# Patient Record
Sex: FEMALE | Race: WHITE
[De-identification: names, ages, dates, MRNs, and addresses within clinical notes are randomized per-mention and may not be internally consistent; named-entity substitution may affect disease eponyms.]

---

## 2019-03-07 ENCOUNTER — HOSPITAL ENCOUNTER (OUTPATIENT)
Dept: HOSPITAL 8 - STAR | Age: 74
Discharge: HOME | End: 2019-03-07
Attending: NEUROLOGICAL SURGERY
Payer: MEDICARE

## 2019-03-07 DIAGNOSIS — M43.16: Primary | ICD-10-CM

## 2019-03-07 DIAGNOSIS — M47.814: ICD-10-CM

## 2019-03-07 DIAGNOSIS — I44.1: ICD-10-CM

## 2019-03-07 DIAGNOSIS — R79.1: ICD-10-CM

## 2019-03-07 DIAGNOSIS — R82.998: ICD-10-CM

## 2019-03-07 LAB
ANION GAP SERPL CALC-SCNC: 4 MMOL/L (ref 5–15)
APTT BLD: 34 SECONDS (ref 25–31)
BASOPHILS # BLD AUTO: 0.02 X10^3/UL (ref 0–0.1)
BASOPHILS NFR BLD AUTO: 0 % (ref 0–1)
CALCIUM SERPL-MCNC: 8.6 MG/DL (ref 8.5–10.1)
CHLORIDE SERPL-SCNC: 109 MMOL/L (ref 98–107)
CREAT SERPL-MCNC: 0.86 MG/DL (ref 0.55–1.02)
CULTURE INDICATED?: YES
EOSINOPHIL # BLD AUTO: 0 X10^3/UL (ref 0–0.4)
EOSINOPHIL NFR BLD AUTO: 0 % (ref 1–7)
ERYTHROCYTE [DISTWIDTH] IN BLOOD BY AUTOMATED COUNT: 15.6 % (ref 9.6–15.2)
INR PPP: 1 (ref 0.93–1.1)
LYMPHOCYTES # BLD AUTO: 0.99 X10^3/UL (ref 1–3.4)
LYMPHOCYTES NFR BLD AUTO: 17 % (ref 22–44)
MCH RBC QN AUTO: 30.9 PG (ref 27–34.8)
MCHC RBC AUTO-ENTMCNC: 33.2 G/DL (ref 32.4–35.8)
MCV RBC AUTO: 92.9 FL (ref 80–100)
MD: NO
MICROSCOPIC: (no result)
MONOCYTES # BLD AUTO: 0.37 X10^3/UL (ref 0.2–0.8)
MONOCYTES NFR BLD AUTO: 6 % (ref 2–9)
NEUTROPHILS # BLD AUTO: 4.57 X10^3/UL (ref 1.8–6.8)
NEUTROPHILS NFR BLD AUTO: 77 % (ref 42–75)
PLATELET # BLD AUTO: 277 X10^3/UL (ref 130–400)
PMV BLD AUTO: 8.5 FL (ref 7.4–10.4)
PROTHROMBIN TIME: 10.5 SECONDS (ref 9.6–11.5)
RBC # BLD AUTO: 4.66 X10^6/UL (ref 3.82–5.3)

## 2019-03-07 PROCEDURE — 85025 COMPLETE CBC W/AUTO DIFF WBC: CPT

## 2019-03-07 PROCEDURE — 93005 ELECTROCARDIOGRAM TRACING: CPT

## 2019-03-07 PROCEDURE — 85730 THROMBOPLASTIN TIME PARTIAL: CPT

## 2019-03-07 PROCEDURE — 36415 COLL VENOUS BLD VENIPUNCTURE: CPT

## 2019-03-07 PROCEDURE — 87147 CULTURE TYPE IMMUNOLOGIC: CPT

## 2019-03-07 PROCEDURE — 81001 URINALYSIS AUTO W/SCOPE: CPT

## 2019-03-07 PROCEDURE — 80048 BASIC METABOLIC PNL TOTAL CA: CPT

## 2019-03-07 PROCEDURE — 87086 URINE CULTURE/COLONY COUNT: CPT

## 2019-03-07 PROCEDURE — 85610 PROTHROMBIN TIME: CPT

## 2019-03-07 PROCEDURE — 71046 X-RAY EXAM CHEST 2 VIEWS: CPT

## 2019-03-18 ENCOUNTER — HOSPITAL ENCOUNTER (OUTPATIENT)
Dept: HOSPITAL 8 - STAR | Age: 74
Discharge: HOME | End: 2019-03-18
Attending: NEUROLOGICAL SURGERY
Payer: MEDICARE

## 2019-03-18 DIAGNOSIS — Z01.818: Primary | ICD-10-CM

## 2019-03-18 DIAGNOSIS — M43.16: ICD-10-CM

## 2019-03-18 PROCEDURE — 93005 ELECTROCARDIOGRAM TRACING: CPT

## 2019-06-17 ENCOUNTER — HOSPITAL ENCOUNTER (OUTPATIENT)
Dept: RADIOLOGY | Facility: MEDICAL CENTER | Age: 74
End: 2019-06-17
Attending: NEUROLOGICAL SURGERY
Payer: MEDICARE

## 2019-06-17 DIAGNOSIS — M43.16 SPONDYLOLISTHESIS OF LUMBAR REGION: ICD-10-CM

## 2019-06-17 PROCEDURE — 72110 X-RAY EXAM L-2 SPINE 4/>VWS: CPT

## 2019-09-09 ENCOUNTER — HOSPITAL ENCOUNTER (OUTPATIENT)
Dept: RADIOLOGY | Facility: MEDICAL CENTER | Age: 74
End: 2019-09-09
Attending: PHYSICIAN ASSISTANT
Payer: MEDICARE

## 2019-09-09 DIAGNOSIS — G89.29 CHRONIC LOW BACK PAIN WITHOUT SCIATICA, UNSPECIFIED BACK PAIN LATERALITY: ICD-10-CM

## 2019-09-09 DIAGNOSIS — M54.50 CHRONIC LOW BACK PAIN WITHOUT SCIATICA, UNSPECIFIED BACK PAIN LATERALITY: ICD-10-CM

## 2019-09-09 PROCEDURE — 72110 X-RAY EXAM L-2 SPINE 4/>VWS: CPT

## 2020-12-15 ENCOUNTER — TELEMEDICINE (OUTPATIENT)
Dept: MEDICAL GROUP | Age: 75
End: 2020-12-15
Payer: MEDICARE

## 2020-12-15 VITALS
HEIGHT: 64 IN | TEMPERATURE: 98.6 F | DIASTOLIC BLOOD PRESSURE: 81 MMHG | WEIGHT: 155 LBS | HEART RATE: 77 BPM | SYSTOLIC BLOOD PRESSURE: 130 MMHG | BODY MASS INDEX: 26.46 KG/M2

## 2020-12-15 DIAGNOSIS — E04.1 THYROID NODULE: ICD-10-CM

## 2020-12-15 DIAGNOSIS — Z00.00 HEALTH CARE MAINTENANCE: ICD-10-CM

## 2020-12-15 DIAGNOSIS — E78.5 DYSLIPIDEMIA: ICD-10-CM

## 2020-12-15 DIAGNOSIS — E03.9 ACQUIRED HYPOTHYROIDISM: ICD-10-CM

## 2020-12-15 DIAGNOSIS — E06.3 HASHIMOTO'S THYROIDITIS: ICD-10-CM

## 2020-12-15 DIAGNOSIS — E55.9 HYPOVITAMINOSIS D: ICD-10-CM

## 2020-12-15 PROCEDURE — 99204 OFFICE O/P NEW MOD 45 MIN: CPT | Performed by: INTERNAL MEDICINE

## 2020-12-15 ASSESSMENT — ANXIETY QUESTIONNAIRES
4. TROUBLE RELAXING: NOT AT ALL
5. BEING SO RESTLESS THAT IT IS HARD TO SIT STILL: NOT AT ALL
GAD7 TOTAL SCORE: 0
3. WORRYING TOO MUCH ABOUT DIFFERENT THINGS: NOT AT ALL
1. FEELING NERVOUS, ANXIOUS, OR ON EDGE: NOT AT ALL
7. FEELING AFRAID AS IF SOMETHING AWFUL MIGHT HAPPEN: NOT AT ALL
2. NOT BEING ABLE TO STOP OR CONTROL WORRYING: NOT AT ALL
6. BECOMING EASILY ANNOYED OR IRRITABLE: NOT AT ALL

## 2020-12-15 ASSESSMENT — PATIENT HEALTH QUESTIONNAIRE - PHQ9: CLINICAL INTERPRETATION OF PHQ2 SCORE: 0

## 2020-12-16 NOTE — PROGRESS NOTES
Telemedicine Visit: Established Patient     This Remote Face to Face encounter was conducted via Zoom. Given the importance of social distancing and other strategies recommended to reduce the risk of COVID-19 transmission, I am providing medical care to this patient via audio/video visit in place of an in person visit at the request of the patient. Verbal consent to telehealth, risks, benefits, and consequences were discussed. Patient retains the right to withdraw at any time. All existing confidentiality protections apply. The patient has access to all transmitted medical information. No dissemination of any patient images or information to other entities without further written consent.    CHIEF COMPLAINT     Chief Complaint   Patient presents with   • Establish Care       HPI  Paige Gudino is a 75 y.o. female who presents today for the following       Hypothyroidism, Hashimoto, Thyroid nodule  [place] thyroid, 60 mg, taking daily early in am, before any po intake   - 1.5 tabs x 3 days, 1 tab x 4 days a week  No temperature intolerance. No change in weight,  hair/skin quality, BMs.   No tremors, weakness.  No peripheral swelling.  No mood changes.  FH: neg    Thyroid ultrasound, 12/9/2020  Mean solid-appearing nodule measuring 8X5X6 mm      Dyslipidemia  The patient had slightly abnormal lipid panel, no medications  Diet: Advised low-calorie  Exercise: Advised daily  BMI: 26  FH: neg     Hypovitaminosis D  The patient had low vitamin D level.  Vitamin D supplement :OTC    Reviewed PMH, PSH, FH, SH, ALL, HCM/IMM  Reviewed MEDS    CURRENT MEDICATIONS  No current outpatient medications on file.     No current facility-administered medications for this visit.      ALLERGIES  Allergies: Synthroid [levothroid]  PAST MEDICAL HISTORY  Past Medical History:   Diagnosis Date   • Hypothyroidism    • Thyroid nodule      SURGICAL HISTORY  She  has no past surgical history on file.  SOCIAL HISTORY  Social History  "    Tobacco Use   • Smoking status: Not on file   Substance Use Topics   • Alcohol use: Not on file   • Drug use: Not on file     Social History     Social History Narrative   • Not on file     FAMILY HISTORY  History reviewed. No pertinent family history.  No family status information on file.     ROS   Constitutional: Negative for fever, chills, fatigue.  HENT: Negative for congestion, sore throat.  Eyes: Negative for vision problems.   Respiratory: Negative for cough, shortness of breath.  Cardiovascular: Negative for chest pain, palpitations.   Gastrointestinal: Negative for heartburn, nausea, abdominal pain.   Genitourinary: Negative for dysuria.  Musculoskeletal: Negative for significant myalgia, back and joint pain.   Skin: Negative for rash.   Neuro: Negative for dizziness, weakness and headaches.   Endo/Heme/Allergies: Does not bruise/bleed easily.   Psychiatric/Behavioral: Negative for depression.    Objective   Vitals obtained by patient:  Blood Pressure 130/81 (BP Location: Left arm, Patient Position: Sitting)   Pulse 77   Temperature 37 °C (98.6 °F) (Temporal)   Height 1.626 m (5' 4\")   Weight 70.3 kg (155 lb)   Body Mass Index 26.61 kg/m²   Physical Exam:  Constitutional: Alert, no distress, well-groomed.  Skin: No rash in visible areas.  Eye: Round. Conjunctiva clear, lids normal.  ENMT: Lips pink without lesions, good dentition. Phonation normal.  Neck: No visible masses or thyromegaly. Moves freely without pain.  CV: no peripheral cyanosis, tachycardia.  Respiratory: Unlabored respiratory effort, no cough or audible wheezing.  Psych: Alert and oriented x3, normal affect and mood.     Labs     Labs are reviewed and discussed with a patient  Lab Results   Component Value Date/Time    WBC 6.0 09/05/2006 10:55 AM    RBC 4.54 09/05/2006 10:55 AM    HEMOGLOBIN 12.6 09/05/2006 10:55 AM    HEMATOCRIT 37.7 09/05/2006 10:55 AM    MCV 83.0 09/05/2006 10:55 AM    MCH 27.7 09/05/2006 10:55 AM    MCHC 33.4 " 09/05/2006 10:55 AM    MPV 6.9 09/05/2006 10:55 AM        Imaging      Reviewed and discussed per \[facility]\""    Assessment and Plan     Paige Gudino is a 75 y.o. female    1. Acquired hypothyroidism  Asymptomatic, pending labs, continue current treatment  - TSH; Future  - FREE THYROXINE; Future  - T3 FREE; Future  - THYROID PEROXIDASE  (TPO) AB; Future    2. Thyroid nodule  Reviewed and discussed imaging  - TSH; Future  - FREE THYROXINE; Future    3. Hashimoto's thyroiditis  Follow-up labs  - THYROID PEROXIDASE  (TPO) AB; Future    4. Dyslipidemia  Advised per HPI, pending labs  - Comp Metabolic Panel; Future  - Lipid Profile; Future    5. Hypovitaminosis D  Continue current supplement, follow-up labs  - VITAMIN D,25 HYDROXY; Future    6. Health care maintenance  Pending records from [facility]    Follow-up: As needed, according to results

## 2020-12-21 ENCOUNTER — TELEPHONE (OUTPATIENT)
Dept: MEDICAL GROUP | Age: 75
End: 2020-12-21

## 2020-12-21 NOTE — TELEPHONE ENCOUNTER
FINAL PRIOR AUTHORIZATION STATUS:    1.  Name of Medication & Dose: [place] Thyroid 60mg tab     2. Prior Auth Status: Approved through 12/31/2021     3. Action Taken: Pharmacy Notified: yes Patient Notified: yes

## 2021-01-04 ENCOUNTER — HOSPITAL ENCOUNTER (OUTPATIENT)
Dept: LAB | Facility: MEDICAL CENTER | Age: 76
End: 2021-01-04
Attending: INTERNAL MEDICINE
Payer: MEDICARE

## 2021-01-04 DIAGNOSIS — E04.1 THYROID NODULE: ICD-10-CM

## 2021-01-04 DIAGNOSIS — E78.5 DYSLIPIDEMIA: ICD-10-CM

## 2021-01-04 DIAGNOSIS — E03.9 ACQUIRED HYPOTHYROIDISM: ICD-10-CM

## 2021-01-04 DIAGNOSIS — E55.9 HYPOVITAMINOSIS D: ICD-10-CM

## 2021-01-04 DIAGNOSIS — E06.3 HASHIMOTO'S THYROIDITIS: ICD-10-CM

## 2021-01-04 LAB
25(OH)D3 SERPL-MCNC: 21 NG/ML (ref 30–100)
ALBUMIN SERPL BCP-MCNC: 4.1 G/DL (ref 3.2–4.9)
ALBUMIN/GLOB SERPL: 1.2 G/DL
ALP SERPL-CCNC: 85 U/L (ref 30–99)
ALT SERPL-CCNC: 21 U/L (ref 2–50)
ANION GAP SERPL CALC-SCNC: 9 MMOL/L (ref 7–16)
AST SERPL-CCNC: 24 U/L (ref 12–45)
BILIRUB SERPL-MCNC: 1.5 MG/DL (ref 0.1–1.5)
BUN SERPL-MCNC: 11 MG/DL (ref 8–22)
CALCIUM SERPL-MCNC: 9.1 MG/DL (ref 8.5–10.5)
CHLORIDE SERPL-SCNC: 103 MMOL/L (ref 96–112)
CHOLEST SERPL-MCNC: 229 MG/DL (ref 100–199)
CO2 SERPL-SCNC: 26 MMOL/L (ref 20–33)
CREAT SERPL-MCNC: 0.79 MG/DL (ref 0.5–1.4)
FASTING STATUS PATIENT QL REPORTED: NORMAL
GLOBULIN SER CALC-MCNC: 3.4 G/DL (ref 1.9–3.5)
GLUCOSE SERPL-MCNC: 94 MG/DL (ref 65–99)
HDLC SERPL-MCNC: 45 MG/DL
LDLC SERPL CALC-MCNC: 154 MG/DL
POTASSIUM SERPL-SCNC: 4.3 MMOL/L (ref 3.6–5.5)
PROT SERPL-MCNC: 7.5 G/DL (ref 6–8.2)
SODIUM SERPL-SCNC: 138 MMOL/L (ref 135–145)
T3FREE SERPL-MCNC: 4.67 PG/ML (ref 2–4.4)
T4 FREE SERPL-MCNC: 0.89 NG/DL (ref 0.93–1.7)
THYROPEROXIDASE AB SERPL-ACNC: 490 IU/ML (ref 0–9)
TRIGL SERPL-MCNC: 148 MG/DL (ref 0–149)
TSH SERPL DL<=0.005 MIU/L-ACNC: 7.16 UIU/ML (ref 0.38–5.33)

## 2021-01-04 PROCEDURE — 84439 ASSAY OF FREE THYROXINE: CPT

## 2021-01-04 PROCEDURE — 86376 MICROSOMAL ANTIBODY EACH: CPT

## 2021-01-04 PROCEDURE — 84443 ASSAY THYROID STIM HORMONE: CPT

## 2021-01-04 PROCEDURE — 82306 VITAMIN D 25 HYDROXY: CPT

## 2021-01-04 PROCEDURE — 36415 COLL VENOUS BLD VENIPUNCTURE: CPT

## 2021-01-04 PROCEDURE — 80061 LIPID PANEL: CPT

## 2021-01-04 PROCEDURE — 80053 COMPREHEN METABOLIC PANEL: CPT

## 2021-01-04 PROCEDURE — 84481 FREE ASSAY (FT-3): CPT

## 2021-01-15 DIAGNOSIS — Z23 NEED FOR VACCINATION: ICD-10-CM

## 2021-01-19 ENCOUNTER — TELEPHONE (OUTPATIENT)
Dept: MEDICAL GROUP | Age: 76
End: 2021-01-19

## 2021-01-19 NOTE — TELEPHONE ENCOUNTER
ESTABLISHED PATIENT PRE-VISIT PLANNING     01/19/21 Called pt. No answer. LVM: Offered AWV. Advised Virtual Visit scheduled Wednesday, 1/20@1:30PM. Shoutlet active. Advised download Zoom cristobal. Labs Done.      Patient was contacted to complete PVP.     Note: Patient will not be contacted if there is no indication to call.     1.  Reviewed notes from the last few office visits within the medical group: Yes    2.  If any orders were placed at last visit or intended to be done for this visit (i.e. 6 mos follow-up), do we have Results/Consult Notes?         •  Labs - Labs ordered, completed on 01/04/21 and results are in chart.  Note: If patient appointment is for lab review and patient did not complete labs, check with provider if OK to reschedule patient until labs completed.       •  Imaging - Imaging was not ordered at last office visit.       •  Referrals - No referrals were ordered at last office visit.    3. Is this appointment scheduled as a Hospital Follow-Up? No    4.  Immunizations were updated in Epic using Reconcile Outside Information activity? Yes    5.  Patient is due for the following Health Maintenance Topics:   Health Maintenance Due   Topic Date Due   • Annual Wellness Visit  1945   • HEPATITIS C SCREENING  1945   • COLONOSCOPY  10/06/1996   • MAMMOGRAM  09/18/2007   • BONE DENSITY  10/06/2011   • IMM PNEUMOCOCCAL VACCINE: 65+ Years (2 of 2 - PPSV23) 10/06/2011   • IMM ZOSTER VACCINES (2 of 3) 03/05/2015       6.  AHA (Pulse8) form printed for Provider? N/A

## 2021-01-19 NOTE — TELEPHONE ENCOUNTER
Phone Number Called: [phone] (home)       Call outcome: Left detailed message for patient. Informed to call back with any additional questions.    Message: Received message from 's Medical Assistant Mima that patient came into office today & spoke with  requesting to speak with myself in person and I was unavailable. It is unknown the reasoning for the patient's visit, however, call placed to patient afterwards @ [phone] (home)   To follow up. No answer. Received voicemail. Left voice message advising Virtual Visit scheduled for tomorrow, Wednesday, 1/20/21@1:30PM-. Advised download [employer] cristobal. [place] active.

## 2021-01-20 ENCOUNTER — TELEMEDICINE (OUTPATIENT)
Dept: MEDICAL GROUP | Age: 76
End: 2021-01-20
Payer: MEDICARE

## 2021-01-20 VITALS
SYSTOLIC BLOOD PRESSURE: 133 MMHG | DIASTOLIC BLOOD PRESSURE: 71 MMHG | BODY MASS INDEX: 26.46 KG/M2 | WEIGHT: 155 LBS | HEIGHT: 64 IN

## 2021-01-20 DIAGNOSIS — Z00.00 MEDICARE ANNUAL WELLNESS VISIT, SUBSEQUENT: ICD-10-CM

## 2021-01-20 DIAGNOSIS — Z78.0 POSTMENOPAUSAL STATUS (AGE-RELATED) (NATURAL): ICD-10-CM

## 2021-01-20 DIAGNOSIS — E06.3 HASHIMOTO'S THYROIDITIS: ICD-10-CM

## 2021-01-20 DIAGNOSIS — E03.9 ACQUIRED HYPOTHYROIDISM: ICD-10-CM

## 2021-01-20 DIAGNOSIS — Z12.12 SCREENING FOR COLORECTAL CANCER: ICD-10-CM

## 2021-01-20 DIAGNOSIS — E78.5 DYSLIPIDEMIA: ICD-10-CM

## 2021-01-20 DIAGNOSIS — Z11.59 NEED FOR HEPATITIS C SCREENING TEST: ICD-10-CM

## 2021-01-20 DIAGNOSIS — N95.1 MENOPAUSAL STATE: ICD-10-CM

## 2021-01-20 DIAGNOSIS — Z00.00 HEALTH CARE MAINTENANCE: ICD-10-CM

## 2021-01-20 DIAGNOSIS — Z12.31 ENCOUNTER FOR SCREENING MAMMOGRAM FOR BREAST CANCER: ICD-10-CM

## 2021-01-20 DIAGNOSIS — E04.1 THYROID NODULE: ICD-10-CM

## 2021-01-20 DIAGNOSIS — Z12.11 SCREENING FOR COLORECTAL CANCER: ICD-10-CM

## 2021-01-20 DIAGNOSIS — E55.9 HYPOVITAMINOSIS D: ICD-10-CM

## 2021-01-20 PROCEDURE — G0439 PPPS, SUBSEQ VISIT: HCPCS | Mod: 95,CR | Performed by: INTERNAL MEDICINE

## 2021-01-20 RX ORDER — THYROID 60 MG/1
TABLET ORAL
Qty: 114 TAB | Refills: 4 | Status: SHIPPED | OUTPATIENT
Start: 2021-01-20 | End: 2021-01-20

## 2021-01-20 RX ORDER — THYROID 60 MG/1
TABLET ORAL
Qty: 135 TAB | Refills: 4 | Status: SHIPPED | OUTPATIENT
Start: 2021-01-20 | End: 2022-02-03

## 2021-01-20 ASSESSMENT — ENCOUNTER SYMPTOMS: GENERAL WELL-BEING: EXCELLENT

## 2021-01-20 ASSESSMENT — PATIENT HEALTH QUESTIONNAIRE - PHQ9: CLINICAL INTERPRETATION OF PHQ2 SCORE: 0

## 2021-01-20 ASSESSMENT — ACTIVITIES OF DAILY LIVING (ADL): BATHING_REQUIRES_ASSISTANCE: 0

## 2021-01-20 NOTE — PROGRESS NOTES
Chief Complaint   Patient presents with   • Annual Exam   • Lab Results   • Medication Refill     armour thyroid 60 mg     HPI:  Rosangela is a 75 y.o. here for Medicare Annual Wellness Visit    Patient Active Problem List    Diagnosis Date Noted   • Acquired hypothyroidism 12/15/2020   • Thyroid nodule 12/15/2020   • Hashimoto's thyroiditis 12/15/2020   • Dyslipidemia 12/15/2020   • Health care maintenance 12/15/2020   • Hypovitaminosis D 12/15/2020     No current outpatient medications on file.     No current facility-administered medications for this visit.       Patient is taking medications as noted in medication list.  Current supplements as per medication list.     Allergies: Synthroid [levothroid]    Current social contact/activities: Nothing right now with virus and weather, when weather is nice and no virus walks with her dog, usually walk around [facility] for about 30 minutes     Is patient current with immunizations? No, due for PNEUMOVAX (PPSV23) and SHINGRIX (Shingles). Patient is interested in receiving NONE today.    She  reports that she has never smoked. She has never used smokeless tobacco. She reports previous alcohol use. She reports that she does not use drugs.  Counseling given: Not Answered    DPA/Advanced directive: Patient has Advanced Directive on file.     ROS:    Gait: Uses no assistive device   Ostomy: No   Other tubes: No   Amputations: No   Chronic oxygen use No   Last eye exam 9/2020   Wears hearing aids: No   : Denies any urinary leakage during the last 6 months    Screening:    Depression Screening  Little interest or pleasure in doing things?  0 - not at all  Feeling down, depressed, or hopeless? 0 - not at all  Patient Health Questionnaire Score: 0    Screening for Cognitive Impairment  Three Minute Recall (river, nation, finger)   3/3 [place], nation, finger  [place] clock face with all 12 numbers and set the hands to show 10 past 11.       If cognitive concerns identified, deferred for  follow up unless specifically addressed in assessment and plan.    Fall Risk Assessment  Has the patient had two or more falls in the last year or any fall with injury in the last year?    No  If fall risk identified, deferred for follow up unless specifically addressed in assessment and plan.    Safety Assessment  Throw rugs on floor.  No  Handrails on all stairs.  Yes  Good lighting in all hallways.  Yes  Difficulty hearing.  No  Patient counseled about all safety risks that were identified.    Functional Assessment ADLs  Are there any barriers preventing you from cooking for yourself or meeting nutritional needs?  No.    Are there any barriers preventing you from driving safely or obtaining transportation?  No.    Are there any barriers preventing you from using a telephone or calling for help?  No.    Are there any barriers preventing you from shopping?  No.    Are there any barriers preventing you from taking care of your own finances?  No.    Are there any barriers preventing you from managing your medications?  No.    Are there any barriers preventing you from showering, bathing or dressing yourself?  No.    Are you currently engaging in any exercise or physical activity?  Yes.  Nothing right now with virus and weather, when weather is nice and no virus walks with her dog, usually walk around [facility] for about 30 minutes  What is your perception of your health?  Excellent.    Health Maintenance Summary                HEPATITIS C SCREENING Overdue 1945     COLONOSCOPY Overdue 10/6/1996     MAMMOGRAM Overdue 9/18/2007      Done 9/18/2006 [id number]     Patient has more history with this topic...    BONE DENSITY Overdue 10/6/2011      Done 8/9/2005 DS-BONE DENSITY STUDY (DEXA)    IMM ZOSTER VACCINES Overdue 3/5/2015      Done 1/8/2015 Imm Admin: Zoster Vaccine Live (ZVL) (Zostavax) - HISTORICAL DATA    IMM PNEUMOCOCCAL VACCINE: 65+ Years Overdue 6/25/2020      Done 6/25/2019 Imm Admin:  "Pneumococcal Conjugate Vaccine (Prevnar/PCV-13)    IMM DTaP/Tdap/Td Vaccine Next Due 6/25/2029      Done 6/25/2019 Imm Admin: Tdap Vaccine        Patient Care Team:  Jackie Velarde M.D. as PCP - General (Family Medicine)    Social History     Tobacco Use   • Smoking status: Never Smoker   • Smokeless tobacco: Never Used   Substance Use Topics   • Alcohol use: Not Currently   • Drug use: Never     Family History   Problem Relation Age of Onset   • Hyperlipidemia Neg Hx      She  has a past medical history of Hypothyroidism and Thyroid nodule.   History reviewed. No pertinent surgical history.    Exam:   /71 (BP Location: Left arm, Patient Position: Sitting)   Ht 1.626 m (5' 4\")   Wt 70.3 kg (155 lb)  Body mass index is 26.61 kg/m².  Hearing good.    Dentition good  Alert, oriented in no acute distress.  Eye contact is good, speech goal directed, affect calm    Labs  Reviewed and discussed  Lab Results   Component Value Date/Time    CHOLSTRLTOT 229 (H) 01/04/2021 10:02 AM     (H) 01/04/2021 10:02 AM    HDL 45 01/04/2021 10:02 AM    TRIGLYCERIDE 148 01/04/2021 10:02 AM       Lab Results   Component Value Date/Time    SODIUM 138 01/04/2021 10:02 AM    POTASSIUM 4.3 01/04/2021 10:02 AM    CHLORIDE 103 01/04/2021 10:02 AM    CO2 26 01/04/2021 10:02 AM    GLUCOSE 94 01/04/2021 10:02 AM    BUN 11 01/04/2021 10:02 AM    CREATININE 0.79 01/04/2021 10:02 AM     Lab Results   Component Value Date/Time    ALKPHOSPHAT 85 01/04/2021 10:02 AM    ASTSGOT 24 01/04/2021 10:02 AM    ALTSGPT 21 01/04/2021 10:02 AM    TBILIRUBIN 1.5 01/04/2021 10:02 AM      No results found for: HBA1C  No results found for: TSH  Lab Results   Component Value Date/Time    FREET4 0.89 (L) 01/04/2021 10:02 AM     Assessment and Plan    1. Medicare annual wellness visit, subsequent  Reviewed PMH, PSH, FH, SH, ALL, MEDS, HCM/IMM.   - Subsequent Annual Wellness Visit - Includes PPPS ()    2. Health care maintenance  Per HPI  - " Subsequent Annual Wellness Visit - Includes PPPS ()    3. Need for hepatitis C screening test  - HEP C VIRUS ANTIBODY; Future  - Subsequent Annual Wellness Visit - Includes PPPS ()    4. Screening for colorectal cancer  - REFERRAL TO GI FOR COLONOSCOPY  - COLOGUARD (FIT DNA)  - Subsequent Annual Wellness Visit - Includes PPPS ()    5. Encounter for screening mammogram for breast cancer  - MA-SCREENING MAMMO BILAT W/CAD; Future  - Subsequent Annual Wellness Visit - Includes PPPS ()    6. Postmenopausal status (age-related) (natural)  - DS-BONE DENSITY STUDY (DEXA)  - Subsequent Annual Wellness Visit - Includes PPPS ()  7. Menopausal state  - DS-BONE DENSITY STUDY (DEXA)  - Subsequent Annual Wellness Visit - Includes PPPS ()    8. Dyslipidemia  Borderline, discussed the treatment options, patient prefers low-calorie diet, exercise, weight control  - Comp Metabolic Panel; Future  - Lipid Profile; Future  - Subsequent Annual Wellness Visit - Includes PPPS ()    9. Acquired hypothyroidism  On [place] Thyroid, 60 mg taking 1.5 tablets for 3 days in a week and 1 tablet for 4 days in a week  -Increase medicine to 1.5 tablets daily    - TSH; Standing  - FREE THYROXINE; Standing  - THYROID PEROXIDASE  (TPO) AB; Future  - thyroid (ARMOUR THYROID) 60 MG Tab; Take 1.5 tabs early in AM, PO  Dispense: 135 Tab; Refill: 4  - Subsequent Annual Wellness Visit - Includes PPPS ()    10. Hashimoto's thyroiditis  Reviewed labs  - THYROID PEROXIDASE  (TPO) AB; Future  - Subsequent Annual Wellness Visit - Includes PPPS ()    11. Thyroid nodule  Imaging will be followed up  - Subsequent Annual Wellness Visit - Includes PPPS ()    12. Hypovitaminosis D  No supplement, will start 2000 units of vitamin D daily, OTC  - VITAMIN D,25 HYDROXY; Future  - Subsequent Annual Wellness Visit - Includes PPPS ()    Services suggested: No services needed at this time  Health Care Screening  recommendations as per orders if indicated.  Referrals offered: PT/OT/Nutrition counseling/Behavioral Health/Smoking cessation as per orders if indicated.    Discussion today about general wellness and lifestyle habits:    · Prevent falls and reduce trip hazards; Cautioned about securing or removing rugs.  · Have a working fire alarm and carbon monoxide detector;   · Engage in regular physical activity and social activities       Follow-up: Return in about 6 months (around 7/20/2021) for LABS.

## 2021-04-14 ENCOUNTER — OFFICE VISIT (OUTPATIENT)
Dept: HEALTH INFORMATION MANAGEMENT | Facility: MEDICAL CENTER | Age: 76
End: 2021-04-14

## 2021-04-14 DIAGNOSIS — E88.810 METABOLIC SYNDROME: ICD-10-CM

## 2021-04-14 DIAGNOSIS — E78.2 MIXED HYPERLIPIDEMIA: ICD-10-CM

## 2021-04-14 DIAGNOSIS — E03.9 HYPOTHYROIDISM, UNSPECIFIED TYPE: ICD-10-CM

## 2021-04-14 DIAGNOSIS — E06.3 AUTOIMMUNE THYROIDITIS: ICD-10-CM

## 2021-04-14 PROCEDURE — 97802 MEDICAL NUTRITION INDIV IN: CPT | Performed by: DIETITIAN, REGISTERED

## 2021-04-14 NOTE — PROGRESS NOTES
4/14/2021    Jackie Velarde M.D.  75 y.o.   Time in/out: 1188    Anthropometrics/Objective  There were no vitals filed for this visit.    There is no height or weight on file to calculate BMI.    Stated Goal Weight: NA    Estimated Caloric needs NA  Kcal   See comprehensive patient history form for further information     Subjective:  - provided updated labs as out of network provider  - may be switching to [facility] PCP  - current PCP rec no soy, gluten, dairy or sugar  - has been making some swaps  - does not feel different      Nutrition Diagnosis (PES Statement)  Problem (Nutrition diagnosis)  Clinical: Altered nutrition-related lab values    Etiology(Addresses the cause,contributing factors)  Food and nutrition related knowledge deficit    Signs/Symptoms (Address observations and stated info: subjective and objective data)  Reports or observations of CHO/PRO/FAT intake that is different from recommended types or ingested on an irregular basis    Client history:  Condition(s) associated with a diagnosis or treatment (specify) acquired hypothyroidism, hashimotos, dyslipidemia, Vit D def    Biochemical data, medical test and procedures  No results found for: HBA1C@  No results found for: POCGLUCOSE  Lab Results   Component Value Date/Time    CHOLSTRLTOT 229 (H) 01/04/2021 10:02 AM     (H) 01/04/2021 10:02 AM    HDL 45 01/04/2021 10:02 AM    TRIGLYCERIDE 148 01/04/2021 10:02 AM         Nutrition Intervention    Meal and Snack  Recommend a general/healthful diet    Comprehensive Nutrition education Instruction or training leading to in-depth nutrition related knowledge about:  Handouts provided regarding topics discussed: AHA fats handout    Monitoring & Evaluation Plan    Behavioral-Environmental:  Behavior: identify if food feels good or not for you    Food / Nutrient Intake:  Macronutrients intake: inc more heart healthy fats    Physical Signs / Symptoms:  Lipid profiles: WNL    Assessment Notes:  Rosangela  would like to work on a healthful diet as she had some recent labs done and PCP made some recommendations. She has been making some swaps but unsure if they are helpful as she has not been feeling any different. She was a bit worried about her A1c initially as well. We discussed she is in a good goal range as it was 5.9%. We discussed her cholesterol levels. We reviewed the impact of thyroid abnormalities on cholesterol levels. At this visit we reviewed the AHA fats handout. At next visit suggest reviewing the plate planner/Med style plate.     Goals:  1. Eat what feels good to you! If foods don't make you feel good, then you can avoid it  2. Keep up with the movement! Add more as you feel comfortable.    Follow up: 4-6 weeks

## 2021-05-12 ENCOUNTER — OFFICE VISIT (OUTPATIENT)
Dept: HEALTH INFORMATION MANAGEMENT | Facility: MEDICAL CENTER | Age: 76
End: 2021-05-12

## 2021-05-12 DIAGNOSIS — E88.810 METABOLIC SYNDROME: ICD-10-CM

## 2021-05-12 DIAGNOSIS — E06.3 AUTOIMMUNE THYROIDITIS: ICD-10-CM

## 2021-05-12 DIAGNOSIS — E03.9 HYPOTHYROIDISM, UNSPECIFIED TYPE: ICD-10-CM

## 2021-05-12 DIAGNOSIS — E78.2 MIXED HYPERLIPIDEMIA: ICD-10-CM

## 2021-05-12 PROCEDURE — 97803 MED NUTRITION INDIV SUBSEQ: CPT | Performed by: DIETITIAN, REGISTERED

## 2021-05-12 NOTE — PROGRESS NOTES
Nutrition Reassess    5/12/2021    Jackie Velarde M.D.   75 y.o.     Time In/Out: 2:35-3:09      Subjective:  - more regular movement  - down 9 lbs  - cooking differently  - more mindful  - heart healthy fats usually  - decreasing added sugars  - gardening more now    Anthropometrics/Objective    There were no vitals filed for this visit.  There is no height or weight on file to calculate BMI.          ReAssesment/Notes:    Rosangela has been working on her overall health goals towards better health inclusive of weight loss. She is pleased with her weight loss. States she feels good with her choices, finds them sustainable. Allows herself to be inclusive of foods, applauded her for this. Feels comfortable with sweet treats and having them in portions that feel good for her rather than large portions of these. Provided Rosangela with the plate planner handout to identify portions, and Med style eating handouts to help identify healthier food choices as a norm. Encouraged Rosangela to reach out with any questions.    Follow-up: 3-6 months

## 2021-06-11 ENCOUNTER — OFFICE VISIT (OUTPATIENT)
Dept: MEDICAL GROUP | Age: 76
End: 2021-06-11
Payer: MEDICARE

## 2021-06-11 VITALS
HEART RATE: 74 BPM | DIASTOLIC BLOOD PRESSURE: 70 MMHG | BODY MASS INDEX: 26.84 KG/M2 | HEIGHT: 64 IN | SYSTOLIC BLOOD PRESSURE: 132 MMHG | OXYGEN SATURATION: 96 % | WEIGHT: 157.2 LBS | TEMPERATURE: 97.1 F

## 2021-06-11 DIAGNOSIS — E78.5 DYSLIPIDEMIA: ICD-10-CM

## 2021-06-11 DIAGNOSIS — Z76.89 ESTABLISHING CARE WITH NEW DOCTOR, ENCOUNTER FOR: ICD-10-CM

## 2021-06-11 DIAGNOSIS — E53.8 VITAMIN B 12 DEFICIENCY: ICD-10-CM

## 2021-06-11 DIAGNOSIS — E55.9 VITAMIN D INSUFFICIENCY: ICD-10-CM

## 2021-06-11 DIAGNOSIS — E03.9 ACQUIRED HYPOTHYROIDISM: ICD-10-CM

## 2021-06-11 PROBLEM — Z00.00 HEALTH CARE MAINTENANCE: Status: RESOLVED | Noted: 2020-12-15 | Resolved: 2021-06-11

## 2021-06-11 PROCEDURE — 99213 OFFICE O/P EST LOW 20 MIN: CPT | Performed by: PHYSICIAN ASSISTANT

## 2021-06-11 RX ORDER — TRIAMCINOLONE ACETONIDE 5 MG/G
CREAM TOPICAL
COMMUNITY
Start: 2021-05-04 | End: 2021-06-11

## 2021-06-11 RX ORDER — METHYLPREDNISOLONE 4 MG/1
TABLET ORAL
COMMUNITY
Start: 2021-05-04 | End: 2021-06-11

## 2021-06-11 RX ORDER — TRIAMCINOLONE ACETONIDE 5 MG/G
OINTMENT TOPICAL
COMMUNITY
Start: 2021-05-04 | End: 2021-06-11

## 2021-06-11 RX ORDER — CEFDINIR 300 MG/1
CAPSULE ORAL
COMMUNITY
Start: 2021-05-04 | End: 2021-06-11

## 2021-06-11 RX ORDER — ALPRAZOLAM 1 MG/1
TABLET ORAL
COMMUNITY
Start: 2021-05-04 | End: 2021-06-11

## 2021-06-11 NOTE — PROGRESS NOTES
"cc: Establish care    Subjective:     HPI  Paige Gudino is a 75 y.o. female presenting to [facility].  Spent about 3 months since she has been seen by primary care provider.  She is up-to-date on mammogram and colonoscopy.    Dyslipidemia  LDL-154, done almost 6 months ago.  She has changed her diet significantly.  She was eating a lot of sweets daily, she has decreased this significantly.  She has successfully lost about 11 pounds.  She is currently not on any medications.  Denies dizziness, chest pain, palpitations, shortness of breath.    Acquired hypothyroidism  She is currently taking 90 mg of [place] Thyroid 5 days a week, and 60 mg 2 days a week.  Thyroid levels done 3 months ago were within normal limits, however she states that she did adjust her medication after this.  Denies fatigue, palpitations, jitteriness, heat/cold intolerance.    Vitamin B 12 deficiency  Noted on labs done about 3 months ago by previous provider.  Vitamin B12 level-93.  She is currently taking oral supplementation over-the-counter.  Does note increase in energy.      Review of systems:  See above.       Current Outpatient Medications:   •  thyroid (ARMOUR THYROID) 60 MG Tab, Take 1.5 tabs early in AM, PO, Disp: 135 Tab, Rfl: 4    Allergies, past medical history, past surgical history, family history, social history reviewed and updated    Objective:     Vitals: /70 (BP Location: Left arm, Patient Position: Sitting, BP Cuff Size: Adult)   Pulse 74   Temp 36.2 °C (97.1 °F) (Temporal)   Ht 1.626 m (5' 4\")   Wt 71.3 kg (157 lb 3.2 oz)   LMP  (LMP Unknown)   SpO2 96%   Breastfeeding No   BMI 26.98 kg/m²   General: Alert, pleasant, NAD  HEENT: Normocephalic. Neck supple.  No thyromegaly or masses palpated. No cervical or supraclavicular lymphadenopathy. No carotid bruits   Heart: Regular rate and rhythm.  S1 and S2 normal.  No murmurs appreciated.  Respiratory: Normal respiratory effort.  Clear to " auscultation bilaterally.  Skin: Warm, dry, no rashes.  Extremities: No leg edema.  Radial pulses 2+ symmetric  Psych:  Affect/mood is normal, judgement is good, memory is intact, grooming is appropriate.    Assessment/Plan:     Paige was seen today for establish care and lab results.    Diagnoses and all orders for this visit:    Acquired hypothyroidism  -She did have dose adjustment.  Will check thyroid levels and adjust medication if needed pending results  -     TSH WITH REFLEX TO FT4; Future    Dyslipidemia  -Previously elevated.  She has lost about 11 pounds.  Will monitor and repeat lipid panel.  -     Lipid Profile; Future    Vitamin D insufficiency  -Controlled.  Continue with over-the-counter vitamin D    Vitamin B 12 deficiency  -She has been on oral supplementation for about 3 months.  We will repeat vitamin B12 levels  -     VITAMIN B12; Future    Establishing care with new doctor, encounter for  -We will request records and review when received      Return in about 7 months (around 1/11/2022) for AWV.

## 2021-06-11 NOTE — ASSESSMENT & PLAN NOTE
Noted on labs done about 3 months ago by previous provider.  Vitamin B12 level-93.  She is currently taking oral supplementation over-the-counter.  Does note increase in energy.

## 2021-06-11 NOTE — ASSESSMENT & PLAN NOTE
LDL-154, done almost 6 months ago.  She has changed her diet significantly.  She was eating a lot of sweets daily, she has decreased this significantly.  She has successfully lost about 11 pounds.  She is currently not on any medications.  Denies dizziness, chest pain, palpitations, shortness of breath.

## 2021-06-11 NOTE — ASSESSMENT & PLAN NOTE
She is currently taking 90 mg of [place] Thyroid 5 days a week, and 60 mg 2 days a week.  Thyroid levels done 3 months ago were within normal limits, however she states that she did adjust her medication after this.  Denies fatigue, palpitations, jitteriness, heat/cold intolerance.

## 2021-07-26 ENCOUNTER — HOSPITAL ENCOUNTER (OUTPATIENT)
Dept: LAB | Facility: MEDICAL CENTER | Age: 76
End: 2021-07-26
Attending: PHYSICIAN ASSISTANT
Payer: MEDICARE

## 2021-07-26 DIAGNOSIS — E78.5 DYSLIPIDEMIA: ICD-10-CM

## 2021-07-26 DIAGNOSIS — E53.8 VITAMIN B 12 DEFICIENCY: ICD-10-CM

## 2021-07-26 DIAGNOSIS — E03.9 ACQUIRED HYPOTHYROIDISM: ICD-10-CM

## 2021-07-26 LAB
CHOLEST SERPL-MCNC: 182 MG/DL (ref 100–199)
FASTING STATUS PATIENT QL REPORTED: NORMAL
HDLC SERPL-MCNC: 41 MG/DL
LDLC SERPL CALC-MCNC: 109 MG/DL
TRIGL SERPL-MCNC: 160 MG/DL (ref 0–149)
TSH SERPL DL<=0.005 MIU/L-ACNC: 0.41 UIU/ML (ref 0.38–5.33)
VIT B12 SERPL-MCNC: 1826 PG/ML (ref 211–911)

## 2021-07-26 PROCEDURE — 82607 VITAMIN B-12: CPT

## 2021-07-26 PROCEDURE — 84443 ASSAY THYROID STIM HORMONE: CPT

## 2021-07-26 PROCEDURE — 80061 LIPID PANEL: CPT

## 2021-07-26 PROCEDURE — 36415 COLL VENOUS BLD VENIPUNCTURE: CPT

## 2021-10-11 ENCOUNTER — HOSPITAL ENCOUNTER (OUTPATIENT)
Dept: RADIOLOGY | Facility: MEDICAL CENTER | Age: 76
End: 2021-10-11
Payer: MEDICARE

## 2021-10-25 ENCOUNTER — TELEPHONE (OUTPATIENT)
Dept: MEDICAL GROUP | Age: 76
End: 2021-10-25

## 2021-10-25 NOTE — TELEPHONE ENCOUNTER
DOCUMENTATION OF PAR STATUS:    1. Name of Medication & Dose: [place] Thyroid 60mg     2. Name of Prescription Coverage Company & phone #: [employer]    3. Date Prior Auth Submitted: 10/25/2021    4. What information was given to obtain insurance decision? ICD10    5. Prior Auth Status? Sent to Plan    6. Patient Notified: yes    Key: [id number] - PA Case ID: [id number]

## 2021-11-05 ENCOUNTER — HOSPITAL ENCOUNTER (OUTPATIENT)
Dept: RADIOLOGY | Facility: MEDICAL CENTER | Age: 76
End: 2021-11-05
Attending: PHYSICIAN ASSISTANT
Payer: MEDICARE

## 2021-11-05 DIAGNOSIS — Z12.31 VISIT FOR SCREENING MAMMOGRAM: ICD-10-CM

## 2021-11-05 PROCEDURE — 77063 BREAST TOMOSYNTHESIS BI: CPT | Mod: RT

## 2022-02-01 DIAGNOSIS — E03.9 ACQUIRED HYPOTHYROIDISM: ICD-10-CM

## 2022-02-03 RX ORDER — THYROID 60 MG
TABLET ORAL
Qty: 135 TABLET | Refills: 0 | Status: SHIPPED | OUTPATIENT
Start: 2022-02-03 | End: 2022-03-28

## 2022-03-25 SDOH — HEALTH STABILITY: MENTAL HEALTH
STRESS IS WHEN SOMEONE FEELS TENSE, NERVOUS, ANXIOUS, OR CAN'T SLEEP AT NIGHT BECAUSE THEIR MIND IS TROUBLED. HOW STRESSED ARE YOU?: NOT AT ALL

## 2022-03-25 SDOH — ECONOMIC STABILITY: HOUSING INSECURITY: IN THE LAST 12 MONTHS, HOW MANY PLACES HAVE YOU LIVED?: 1

## 2022-03-25 SDOH — ECONOMIC STABILITY: HOUSING INSECURITY
IN THE LAST 12 MONTHS, WAS THERE A TIME WHEN YOU DID NOT HAVE A STEADY PLACE TO SLEEP OR SLEPT IN A SHELTER (INCLUDING NOW)?: NO

## 2022-03-25 SDOH — ECONOMIC STABILITY: TRANSPORTATION INSECURITY
IN THE PAST 12 MONTHS, HAS THE LACK OF TRANSPORTATION KEPT YOU FROM MEDICAL APPOINTMENTS OR FROM GETTING MEDICATIONS?: NO

## 2022-03-25 SDOH — HEALTH STABILITY: PHYSICAL HEALTH: ON AVERAGE, HOW MANY DAYS PER WEEK DO YOU ENGAGE IN MODERATE TO STRENUOUS EXERCISE (LIKE A BRISK WALK)?: 2 DAYS

## 2022-03-25 SDOH — HEALTH STABILITY: PHYSICAL HEALTH: ON AVERAGE, HOW MANY MINUTES DO YOU ENGAGE IN EXERCISE AT THIS LEVEL?: 20 MIN

## 2022-03-25 SDOH — ECONOMIC STABILITY: INCOME INSECURITY: IN THE LAST 12 MONTHS, WAS THERE A TIME WHEN YOU WERE NOT ABLE TO PAY THE MORTGAGE OR RENT ON TIME?: NO

## 2022-03-25 SDOH — ECONOMIC STABILITY: FOOD INSECURITY: WITHIN THE PAST 12 MONTHS, THE FOOD YOU BOUGHT JUST DIDN'T LAST AND YOU DIDN'T HAVE MONEY TO GET MORE.: NEVER TRUE

## 2022-03-25 SDOH — ECONOMIC STABILITY: FOOD INSECURITY: WITHIN THE PAST 12 MONTHS, YOU WORRIED THAT YOUR FOOD WOULD RUN OUT BEFORE YOU GOT MONEY TO BUY MORE.: NEVER TRUE

## 2022-03-25 SDOH — ECONOMIC STABILITY: TRANSPORTATION INSECURITY
IN THE PAST 12 MONTHS, HAS LACK OF TRANSPORTATION KEPT YOU FROM MEETINGS, WORK, OR FROM GETTING THINGS NEEDED FOR DAILY LIVING?: NO

## 2022-03-25 SDOH — ECONOMIC STABILITY: TRANSPORTATION INSECURITY
IN THE PAST 12 MONTHS, HAS LACK OF RELIABLE TRANSPORTATION KEPT YOU FROM MEDICAL APPOINTMENTS, MEETINGS, WORK OR FROM GETTING THINGS NEEDED FOR DAILY LIVING?: NO

## 2022-03-25 SDOH — ECONOMIC STABILITY: INCOME INSECURITY: HOW HARD IS IT FOR YOU TO PAY FOR THE VERY BASICS LIKE FOOD, HOUSING, MEDICAL CARE, AND HEATING?: NOT HARD AT ALL

## 2022-03-25 ASSESSMENT — SOCIAL DETERMINANTS OF HEALTH (SDOH)
HOW HARD IS IT FOR YOU TO PAY FOR THE VERY BASICS LIKE FOOD, HOUSING, MEDICAL CARE, AND HEATING?: NOT HARD AT ALL
DO YOU BELONG TO ANY CLUBS OR ORGANIZATIONS SUCH AS CHURCH GROUPS UNIONS, FRATERNAL OR ATHLETIC GROUPS, OR SCHOOL GROUPS?: YES
DO YOU BELONG TO ANY CLUBS OR ORGANIZATIONS SUCH AS CHURCH GROUPS UNIONS, FRATERNAL OR ATHLETIC GROUPS, OR SCHOOL GROUPS?: YES
IN A TYPICAL WEEK, HOW MANY TIMES DO YOU TALK ON THE PHONE WITH FAMILY, FRIENDS, OR NEIGHBORS?: TWICE A WEEK
IN A TYPICAL WEEK, HOW MANY TIMES DO YOU TALK ON THE PHONE WITH FAMILY, FRIENDS, OR NEIGHBORS?: TWICE A WEEK
HOW OFTEN DO YOU ATTENT MEETINGS OF THE CLUB OR ORGANIZATION YOU BELONG TO?: MORE THAN 4 TIMES PER YEAR
HOW OFTEN DO YOU HAVE A DRINK CONTAINING ALCOHOL: NEVER
HOW OFTEN DO YOU ATTEND CHURCH OR RELIGIOUS SERVICES?: NEVER
HOW MANY DRINKS CONTAINING ALCOHOL DO YOU HAVE ON A TYPICAL DAY WHEN YOU ARE DRINKING: PATIENT DECLINED
HOW OFTEN DO YOU ATTENT MEETINGS OF THE CLUB OR ORGANIZATION YOU BELONG TO?: MORE THAN 4 TIMES PER YEAR
HOW OFTEN DO YOU GET TOGETHER WITH FRIENDS OR RELATIVES?: ONCE A WEEK
HOW OFTEN DO YOU HAVE SIX OR MORE DRINKS ON ONE OCCASION: NEVER
WITHIN THE PAST 12 MONTHS, YOU WORRIED THAT YOUR FOOD WOULD RUN OUT BEFORE YOU GOT THE MONEY TO BUY MORE: NEVER TRUE
HOW OFTEN DO YOU GET TOGETHER WITH FRIENDS OR RELATIVES?: ONCE A WEEK
HOW OFTEN DO YOU ATTEND CHURCH OR RELIGIOUS SERVICES?: NEVER

## 2022-03-25 ASSESSMENT — LIFESTYLE VARIABLES
HOW OFTEN DO YOU HAVE SIX OR MORE DRINKS ON ONE OCCASION: NEVER
HOW MANY STANDARD DRINKS CONTAINING ALCOHOL DO YOU HAVE ON A TYPICAL DAY: PATIENT DECLINED
HOW OFTEN DO YOU HAVE A DRINK CONTAINING ALCOHOL: NEVER

## 2022-03-28 ENCOUNTER — OFFICE VISIT (OUTPATIENT)
Dept: MEDICAL GROUP | Age: 77
End: 2022-03-28
Payer: MEDICARE

## 2022-03-28 VITALS
HEART RATE: 75 BPM | WEIGHT: 158.6 LBS | TEMPERATURE: 97.9 F | OXYGEN SATURATION: 95 % | SYSTOLIC BLOOD PRESSURE: 122 MMHG | HEIGHT: 64 IN | DIASTOLIC BLOOD PRESSURE: 68 MMHG | BODY MASS INDEX: 27.08 KG/M2

## 2022-03-28 DIAGNOSIS — E03.9 ACQUIRED HYPOTHYROIDISM: ICD-10-CM

## 2022-03-28 DIAGNOSIS — E55.9 HYPOVITAMINOSIS D: ICD-10-CM

## 2022-03-28 DIAGNOSIS — E53.8 VITAMIN B 12 DEFICIENCY: ICD-10-CM

## 2022-03-28 DIAGNOSIS — E78.5 DYSLIPIDEMIA: ICD-10-CM

## 2022-03-28 DIAGNOSIS — Z78.0 POSTMENOPAUSAL STATUS (AGE-RELATED) (NATURAL): ICD-10-CM

## 2022-03-28 DIAGNOSIS — Z00.00 MEDICARE ANNUAL WELLNESS VISIT, SUBSEQUENT: Primary | ICD-10-CM

## 2022-03-28 DIAGNOSIS — M85.80 OSTEOPENIA, UNSPECIFIED LOCATION: ICD-10-CM

## 2022-03-28 PROBLEM — H90.3 SENSORINEURAL HEARING LOSS, BILATERAL: Status: ACTIVE | Noted: 2022-03-28

## 2022-03-28 PROCEDURE — G0439 PPPS, SUBSEQ VISIT: HCPCS | Performed by: PHYSICIAN ASSISTANT

## 2022-03-28 RX ORDER — THYROID 60 MG
TABLET ORAL
Qty: 135 TABLET | Refills: 3 | Status: SHIPPED | OUTPATIENT
Start: 2022-03-28 | End: 2022-03-28 | Stop reason: SDUPTHER

## 2022-03-28 RX ORDER — THYROID 60 MG
TABLET ORAL
Qty: 135 TABLET | Refills: 3 | Status: SHIPPED | OUTPATIENT
Start: 2022-04-28 | End: 2023-07-18

## 2022-03-28 ASSESSMENT — ACTIVITIES OF DAILY LIVING (ADL): BATHING_REQUIRES_ASSISTANCE: 0

## 2022-03-28 ASSESSMENT — PATIENT HEALTH QUESTIONNAIRE - PHQ9: CLINICAL INTERPRETATION OF PHQ2 SCORE: 0

## 2022-03-28 ASSESSMENT — ENCOUNTER SYMPTOMS: GENERAL WELL-BEING: GOOD

## 2022-03-28 NOTE — PROGRESS NOTES
Chief Complaint   Patient presents with   • Medicare Annual Wellness         HPI:  Rosangela is a 76 y.o. here for Medicare Annual Wellness Visit    Results for ROSANGELA VERDIN (MRN [id number]) as of 3/28/2022 11:04   Ref. Range 7/26/2021 09:17   Cholesterol,Tot Latest Ref Range: 100 - 199 mg/dL 182   Triglycerides Latest Ref Range: 0 - 149 mg/dL 160 (H)   HDL Latest Ref Range: >=40 mg/dL 41   LDL Latest Ref Range: <100 mg/dL 109 (H)   Vitamin B12 -True Cobalamin Latest Ref Range: 211 - 911 pg/mL 1826 (H)   TSH Latest Ref Range: 0.380 - 5.330 uIU/mL 0.410       Patient Active Problem List    Diagnosis Date Noted   • Sensorineural hearing loss, bilateral 03/28/2022   • Osteopenia 03/28/2022   • Vitamin B 12 deficiency 06/11/2021   • Acquired hypothyroidism 12/15/2020   • Thyroid nodule 12/15/2020   • Hashimoto's thyroiditis 12/15/2020   • Dyslipidemia 12/15/2020   • Hypovitaminosis D 12/15/2020       Current Outpatient Medications   Medication Sig Dispense Refill   • [START ON 4/28/2022] ARMOUR THYROID 60 MG Tab TAKE 1 & 1/2 (ONE & ONE-HALF) TABLETS BY MOUTH MON-Friday, 60 MG SAT AND SUNDAY 135 Tablet 3     No current facility-administered medications for this visit.        Patient is taking medications as noted in medication list.  Current supplements as per medication list.     Allergies: Synthroid [fd&c red #40 [place]-levothyroxine] and Synthroid [levothyroxine]    Current social contact/activities:  Weekly activities, garden club    Is patient current with immunizations? No, due for FLU, PNEUMOVAX (PPSV23), SHINGRIX (Shingles) and COVID-19. Patient is interested in receiving NONE today.    She  reports that she has never smoked. She has never used smokeless tobacco. She reports previous alcohol use. She reports that she does not use drugs.  Counseling given: Not Answered        DPA/Advanced directive: Patient has Durable Power of , but it is not on file. Instructed to bring in a copy to scan into their  chart.    ROS:    Gait: Uses no assistive device   Ostomy: No   Other tubes: No   Amputations: No   Chronic oxygen use No   Last eye exam  Last year   Wears hearing aids: No   : Denies any urinary leakage during the last 6 months    Screening:        Depression Screening  Little interest or pleasure in doing things?  0 - not at all  Feeling down, depressed, or hopeless? 0 - not at all  Patient Health Questionnaire Score: 0    If depressive symptoms identified deferred to follow up visit unless specifically addressed in assessment and plan.    Interpretation of PHQ-9 Total Score   Score Severity   1-4 No Depression   5-9 Mild Depression   10-14 Moderate Depression   15-19 Moderately Severe Depression   20-27 Severe Depression    Screening for Cognitive Impairment  Three Minute Recall (daughter, heaven, mountain)  3/3    Abdoul clock face with all 12 numbers and set the hands to show 10 past 11.  Yes    If cognitive concerns identified, deferred for follow up unless specifically addressed in assessment and plan.    Fall Risk Assessment  Has the patient had two or more falls in the last year or any fall with injury in the last year?  No  If fall risk identified, deferred for follow up unless specifically addressed in assessment and plan.    Safety Assessment  Throw rugs on floor.  No  Handrails on all stairs.  Yes  Good lighting in all hallways.  Yes  Difficulty hearing.  No  Patient counseled about all safety risks that were identified.    Functional Assessment ADLs  Are there any barriers preventing you from cooking for yourself or meeting nutritional needs?  No.    Are there any barriers preventing you from driving safely or obtaining transportation?  No.    Are there any barriers preventing you from using a telephone or calling for help?  No.    Are there any barriers preventing you from shopping?  No.    Are there any barriers preventing you from taking care of your own finances?  No.    Are there any barriers  preventing you from managing your medications?  No.    Are there any barriers preventing you from showering, bathing or dressing yourself?  No.    Are you currently engaging in any exercise or physical activity?  Yes.  Goes to gym  What is your perception of your health?  Good.    Health Maintenance Summary          Overdue - COVID-19 Vaccine (1) Overdue - never done    No completion history exists for this topic.          Ordered - BONE DENSITY (Every 5 Years) Ordered on 3/28/2022    08/09/2005  DS-BONE DENSITY STUDY (DEXA)          Overdue - IMM ZOSTER VACCINES (2 of 3) Overdue since 3/5/2015    01/08/2015  Imm Admin: Zoster Vaccine Live (ZVL) (Zostavax) - HISTORICAL DATA          Overdue - IMM INFLUENZA (1) Overdue since 9/1/2021 09/23/2020  Imm Admin: Influenza Vaccine Quad Inj (Pf)    09/07/2019  Imm Admin: Influenza, Unspecified - HISTORICAL DATA    10/01/2018  Imm Admin: Influenza Vaccine Quad Inj (Pf)    09/30/2017  Imm Admin: Influenza Vaccine Quad Inj (Pf)    10/17/2016  Imm Admin: Influenza (IM) Preservative Free - HISTORICAL DATA    Only the first 5 history entries have been loaded, but more history exists.          Overdue - IMM PNEUMOCOCCAL VACCINE: 65+ Years (2 of 2 - PPSV23) Overdue since 1/23/2022 01/23/2021  Imm Admin: Pneumococcal Conjugate Vaccine (Prevnar/PCV-13)    06/25/2019  Imm Admin: Pneumococcal Conjugate Vaccine (Prevnar/PCV-13)          MAMMOGRAM (Yearly) Next due on 11/5/2022 11/05/2021  MA-SCREENING MAMMO RIGHT W/TOMOSYNTHESIS W/CAD    11/03/2020  Done    09/18/2006  [id number]    08/09/2005  [id number]    06/23/2004  [id number]          Annual Wellness Visit (Every 366 Days) Next due on 3/29/2023    03/28/2022  Visit Dx: Medicare annual wellness visit, subsequent    03/28/2022  Level of Service: ANNUAL WELLNESS VISIT-INCLUDES PPPS SUBSEQUE*    01/20/2021  Done    01/20/2021  Visit Dx: Medicare annual wellness visit, subsequent     "01/20/2021  Subsequent Annual Wellness Visit - Includes PPPS ()          COLORECTAL CANCER SCREENING (COLONOSCOPY - Every 3 Years) Next due on 3/28/2024    03/28/2021  REFERRAL TO GI FOR COLONOSCOPY    02/25/2015  COLONOSCOPY (Reason not specified)          IMM DTaP/Tdap/Td Vaccine (2 - Td or Tdap) Next due on 6/25/2029 06/25/2019  Imm Admin: Tdap Vaccine          HEPATITIS C SCREENING  Completed    03/18/2021  HEP C VIRUS ANTIBODY          IMM HEP B VACCINE (Series Information) Aged Out    No completion history exists for this topic.          IMM MENINGOCOCCAL VACCINE (MCV4) (Series Information) Aged Out    No completion history exists for this topic.                Patient Care Team:  Roula Ga P.A.-C. as PCP - General (Family Medicine)    Social History     Tobacco Use   • Smoking status: Never Smoker   • Smokeless tobacco: Never Used   Vaping Use   • Vaping Use: Never used   Substance Use Topics   • Alcohol use: Not Currently   • Drug use: Never     Family History   Problem Relation Age of Onset   • Hyperlipidemia Neg Hx      She  has a past medical history of Hypothyroidism and Thyroid nodule.   History reviewed. No pertinent surgical history.        Exam:     /68 (BP Location: Left arm, Patient Position: Sitting, BP Cuff Size: Adult)   Pulse 75   Temp 36.6 °C (97.9 °F) (Temporal)   Ht 1.626 m (5' 4\")   Wt 71.9 kg (158 lb 9.6 oz)   SpO2 95%  Body mass index is 27.22 kg/m².    Hearing fair.    Dentition good  Alert, oriented in no acute distress.  Eye contact is good, speech goal directed, affect calm      Assessment and Plan. The following treatment and monitoring plan is recommended:      1. Medicare annual wellness visit, subsequent  Continue with regular physical activity and good control diet.  Recommended pneumonia, shingles, and Covid vaccine.  She declines.    2. Acquired hypothyroidism  -Has been well controlled at current dose.  Will be due for thyroid level check.  - TSH WITH " REFLEX TO FT4; Future  - ARMOUR THYROID 60 MG Tab; TAKE 1 & 1/2 (ONE & ONE-HALF) TABLETS BY MOUTH MON-Friday, 60 MG SAT AND SUNDAY  Dispense: 135 Tablet; Refill: 3    3. Hypovitaminosis D  - continue with over-the-counter vitamin D.  Will check below lab.  Further treatment if needed pending results  - VITAMIN D,25 HYDROXY; Future    4. Vitamin B 12 deficiency  -Currently taking over-the-counter vitamin B-12.  Check below lab.  Further treatment if needed pending results  - CBC WITH DIFFERENTIAL; Future  - VITAMIN B12; Future    5. Dyslipidemia  -Significantly improved through lifestyle modifications.  Will monitor check below lab.  - Comp Metabolic Panel; Future  - Lipid Profile; Future    6. Postmenopausal status (age-related) (natural)  -Due for DEXA.   - DS-BONE DENSITY STUDY (DEXA); Future    7.  Osteopenia, unspecified location  Due for DEXA.  Recommended calcium and vitamin D daily    Services suggested: No services needed at this time  Health Care Screening recommendations as per orders if indicated.  Referrals offered: PT/OT/Nutrition counseling/Behavioral Health/Smoking cessation as per orders if indicated.    Discussion today about general wellness and lifestyle habits:    · Prevent falls and reduce trip hazards; Cautioned about securing or removing rugs.  · Have a working fire alarm and carbon monoxide detector;   · Engage in regular physical activity and social activities       Follow-up: Return in about 6 months (around 9/28/2022) for Lab Review.

## 2022-05-18 ENCOUNTER — HOSPITAL ENCOUNTER (OUTPATIENT)
Dept: RADIOLOGY | Facility: MEDICAL CENTER | Age: 77
End: 2022-05-18
Attending: PHYSICIAN ASSISTANT
Payer: MEDICARE

## 2022-05-18 DIAGNOSIS — Z78.0 POSTMENOPAUSAL STATUS (AGE-RELATED) (NATURAL): ICD-10-CM

## 2022-05-18 PROCEDURE — 77080 DXA BONE DENSITY AXIAL: CPT

## 2022-05-24 ENCOUNTER — TELEPHONE (OUTPATIENT)
Dept: HEALTH INFORMATION MANAGEMENT | Facility: OTHER | Age: 77
End: 2022-05-24
Payer: MEDICARE

## 2022-05-24 NOTE — TELEPHONE ENCOUNTER
Roula, I copied you on my conversation only to make you aware that the patient had been taking less calcium than you recommended and more vitamin D than recommended.    I spoke to the patient and conveyed Roula's message regarding the results of her DEXA scan.  The patient was told that she  has osteopenia. She was told that Roula recommended that she take  1200 mg of calcium daily in addition to 2000 units of vitamin D if not already taking over-the-counter.  The patient said that she was taking 1000 mg of calcium but would increase it to 1200 mg.  She said that she has had  osteopenia for years and was taking 5,000 units of vitamin D.

## 2022-06-20 ENCOUNTER — OFFICE VISIT (OUTPATIENT)
Dept: MEDICAL GROUP | Age: 77
End: 2022-06-20
Payer: MEDICARE

## 2022-06-20 VITALS
SYSTOLIC BLOOD PRESSURE: 126 MMHG | RESPIRATION RATE: 16 BRPM | HEIGHT: 64 IN | TEMPERATURE: 100 F | HEART RATE: 92 BPM | BODY MASS INDEX: 27.31 KG/M2 | WEIGHT: 160 LBS | DIASTOLIC BLOOD PRESSURE: 84 MMHG | OXYGEN SATURATION: 98 %

## 2022-06-20 DIAGNOSIS — Z85.3 HISTORY OF LEFT BREAST CANCER: ICD-10-CM

## 2022-06-20 DIAGNOSIS — Z98.82 BREAST IMPLANT STATUS: ICD-10-CM

## 2022-06-20 DIAGNOSIS — N63.0 BREAST LUMP: ICD-10-CM

## 2022-06-20 DIAGNOSIS — N64.4 MASTODYNIA: ICD-10-CM

## 2022-06-20 DIAGNOSIS — Z90.12 HISTORY OF LEFT MASTECTOMY: ICD-10-CM

## 2022-06-20 PROCEDURE — 99214 OFFICE O/P EST MOD 30 MIN: CPT | Performed by: INTERNAL MEDICINE

## 2022-06-20 NOTE — PROGRESS NOTES
CHIEF COMPLAINT  Left breast lump    HPI: Patient is a 76 y.o. female patient who presents today for the following     LT breast lump / breast implant  H/o mastectomy / LT breast cancer   77 y/o, F, h/o remote LT breast cancer/mastectomy, implant placement    She has just noticed lump in the LT  breast:  6 o'clock  No change of the breast skin, asymmetry, nipple discharge.    No axillary, cervical LAD.   No fatigue, fevers, night sweats, weight loss.   She is  doing self breast exam.   Last mammography: RT breast 11/5/21    FH of breast cancer: unknown    She  has a past medical history of Hypothyroidism and Thyroid nodule.    Patient Active Problem List    Diagnosis Date Noted   • Sensorineural hearing loss, bilateral 03/28/2022   • Osteopenia 03/28/2022   • Vitamin B 12 deficiency 06/11/2021   • Acquired hypothyroidism 12/15/2020   • Thyroid nodule 12/15/2020   • Hashimoto's thyroiditis 12/15/2020   • Dyslipidemia 12/15/2020   • Hypovitaminosis D 12/15/2020       Allergies:Synthroid [fd&c red #40 [place]-levothyroxine] and Synthroid [levothyroxine]    Current Outpatient Medications   Medication Sig Dispense Refill   • ARMOUR THYROID 60 MG Tab TAKE 1 & 1/2 (ONE & ONE-HALF) TABLETS BY MOUTH MON-Friday, 60 MG SAT AND SUNDAY 135 Tablet 3     No current facility-administered medications for this visit.       Social History     Tobacco Use   • Smoking status: Never Smoker   • Smokeless tobacco: Never Used   Vaping Use   • Vaping Use: Never used   Substance Use Topics   • Alcohol use: Not Currently   • Drug use: Never       Family History   Problem Relation Age of Onset   • Hyperlipidemia Neg Hx      History reviewed. No pertinent surgical history.      ROS   No fever, chills, nausea, vomiting, diarrhea.  No chest pain or shortness of breath.    See HPI      PHYSICAL EXAM   Blood Pressure 126/84 (BP Location: Left arm, Patient Position: Sitting, BP Cuff Size: Adult)   Pulse 92   Temperature 37.8 °C (100 °F) (Temporal)   " Respiration 16   Height 1.626 m (5' 4\")   Weight 72.6 kg (160 lb)   Oxygen Saturation 98%  Body mass index is 27.46 kg/m².  General:  NAD, well appearing  HEENT:   NC/AT, PERRLA, EOMI, OP clear, no lymphadenopathy, no thyromegaly.    Cardiovascular: RRR.   No m/r/g. No carotid bruits       Lungs:   CTAB, no w/r/r, no respiratory distress  Abdomen: Soft, NT/ND + BS, no masses, no organoomegaly  Extremities:  2+ DP and radial pulses bilaterally.  No c/c/e  Skin:  Warm and dry.  No lesions noted.  Neurologic: Alert & oriented x 3. No focal deficits noted  Psychiatric:  Affect, mood, and judgment normal.  Breast exam:  Breast implant LT breast, scar, removed nipple.  Possible lump 1-2 cm at 6 o'clock, non-tender, non-movable, does not appear as lump, more as implant part.    Assessment and Plan     1. Breast lump LEFT  My impression / exam is that it looks more as implant problem, rather than breast lump, pending imaging.  - MA DIAGNOSTIC MAMMO LEFT W/CAD; Future  - US-BREAST LIMITED-LEFT; Future  2. Mastodynia   - MA DIAGNOSTIC MAMMO LEFT W/CAD; Future  - US-BREAST LIMITED-LEFT; Future  3. Breast implant status  - MA DIAGNOSTIC MAMMO LEFT W/CAD; Future  - US-BREAST LIMITED-LEFT; Future  4. History of left mastectomy  - MA DIAGNOSTIC MAMMO LEFT W/CAD; Future  - US-BREAST LIMITED-LEFT; Future  5. History of left breast cancer  - MA DIAGNOSTIC MAMMO LEFT W/CAD; Future  - US-BREAST LIMITED-LEFT; Future    Followup: Return if symptoms worsen or fail to improve.    All questions are answered.    Please note that this dictation was created using voice recognition software. I have made every reasonable attempt to correct obvious errors, but I expect that there are errors of grammar and possibly content that I did not discover before finalizing the note.          "

## 2022-06-23 ENCOUNTER — TELEMEDICINE (OUTPATIENT)
Dept: MEDICAL GROUP | Age: 77
End: 2022-06-23
Payer: MEDICARE

## 2022-06-23 ENCOUNTER — HOSPITAL ENCOUNTER (OUTPATIENT)
Dept: RADIOLOGY | Facility: MEDICAL CENTER | Age: 77
End: 2022-06-23
Attending: INTERNAL MEDICINE
Payer: MEDICARE

## 2022-06-23 DIAGNOSIS — R05.9 COUGH: ICD-10-CM

## 2022-06-23 DIAGNOSIS — Z98.82 BREAST IMPLANT STATUS: ICD-10-CM

## 2022-06-23 DIAGNOSIS — Z85.3 HISTORY OF LEFT BREAST CANCER: ICD-10-CM

## 2022-06-23 DIAGNOSIS — Z90.12 HISTORY OF LEFT MASTECTOMY: ICD-10-CM

## 2022-06-23 DIAGNOSIS — N64.4 MASTODYNIA: ICD-10-CM

## 2022-06-23 DIAGNOSIS — N63.0 BREAST LUMP: ICD-10-CM

## 2022-06-23 PROCEDURE — 99213 OFFICE O/P EST LOW 20 MIN: CPT | Mod: 95 | Performed by: INTERNAL MEDICINE

## 2022-06-23 PROCEDURE — 76642 ULTRASOUND BREAST LIMITED: CPT | Mod: LT

## 2022-06-23 RX ORDER — PREDNISONE 20 MG/1
20 TABLET ORAL
Qty: 7 TABLET | Refills: 0 | Status: SHIPPED | OUTPATIENT
Start: 2022-06-23 | End: 2022-06-30

## 2022-06-23 RX ORDER — ALBUTEROL SULFATE 90 UG/1
2 AEROSOL, METERED RESPIRATORY (INHALATION) EVERY 6 HOURS PRN
Qty: 8.5 G | Refills: 3 | Status: ON HOLD | OUTPATIENT
Start: 2022-06-23 | End: 2022-08-01

## 2022-06-23 RX ORDER — AZITHROMYCIN 250 MG/1
TABLET, FILM COATED ORAL
Qty: 6 TABLET | Refills: 0 | Status: ON HOLD | OUTPATIENT
Start: 2022-06-23 | End: 2022-08-01

## 2022-06-23 RX ORDER — CODEINE PHOSPHATE/GUAIFENESIN 10-100MG/5
5 LIQUID (ML) ORAL 3 TIMES DAILY PRN
Qty: 236 ML | Refills: 1 | Status: SHIPPED | OUTPATIENT
Start: 2022-06-23 | End: 2022-07-03

## 2022-06-23 NOTE — PROGRESS NOTES
Telemedicine Visit: Established Patient     This Remote Face to Face encounter was conducted via Zoom. Given the importance of social distancing and other strategies recommended to reduce the risk of COVID-19 transmission, I am providing medical care to this patient via audio/video visit in place of an in person visit at the request of the patient. Verbal consent to telehealth, risks, benefits, and consequences were discussed. Patient retains the right to withdraw at any time. All existing confidentiality protections apply. The patient has access to all transmitted medical information. No dissemination of any patient images or information to other entities without further written consent.    CHIEF COMPLAINT     RESPIRATORY SX    HPI  Paige Gudino is a 76 y.o. female who presents today for the following     Covid sx  Sx x 4 days  Fever low grade, chills initially  · Fatigue  · Muscle or body aches  · Headache  · Congestion or runny nose  · Cough, dry    Patient has not have other covid sx:  · New loss of taste or smell  · Sore throat  · Shortness of breath or difficulty breathing  · Nausea or vomiting  · Diarrhea, abdominal cramps.    Reviewed PMH, PSH, FH, SH, ALL, HCM/IMM, no changes  Reviewed MEDS, no changes    Patient Active Problem List    Diagnosis Date Noted   • Sensorineural hearing loss, bilateral 03/28/2022   • Osteopenia 03/28/2022   • Vitamin B 12 deficiency 06/11/2021   • Acquired hypothyroidism 12/15/2020   • Thyroid nodule 12/15/2020   • Hashimoto's thyroiditis 12/15/2020   • Dyslipidemia 12/15/2020   • Hypovitaminosis D 12/15/2020     CURRENT MEDICATIONS  Current Outpatient Medications   Medication Sig Dispense Refill   • azithromycin (ZITHROMAX) 250 MG Tab Take 1 tabl twice daily the first day, then 1 tabl daily, PO. 6 Tablet 0   • predniSONE (DELTASONE) 20 MG Tab Take 1 Tablet by mouth 1/2 hour after breakfast for 7 days. 7 Tablet 0   • albuterol 108 (90 Base) MCG/ACT Aero Soln  "inhalation aerosol Inhale 2 Puffs every 6 hours as needed for Shortness of Breath. 8.5 g 3   • guaifenesin-codeine (TUSSI-ORGANIDIN NR) 100-10 MG/5ML syrup Take 5 mL by mouth 3 times a day as needed for Cough for up to 10 days. 236 mL 1   • ARMOUR THYROID 60 MG Tab TAKE 1 & 1/2 (ONE & ONE-HALF) TABLETS BY MOUTH MON-Friday, 60 MG SAT AND SUNDAY 135 Tablet 3     No current facility-administered medications for this visit.     ALLERGIES  Allergies: Synthroid [fd&c red #40 [place]-levothyroxine] and Synthroid [levothyroxine]  PAST MEDICAL HISTORY  Past Medical History:   Diagnosis Date   • Hypothyroidism    • Thyroid nodule      SURGICAL HISTORY  She  has no past surgical history on file.  SOCIAL HISTORY  Social History     Tobacco Use   • Smoking status: Never Smoker   • Smokeless tobacco: Never Used   Vaping Use   • Vaping Use: Never used   Substance Use Topics   • Alcohol use: Not Currently   • Drug use: Never     Social History     Social History Narrative   • Not on file     FAMILY HISTORY  Family History   Problem Relation Age of Onset   • Hyperlipidemia Neg Hx      Family Status   Relation Name Status   • Neg Hx  (Not Specified)       ROS   Constitutional: Per HPI.  HENT: Per HPI.  Respiratory: Per HPI.  Cardiovascular: Negative for chest pain, palpitations.   Gastrointestinal: Per HPI.  Musculoskeletal: Per HPI.  Skin: Negative for rash.   Neuro: Per HPI.  Endo/Heme/Allergies: Does not bruise/bleed easily.     Objective   Vitals obtained by patient:   Respiration 16   Height 1.626 m (5' 4\")   Weight 72.6 kg (160 lb)   Last Menstrual Period  (LMP Unknown)   Body Mass Index 27.46 kg/m²   Physical Exam:  Constitutional: Alert, no distress, well-groomed.  Skin: No rash in visible areas.  Eye: Round. Conjunctiva clear, lids normal.  ENMT: Lips pink without lesions, good dentition. Phonation normal.  Neck: No visible masses or thyromegaly. Moves freely without pain.  CV: no peripheral cyanosis, " tachycardia.  Respiratory: Unlabored respiratory effort, no cough or audible wheezing.  Psych: Alert and oriented x3, normal affect and mood.     Labs     Labs are reviewed and discussed with a patient  Lab Results   Component Value Date/Time    CHOLSTRLTOT 182 07/26/2021 09:17 AM     (H) 07/26/2021 09:17 AM    HDL 41 07/26/2021 09:17 AM    TRIGLYCERIDE 160 (H) 07/26/2021 09:17 AM       Lab Results   Component Value Date/Time    SODIUM 138 01/04/2021 10:02 AM    POTASSIUM 4.3 01/04/2021 10:02 AM    CHLORIDE 103 01/04/2021 10:02 AM    CO2 26 01/04/2021 10:02 AM    GLUCOSE 94 01/04/2021 10:02 AM    BUN 11 01/04/2021 10:02 AM    CREATININE 0.79 01/04/2021 10:02 AM     Lab Results   Component Value Date/Time    ALKPHOSPHAT 85 01/04/2021 10:02 AM    ASTSGOT 24 01/04/2021 10:02 AM    ALTSGPT 21 01/04/2021 10:02 AM    TBILIRUBIN 1.5 01/04/2021 10:02 AM      No results found for: HBA1C  No results found for: TSH  Lab Results   Component Value Date/Time    FREET4 0.89 (L) 01/04/2021 10:02 AM       Lab Results   Component Value Date/Time    WBC 6.0 09/05/2006 10:55 AM    RBC 4.54 09/05/2006 10:55 AM    HEMOGLOBIN 12.6 09/05/2006 10:55 AM    HEMATOCRIT 37.7 09/05/2006 10:55 AM    MCV 83.0 09/05/2006 10:55 AM    MCH 27.7 09/05/2006 10:55 AM    MCHC 33.4 09/05/2006 10:55 AM    MPV 6.9 09/05/2006 10:55 AM        Imaging      None  Reviewed and discussed per HPI    Assessment and Plan     Paige Gudino is a 76 y.o. female    1. Cough  Advised good fluid intake, rest  - azithromycin (ZITHROMAX) 250 MG Tab; Take 1 tabl twice daily the first day, then 1 tabl daily, PO.  Dispense: 6 Tablet; Refill: 0  - predniSONE (DELTASONE) 20 MG Tab; Take 1 Tablet by mouth 1/2 hour after breakfast for 7 days.  Dispense: 7 Tablet; Refill: 0  - albuterol 108 (90 Base) MCG/ACT Aero Soln inhalation aerosol; Inhale 2 Puffs every 6 hours as needed for Shortness of Breath.  Dispense: 8.5 g; Refill: 3  - guaifenesin-codeine  (TUSSI-AGAPITO NR) 100-10 MG/5ML syrup; Take 5 mL by mouth 3 times a day as needed for Cough for up to 10 days.  Dispense: 236 mL; Refill: 1        Follow-up:prn

## 2022-06-24 VITALS — HEIGHT: 64 IN | BODY MASS INDEX: 27.31 KG/M2 | RESPIRATION RATE: 16 BRPM | WEIGHT: 160 LBS

## 2022-06-27 ENCOUNTER — HOSPITAL ENCOUNTER (OUTPATIENT)
Dept: RADIOLOGY | Facility: MEDICAL CENTER | Age: 77
End: 2022-06-27
Attending: INTERNAL MEDICINE
Payer: MEDICARE

## 2022-06-27 DIAGNOSIS — R92.8 ABNORMAL ULTRASOUND OF BREAST: ICD-10-CM

## 2022-06-27 LAB — PATHOLOGY CONSULT NOTE: NORMAL

## 2022-06-27 PROCEDURE — 88342 IMHCHEM/IMCYTCHM 1ST ANTB: CPT

## 2022-06-27 PROCEDURE — 88305 TISSUE EXAM BY PATHOLOGIST: CPT

## 2022-06-27 PROCEDURE — 19083 BX BREAST 1ST LESION US IMAG: CPT | Mod: LT

## 2022-06-27 PROCEDURE — 88360 TUMOR IMMUNOHISTOCHEM/MANUAL: CPT

## 2022-06-28 ENCOUNTER — PATIENT MESSAGE (OUTPATIENT)
Dept: MEDICAL GROUP | Age: 77
End: 2022-06-28
Payer: MEDICARE

## 2022-06-28 DIAGNOSIS — D05.12 DUCTAL CARCINOMA IN SITU (DCIS) OF LEFT BREAST: ICD-10-CM

## 2022-06-28 NOTE — PATIENT COMMUNICATION
1. Caller Name: Paige Gudino                        Call Back Number: [phone] (home)       How would the patient prefer to be contacted with a response: [employer] message    2. SPECIFIC Action To Be Taken: Referral pending, please sign.    3. Diagnosis/Clinical Reason for Request: Ductal Carcinoma in SITU, Left breast    4. Specialty & Provider Name/Lab/Imaging Location: Dr. Elena Arroyo    5. Is appointment scheduled for requested order/referral: no    Patient was informed they will receive a return phone call from the office ONLY if there are any questions before processing their request. Advised to call back if they haven't received a call from the referral department in 5 days.

## 2022-07-19 ENCOUNTER — PRE-ADMISSION TESTING (OUTPATIENT)
Dept: ADMISSIONS | Facility: MEDICAL CENTER | Age: 77
End: 2022-07-19
Attending: SURGERY
Payer: MEDICARE

## 2022-07-29 ENCOUNTER — PATIENT MESSAGE (OUTPATIENT)
Dept: HEALTH INFORMATION MANAGEMENT | Facility: OTHER | Age: 77
End: 2022-07-29

## 2022-07-29 ENCOUNTER — PATIENT OUTREACH (OUTPATIENT)
Dept: OTHER | Facility: MEDICAL CENTER | Age: 77
End: 2022-07-29
Payer: MEDICARE

## 2022-07-29 DIAGNOSIS — Z65.8 PSYCHOSOCIAL DISTRESS: ICD-10-CM

## 2022-07-29 NOTE — PROGRESS NOTES
"Oncology Nurse Navigation Assessment  Phoned pt for follow up.  Pt is shceduled for a partial mastectomy on 8/1/22.  She will see Dr. Arroyo for post op follow up on 8/9/22.  Pt reports she found a lump in her breast while undressing.  She has a personal history of breast cancer in the same breast 35 years ago.          BARRIERS ASSESSMENT:    TRANSPORTATION: denies barriers;  will transport  EMPLOYMENT:  retired   FINANCIAL:  No barriers at this time  INSURANCE:  Medicare  SUPPORT SYSTEM:  and  Daughter who lives with them. Both will be available post op.  PSYCHOLOGICAL:   dst 1/10  \"count down time to surgery\"   COMMUNICATION:  No barrier noted   FAMILY CARE: denies barriers  SELF CARE: denies barriers         INTERVENTION:  Intro letter sent via My Chart.    "

## 2022-08-01 ENCOUNTER — APPOINTMENT (OUTPATIENT)
Dept: RADIOLOGY | Facility: MEDICAL CENTER | Age: 77
End: 2022-08-01
Attending: SURGERY
Payer: MEDICARE

## 2022-08-01 ENCOUNTER — ANESTHESIA EVENT (OUTPATIENT)
Dept: SURGERY | Facility: MEDICAL CENTER | Age: 77
End: 2022-08-01
Payer: MEDICARE

## 2022-08-01 ENCOUNTER — ANESTHESIA (OUTPATIENT)
Dept: SURGERY | Facility: MEDICAL CENTER | Age: 77
End: 2022-08-01
Payer: MEDICARE

## 2022-08-01 ENCOUNTER — HOSPITAL ENCOUNTER (OUTPATIENT)
Facility: MEDICAL CENTER | Age: 77
End: 2022-08-01
Attending: SURGERY | Admitting: SURGERY
Payer: MEDICARE

## 2022-08-01 VITALS
HEART RATE: 63 BPM | BODY MASS INDEX: 27.02 KG/M2 | HEIGHT: 64 IN | SYSTOLIC BLOOD PRESSURE: 155 MMHG | OXYGEN SATURATION: 92 % | RESPIRATION RATE: 16 BRPM | DIASTOLIC BLOOD PRESSURE: 70 MMHG | WEIGHT: 158.29 LBS | TEMPERATURE: 97.1 F

## 2022-08-01 DIAGNOSIS — R11.2 POST-OPERATIVE NAUSEA AND VOMITING: ICD-10-CM

## 2022-08-01 DIAGNOSIS — D05.12 INTRADUCTAL CARCINOMA IN SITU OF LEFT BREAST: ICD-10-CM

## 2022-08-01 DIAGNOSIS — G89.18 ACUTE POST-OPERATIVE PAIN: ICD-10-CM

## 2022-08-01 DIAGNOSIS — Z98.890 POST-OPERATIVE NAUSEA AND VOMITING: ICD-10-CM

## 2022-08-01 PROBLEM — D05.10 DUCTAL CARCINOMA IN SITU (DCIS) OF BREAST: Status: ACTIVE | Noted: 2022-08-01

## 2022-08-01 PROCEDURE — 160009 HCHG ANES TIME/MIN: Performed by: SURGERY

## 2022-08-01 PROCEDURE — 160039 HCHG SURGERY MINUTES - EA ADDL 1 MIN LEVEL 3: Performed by: SURGERY

## 2022-08-01 PROCEDURE — 88307 TISSUE EXAM BY PATHOLOGIST: CPT

## 2022-08-01 PROCEDURE — 160025 RECOVERY II MINUTES (STATS): Performed by: SURGERY

## 2022-08-01 PROCEDURE — 88360 TUMOR IMMUNOHISTOCHEM/MANUAL: CPT | Mod: 91,XU

## 2022-08-01 PROCEDURE — 160028 HCHG SURGERY MINUTES - 1ST 30 MINS LEVEL 3: Performed by: SURGERY

## 2022-08-01 PROCEDURE — 160046 HCHG PACU - 1ST 60 MINS PHASE II: Performed by: SURGERY

## 2022-08-01 PROCEDURE — 76098 X-RAY EXAM SURGICAL SPECIMEN: CPT | Mod: LT

## 2022-08-01 PROCEDURE — 88342 IMHCHEM/IMCYTCHM 1ST ANTB: CPT

## 2022-08-01 PROCEDURE — 88341 IMHCHEM/IMCYTCHM EA ADD ANTB: CPT | Mod: 91

## 2022-08-01 PROCEDURE — 700101 HCHG RX REV CODE 250: Performed by: ANESTHESIOLOGY

## 2022-08-01 PROCEDURE — 160048 HCHG OR STATISTICAL LEVEL 1-5: Performed by: SURGERY

## 2022-08-01 PROCEDURE — 700105 HCHG RX REV CODE 258: Performed by: SURGERY

## 2022-08-01 PROCEDURE — 160002 HCHG RECOVERY MINUTES (STAT): Performed by: SURGERY

## 2022-08-01 PROCEDURE — 88361 TUMOR IMMUNOHISTOCHEM/COMPUT: CPT

## 2022-08-01 PROCEDURE — 160035 HCHG PACU - 1ST 60 MINS PHASE I: Performed by: SURGERY

## 2022-08-01 PROCEDURE — 99100 ANES PT EXTEME AGE<1 YR&>70: CPT | Performed by: ANESTHESIOLOGY

## 2022-08-01 PROCEDURE — A9270 NON-COVERED ITEM OR SERVICE: HCPCS | Performed by: ANESTHESIOLOGY

## 2022-08-01 PROCEDURE — 700111 HCHG RX REV CODE 636 W/ 250 OVERRIDE (IP): Performed by: ANESTHESIOLOGY

## 2022-08-01 PROCEDURE — 00400 ANES INTEGUMENTARY SYS NOS: CPT | Performed by: ANESTHESIOLOGY

## 2022-08-01 PROCEDURE — 700102 HCHG RX REV CODE 250 W/ 637 OVERRIDE(OP): Performed by: ANESTHESIOLOGY

## 2022-08-01 PROCEDURE — 700101 HCHG RX REV CODE 250: Performed by: SURGERY

## 2022-08-01 RX ORDER — OXYCODONE HCL 5 MG/5 ML
5 SOLUTION, ORAL ORAL
Status: DISCONTINUED | OUTPATIENT
Start: 2022-08-01 | End: 2022-08-01 | Stop reason: HOSPADM

## 2022-08-01 RX ORDER — LIDOCAINE HYDROCHLORIDE 20 MG/ML
INJECTION, SOLUTION EPIDURAL; INFILTRATION; INTRACAUDAL; PERINEURAL PRN
Status: DISCONTINUED | OUTPATIENT
Start: 2022-08-01 | End: 2022-08-01 | Stop reason: SURG

## 2022-08-01 RX ORDER — HYDRALAZINE HYDROCHLORIDE 20 MG/ML
5 INJECTION INTRAMUSCULAR; INTRAVENOUS
Status: DISCONTINUED | OUTPATIENT
Start: 2022-08-01 | End: 2022-08-01 | Stop reason: HOSPADM

## 2022-08-01 RX ORDER — HYDROMORPHONE HYDROCHLORIDE 1 MG/ML
0.2 INJECTION, SOLUTION INTRAMUSCULAR; INTRAVENOUS; SUBCUTANEOUS
Status: DISCONTINUED | OUTPATIENT
Start: 2022-08-01 | End: 2022-08-01 | Stop reason: HOSPADM

## 2022-08-01 RX ORDER — ONDANSETRON 2 MG/ML
INJECTION INTRAMUSCULAR; INTRAVENOUS PRN
Status: DISCONTINUED | OUTPATIENT
Start: 2022-08-01 | End: 2022-08-01 | Stop reason: SURG

## 2022-08-01 RX ORDER — ONDANSETRON 2 MG/ML
4 INJECTION INTRAMUSCULAR; INTRAVENOUS
Status: DISCONTINUED | OUTPATIENT
Start: 2022-08-01 | End: 2022-08-01 | Stop reason: HOSPADM

## 2022-08-01 RX ORDER — ALBUTEROL SULFATE 2.5 MG/3ML
2.5 SOLUTION RESPIRATORY (INHALATION)
Status: DISCONTINUED | OUTPATIENT
Start: 2022-08-01 | End: 2022-08-01 | Stop reason: HOSPADM

## 2022-08-01 RX ORDER — ACETAMINOPHEN 500 MG
1000 TABLET ORAL ONCE
Status: COMPLETED | OUTPATIENT
Start: 2022-08-01 | End: 2022-08-01

## 2022-08-01 RX ORDER — CEFAZOLIN SODIUM 1 G/3ML
INJECTION, POWDER, FOR SOLUTION INTRAMUSCULAR; INTRAVENOUS PRN
Status: DISCONTINUED | OUTPATIENT
Start: 2022-08-01 | End: 2022-08-01 | Stop reason: SURG

## 2022-08-01 RX ORDER — CELECOXIB 200 MG/1
200 CAPSULE ORAL ONCE
Status: COMPLETED | OUTPATIENT
Start: 2022-08-01 | End: 2022-08-01

## 2022-08-01 RX ORDER — BUPIVACAINE HYDROCHLORIDE AND EPINEPHRINE 2.5; 5 MG/ML; UG/ML
INJECTION, SOLUTION EPIDURAL; INFILTRATION; INTRACAUDAL; PERINEURAL
Status: DISCONTINUED | OUTPATIENT
Start: 2022-08-01 | End: 2022-08-01 | Stop reason: HOSPADM

## 2022-08-01 RX ORDER — HYDROMORPHONE HYDROCHLORIDE 1 MG/ML
0.5 INJECTION, SOLUTION INTRAMUSCULAR; INTRAVENOUS; SUBCUTANEOUS
Status: DISCONTINUED | OUTPATIENT
Start: 2022-08-01 | End: 2022-08-01 | Stop reason: HOSPADM

## 2022-08-01 RX ORDER — HYDROCODONE BITARTRATE AND ACETAMINOPHEN 5; 325 MG/1; MG/1
1 TABLET ORAL EVERY 4 HOURS PRN
Qty: 12 TABLET | Refills: 0 | Status: SHIPPED | OUTPATIENT
Start: 2022-08-01 | End: 2022-08-03

## 2022-08-01 RX ORDER — HYDROMORPHONE HYDROCHLORIDE 1 MG/ML
0.4 INJECTION, SOLUTION INTRAMUSCULAR; INTRAVENOUS; SUBCUTANEOUS
Status: DISCONTINUED | OUTPATIENT
Start: 2022-08-01 | End: 2022-08-01 | Stop reason: HOSPADM

## 2022-08-01 RX ORDER — HALOPERIDOL 5 MG/ML
1 INJECTION INTRAMUSCULAR
Status: DISCONTINUED | OUTPATIENT
Start: 2022-08-01 | End: 2022-08-01 | Stop reason: HOSPADM

## 2022-08-01 RX ORDER — SODIUM CHLORIDE, SODIUM LACTATE, POTASSIUM CHLORIDE, CALCIUM CHLORIDE 600; 310; 30; 20 MG/100ML; MG/100ML; MG/100ML; MG/100ML
INJECTION, SOLUTION INTRAVENOUS CONTINUOUS
Status: DISCONTINUED | OUTPATIENT
Start: 2022-08-01 | End: 2022-08-01

## 2022-08-01 RX ORDER — OXYCODONE HCL 5 MG/5 ML
10 SOLUTION, ORAL ORAL
Status: DISCONTINUED | OUTPATIENT
Start: 2022-08-01 | End: 2022-08-01 | Stop reason: HOSPADM

## 2022-08-01 RX ORDER — SODIUM CHLORIDE, SODIUM LACTATE, POTASSIUM CHLORIDE, CALCIUM CHLORIDE 600; 310; 30; 20 MG/100ML; MG/100ML; MG/100ML; MG/100ML
INJECTION, SOLUTION INTRAVENOUS CONTINUOUS
Status: DISCONTINUED | OUTPATIENT
Start: 2022-08-01 | End: 2022-08-01 | Stop reason: HOSPADM

## 2022-08-01 RX ORDER — DEXAMETHASONE SODIUM PHOSPHATE 4 MG/ML
INJECTION, SOLUTION INTRA-ARTICULAR; INTRALESIONAL; INTRAMUSCULAR; INTRAVENOUS; SOFT TISSUE PRN
Status: DISCONTINUED | OUTPATIENT
Start: 2022-08-01 | End: 2022-08-01 | Stop reason: SURG

## 2022-08-01 RX ORDER — ONDANSETRON 4 MG/1
4 TABLET, ORALLY DISINTEGRATING ORAL EVERY 6 HOURS PRN
Qty: 10 TABLET | Refills: 0 | Status: SHIPPED | OUTPATIENT
Start: 2022-08-01 | End: 2022-09-12

## 2022-08-01 RX ADMIN — LIDOCAINE HYDROCHLORIDE 80 MG: 20 INJECTION, SOLUTION EPIDURAL; INFILTRATION; INTRACAUDAL at 15:54

## 2022-08-01 RX ADMIN — DEXAMETHASONE SODIUM PHOSPHATE 8 MG: 4 INJECTION, SOLUTION INTRA-ARTICULAR; INTRALESIONAL; INTRAMUSCULAR; INTRAVENOUS; SOFT TISSUE at 15:57

## 2022-08-01 RX ADMIN — ACETAMINOPHEN 1000 MG: 500 TABLET ORAL at 14:48

## 2022-08-01 RX ADMIN — FENTANYL CITRATE 100 MCG: 50 INJECTION, SOLUTION INTRAMUSCULAR; INTRAVENOUS at 15:54

## 2022-08-01 RX ADMIN — EPHEDRINE SULFATE 10 MG: 50 INJECTION, SOLUTION INTRAVENOUS at 16:20

## 2022-08-01 RX ADMIN — CEFAZOLIN 2 G: 330 INJECTION, POWDER, FOR SOLUTION INTRAMUSCULAR; INTRAVENOUS at 15:51

## 2022-08-01 RX ADMIN — CELECOXIB 200 MG: 200 CAPSULE ORAL at 14:48

## 2022-08-01 RX ADMIN — ONDANSETRON 4 MG: 2 INJECTION INTRAMUSCULAR; INTRAVENOUS at 16:22

## 2022-08-01 RX ADMIN — SODIUM CHLORIDE, POTASSIUM CHLORIDE, SODIUM LACTATE AND CALCIUM CHLORIDE: 600; 310; 30; 20 INJECTION, SOLUTION INTRAVENOUS at 15:51

## 2022-08-01 RX ADMIN — PROPOFOL 150 MG: 10 INJECTION, EMULSION INTRAVENOUS at 15:54

## 2022-08-01 ASSESSMENT — PAIN DESCRIPTION - PAIN TYPE
TYPE: SURGICAL PAIN

## 2022-08-01 ASSESSMENT — PAIN SCALES - GENERAL: PAIN_LEVEL: 0

## 2022-08-01 NOTE — ANESTHESIA PROCEDURE NOTES
Airway    Date/Time: 8/1/2022 3:55 PM  Performed by: Devonte Spears M.D.  Authorized by: Devonte Spears M.D.     Location:  OR  Urgency:  Elective  Indications for Airway Management:  Anesthesia      Spontaneous Ventilation: absent    Sedation Level:  Deep  Preoxygenated: Yes    Final Airway Type:  Supraglottic airway  Final Supraglottic Airway:  Standard LMA    SGA Size:  3  Number of Attempts at Approach:  1   Atraumatic insertion, good seal

## 2022-08-01 NOTE — DISCHARGE INSTRUCTIONS
HOME CARE INSTRUCTIONS    ACTIVITY: Rest and take it easy for the first 24 hours.  A responsible adult is recommended to remain with you during that time.  It is normal to feel sleepy.  We encourage you to not do anything that requires balance, judgment or coordination.    FOR 24 HOURS DO NOT:  Drive, operate machinery or run household appliances.  Drink beer or alcoholic beverages.  Make important decisions or sign legal documents.    SPECIAL INSTRUCTIONS:   This sheet gives you information about how to care for yourself after your procedure. Your health care provider may also give you more specific instructions. If you have problems or questions, contact your health care provider.  What can I expect after the procedure?  After the procedure, it is common to have:  Breast swelling.  Breast tenderness.  Stiffness in your arm or shoulder.  A change in the shape and feel of your breast.  Scar tissue that feels hard to the touch in the area where the lump was removed.  Follow these instructions at home:  Medicines  Take over-the-counter and prescription medicines only as told by your health care provider.  Take your antibiotic medicine as told by your health care provider. Do not stop taking the antibiotic even if you start to feel better.  If you are taking prescription pain medicine, take actions to prevent or treat constipation. Your health care provider may recommend that you:  Drink enough fluid to keep your urine pale yellow.  Eat foods that are high in fiber, such as fresh fruits and vegetables, whole grains, and beans.  Limit foods that are high in fat and processed sugars, such as fried and sweet foods.  Take an over-the-counter or prescription medicine for constipation.  Bathing  Do not take baths, swim, or use a hot tub until your health care provider approves. Ask your health care provider if you may take showers. You may only be allowed to take sponge baths.  Incision care         Follow instructions from  your health care provider about how to take care of your incision. Make sure you:  Wash your hands with soap and water before you change your bandage (dressing). If soap and water are not available, use hand .  Change your dressing as told by your health care provider.  Leave stitches (sutures), skin glue, or adhesive strips in place. These skin closures may need to stay in place for 2 weeks or longer. If adhesive strip edges start to loosen and curl up, you may trim the loose edges. Do not remove adhesive strips completely unless your health care provider tells you to do that.  Check your incision area every day for signs of infection. Check for:  Redness, swelling, or pain.  Fluid or blood.  Warmth.  Pus or a bad smell.  Keep your dressing clean and dry.  If you were sent home with a surgical drain in place, follow instructions from your health care provider about emptying it.  Activity  Return to your normal activities as told by your health care provider. Ask your health care provider what activities are safe for you.  Be careful to avoid any activities that could cause an injury to your arm on the side of your surgery.  Do not lift anything that is heavier than 10 lb (4.5 kg), or the limit that you are told, until your health care provider says that it is safe. Avoid lifting with the arm that is on the side of your surgery.  Do not carry heavy objects on your shoulder on the side of your surgery.  After your drain is removed, you should perform exercises to keep your arm from getting stiff and swollen. Talk with your health care provider about which exercises are safe for you.  General instructions  Do not drive or use heavy machinery while taking prescription pain medicine.  Wear a supportive bra as told by your health care provider.  Raise (elevate) your arm above the level of your heart while you are sitting or lying down.  Do not wear tight jewelry on your arm, wrist, or fingers on the side of your  surgery.  Keep all follow-up visits as told by your health care provider. This is important.  You may need to be screened for extra fluid around the lymph nodes (lymphedema). Follow instructions from your health care provider about how often you should be checked.  If you had any lymph nodes removed during your procedure, be sure to tell all of your health care providers. This is important information to share before you are involved in certain procedures, such as having blood tests or having your blood pressure taken.  Contact a health care provider if:  You develop a rash.  You have a fever.  Your pain medicine is not working.  Your swelling, weakness, or numbness in your arm has not improved after a few weeks.  You have new swelling in your breast or arm.  You have redness, swelling, or pain in your incision area.  You have fluid or blood coming from your incision.  Your incision feels warm to the touch.  You have pus or a bad smell coming from your incision.  Get help right away if:  You have very bad pain in your breast or arm.  You have chest pain.  You have difficulty breathing.  Summary  After the procedure, it is common to have breast tenderness, swelling, and stiffness in your arm and shoulder.  Follow instructions from your health care provider about how to take care of your incision.  Do not lift anything that is heavier than 10 lb (4.5 kg), or the limit that you are told, until your health care provider says that it is safe. Avoid lifting with the arm that is on the side of your surgery.  If you had any lymph nodes removed during your procedure, be sure to tell all of your health care providers. This is important information to share before you are involved in certain procedures, such as having blood tests or having your blood pressure taken.  This information is not intended to replace advice given to you by your health care provider. Make sure you discuss any questions you have with your health care  provider.  Document Released: 01/03/2008 Document Revised: 03/27/2020 Document Reviewed: 08/30/2017  [employer] Patient Education © 2020 [employer]      DIET: To avoid nausea, slowly advance diet as tolerated, avoiding spicy or greasy foods for the first day.  Add more substantial food to your diet according to your physician's instructions.  Babies can be fed formula or breast milk as soon as they are hungry.  INCREASE FLUIDS AND FIBER TO AVOID CONSTIPATION.    SURGICAL DRESSING/BATHING: you may shower in 24 hours, your incicions is closed with Dermabond    MEDICATIONS: Resume taking daily medication.  Take prescribed pain medication with food.  If no medication is prescribed, you may take non-aspirin pain medication if needed.  PAIN MEDICATION CAN BE VERY CONSTIPATING.  Take a stool softener or laxative such as senokot, pericolace, or milk of magnesia if needed.    Prescription given for Norco and Zofran sent to your [facility].  Last pain medication given at Tylenol 1000mg and Celebrex 200mg at 2:48pm..    A follow-up appointment should be arranged with your doctor in 1-2 weeks; call to schedule.    You should CALL YOUR PHYSICIAN if you develop:  Fever greater than 101 degrees F.  Pain not relieved by medication, or persistent nausea or vomiting.  Excessive bleeding (blood soaking through dressing) or unexpected drainage from the wound.  Extreme redness or swelling around the incision site, drainage of pus or foul smelling drainage.  Inability to urinate or empty your bladder within 8 hours.  Problems with breathing or chest pain.    You should call 911 if you develop problems with breathing or chest pain.  If you are unable to contact your doctor or surgical center, you should go to the nearest emergency room or urgent care center.  Physician's telephone #: [phone] [place]    MILD FLU-LIKE SYMPTOMS ARE NORMAL.  YOU MAY EXPERIENCE GENERALIZED MUSCLE ACHES, THROAT IRRITATION, HEADACHE AND/OR  SOME NAUSEA.    If any questions arise, call your doctor.  If your doctor is not available, please feel free to call the Surgical Center at [phone].  The Center is open Monday through Friday from 7AM to 7PM.      A registered nurse may call you a few days after your surgery to see how you are doing after your procedure.    You may also receive a survey in the mail within the next two weeks and we ask that you take a few moments to complete the survey and return it to us.  Our goal is to provide you with very good care and we value your comments.     Depression / Suicide Risk    As you are discharged from this [facility] facility, it is important to learn how to keep safe from harming yourself.    Recognize the warning signs:  Abrupt changes in personality, positive or negative- including increase in energy   Giving away possessions  Change in eating patterns- significant weight changes-  positive or negative  Change in sleeping patterns- unable to sleep or sleeping all the time   Unwillingness or inability to communicate  Depression  Unusual sadness, discouragement and loneliness  Talk of wanting to die  Neglect of personal appearance   Rebelliousness- reckless behavior  Withdrawal from people/activities they love  Confusion- inability to concentrate     If you or a loved one observes any of these behaviors or has concerns about self-harm, here's what you can do:  Talk about it- your feelings and reasons for harming yourself  Remove any means that you might use to hurt yourself (examples: pills, rope, extension cords, firearm)  Get professional help from the community (Mental Health, Substance Abuse, psychological counseling)  Do not be alone:Call your Safe Contact- someone whom you trust who will be there for you.  Call your local CRISIS HOTLINE 108-1861 or 189-414-7743  Call your local Children's Mobile Crisis Response Team [place] (449) 431-8598 or www.gamigo  Call the toll free National  Suicide Prevention Hotlines   National Suicide Prevention Lifeline 766-247-OTNO (9083)  [facility] 800-SUICIDE (307-4012)    I acknowledge receipt and understanding of these Home Care instructions.

## 2022-08-01 NOTE — ANESTHESIA PREPROCEDURE EVALUATION
Case: [id number] Date/Time: 08/01/22 1545    Procedure: MELLO LOCALIZED LEFT PARTIAL MASTECTOMY    Pre-op diagnosis: INTRADUCTAL CARCINOMA IN SITU OF BREAST -LEFT    Location: [facility] 08 / SURGERY [facility]    Surgeons: Elena Arroyo M.D.        Hypothyroid, PONV.  Denies: MI/CHF/smoking/CVA/DM/CKD.    Physical Exam    Airway   Mallampati: II  TM distance: >3 FB  Neck ROM: full       Cardiovascular - normal exam  Rhythm: regular  Rate: normal  (-) murmur     Dental - normal exam           Pulmonary - normal exam  Breath sounds clear to auscultation     Abdominal    Neurological - normal exam                 Anesthesia Plan    ASA 2       Plan - general       Airway plan will be LMA          Induction: intravenous    Postoperative Plan: Postoperative administration of opioids is intended.    Pertinent diagnostic labs and testing reviewed    Informed Consent:    Anesthetic plan and risks discussed with patient.    Use of blood products discussed with: patient whom consented to blood products.

## 2022-08-01 NOTE — ANESTHESIA TIME REPORT
Anesthesia Start and Stop Event Times     Date Time Event    8/1/2022 1443 Ready for Procedure     1551 Anesthesia Start     1636 Anesthesia Stop        Responsible Staff  08/01/22    Name Role Begin End    Devonte Spears M.D. Anesth 1551 1636        Overtime Reason:  no overtime (within assigned shift)    Comments:

## 2022-08-01 NOTE — PROGRESS NOTES
Patient in PACU in stable condition. VSS. Surgical dressing clean dry intact. Will continue to monitor and medicate appropriately.

## 2022-08-01 NOTE — OP REPORT
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    Anesthesiologist/Type of Anesthesia:  Anesthesiologist: Devonte Spears M.D./General    Surgical Staff:  Assistant: TOAN Kiser  Circulator: Janessa Posada R.N.  Scrub Person: Waldemar Whalen     The nature of the surgical procedure warranted additional skilled operative assistance from an Advanced Registered Nurse Practitioner (ARNP). The assistant was present during the entire operation. The surgical assistant performed the following: provided assistance with optimal surgical exposure of the operative field, provided high complexity, subspecialty decision making input and performed the skin closure and dressing application.      Specimens removed if any:  ID Type Source Tests Collected by Time Destination   A : Left Breast DCIS; short stitch superior, long stitch lateral Tissue Breast PATHOLOGY SPECIMEN Elena Arroyo M.D. 8/1/2022  4:12 PM    B : additional inferior margin; stitch marks new margin Tissue Breast PATHOLOGY SPECIMEN Elena Arroyo M.D. 8/1/2022  4:16 PM    C : new medial margin; stitch marks new margin Tissue Breast PATHOLOGY SPECIMEN Elena Arroyo M.D. 8/1/2022  4:17 PM    D : lateral margin; stitch marks new margin Tissue Breast PATHOLOGY SPECIMEN Elena Arroyo M.D. 8/1/2022  4:20 PM        Estimated Blood Loss: 10mL    Findings: Mello clip and mass in surgical specimen    Complications: None apparent    Indications: This is a 76-year-old woman who presents with a self identified palpable mass.  Further work-up revealed recurrent ductal carcinoma in situ for which she had had a mastectomy 35 years ago.  We discussed excision with risks including, but not  limited to, bleeding, infection, damage surrounding structures, implant puncture, need for further procedures and further treatment.  The patient understood and agreed to proceed.    Description of procedure:  The patient was brought to the operating room and placed in a comfortable supine position on the operating room table with all appropriate points padded.  General anesthesia was induced without complication.  Timeout was held and completed confirming correct patient, correct site, correct procedure and SCDs on and functioning.  She received antibiotics prior to incision.  Her left chest was prepped with ChloraPrep and draped in the usual sterile fashion.  We confirmed that the savvy clip was functional.  I infiltrated quarter percent Marcaine with epi transversely over the palpable mass, where the Chely signal was strongest.  I made an incision with a 10 blade.  I resected the tissue circumferentially with cautery and then sharply as much as possible when I got close to the implant.  The posterior margin is at the implant, there is no further tissue to remove.  The anterior margin was completely removed.  Once I amputated the specimen completely, I marked it carefully with a short stitch superior long stitch laterally.  This was placed on specimen mammogram and I noted that the mass in clip were both in the specimen.  This was somewhat close to the inferior margin so I took an additional inferior margin, medial margin and lateral margin.  As noted the posterior margin is the implant and the anterior margin was completely removed.  The superior margin appeared to be far away from the clip and mass.  I irrigated the cavity and confirmed excellent hemostasis.  I closed the skin in layers using interrupted 3-0 Vicryl in the dermis and a running subicular 4-0 Monocryl in the epidermis.  This was dressed with Dermabond.  The patient was awoken from anesthesia in good condition having tolerated procedure well.  All  counts were correct at the end of the case x2.        8/1/2022 4:45 PM Elena Arroyo M.D.

## 2022-08-02 LAB — PATHOLOGY CONSULT NOTE: NORMAL

## 2022-08-02 NOTE — PROGRESS NOTES
Patient to Phase II with RN in stable condition. VSS. Surgical dressing clean dry intact. Aox4 and on RA. No belongings. Family updated. No further needs.

## 2022-08-02 NOTE — ANESTHESIA POSTPROCEDURE EVALUATION
Patient: Paige Gudino    Procedure Summary     Date: 08/01/22 Room / Location: [facility] 08 / SURGERY [facility]    Anesthesia Start: 1551 Anesthesia Stop: 1636    Procedure: MELLO LOCALIZED LEFT PARTIAL MASTECTOMY (Left Breast) Diagnosis: (INTRADUCTAL CARCINOMA IN SITU OF BREAST -LEFT)    Surgeons: Elena Arroyo M.D. Responsible Provider: Devonte Spears M.D.    Anesthesia Type: general ASA Status: 2          Final Anesthesia Type: general  Last vitals  BP   Blood Pressure : (!) 155/70    Temp   36.2 °C (97.1 °F)    Pulse   63   Resp   16    SpO2   92 %      Anesthesia Post Evaluation    Patient location during evaluation: PACU  Patient participation: complete - patient participated  Level of consciousness: awake and alert  Pain score: 0    Airway patency: patent  Anesthetic complications: no  Cardiovascular status: hemodynamically stable  Respiratory status: acceptable  Hydration status: euvolemic    PONV: none          No complications documented.     Nurse Pain Score: 0 (NPRS)

## 2022-08-02 NOTE — OR NURSING
Pt arrived to phase II. Pt states pain is tolerable and is motivated to go home. D/C instructions given to pt and family, verbalized understanding.

## 2022-09-12 PROBLEM — C50.812 CANCER OF OVERLAPPING SITES OF LEFT BREAST (HCC): Status: ACTIVE | Noted: 2022-08-01

## 2022-09-13 ENCOUNTER — HOSPITAL ENCOUNTER (OUTPATIENT)
Dept: RADIATION ONCOLOGY | Facility: MEDICAL CENTER | Age: 77
End: 2022-09-30
Attending: RADIOLOGY
Payer: MEDICARE

## 2022-09-13 VITALS
HEIGHT: 64 IN | WEIGHT: 158 LBS | DIASTOLIC BLOOD PRESSURE: 99 MMHG | OXYGEN SATURATION: 96 % | SYSTOLIC BLOOD PRESSURE: 177 MMHG | BODY MASS INDEX: 26.98 KG/M2 | HEART RATE: 73 BPM

## 2022-09-13 DIAGNOSIS — C50.812 CANCER OF OVERLAPPING SITES OF LEFT BREAST (HCC): ICD-10-CM

## 2022-09-13 PROBLEM — C50.919 MALIGNANT TUMOR OF BREAST (HCC): Status: ACTIVE | Noted: 2022-07-12

## 2022-09-13 PROCEDURE — 99214 OFFICE O/P EST MOD 30 MIN: CPT | Performed by: RADIOLOGY

## 2022-09-13 PROCEDURE — 99205 OFFICE O/P NEW HI 60 MIN: CPT | Performed by: RADIOLOGY

## 2022-09-13 ASSESSMENT — PAIN SCALES - GENERAL: PAINLEVEL: NO PAIN

## 2022-09-13 NOTE — CONSULTS
RADIATION ONCOLOGY CONSULT    DATE OF SERVICE: 9/13/2022    IDENTIFICATION: A 76 y.o. female with [id number] Grade 2 invasive ductal carcinoma of the left breast 6 o'clock position arising in breast tissue after modified radical mastectomy 35 years ago for multifocal DCIS.  Tumor ER/MD greater than 90 HER2 1+ Ki-67 5 to 10% status postlumpectomy node lymph node dissection.  1.6 cm tumor less than 0.5 cm from posterior margin no LVI.  She is here at the kind request of Dr. Elena Terrell.      HISTORY OF PRESENT ILLNESS: Patient's breast cancer history dates back to 35 years ago when she underwent mastectomy for multifocal DCIS.  That is the only treatment she received.  More recently in May she noted a lump at about the 12 o'clock position in the reconstructed breast in mammary tissue.  She underwent an ultrasound 6/23/2022 revealing at the 6 o'clock position a 10 x 6 x 7 cm mass the axilla was negative.  This was biopsied 6/27/2022 revealing an invasive cribriform carcinoma grade 1 ER/MD greater than 90 HER2 IHC 1+.She then underwent an excisional biopsy 8/1/2022 again HER2 1+ grade 2 infiltrating ductal carcinoma measuring 1.6 cm with a Ki-67 of 5 to 10%.  There was no lymph static vessel invasion.  The posterior margin was less than 0.25 mm.  Postoperatively she is doing well she is here to discuss radiation therapy in her overall treatment plan.  It was explained to her that she did not have an axillary node dissection because she had 135 years ago.    PAST MEDICAL HISTORY:   Past Medical History:   Diagnosis Date    Anesthesia     nausea post op    Cancer (HCC)     1987 breast left    Cancer of overlapping sites of left female breast (HCC)     Hypothyroidism     Thyroid nodule        PAST SURGICAL HISTORY:  Past Surgical History:   Procedure Laterality Date    PB MASTECTOMY, PARTIAL Left 8/1/2022    Procedure: MELLO LOCALIZED LEFT PARTIAL MASTECTOMY;  Surgeon: Elena Arroyo M.D.;  Location: SURGERY  [facility];  Service: General    OTHER ORTHOPEDIC SURGERY  2019    back surgery    OTHER  2001    replace left breast implant    OTHER  1988    Left breast implant/lift    OTHER  1987    left mastectomy       GYNECOLOGICAL STATUS:  G3, P2    CURRENT MEDICATIONS:  Current Outpatient Medications   Medication Sig Dispense Refill    vitamin D3 (CHOLECALCIFEROL) 5000 Unit (125 mcg) Tab Take 1,000 Units by mouth every day.      Calcium Carbonate (CALCIUM 600 PO) Take  by mouth every day.      Cyanocobalamin (VITAMIN B12 PO) Take 150 mg by mouth every 7 days.      Non Formulary Request \"Total vitamin\" 280mg. 2 tabs po daily      Multiple Vitamins-Minerals (SM MULTIPLE VITAMINS WOMENS PO) Take 2 Tablets by mouth every day at 6 PM.      ARMOUR THYROID 60 MG Tab TAKE 1 & 1/2 (ONE & ONE-HALF) TABLETS BY MOUTH MON-Friday, 60 MG SAT AND SUNDAY 135 Tablet 3     No current facility-administered medications for this encounter.       ALLERGIES:    Synthroid [fd&c red #40 [place]-levothyroxine] and Synthroid [levothyroxine]    FAMILY HISTORY:    Family History   Problem Relation Age of Onset    Ovarian Cancer Mother     Hyperlipidemia Neg Hx    [unfilled]        SOCIAL HISTORY:     reports that she has never smoked. She has never used smokeless tobacco. She reports that she does not currently use alcohol. She reports that she does not use drugs.  Patient is a 76 year old female who resides in [place] with her spouse. She is retired from banking. Patient lives with her second  and her daughter who is a paraplegic due to transverse myelitis.    REVIEW OF SYSTEMS: Pertinent positives consist of  nothing  The rest of the review of systems is negative and has been reviewed.     PAIN:   Patient reports no acute/chronic pain     PHYSICAL EXAM:    0= Fully active, able to carry on all pre-disease performance without restriction.  Vitals:    09/13/22 0806   BP: (!) 177/99   BP Location: Right arm   Patient Position: Sitting   BP Cuff " "Size: Adult   Pulse: 73   SpO2: 96%   Weight: 71.7 kg (158 lb)   Height: 1.626 m (5' 4\")   Pain Score: No pain        GENERAL: Well-appearing alert and oriented x3 no apparent distress  Breasts: Left reconstructed breast has evidence of an incision  just below the reconstructed nipple that is smooth flat without nodularity or mass.  Implant is in place no other masses are appreciated in that breast.  The right breast has evidence of reduction mammoplasty no masses are appreciated there.  She does have just some induration in the left axilla which she states she has had since the original mastectomy 35 years ago.  HEENT:  Pupils are equal, round, and reactive to light.  Extraocular muscles   are intact. Sclerae nonicteric.  Conjunctivae pink.  Oral cavity, tongue   protrudes midline.   NECK:   No peripheral adenopathy of the neck, supraclavicular fossa or axillae   bilaterally.  LUNGS:  Clear to ascultation   HEART:  Regular rate and rhythm.  No murmur appreciated  ABDOMEN:  Soft. No evidence of hepatosplenomegaly.    EXTREMITIES:  Without Edema.  NEUROLOGIC:  Cranial nerves II through XII were intact. Normal stance and gait motor and sensory grossly within normal limits            IMPRESSION:    A 76 y.o. 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  Tumor ER/W[place] greater than 90 HER2 1+ Ki-67 5 to 10% status postlumpectomy node lymph node dissection.  1.6 cm tumor less than 0.5 cm from posterior margin no LVI. .      RECOMMENDATIONS:   I had a long discussion with the patient regarding diagnosis prognosis and treatment.  I am recommending radiation therapy to decrease her chance of local regional recurrence.  I am recommending 15 fractions plus a 5 fraction boost.  I've described the details of radiation along with the side effects both acute and chronic, including but not exclusive " to fatigue, skin reaction, local soreness, swelling, delayed healing, scarring fibrosis discoloration. Also understands since she has an implant there is a chance for retraction requiring a replacement.Ample time was allowed for questions, and patient understands.    She is tentatively scheduled for simulation to get started soon thereafter.  She will be seeing [facility] in consultation regarding systemic management.    Thank you for the opportunity to participate in her care.  If any questions or comments, please do not hesitate in calling.    Please note that this dictation was created using voice recognition software. I have made every reasonable attempt to correct obvious errors, but I expect that there are errors of grammar and possibly content that I did not discover before finalizing the note.

## 2022-09-13 NOTE — PROGRESS NOTES
"Patient was seen today in clinic with Dr. Burroughs for consultation.  Vitals signs and weight were obtained and pain assessment was completed.  Allergies and medications were reviewed with the patient.      Vitals/Pain:  Vitals:    09/13/22 0806   BP: (!) 177/99   BP Location: Right arm   Patient Position: Sitting   BP Cuff Size: Adult   Pulse: 73   SpO2: 96%   Weight: 71.7 kg (158 lb)   Height: 1.626 m (5' 4\")   Pain Score: No pain        Allergies:   Synthroid [fd&c red #40 [place]-levothyroxine] and Synthroid [levothyroxine]    Current Medications:  Current Outpatient Medications   Medication Sig Dispense Refill    vitamin D3 (CHOLECALCIFEROL) 5000 Unit (125 mcg) Tab Take 1,000 Units by mouth every day.      Calcium Carbonate (CALCIUM 600 PO) Take  by mouth every day.      Cyanocobalamin (VITAMIN B12 PO) Take 150 mg by mouth every 7 days.      Non Formulary Request \"Total vitamin\" 280mg. 2 tabs po daily      Multiple Vitamins-Minerals (SM MULTIPLE VITAMINS WOMENS PO) Take 2 Tablets by mouth every day at 6 PM.      ARMOUR THYROID 60 MG Tab TAKE 1 & 1/2 (ONE & ONE-HALF) TABLETS BY MOUTH MON-Friday, 60 MG SAT AND SUNDAY 135 Tablet 3     No current facility-administered medications for this encounter.         PCP:  Harmeet Srinivasan R.N.   "

## 2022-09-13 NOTE — CT SIMULATION
[facility]   PRIMARY PHYSICIAN Roula Ga [id number]   REFERRING PHYSICIAN Elena Arroyo M* 1945     Cancer of overlapping sites of left breast (HCC)  Staging form: Breast, AJCC 8th Edition  - Pathologic: Stage Unknown (pT1c, pNX, cM0, G2, ER+, WA+, HER2-) - Signed by Teri Burroughs M.D. on 9/12/2022  Stage prefix: Initial diagnosis  Histologic grading system: 3 grade system         Treatment Planning CT Simulation        Order Questions       Question Answer Comment    Is this for a new course of treatment? Yes     Is this an Addendum? No     Implanted Device/Pacemaker No     Simulation Status Initial     Treatment Technique 3D CRT     Treatment Pattern/Frequency Daily     Concurrent Chemotherapy No     CT Technique 3D possible     DIBH     Slice Thickness 3mm     Scan Extent Chest     Treatment Device(s) Vac Kenn      Wing Board     Treatment Marker Scar      Breast Borders     Patient Attire Gown     Patient Position Supine     Patient Orientation Head First     Arm Position Up     Treatment Machine No preference     Treatment Image Guidance Ports for boost     CBCT     Frequency (ports) Weekly     Frequency (CBCT) Daily for boost    Image Guidance Match PTV - Soft Tissue     Other Orders Weekly Physics Check

## 2022-09-16 ENCOUNTER — HOSPITAL ENCOUNTER (OUTPATIENT)
Dept: LAB | Facility: MEDICAL CENTER | Age: 77
End: 2022-09-16
Attending: PHYSICIAN ASSISTANT
Payer: MEDICARE

## 2022-09-16 DIAGNOSIS — E55.9 HYPOVITAMINOSIS D: ICD-10-CM

## 2022-09-16 DIAGNOSIS — E78.5 DYSLIPIDEMIA: ICD-10-CM

## 2022-09-16 DIAGNOSIS — E03.9 ACQUIRED HYPOTHYROIDISM: ICD-10-CM

## 2022-09-16 DIAGNOSIS — E53.8 VITAMIN B 12 DEFICIENCY: ICD-10-CM

## 2022-09-16 LAB
25(OH)D3 SERPL-MCNC: 48 NG/ML (ref 30–100)
ALBUMIN SERPL BCP-MCNC: 4.4 G/DL (ref 3.2–4.9)
ALBUMIN/GLOB SERPL: 1.4 G/DL
ALP SERPL-CCNC: 79 U/L (ref 30–99)
ALT SERPL-CCNC: 18 U/L (ref 2–50)
ANION GAP SERPL CALC-SCNC: 10 MMOL/L (ref 7–16)
AST SERPL-CCNC: 17 U/L (ref 12–45)
BASOPHILS # BLD AUTO: 0.2 % (ref 0–1.8)
BASOPHILS # BLD: 0.01 K/UL (ref 0–0.12)
BILIRUB SERPL-MCNC: 1.7 MG/DL (ref 0.1–1.5)
BUN SERPL-MCNC: 17 MG/DL (ref 8–22)
CALCIUM SERPL-MCNC: 9.2 MG/DL (ref 8.5–10.5)
CHLORIDE SERPL-SCNC: 103 MMOL/L (ref 96–112)
CHOLEST SERPL-MCNC: 182 MG/DL (ref 100–199)
CO2 SERPL-SCNC: 25 MMOL/L (ref 20–33)
CREAT SERPL-MCNC: 0.85 MG/DL (ref 0.5–1.4)
EOSINOPHIL # BLD AUTO: 0 K/UL (ref 0–0.51)
EOSINOPHIL NFR BLD: 0 % (ref 0–6.9)
ERYTHROCYTE [DISTWIDTH] IN BLOOD BY AUTOMATED COUNT: 45.8 FL (ref 35.9–50)
FASTING STATUS PATIENT QL REPORTED: NORMAL
GFR SERPLBLD CREATININE-BSD FMLA CKD-EPI: 71 ML/MIN/1.73 M 2
GLOBULIN SER CALC-MCNC: 3.2 G/DL (ref 1.9–3.5)
GLUCOSE SERPL-MCNC: 99 MG/DL (ref 65–99)
HCT VFR BLD AUTO: 41.2 % (ref 37–47)
HDLC SERPL-MCNC: 41 MG/DL
HGB BLD-MCNC: 12.8 G/DL (ref 12–16)
IMM GRANULOCYTES # BLD AUTO: 0.02 K/UL (ref 0–0.11)
IMM GRANULOCYTES NFR BLD AUTO: 0.4 % (ref 0–0.9)
LDLC SERPL CALC-MCNC: 114 MG/DL
LYMPHOCYTES # BLD AUTO: 1.04 K/UL (ref 1–4.8)
LYMPHOCYTES NFR BLD: 20.8 % (ref 22–41)
MCH RBC QN AUTO: 25.2 PG (ref 27–33)
MCHC RBC AUTO-ENTMCNC: 31.1 G/DL (ref 33.6–35)
MCV RBC AUTO: 81.1 FL (ref 81.4–97.8)
MONOCYTES # BLD AUTO: 0.35 K/UL (ref 0–0.85)
MONOCYTES NFR BLD AUTO: 7 % (ref 0–13.4)
NEUTROPHILS # BLD AUTO: 3.58 K/UL (ref 2–7.15)
NEUTROPHILS NFR BLD: 71.6 % (ref 44–72)
NRBC # BLD AUTO: 0 K/UL
NRBC BLD-RTO: 0 /100 WBC
PLATELET # BLD AUTO: 260 K/UL (ref 164–446)
PMV BLD AUTO: 11.2 FL (ref 9–12.9)
POTASSIUM SERPL-SCNC: 4.3 MMOL/L (ref 3.6–5.5)
PROT SERPL-MCNC: 7.6 G/DL (ref 6–8.2)
RBC # BLD AUTO: 5.08 M/UL (ref 4.2–5.4)
SODIUM SERPL-SCNC: 138 MMOL/L (ref 135–145)
TRIGL SERPL-MCNC: 134 MG/DL (ref 0–149)
TSH SERPL DL<=0.005 MIU/L-ACNC: 1.32 UIU/ML (ref 0.38–5.33)
VIT B12 SERPL-MCNC: 1763 PG/ML (ref 211–911)
WBC # BLD AUTO: 5 K/UL (ref 4.8–10.8)

## 2022-09-16 PROCEDURE — 36415 COLL VENOUS BLD VENIPUNCTURE: CPT

## 2022-09-16 PROCEDURE — 85025 COMPLETE CBC W/AUTO DIFF WBC: CPT

## 2022-09-16 PROCEDURE — 80061 LIPID PANEL: CPT

## 2022-09-16 PROCEDURE — 82306 VITAMIN D 25 HYDROXY: CPT

## 2022-09-16 PROCEDURE — 80053 COMPREHEN METABOLIC PANEL: CPT

## 2022-09-16 PROCEDURE — 84443 ASSAY THYROID STIM HORMONE: CPT

## 2022-09-16 PROCEDURE — 82607 VITAMIN B-12: CPT

## 2022-09-22 ENCOUNTER — TELEPHONE (OUTPATIENT)
Dept: MEDICAL GROUP | Age: 77
End: 2022-09-22
Payer: MEDICARE

## 2022-09-22 NOTE — TELEPHONE ENCOUNTER
----- Message from Roula Ga P.A.-C. sent at 9/19/2022  8:35 AM PDT -----  Please let patient know overall her labs look pretty good.  There are some slight abnormalities, these are not concerning.  We will review them together at her upcoming office visit.

## 2022-09-22 NOTE — TELEPHONE ENCOUNTER
Phone Number Called: [phone] (home)     Call outcome: Did not leave a detailed message. Requested patient to call back.    Message: 1st attempt

## 2022-09-23 NOTE — TELEPHONE ENCOUNTER
Phone Number Called: [phone] (home)     Call outcome: Spoke to patient regarding message below.    Message: Patient informed of Roula's message

## 2022-09-27 ENCOUNTER — HOSPITAL ENCOUNTER (OUTPATIENT)
Dept: RADIATION ONCOLOGY | Facility: MEDICAL CENTER | Age: 77
End: 2022-09-27

## 2022-09-27 PROCEDURE — 77290 THER RAD SIMULAJ FIELD CPLX: CPT | Performed by: RADIOLOGY

## 2022-09-27 PROCEDURE — 77263 THER RADIOLOGY TX PLNG CPLX: CPT | Performed by: RADIOLOGY

## 2022-09-27 PROCEDURE — 77334 RADIATION TREATMENT AID(S): CPT | Mod: 26 | Performed by: RADIOLOGY

## 2022-09-27 PROCEDURE — 77290 THER RAD SIMULAJ FIELD CPLX: CPT | Mod: 26 | Performed by: RADIOLOGY

## 2022-09-27 PROCEDURE — 77334 RADIATION TREATMENT AID(S): CPT | Performed by: RADIOLOGY

## 2022-09-27 NOTE — RADIATION COMPLETION NOTES
DATE OF SERVICE: 9/27/2022    DIAGNOSIS:  Cancer of overlapping sites of left breast (HCC)  Staging form: Breast, AJCC 8th Edition  - Pathologic: Stage Unknown (pT1c, pNX, cM0, G2, ER+, MI+, HER2-) - Signed by Teri Burroughs M.D. on 9/12/2022  Stage prefix: Initial diagnosis  Histologic grading system: 3 grade system       DATE OF SERVICE: 9/27/2022    TYPE OF SIMULATION: Breast       [] Right    [x] Left      GOAL OF TREATMENT:   [x] Curative  [] Palliative  [] Oligometastatic    COMPLEX:  [x] Complex Blocking   []Arcs  [] Custom Blocks  [] >3 Sites    PROCEDURE: Patient placed in supine position on wing board with VAC YASMINE bag with appropriate slant to compensate for slope of chest wall.  Breast/chest wall borders marked CT scan obtained to contain entire volume of interest.  Breath-hold required      I have personally reviewed the relevant data, performed the target localization, and determined all relevant factors for this patient’s simulation.

## 2022-09-27 NOTE — RADIATION COMPLETION NOTES
Clinical Treatment Planning Note    DATE OF SERVICE: 9/27/2022    DIAGNOSIS:  Cancer of overlapping sites of left breast (HCC)  Staging form: Breast, AJCC 8th Edition  - Pathologic: Stage Unknown (pT1c, pNX, cM0, G2, ER+, TN+, HER2-) - Signed by Teri Burroughs M.D. on 9/12/2022  Stage prefix: Initial diagnosis  Histologic grading system: 3 grade system         IMAGING REVIEWED:  [] CT     [] MRI     [] PET/CT     [] BONE SCAN     [x] MAMMO     [] OTHER      TREATMENT INTENT:   [x] CURATIVE     [] MAINTENANCE     []  PALLIATIVE      []  SUPPORTIVE     []  PROPHYLACTIC     [] BENIGN     []  CONSOLIDATIVE      [] DEFINITIVE   []  OLOGIMETASTATIC      LINE OF TREATMENT:  [] ADJUVANT   [x] DEFINITIVE   [] NEOADJUVANT   [] RE-TREATMENT      TECHNIQUE PLANNED:  [] IMRT   [x] 3D   [] SBRT   [] SRS/SRT   [] HDR   [] ELECTRON       IMRT JUSTIFICATION:  []   An immediately adjacent area has been previously irradiated and abutting portals must be established with high precision.    []  Dose escalation is planned to deliver radiation doses in excess of those commonly utilized for similar tumors with conventional treatment.    []  The target volume is concave or convex, and the critical normal tissues are within or around that convexity or concavity.    []  The target volume is in close proximity to critical structures that must be protected.    []  The volume of interest must be covered with narrow margins to adequately protect  immediately adjacent structures.      FIELDS & BLOCKING:  [x] COMPLEX BLOCKS     []  = 3 TX AREAS     []  ARCS     []  CUSTOM SHEILD        []  SIMPLE BLOCK      CHEMOTHERAPY:  []  CONCURRENT     []  INDUCTION     [] SEQUENTIAL     []  <30 DAYS FROM XRT      NOTES:    OAR CONSTRAINTS: (GUIDELINES ONLY NOT ABSOLUTE)    Target Prescribed Coverage   PTV 95% of PTV covered by 95% (cGy) of RX Dose       JORGE Goal   Max point dose PTV Eval <=110%   Contralateral Breast V5% < 2Gy   Ipsilateral Lung V15% <  20Gy   Ipsilateral Lung V35% < 10Gy   Ipsilateral Lung V50% < 5Gy   Contralateral Lung V10% < 5Gy   Heart (L Sided) V5% < 20Gy   *RTOG 1005, START-A, START-B

## 2022-09-29 ENCOUNTER — OFFICE VISIT (OUTPATIENT)
Dept: MEDICAL GROUP | Age: 77
End: 2022-09-29
Payer: MEDICARE

## 2022-09-29 VITALS
HEART RATE: 76 BPM | BODY MASS INDEX: 27.31 KG/M2 | WEIGHT: 160 LBS | HEIGHT: 64 IN | TEMPERATURE: 98.6 F | DIASTOLIC BLOOD PRESSURE: 62 MMHG | RESPIRATION RATE: 16 BRPM | OXYGEN SATURATION: 97 % | SYSTOLIC BLOOD PRESSURE: 122 MMHG

## 2022-09-29 DIAGNOSIS — C50.319 MALIGNANT NEOPLASM OF LOWER-INNER QUADRANT OF FEMALE BREAST, UNSPECIFIED ESTROGEN RECEPTOR STATUS, UNSPECIFIED LATERALITY (HCC): ICD-10-CM

## 2022-09-29 DIAGNOSIS — E03.9 ACQUIRED HYPOTHYROIDISM: ICD-10-CM

## 2022-09-29 DIAGNOSIS — E04.1 THYROID NODULE: ICD-10-CM

## 2022-09-29 PROCEDURE — 99214 OFFICE O/P EST MOD 30 MIN: CPT | Performed by: PHYSICIAN ASSISTANT

## 2022-09-29 ASSESSMENT — FIBROSIS 4 INDEX: FIB4 SCORE: 1.16

## 2022-09-29 NOTE — PROGRESS NOTES
cc: Breast cancer and diarrhea    Subjective:     RAVINDRA Gibson was seen by another provider in our clinic, as she felt a mass on her left breast.  Subsequently imaging was completed concerning for recurrent breast cancer.  Underwent biopsy-which confirmed breast cancer.  She established with breast surgeon.  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  Since her surgery she has been doing well.  She is established with radiation oncology.  They are planning on 20 rounds of radiation therapy.  She also has an appointment with medical oncology next week.  Overall she is feeling well in that regards.    She would also like to discuss her [place] Thyroid.  She states that in 2018 when the [place] Thyroid was increased to 90 mg Monday through Friday she started experience diarrhea almost daily, would even have accidents as symptoms would come on fairly quickly.  She stopped taking her [place] Thyroid altogether a week and a half ago.  Since then she has not had any diarrhea.  She unfortunately was not able to take levothyroxine or Synthroid as this makes her break out in hives.  Thyroid level done recently was within normal limits, but was also still taking [place] Thyroid.  In further discussion she states she also had an ultrasound done in 2020, with thyroid nodule.  In review it did recommend follow-up ultrasound.    FINDINGS:   Right thyroid lobe measures 3.0 cm x 1.0 cm x 1.1 cm.   The right thyroid lobe is diffusely inhomogeneous in echogenicity.     Thyroid isthmus is inhomogeneous in echogenicity and measures 2 mm in   thickness.     Left thyroid lobe measures 2.7 cm x 1.0 cm x 1.1 cm.   The left thyroid lobe is diffusely inhomogeneous in echogenicity.   Left thyroid lobe nodule:   Mid solid appearing nodule measuring 8 mm CC by 5 mm AP by 6 no meters lateral   dimension.  TR 3 follow-up  is recommended.     IMPRESSION:     SOMEWHAT ATROPHIC THYROID GLAND CONSISTENT WITH HYPOTHYROIDISM.   SOLITARY SMALL LEFT THYROID LOBE NODULE.  FOLLOW-UP IS RECOMMENDED.   TI RADS 3.     Latest Reference Range & Units 9/16/22 10:21   WBC 4.8 - 10.8 K/uL 5.0   RBC 4.20 - 5.40 M/uL 5.08   Hemoglobin 12.0 - 16.0 g/dL 12.8   Hematocrit 37.0 - 47.0 % 41.2   MCV 81.4 - 97.8 fL 81.1 (L)   MCH 27.0 - 33.0 pg 25.2 (L)   MCHC 33.6 - 35.0 g/dL 31.1 (L)   RDW 35.9 - 50.0 fL 45.8   Platelet Count 164 - 446 K/uL 260   MPV 9.0 - 12.9 fL 11.2   Neutrophils-Polys 44.00 - 72.00 % 71.60   Neutrophils (Absolute) 2.00 - 7.15 K/uL 3.58   Lymphocytes 22.00 - 41.00 % 20.80 (L)   Lymphs (Absolute) 1.00 - 4.80 K/uL 1.04   Monocytes 0.00 - 13.40 % 7.00   Monos (Absolute) 0.00 - 0.85 K/uL 0.35   Eosinophils 0.00 - 6.90 % 0.00   Eos (Absolute) 0.00 - 0.51 K/uL 0.00   Basophils 0.00 - 1.80 % 0.20   Baso (Absolute) 0.00 - 0.12 K/uL 0.01   Immature Granulocytes 0.00 - 0.90 % 0.40   Immature Granulocytes (abs) 0.00 - 0.11 K/uL 0.02   Nucleated RBC /100 WBC 0.00   NRBC (Absolute) K/uL 0.00   Sodium 135 - 145 mmol/L 138   Potassium 3.6 - 5.5 mmol/L 4.3   Chloride 96 - 112 mmol/L 103   Co2 20 - 33 mmol/L 25   Anion Gap 7.0 - 16.0  10.0   Glucose 65 - 99 mg/dL 99   Bun 8 - 22 mg/dL 17   Creatinine 0.50 - 1.40 mg/dL 0.85   GFR (CKD-EPI) >60 mL/min/1.73 m 2 71   Calcium 8.5 - 10.5 mg/dL 9.2   AST(SGOT) 12 - 45 U/L 17   ALT(SGPT) 2 - 50 U/L 18   Alkaline Phosphatase 30 - 99 U/L 79   Total Bilirubin 0.1 - 1.5 mg/dL 1.7 (H)   Albumin 3.2 - 4.9 g/dL 4.4   Total Protein 6.0 - 8.2 g/dL 7.6   Globulin 1.9 - 3.5 g/dL 3.2   A-G Ratio g/dL 1.4   Fasting Status  Fasting   Cholesterol,Tot 100 - 199 mg/dL 182   Triglycerides 0 - 149 mg/dL 134   HDL >=40 mg/dL 41   LDL <100 mg/dL 114 (H)   25-Hydroxy   Vitamin D 25 30 - 100 ng/mL 48   Vitamin B12 -True Cobalamin 211 - 911 pg/mL 1763 (H)   TSH 0.380 - 5.330 uIU/mL 1.320   (L): Data is abnormally low  (H): Data is  "abnormally high    Review of systems:  See above.       Current Outpatient Medications:     vitamin D3 (CHOLECALCIFEROL) 5000 Unit (125 mcg) Tab, Take 1,000 Units by mouth every day., Disp: , Rfl:     Calcium Carbonate (CALCIUM 600 PO), Take  by mouth every day., Disp: , Rfl:     Cyanocobalamin (VITAMIN B12 PO), Take 150 mg by mouth every 7 days., Disp: , Rfl:     Non Formulary Request, \"Total vitamin\" 280mg. 2 tabs po daily, Disp: , Rfl:     Multiple Vitamins-Minerals (SM MULTIPLE VITAMINS WOMENS PO), Take 2 Tablets by mouth every day at 6 PM., Disp: , Rfl:     ARMOUR THYROID 60 MG Tab, TAKE 1 & 1/2 (ONE & ONE-HALF) TABLETS BY MOUTH MON-Friday, 60 MG SAT AND SUNDAY (Patient not taking: Reported on 9/29/2022), Disp: 135 Tablet, Rfl: 3    Allergies, past medical history, past surgical history, family history, social history reviewed and updated    Objective:     Vitals: /62 (BP Location: Left arm, Patient Position: Sitting, BP Cuff Size: Adult)   Pulse 76   Temp 37 °C (98.6 °F) (Temporal)   Resp 16   Ht 1.626 m (5' 4\")   Wt 72.6 kg (160 lb)   LMP  (LMP Unknown)   SpO2 97%   BMI 27.46 kg/m²   General: Alert, pleasant, NAD  HEENT: Normocephalic. Neck supple.  No thyromegaly or masses palpated. No cervical or supraclavicular lymphadenopathy. No carotid bruits   Heart: Regular rate and rhythm.  S1 and S2 normal.  No murmurs appreciated.  Respiratory: Normal respiratory effort.  Clear to auscultation bilaterally.  Skin: Warm, dry, no rashes.  Extremities: No leg edema.  Radial pulses 2+ symmetric  Psych:  Affect/mood is normal, judgement is good, memory is intact, grooming is appropriate.    Assessment/Plan:     Paige was seen today for follow-up and medication problem.    Diagnoses and all orders for this visit:    Malignant neoplasm of lower-inner quadrant of female breast, unspecified estrogen receptor status, unspecified laterality (HCC)  Recovering well from partial mastectomy.  Currently followed by " radiation oncology, will be starting radiation next week.  She is also scheduled with medical oncology.  Follow-up with specialist as recommended.    Acquired hypothyroidism  -Diarrhea completely resolved after stopping [place] Thyroid.  Seems to have occurred when her dose was increased to 90 mg.  Her TSH level was on the lower end of normal.  We will have her restart [place] Thyroid at 60 mg.  If diarrhea represents, advised to send [employer] message and stop taking the [place] Thyroid.  We will repeat TSH levels in 6 weeks.  -     TSH WITH REFLEX TO FT4; Future    Thyroid nodule  -Imaging completed at another facility, ordered by another provider did recommend follow-up ultrasound.  Ultrasound ordered.  Advised to have this scheduled at her convenience.  -     US-THYROID; Future        Return in about 3 months (around 12/29/2022) for Lab Review.

## 2022-09-30 PROCEDURE — 77334 RADIATION TREATMENT AID(S): CPT | Performed by: RADIOLOGY

## 2022-09-30 PROCEDURE — 77334 RADIATION TREATMENT AID(S): CPT | Mod: 26 | Performed by: RADIOLOGY

## 2022-09-30 PROCEDURE — 77295 3-D RADIOTHERAPY PLAN: CPT | Performed by: RADIOLOGY

## 2022-09-30 PROCEDURE — 77300 RADIATION THERAPY DOSE PLAN: CPT | Mod: 26 | Performed by: RADIOLOGY

## 2022-09-30 PROCEDURE — 77300 RADIATION THERAPY DOSE PLAN: CPT | Performed by: RADIOLOGY

## 2022-09-30 PROCEDURE — 77295 3-D RADIOTHERAPY PLAN: CPT | Mod: 26 | Performed by: RADIOLOGY

## 2022-10-03 ENCOUNTER — HOSPITAL ENCOUNTER (OUTPATIENT)
Dept: RADIATION ONCOLOGY | Facility: MEDICAL CENTER | Age: 77
End: 2022-10-31
Attending: RADIOLOGY
Payer: MEDICARE

## 2022-10-04 ENCOUNTER — HOSPITAL ENCOUNTER (OUTPATIENT)
Dept: RADIATION ONCOLOGY | Facility: MEDICAL CENTER | Age: 77
End: 2022-10-04

## 2022-10-04 LAB
CHEMOTHERAPY INFUSION START DATE: NORMAL
CHEMOTHERAPY RECORDS: 2.67
CHEMOTHERAPY RECORDS: 4005
CHEMOTHERAPY RECORDS: NORMAL
CHEMOTHERAPY RX CANCER: NORMAL
DATE 1ST CHEMO CANCER: NORMAL
RAD ONC ARIA COURSE LAST TREATMENT DATE: NORMAL
RAD ONC ARIA COURSE TREATMENT ELAPSED DAYS: NORMAL
RAD ONC ARIA REFERENCE POINT DOSAGE GIVEN TO DATE: 2.67
RAD ONC ARIA REFERENCE POINT DOSAGE GIVEN TO DATE: 2.67
RAD ONC ARIA REFERENCE POINT ID: NORMAL
RAD ONC ARIA REFERENCE POINT ID: NORMAL
RAD ONC ARIA REFERENCE POINT SESSION DOSAGE GIVEN: 2.67
RAD ONC ARIA REFERENCE POINT SESSION DOSAGE GIVEN: 2.67

## 2022-10-04 PROCEDURE — 77280 THER RAD SIMULAJ FIELD SMPL: CPT | Performed by: RADIOLOGY

## 2022-10-04 PROCEDURE — 77412 RADIATION TX DELIVERY LVL 3: CPT | Performed by: RADIOLOGY

## 2022-10-04 PROCEDURE — 77280 THER RAD SIMULAJ FIELD SMPL: CPT | Mod: 26 | Performed by: RADIOLOGY

## 2022-10-04 NOTE — CT SIMULATION
DATE OF SERVICE: 10/4/2022    Radiation Therapy Episodes       Active Episodes       Radiation Therapy: 3D CRT                   Radiation Treatments         Plan Last Treated On Elapsed Days Fractions Treated Prescribed Fraction Dose (cGy) Prescribed Total Dose (cGy)    DIBH L Breast 10/4/2022 0 @ [id number] 1 of 15 267 4,005                  Reference Point Last Treated On Elapsed Days Most Recent Session Dose (cGy) Total Dose (cGy)    DIBH L Breast 10/4/2022 0 @ [id number] 267 267    DIBH L Breast CP 10/4/2022 0 @ [id number] 267 267                            First Visit Simple Simulation: Called by [employer] machine to verify treatment parameters including:  treatment site, treatment dose, and treatment setup prior to first treatment. Image derived shifts reviewed in all appropriate plains.  Shifts approved.  Patient treated.    I have personally reviewed the relevant data, performed the target localization, and determined all relevant factors for this patient’s simulation.

## 2022-10-05 ENCOUNTER — HOSPITAL ENCOUNTER (OUTPATIENT)
Dept: RADIATION ONCOLOGY | Facility: MEDICAL CENTER | Age: 77
End: 2022-10-05
Payer: MEDICARE

## 2022-10-05 LAB
CHEMOTHERAPY INFUSION START DATE: NORMAL
CHEMOTHERAPY RECORDS: 2.67
CHEMOTHERAPY RECORDS: 4005
CHEMOTHERAPY RECORDS: NORMAL
CHEMOTHERAPY RX CANCER: NORMAL
DATE 1ST CHEMO CANCER: NORMAL
RAD ONC ARIA COURSE LAST TREATMENT DATE: NORMAL
RAD ONC ARIA COURSE TREATMENT ELAPSED DAYS: NORMAL
RAD ONC ARIA REFERENCE POINT DOSAGE GIVEN TO DATE: 5.34
RAD ONC ARIA REFERENCE POINT DOSAGE GIVEN TO DATE: 5.34
RAD ONC ARIA REFERENCE POINT ID: NORMAL
RAD ONC ARIA REFERENCE POINT ID: NORMAL
RAD ONC ARIA REFERENCE POINT SESSION DOSAGE GIVEN: 2.67
RAD ONC ARIA REFERENCE POINT SESSION DOSAGE GIVEN: 2.67

## 2022-10-05 PROCEDURE — 77412 RADIATION TX DELIVERY LVL 3: CPT | Performed by: RADIOLOGY

## 2022-10-05 NOTE — ON TREATMENT VISIT
ON TREATMENT NOTE  RADIATION ONCOLOGY DEPARTMENT    Patient name:  Paige Gudino    Primary Physician:  Roula Ga P.A.-C. MRN: [id number]  CSN: [id number]   Referring physician:  No ref. provider found : 1945, 76 y.o.     ENCOUNTER DATE:  10/05/22    DIAGNOSIS:    Cancer of overlapping sites of left breast (HCC)  Staging form: Breast, AJCC 8th Edition  - Pathologic: Stage Unknown (pT1c, pNX, cM0, G2, ER+, MS+, HER2-) - Signed by Teri Burroughs M.D. on 2022  Stage prefix: Initial diagnosis  Histologic grading system: 3 grade system      TREATMENT SUMMARY:  [facility] Treatment Information          Some values may be hidden. Unless noted otherwise, only the newest values recorded on each date are displayed.           [facility] Treatment Summary 10/5/22   Course First Treatment Date 10/04/2022   Course Last Treatment Date 10/05/2022   DIBH L Breast Plan from Course C1 L_breast   Fraction 2 of 15   Elapsed Course Days 1 @    Prescribed Fraction Dose 267 cGy   Prescribed Total Dose 4,005 cGy   DIBH L Breast Reference Point from Course C1 L_breast   Elapsed Course Days 1 @    Session Dose 267 cGy   Total Dose 534 cGy   DIBH L Breast CP Reference Point from Course C1 L_breast   Elapsed Course Days  @    Session Dose 267 cGy   Total Dose 534 cGy                SUBJECTIVE:   Patient is doing well.  She does not any questions regarding her radiation.    VITAL SIGNS:    Encounter Vitals  Blood Pressure : (!) (P) 153/89  Pulse: (P) 73  Pulse Oximetry: (P) 97 %  Pain Assessment 2022   Pain Score 0   Some recent data might be hidden          PHYSICAL EXAM:  No erythema    TOXICITY  Toxicity Assessment 10/5/2022   Toxicity Assessment Breast   Fatigue (lethargy, malaise, asthenia) None   Fever (in the absence of neutropenia) None   Radiation Dermatitis None   Lymphatics Normal   RT - Pain due to RT None   Dyspnea Normal         IMPRESSION:  Cancer Staging  Cancer of  overlapping sites of left breast (HCC)  Staging form: Breast, AJCC 8th Edition  - Pathologic: Stage Unknown (pT1c, pNX, cM0, G2, ER+, SC+, HER2-) - Signed by Teri Burroughs M.D. on 9/12/2022      PLAN:  No change in treatment plan    Disposition:  Treatment plan reviewed. Questions answered. Continue therapy outlined.     Glenn Lloyd M.D.    No orders of the defined types were placed in this encounter.

## 2022-10-06 ENCOUNTER — HOSPITAL ENCOUNTER (OUTPATIENT)
Dept: RADIATION ONCOLOGY | Facility: MEDICAL CENTER | Age: 77
End: 2022-10-06
Payer: MEDICARE

## 2022-10-06 LAB
CHEMOTHERAPY INFUSION START DATE: NORMAL
CHEMOTHERAPY RECORDS: 2.67
CHEMOTHERAPY RECORDS: 4005
CHEMOTHERAPY RECORDS: NORMAL
CHEMOTHERAPY RX CANCER: NORMAL
DATE 1ST CHEMO CANCER: NORMAL
RAD ONC ARIA COURSE LAST TREATMENT DATE: NORMAL
RAD ONC ARIA COURSE TREATMENT ELAPSED DAYS: NORMAL
RAD ONC ARIA REFERENCE POINT DOSAGE GIVEN TO DATE: 8.01
RAD ONC ARIA REFERENCE POINT DOSAGE GIVEN TO DATE: 8.01
RAD ONC ARIA REFERENCE POINT ID: NORMAL
RAD ONC ARIA REFERENCE POINT ID: NORMAL
RAD ONC ARIA REFERENCE POINT SESSION DOSAGE GIVEN: 2.67
RAD ONC ARIA REFERENCE POINT SESSION DOSAGE GIVEN: 2.67

## 2022-10-06 PROCEDURE — 77412 RADIATION TX DELIVERY LVL 3: CPT | Performed by: RADIOLOGY

## 2022-10-06 PROCEDURE — 77336 RADIATION PHYSICS CONSULT: CPT | Performed by: RADIOLOGY

## 2022-10-07 ENCOUNTER — HOSPITAL ENCOUNTER (OUTPATIENT)
Dept: RADIATION ONCOLOGY | Facility: MEDICAL CENTER | Age: 77
End: 2022-10-07
Payer: MEDICARE

## 2022-10-07 LAB
CHEMOTHERAPY INFUSION START DATE: NORMAL
CHEMOTHERAPY RECORDS: 2.67
CHEMOTHERAPY RECORDS: 4005
CHEMOTHERAPY RECORDS: NORMAL
CHEMOTHERAPY RX CANCER: NORMAL
DATE 1ST CHEMO CANCER: NORMAL
RAD ONC ARIA COURSE LAST TREATMENT DATE: NORMAL
RAD ONC ARIA COURSE TREATMENT ELAPSED DAYS: NORMAL
RAD ONC ARIA REFERENCE POINT DOSAGE GIVEN TO DATE: 10.68
RAD ONC ARIA REFERENCE POINT DOSAGE GIVEN TO DATE: 10.68
RAD ONC ARIA REFERENCE POINT ID: NORMAL
RAD ONC ARIA REFERENCE POINT ID: NORMAL
RAD ONC ARIA REFERENCE POINT SESSION DOSAGE GIVEN: 2.67
RAD ONC ARIA REFERENCE POINT SESSION DOSAGE GIVEN: 2.67

## 2022-10-07 PROCEDURE — 77412 RADIATION TX DELIVERY LVL 3: CPT | Performed by: RADIOLOGY

## 2022-10-10 ENCOUNTER — HOSPITAL ENCOUNTER (OUTPATIENT)
Dept: RADIATION ONCOLOGY | Facility: MEDICAL CENTER | Age: 77
End: 2022-10-10
Payer: MEDICARE

## 2022-10-10 LAB
CHEMOTHERAPY INFUSION START DATE: NORMAL
CHEMOTHERAPY RECORDS: 2.67
CHEMOTHERAPY RECORDS: 4005
CHEMOTHERAPY RECORDS: NORMAL
CHEMOTHERAPY RX CANCER: NORMAL
DATE 1ST CHEMO CANCER: NORMAL
RAD ONC ARIA COURSE LAST TREATMENT DATE: NORMAL
RAD ONC ARIA COURSE TREATMENT ELAPSED DAYS: NORMAL
RAD ONC ARIA REFERENCE POINT DOSAGE GIVEN TO DATE: 13.35
RAD ONC ARIA REFERENCE POINT DOSAGE GIVEN TO DATE: 13.35
RAD ONC ARIA REFERENCE POINT ID: NORMAL
RAD ONC ARIA REFERENCE POINT ID: NORMAL
RAD ONC ARIA REFERENCE POINT SESSION DOSAGE GIVEN: 2.67
RAD ONC ARIA REFERENCE POINT SESSION DOSAGE GIVEN: 2.67

## 2022-10-10 PROCEDURE — 77412 RADIATION TX DELIVERY LVL 3: CPT | Performed by: RADIOLOGY

## 2022-10-10 PROCEDURE — 77427 RADIATION TX MANAGEMENT X5: CPT | Performed by: RADIOLOGY

## 2022-10-11 ENCOUNTER — HOSPITAL ENCOUNTER (OUTPATIENT)
Dept: RADIATION ONCOLOGY | Facility: MEDICAL CENTER | Age: 77
End: 2022-10-11
Payer: MEDICARE

## 2022-10-11 LAB
CHEMOTHERAPY INFUSION START DATE: NORMAL
CHEMOTHERAPY RECORDS: 2.67
CHEMOTHERAPY RECORDS: 4005
CHEMOTHERAPY RECORDS: NORMAL
CHEMOTHERAPY RX CANCER: NORMAL
DATE 1ST CHEMO CANCER: NORMAL
RAD ONC ARIA COURSE LAST TREATMENT DATE: NORMAL
RAD ONC ARIA COURSE TREATMENT ELAPSED DAYS: NORMAL
RAD ONC ARIA REFERENCE POINT DOSAGE GIVEN TO DATE: 16.02
RAD ONC ARIA REFERENCE POINT DOSAGE GIVEN TO DATE: 16.02
RAD ONC ARIA REFERENCE POINT ID: NORMAL
RAD ONC ARIA REFERENCE POINT ID: NORMAL
RAD ONC ARIA REFERENCE POINT SESSION DOSAGE GIVEN: 2.67
RAD ONC ARIA REFERENCE POINT SESSION DOSAGE GIVEN: 2.67

## 2022-10-11 PROCEDURE — 77417 THER RADIOLOGY PORT IMAGE(S): CPT | Performed by: RADIOLOGY

## 2022-10-11 PROCEDURE — 77412 RADIATION TX DELIVERY LVL 3: CPT | Performed by: RADIOLOGY

## 2022-10-12 ENCOUNTER — HOSPITAL ENCOUNTER (OUTPATIENT)
Dept: RADIATION ONCOLOGY | Facility: MEDICAL CENTER | Age: 77
End: 2022-10-12
Payer: MEDICARE

## 2022-10-12 VITALS — DIASTOLIC BLOOD PRESSURE: 91 MMHG | OXYGEN SATURATION: 98 % | HEART RATE: 81 BPM | SYSTOLIC BLOOD PRESSURE: 140 MMHG

## 2022-10-12 LAB
CHEMOTHERAPY INFUSION START DATE: NORMAL
CHEMOTHERAPY RECORDS: 2.67
CHEMOTHERAPY RECORDS: 4005
CHEMOTHERAPY RECORDS: NORMAL
CHEMOTHERAPY RX CANCER: NORMAL
DATE 1ST CHEMO CANCER: NORMAL
RAD ONC ARIA COURSE LAST TREATMENT DATE: NORMAL
RAD ONC ARIA COURSE TREATMENT ELAPSED DAYS: NORMAL
RAD ONC ARIA REFERENCE POINT DOSAGE GIVEN TO DATE: 18.69
RAD ONC ARIA REFERENCE POINT DOSAGE GIVEN TO DATE: 18.69
RAD ONC ARIA REFERENCE POINT ID: NORMAL
RAD ONC ARIA REFERENCE POINT ID: NORMAL
RAD ONC ARIA REFERENCE POINT SESSION DOSAGE GIVEN: 2.67
RAD ONC ARIA REFERENCE POINT SESSION DOSAGE GIVEN: 2.67

## 2022-10-12 PROCEDURE — 77412 RADIATION TX DELIVERY LVL 3: CPT | Performed by: RADIOLOGY

## 2022-10-12 NOTE — ON TREATMENT VISIT
ON TREATMENT NOTE  RADIATION ONCOLOGY DEPARTMENT    Patient name:  Paige Gudino    Primary Physician:  Roula Ga P.A.-C. MRN: [id number]  CSN: [id number]   Referring physician:  No ref. provider found : 1945, 77 y.o.     ENCOUNTER DATE:  10/12/22    DIAGNOSIS:    Cancer of overlapping sites of left breast (HCC)  Staging form: Breast, AJCC 8th Edition  - Pathologic: Stage Unknown (pT1c, pNX, cM0, G2, ER+, ND+, HER2-) - Signed by Teri Burroughs M.D. on 2022  Stage prefix: Initial diagnosis  Histologic grading system: 3 grade system      TREATMENT SUMMARY:  Radiation Treatments       Active   Plans   DIBH L Breast   Most recent treatment: Dose planned: 267 cGy (fraction 7 of 15 on 10/12/2022)   Total: Dose planned: 4,005 cGy   Elapsed Days: 8 @       Reference Points   DIBH L Breast   Most recent treatment: Dose given: 267 cGy (on 10/12/2022)   Total: Dose given: 1,869 cGy   Elapsed Days: 8 @ [id number]      DIBH L Breast CP   Most recent treatment: Dose given: 267 cGy (on 10/12/2022)   Total: Dose given: 1,869 cGy   Elapsed Days: 8 @ [id number]                      SUBJECTIVE:   Tolerating therapy without event      VITAL SIGNS:  BP (!) 140/91 (BP Location: Left arm, Patient Position: Sitting)   Pulse 81   SpO2 98%    Encounter Vitals  Blood Pressure : (!) 140/91  Pulse: 81  Pulse Oximetry: 98 %  Pain Assessment 2022   Pain Score 0   Some recent data might be hidden          PHYSICAL EXAM:    No erythema    Toxicity Assessment 10/12/2022 10/5/2022   Toxicity Assessment Breast Breast   Fatigue (lethargy, malaise, asthenia) None None   Fever (in the absence of neutropenia) None None   Radiation Dermatitis None None   Lymphatics Normal Normal   RT - Pain due to RT None None   Dyspnea Normal Normal         IMPRESSION:  Cancer Staging  Cancer of overlapping sites of left breast (HCC)  Staging form: Breast, AJCC 8th Edition  - Pathologic: Stage Unknown (pT1c, pNX,  cM0, G2, ER+, CO+, HER2-) - Signed by Teri Burroughs M.D. on 9/12/2022      PLAN:  No change in treatment plan    Disposition:  Treatment plan reviewed. Questions answered. Continue therapy outlined.     Teri Burroughs M.D.    No orders of the defined types were placed in this encounter.

## 2022-10-13 ENCOUNTER — HOSPITAL ENCOUNTER (OUTPATIENT)
Dept: RADIATION ONCOLOGY | Facility: MEDICAL CENTER | Age: 77
End: 2022-10-13
Payer: MEDICARE

## 2022-10-13 LAB
CHEMOTHERAPY INFUSION START DATE: NORMAL
CHEMOTHERAPY RECORDS: 2.67
CHEMOTHERAPY RECORDS: 4005
CHEMOTHERAPY RECORDS: NORMAL
CHEMOTHERAPY RX CANCER: NORMAL
DATE 1ST CHEMO CANCER: NORMAL
RAD ONC ARIA COURSE LAST TREATMENT DATE: NORMAL
RAD ONC ARIA COURSE TREATMENT ELAPSED DAYS: NORMAL
RAD ONC ARIA REFERENCE POINT DOSAGE GIVEN TO DATE: 21.36
RAD ONC ARIA REFERENCE POINT DOSAGE GIVEN TO DATE: 21.36
RAD ONC ARIA REFERENCE POINT ID: NORMAL
RAD ONC ARIA REFERENCE POINT ID: NORMAL
RAD ONC ARIA REFERENCE POINT SESSION DOSAGE GIVEN: 2.67
RAD ONC ARIA REFERENCE POINT SESSION DOSAGE GIVEN: 2.67

## 2022-10-13 PROCEDURE — 77412 RADIATION TX DELIVERY LVL 3: CPT | Performed by: RADIOLOGY

## 2022-10-13 PROCEDURE — 77336 RADIATION PHYSICS CONSULT: CPT | Performed by: RADIOLOGY

## 2022-10-14 ENCOUNTER — HOSPITAL ENCOUNTER (OUTPATIENT)
Dept: RADIATION ONCOLOGY | Facility: MEDICAL CENTER | Age: 77
End: 2022-10-14
Payer: MEDICARE

## 2022-10-14 LAB
CHEMOTHERAPY INFUSION START DATE: NORMAL
CHEMOTHERAPY RECORDS: 2.67
CHEMOTHERAPY RECORDS: 4005
CHEMOTHERAPY RECORDS: NORMAL
CHEMOTHERAPY RX CANCER: NORMAL
DATE 1ST CHEMO CANCER: NORMAL
RAD ONC ARIA COURSE LAST TREATMENT DATE: NORMAL
RAD ONC ARIA COURSE TREATMENT ELAPSED DAYS: NORMAL
RAD ONC ARIA REFERENCE POINT DOSAGE GIVEN TO DATE: 24.03
RAD ONC ARIA REFERENCE POINT DOSAGE GIVEN TO DATE: 24.03
RAD ONC ARIA REFERENCE POINT ID: NORMAL
RAD ONC ARIA REFERENCE POINT ID: NORMAL
RAD ONC ARIA REFERENCE POINT SESSION DOSAGE GIVEN: 2.67
RAD ONC ARIA REFERENCE POINT SESSION DOSAGE GIVEN: 2.67

## 2022-10-14 PROCEDURE — 77412 RADIATION TX DELIVERY LVL 3: CPT | Performed by: RADIOLOGY

## 2022-10-17 ENCOUNTER — HOSPITAL ENCOUNTER (OUTPATIENT)
Dept: RADIATION ONCOLOGY | Facility: MEDICAL CENTER | Age: 77
End: 2022-10-17
Payer: MEDICARE

## 2022-10-17 LAB
CHEMOTHERAPY INFUSION START DATE: NORMAL
CHEMOTHERAPY RECORDS: 2.67
CHEMOTHERAPY RECORDS: 4005
CHEMOTHERAPY RECORDS: NORMAL
CHEMOTHERAPY RX CANCER: NORMAL
DATE 1ST CHEMO CANCER: NORMAL
RAD ONC ARIA COURSE LAST TREATMENT DATE: NORMAL
RAD ONC ARIA COURSE TREATMENT ELAPSED DAYS: NORMAL
RAD ONC ARIA REFERENCE POINT DOSAGE GIVEN TO DATE: 26.7
RAD ONC ARIA REFERENCE POINT DOSAGE GIVEN TO DATE: 26.7
RAD ONC ARIA REFERENCE POINT ID: NORMAL
RAD ONC ARIA REFERENCE POINT ID: NORMAL
RAD ONC ARIA REFERENCE POINT SESSION DOSAGE GIVEN: 2.67
RAD ONC ARIA REFERENCE POINT SESSION DOSAGE GIVEN: 2.67

## 2022-10-17 PROCEDURE — 77427 RADIATION TX MANAGEMENT X5: CPT | Performed by: RADIOLOGY

## 2022-10-17 PROCEDURE — 77412 RADIATION TX DELIVERY LVL 3: CPT | Performed by: RADIOLOGY

## 2022-10-18 ENCOUNTER — HOSPITAL ENCOUNTER (OUTPATIENT)
Dept: RADIATION ONCOLOGY | Facility: MEDICAL CENTER | Age: 77
End: 2022-10-18
Payer: MEDICARE

## 2022-10-18 LAB
CHEMOTHERAPY INFUSION START DATE: NORMAL
CHEMOTHERAPY RECORDS: 2.67
CHEMOTHERAPY RECORDS: 4005
CHEMOTHERAPY RECORDS: NORMAL
CHEMOTHERAPY RX CANCER: NORMAL
DATE 1ST CHEMO CANCER: NORMAL
RAD ONC ARIA COURSE LAST TREATMENT DATE: NORMAL
RAD ONC ARIA COURSE TREATMENT ELAPSED DAYS: NORMAL
RAD ONC ARIA REFERENCE POINT DOSAGE GIVEN TO DATE: 29.37
RAD ONC ARIA REFERENCE POINT DOSAGE GIVEN TO DATE: 29.37
RAD ONC ARIA REFERENCE POINT ID: NORMAL
RAD ONC ARIA REFERENCE POINT ID: NORMAL
RAD ONC ARIA REFERENCE POINT SESSION DOSAGE GIVEN: 2.67
RAD ONC ARIA REFERENCE POINT SESSION DOSAGE GIVEN: 2.67

## 2022-10-18 PROCEDURE — 77417 THER RADIOLOGY PORT IMAGE(S): CPT | Performed by: RADIOLOGY

## 2022-10-18 PROCEDURE — 77412 RADIATION TX DELIVERY LVL 3: CPT | Performed by: RADIOLOGY

## 2022-10-19 ENCOUNTER — HOSPITAL ENCOUNTER (OUTPATIENT)
Dept: RADIATION ONCOLOGY | Facility: MEDICAL CENTER | Age: 77
End: 2022-10-19
Payer: MEDICARE

## 2022-10-19 ENCOUNTER — APPOINTMENT (OUTPATIENT)
Dept: RADIATION ONCOLOGY | Facility: MEDICAL CENTER | Age: 77
End: 2022-10-19
Attending: RADIOLOGY
Payer: MEDICARE

## 2022-10-19 LAB
CHEMOTHERAPY INFUSION START DATE: NORMAL
CHEMOTHERAPY RECORDS: 2.67
CHEMOTHERAPY RECORDS: 4005
CHEMOTHERAPY RECORDS: NORMAL
CHEMOTHERAPY RX CANCER: NORMAL
DATE 1ST CHEMO CANCER: NORMAL
RAD ONC ARIA COURSE LAST TREATMENT DATE: NORMAL
RAD ONC ARIA COURSE TREATMENT ELAPSED DAYS: NORMAL
RAD ONC ARIA REFERENCE POINT DOSAGE GIVEN TO DATE: 32.04
RAD ONC ARIA REFERENCE POINT DOSAGE GIVEN TO DATE: 32.04
RAD ONC ARIA REFERENCE POINT ID: NORMAL
RAD ONC ARIA REFERENCE POINT ID: NORMAL
RAD ONC ARIA REFERENCE POINT SESSION DOSAGE GIVEN: 2.67
RAD ONC ARIA REFERENCE POINT SESSION DOSAGE GIVEN: 2.67

## 2022-10-19 PROCEDURE — 77412 RADIATION TX DELIVERY LVL 3: CPT | Performed by: RADIOLOGY

## 2022-10-19 NOTE — ON TREATMENT VISIT
ON TREATMENT NOTE  RADIATION ONCOLOGY DEPARTMENT    Patient name:  Paige Gudino    Primary Physician:  Roula Ga P.A.-C. MRN: [id number]  CSN: [id number]   Referring physician:  No ref. provider found : 1945, 77 y.o.     ENCOUNTER DATE:  10/19/22    DIAGNOSIS:    Cancer of overlapping sites of left breast (HCC)  Staging form: Breast, AJCC 8th Edition  - Pathologic: Stage Unknown (pT1c, pNX, cM0, G2, ER+, ID+, HER2-) - Signed by Teri Burroughs M.D. on 2022  Stage prefix: Initial diagnosis  Histologic grading system: 3 grade system      TREATMENT SUMMARY:  Radiation Treatments       Active   Plans   DIBH L Breast   Most recent treatment: Dose planned: 267 cGy (fraction 11 of 15 on 10/18/2022)   Total: Dose planned: 4,005 cGy   Elapsed Days: 14 @ [id number]      Reference Points   DIBH L Breast   Most recent treatment: Dose given: 267 cGy (on 10/18/2022)   Total: Dose given: 2,937 cGy   Elapsed Days: 14 @ [id number]      DIBH L Breast CP   Most recent treatment: Dose given: 267 cGy (on 10/18/2022)   Total: Dose given: 2,937 cGy   Elapsed Days: 14 @ [id number]                      SUBJECTIVE:   Unhappy with oncologist      VITAL SIGNS:  There were no vitals taken for this visit.      Pain Assessment 2022   Pain Score 0   Some recent data might be hidden          PHYSICAL EXAM:    No erythema    Toxicity Assessment 10/19/2022 10/12/2022 10/5/2022   Toxicity Assessment Breast Breast Breast   Fatigue (lethargy, malaise, asthenia) None None None   Fever (in the absence of neutropenia) None None None   Radiation Dermatitis None None None   Lymphatics Normal Normal Normal   RT - Pain due to RT None None None   Dyspnea Normal Normal Normal         IMPRESSION:  Cancer Staging  Cancer of overlapping sites of left breast (HCC)  Staging form: Breast, AJCC 8th Edition  - Pathologic: Stage Unknown (pT1c, pNX, cM0, G2, ER+, ID+, HER2-) - Signed by Teri Burroughs M.D. on  9/12/2022      PLAN:  No change in treatment plan    Disposition:  Treatment plan reviewed. Questions answered. Continue therapy outlined.     Teri Burroughs M.D.    No orders of the defined types were placed in this encounter.

## 2022-10-20 ENCOUNTER — HOSPITAL ENCOUNTER (OUTPATIENT)
Dept: RADIATION ONCOLOGY | Facility: MEDICAL CENTER | Age: 77
End: 2022-10-20
Payer: MEDICARE

## 2022-10-20 DIAGNOSIS — C50.812 CANCER OF OVERLAPPING SITES OF LEFT BREAST (HCC): ICD-10-CM

## 2022-10-20 LAB
CHEMOTHERAPY INFUSION START DATE: NORMAL
CHEMOTHERAPY RECORDS: 2.67
CHEMOTHERAPY RECORDS: 4005
CHEMOTHERAPY RECORDS: NORMAL
CHEMOTHERAPY RX CANCER: NORMAL
DATE 1ST CHEMO CANCER: NORMAL
RAD ONC ARIA COURSE LAST TREATMENT DATE: NORMAL
RAD ONC ARIA COURSE TREATMENT ELAPSED DAYS: NORMAL
RAD ONC ARIA REFERENCE POINT DOSAGE GIVEN TO DATE: 34.71
RAD ONC ARIA REFERENCE POINT DOSAGE GIVEN TO DATE: 34.71
RAD ONC ARIA REFERENCE POINT ID: NORMAL
RAD ONC ARIA REFERENCE POINT ID: NORMAL
RAD ONC ARIA REFERENCE POINT SESSION DOSAGE GIVEN: 2.67
RAD ONC ARIA REFERENCE POINT SESSION DOSAGE GIVEN: 2.67

## 2022-10-20 PROCEDURE — 77300 RADIATION THERAPY DOSE PLAN: CPT | Mod: 26 | Performed by: RADIOLOGY

## 2022-10-20 PROCEDURE — 77300 RADIATION THERAPY DOSE PLAN: CPT | Performed by: RADIOLOGY

## 2022-10-20 PROCEDURE — 77334 RADIATION TREATMENT AID(S): CPT | Mod: 26 | Performed by: RADIOLOGY

## 2022-10-20 PROCEDURE — 77336 RADIATION PHYSICS CONSULT: CPT | Mod: XU | Performed by: RADIOLOGY

## 2022-10-20 PROCEDURE — 77412 RADIATION TX DELIVERY LVL 3: CPT | Performed by: RADIOLOGY

## 2022-10-20 PROCEDURE — 77295 3-D RADIOTHERAPY PLAN: CPT | Performed by: RADIOLOGY

## 2022-10-20 PROCEDURE — 77334 RADIATION TREATMENT AID(S): CPT | Performed by: RADIOLOGY

## 2022-10-20 PROCEDURE — 77295 3-D RADIOTHERAPY PLAN: CPT | Mod: 26 | Performed by: RADIOLOGY

## 2022-10-21 ENCOUNTER — HOSPITAL ENCOUNTER (OUTPATIENT)
Dept: RADIATION ONCOLOGY | Facility: MEDICAL CENTER | Age: 77
End: 2022-10-21
Payer: MEDICARE

## 2022-10-21 LAB
CHEMOTHERAPY INFUSION START DATE: NORMAL
CHEMOTHERAPY RECORDS: 2.67
CHEMOTHERAPY RECORDS: 4005
CHEMOTHERAPY RECORDS: NORMAL
CHEMOTHERAPY RX CANCER: NORMAL
DATE 1ST CHEMO CANCER: NORMAL
RAD ONC ARIA COURSE LAST TREATMENT DATE: NORMAL
RAD ONC ARIA COURSE TREATMENT ELAPSED DAYS: NORMAL
RAD ONC ARIA REFERENCE POINT DOSAGE GIVEN TO DATE: 37.38
RAD ONC ARIA REFERENCE POINT DOSAGE GIVEN TO DATE: 37.38
RAD ONC ARIA REFERENCE POINT ID: NORMAL
RAD ONC ARIA REFERENCE POINT ID: NORMAL
RAD ONC ARIA REFERENCE POINT SESSION DOSAGE GIVEN: 2.67
RAD ONC ARIA REFERENCE POINT SESSION DOSAGE GIVEN: 2.67

## 2022-10-21 PROCEDURE — 77412 RADIATION TX DELIVERY LVL 3: CPT | Performed by: RADIOLOGY

## 2022-10-24 ENCOUNTER — HOSPITAL ENCOUNTER (OUTPATIENT)
Dept: RADIATION ONCOLOGY | Facility: MEDICAL CENTER | Age: 77
End: 2022-10-24
Payer: MEDICARE

## 2022-10-24 LAB
CHEMOTHERAPY INFUSION START DATE: NORMAL
CHEMOTHERAPY RECORDS: 2.67
CHEMOTHERAPY RECORDS: 4005
CHEMOTHERAPY RECORDS: NORMAL
CHEMOTHERAPY RX CANCER: NORMAL
DATE 1ST CHEMO CANCER: NORMAL
RAD ONC ARIA COURSE LAST TREATMENT DATE: NORMAL
RAD ONC ARIA COURSE TREATMENT ELAPSED DAYS: NORMAL
RAD ONC ARIA REFERENCE POINT DOSAGE GIVEN TO DATE: 40.05
RAD ONC ARIA REFERENCE POINT DOSAGE GIVEN TO DATE: 40.05
RAD ONC ARIA REFERENCE POINT ID: NORMAL
RAD ONC ARIA REFERENCE POINT ID: NORMAL
RAD ONC ARIA REFERENCE POINT SESSION DOSAGE GIVEN: 2.67
RAD ONC ARIA REFERENCE POINT SESSION DOSAGE GIVEN: 2.67

## 2022-10-24 PROCEDURE — 77412 RADIATION TX DELIVERY LVL 3: CPT | Performed by: RADIOLOGY

## 2022-10-24 PROCEDURE — 77427 RADIATION TX MANAGEMENT X5: CPT | Performed by: RADIOLOGY

## 2022-10-25 ENCOUNTER — HOSPITAL ENCOUNTER (OUTPATIENT)
Dept: RADIATION ONCOLOGY | Facility: MEDICAL CENTER | Age: 77
End: 2022-10-25

## 2022-10-25 LAB
CHEMOTHERAPY INFUSION START DATE: NORMAL
CHEMOTHERAPY RECORDS: 1000
CHEMOTHERAPY RECORDS: 2
CHEMOTHERAPY RECORDS: NORMAL
CHEMOTHERAPY RX CANCER: NORMAL
DATE 1ST CHEMO CANCER: NORMAL
RAD ONC ARIA COURSE LAST TREATMENT DATE: NORMAL
RAD ONC ARIA COURSE TREATMENT ELAPSED DAYS: NORMAL
RAD ONC ARIA REFERENCE POINT DOSAGE GIVEN TO DATE: 2
RAD ONC ARIA REFERENCE POINT DOSAGE GIVEN TO DATE: 2
RAD ONC ARIA REFERENCE POINT ID: NORMAL
RAD ONC ARIA REFERENCE POINT ID: NORMAL
RAD ONC ARIA REFERENCE POINT SESSION DOSAGE GIVEN: 2
RAD ONC ARIA REFERENCE POINT SESSION DOSAGE GIVEN: 2

## 2022-10-25 PROCEDURE — 77280 THER RAD SIMULAJ FIELD SMPL: CPT | Mod: 26 | Performed by: RADIOLOGY

## 2022-10-25 PROCEDURE — 77280 THER RAD SIMULAJ FIELD SMPL: CPT | Performed by: RADIOLOGY

## 2022-10-25 PROCEDURE — 77412 RADIATION TX DELIVERY LVL 3: CPT | Performed by: RADIOLOGY

## 2022-10-25 NOTE — CT SIMULATION
DATE OF SERVICE: 10/25/2022    Radiation Therapy Episodes       Active Episodes       Radiation Therapy: 3D CRT                   Radiation Treatments         Plan Last Treated On Elapsed Days Fractions Treated Prescribed Fraction Dose (cGy) Prescribed Total Dose (cGy)    DIBH L Breast 10/24/2022 20 @ [id number] 15 of 15 267 4,005    [facility] 10/25/20[facility] 21 @ [id number] 1 of 5 200 1,000                  Reference Point Last Treated On Elapsed Days Most Recent Session Dose (cGy) Total Dose (cGy)    DIBH L Breast 10/24/2022 20 @ [id number] 267 4,005    DIBH L Breast CP 10/24/2022 20 @ [id number] 267 4,005    [facility] 10/25/2022 21 @ [id number] 200 200    [facility] CP 10/25/2022 21 @ [id number] 200 200                            First Visit Simple Simulation: Called by [employer] machine to verify treatment parameters including:  treatment site, treatment dose, and treatment setup prior to first treatment. Image derived shifts reviewed in all appropriate plains.  Shifts approved.  Patient treated.    I have personally reviewed the relevant data, performed the target localization, and determined all relevant factors for this patient’s simulation.

## 2022-10-26 ENCOUNTER — HOSPITAL ENCOUNTER (OUTPATIENT)
Dept: RADIATION ONCOLOGY | Facility: MEDICAL CENTER | Age: 77
End: 2022-10-26
Payer: MEDICARE

## 2022-10-26 VITALS — HEART RATE: 72 BPM | OXYGEN SATURATION: 98 %

## 2022-10-26 LAB

## 2022-10-26 PROCEDURE — 77014 PR CT GUIDANCE PLACEMENT RAD THERAPY FIELDS: CPT | Mod: 26 | Performed by: RADIOLOGY

## 2022-10-26 PROCEDURE — 77387 GUIDANCE FOR RADJ TX DLVR: CPT | Performed by: RADIOLOGY

## 2022-10-26 PROCEDURE — 77412 RADIATION TX DELIVERY LVL 3: CPT | Performed by: RADIOLOGY

## 2022-10-26 NOTE — ON TREATMENT VISIT
ON TREATMENT NOTE  RADIATION ONCOLOGY DEPARTMENT    Patient name:  Paige Gudino    Primary Physician:  Roula Ga P.A.-C. MRN: [id number]  CSN: [id number]   Referring physician:  No ref. provider found : 1945, 77 y.o.     ENCOUNTER DATE:  10/26/22    DIAGNOSIS:    Cancer of overlapping sites of left breast (HCC)  Staging form: Breast, AJCC 8th Edition  - Pathologic: Stage Unknown (pT1c, pNX, cM0, G2, ER+, PA+, HER2-) - Signed by Teri Burroughs M.D. on 2022  Stage prefix: Initial diagnosis  Histologic grading system: 3 grade system      TREATMENT SUMMARY:  Radiation Treatments       Active   Plans   DIBH L Breast   Most recent treatment: Dose planned: 267 cGy (fraction 15 of 15 on 10/24/2022)   Total: Dose planned: 4,005 cGy   Elapsed Days: 20 @ [id number]      L Brst Bst FB   Most recent treatment: Dose planned: 200 cGy (fraction 2 of 5 on 10/26/2022)   Total: Dose planned: 1,000 cGy   Elapsed Days: 22 @       Reference Points   DIBH L Breast   Most recent treatment: Dose given: 267 cGy (on 10/24/2022)   Total: Dose given: 4,005 cGy   Elapsed Days: 20 @ [id number]      DIBH L Breast CP   Most recent treatment: Dose given: 267 cGy (on 10/24/2022)   Total: Dose given: 4,005 cGy   Elapsed Days: 20 @ [id number]      L Brst Bst FB   Most recent treatment: Dose given: 200 cGy (on 10/26/2022)   Total: Dose given: 400 cGy   Elapsed Days: 22 @ [id number]      L Brst Bst FB CP   Most recent treatment: Dose given: 200 cGy (on 10/26/2022)   Total: Dose given: 400 cGy   Elapsed Days: 22 @                       SUBJECTIVE:   Tolerating therapy without event      VITAL SIGNS:  Pulse 72   SpO2 98%    Encounter Vitals  Pulse: 72  Pulse Oximetry: 98 %  Pain Assessment 2022   Pain Score 0   Some recent data might be hidden          PHYSICAL EXAM:    Mild erythema in the treatment field    Toxicity Assessment 10/26/2022 10/19/2022 10/12/2022 10/5/2022   Toxicity  Assessment Breast Breast Breast Breast   Fatigue (lethargy, malaise, asthenia) None None None None   Fever (in the absence of neutropenia) None None None None   Radiation Dermatitis None None None None   Lymphatics Normal Normal Normal Normal   RT - Pain due to RT None None None None   Dyspnea Normal Normal Normal Normal         IMPRESSION:  Cancer Staging  Cancer of overlapping sites of left breast (HCC)  Staging form: Breast, AJCC 8th Edition  - Pathologic: Stage Unknown (pT1c, pNX, cM0, G2, ER+, MA+, HER2-) - Signed by Teri Burroughs M.D. on 9/12/2022      PLAN:  No change in treatment plan    Disposition:  Treatment plan reviewed. Questions answered. Continue therapy outlined.     Teri Burroughs M.D.    No orders of the defined types were placed in this encounter.

## 2022-10-26 NOTE — ADDENDUM NOTE
Encounter addended by: Teri Burroughs M.D. on: 10/26/2022 10:57 AM   Actions taken: Medication List reviewed

## 2022-10-27 ENCOUNTER — HOSPITAL ENCOUNTER (OUTPATIENT)
Dept: RADIATION ONCOLOGY | Facility: MEDICAL CENTER | Age: 77
End: 2022-10-27
Payer: MEDICARE

## 2022-10-27 LAB
CHEMOTHERAPY INFUSION START DATE: NORMAL
CHEMOTHERAPY RECORDS: 1000
CHEMOTHERAPY RECORDS: 2
CHEMOTHERAPY RECORDS: NORMAL
CHEMOTHERAPY RX CANCER: NORMAL
DATE 1ST CHEMO CANCER: NORMAL
RAD ONC ARIA COURSE LAST TREATMENT DATE: NORMAL
RAD ONC ARIA COURSE TREATMENT ELAPSED DAYS: NORMAL
RAD ONC ARIA REFERENCE POINT DOSAGE GIVEN TO DATE: 6
RAD ONC ARIA REFERENCE POINT DOSAGE GIVEN TO DATE: 6
RAD ONC ARIA REFERENCE POINT ID: NORMAL
RAD ONC ARIA REFERENCE POINT ID: NORMAL
RAD ONC ARIA REFERENCE POINT SESSION DOSAGE GIVEN: 2
RAD ONC ARIA REFERENCE POINT SESSION DOSAGE GIVEN: 2

## 2022-10-27 PROCEDURE — 77014 PR CT GUIDANCE PLACEMENT RAD THERAPY FIELDS: CPT | Mod: 26 | Performed by: RADIOLOGY

## 2022-10-27 PROCEDURE — 77412 RADIATION TX DELIVERY LVL 3: CPT | Performed by: RADIOLOGY

## 2022-10-27 PROCEDURE — 77387 GUIDANCE FOR RADJ TX DLVR: CPT | Performed by: RADIOLOGY

## 2022-10-27 PROCEDURE — 77336 RADIATION PHYSICS CONSULT: CPT | Performed by: RADIOLOGY

## 2022-10-31 ENCOUNTER — HOSPITAL ENCOUNTER (OUTPATIENT)
Dept: RADIATION ONCOLOGY | Facility: MEDICAL CENTER | Age: 77
End: 2022-10-31
Payer: MEDICARE

## 2022-10-31 LAB
CHEMOTHERAPY INFUSION START DATE: NORMAL
CHEMOTHERAPY RECORDS: 1000
CHEMOTHERAPY RECORDS: 2
CHEMOTHERAPY RECORDS: NORMAL
CHEMOTHERAPY RX CANCER: NORMAL
DATE 1ST CHEMO CANCER: NORMAL
RAD ONC ARIA COURSE LAST TREATMENT DATE: NORMAL
RAD ONC ARIA COURSE TREATMENT ELAPSED DAYS: NORMAL
RAD ONC ARIA REFERENCE POINT DOSAGE GIVEN TO DATE: 8
RAD ONC ARIA REFERENCE POINT DOSAGE GIVEN TO DATE: 8
RAD ONC ARIA REFERENCE POINT ID: NORMAL
RAD ONC ARIA REFERENCE POINT ID: NORMAL
RAD ONC ARIA REFERENCE POINT SESSION DOSAGE GIVEN: 2
RAD ONC ARIA REFERENCE POINT SESSION DOSAGE GIVEN: 2

## 2022-10-31 PROCEDURE — 77014 PR CT GUIDANCE PLACEMENT RAD THERAPY FIELDS: CPT | Mod: 26 | Performed by: RADIOLOGY

## 2022-10-31 PROCEDURE — 77412 RADIATION TX DELIVERY LVL 3: CPT | Performed by: RADIOLOGY

## 2022-10-31 PROCEDURE — 77387 GUIDANCE FOR RADJ TX DLVR: CPT | Performed by: RADIOLOGY

## 2022-11-01 ENCOUNTER — HOSPITAL ENCOUNTER (OUTPATIENT)
Dept: RADIATION ONCOLOGY | Facility: MEDICAL CENTER | Age: 77
End: 2022-11-01

## 2022-11-01 ENCOUNTER — HOSPITAL ENCOUNTER (OUTPATIENT)
Dept: RADIATION ONCOLOGY | Facility: MEDICAL CENTER | Age: 77
End: 2022-11-30
Attending: RADIOLOGY
Payer: MEDICARE

## 2022-11-01 LAB
CHEMOTHERAPY INFUSION START DATE: NORMAL
CHEMOTHERAPY INFUSION START DATE: NORMAL
CHEMOTHERAPY INFUSION STOP DATE: NORMAL
CHEMOTHERAPY RECORDS: 1000
CHEMOTHERAPY RECORDS: 1000
CHEMOTHERAPY RECORDS: 2
CHEMOTHERAPY RECORDS: 2
CHEMOTHERAPY RECORDS: 2.67
CHEMOTHERAPY RECORDS: 4005
CHEMOTHERAPY RECORDS: NORMAL
CHEMOTHERAPY RX CANCER: NORMAL
CHEMOTHERAPY RX CANCER: NORMAL
DATE 1ST CHEMO CANCER: NORMAL
DATE 1ST CHEMO CANCER: NORMAL
RAD ONC ARIA COURSE LAST TREATMENT DATE: NORMAL
RAD ONC ARIA COURSE LAST TREATMENT DATE: NORMAL
RAD ONC ARIA COURSE TREATMENT ELAPSED DAYS: NORMAL
RAD ONC ARIA COURSE TREATMENT ELAPSED DAYS: NORMAL
RAD ONC ARIA REFERENCE POINT DOSAGE GIVEN TO DATE: 10
RAD ONC ARIA REFERENCE POINT DOSAGE GIVEN TO DATE: 40.05
RAD ONC ARIA REFERENCE POINT DOSAGE GIVEN TO DATE: 40.05
RAD ONC ARIA REFERENCE POINT ID: NORMAL
RAD ONC ARIA REFERENCE POINT SESSION DOSAGE GIVEN: 2
RAD ONC ARIA REFERENCE POINT SESSION DOSAGE GIVEN: 2

## 2022-11-01 PROCEDURE — 77014 PR CT GUIDANCE PLACEMENT RAD THERAPY FIELDS: CPT | Mod: 26 | Performed by: RADIOLOGY

## 2022-11-01 PROCEDURE — 77387 GUIDANCE FOR RADJ TX DLVR: CPT | Performed by: RADIOLOGY

## 2022-11-01 PROCEDURE — 77412 RADIATION TX DELIVERY LVL 3: CPT | Performed by: RADIOLOGY

## 2022-11-01 PROCEDURE — 77427 RADIATION TX MANAGEMENT X5: CPT | Performed by: RADIOLOGY

## 2022-11-01 NOTE — RADIATION COMPLETION NOTES
END OF TREATMENT SUMMARY    Patient name:  Paige Gudino    Primary Physician:  Roula Ga P.A.-C. MRN: [id number]  CSN: [id number]   Referring physician:   Dr. Elena Terrell  : 1945, 77 y.o.       TREATMENT SUMMARY:        Course First Treatment Date 10/04/2022    Course Last Treatment Date 2022   Course Elapsed Days 28 @ [id number]   Course Intent Curative     Radiation Therapy Episodes       Active Episodes       Radiation Therapy: 3D CRT                   Radiation Treatments         Plan Last Treated On Elapsed Days Fractions Treated Prescribed Fraction Dose (cGy) Prescribed Total Dose (cGy)    DIBH L Breast 10/24/2022 20 @ [id number] 15 of 15 267 4,005    L Brst [facility] 2022 28 @ [id number] 5 of 5 200 1,000                  Reference Point Last Treated On Elapsed Days Most Recent Session Dose (cGy) Total Dose (cGy)    DIBH L Breast 10/24/2022 20 @ [id number] 267 4,005    DIBH L Breast CP 10/24/2022 20 @ [id number] 267 4,005    L Brst [facility] 2022 28 @ [id number] 200 1,000    L Brst [facility] 2022 28 @ [id number] 200 1,000                                     STAGE:   Cancer of overlapping sites of left breast (HCC)  Staging form: Breast, AJCC 8th Edition  - Pathologic: Stage Unknown (pT1c, pNX, cM0, G2, ER+, OK+, HER2-) - Signed by Teri Burroughs M.D. on 2022  Stage prefix: Initial diagnosis  Histologic grading system: 3 grade system       TREATMENT INDICATION:   Local control, cure     CONCURRENT SYSTEMIC TREATMENT:   None     RT COURSE DISCONTINUED EARLY:   No     PATIENT EXPERIENCE:   Toxicity Assessment 10/26/2022 10/19/2022 10/12/2022 10/5/2022   Toxicity Assessment Breast Breast Breast Breast   Fatigue (lethargy, malaise, asthenia) None None None None   Fever (in the absence of neutropenia) None None None None   Radiation Dermatitis None None None None   Lymphatics Normal Normal Normal Normal   RT - Pain due to RT None None None None    Dyspnea Normal Normal Normal Normal        FOLLOW-UP PLAN:   6 Weeks     COMMENT:          ANATOMIC TARGET SUMMARY    ANATOMIC TARGET MODALITY TECHNIQUE   Left breast   External beam, photons 3D Conformal            COMMENT:         DIAGRAMS:    DOSE VOLUME HISTOGRAMS:

## 2022-11-11 ENCOUNTER — APPOINTMENT (OUTPATIENT)
Dept: RADIOLOGY | Facility: MEDICAL CENTER | Age: 77
End: 2022-11-11
Attending: PHYSICIAN ASSISTANT
Payer: MEDICARE

## 2022-11-11 ENCOUNTER — PATIENT MESSAGE (OUTPATIENT)
Dept: HEALTH INFORMATION MANAGEMENT | Facility: OTHER | Age: 77
End: 2022-11-11

## 2022-11-11 ENCOUNTER — HOSPITAL ENCOUNTER (OUTPATIENT)
Dept: LAB | Facility: MEDICAL CENTER | Age: 77
End: 2022-11-11
Attending: PHYSICIAN ASSISTANT
Payer: MEDICARE

## 2022-11-11 DIAGNOSIS — Z12.31 VISIT FOR SCREENING MAMMOGRAM: ICD-10-CM

## 2022-11-11 DIAGNOSIS — E03.9 ACQUIRED HYPOTHYROIDISM: ICD-10-CM

## 2022-11-11 LAB
T4 FREE SERPL-MCNC: 0.55 NG/DL (ref 0.93–1.7)
TSH SERPL DL<=0.005 MIU/L-ACNC: 19 UIU/ML (ref 0.38–5.33)

## 2022-11-11 PROCEDURE — 84439 ASSAY OF FREE THYROXINE: CPT

## 2022-11-11 PROCEDURE — 84443 ASSAY THYROID STIM HORMONE: CPT

## 2022-11-11 PROCEDURE — 36415 COLL VENOUS BLD VENIPUNCTURE: CPT

## 2022-11-21 DIAGNOSIS — E03.9 ACQUIRED HYPOTHYROIDISM: ICD-10-CM

## 2022-11-29 ENCOUNTER — HOSPITAL ENCOUNTER (OUTPATIENT)
Dept: HEMATOLOGY ONCOLOGY | Facility: MEDICAL CENTER | Age: 77
End: 2022-11-29
Attending: INTERNAL MEDICINE
Payer: MEDICARE

## 2022-11-29 VITALS
OXYGEN SATURATION: 97 % | WEIGHT: 160.05 LBS | HEART RATE: 84 BPM | SYSTOLIC BLOOD PRESSURE: 140 MMHG | DIASTOLIC BLOOD PRESSURE: 82 MMHG | BODY MASS INDEX: 27.47 KG/M2 | RESPIRATION RATE: 16 BRPM | TEMPERATURE: 99 F

## 2022-11-29 DIAGNOSIS — C50.812 CANCER OF OVERLAPPING SITES OF LEFT BREAST (HCC): ICD-10-CM

## 2022-11-29 PROCEDURE — 99204 OFFICE O/P NEW MOD 45 MIN: CPT | Performed by: INTERNAL MEDICINE

## 2022-11-29 PROCEDURE — 99212 OFFICE O/P EST SF 10 MIN: CPT | Performed by: INTERNAL MEDICINE

## 2022-11-29 ASSESSMENT — ENCOUNTER SYMPTOMS
NEUROLOGICAL NEGATIVE: 1
MUSCULOSKELETAL NEGATIVE: 1
EYES NEGATIVE: 1
RESPIRATORY NEGATIVE: 1
PSYCHIATRIC NEGATIVE: 1
GASTROINTESTINAL NEGATIVE: 1
CARDIOVASCULAR NEGATIVE: 1
CONSTITUTIONAL NEGATIVE: 1

## 2022-11-29 ASSESSMENT — FIBROSIS 4 INDEX: FIB4 SCORE: 1.17

## 2022-11-29 ASSESSMENT — PAIN SCALES - GENERAL: PAINLEVEL: NO PAIN

## 2022-11-29 NOTE — LETTER
[facility]   [address] [place] [address],   [address]  [place] [address]  Phone: [phone]  Fax: [phone]              Paige Gudino  1945    Encounter Date: 11/29/2022    Casimiro Fowler M.D.          PROGRESS NOTE:  Consult:  Hematology/Oncology      Referring Physician: Che Burroughs MD  Primary Care:  Roula Ga P.A.-C.    Diagnosis: ER positive left breast cancer    Chief Complaint: ER positive left breast cancer    History of Presenting Illness:  Paige Gudino is a 77 y.o. female with a history of noninvasive carcinoma of the left breast associated with calcifications found on mammogram 34 years ago.  She was treated with a total mastectomy at that time.  She had implant reconstruction and a right mamma pexy subsequently.  No systemic therapy was given.  She did well until May 2022 when she self detected a lump under the implant at the inframammary fold on the left.  She had a an ultrasound that showed a irregular spiculated hypoechoic solid mass measuring 10 mm.  2722 she underwent a biopsy of mass      Past Medical History:   Diagnosis Date   • Anesthesia     nausea post op   • Cancer (HCC)     1987 breast left   • Cancer of overlapping sites of left female breast (HCC)    • Hypothyroidism    • Thyroid nodule        Past Surgical History:   Procedure Laterality Date   • PB MASTECTOMY, PARTIAL Left 8/1/2022    Procedure: MELLO LOCALIZED LEFT PARTIAL MASTECTOMY;  Surgeon: Elena Arroyo M.D.;  Location: SURGERY [facility];  Service: General   • OTHER ORTHOPEDIC SURGERY  2019    back surgery   • OTHER  2001    replace left breast implant   • OTHER  1988    Left breast implant/lift   • OTHER  1987    left mastectomy       Social History     Tobacco Use   • Smoking status: Never   • Smokeless tobacco: Never   Vaping Use   • Vaping Use: Never used   Substance Use Topics   • Alcohol use: Not Currently   • Drug use: Never        Family History   Problem Relation Age  "of Onset   • Ovarian Cancer Mother    • Hyperlipidemia Neg Hx        Allergies as of 11/29/2022 - Reviewed 11/29/2022   Allergen Reaction Noted   • Synthroid [fd&c red #40 al fang-levothyroxine] Hives and Unspecified 05/04/2021   • Synthroid [levothyroxine]  11/04/2021         Current Outpatient Medications:   •  vitamin D3 (CHOLECALCIFEROL) 5000 Unit (125 mcg) Tab, Take 1,000 Units by mouth every day., Disp: , Rfl:   •  Calcium Carbonate (CALCIUM 600 PO), Take  by mouth every day., Disp: , Rfl:   •  Cyanocobalamin (VITAMIN B12 PO), Take 150 mg by mouth every 7 days., Disp: , Rfl:   •  Non Formulary Request, \"Total vitamin\" 280mg. 2 tabs po daily, Disp: , Rfl:   •  Multiple Vitamins-Minerals (SM MULTIPLE VITAMINS WOMENS PO), Take 2 Tablets by mouth every day at 6 PM., Disp: , Rfl:   •  ARMOUR THYROID 60 MG Tab, TAKE 1 & 1/2 (ONE & ONE-HALF) TABLETS BY MOUTH MON-Friday, 60 MG SAT AND SUNDAY, Disp: 135 Tablet, Rfl: 3    Review of Systems:  ROS       Physical Exam:  Vitals:    11/29/22 0954   BP: (!) 140/82   BP Location: Right arm   Patient Position: Sitting   BP Cuff Size: Adult   Pulse: 84   Resp: 16   Temp: 37.2 °C (99 °F)   TempSrc: Temporal   SpO2: 97%   Weight: 72.6 kg (160 lb 0.9 oz)   Height: (P) 1.626 m (5' 4.02\")       {DESC; KARNOFSKY SCALE WITH ECOG EQUIVALENT:11707:::1}    {DISTRESS LEVEL:65856:::1}    Physical Exam     Depression Screening    Little interest or pleasure in doing things?      Feeling down, depressed , or hopeless?     Trouble falling or staying asleep, or sleeping too much?      Feeling tired or having little energy?      Poor appetite or overeating?      Feeling bad about yourself - or that you are a failure or have let yourself or your family down?     Trouble concentrating on things, such as reading the newspaper or watching television?     Moving or speaking so slowly that other people could have noticed.  Or the opposite - being so fidgety or restless that you have been moving around " a lot more than usual?      Thoughts that you would be better off dead, or of hurting yourself?      Patient Health Questionnaire Score:       If depressive symptoms identified deferred to follow up visit unless specifically addressed in assesment and plan.    Labs:  Hospital Outpatient Visit on 11/11/2022   Component Date Value Ref Range Status   • TSH 11/11/2022 19.000 (H)  0.380 - 5.330 uIU/mL Final    Comment: The 2011 American Thyroid Association (ALEXANDRO) guidelines  recommended that the interpretation of thyroid function in  pregnancy be based on trimester specific reference ranges.    1st Trimester  0.100-2.500 mIU/L  2nd Trimester  0.200-3.000 mIU/L  3rd Trimester  0.300-3.500 mIU/L    These established reference ranges have not been validated  at [facility].     • Free T-4 11/11/2022 0.55 (L)  0.93 - 1.70 ng/dL Final   Hospital Outpatient Visit on 11/01/2022   Component Date Value Ref Range Status   • Course ID 11/01/2022 C1 L_breast   Final   • Course Start Date 11/01/2022 [id number]   Final   • Course First Treatment Date 11/01/2022 10/04/2022   Final   • Course Last Treatment Date 11/01/2022 11/01/2022   Final   • Course Elapsed Days 11/01/2022 28 @ [id number]   Final   • Course Intent 11/01/2022 Curative   Final   • RP ID 11/01/2022 L Brst Bst FB   Final   • RP Dosage Given to Date (Gy) 11/01/2022 10   Final   • RP Session Dosage Given (Gy) 11/01/2022 2   Final   • RP ID 11/01/2022 L Brst Bst FB CP   Final   • RP Dosage Given to Date (Gy) 11/01/2022 10   Final   • RP Session Dosage Given (Gy) 11/01/2022 2   Final   • Plan ID 11/01/2022 L Brst Bst FB   Final   • Plan Name 11/01/2022 L Brst Bst FB   Final   • Plan Fractions Treated to Date 11/01/2022 5 of 5   Final   • Plan Prescribed Dose Per Fraction * 11/01/2022 2   Final   • Plan Total Prescribed Dose (cGy) 11/01/2022 1,000   Final   Orders Only on 11/01/2022   Component Date Value Ref Range Status   • Course ID 11/01/2022 C1  L_breast   Final   • Course Start Date 11/01/2022 [id number]   Final   • Course End Date 11/01/2022 [id number]   Final   • Course First Treatment Date 11/01/2022 10/04/2022   Final   • Course Last Treatment Date 11/01/2022 11/01/2022   Final   • Course Elapsed Days 11/01/2022 28 @ [id number]   Final   • Course Intent 11/01/2022 Curative   Final   • RP ID 11/01/2022 DIBH L Breast   Final   • RP Dosage Given to Date (Gy) 11/01/2022 40.05   Final   • RP ID 11/01/2022 DIBH L Breast CP   Final   • RP Dosage Given to Date (Gy) 11/01/2022 40.05   Final   • RP ID 11/01/2022 L Brst Bst FB   Final   • RP Dosage Given to Date (Gy) 11/01/2022 10   Final   • RP ID 11/01/2022 L Brst Bst FB CP   Final   • RP Dosage Given to Date (Gy) 11/01/2022 10   Final   • Plan ID 11/01/2022 DIBH L Breast   Final   • Plan Name 11/01/2022 DIBH L Breast   Final   • Plan Fractions Treated to Date 11/01/2022 15 of 15   Final   • Plan Prescribed Dose Per Fraction * 11/01/2022 2.67   Final   • Plan Total Prescribed Dose (cGy) 11/01/2022 4,005   Final   • Plan ID 11/01/2022 L Brst Bst FB   Final   • Plan Name 11/01/2022 L Brst Bst FB   Final   • Plan Fractions Treated to Date 11/01/2022 5 of 5   Final   • Plan Prescribed Dose Per Fraction * 11/01/2022 2   Final   • Plan Total Prescribed Dose (cGy) 11/01/2022 1,000   Final   Hospital Outpatient Visit on 10/31/2022   Component Date Value Ref Range Status   • Course ID 10/31/2022 C1 L_breast   Final   • Course Start Date 10/31/2022 [id number]   Final   • Course First Treatment Date 10/31/2022 10/04/2022   Final   • Course Last Treatment Date 10/31/2022 10/31/2022   Final   • Course Elapsed Days 10/31/2022 27 @ [id number]   Final   • Course Intent 10/31/2022 Curative   Final   • RP ID 10/31/2022 L Brst Bst FB   Final   • RP Dosage Given to Date (Gy) 10/31/2022 8   Final   • RP Session Dosage Given (Gy) 10/31/2022 2   Final   • RP ID 10/31/2022 L Brst Bst FB CP   Final   • RP Dosage Given  to Date (Gy) 10/31/2022 8   Final   • RP Session Dosage Given (Gy) 10/31/2022 2   Final   • Plan ID 10/31/2022 L Brst Bst FB   Final   • Plan Name 10/31/2022 L Brst Bst FB   Final   • Plan Fractions Treated to Date 10/31/2022 4 of 5   Final   • Plan Prescribed Dose Per Fraction * 10/31/2022 2   Final   • Plan Total Prescribed Dose (cGy) 10/31/2022 1,000   Final       Imaging:   All listed images below have been independently reviewed by me. I agree with the findings as summarized below:    No results found.     Pathology:  ***    Assessment & Plan:  1. Cancer of overlapping sites of left breast (HCC)       ***    Any questions and concerns raised by the patient were answered to the best of my ability. Thank you for allowing me to participate in the care for this patient. Please feel free to contact me for any questions or concerns.     Casimiro Fowler M.D.                No Recipients

## 2022-11-29 NOTE — LETTER
[address],   [address]  [place] [address]  Phone: [phone]  Fax: [phone]              Paige Gudino  1945    Encounter Date: 11/29/2022    Casimiro Fowler M.D.          PROGRESS NOTE:  Consult:  Hematology/Oncology      Referring Physician: Che Burroughs MD  Primary Care:  Roula Ga P.A.-C.    Diagnosis: ER positive left breast cancer    Chief Complaint: ER positive left breast cancer    History of Presenting Illness:  Paige Gudino is a 77 y.o. female with a history of noninvasive carcinoma of the left breast associated with calcifications found on mammogram 34 years ago.  She was treated with a total mastectomy at that time.  She had implant reconstruction and a right mamma pexy subsequently.  No systemic therapy was given.  She did well until May 2022 when she self detected a lump under the implant at the inframammary fold on the left.  She had a an ultrasound that showed a irregular spiculated hypoechoic solid mass measuring 10 mm.  On 6/27/2022 she underwent a biopsy of mass or ultrasound guidance.  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  The tumor was ER positive greater than 90% DC positive greater than 90% HER2 negative IHC 1+.  Residual normal breast tissue was not mentioned but there was a background stroma showing dense scar with abundant microcalcifications.  On 8/1/2022 she underwent resection of the lesion which demonstrated invasive grade 2 mammary carcinoma measuring 1.6 cm with a close posterior margin, all other margins clear.  Postoperatively she saw her radiation therapy and received radiation to the chest wall.  An Oncotype Dx recurrence score was ordered and returned a score of 0.  In the interim she saw another medical oncologist who commended endocrine therapy.  The slides was sent for second  "opinion to [place] and they confirmed the diagnosis of invasive mammary carcinoma although they felt it was not a cribriform type.  He repeated myoepithelial stains which were negative.  She elected to transfer care and comes in today for second opinion regarding systemic therapy.      Past Medical History:   Diagnosis Date   • Anesthesia     nausea post op   • Cancer (HCC)     1987 breast left   • Cancer of overlapping sites of left female breast (HCC)    • Hypothyroidism    • Thyroid nodule        Past Surgical History:   Procedure Laterality Date   • PB MASTECTOMY, PARTIAL Left 8/1/2022    Procedure: MELLO LOCALIZED LEFT PARTIAL MASTECTOMY;  Surgeon: Elena Arroyo M.D.;  Location: SURGERY [facility];  Service: General   • OTHER ORTHOPEDIC SURGERY  2019    back surgery   • OTHER  2001    replace left breast implant   • OTHER  1988    Left breast implant/lift   • OTHER  1987    left mastectomy       Social History     Tobacco Use   • Smoking status: Never   • Smokeless tobacco: Never   Vaping Use   • Vaping Use: Never used   Substance Use Topics   • Alcohol use: Not Currently   • Drug use: Never        Family History   Problem Relation Age of Onset   • Ovarian Cancer Mother    • Hyperlipidemia Neg Hx        Allergies as of 11/29/2022 - Reviewed 11/29/2022   Allergen Reaction Noted   • Synthroid [fd&c red #40 al fang-levothyroxine] Hives and Unspecified 05/04/2021   • Synthroid [levothyroxine]  11/04/2021         Current Outpatient Medications:   •  vitamin D3 (CHOLECALCIFEROL) 5000 Unit (125 mcg) Tab, Take 1,000 Units by mouth every day., Disp: , Rfl:   •  Calcium Carbonate (CALCIUM 600 PO), Take  by mouth every day., Disp: , Rfl:   •  Cyanocobalamin (VITAMIN B12 PO), Take 150 mg by mouth every 7 days., Disp: , Rfl:   •  Non Formulary Request, \"Total vitamin\" 280mg. 2 tabs po daily, Disp: , Rfl:   •  Multiple Vitamins-Minerals (SM MULTIPLE VITAMINS WOMENS PO), Take 2 Tablets by mouth every day at 6 PM., " "Disp: , Rfl:   •  ARMOUR THYROID 60 MG Tab, TAKE 1 & 1/2 (ONE & ONE-HALF) TABLETS BY MOUTH MON-Friday, 60 MG SAT AND SUNDAY, Disp: 135 Tablet, Rfl: 3    Review of Systems:  Review of Systems   Constitutional: Negative.    HENT: Negative.     Eyes: Negative.    Respiratory: Negative.     Cardiovascular: Negative.    Gastrointestinal: Negative.    Genitourinary: Negative.    Musculoskeletal: Negative.    Skin: Negative.    Neurological: Negative.    Endo/Heme/Allergies: Negative.    Psychiatric/Behavioral: Negative.          Physical Exam:  Vitals:    11/29/22 0954   BP: (!) 140/82   BP Location: Right arm   Patient Position: Sitting   BP Cuff Size: Adult   Pulse: 84   Resp: 16   Temp: 37.2 °C (99 °F)   TempSrc: Temporal   SpO2: 97%   Weight: 72.6 kg (160 lb 0.9 oz)   Height: (P) 1.626 m (5' 4.02\")       DESC; KARNOFSKY SCALE WITH ECOG EQUIVALENT: 100, Fully active, able to carry on all pre-disease performed without restriction (ECOG equivalent 0)    DISTRESS LEVEL: no acute distress    Physical Exam  Constitutional:       Appearance: Normal appearance.   HENT:      Head: Normocephalic.      Mouth/Throat:      Mouth: Mucous membranes are moist.      Pharynx: Oropharynx is clear.   Eyes:      Extraocular Movements: Extraocular movements intact.      Conjunctiva/sclera: Conjunctivae normal.      Pupils: Pupils are equal, round, and reactive to light.   Cardiovascular:      Rate and Rhythm: Normal rate and regular rhythm.      Pulses: Normal pulses.   Pulmonary:      Effort: Pulmonary effort is normal.      Breath sounds: Normal breath sounds.   Chest:      Comments: Left mastectomy and reconstruction without nodularity or erythema.  There is an incision in the inframammary fold consistent with excision of the recurrent/new primary without nodularity or erythema.  Right breast shows a mamma pexy without masses nipple discharge or tenderness.  No axillary adenopathy is noted bilaterally  Abdominal:      General: Abdomen " is flat. Bowel sounds are normal.      Palpations: Abdomen is soft. There is no mass.   Musculoskeletal:         General: Normal range of motion.   Skin:     General: Skin is warm.   Neurological:      General: No focal deficit present.      Mental Status: She is alert and oriented to person, place, and time.   Psychiatric:         Mood and Affect: Mood normal.         Behavior: Behavior normal.          Labs:  Hospital Outpatient Visit on 11/11/2022   Component Date Value Ref Range Status   • TSH 11/11/2022 19.000 (H)  0.380 - 5.330 uIU/mL Final    Comment: The 2011 American Thyroid Association (ALEXANDRO) guidelines  recommended that the interpretation of thyroid function in  pregnancy be based on trimester specific reference ranges.    1st Trimester  0.100-2.500 mIU/L  2nd Trimester  0.200-3.000 mIU/L  3rd Trimester  0.300-3.500 mIU/L    These established reference ranges have not been validated  at [facility].     • Free T-4 11/11/2022 0.55 (L)  0.93 - 1.70 ng/dL Final   Hospital Outpatient Visit on 11/01/2022   Component Date Value Ref Range Status   • Course ID 11/01/2022 C1 L_breast   Final   • Course Start Date 11/01/2022 [id number]   Final   • Course First Treatment Date 11/01/2022 10/04/2022   Final   • Course Last Treatment Date 11/01/2022 11/01/2022   Final   • Course Elapsed Days 11/01/2022 28 @ [id number]   Final   • Course Intent 11/01/2022 Curative   Final   • RP ID 11/01/2022 L Brst Bst FB   Final   • RP Dosage Given to Date (Gy) 11/01/2022 10   Final   • RP Session Dosage Given (Gy) 11/01/2022 2   Final   • RP ID 11/01/2022 L Brst Bst FB CP   Final   • RP Dosage Given to Date (Gy) 11/01/2022 10   Final   • RP Session Dosage Given (Gy) 11/01/2022 2   Final   • Plan ID 11/01/2022 L Brst Bst FB   Final   • Plan Name 11/01/2022 L Brst Bst FB   Final   • Plan Fractions Treated to Date 11/01/2022 5 of 5   Final   • Plan Prescribed Dose Per Fraction * 11/01/2022 2   Final   • Plan Total  Prescribed Dose (cGy) 11/01/2022 1,000   Final   Orders Only on 11/01/2022   Component Date Value Ref Range Status   • Course ID 11/01/2022 C1 L_breast   Final   • Course Start Date 11/01/2022 [id number]   Final   • Course End Date 11/01/2022 [id number]   Final   • Course First Treatment Date 11/01/2022 10/04/2022   Final   • Course Last Treatment Date 11/01/2022 11/01/2022   Final   • Course Elapsed Days 11/01/2022 28 @ [id number]   Final   • Course Intent 11/01/2022 Curative   Final   • RP ID 11/01/2022 DIBH L Breast   Final   • RP Dosage Given to Date (Gy) 11/01/2022 40.05   Final   • RP ID 11/01/2022 DIBH L Breast CP   Final   • RP Dosage Given to Date (Gy) 11/01/2022 40.05   Final   • RP ID 11/01/2022 L Brst Bst FB   Final   • RP Dosage Given to Date (Gy) 11/01/2022 10   Final   • RP ID 11/01/2022 L Brst Bst FB CP   Final   • RP Dosage Given to Date (Gy) 11/01/2022 10   Final   • Plan ID 11/01/2022 DIBH L Breast   Final   • Plan Name 11/01/2022 DIBH L Breast   Final   • Plan Fractions Treated to Date 11/01/2022 15 of 15   Final   • Plan Prescribed Dose Per Fraction * 11/01/2022 2.67   Final   • Plan Total Prescribed Dose (cGy) 11/01/2022 4,005   Final   • Plan ID 11/01/2022 L Brst Bst FB   Final   • Plan Name 11/01/2022 L Brst Bst FB   Final   • Plan Fractions Treated to Date 11/01/2022 5 of 5   Final   • Plan Prescribed Dose Per Fraction * 11/01/2022 2   Final   • Plan Total Prescribed Dose (cGy) 11/01/2022 1,000   Final   Hospital Outpatient Visit on 10/31/2022   Component Date Value Ref Range Status   • Course ID 10/31/2022 C1 L_breast   Final   • Course Start Date 10/31/2022 [id number]   Final   • Course First Treatment Date 10/31/2022 10/04/2022   Final   • Course Last Treatment Date 10/31/2022 10/31/2022   Final   • Course Elapsed Days 10/31/2022 27 @ [id number]   Final   • Course Intent 10/31/2022 Curative   Final   • RP ID 10/31/2022 L Brst Bst FB   Final   • RP Dosage Given to Date (Gy)  10/31/2022 8   Final   • RP Session Dosage Given (Gy) 10/31/2022 2   Final   • RP ID 10/31/2022 L Brst Bst FB CP   Final   • RP Dosage Given to Date (Gy) 10/31/2022 8   Final   • RP Session Dosage Given (Gy) 10/31/2022 2   Final   • Plan ID 10/31/2022 L Brst Bst FB   Final   • Plan Name 10/31/2022 L Brst Bst FB   Final   • Plan Fractions Treated to Date 10/31/2022 4 of 5   Final   • Plan Prescribed Dose Per Fraction * 10/31/2022 2   Final   • Plan Total Prescribed Dose (cGy) 10/31/2022 1,000   Final       Imaging:   All listed images below have been independently reviewed by me. I agree with the findings as summarized below:    No results found.     Pathology:      Assessment & Plan:      1.  History of breast cancer 34 years ago status postmastectomy.  2.  Inframammary recurrence on the left side, invasive mammary carcinoma likely occurring in a rest of residual breast tissue versus less likely recurrent disease.  Tumor was grade 2, stage I (pT1c, PN X) ER greater than 95%, [place] greater than 95%, HER2 negative Ki-67 low.  Oncotype DX recurrence score of 0.  Status post radiation therapy to the regional area.    Plan: She is a good candidate for adjuvant endocrine therapy with anastrozole.  We will see her back in 8 weeks for assessment of her tolerance of therapy.  Present her case in tumor board  Any questions and concerns raised by the patient were answered to the best of my ability. Thank you for allowing me to participate in the care for this patient. Please feel free to contact me for any questions or concerns.     Casimiro Fowler M.D.              No Recipients

## 2022-11-29 NOTE — PROGRESS NOTES
Consult:  Hematology/Oncology      Referring Physician: Che Burroughs MD  Primary Care:  Roula Ga P.A.-C.    Diagnosis: ER positive left breast cancer    Chief Complaint: ER positive left breast cancer    History of Presenting Illness:  Paige Gudino is a 77 y.o. female with a history of noninvasive carcinoma of the left breast associated with calcifications found on mammogram 34 years ago.  She was treated with a total mastectomy at that time.  She had implant reconstruction and a right mamma pexy subsequently.  No systemic therapy was given.  She did well until May 2022 when she self detected a lump under the implant at the inframammary fold on the left.  She had a an ultrasound that showed a irregular spiculated hypoechoic solid mass measuring 10 mm.  On 6/27/2022 she underwent a biopsy of mass or ultrasound guidance.  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  The tumor was ER positive greater than 90% SD positive greater than 90% HER2 negative IHC 1+.  Residual normal breast tissue was not mentioned but there was a background stroma showing dense scar with abundant microcalcifications.  On 8/1/2022 she underwent resection of the lesion which demonstrated invasive grade 2 mammary carcinoma measuring 1.6 cm with a close posterior margin, all other margins clear.  Postoperatively she saw her radiation therapy and received radiation to the chest wall.  An Oncotype Dx recurrence score was ordered and returned a score of 0.  In the interim she saw another medical oncologist who commended endocrine therapy.  The slides was sent for second opinion to [place] and they confirmed the diagnosis of invasive mammary carcinoma although they felt it was not a cribriform type.  He repeated myoepithelial stains which were negative.  She elected to transfer care and comes in today for second  "opinion regarding systemic therapy.      Past Medical History:   Diagnosis Date    Anesthesia     nausea post op    Cancer (HCC)     1987 breast left    Cancer of overlapping sites of left female breast (HCC)     Hypothyroidism     Thyroid nodule        Past Surgical History:   Procedure Laterality Date    PB MASTECTOMY, PARTIAL Left 8/1/2022    Procedure: MELLO LOCALIZED LEFT PARTIAL MASTECTOMY;  Surgeon: Elena Arroyo M.D.;  Location: SURGERY [facility];  Service: General    OTHER ORTHOPEDIC SURGERY  2019    back surgery    OTHER  2001    replace left breast implant    OTHER  1988    Left breast implant/lift    OTHER  1987    left mastectomy       Social History     Tobacco Use    Smoking status: Never    Smokeless tobacco: Never   Vaping Use    Vaping Use: Never used   Substance Use Topics    Alcohol use: Not Currently    Drug use: Never        Family History   Problem Relation Age of Onset    Ovarian Cancer Mother     Hyperlipidemia Neg Hx        Allergies as of 11/29/2022 - Reviewed 11/29/2022   Allergen Reaction Noted    Synthroid [fd&c red #40 al fang-levothyroxine] Hives and Unspecified 05/04/2021    Synthroid [levothyroxine]  11/04/2021         Current Outpatient Medications:     vitamin D3 (CHOLECALCIFEROL) 5000 Unit (125 mcg) Tab, Take 1,000 Units by mouth every day., Disp: , Rfl:     Calcium Carbonate (CALCIUM 600 PO), Take  by mouth every day., Disp: , Rfl:     Cyanocobalamin (VITAMIN B12 PO), Take 150 mg by mouth every 7 days., Disp: , Rfl:     Non Formulary Request, \"Total vitamin\" 280mg. 2 tabs po daily, Disp: , Rfl:     Multiple Vitamins-Minerals (SM MULTIPLE VITAMINS WOMENS PO), Take 2 Tablets by mouth every day at 6 PM., Disp: , Rfl:     ARMOUR THYROID 60 MG Tab, TAKE 1 & 1/2 (ONE & ONE-HALF) TABLETS BY MOUTH MON-Friday, 60 MG SAT AND SUNDAY, Disp: 135 Tablet, Rfl: 3    Review of Systems:  Review of Systems   Constitutional: Negative.    HENT: Negative.     Eyes: Negative.    Respiratory: " "Negative.     Cardiovascular: Negative.    Gastrointestinal: Negative.    Genitourinary: Negative.    Musculoskeletal: Negative.    Skin: Negative.    Neurological: Negative.    Endo/Heme/Allergies: Negative.    Psychiatric/Behavioral: Negative.          Physical Exam:  Vitals:    11/29/22 0954   BP: (!) 140/82   BP Location: Right arm   Patient Position: Sitting   BP Cuff Size: Adult   Pulse: 84   Resp: 16   Temp: 37.2 °C (99 °F)   TempSrc: Temporal   SpO2: 97%   Weight: 72.6 kg (160 lb 0.9 oz)   Height: (P) 1.626 m (5' 4.02\")       DESC; KARNOFSKY SCALE WITH ECOG EQUIVALENT: 100, Fully active, able to carry on all pre-disease performed without restriction (ECOG equivalent 0)    DISTRESS LEVEL: no acute distress    Physical Exam  Constitutional:       Appearance: Normal appearance.   HENT:      Head: Normocephalic.      Mouth/Throat:      Mouth: Mucous membranes are moist.      Pharynx: Oropharynx is clear.   Eyes:      Extraocular Movements: Extraocular movements intact.      Conjunctiva/sclera: Conjunctivae normal.      Pupils: Pupils are equal, round, and reactive to light.   Cardiovascular:      Rate and Rhythm: Normal rate and regular rhythm.      Pulses: Normal pulses.   Pulmonary:      Effort: Pulmonary effort is normal.      Breath sounds: Normal breath sounds.   Chest:      Comments: Left mastectomy and reconstruction without nodularity or erythema.  There is an incision in the inframammary fold consistent with excision of the recurrent/new primary without nodularity or erythema.  Right breast shows a mamma pexy without masses nipple discharge or tenderness.  No axillary adenopathy is noted bilaterally  Abdominal:      General: Abdomen is flat. Bowel sounds are normal.      Palpations: Abdomen is soft. There is no mass.   Musculoskeletal:         General: Normal range of motion.   Skin:     General: Skin is warm.   Neurological:      General: No focal deficit present.      Mental Status: She is alert and " oriented to person, place, and time.   Psychiatric:         Mood and Affect: Mood normal.         Behavior: Behavior normal.          Labs:  Hospital Outpatient Visit on 11/11/2022   Component Date Value Ref Range Status    TSH 11/11/2022 19.000 (H)  0.380 - 5.330 uIU/mL Final    Comment: The 2011 American Thyroid Association (ALEXANDRO) guidelines  recommended that the interpretation of thyroid function in  pregnancy be based on trimester specific reference ranges.    1st Trimester  0.100-2.500 mIU/L  2nd Trimester  0.200-3.000 mIU/L  3rd Trimester  0.300-3.500 mIU/L    These established reference ranges have not been validated  at [facility].      Free T-4 11/11/2022 0.55 (L)  0.93 - 1.70 ng/dL Final   Hospital Outpatient Visit on 11/01/2022   Component Date Value Ref Range Status    Course ID 11/01/2022 C1 L_breast   Final    Course Start Date 11/01/2022 [id number]   Final    Course First Treatment Date 11/01/2022 10/04/2022   Final    Course Last Treatment Date 11/01/2022 11/01/2022   Final    Course Elapsed Days 11/01/2022 28 @ [id number]   Final    Course Intent 11/01/2022 Curative   Final    RP ID 11/01/2022 L Brst Bst FB   Final    RP Dosage Given to Date (Gy) 11/01/2022 10   Final    RP Session Dosage Given (Gy) 11/01/2022 2   Final    RP ID 11/01/2022 L Brst Bst FB CP   Final    RP Dosage Given to Date (Gy) 11/01/2022 10   Final    RP Session Dosage Given (Gy) 11/01/2022 2   Final    Plan ID 11/01/2022 L Brst Bst FB   Final    Plan Name 11/01/2022 L Brst Bst FB   Final    Plan Fractions Treated to Date 11/01/2022 5 of 5   Final    Plan Prescribed Dose Per Fraction * 11/01/2022 2   Final    Plan Total Prescribed Dose (cGy) 11/01/2022 1,000   Final   Orders Only on 11/01/2022   Component Date Value Ref Range Status    Course ID 11/01/2022 C1 L_breast   Final    Course Start Date 11/01/2022 [id number]   Final    Course End Date 11/01/2022 [id number]   Final    Course First Treatment  Date 11/01/2022 10/04/2022   Final    Course Last Treatment Date 11/01/2022 11/01/2022   Final    Course Elapsed Days 11/01/2022 28 @ [id number]   Final    Course Intent 11/01/2022 Curative   Final    RP ID 11/01/2022 DIBH L Breast   Final    RP Dosage Given to Date (Gy) 11/01/2022 40.05   Final    RP ID 11/01/2022 DIBH L Breast CP   Final    RP Dosage Given to Date (Gy) 11/01/2022 40.05   Final    RP ID 11/01/2022 L Brst Bst FB   Final    RP Dosage Given to Date (Gy) 11/01/2022 10   Final    RP ID 11/01/2022 L Brst Bst FB CP   Final    RP Dosage Given to Date (Gy) 11/01/2022 10   Final    Plan ID 11/01/2022 DIBH L Breast   Final    Plan Name 11/01/2022 DIBH L Breast   Final    Plan Fractions Treated to Date 11/01/2022 15 of 15   Final    Plan Prescribed Dose Per Fraction * 11/01/2022 2.67   Final    Plan Total Prescribed Dose (cGy) 11/01/2022 4,005   Final    Plan ID 11/01/2022 L Brst Bst FB   Final    Plan Name 11/01/2022 L Brst Bst FB   Final    Plan Fractions Treated to Date 11/01/2022 5 of 5   Final    Plan Prescribed Dose Per Fraction * 11/01/2022 2   Final    Plan Total Prescribed Dose (cGy) 11/01/2022 1,000   Final   Hospital Outpatient Visit on 10/31/2022   Component Date Value Ref Range Status    Course ID 10/31/2022 C1 L_breast   Final    Course Start Date 10/31/2022 [id number]   Final    Course First Treatment Date 10/31/2022 10/04/2022   Final    Course Last Treatment Date 10/31/2022 10/31/2022   Final    Course Elapsed Days 10/31/2022 27 @ [id number]   Final    Course Intent 10/31/2022 Curative   Final    RP ID 10/31/2022 L Brst Bst FB   Final    RP Dosage Given to Date (Gy) 10/31/2022 8   Final    RP Session Dosage Given (Gy) 10/31/2022 2   Final    RP ID 10/31/2022 L Brst Bst FB CP   Final    RP Dosage Given to Date (Gy) 10/31/2022 8   Final    RP Session Dosage Given (Gy) 10/31/2022 2   Final    Plan ID 10/31/2022 L Brst Bst FB   Final    Plan Name 10/31/2022 FRANCES BANGURA   Final    Plan  Fractions Treated to Date 10/31/2022 4 of 5   Final    Plan Prescribed Dose Per Fraction * 10/31/2022 2   Final    Plan Total Prescribed Dose (cGy) 10/31/2022 1,000   Final       Imaging:   All listed images below have been independently reviewed by me. I agree with the findings as summarized below:    No results found.     Pathology:      Assessment & Plan:      1.  History of left breast cancer 34 years ago status postmastectomy without systemic treatment.  2.  Inframammary recurrence on the left side, invasive mammary carcinoma likely occurring in a rest of residual breast tissue versus less likely recurrent disease.  Tumor was grade 2, stage I (pT1c, PN X) ER greater than 95%, [place] greater than 95%, HER2 negative Ki-67 low.  Oncotype DX recurrence score of 0.  Status post radiation therapy to the regional area.    Plan: She is a good candidate for adjuvant endocrine therapy with anastrozole.  We will see her back in 8 weeks for assessment of her tolerance of therapy.  Present her case in tumor board  Any questions and concerns raised by the patient were answered to the best of my ability. Thank you for allowing me to participate in the care for this patient. Please feel free to contact me for any questions or concerns.     Casimiro Fowler M.D.

## 2022-11-30 DIAGNOSIS — C50.812 CANCER OF OVERLAPPING SITES OF LEFT BREAST (HCC): Primary | ICD-10-CM

## 2022-11-30 RX ORDER — ANASTROZOLE 1 MG/1
1 TABLET ORAL DAILY
Qty: 30 TABLET | Refills: 0 | Status: SHIPPED | OUTPATIENT
Start: 2022-11-30 | End: 2022-12-28

## 2022-12-01 ENCOUNTER — TELEPHONE (OUTPATIENT)
Dept: HEALTH INFORMATION MANAGEMENT | Facility: OTHER | Age: 77
End: 2022-12-01
Payer: MEDICARE

## 2022-12-02 ENCOUNTER — TELEPHONE (OUTPATIENT)
Dept: MEDICAL GROUP | Age: 77
End: 2022-12-02
Payer: MEDICARE

## 2022-12-02 NOTE — TELEPHONE ENCOUNTER
----- Message from Maryann Eddy sent at 12/1/2022 11:15 AM PST -----  Regarding: Thyroid medication  Called pt to r/s her dec. 16th appt. She stated she is supposed to go over her thyroid medication with abraham due to abraham thinking she may need more or a different medication. She was talking to another one of her providers who stated that she may want to stop taking her supplements with her thyroid medication in the morning, that it could cause her numbers to be off. She is wondering if she should try that first and then do her thyroid labs sometime in January? Or redo them asap and come in before January. Please advise pt.    Thank vickie daigle

## 2022-12-02 NOTE — TELEPHONE ENCOUNTER
I had an extended conversation with the patient to convey Roula's message: let patient know that her thyroid level is elevated, we are under treating her.  I know that she was having issues with diarrhea with higher dosages.  Can you please get her scheduled for a quick visit, this can be virtual, so that we can discuss medication adjustment.

## 2022-12-05 NOTE — TELEPHONE ENCOUNTER
Roula Ga P.A.-C.  You 3 days ago    No, just have her come in for her visit and we will discuss them.  If she has been taking her supplements in the morning like she normally has it would not throw her thyroid levels off that much.  Have her keep her appointment

## 2022-12-06 NOTE — TELEPHONE ENCOUNTER
Phone Number Called: [phone]      Call outcome: Spoke to patient regarding message below.    Message: Called and informed patient that she will review her thyroid with R[facility]a at her upcoming visit and not to change how she takes her thyroid medication.

## 2022-12-06 NOTE — TELEPHONE ENCOUNTER
Phone Number Called: [phone] (home)     Call outcome: Did not leave a detailed message. Requested patient to call back.    Message: 1st attempt

## 2022-12-08 ENCOUNTER — HOSPITAL ENCOUNTER (OUTPATIENT)
Dept: RADIOLOGY | Facility: MEDICAL CENTER | Age: 77
End: 2022-12-08
Attending: PHYSICIAN ASSISTANT
Payer: MEDICARE

## 2022-12-08 DIAGNOSIS — Z12.31 ENCOUNTER FOR SCREENING MAMMOGRAM FOR MALIGNANT NEOPLASM OF BREAST: ICD-10-CM

## 2022-12-08 PROCEDURE — 77063 BREAST TOMOSYNTHESIS BI: CPT | Mod: 52

## 2022-12-12 ENCOUNTER — HOSPITAL ENCOUNTER (OUTPATIENT)
Dept: RADIATION ONCOLOGY | Facility: MEDICAL CENTER | Age: 77
End: 2022-12-31
Attending: RADIOLOGY
Payer: MEDICARE

## 2022-12-12 VITALS — DIASTOLIC BLOOD PRESSURE: 90 MMHG | OXYGEN SATURATION: 97 % | HEART RATE: 73 BPM | SYSTOLIC BLOOD PRESSURE: 148 MMHG

## 2022-12-12 PROCEDURE — 99214 OFFICE O/P EST MOD 30 MIN: CPT | Performed by: RADIOLOGY

## 2022-12-12 NOTE — PROGRESS NOTES
"RADIATION ONCOLOGY FOLLOW-UP    DATE OF SERVICE: 12/12/2022    IDENTIFICATION:   A 77 y.o. 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  Tumor ER/CA greater than 90 HER2 1+ Ki-67 5 to 10% status postlumpectomy node lymph node dissection.  1.6 cm tumor less than 0.5 cm from posterior margin no LVI.    Oncotype 0.  Completed radiation therapy 11/1/2022.    HISTORY OF PRESENT ILLNESS:    since last seen patient has been doing well.  She saw Dr. Fowler the Oncotype was 0 he is going to put her on an anastrozole.  From a radiation standpoint she did well she really did not have much in the way of side effects.  She is feeling much more calm now that she did the Oncotype and it was negative.    CURRENT MEDICATIONS:  Current Outpatient Medications   Medication Sig Dispense Refill    anastrozole (ARIMIDEX) 1 MG Tab Take 1 Tablet by mouth every day. 30 Tablet 0    vitamin D3 (CHOLECALCIFEROL) 5000 Unit (125 mcg) Tab Take 1,000 Units by mouth every day.      Calcium Carbonate (CALCIUM 600 PO) Take  by mouth every day.      Cyanocobalamin (VITAMIN B12 PO) Take 150 mg by mouth every 7 days.      Non Formulary Request \"Total vitamin\" 280mg. 2 tabs po daily      Multiple Vitamins-Minerals (SM MULTIPLE VITAMINS WOMENS PO) Take 2 Tablets by mouth every day at 6 PM.      ARMOUR THYROID 60 MG Tab TAKE 1 & 1/2 (ONE & ONE-HALF) TABLETS BY MOUTH MON-Friday, 60 MG SAT AND SUNDAY 135 Tablet 3     No current facility-administered medications for this encounter.       ALLERGIES:  Synthroid [fd&c red #40 al lake-levothyroxine] and Synthroid [levothyroxine]    FAMILY HISTORY:    Family History   Problem Relation Age of Onset    Ovarian Cancer Mother     Hyperlipidemia Neg Hx    [unfilled]        SOCIAL HISTORY:     reports that she has never smoked. She has never used smokeless tobacco. She " reports that she does not currently use alcohol. She reports that she does not use drugs.    PAIN:   None    REVIEW OF SYSTEMS: Is significant for  nothing new  The rest of the review of systems has been reviewed.    PHYSICAL EXAM:     ECOG PERFORMANCE STATUS:  0= Fully active, able to carry on all pre-disease performance without restriction.   Vitals:    12/12/22 0810   BP: (!) 148/90   BP Location: Right arm   Patient Position: Sitting   Pulse: 73   SpO2: 97%            GENERAL:  well-appearing alert and oriented x3 in no apparent distress   left reconstructed breast is smooth flat without nodularity or mass.  Minimal radiation change.  Right breast has evidence of reduction mammoplasty and prior radiation.  No mass appreciated in either chest wall breast or axilla.  HEENT:  Pupils are equal, round, and reactive to light.  Extraocular muscles   are intact. Sclerae nonicteric.  Conjunctivae pink.  Oral cavity, tongue   protrudes midline.   NECK:  No preauricular neck supraclavicular axillary adenopathy.  LUNGS:  Clear to ascultation   HEART:  Regular rate and rhythm.  No murmur appreciated  ABDOMEN:  Soft. No evidence of hepatosplenomegaly.   EXTREMITIES:  Without Edema.  NEUROLOGIC:  Cranial nerves II through XII were intact. Normal stance and gait motor and sensory grossly within normal limits          IMPRESSION:    A 77 y.o. 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  Tumor ER/MD greater than 90 HER2 1+ Ki-67 5 to 10% status postlumpectomy node lymph node dissection.  1.6 cm tumor less than 0.5 cm from posterior margin no LVI.   Radiation therapy was completed on 11/1/22.   Oncotype 0 on anastrozole.    RECOMMENDATIONS:       Patient is going to continue her follow-up with Dr. Fowler based on national cancer guidelines.  I am happy to see her on an as-needed  basis.        Thank you for the opportunity to participate in her care.  If any questions or comments, please do not hesitate in calling.      Please note that this dictation was created using voice recognition software. I have made every reasonable attempt to correct obvious errors, but I expect that there are errors of grammar and possibly content that I did not discover before finalizing the note.

## 2022-12-12 NOTE — PROGRESS NOTES
"Patient was seen today in clinic with Dr. Burroughs for follow up.  Vitals signs and weight were obtained and pain assessment was completed.  Allergies and medications were reviewed with the patient.  Toxicities of treatment assessed.     Vitals/Pain:  Vitals:    12/12/22 0810   BP: (!) 148/90   BP Location: Right arm   Patient Position: Sitting   Pulse: 73   SpO2: 97%            Allergies:   Synthroid [fd&c red #40 al lake-levothyroxine] and Synthroid [levothyroxine]    Current Medications:  Current Outpatient Medications   Medication Sig Dispense Refill    anastrozole (ARIMIDEX) 1 MG Tab Take 1 Tablet by mouth every day. 30 Tablet 0    vitamin D3 (CHOLECALCIFEROL) 5000 Unit (125 mcg) Tab Take 1,000 Units by mouth every day.      Calcium Carbonate (CALCIUM 600 PO) Take  by mouth every day.      Cyanocobalamin (VITAMIN B12 PO) Take 150 mg by mouth every 7 days.      Non Formulary Request \"Total vitamin\" 280mg. 2 tabs po daily      Multiple Vitamins-Minerals (SM MULTIPLE VITAMINS WOMENS PO) Take 2 Tablets by mouth every day at 6 PM.      ARMOUR THYROID 60 MG Tab TAKE 1 & 1/2 (ONE & ONE-HALF) TABLETS BY MOUTH MON-Friday, 60 MG SAT AND SUNDAY 135 Tablet 3     No current facility-administered medications for this encounter.           Ck Herrera Ass't    "

## 2022-12-27 ENCOUNTER — TELEPHONE (OUTPATIENT)
Dept: HEMATOLOGY ONCOLOGY | Facility: MEDICAL CENTER | Age: 77
End: 2022-12-27
Payer: MEDICARE

## 2022-12-27 DIAGNOSIS — C50.812 CANCER OF OVERLAPPING SITES OF LEFT BREAST (HCC): ICD-10-CM

## 2022-12-27 NOTE — TELEPHONE ENCOUNTER
Patient states she spoke with someone last week, requesting the refill on her Anastrozole.  She contacted her pharmacy, [facility] on [address], and they don't have it.    Please put in the refill for this medication, as she has only 4 pills left.

## 2022-12-28 RX ORDER — ANASTROZOLE 1 MG/1
TABLET ORAL
Qty: 30 TABLET | Refills: 0 | Status: SHIPPED | OUTPATIENT
Start: 2022-12-28 | End: 2023-07-18

## 2023-01-16 ENCOUNTER — HOSPITAL ENCOUNTER (OUTPATIENT)
Dept: RADIOLOGY | Facility: MEDICAL CENTER | Age: 78
End: 2023-01-16

## 2023-01-16 ENCOUNTER — HOSPITAL ENCOUNTER (OUTPATIENT)
Dept: RADIOLOGY | Facility: MEDICAL CENTER | Age: 78
End: 2023-01-16
Attending: PHYSICIAN ASSISTANT
Payer: MEDICARE

## 2023-01-16 DIAGNOSIS — E04.1 THYROID NODULE: ICD-10-CM

## 2023-01-16 PROCEDURE — 76536 US EXAM OF HEAD AND NECK: CPT

## 2023-01-25 ENCOUNTER — HOSPITAL ENCOUNTER (OUTPATIENT)
Dept: HEMATOLOGY ONCOLOGY | Facility: MEDICAL CENTER | Age: 78
End: 2023-01-25
Attending: INTERNAL MEDICINE
Payer: MEDICARE

## 2023-01-25 VITALS
RESPIRATION RATE: 18 BRPM | SYSTOLIC BLOOD PRESSURE: 161 MMHG | WEIGHT: 159.72 LBS | OXYGEN SATURATION: 97 % | HEART RATE: 90 BPM | TEMPERATURE: 99.3 F | BODY MASS INDEX: 27.27 KG/M2 | DIASTOLIC BLOOD PRESSURE: 91 MMHG | HEIGHT: 64 IN

## 2023-01-25 DIAGNOSIS — C50.812 CANCER OF OVERLAPPING SITES OF LEFT BREAST (HCC): ICD-10-CM

## 2023-01-25 PROCEDURE — 99212 OFFICE O/P EST SF 10 MIN: CPT | Performed by: INTERNAL MEDICINE

## 2023-01-25 PROCEDURE — 99213 OFFICE O/P EST LOW 20 MIN: CPT | Performed by: INTERNAL MEDICINE

## 2023-01-25 RX ORDER — ANASTROZOLE 1 MG/1
1 TABLET ORAL DAILY
Qty: 90 TABLET | Refills: 1 | Status: SHIPPED | OUTPATIENT
Start: 2023-01-25 | End: 2023-07-18

## 2023-01-25 ASSESSMENT — ENCOUNTER SYMPTOMS
CONSTITUTIONAL NEGATIVE: 1
MUSCULOSKELETAL NEGATIVE: 1
RESPIRATORY NEGATIVE: 1
EYES NEGATIVE: 1
CARDIOVASCULAR NEGATIVE: 1
GASTROINTESTINAL NEGATIVE: 1
NEUROLOGICAL NEGATIVE: 1
PSYCHIATRIC NEGATIVE: 1

## 2023-01-25 ASSESSMENT — PAIN SCALES - GENERAL: PAINLEVEL: NO PAIN

## 2023-01-25 ASSESSMENT — FIBROSIS 4 INDEX: FIB4 SCORE: 1.17

## 2023-01-25 NOTE — ADDENDUM NOTE
Encounter addended by: Tahira Dunlap, Med Ass't on: 1/25/2023 10:22 AM   Actions taken: Charge Capture section accepted

## 2023-01-25 NOTE — PROGRESS NOTES
Medical oncology follow-up visit 1/25/2023:      Referring Physician: Che Burroughs MD  Primary Care:  [facility] Ga P.A.-[facility].    Diagnosis: ER positive left breast cancer    Chief Complaint: ER positive left breast cancer    History of Presenting Illness:  Paige Gudino is a 77 y.o. female with a history of noninvasive carcinoma of the left breast associated with calcifications found on mammogram 34 years ago.  She was treated with a total mastectomy at that time.  She had implant reconstruction and a right mamma pexy subsequently.  No systemic therapy was given.  She did well until May 2022 when she self detected a lump under the implant at the inframammary fold on the left.  She had a an ultrasound that showed a irregular spiculated hypoechoic solid mass measuring 10 mm.  On 6/27/2022 she underwent a biopsy of mass or ultrasound guidance.  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  The tumor was ER positive greater than 90% VA positive greater than 90% HER2 negative IHC 1+.  Residual normal breast tissue was not mentioned but there was a background stroma showing dense scar with abundant microcalcifications.  On 8/1/2022 she underwent resection of the lesion which demonstrated invasive grade 2 mammary carcinoma measuring 1.6 cm with a close posterior margin, all other margins clear.  Postoperatively she saw her radiation therapy and received radiation to the chest wall.  An Oncotype Dx recurrence score was ordered and returned a score of 0.  In the interim she saw another medical oncologist who commended endocrine therapy.  The slides was sent for second opinion to [place] and they confirmed the diagnosis of invasive mammary carcinoma although they felt it was not a cribriform type.  He repeated myoepithelial stains which were negative.  She elected to transfer care and comes in today  for second opinion regarding systemic therapy.    Interval history 1/25/2023: She started anastrozole 6 weeks ago and is tolerating it extremely well.  She has some mild warmth at night improved with using less covers and turning on a fan.  She has no musculoskeletal symptoms.  She is seeing her physician regarding her thyroid nodule.  Overall she feels terrific.  Postoperative evaluation was normal.      Past Medical History:   Diagnosis Date    Anesthesia     nausea post op    Cancer (HCC)     1987 breast left    Cancer of overlapping sites of left female breast (HCC)     Hypothyroidism     Thyroid nodule        Past Surgical History:   Procedure Laterality Date    PB MASTECTOMY, PARTIAL Left 8/1/2022    Procedure: MELLO LOCALIZED LEFT PARTIAL MASTECTOMY;  Surgeon: Elena Arroyo M.D.;  Location: SURGERY [facility];  Service: General    OTHER ORTHOPEDIC SURGERY  2019    back surgery    OTHER  2001    replace left breast implant    OTHER  1988    Left breast implant/lift    OTHER  1987    left mastectomy       Social History     Tobacco Use    Smoking status: Never    Smokeless tobacco: Never   Vaping Use    Vaping Use: Never used   Substance Use Topics    Alcohol use: Not Currently    Drug use: Never        Family History   Problem Relation Age of Onset    Ovarian Cancer Mother     Hyperlipidemia Neg Hx        Allergies as of 01/25/2023 - Reviewed 01/25/2023   Allergen Reaction Noted    Synthroid [fd&c red #40 al fang-levothyroxine] Hives and Unspecified 05/04/2021    Synthroid [levothyroxine]  11/04/2021         Current Outpatient Medications:     anastrozole (ARIMIDEX) 1 MG Tab, Take 1 tablet by mouth once daily, Disp: 30 Tablet, Rfl: 0    vitamin D3 (CHOLECALCIFEROL) 5000 Unit (125 mcg) Tab, Take 1,000 Units by mouth every day., Disp: , Rfl:     Calcium Carbonate (CALCIUM 600 PO), Take  by mouth every day., Disp: , Rfl:     Cyanocobalamin (VITAMIN B12 PO), Take 150 mg by mouth every 7 days., Disp: , Rfl:  "    Non Formulary Request, \"Total vitamin\" 280mg. 2 tabs po daily, Disp: , Rfl:     Multiple Vitamins-Minerals (SM MULTIPLE VITAMINS WOMENS PO), Take 2 Tablets by mouth every day at 6 PM., Disp: , Rfl:     ARMOUR THYROID 60 MG Tab, TAKE 1 & 1/2 (ONE & ONE-HALF) TABLETS BY MOUTH MON-Friday, 60 MG SAT AND SUNDAY, Disp: 135 Tablet, Rfl: 3    Review of Systems:  Review of Systems   Constitutional: Negative.    HENT: Negative.     Eyes: Negative.    Respiratory: Negative.     Cardiovascular: Negative.    Gastrointestinal: Negative.    Genitourinary: Negative.    Musculoskeletal: Negative.    Skin: Negative.    Neurological: Negative.    Endo/Heme/Allergies: Negative.    Psychiatric/Behavioral: Negative.          Physical Exam:  There were no vitals filed for this visit.      DESC; KARNOFSKY SCALE WITH ECOG EQUIVALENT: 100, Fully active, able to carry on all pre-disease performed without restriction (ECOG equivalent 0)    DISTRESS LEVEL: no acute distress    Physical Exam  Constitutional:       Appearance: Normal appearance.   HENT:      Head: Normocephalic.      Mouth/Throat:      Mouth: Mucous membranes are moist.      Pharynx: Oropharynx is clear.   Eyes:      Extraocular Movements: Extraocular movements intact.      Conjunctiva/sclera: Conjunctivae normal.      Pupils: Pupils are equal, round, and reactive to light.   Cardiovascular:      Rate and Rhythm: Normal rate and regular rhythm.      Pulses: Normal pulses.   Pulmonary:      Effort: Pulmonary effort is normal.      Breath sounds: Normal breath sounds.   Chest:      Comments: Left mastectomy and reconstruction without nodularity or erythema.  There is an incision in the inframammary fold consistent with excision of the recurrent/new primary without nodularity or erythema.  Right breast shows a mamma pexy without masses nipple discharge or tenderness.  No axillary adenopathy is noted bilaterally  Abdominal:      General: Abdomen is flat. Bowel sounds are normal. "      Palpations: Abdomen is soft. There is no mass.   Musculoskeletal:         General: Normal range of motion.   Skin:     General: Skin is warm.   Neurological:      General: No focal deficit present.      Mental Status: She is alert and oriented to person, place, and time.   Psychiatric:         Mood and Affect: Mood normal.         Behavior: Behavior normal.          Labs:  Hospital Outpatient Visit on 11/11/2022   Component Date Value Ref Range Status    TSH 11/11/2022 19.000 (H)  0.380 - 5.330 uIU/mL Final    Comment: The 2011 American Thyroid Association (ALEXANDRO) guidelines  recommended that the interpretation of thyroid function in  pregnancy be based on trimester specific reference ranges.    1st Trimester  0.100-2.500 mIU/L  2nd Trimester  0.200-3.000 mIU/L  3rd Trimester  0.300-3.500 mIU/L    These established reference ranges have not been validated  at [place].      Free T-4 11/11/2022 0.55 (L)  0.93 - 1.70 ng/dL Final   Hospital Outpatient Visit on 11/01/2022   Component Date Value Ref Range Status    Course ID 11/01/2022 C1 [email or web address]   Final    Course Start Date 11/01/2022 [id number]   Final    Course First Treatment Date 11/01/2022 10/04/2022   Final    Course Last Treatment Date 11/01/2022 11/01/2022   Final    Course Elapsed Days 11/01/2022 28 @ [id number]   Final    Course Intent 11/01/2022 Curative   Final    RP ID 11/01/2022 L Brst Bst FB   Final    RP Dosage Given to Date (Gy) 11/01/2022 10   Final    RP Session Dosage Given (Gy) 11/01/2022 2   Final    RP ID 11/01/2022 L Brst Bst FB CP   Final    RP Dosage Given to Date (Gy) 11/01/2022 10   Final    RP Session Dosage Given (Gy) 11/01/2022 2   Final    Plan ID 11/01/2022 L Brst Bst FB   Final    Plan Name 11/01/2022 L Brst Bst FB   Final    Plan Fractions Treated to Date 11/01/2022 5 of 5   Final    Plan Prescribed Dose Per Fraction * 11/01/2022 2   Final    Plan Total Prescribed Dose (cGy) 11/01/2022 1,000   Final   Orders  Only on 11/01/2022   Component Date Value Ref Range Status    Course ID 11/01/2022 C1 [email or web address]   Final    Course Start Date 11/01/2022 [id number]   Final    Course End Date 11/01/2022 [id number]   Final    Course First Treatment Date 11/01/2022 10/04/2022   Final    Course Last Treatment Date 11/01/2022 11/01/2022   Final    Course Elapsed Days 11/01/2022 28 @ [id number]   Final    Course Intent 11/01/2022 Curative   Final    RP ID 11/01/2022 DIBH L Breast   Final    RP Dosage Given to Date (Gy) 11/01/2022 40.05   Final    RP ID 11/01/2022 DIBH L Breast CP   Final    RP Dosage Given to Date (Gy) 11/01/2022 40.05   Final    RP ID 11/01/2022 L Brst Bst FB   Final    RP Dosage Given to Date (Gy) 11/01/2022 10   Final    RP ID 11/01/2022 L Brst Bst FB CP   Final    RP Dosage Given to Date (Gy) 11/01/2022 10   Final    Plan ID 11/01/2022 DIBH L Breast   Final    Plan Name 11/01/2022 DIBH L Breast   Final    Plan Fractions Treated to Date 11/01/2022 15 of 15   Final    Plan Prescribed Dose Per Fraction * 11/01/2022 2.67   Final    Plan Total Prescribed Dose (cGy) 11/01/2022 4,005   Final    Plan ID 11/01/2022 L Brst Bst FB   Final    Plan Name 11/01/2022 L Brst Bst FB   Final    Plan Fractions Treated to Date 11/01/2022 5 of 5   Final    Plan Prescribed Dose Per Fraction * 11/01/2022 2   Final    Plan Total Prescribed Dose (cGy) 11/01/2022 1,000   Final   Hospital Outpatient Visit on 10/31/2022   Component Date Value Ref Range Status    Course ID 10/31/2022 C1 L_breast   Final    Course Start Date 10/31/2022 [id number]   Final    Course First Treatment Date 10/31/2022 10/04/2022   Final    Course Last Treatment Date 10/31/2022 10/31/2022   Final    Course Elapsed Days 10/31/2022 27 @ [id number]   Final    Course Intent 10/31/2022 Curative   Final    RP ID 10/31/2022 L Brst Bst FB   Final    RP Dosage Given to Date (Gy) 10/31/2022 8   Final    RP Session Dosage Given (Gy) 10/31/2022 2   Final    RP ID  10/31/2022 L Brst Bst FB CP   Final    RP Dosage Given to Date (Gy) 10/31/2022 8   Final    RP Session Dosage Given (Gy) 10/31/2022 2   Final    Plan ID 10/31/2022 L Brst Bst FB   Final    Plan Name 10/31/2022 L Brst Bst FB   Final    Plan Fractions Treated to Date 10/31/2022 4 of 5   Final    Plan Prescribed Dose Per Fraction * 10/31/2022 2   Final    Plan Total Prescribed Dose (cGy) 10/31/2022 1,000   Final       Imaging:   All listed images below have been independently reviewed by me. I agree with the findings as summarized below:    No results found.     Pathology:      Assessment & Plan:      1.  History of left breast cancer 34 years ago status postmastectomy without systemic treatment.  2.  Inframammary recurrence on the left side, invasive mammary carcinoma likely occurring in a rest of residual breast tissue versus less likely recurrent disease.  Tumor was grade 2, stage I (pT1c, PN X) ER greater than 95%, AR greater than 95%, HER2 negative Ki-67 low.  Oncotype DX recurrence score of 0.  Status post radiation therapy to the regional area.  On anastrozole from December 2022 with excellent tolerance.    Plan: Continue anastrozole.  I will see her back in 6 months.  She will receive a mammogram after the next visit.  Any questions and concerns raised by the patient were answered to the best of my ability. Thank you for allowing me to participate in the care for this patient. Please feel free to contact me for any questions or concerns.     Casimiro Fowler M.D.

## 2023-04-24 ENCOUNTER — OFFICE VISIT (OUTPATIENT)
Dept: URGENT CARE | Facility: CLINIC | Age: 78
End: 2023-04-24
Payer: MEDICARE

## 2023-04-24 VITALS
SYSTOLIC BLOOD PRESSURE: 130 MMHG | HEIGHT: 64 IN | BODY MASS INDEX: 26.46 KG/M2 | DIASTOLIC BLOOD PRESSURE: 80 MMHG | TEMPERATURE: 99 F | WEIGHT: 155 LBS | RESPIRATION RATE: 16 BRPM | HEART RATE: 76 BPM | OXYGEN SATURATION: 98 %
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
## 2023-04-24 DIAGNOSIS — S00.01XA ABRASION OF SCALP, INITIAL ENCOUNTER: ICD-10-CM

## 2023-04-24 DIAGNOSIS — S61.411A SKIN TEAR OF RIGHT HAND WITHOUT COMPLICATION, INITIAL ENCOUNTER: ICD-10-CM

## 2023-04-24 DIAGNOSIS — S40.211A: ICD-10-CM

## 2023-04-24 PROCEDURE — 99213 OFFICE O/P EST LOW 20 MIN: CPT | Performed by: PHYSICIAN ASSISTANT

## 2023-04-24 RX ORDER — CEPHALEXIN 500 MG/1
500 CAPSULE ORAL 4 TIMES DAILY
Qty: 28 CAPSULE | Refills: 0 | Status: SHIPPED | OUTPATIENT
Start: 2023-04-24 | End: 2023-05-01

## 2023-04-24 ASSESSMENT — FIBROSIS 4 INDEX: FIB4 SCORE: 1.17

## 2023-04-24 ASSESSMENT — ENCOUNTER SYMPTOMS
HEADACHES: 0
MYALGIAS: 0
DIZZINESS: 0

## 2023-04-24 NOTE — PROGRESS NOTES
"Jigar Gudino is a 77 y.o. female who presents with Laceration (On right hand and head. Also some scratches on the back. )    PMH:  has a past medical history of Anesthesia, Cancer (HCC), Cancer of overlapping sites of left female breast (HCC), Hypothyroidism, and Thyroid nodule.  MEDS:   Current Outpatient Medications:     cephALEXin (KEFLEX) 500 MG Cap, Take 1 Capsule by mouth 4 times a day for 7 days., Disp: 28 Capsule, Rfl: 0    mupirocin (BACTROBAN) 2 % Ointment, Apply 1 Application. topically 2 times a day., Disp: 22 g, Rfl: 0    anastrozole (ARIMIDEX) 1 MG Tab, Take 1 Tablet by mouth every day., Disp: 90 Tablet, Rfl: 1    anastrozole (ARIMIDEX) 1 MG Tab, Take 1 tablet by mouth once daily, Disp: 30 Tablet, Rfl: 0    vitamin D3 (CHOLECALCIFEROL) 5000 Unit (125 mcg) Tab, Take 1,000 Units by mouth every day., Disp: , Rfl:     Calcium Carbonate (CALCIUM 600 PO), Take  by mouth every day., Disp: , Rfl:     Cyanocobalamin (VITAMIN B12 PO), Take 150 mg by mouth every 7 days., Disp: , Rfl:     Non Formulary Request, \"Total vitamin\" 280mg. 2 tabs po daily, Disp: , Rfl:     Multiple Vitamins-Minerals (SM MULTIPLE VITAMINS WOMENS PO), Take 2 Tablets by mouth every day at 6 PM., Disp: , Rfl:     ARMOUR THYROID 60 MG Tab, TAKE 1 & 1/2 (ONE & ONE-HALF) TABLETS BY MOUTH MON-Friday, 60 MG SAT AND SUNDAY, Disp: 135 Tablet, Rfl: 3  ALLERGIES:   Allergies   Allergen Reactions    Synthroid [Fd&C Red #40 Al Paul-Levothyroxine] Hives and Unspecified    Synthroid [Levothyroxine]      Other reaction(s): Unknown     SURGHX:   Past Surgical History:   Procedure Laterality Date    PB MASTECTOMY, PARTIAL Left 8/1/2022    Procedure: MELLO LOCALIZED LEFT PARTIAL MASTECTOMY;  Surgeon: Elena Arroyo M.D.;  Location: SURGERY [facility];  Service: General    OTHER ORTHOPEDIC SURGERY  2019    back surgery    OTHER  2001    replace left breast implant    OTHER  1988    Left breast implant/lift    OTHER  1987    left " "mastectomy     SOCHX:  reports that she has never smoked. She has never used smokeless tobacco. She reports that she does not currently use alcohol. She reports that she does not use drugs.  FH: Reviewed with patient, not pertinent to this visit.           Patient presents clinic today with complaint of laceration to the back of her right hand, scratches to her scalp as well as her right shoulder and upper back.  Patient was putting in some solar lights along her sidewalk, when the light she was holding gave way and she tipped forward into a trend shrub hood in her yard that was quite sharp since it has not had any leaves or flowers on it yet.  Patient states she cleaned her wounds up with soap and water but she felt her hand may need some stitches.  Patient's Tdap is not up-to-date.  She is not interested in a vaccine today.  No other complaints.    Laceration   The incident occurred 1 to 3 hours ago. The laceration is located on the Scalp, right hand and back. Injury mechanism: hydrangea bush in her yard. The pain is mild. The pain has been Constant since onset. She reports no foreign bodies present. Her tetanus status is out of date.     Review of Systems   Musculoskeletal:  Negative for joint pain and myalgias.   Neurological:  Negative for dizziness and headaches.   All other systems reviewed and are negative.           Objective     /80 (BP Location: Left arm, Patient Position: Sitting, BP Cuff Size: Adult)   Pulse 76   Temp 37.2 °C (99 °F) (Temporal)   Resp 16   Ht 1.626 m (5' 4\")   Wt 70.3 kg (155 lb)   LMP  (LMP Unknown)   SpO2 98%   BMI 26.61 kg/m²      Physical Exam  Vitals and nursing note reviewed.   Constitutional:       General: She is not in acute distress.     Appearance: Normal appearance. She is well-developed and normal weight. She is not ill-appearing or toxic-appearing.   HENT:      Head: Normocephalic and atraumatic.      Right Ear: Tympanic membrane normal.      Left Ear: " Tympanic membrane normal.      Nose: Nose normal.      Mouth/Throat:      Lips: Pink.      Mouth: Mucous membranes are moist.      Pharynx: Oropharynx is clear. Uvula midline.   Eyes:      Extraocular Movements: Extraocular movements intact.      Conjunctiva/sclera: Conjunctivae normal.      Pupils: Pupils are equal, round, and reactive to light.   Cardiovascular:      Rate and Rhythm: Normal rate and regular rhythm.      Pulses: Normal pulses.      Heart sounds: Normal heart sounds.   Pulmonary:      Effort: Pulmonary effort is normal.      Breath sounds: Normal breath sounds.   Abdominal:      General: Bowel sounds are normal.      Palpations: Abdomen is soft.   Musculoskeletal:         General: Normal range of motion.      Cervical back: Normal range of motion and neck supple.   Skin:     General: Skin is warm and dry.      Capillary Refill: Capillary refill takes less than 2 seconds.          Neurological:      General: No focal deficit present.      Mental Status: She is alert and oriented to person, place, and time.      Cranial Nerves: No cranial nerve deficit.      Motor: Motor function is intact.      Coordination: Coordination is intact.      Gait: Gait normal.   Psychiatric:         Mood and Affect: Mood normal.                           Assessment & Plan        1. Skin tear of right hand without complication, initial encounter    - cephALEXin (KEFLEX) 500 MG Cap; Take 1 Capsule by mouth 4 times a day for 7 days.  Dispense: 28 Capsule; Refill: 0  - mupirocin (BACTROBAN) 2 % Ointment; Apply 1 Application. topically 2 times a day.  Dispense: 22 g; Refill: 0    2. Abrasion of scalp, initial encounter    - cephALEXin (KEFLEX) 500 MG Cap; Take 1 Capsule by mouth 4 times a day for 7 days.  Dispense: 28 Capsule; Refill: 0  - mupirocin (BACTROBAN) 2 % Ointment; Apply 1 Application. topically 2 times a day.  Dispense: 22 g; Refill: 0    3. Abrasion of shoulder area, right, initial encounter    - cephALEXin  (KEFLEX) 500 MG Cap; Take 1 Capsule by mouth 4 times a day for 7 days.  Dispense: 28 Capsule; Refill: 0  - mupirocin (BACTROBAN) 2 % Ointment; Apply 1 Application. topically 2 times a day.  Dispense: 22 g; Refill: 0    4. Fall against object    - cephALEXin (KEFLEX) 500 MG Cap; Take 1 Capsule by mouth 4 times a day for 7 days.  Dispense: 28 Capsule; Refill: 0  - mupirocin (BACTROBAN) 2 % Ointment; Apply 1 Application. topically 2 times a day.  Dispense: 22 g; Refill: 0            Skin tear to the back of right hand was irrigated copiously, washed with Hibiclens, irrigated again.  And was approximated gently.  Steri-Strips applied with good alignment of skin.  Wound was bandaged.      Remainder of superficial skin scratches and scrapes were left on bandaged at patient's request.    I have sent a prescription for mupirocin ointment for patient to place on her superficial scrapes on her scalp and shoulder and back.  Patient was instructed not to use this on her Steri-Strips of her hand as this will displace the Steri-Strips.  Patient verbalized understanding of these instructions.    Patient was instructed to remove the Steri-Strips after 7 days if they have not fallen off on their own.  Patient verbalized understanding of this instruction as well.    I sent a prescription for oral antibiotics for patient to take only if skin wound begins to appear infected, begins having significant erythema extending away from wound edges, if it begins to drain purulent discharge or has significant swelling.  Patient verbalized understanding of these instructions also.    Patient politely declined Tdap at this time.    PT should follow up with PCP in 1-2 days for re-evaluation if symptoms have not improved.      Discussed red flags and reasons to return to UC or ED.      Pt and/or family verbalized understanding of diagnosis and follow up instructions and was offered informational handout on diagnosis.  PT discharged.

## 2023-05-25 ENCOUNTER — HOSPITAL ENCOUNTER (OUTPATIENT)
Dept: RADIOLOGY | Facility: MEDICAL CENTER | Age: 78
End: 2023-05-25
Attending: NURSE PRACTITIONER
Payer: MEDICARE

## 2023-05-25 ENCOUNTER — OFFICE VISIT (OUTPATIENT)
Dept: URGENT CARE | Facility: PHYSICIAN GROUP | Age: 78
End: 2023-05-25
Payer: MEDICARE

## 2023-05-25 VITALS
HEIGHT: 64 IN | DIASTOLIC BLOOD PRESSURE: 80 MMHG | BODY MASS INDEX: 26.46 KG/M2 | WEIGHT: 155 LBS | SYSTOLIC BLOOD PRESSURE: 116 MMHG | OXYGEN SATURATION: 97 % | RESPIRATION RATE: 16 BRPM | TEMPERATURE: 97.9 F | HEART RATE: 70 BPM

## 2023-05-25 DIAGNOSIS — R07.81 RIB PAIN ON RIGHT SIDE: ICD-10-CM

## 2023-05-25 PROCEDURE — 3074F SYST BP LT 130 MM HG: CPT | Performed by: NURSE PRACTITIONER

## 2023-05-25 PROCEDURE — 3079F DIAST BP 80-89 MM HG: CPT | Performed by: NURSE PRACTITIONER

## 2023-05-25 PROCEDURE — 99213 OFFICE O/P EST LOW 20 MIN: CPT | Performed by: NURSE PRACTITIONER

## 2023-05-25 PROCEDURE — 71101 X-RAY EXAM UNILAT RIBS/CHEST: CPT | Mod: RT

## 2023-05-25 ASSESSMENT — FIBROSIS 4 INDEX: FIB4 SCORE: 1.17

## 2023-05-26 NOTE — PROGRESS NOTES
Subjective:     Paige Gudino is a 77 y.o. female who presents for Rib Injury (Bent over on an arm rest earlier today, ribs have been painful ever since )      Rib Injury      Pt presents for evaluation of a new problem. Rosangela is a very pleasant 77-year-old female presents urgent care today with complaints of right-sided rib pain that started earlier today when she bent over to console in her car trying to grab something off the floor of her passenger seat.  She believes that she could sustained this reach and notes that the console of her car is soft.  However, she did develop right-sided rib pain following this maneuver.  She is concerned that she may have sustained a rib fracture.  She has used Tylenol which provided some relief of her discomfort.    ROS    PMH:   Past Medical History:   Diagnosis Date    Anesthesia     nausea post op    Cancer (HCC)     1987 breast left    Cancer of overlapping sites of left female breast (HCC)     Hypothyroidism     Thyroid nodule      ALLERGIES:   Allergies   Allergen Reactions    Synthroid [Fd&C Red #40 Al Paul-Levothyroxine] Hives and Unspecified    Synthroid [Levothyroxine]      Other reaction(s): Unknown     SURGHX:   Past Surgical History:   Procedure Laterality Date    PB MASTECTOMY, PARTIAL Left 8/1/2022    Procedure: MELLO LOCALIZED LEFT PARTIAL MASTECTOMY;  Surgeon: Elena Arroyo M.D.;  Location: SURGERY [facility];  Service: General    OTHER ORTHOPEDIC SURGERY  2019    back surgery    OTHER  2001    replace left breast implant    OTHER  1988    Left breast implant/lift    OTHER  1987    left mastectomy     SOCHX:   Social History     Socioeconomic History    Marital status:     Highest education level: Bachelor's degree (e.g., BA, AB, BS)   Tobacco Use    Smoking status: Never    Smokeless tobacco: Never   Vaping Use    Vaping Use: Never used   Substance and Sexual Activity    Alcohol use: Not Currently    Drug use: Never     Social  "Determinants of Health     Financial Resource Strain: Low Risk  (3/25/2022)    Overall Financial Resource Strain (CARDIA)     Difficulty of Paying Living Expenses: Not hard at all   Food Insecurity: No Food Insecurity (3/25/2022)    Hunger Vital Sign     Worried About Running Out of Food in the Last Year: Never true     Ran Out of Food in the Last Year: Never true   Transportation Needs: No Transportation Needs (3/25/2022)    PRAPARE - Transportation     Lack of Transportation (Medical): No     Lack of Transportation (Non-Medical): No   Physical Activity: Insufficiently Active (3/25/2022)    Exercise Vital Sign     Days of Exercise per Week: 2 days     Minutes of Exercise per Session: 20 min   Stress: No Stress Concern Present (3/25/2022)    [employer] [place] [employer] - Occupational Stress Questionnaire     Feeling of Stress : Not at all   Social Connections: Moderately Integrated (3/25/2022)    Social Connection and Isolation Panel [NHANES]     Frequency of Communication with Friends and Family: Twice a week     Frequency of Social Gatherings with Friends and Family: Once a week     Attends [employer] Services: Never     Active Member of Clubs or Organizations: Yes     Attends Club or Organization Meetings: More than 4 times per year     Marital Status:    Housing Stability: Low Risk  (3/25/2022)    Housing Stability Vital Sign     Unable to Pay for Housing in the Last Year: No     Number of Places Lived in the Last Year: 1     Unstable Housing in the Last Year: No     FH:   Family History   Problem Relation Age of Onset    Ovarian Cancer Mother     Hyperlipidemia Neg Hx          Objective:   /80   Pulse 70   Temp 36.6 °C (97.9 °F) (Temporal)   Resp 16   Ht 1.626 m (5' 4\")   Wt 70.3 kg (155 lb)   LMP  (LMP Unknown)   SpO2 97%   BMI 26.61 kg/m²     Physical Exam  Vitals and nursing note reviewed.   Constitutional:       General: She is not in acute distress.     Appearance: Normal " appearance. She is normal weight. She is not ill-appearing or toxic-appearing.   HENT:      Head: Normocephalic.      Right Ear: External ear normal.      Left Ear: External ear normal.      Nose: No congestion or rhinorrhea.      Mouth/Throat:      Pharynx: No oropharyngeal exudate or posterior oropharyngeal erythema.   Eyes:      General:         Right eye: No discharge.         Left eye: No discharge.      Pupils: Pupils are equal, round, and reactive to light.   Pulmonary:      Effort: Pulmonary effort is normal.   Chest:      Chest wall: Tenderness present.          Comments: Tenderness present to right lateral rib.  Negative for swelling, ecchymosis or lesions.   Abdominal:      General: Abdomen is flat.   Musculoskeletal:         General: Normal range of motion.      Cervical back: Normal range of motion and neck supple.   Skin:     General: Skin is dry.   Neurological:      General: No focal deficit present.      Mental Status: She is alert and oriented to person, place, and time. Mental status is at baseline.   Psychiatric:         Mood and Affect: Mood normal.         Behavior: Behavior normal.         Thought Content: Thought content normal.         Judgment: Judgment normal.       [id number] (WITH 1-VIEW CXR) RIGHT    Result Date: 5/25/2023 5/25/2023 6:22 PM HISTORY/REASON FOR EXAM:  Right rib pain. Trauma.. TECHNIQUE/EXAM DESCRIPTION AND NUMBER OF VIEWS:  3 images of the right ribs and chest. COMPARISON: NONE FINDINGS: No fractures or acute bony changes are noted.  There is no evidence of a hemo or pneumothorax.     Normal rib series.     Assessment/Plan:   Assessment    1. Rib pain on right side  [id number] (WITH 1-VIEW CXR) RIGHT        X-ray results discussed with patient.  She is likely suffering from rib contusion.  I did encourage use of ibuprofen, Tylenol, ice/heat and rest.  Patient to splint right side with pillow with movement, deep breathing, coughing, sneezing and laughing.   Deep breathing exercises encouraged.  Patient to follow-up for worsening or persistent symptoms.  AVS handout given and reviewed with patient. Pt educated on red flags and when to seek treatment back in ER or UC.

## 2023-07-17 ENCOUNTER — APPOINTMENT (OUTPATIENT)
Dept: MEDICAL GROUP | Facility: LAB | Age: 78
End: 2023-07-17
Payer: MEDICARE

## 2023-07-18 ENCOUNTER — HOSPITAL ENCOUNTER (OUTPATIENT)
Dept: HEMATOLOGY ONCOLOGY | Facility: MEDICAL CENTER | Age: 78
End: 2023-07-18
Attending: INTERNAL MEDICINE
Payer: MEDICARE

## 2023-07-18 VITALS
BODY MASS INDEX: 27.42 KG/M2 | TEMPERATURE: 97.4 F | SYSTOLIC BLOOD PRESSURE: 124 MMHG | HEART RATE: 79 BPM | HEIGHT: 64 IN | OXYGEN SATURATION: 95 % | WEIGHT: 160.6 LBS | DIASTOLIC BLOOD PRESSURE: 80 MMHG | RESPIRATION RATE: 16 BRPM

## 2023-07-18 DIAGNOSIS — C50.812 CANCER OF OVERLAPPING SITES OF LEFT BREAST (HCC): ICD-10-CM

## 2023-07-18 PROCEDURE — 99212 OFFICE O/P EST SF 10 MIN: CPT | Performed by: INTERNAL MEDICINE

## 2023-07-18 PROCEDURE — 99213 OFFICE O/P EST LOW 20 MIN: CPT | Performed by: INTERNAL MEDICINE

## 2023-07-18 RX ORDER — ANASTROZOLE 1 MG/1
1 TABLET ORAL DAILY
Qty: 90 TABLET | Refills: 3 | Status: SHIPPED | OUTPATIENT
Start: 2023-07-18

## 2023-07-18 ASSESSMENT — ENCOUNTER SYMPTOMS
PSYCHIATRIC NEGATIVE: 1
GASTROINTESTINAL NEGATIVE: 1
EYES NEGATIVE: 1
CONSTITUTIONAL NEGATIVE: 1
NEUROLOGICAL NEGATIVE: 1
RESPIRATORY NEGATIVE: 1
CARDIOVASCULAR NEGATIVE: 1
MUSCULOSKELETAL NEGATIVE: 1

## 2023-07-18 ASSESSMENT — FIBROSIS 4 INDEX: FIB4 SCORE: 1.17

## 2023-07-18 ASSESSMENT — PAIN SCALES - GENERAL: PAINLEVEL: NO PAIN

## 2023-07-18 NOTE — PROGRESS NOTES
Medical oncology follow-up visit 7/18/2023:      Referring Physician: Che Burroughs MD  Primary Care:  Roula Ga P.A.-C.    Diagnosis: ER positive left breast cancer    Chief Complaint: ER positive left breast cancer    History of Presenting Illness:  Paige Gudino is a 77 y.o. female with a history of noninvasive carcinoma of the left breast associated with calcifications found on mammogram 34 years ago.  She was treated with a total mastectomy at that time.  She had implant reconstruction and a right mamma pexy subsequently.  No systemic therapy was given.  She did well until May 2022 when she self detected a lump under the implant at the inframammary fold on the left.  She had a an ultrasound that showed a irregular spiculated hypoechoic solid mass measuring 10 mm.  On 6/27/2022 she underwent a biopsy of mass or ultrasound guidance.  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  The tumor was ER positive greater than 90% MD positive greater than 90% HER2 negative IHC 1+.  Residual normal breast tissue was not mentioned but there was a background stroma showing dense scar with abundant microcalcifications.  On 8/1/2022 she underwent resection of the lesion which demonstrated invasive grade 2 mammary carcinoma measuring 1.6 cm with a close posterior margin, all other margins clear.  Postoperatively she saw her radiation therapy and received radiation to the chest wall.  An Oncotype Dx recurrence score was ordered and returned a score of 0.  In the interim she saw another medical oncologist who commended endocrine therapy.  The slides was sent for second opinion to [facility] and they confirmed the diagnosis of invasive mammary carcinoma although they felt it was not a cribriform type.  He repeated myoepithelial stains which were negative.  She elected to transfer care and comes in today  for second opinion regarding systemic therapy.    Interval history 1/25/2023: She started anastrozole 6 weeks ago and is tolerating it extremely well.  She has some mild warmth at night improved with using less covers and turning on a fan.  She has no musculoskeletal symptoms.  She is seeing her physician regarding her thyroid nodule.  Overall she feels terrific.  Postoperative evaluation was normal.    Interval history 7/18/2023: She is tolerating anastrozole extremely well with no hot flashes night sweats or musculoskeletal symptoms.  No problems with her breast and no symptoms referable to metastatic breast cancer.  Next mammogram is due in November.      Past Medical History:   Diagnosis Date    Anesthesia     nausea post op    Cancer (HCC)     1987 breast left    Cancer of overlapping sites of left female breast (HCC)     Hypothyroidism     Thyroid nodule        Past Surgical History:   Procedure Laterality Date    PB MASTECTOMY, PARTIAL Left 8/1/2022    Procedure: MELLO LOCALIZED LEFT PARTIAL MASTECTOMY;  Surgeon: Elena Arroyo M.D.;  Location: SURGERY [facility];  Service: General    OTHER ORTHOPEDIC SURGERY  2019    back surgery    OTHER  2001    replace left breast implant    OTHER  1988    Left breast implant/lift    OTHER  1987    left mastectomy       Social History     Tobacco Use    Smoking status: Never    Smokeless tobacco: Never   Vaping Use    Vaping Use: Never used   Substance Use Topics    Alcohol use: Not Currently    Drug use: Never        Family History   Problem Relation Age of Onset    Ovarian Cancer Mother     Hyperlipidemia Neg Hx        Allergies as of 07/18/2023 - Reviewed 07/18/2023   Allergen Reaction Noted    Synthroid [fd&c red #40 al fang-levothyroxine] Hives and Unspecified 05/04/2021    Synthroid [levothyroxine]  11/04/2021         Current Outpatient Medications:     anastrozole (ARIMIDEX) 1 MG Tab, Take 1 Tablet by mouth every day., Disp: 90 Tablet, Rfl: 3    vitamin D3  "(CHOLECALCIFEROL) 5000 Unit (125 mcg) Tab, Take 1,000 Units by mouth every day., Disp: , Rfl:     Calcium Carbonate (CALCIUM 600 PO), Take  by mouth every day., Disp: , Rfl:     Cyanocobalamin (VITAMIN B12 PO), Take 150 mg by mouth every 7 days., Disp: , Rfl:     Multiple Vitamins-Minerals (SM MULTIPLE VITAMINS WOMENS PO), Take 2 Tablets by mouth every day at 6 PM., Disp: , Rfl:     mupirocin (BACTROBAN) 2 % Ointment, Apply 1 Application. topically 2 times a day., Disp: 22 g, Rfl: 0    Non Formulary Request, \"Total vitamin\" 280mg. 2 tabs po daily, Disp: , Rfl:     ARMOUR THYROID 60 MG Tab, TAKE 1 & 1/2 (ONE & ONE-HALF) TABLETS BY MOUTH MON-Friday, 60 MG SAT AND SUNDAY, Disp: 135 Tablet, Rfl: 3    Review of Systems:  Review of Systems   Constitutional: Negative.    HENT: Negative.     Eyes: Negative.    Respiratory: Negative.     Cardiovascular: Negative.    Gastrointestinal: Negative.    Genitourinary: Negative.    Musculoskeletal: Negative.    Skin: Negative.    Neurological: Negative.    Endo/Heme/Allergies: Negative.    Psychiatric/Behavioral: Negative.            Physical Exam:  Vitals:    07/18/23 1056   BP: 124/80   Pulse: 79   Resp: 16   Temp: 36.3 °C (97.4 °F)   TempSrc: Temporal   SpO2: 95%   Weight: 72.8 kg (160 lb 9.7 oz)   Height: 1.626 m (5' 4.02\")         DESC; KARNOFSKY SCALE WITH ECOG EQUIVALENT: 100, Fully active, able to carry on all pre-disease performed without restriction (ECOG equivalent 0)    DISTRESS LEVEL: no acute distress    Physical Exam  Constitutional:       Appearance: Normal appearance.   HENT:      Head: Normocephalic.      Mouth/Throat:      Mouth: Mucous membranes are moist.      Pharynx: Oropharynx is clear.   Eyes:      Extraocular Movements: Extraocular movements intact.      Conjunctiva/sclera: Conjunctivae normal.      Pupils: Pupils are equal, round, and reactive to light.   Cardiovascular:      Rate and Rhythm: Normal rate and regular rhythm.      Pulses: Normal pulses. "   Pulmonary:      Effort: Pulmonary effort is normal.      Breath sounds: Normal breath sounds.   Chest:      Comments: Left mastectomy and reconstruction without nodularity or erythema.  There is an incision in the inframammary fold consistent with excision of the recurrent/new primary without nodularity or erythema.  Right breast shows a mamma pexy without masses nipple discharge or tenderness.  No axillary adenopathy is noted bilaterally  Abdominal:      General: Abdomen is flat. Bowel sounds are normal.      Palpations: Abdomen is soft. There is no mass.   Musculoskeletal:         General: Normal range of motion.   Skin:     General: Skin is warm.   Neurological:      General: No focal deficit present.      Mental Status: She is alert and oriented to person, place, and time.   Psychiatric:         Mood and Affect: Mood normal.         Behavior: Behavior normal.            Labs:  Hospital Outpatient Visit on 11/11/2022   Component Date Value Ref Range Status    TSH 11/11/2022 19.000 (H)  0.380 - 5.330 uIU/mL Final    Comment: The 2011 American Thyroid Association (ALEXANDRO) guidelines  recommended that the interpretation of thyroid function in  pregnancy be based on trimester specific reference ranges.    1st Trimester  0.100-2.500 mIU/L  2nd Trimester  0.200-3.000 mIU/L  3rd Trimester  0.300-3.500 mIU/L    These established reference ranges have not been validated  at [facility].      Free T-4 11/11/2022 0.55 (L)  0.93 - 1.70 ng/dL Final   Hospital Outpatient Visit on 11/01/2022   Component Date Value Ref Range Status    Course ID 11/01/2022 C1 L_breast   Final    Course Start Date 11/01/2022 [id number]   Final    Course First Treatment Date 11/01/2022 10/04/2022   Final    Course Last Treatment Date 11/01/2022 11/01/2022   Final    Course Elapsed Days 11/01/2022 28 @ [id number]   Final    Course Intent 11/01/2022 Curative   Final    RP ID 11/01/2022 L Brst Bst FB   Final    RP Dosage Given to  Date (Gy) 11/01/2022 10   Final    RP Session Dosage Given (Gy) 11/01/2022 2   Final    RP ID 11/01/2022 L Brst Bst FB CP   Final    RP Dosage Given to Date (Gy) 11/01/2022 10   Final    RP Session Dosage Given (Gy) 11/01/2022 2   Final    Plan ID 11/01/2022 L Brst Bst FB   Final    Plan Name 11/01/2022 L Brst Bst FB   Final    Plan Fractions Treated to Date 11/01/2022 5 of 5   Final    Plan Prescribed Dose Per Fraction * 11/01/2022 2   Final    Plan Total Prescribed Dose (cGy) 11/01/2022 1,000   Final   Orders Only on 11/01/2022   Component Date Value Ref Range Status    Course ID 11/01/2022 C1 L_breast   Final    Course Start Date 11/01/2022 [id number]   Final    Course End Date 11/01/2022 [id number]   Final    Course First Treatment Date 11/01/2022 10/04/2022   Final    Course Last Treatment Date 11/01/2022 11/01/2022   Final    Course Elapsed Days 11/01/2022 28 @ [id number]   Final    Course Intent 11/01/2022 Curative   Final    RP ID 11/01/2022 DIBH L Breast   Final    RP Dosage Given to Date (Gy) 11/01/2022 40.05   Final    RP ID 11/01/2022 DIBH L Breast CP   Final    RP Dosage Given to Date (Gy) 11/01/2022 40.05   Final    RP ID 11/01/2022 L Brst Bst FB   Final    RP Dosage Given to Date (Gy) 11/01/2022 10   Final    RP ID 11/01/2022 L Brst Bst FB CP   Final    RP Dosage Given to Date (Gy) 11/01/2022 10   Final    Plan ID 11/01/2022 DIBH L Breast   Final    Plan Name 11/01/2022 DIBH L Breast   Final    Plan Fractions Treated to Date 11/01/2022 15 of 15   Final    Plan Prescribed Dose Per Fraction * 11/01/2022 2.67   Final    Plan Total Prescribed Dose (cGy) 11/01/2022 4,005   Final    Plan ID 11/01/2022 L Brst Bst FB   Final    Plan Name 11/01/2022 L Brst Bst FB   Final    Plan Fractions Treated to Date 11/01/2022 5 of 5   Final    Plan Prescribed Dose Per Fraction * 11/01/2022 2   Final    Plan Total Prescribed Dose (cGy) 11/01/2022 1,000   Final   Hospital Outpatient Visit on 10/31/2022    Component Date Value Ref Range Status    Course ID 10/31/2022 C1 L_breast   Final    Course Start Date 10/31/2022 [id number]   Final    Course First Treatment Date 10/31/2022 10/04/2022   Final    Course Last Treatment Date 10/31/2022 10/31/2022   Final    Course Elapsed Days 10/31/2022 27 @ [id number]   Final    Course Intent 10/31/2022 Curative   Final    RP ID 10/31/2022 L Brst Bst FB   Final    RP Dosage Given to Date (Gy) 10/31/2022 8   Final    RP Session Dosage Given (Gy) 10/31/2022 2   Final    RP ID 10/31/2022 L Brst Bst FB CP   Final    RP Dosage Given to Date (Gy) 10/31/2022 8   Final    RP Session Dosage Given (Gy) 10/31/2022 2   Final    Plan ID 10/31/2022 L Brst Bst FB   Final    Plan Name 10/31/2022 L Brst Bst FB   Final    Plan Fractions Treated to Date 10/31/2022 4 of 5   Final    Plan Prescribed Dose Per Fraction * 10/31/2022 2   Final    Plan Total Prescribed Dose (cGy) 10/31/2022 1,000   Final       Imaging:   All listed images below have been independently reviewed by me. I agree with the findings as summarized below:    No results found.     Pathology:      Assessment & Plan:      1.  History of left breast cancer 34 years ago status postmastectomy without systemic treatment.  2.  Inframammary recurrence on the left side, invasive mammary carcinoma likely occurring in a rest of residual breast tissue versus less likely recurrent disease.  Tumor was grade 2, stage I (pT1c, PN X) ER greater than 95%, SC greater than 95%, HER2 negative Ki-67 low.  Oncotype DX recurrence score of 0.  Status post radiation therapy to the regional area.  On anastrozole from December 2022 with excellent tolerance, no evidence of disease.    Plan: Continue anastrozole.  I will see her back in 6 months.  She will receive a mammogram in November.  Any questions and concerns raised by the patient were answered to the best of my ability. Thank you for allowing me to participate in the care for this patient. Please  feel free to contact me for any questions or concerns.     Casimiro Fowler M.D.

## 2023-08-03 ENCOUNTER — TELEPHONE (OUTPATIENT)
Dept: MEDICAL GROUP | Facility: LAB | Age: 78
End: 2023-08-03
Payer: MEDICARE

## 2023-08-03 RX ORDER — ANASTROZOLE 1 MG/1
1 TABLET ORAL DAILY
COMMUNITY
End: 2023-08-03

## 2023-08-03 RX ORDER — CEPHALEXIN 500 MG/1
CAPSULE ORAL
COMMUNITY
End: 2023-08-03

## 2023-08-03 RX ORDER — LEVOTHYROXINE SODIUM 0.1 MG/1
100 TABLET ORAL
COMMUNITY
Start: 2023-06-26

## 2023-08-03 RX ORDER — LEVOTHYROXINE SODIUM 0.1 MG/1
1 TABLET ORAL
COMMUNITY
End: 2023-08-03

## 2023-08-03 NOTE — TELEPHONE ENCOUNTER
NEW PATIENT VISIT PRE-VISIT PLANNING    1.  [employer] Patient is checked in Patient Demographics?Yes    2.  Immunizations were updated in Epic using Reconcile Outside Information activity? Yes         3.  Is this appointment scheduled as a Hospital Follow-Up? No    4.  Patient is due for the following Health Maintenance Topics:   Health Maintenance Due   Topic Date Due    COVID-19 Vaccine (1) Never done    IMM ZOSTER VACCINES (1 of 2) 03/05/2015    IMM PNEUMOCOCCAL VACCINE: 65+ Years (2 - PPSV23 if available, else PCV20) 03/20/2021    Annual Wellness Visit  03/29/2023     5.  Reviewed/Updated the following with patient:          Preferred Pharmacy? Yes           Preferred Lab? Yes           Preferred Communication? Yes           Allergies? Yes           Medications? Yes, abstract     6.  Updated Care Team?          DME Company (gait device, O2, CPAP, etc.) N/A          Other Specialists (eye doctor, derm, GYN, cardiology, endo, etc): yes    7.  AHA (Puls8) form printed for Provider? N/A

## 2023-08-03 NOTE — TELEPHONE ENCOUNTER
Left message for patient to call back regarding pre-visit planning. Please transfer call to [phone].

## 2023-08-11 SDOH — HEALTH STABILITY: PHYSICAL HEALTH: ON AVERAGE, HOW MANY MINUTES DO YOU ENGAGE IN EXERCISE AT THIS LEVEL?: 20 MIN

## 2023-08-11 SDOH — ECONOMIC STABILITY: FOOD INSECURITY: WITHIN THE PAST 12 MONTHS, THE FOOD YOU BOUGHT JUST DIDN'T LAST AND YOU DIDN'T HAVE MONEY TO GET MORE.: NEVER TRUE

## 2023-08-11 SDOH — ECONOMIC STABILITY: HOUSING INSECURITY: IN THE LAST 12 MONTHS, HOW MANY PLACES HAVE YOU LIVED?: 1

## 2023-08-11 SDOH — ECONOMIC STABILITY: FOOD INSECURITY: WITHIN THE PAST 12 MONTHS, YOU WORRIED THAT YOUR FOOD WOULD RUN OUT BEFORE YOU GOT MONEY TO BUY MORE.: NEVER TRUE

## 2023-08-11 SDOH — HEALTH STABILITY: PHYSICAL HEALTH: ON AVERAGE, HOW MANY DAYS PER WEEK DO YOU ENGAGE IN MODERATE TO STRENUOUS EXERCISE (LIKE A BRISK WALK)?: 3 DAYS

## 2023-08-11 SDOH — ECONOMIC STABILITY: INCOME INSECURITY: IN THE LAST 12 MONTHS, WAS THERE A TIME WHEN YOU WERE NOT ABLE TO PAY THE MORTGAGE OR RENT ON TIME?: NO

## 2023-08-11 SDOH — ECONOMIC STABILITY: INCOME INSECURITY: HOW HARD IS IT FOR YOU TO PAY FOR THE VERY BASICS LIKE FOOD, HOUSING, MEDICAL CARE, AND HEATING?: NOT HARD AT ALL

## 2023-08-11 ASSESSMENT — SOCIAL DETERMINANTS OF HEALTH (SDOH)
HOW OFTEN DO YOU ATTEND CHURCH OR RELIGIOUS SERVICES?: NEVER
HOW OFTEN DO YOU ATTENT MEETINGS OF THE CLUB OR ORGANIZATION YOU BELONG TO?: MORE THAN 4 TIMES PER YEAR
HOW OFTEN DO YOU HAVE SIX OR MORE DRINKS ON ONE OCCASION: NEVER
HOW OFTEN DO YOU ATTEND CHURCH OR RELIGIOUS SERVICES?: NEVER
IN A TYPICAL WEEK, HOW MANY TIMES DO YOU TALK ON THE PHONE WITH FAMILY, FRIENDS, OR NEIGHBORS?: MORE THAN THREE TIMES A WEEK
HOW OFTEN DO YOU ATTENT MEETINGS OF THE CLUB OR ORGANIZATION YOU BELONG TO?: MORE THAN 4 TIMES PER YEAR
DO YOU BELONG TO ANY CLUBS OR ORGANIZATIONS SUCH AS CHURCH GROUPS UNIONS, FRATERNAL OR ATHLETIC GROUPS, OR SCHOOL GROUPS?: YES
WITHIN THE PAST 12 MONTHS, YOU WORRIED THAT YOUR FOOD WOULD RUN OUT BEFORE YOU GOT THE MONEY TO BUY MORE: NEVER TRUE
HOW OFTEN DO YOU GET TOGETHER WITH FRIENDS OR RELATIVES?: MORE THAN THREE TIMES A WEEK
HOW MANY DRINKS CONTAINING ALCOHOL DO YOU HAVE ON A TYPICAL DAY WHEN YOU ARE DRINKING: PATIENT DOES NOT DRINK
HOW OFTEN DO YOU GET TOGETHER WITH FRIENDS OR RELATIVES?: MORE THAN THREE TIMES A WEEK
DO YOU BELONG TO ANY CLUBS OR ORGANIZATIONS SUCH AS CHURCH GROUPS UNIONS, FRATERNAL OR ATHLETIC GROUPS, OR SCHOOL GROUPS?: YES
HOW OFTEN DO YOU HAVE A DRINK CONTAINING ALCOHOL: MONTHLY OR LESS
IN A TYPICAL WEEK, HOW MANY TIMES DO YOU TALK ON THE PHONE WITH FAMILY, FRIENDS, OR NEIGHBORS?: MORE THAN THREE TIMES A WEEK
HOW HARD IS IT FOR YOU TO PAY FOR THE VERY BASICS LIKE FOOD, HOUSING, MEDICAL CARE, AND HEATING?: NOT HARD AT ALL

## 2023-08-11 ASSESSMENT — LIFESTYLE VARIABLES
AUDIT-C TOTAL SCORE: 1
HOW OFTEN DO YOU HAVE A DRINK CONTAINING ALCOHOL: MONTHLY OR LESS
SKIP TO QUESTIONS 9-10: 1
HOW MANY STANDARD DRINKS CONTAINING ALCOHOL DO YOU HAVE ON A TYPICAL DAY: PATIENT DOES NOT DRINK
HOW OFTEN DO YOU HAVE SIX OR MORE DRINKS ON ONE OCCASION: NEVER

## 2023-08-14 ENCOUNTER — OFFICE VISIT (OUTPATIENT)
Dept: MEDICAL GROUP | Facility: LAB | Age: 78
End: 2023-08-14
Payer: MEDICARE

## 2023-08-14 VITALS
TEMPERATURE: 97.9 F | DIASTOLIC BLOOD PRESSURE: 90 MMHG | BODY MASS INDEX: 27.31 KG/M2 | HEART RATE: 72 BPM | HEIGHT: 64 IN | RESPIRATION RATE: 12 BRPM | WEIGHT: 160 LBS | OXYGEN SATURATION: 96 % | SYSTOLIC BLOOD PRESSURE: 150 MMHG

## 2023-08-14 DIAGNOSIS — E04.1 THYROID NODULE: ICD-10-CM

## 2023-08-14 DIAGNOSIS — E55.9 HYPOVITAMINOSIS D: ICD-10-CM

## 2023-08-14 DIAGNOSIS — E78.5 DYSLIPIDEMIA: ICD-10-CM

## 2023-08-14 DIAGNOSIS — Z76.89 ENCOUNTER TO ESTABLISH CARE: ICD-10-CM

## 2023-08-14 DIAGNOSIS — E53.8 VITAMIN B 12 DEFICIENCY: ICD-10-CM

## 2023-08-14 DIAGNOSIS — Z12.31 ENCOUNTER FOR SCREENING MAMMOGRAM FOR MALIGNANT NEOPLASM OF BREAST: ICD-10-CM

## 2023-08-14 DIAGNOSIS — E03.9 ACQUIRED HYPOTHYROIDISM: ICD-10-CM

## 2023-08-14 PROBLEM — C50.919 MALIGNANT TUMOR OF BREAST (HCC): Status: ACTIVE | Noted: 2022-07-11

## 2023-08-14 PROBLEM — H93.13 TINNITUS OF BOTH EARS: Status: ACTIVE | Noted: 2023-08-14

## 2023-08-14 PROBLEM — J32.8 OTHER CHRONIC SINUSITIS: Status: ACTIVE | Noted: 2023-08-14

## 2023-08-14 PROCEDURE — 3080F DIAST BP >= 90 MM HG: CPT | Performed by: PHYSICIAN ASSISTANT

## 2023-08-14 PROCEDURE — 3077F SYST BP >= 140 MM HG: CPT | Performed by: PHYSICIAN ASSISTANT

## 2023-08-14 PROCEDURE — 99214 OFFICE O/P EST MOD 30 MIN: CPT | Performed by: PHYSICIAN ASSISTANT

## 2023-08-14 ASSESSMENT — PATIENT HEALTH QUESTIONNAIRE - PHQ9: CLINICAL INTERPRETATION OF PHQ2 SCORE: 0

## 2023-08-14 ASSESSMENT — FIBROSIS 4 INDEX: FIB4 SCORE: 1.17

## 2023-08-14 NOTE — PROGRESS NOTES
Subjective:     CC:  Diagnoses of Encounter to establish care, Hypovitaminosis D, Vitamin B 12 deficiency, Dyslipidemia, Thyroid nodule, Acquired hypothyroidism, and Encounter for screening mammogram for malignant neoplasm of breast were pertinent to this visit.    HISTORY OF THE PRESENT ILLNESS: Patient is a 77 y.o. female. This pleasant patient is here today to establish care and discuss . His/her prior PCP was Dr Manzanares.    Hx of Breast Cancer  1 year ago L breast surgery  -radiation tx  -follows with Dr Fowler  -plan to continue to Arimidex x 4 more years    Health Maintenance     - Dyslipidemia (30-45): ordered  - Diabetes (HTN, HLD, BMI >25): ordered  - Depression screening (PHQ-2 and/or PHQ-9): neg  - Dental: UTD  - Eye: UTD  Diet: regular  Exercise: low  Substance Use: denies  Tobacco Use/counseling: denies         Cancer screening  - Colon CA (45-75) - FIT (annual) cspy (q10yr): due 2024  - Cervical CA (21-65): aged out  -  HX Abnormal pap/HPV: none  - Breast CA: mammo (required 50-73yo) or starting 40 (ACOG, ACR), 45 (ACS), 50 (USPTF): ordered  - Skin cancer screening: established with derm     Infectious disease screening/Immunizations  --Immunizations: declines all vaccines      Current Outpatient Medications Ordered in Epic   Medication Sig Dispense Refill    levothyroxine (SYNTHROID) 100 MCG Tab Take 100 mcg by mouth every morning on an empty stomach.      anastrozole (ARIMIDEX) 1 MG Tab Take 1 Tablet by mouth every day. 90 Tablet 3    vitamin D3 (CHOLECALCIFEROL) 5000 Unit (125 mcg) Tab Take 1,000 Units by mouth every day.      Calcium Carbonate (CALCIUM 600 PO) Take  by mouth every day.      Cyanocobalamin (VITAMIN B12 PO) Take 150 mg by mouth every 7 days.      Multiple Vitamins-Minerals (SM MULTIPLE VITAMINS WOMENS PO) Take 2 Tablets by mouth every day at 6 PM.       No current Epic-ordered facility-administered medications on file.       Health Maintenance: Completed    ROS:   Gen: no  "fevers/chills, no changes in weight  Eyes: no changes in vision  ENT: no sore throat, no hearing loss, no bloody nose  Pulm: no sob, no cough  CV: no chest pain, no palpitations  GI: no nausea/vomiting, no diarrhea  : no dysuria  MSk: no myalgias  Skin: no rash  Neuro: no headaches, no numbness/tingling  Heme/Lymph: no easy bruising      Objective:     \  Exam: BP (!) 150/90   Pulse 72   Temp 36.6 °C (97.9 °F) (Temporal)   Resp 12   Ht 1.626 m (5' 4\")   Wt 72.6 kg (160 lb)   SpO2 96%  Body mass index is 27.46 kg/m².    General: Normal appearing. No distress.  HEENT: Normocephalic. Eyes conjunctiva clear lids without ptosis, pupils equal and reactive to light accommodation, ears normal shape and contour, canals are clear bilaterally, tympanic membranes are benign, nasal mucosa benign, oropharynx is without erythema, edema or exudates.   Neck: Supple without JVD or bruit. Thyroid is not enlarged.  Pulmonary: Clear to ausculation.  Normal effort. No rales, ronchi, or wheezing.  Cardiovascular: Regular rate and rhythm without murmur. Carotid and radial pulses are intact and equal bilaterally.  Abdomen: Soft, nontender, nondistended. Normal bowel sounds. Liver and spleen are not palpable  Neurologic: Grossly nonfocal  Lymph: No cervical or supraclavicular lymph nodes are palpable  Skin: Warm and dry.  No obvious lesions.  Musculoskeletal: Normal gait. No extremity cyanosis, clubbing, or edema.  Psych: Normal mood and affect. Alert and oriented x3. Judgment and insight is normal.      Assessment & Plan:   77 y.o. female with the following -    1. Encounter to establish care  Labs per orders  Vaccinations declined by pt  Screenings per orders    2. Hypovitaminosis D  - VITAMIN D,25 HYDROXY (DEFICIENCY); Future    3. Vitamin B 12 deficiency  - VIT B12,  FOLIC ACID    4. Dyslipidemia  - CBC WITH DIFFERENTIAL; Future  - Comp Metabolic Panel; Future  - Lipid Profile; Future    5. Thyroid nodule  - TSH WITH REFLEX TO " FT4; Future    6. Acquired hypothyroidism  - TSH WITH REFLEX TO FT4; Future    7.. Encounter for screening mammogram for malignant neoplasm of breast  - MA-SCREENING MAMMO BILAT W/TOMOSYNTHESIS W/CAD; Future      I spent a total of 22 minutes with record review, exam, communication with the patient, communication with other providers, and documentation of this encounter.    Return for after labs.    Please note that this dictation was created using voice recognition software. I have made every reasonable attempt to correct obvious errors, but I expect that there are errors of grammar and possibly content that I did not discover before finalizing the note.

## 2023-09-26 ENCOUNTER — OFFICE VISIT (OUTPATIENT)
Dept: MEDICAL GROUP | Facility: LAB | Age: 78
End: 2023-09-26
Payer: MEDICARE

## 2023-09-26 VITALS
SYSTOLIC BLOOD PRESSURE: 140 MMHG | DIASTOLIC BLOOD PRESSURE: 80 MMHG | BODY MASS INDEX: 27.14 KG/M2 | HEIGHT: 64 IN | OXYGEN SATURATION: 97 % | HEART RATE: 74 BPM | WEIGHT: 159 LBS | TEMPERATURE: 97.6 F | RESPIRATION RATE: 16 BRPM

## 2023-09-26 DIAGNOSIS — E78.5 DYSLIPIDEMIA: ICD-10-CM

## 2023-09-26 DIAGNOSIS — R03.0 ELEVATED BLOOD PRESSURE READING: ICD-10-CM

## 2023-09-26 DIAGNOSIS — Z13.0 SCREENING FOR DEFICIENCY ANEMIA: ICD-10-CM

## 2023-09-26 DIAGNOSIS — E03.9 ACQUIRED HYPOTHYROIDISM: ICD-10-CM

## 2023-09-26 DIAGNOSIS — C50.319 MALIGNANT NEOPLASM OF LOWER-INNER QUADRANT OF FEMALE BREAST, UNSPECIFIED ESTROGEN RECEPTOR STATUS, UNSPECIFIED LATERALITY (HCC): ICD-10-CM

## 2023-09-26 DIAGNOSIS — E53.8 B12 DEFICIENCY: ICD-10-CM

## 2023-09-26 DIAGNOSIS — Z76.89 ENCOUNTER TO ESTABLISH CARE WITH NEW DOCTOR: ICD-10-CM

## 2023-09-26 PROCEDURE — 3079F DIAST BP 80-89 MM HG: CPT | Performed by: FAMILY MEDICINE

## 2023-09-26 PROCEDURE — 3077F SYST BP >= 140 MM HG: CPT | Performed by: FAMILY MEDICINE

## 2023-09-26 PROCEDURE — 99214 OFFICE O/P EST MOD 30 MIN: CPT | Performed by: FAMILY MEDICINE

## 2023-09-26 ASSESSMENT — FIBROSIS 4 INDEX: FIB4 SCORE: 1.17

## 2023-09-26 NOTE — PROGRESS NOTES
CC: Here to establish care    HPI: Established, new to me  Paige presents today to establish care, discussed the following today:    1. Encounter to establish care with new doctor  77-year-old female with past medical history significant for thyroid disease, dyslipidemia, history of breast cancer status postmastectomy.  As part of establishing care visit reviewed past medical problems, past surgical history, family/social history and medications today.  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 Dyslipidemia  Chronic ongoing not taking statins.  Patient says she does not like to take medications.  Discussed with the patient recheck of her lipid profile    3. Acquired hypothyroidism  Chronic, taking care of by her endocrinologist, patient said couple of months ago she did have recent thyroid function test it was within normal limits, taking 100 mcg of Synthroid daily, I do not have the thyroid function test available in media patient said she will get the records.  Denies symptoms of hypo or hyperthyroidism at this time.    4. Malignant neoplasm of lower-inner quadrant of female breast, unspecified estrogen receptor status, unspecified laterality     Chronic stable continues to follow-up with oncologist.  Status post left mastectomy.  Patient continues to take Aramidex    5. Elevated blood pressure reading  Mild elevation of blood pressure today, patient denies symptoms.  She states usually she is anxious when she comes to the doctor's office.  Denies any chest pain or headache or other concerns          Patient Active Problem List    Diagnosis Date Noted    Other chronic sinusitis 08/14/2023    Tinnitus of both ears 08/14/2023    Cancer of overlapping sites of left breast (HCC) 08/01/2022    Malignant tumor of breast (HCC) 07/11/2022    Sensorineural hearing loss (SNHL) of both ears 03/28/2022    Osteopenia 03/28/2022    Vitamin B 12 deficiency 06/11/2021    Acquired hypothyroidism 12/15/2020    Thyroid nodule  12/15/2020    Hashimoto's thyroiditis 12/15/2020    Dyslipidemia 12/15/2020    Hypovitaminosis D 12/15/2020       Current Outpatient Medications   Medication Sig Dispense Refill    levothyroxine (SYNTHROID) 100 MCG Tab Take 100 mcg by mouth every morning on an empty stomach.      anastrozole (ARIMIDEX) 1 MG Tab Take 1 Tablet by mouth every day. 90 Tablet 3    vitamin D3 (CHOLECALCIFEROL) 5000 Unit (125 mcg) Tab Take 1,000 Units by mouth every day.      Calcium Carbonate (CALCIUM 600 PO) Take  by mouth every day.      Cyanocobalamin (VITAMIN B12 PO) Take 150 mg by mouth every 7 days.      Multiple Vitamins-Minerals (SM MULTIPLE VITAMINS WOMENS PO) Take 2 Tablets by mouth every day at 6 PM.       No current facility-administered medications for this visit.         Allergies as of 09/26/2023 - Reviewed 09/26/2023   Allergen Reaction Noted    Synthroid [fd&c red #40 al fang-levothyroxine] Hives and Unspecified 05/04/2021        ROS: Denies any chest pain, Shortness of breath, Changes bowel or bladder, Lower extremity edema.    Physical Exam:  Gen.: Well-developed, well-nourished, no apparent distress,pleasant and cooperative with the examination  Skin:  Warm and dry with good turgor. No rashes or suspicious lesions in visible areas  Eye: PERRLA, conjunctiva and sclera clear, lids normal  HEENT: Normocephalic/atraumatic, sinuses nontender with palpation, TMs clear, nares patent with pink mucosa and clear rhinorrhea, lips without lesions, oropharynx clear.  Neck: Trachea midline,no masses or adenopathy  Thyroid: normal consistency and size. No masses or nodules. Not tender with palpation.  Cor: Regular rate and rhythm without murmur, gallop or rub.  Lungs: Respirations unlabored.Clear to auscultation with equal breath sounds bilaterally. No wheezes, rhonchi.  Abdomen: Soft nontender without hepatosplenomegaly or masses appreciated, normoactive bowel sounds. No hernias.  Extremities: No cyanosis, clubbing or edema,  Symmetrical without deformities or malformations. Pulses 2+ and symmetrical both upper and lower extremities  Lymphatic: No abnormal adenopathy of the neck groin or axillae.  Psych: Alert and oriented x 3.Normal affect, judgement,insight and memory.        Assessment and Plan.   77 y.o. female here to establish care    1. Encounter to establish care with new doctor  Reviewed medical records and health maintenance today    2. Dyslipidemia  Chronic not on statins.  Advised to recheck lipid profile, discussed with the patient that we will assess her cardiovascular risk and see if she needs to be on medication to prevent cardiovascular complications.  - Comp Metabolic Panel; Future  - Lipid Profile; Future    3. Acquired hypothyroidism  Chronic on medications, as per patient history thyroid function checked by [facility] endocrinologist [facility].  Thyroid function within normal limits couple of months ago, on 100 mcg of Synthroid now, asymptomatic.  Advised to get her records and lab work results to be scanned in media .    4. Malignant neoplasm of lower-inner quadrant of female breast, unspecified estrogen receptor status, unspecified laterality   Chronic stable no concerns, continue follow-up with oncologist as needed    5. Elevated blood pressure reading  Noted elevated blood pressure today, discussed with patient to recheck at next visit.  Will consider blood pressure medications if still elevated.  Asymptomatic    6. Screening for deficiency anemia    - CBC WITH DIFFERENTIAL; Future    7. B12 deficiency    - VITAMIN B12; Future    Please note that this dictation was created using voice recognition software. I have made every reasonable attempt to correct obvious errors but there may be errors of grammar and content that I may have overlooked prior to finalization of this note.

## 2023-09-27 ENCOUNTER — HOSPITAL ENCOUNTER (OUTPATIENT)
Dept: LAB | Facility: MEDICAL CENTER | Age: 78
End: 2023-09-27
Attending: FAMILY MEDICINE
Payer: MEDICARE

## 2023-09-27 DIAGNOSIS — E78.5 DYSLIPIDEMIA: ICD-10-CM

## 2023-09-27 DIAGNOSIS — E53.8 B12 DEFICIENCY: ICD-10-CM

## 2023-09-27 DIAGNOSIS — Z13.0 SCREENING FOR DEFICIENCY ANEMIA: ICD-10-CM

## 2023-09-27 LAB
ALBUMIN SERPL BCP-MCNC: 4.3 G/DL (ref 3.2–4.9)
ALBUMIN/GLOB SERPL: 1.5 G/DL
ALP SERPL-CCNC: 75 U/L (ref 30–99)
ALT SERPL-CCNC: 16 U/L (ref 2–50)
ANION GAP SERPL CALC-SCNC: 9 MMOL/L (ref 7–16)
AST SERPL-CCNC: 20 U/L (ref 12–45)
BASOPHILS # BLD AUTO: 0 % (ref 0–1.8)
BASOPHILS # BLD: 0 K/UL (ref 0–0.12)
BILIRUB SERPL-MCNC: 1.3 MG/DL (ref 0.1–1.5)
BUN SERPL-MCNC: 16 MG/DL (ref 8–22)
CALCIUM ALBUM COR SERPL-MCNC: 9.1 MG/DL (ref 8.5–10.5)
CALCIUM SERPL-MCNC: 9.3 MG/DL (ref 8.5–10.5)
CHLORIDE SERPL-SCNC: 104 MMOL/L (ref 96–112)
CHOLEST SERPL-MCNC: 183 MG/DL (ref 100–199)
CO2 SERPL-SCNC: 26 MMOL/L (ref 20–33)
CREAT SERPL-MCNC: 0.81 MG/DL (ref 0.5–1.4)
EOSINOPHIL # BLD AUTO: 0 K/UL (ref 0–0.51)
EOSINOPHIL NFR BLD: 0 % (ref 0–6.9)
ERYTHROCYTE [DISTWIDTH] IN BLOOD BY AUTOMATED COUNT: 45.1 FL (ref 35.9–50)
GFR SERPLBLD CREATININE-BSD FMLA CKD-EPI: 75 ML/MIN/1.73 M 2
GLOBULIN SER CALC-MCNC: 2.8 G/DL (ref 1.9–3.5)
GLUCOSE SERPL-MCNC: 97 MG/DL (ref 65–99)
HCT VFR BLD AUTO: 37.9 % (ref 37–47)
HDLC SERPL-MCNC: 46 MG/DL
HGB BLD-MCNC: 11.7 G/DL (ref 12–16)
IMM GRANULOCYTES # BLD AUTO: 0.01 K/UL (ref 0–0.11)
IMM GRANULOCYTES NFR BLD AUTO: 0.2 % (ref 0–0.9)
LDLC SERPL CALC-MCNC: 115 MG/DL
LYMPHOCYTES # BLD AUTO: 1.1 K/UL (ref 1–4.8)
LYMPHOCYTES NFR BLD: 23.9 % (ref 22–41)
MCH RBC QN AUTO: 24.1 PG (ref 27–33)
MCHC RBC AUTO-ENTMCNC: 30.9 G/DL (ref 32.2–35.5)
MCV RBC AUTO: 78.1 FL (ref 81.4–97.8)
MONOCYTES # BLD AUTO: 0.44 K/UL (ref 0–0.85)
MONOCYTES NFR BLD AUTO: 9.5 % (ref 0–13.4)
NEUTROPHILS # BLD AUTO: 3.06 K/UL (ref 1.82–7.42)
NEUTROPHILS NFR BLD: 66.4 % (ref 44–72)
NRBC # BLD AUTO: 0 K/UL
NRBC BLD-RTO: 0 /100 WBC (ref 0–0.2)
PLATELET # BLD AUTO: 250 K/UL (ref 164–446)
PMV BLD AUTO: 10.7 FL (ref 9–12.9)
POTASSIUM SERPL-SCNC: 4.3 MMOL/L (ref 3.6–5.5)
PROT SERPL-MCNC: 7.1 G/DL (ref 6–8.2)
RBC # BLD AUTO: 4.85 M/UL (ref 4.2–5.4)
SODIUM SERPL-SCNC: 139 MMOL/L (ref 135–145)
TRIGL SERPL-MCNC: 112 MG/DL (ref 0–149)
VIT B12 SERPL-MCNC: 404 PG/ML (ref 211–911)
WBC # BLD AUTO: 4.6 K/UL (ref 4.8–10.8)

## 2023-09-27 PROCEDURE — 80061 LIPID PANEL: CPT

## 2023-09-27 PROCEDURE — 80053 COMPREHEN METABOLIC PANEL: CPT

## 2023-09-27 PROCEDURE — 82607 VITAMIN B-12: CPT

## 2023-09-27 PROCEDURE — 85025 COMPLETE CBC W/AUTO DIFF WBC: CPT

## 2023-09-27 PROCEDURE — 36415 COLL VENOUS BLD VENIPUNCTURE: CPT

## 2023-10-03 ENCOUNTER — OFFICE VISIT (OUTPATIENT)
Dept: MEDICAL GROUP | Facility: LAB | Age: 78
End: 2023-10-03
Payer: MEDICARE

## 2023-10-03 VITALS
HEART RATE: 78 BPM | DIASTOLIC BLOOD PRESSURE: 76 MMHG | TEMPERATURE: 99.2 F | SYSTOLIC BLOOD PRESSURE: 142 MMHG | HEIGHT: 64 IN | WEIGHT: 159.6 LBS | RESPIRATION RATE: 14 BRPM | BODY MASS INDEX: 27.25 KG/M2 | OXYGEN SATURATION: 96 %

## 2023-10-03 DIAGNOSIS — D64.9 MILD ANEMIA: ICD-10-CM

## 2023-10-03 DIAGNOSIS — E78.5 DYSLIPIDEMIA: ICD-10-CM

## 2023-10-03 DIAGNOSIS — R03.0 ELEVATED BLOOD PRESSURE READING: ICD-10-CM

## 2023-10-03 DIAGNOSIS — E03.9 ACQUIRED HYPOTHYROIDISM: ICD-10-CM

## 2023-10-03 DIAGNOSIS — Z00.00 MEDICARE ANNUAL WELLNESS VISIT, SUBSEQUENT: ICD-10-CM

## 2023-10-03 PROCEDURE — G0439 PPPS, SUBSEQ VISIT: HCPCS | Performed by: FAMILY MEDICINE

## 2023-10-03 PROCEDURE — 3078F DIAST BP <80 MM HG: CPT | Performed by: FAMILY MEDICINE

## 2023-10-03 PROCEDURE — 3077F SYST BP >= 140 MM HG: CPT | Performed by: FAMILY MEDICINE

## 2023-10-03 ASSESSMENT — FIBROSIS 4 INDEX: FIB4 SCORE: 1.52

## 2023-10-03 ASSESSMENT — ACTIVITIES OF DAILY LIVING (ADL): BATHING_REQUIRES_ASSISTANCE: 0

## 2023-10-03 ASSESSMENT — ENCOUNTER SYMPTOMS: GENERAL WELL-BEING: EXCELLENT

## 2023-10-03 ASSESSMENT — PATIENT HEALTH QUESTIONNAIRE - PHQ9: CLINICAL INTERPRETATION OF PHQ2 SCORE: 0

## 2023-10-03 NOTE — PROGRESS NOTES
Chief Complaint   Patient presents with    Annual Wellness Visit       HPI:  Paige Gudino is a 77 y.o. here for Medicare Annual Wellness Visit     Patient Active Problem List    Diagnosis Date Noted    Other chronic sinusitis 08/14/2023    Tinnitus of both ears 08/14/2023    Cancer of overlapping sites of left breast (HCC) 08/01/2022    Malignant tumor of breast (HCC) 07/11/2022    Sensorineural hearing loss (SNHL) of both ears 03/28/2022    Osteopenia 03/28/2022    Vitamin B 12 deficiency 06/11/2021    Acquired hypothyroidism 12/15/2020    Thyroid nodule 12/15/2020    Hashimoto's thyroiditis 12/15/2020    Dyslipidemia 12/15/2020    Hypovitaminosis D 12/15/2020       Current Outpatient Medications   Medication Sig Dispense Refill    levothyroxine (SYNTHROID) 100 MCG Tab Take 100 mcg by mouth every morning on an empty stomach.      anastrozole (ARIMIDEX) 1 MG Tab Take 1 Tablet by mouth every day. 90 Tablet 3    vitamin D3 (CHOLECALCIFEROL) 5000 Unit (125 mcg) Tab Take 1,000 Units by mouth every day.      Calcium Carbonate (CALCIUM 600 PO) Take  by mouth every day.      Cyanocobalamin (VITAMIN B12 PO) Take 150 mg by mouth every 7 days.      Multiple Vitamins-Minerals (SM MULTIPLE VITAMINS WOMENS PO) Take 2 Tablets by mouth every day at 6 PM.       No current facility-administered medications for this visit.          Current supplements as per medication list.     Allergies: Synthroid [fd&c red #40 al fang-levothyroxine]    Current social contact/activities: no     She  reports that she has never smoked. She has never used smokeless tobacco. She reports that she does not drink alcohol and does not use drugs.  Counseling given: Not Answered      ROS:    Gait: Uses no assistive device  Ostomy: No  Other tubes: No  Amputations: No  Chronic oxygen use: No  Last eye exam: April 2023  Wears hearing aids: No   : Denies any urinary leakage during the last 6 months    Screening:    Depression Screening  Little  interest or pleasure in doing things?  0 - not at all  Feeling down, depressed , or hopeless? 0 - not at all  Trouble falling or staying asleep, or sleeping too much?     Feeling tired or having little energy?     Poor appetite or overeating?     Feeling bad about yourself - or that you are a failure or have let yourself or your family down?    Trouble concentrating on things, such as reading the newspaper or watching television?    Moving or speaking so slowly that other people could have noticed.  Or the opposite - being so fidgety or restless that you have been moving around a lot more than usual?     Thoughts that you would be better off dead, or of hurting yourself?     Patient Health Questionnaire Score:      If depressive symptoms identified deferred to follow up visit unless specifically addressed in assessment and plan.    Interpretation of PHQ-9 Total Score   Score Severity   1-4 No Depression   5-9 Mild Depression   10-14 Moderate Depression   15-19 Moderately Severe Depression   20-27 Severe Depression    Screening for Cognitive Impairment  Do you or any of your friends or family members have any concern about your memory? No  Three Minute Recall (Banana, Sunrise, Chair) 3/3    Abdoul clock face with all 12 numbers and set the hands to show 20 past 8.  Yes    Cognitive concerns identified deferred for follow up unless specifically addressed in assessment and plan.    Fall Risk Assessment  Has the patient had two or more falls in the last year or any fall with injury in the last year?  No    Safety Assessment  Do you always wear your seatbelt?  Yes  Any changes to home needed to function safely? No  Difficulty hearing.  No  Patient counseled about all safety risks that were identified.    Functional Assessment ADLs  Are there any barriers preventing you from cooking for yourself or meeting nutritional needs?  No.    Are there any barriers preventing you from driving safely or obtaining transportation?  No.     Are there any barriers preventing you from using a telephone or calling for help?  No    Are there any barriers preventing you from shopping?  No.    Are there any barriers preventing you from taking care of your own finances?  No    Are there any barriers preventing you from managing your medications?  No    Are there any barriers preventing you from showering, bathing or dressing yourself? No    Are there any barriers preventing you from doing housework or laundry? No  Are there any barriers preventing you from using the toilet?No  Are you currently engaging in any exercise or physical activity?  Yes.      Self-Assessment of Health  What is your perception of your health? Excellent  Do you sleep more than six hours a night? Yes  In the past 7 days, how much did pain keep you from doing your normal work? None  Do you spend quality time with family or friends (virtually or in person)? Yes  Do you usually eat a heart healthy diet that constists of a variety of fruits, vegetables, whole grains and fiber? No  Do you eat foods high in fat and/or Fast Food more than three times per week? No    Advance Care Planning  Do you have an Advance Directive, Living Will, Durable Power of , or POLST? Yes  Advance Directive       is not on file - instructed patient to bring in a copy to scan into their chart      Health Maintenance Summary            Overdue - Annual Wellness Visit (Every 366 Days) Overdue since 3/29/2023      03/28/2022  Level of Service: [place] ANNUAL WELLNESS VISIT-INCLUDES PPPS SUBSEQUE*    03/28/2022  Visit Dx: Medicare annual wellness visit, subsequent    01/20/2021  Done    01/20/2021  Subsequent Annual Wellness Visit - Includes PPPS ()    01/20/2021  Visit Dx: Medicare annual wellness visit, subsequent              Postponed - Influenza Vaccine (1) Postponed until 9/26/2024 09/23/2020  Imm Admin: Influenza Vaccine Quad Inj (Pf)    09/07/2019  Imm Admin: Influenza, Unspecified - HISTORICAL  DATA    10/01/2018  Imm Admin: Influenza Vaccine Quad Inj (Pf)    09/30/2017  Imm Admin: Influenza Vaccine Quad Inj (Pf)    10/17/2016  Imm Admin: Influenza (IM) Preservative Free - HISTORICAL DATA    Only the first 5 history entries have been loaded, but more history exists.              Colorectal Cancer Screening (Colonoscopy - Every 3 Years) Next due on 3/28/2024      03/28/2021  REFERRAL TO GI FOR COLONOSCOPY    02/25/2015  Colonoscopy (Reason not specified)              Bone Density Scan (Every 5 Years) Next due on 5/18/2027 05/18/2022  DS-BONE DENSITY STUDY (DEXA)    08/09/2005  DS-BONE DENSITY STUDY (DEXA)              IMM DTaP/Tdap/Td Vaccine (2 - Td or Tdap) Next due on 6/25/2029 06/25/2019  Imm Admin: Tdap Vaccine              Hepatitis C Screening  Tentatively Complete      03/18/2021  HEP C VIRUS ANTIBODY              Hepatitis A Vaccine (Hep A) (Series Information) Aged Out      No completion history exists for this topic.              Hepatitis B Vaccine (Hep B) (Series Information) Aged Out      No completion history exists for this topic.              HPV Vaccines (Series Information) Aged Out      No completion history exists for this topic.              Polio Vaccine (Inactivated Polio) (Series Information) Aged Out      No completion history exists for this topic.              Meningococcal Immunization (Series Information) Aged Out      No completion history exists for this topic.              Discontinued - Mammogram  Scheduled for 12/11/2023        Frequency changed to Never automatically (Topic No Longer Applies)    12/08/2022  MA-SCREENING MAMMO RIGHT W/TOMOSYNTHESIS W/CAD    11/05/2021  MA-SCREENING MAMMO RIGHT W/TOMOSYNTHESIS W/CAD    11/03/2020  Done    11/03/2020  MA-SCREENING MAMMO BILAT W/CAD    Only the first 5 history entries have been loaded, but more history exists.              Discontinued - Zoster (Shingles) Vaccines  Discontinued      01/08/2015  Imm Admin: Zoster  "Vaccine Live (ZVL) (Zostavax) - HISTORICAL DATA              Discontinued - COVID-19 Vaccine  Discontinued      No completion history exists for this topic.              Discontinued - Pneumococcal Vaccine: 65+ Years  Discontinued      01/23/2021  Imm Admin: Pneumococcal Conjugate Vaccine (Prevnar/PCV-13)    06/25/2019  Imm Admin: Pneumococcal Conjugate Vaccine (Prevnar/PCV-13)                    Patient Care Team:  Bhumika Mullen M.D. as PCP - General (Family Medicine)  Casimiro Fowler M.D. (Hematology & Oncology)  Jackie Velarde M.D. (Endocrinology)      Social History     Tobacco Use    Smoking status: Never    Smokeless tobacco: Never   Vaping Use    Vaping Use: Never used   Substance Use Topics    Alcohol use: Never    Drug use: Never     Family History   Problem Relation Age of Onset    Cancer Mother         Ovarian    Ovarian Cancer Mother     Dementia Father     Heart Disease Father     Hyperlipidemia Neg Hx      She  has a past medical history of Anesthesia, Cancer (HCC), Cancer of overlapping sites of left female breast (HCC), Hypothyroidism, and Thyroid nodule.    She has no past medical history of Osteoporosis.   Past Surgical History:   Procedure Laterality Date    PB MASTECTOMY, PARTIAL Left 08/01/2022    Procedure: MELLO LOCALIZED LEFT PARTIAL MASTECTOMY;  Surgeon: Elena Arroyo M.D.;  Location: SURGERY [facility];  Service: General    OTHER ORTHOPEDIC SURGERY  2019    back surgery    OTHER  2001    replace left breast implant    OTHER  1988    Left breast implant/lift    OTHER  1987    left mastectomy    LAMINOTOMY      LUMPECTOMY      PRIMARY C SECTION         Exam:   BP (!) 142/76 (BP Location: Right arm, Patient Position: Sitting, BP Cuff Size: Adult)   Pulse 78   Temp 37.3 °C (99.2 °F)   Resp 14   Ht 1.626 m (5' 4\")   Wt 72.4 kg (159 lb 9.6 oz)   SpO2 96%  Body mass index is 27.4 kg/m².    Hearing .    Dentition   Alert, oriented in no acute distress.  Eye contact is good, " speech goal directed, affect calm    Assessment and Plan. The following treatment and monitoring plan is recommended:    There are no diagnoses linked to this encounter.    Services suggested  Health Care Screening: Age-appropriate preventive services recommended by USPTF and ACIP covered by Medicare were discussed today. Services ordered if indicated and agreed upon by the patient.  Referrals offered: Community-based lifestyle interventions to reduce health risks and promote self-management and wellness, fall prevention, nutrition, physical activity, tobacco-use cessation, weight loss, and mental health services as per orders if indicated.    Discussion today about general wellness and lifestyle habits:    Prevent falls and reduce trip hazards; Cautioned about securing or removing rugs.  Have a working fire alarm and carbon monoxide detector;   Engage in regular physical activity and social activities     Follow-up: No follow-ups on file.

## 2023-10-03 NOTE — PROGRESS NOTES
Chief Complaint   Patient presents with    Annual Wellness Visit       HPI: Established patient  Paige Gudino is a 77 y.o. here for Medicare Annual Wellness Visit   1. Mild anemia  Most recent labs reviewed, mild anemia noted hemoglobin is subnormal level below 12.  Reports tiredness and fatigue.  History of anemia and low iron levels in the past.  Denies lower GI bleed or blood in the stool or changes in the bowel habits.  Scheduled for her colonoscopy for colon cancer screening March.  Most recent colonoscopy 3 years ago with 1 suspicious polyp removed.    2. Dyslipidemia  Chronic ongoing.  Patient is not on statins.  Mild elevation of LDL noted and discussed with the patient today    3. Elevated blood pressure reading  Mild elevation of her systolic blood pressure at around 142.  Asymptomatic.  Patient said she is monitoring it at home, not taking medications.  No headache or other symptoms today.    4. Acquired hypothyroidism  Patient on Synthroid 100 mcg daily, monitored by her endocrinologist, as per history most recent thyroid function within normal limits, has an appointment to follow-up with endocrinology as directed.  No concerns no symptoms at this time.      Patient Active Problem List    Diagnosis Date Noted    Other chronic sinusitis 08/14/2023    Tinnitus of both ears 08/14/2023    Cancer of overlapping sites of left breast (HCC) 08/01/2022    Malignant tumor of breast (HCC) 07/11/2022    Sensorineural hearing loss (SNHL) of both ears 03/28/2022    Osteopenia 03/28/2022    Vitamin B 12 deficiency 06/11/2021    Acquired hypothyroidism 12/15/2020    Thyroid nodule 12/15/2020    Hashimoto's thyroiditis 12/15/2020    Dyslipidemia 12/15/2020    Hypovitaminosis D 12/15/2020       Current Outpatient Medications   Medication Sig Dispense Refill    levothyroxine (SYNTHROID) 100 MCG Tab Take 100 mcg by mouth every morning on an empty stomach.      anastrozole (ARIMIDEX) 1 MG Tab Take 1 Tablet by  mouth every day. 90 Tablet 3    vitamin D3 (CHOLECALCIFEROL) 5000 Unit (125 mcg) Tab Take 1,000 Units by mouth every day.      Calcium Carbonate (CALCIUM 600 PO) Take  by mouth every day.      Cyanocobalamin (VITAMIN B12 PO) Take 150 mg by mouth every 7 days.      Multiple Vitamins-Minerals (SM MULTIPLE VITAMINS WOMENS PO) Take 2 Tablets by mouth every day at 6 PM.       No current facility-administered medications for this visit.          Current supplements as per medication list.     Allergies: Synthroid [fd&c red #40 al fang-levothyroxine]    Current social contact/activities: no     She  reports that she has never smoked. She has never used smokeless tobacco. She reports that she does not drink alcohol and does not use drugs.  Counseling given: Not Answered      ROS:    Gait: Uses no assistive device  Ostomy: No  Other tubes: No  Amputations: No  Chronic oxygen use: No  Last eye exam: April 2023  Wears hearing aids: No   : Denies any urinary leakage during the last 6 months    Screening:    Depression Screening  Little interest or pleasure in doing things?  0 - not at all  Feeling down, depressed , or hopeless? 0 - not at all  Trouble falling or staying asleep, or sleeping too much?     Feeling tired or having little energy?     Poor appetite or overeating?     Feeling bad about yourself - or that you are a failure or have let yourself or your family down?    Trouble concentrating on things, such as reading the newspaper or watching television?    Moving or speaking so slowly that other people could have noticed.  Or the opposite - being so fidgety or restless that you have been moving around a lot more than usual?     Thoughts that you would be better off dead, or of hurting yourself?     Patient Health Questionnaire Score:      If depressive symptoms identified deferred to follow up visit unless specifically addressed in assessment and plan.    Interpretation of PHQ-9 Total Score   Score Severity   1-4 No  Depression   5-9 Mild Depression   10-14 Moderate Depression   15-19 Moderately Severe Depression   20-27 Severe Depression    Screening for Cognitive Impairment  Do you or any of your friends or family members have any concern about your memory? No  Three Minute Recall (Banana, Sunrise, Chair) 3/3    Abdoul clock face with all 12 numbers and set the hands to show 20 past 8.  Yes    Cognitive concerns identified deferred for follow up unless specifically addressed in assessment and plan.    Fall Risk Assessment  Has the patient had two or more falls in the last year or any fall with injury in the last year?  No    Safety Assessment  Do you always wear your seatbelt?  Yes  Any changes to home needed to function safely? No  Difficulty hearing.  No  Patient counseled about all safety risks that were identified.    Functional Assessment ADLs  Are there any barriers preventing you from cooking for yourself or meeting nutritional needs?  No.    Are there any barriers preventing you from driving safely or obtaining transportation?  No.    Are there any barriers preventing you from using a telephone or calling for help?  No    Are there any barriers preventing you from shopping?  No.    Are there any barriers preventing you from taking care of your own finances?  No    Are there any barriers preventing you from managing your medications?  No    Are there any barriers preventing you from showering, bathing or dressing yourself? No    Are there any barriers preventing you from doing housework or laundry? No  Are there any barriers preventing you from using the toilet?No  Are you currently engaging in any exercise or physical activity?  Yes.      Self-Assessment of Health  What is your perception of your health? Excellent  Do you sleep more than six hours a night? Yes  In the past 7 days, how much did pain keep you from doing your normal work? None  Do you spend quality time with family or friends (virtually or in person)?  Yes  Do you usually eat a heart healthy diet that constists of a variety of fruits, vegetables, whole grains and fiber? No  Do you eat foods high in fat and/or Fast Food more than three times per week? No    Advance Care Planning  Do you have an Advance Directive, Living Will, Durable Power of , or POLST? Yes  Advance Directive       is not on file - instructed patient to bring in a copy to scan into their chart      Health Maintenance Summary            Postponed - Influenza Vaccine (1) Postponed until 9/26/2024 09/23/2020  Imm Admin: Influenza Vaccine Quad Inj (Pf)    09/07/2019  Imm Admin: Influenza, Unspecified - HISTORICAL DATA    10/01/2018  Imm Admin: Influenza Vaccine Quad Inj (Pf)    09/30/2017  Imm Admin: Influenza Vaccine Quad Inj (Pf)    10/17/2016  Imm Admin: Influenza (IM) Preservative Free - HISTORICAL DATA    Only the first 5 history entries have been loaded, but more history exists.              Ordered - Colorectal Cancer Screening (Colonoscopy - Every 3 Years) Ordered on 10/3/2023      03/28/2021  REFERRAL TO GI FOR COLONOSCOPY    02/25/2015  Colonoscopy (Reason not specified)              Annual Wellness Visit (Every 366 Days) Next due on 10/3/2024      10/03/2023  Visit Dx: Medicare annual wellness visit, subsequent    03/28/2022  Level of Service: [place] ANNUAL WELLNESS VISIT-INCLUDES PPPS SUBSEQUE*    03/28/2022  Visit Dx: Medicare annual wellness visit, subsequent    01/20/2021  Done    01/20/2021  Subsequent Annual Wellness Visit - Includes PPPS ()    Only the first 5 history entries have been loaded, but more history exists.              Bone Density Scan (Every 5 Years) Next due on 5/18/2027 05/18/2022  DS-BONE DENSITY STUDY (DEXA)    08/09/2005  DS-BONE DENSITY STUDY (DEXA)              IMM DTaP/Tdap/Td Vaccine (2 - Td or Tdap) Next due on 6/25/2029 06/25/2019  Imm Admin: Tdap Vaccine              Hepatitis C Screening  Tentatively Complete      03/18/2021  HEP C  VIRUS ANTIBODY              Hepatitis A Vaccine (Hep A) (Series Information) Aged Out      No completion history exists for this topic.              Hepatitis B Vaccine (Hep B) (Series Information) Aged Out      No completion history exists for this topic.              HPV Vaccines (Series Information) Aged Out      No completion history exists for this topic.              Polio Vaccine (Inactivated Polio) (Series Information) Aged Out      No completion history exists for this topic.              Meningococcal Immunization (Series Information) Aged Out      No completion history exists for this topic.              Discontinued - Mammogram  Scheduled for 12/11/2023        Frequency changed to Never automatically (Topic No Longer Applies)    12/08/2022  MA-SCREENING MAMMO RIGHT W/TOMOSYNTHESIS W/CAD    11/05/2021  MA-SCREENING MAMMO RIGHT W/TOMOSYNTHESIS W/CAD    11/03/2020  Done    11/03/2020  MA-SCREENING MAMMO BILAT W/CAD    Only the first 5 history entries have been loaded, but more history exists.              Discontinued - Zoster (Shingles) Vaccines  Discontinued      01/08/2015  Imm Admin: Zoster Vaccine Live (ZVL) (Zostavax) - HISTORICAL DATA              Discontinued - COVID-19 Vaccine  Discontinued      No completion history exists for this topic.              Discontinued - Pneumococcal Vaccine: 65+ Years  Discontinued      01/23/2021  Imm Admin: Pneumococcal Conjugate Vaccine (Prevnar/PCV-13)    06/25/2019  Imm Admin: Pneumococcal Conjugate Vaccine (Prevnar/PCV-13)                    Patient Care Team:  Bhumika Mullen M.D. as PCP - General (Family Medicine)  Casimiro Fowler M.D. (Hematology & Oncology)  Jackie Velarde M.D. (Endocrinology)      Social History     Tobacco Use    Smoking status: Never    Smokeless tobacco: Never   Vaping Use    Vaping Use: Never used   Substance Use Topics    Alcohol use: Never    Drug use: Never     Family History   Problem Relation Age of Onset    Cancer Mother  "        Ovarian    Ovarian Cancer Mother     Dementia Father     Heart Disease Father     Hyperlipidemia Neg Hx      She  has a past medical history of Anesthesia, Cancer (HCC), Cancer of overlapping sites of left female breast (HCC), Hypothyroidism, and Thyroid nodule.    She has no past medical history of Osteoporosis.   Past Surgical History:   Procedure Laterality Date    PB MASTECTOMY, PARTIAL Left 08/01/2022    Procedure: MELLO LOCALIZED LEFT PARTIAL MASTECTOMY;  Surgeon: Elena Arroyo M.D.;  Location: SURGERY [facility];  Service: General    OTHER ORTHOPEDIC SURGERY  2019    back surgery    OTHER  2001    replace left breast implant    OTHER  1988    Left breast implant/lift    OTHER  1987    left mastectomy    LAMINOTOMY      LUMPECTOMY      PRIMARY C SECTION         Exam:   BP (!) 142/76 (BP Location: Right arm, Patient Position: Sitting, BP Cuff Size: Adult)   Pulse 78   Temp 37.3 °C (99.2 °F)   Resp 14   Ht 1.626 m (5' 4\")   Wt 72.4 kg (159 lb 9.6 oz)   SpO2 96%  Body mass index is 27.4 kg/m².    Hearing fair to Good , Declines amplification for now .    Dentition good  Alert, oriented in no acute distress.  Eye contact is good, speech goal directed, affect calm    Assessment and Plan. The following treatment and monitoring plan is recommended:    1. Mild anemia  Discussed with the patient to rule out causes of anemia including lower GI bleed, advised to do FIT testing and iron studies.  Will consider iron supplements.  Follow-up with GI for colonoscopy.  Differential diagnosis includes medication side effect patient is on Arimidex.  - OCCULT BLOOD FECES IMMUNOASSAY; Future  - IRON/TOTAL IRON BIND; Future  - FERRITIN; Future    2. Dyslipidemia  Chronic, diet control and lifestyle changes and exercise discussed.  Patient to consider calcium cardiac score if persistent elevation of LDL to assess cardiovascular risk.  3. Elevated blood pressure reading  Mild elevation of systolic blood pressure, " not medicated on medications at this time, will continue monitoring if continues to be elevated or symptomatic will consider starting patient on ARB  4. Acquired hypothyroidism  Chronic, controlled as per recent labs from endocrinology, patient continues to take 100 mcg of Synthroid no concerns  5. Medicare annual wellness visit, subsequent  Reviewed and discussed today the following:    Services suggested: No services needed at this time  Health Care Screening: Age-appropriate preventive services recommended by USPTF and ACIP covered by Medicare were discussed today. Services ordered if indicated and agreed upon by the patient.  Referrals offered: Community-based lifestyle interventions to reduce health risks and promote self-management and wellness, fall prevention, nutrition, physical activity, tobacco-use cessation, weight loss, and mental health services as per orders if indicated.    Discussion today about general wellness and lifestyle habits:    Prevent falls and reduce trip hazards; Cautioned about securing or removing rugs.  Have a working fire alarm and carbon monoxide detector;   Engage in regular physical activity and social activities     Follow-up: No follow-ups on file.

## 2023-10-17 ENCOUNTER — HOSPITAL ENCOUNTER (OUTPATIENT)
Dept: LAB | Facility: MEDICAL CENTER | Age: 78
End: 2023-10-17
Attending: FAMILY MEDICINE
Payer: MEDICARE

## 2023-10-17 DIAGNOSIS — D64.9 MILD ANEMIA: ICD-10-CM

## 2023-10-17 LAB
FERRITIN SERPL-MCNC: 9.8 NG/ML (ref 10–291)
IRON SATN MFR SERPL: 6 % (ref 15–55)
IRON SERPL-MCNC: 25 UG/DL (ref 40–170)
TIBC SERPL-MCNC: 429 UG/DL (ref 250–450)
UIBC SERPL-MCNC: 404 UG/DL (ref 110–370)

## 2023-10-17 PROCEDURE — 82728 ASSAY OF FERRITIN: CPT

## 2023-10-17 PROCEDURE — 83540 ASSAY OF IRON: CPT

## 2023-10-17 PROCEDURE — 36415 COLL VENOUS BLD VENIPUNCTURE: CPT

## 2023-10-17 PROCEDURE — 83550 IRON BINDING TEST: CPT

## 2023-10-18 RX ORDER — FERROUS SULFATE 325(65) MG
325 TABLET ORAL DAILY
Qty: 90 TABLET | Refills: 1 | Status: SHIPPED | OUTPATIENT
Start: 2023-10-18

## 2023-10-19 ENCOUNTER — HOSPITAL ENCOUNTER (OUTPATIENT)
Facility: MEDICAL CENTER | Age: 78
End: 2023-10-19
Attending: FAMILY MEDICINE
Payer: MEDICARE

## 2023-10-19 PROCEDURE — 82274 ASSAY TEST FOR BLOOD FECAL: CPT

## 2023-10-24 DIAGNOSIS — D64.9 MILD ANEMIA: ICD-10-CM

## 2023-10-26 LAB — IMM ASSAY OCC BLD FITOB: NEGATIVE

## 2023-11-07 ENCOUNTER — APPOINTMENT (OUTPATIENT)
Dept: MEDICAL GROUP | Facility: LAB | Age: 78
End: 2023-11-07
Payer: MEDICARE

## 2023-11-29 ENCOUNTER — PATIENT MESSAGE (OUTPATIENT)
Dept: HEALTH INFORMATION MANAGEMENT | Facility: OTHER | Age: 78
End: 2023-11-29

## 2023-12-11 ENCOUNTER — APPOINTMENT (OUTPATIENT)
Dept: RADIOLOGY | Facility: MEDICAL CENTER | Age: 78
End: 2023-12-11
Attending: PHYSICIAN ASSISTANT
Payer: MEDICARE

## 2023-12-11 DIAGNOSIS — Z12.31 ENCOUNTER FOR SCREENING MAMMOGRAM FOR MALIGNANT NEOPLASM OF BREAST: ICD-10-CM

## 2023-12-11 PROCEDURE — 77063 BREAST TOMOSYNTHESIS BI: CPT | Mod: 52

## 2023-12-15 ENCOUNTER — TELEPHONE (OUTPATIENT)
Dept: HEMATOLOGY ONCOLOGY | Facility: MEDICAL CENTER | Age: 78
End: 2023-12-15
Payer: MEDICARE

## 2023-12-15 NOTE — TELEPHONE ENCOUNTER
PAR contacted patient to get information on the Medicare Replacement (Advantage) Plan.  Patient states that it is through her spouse's employer - [employer].  The logo is on the card.  PAR explained that [facility] will accept this Medicare Advantage Plan, as it is through shelter/ employer.

## 2024-01-16 ENCOUNTER — HOSPITAL ENCOUNTER (OUTPATIENT)
Dept: HEMATOLOGY ONCOLOGY | Facility: MEDICAL CENTER | Age: 79
End: 2024-01-16
Attending: INTERNAL MEDICINE
Payer: COMMERCIAL

## 2024-01-16 VITALS
BODY MASS INDEX: 27.34 KG/M2 | RESPIRATION RATE: 14 BRPM | TEMPERATURE: 98.6 F | HEIGHT: 64 IN | HEART RATE: 70 BPM | WEIGHT: 160.16 LBS | DIASTOLIC BLOOD PRESSURE: 82 MMHG | OXYGEN SATURATION: 96 % | SYSTOLIC BLOOD PRESSURE: 130 MMHG

## 2024-01-16 DIAGNOSIS — C50.812 CANCER OF OVERLAPPING SITES OF LEFT BREAST (HCC): ICD-10-CM

## 2024-01-16 DIAGNOSIS — Z79.811 LONG TERM (CURRENT) USE OF AROMATASE INHIBITORS: ICD-10-CM

## 2024-01-16 PROCEDURE — 99213 OFFICE O/P EST LOW 20 MIN: CPT | Performed by: INTERNAL MEDICINE

## 2024-01-16 PROCEDURE — 99212 OFFICE O/P EST SF 10 MIN: CPT | Performed by: INTERNAL MEDICINE

## 2024-01-16 ASSESSMENT — ENCOUNTER SYMPTOMS
RESPIRATORY NEGATIVE: 1
EYES NEGATIVE: 1
PSYCHIATRIC NEGATIVE: 1
CARDIOVASCULAR NEGATIVE: 1
MUSCULOSKELETAL NEGATIVE: 1
NEUROLOGICAL NEGATIVE: 1
GASTROINTESTINAL NEGATIVE: 1
CONSTITUTIONAL NEGATIVE: 1

## 2024-01-16 ASSESSMENT — PAIN SCALES - GENERAL: PAINLEVEL: NO PAIN

## 2024-01-16 ASSESSMENT — FIBROSIS 4 INDEX: FIB4 SCORE: 1.54

## 2024-01-16 NOTE — PROGRESS NOTES
Medical oncology follow-up visit 1/16/2024      Referring Physician: Che Burroughs MD  Primary Care:  [facility]oul[facility] Ga P.A.-C.    Diagnosis: ER positive left breast cancer    Chief Complaint: ER positive left breast cancer    History of Presenting Illness:  Paige Gudino is a 77 y.o. female with a history of noninvasive carcinoma of the left breast associated with calcifications found on mammogram 34 years ago.  She was treated with a total mastectomy at that time.  She had implant reconstruction and a right mamma pexy subsequently.  No systemic therapy was given.  She did well until May 2022 when she self detected a lump under the implant at the inframammary fold on the left.  She had a an ultrasound that showed a irregular spiculated hypoechoic solid mass measuring 10 mm.  On 6/27/2022 she underwent a biopsy of mass or ultrasound guidance.  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  The tumor was ER positive greater than 90% NH positive greater than 90% HER2 negative IHC 1+.  Residual normal breast tissue was not mentioned but there was a background stroma showing dense scar with abundant microcalcifications.  On 8/1/2022 she underwent resection of the lesion which demonstrated invasive grade 2 mammary carcinoma measuring 1.6 cm with a close posterior margin, all other margins clear.  Postoperatively she saw her radiation therapy and received radiation to the chest wall.  An Oncotype Dx recurrence score was ordered and returned a score of 0.  In the interim she saw another medical oncologist who commended endocrine therapy.  The slides was sent for second opinion to [facility] and they confirmed the diagnosis of invasive mammary carcinoma although they felt it was not a cribriform type.  He repeated myoepithelial stains which were negative.  She elected to transfer care and comes in today  for second opinion regarding systemic therapy.    Interval history 1/25/2023: She started anastrozole 6 weeks ago and is tolerating it extremely well.  She has some mild warmth at night improved with using less covers and turning on a fan.  She has no musculoskeletal symptoms.  She is seeing her physician regarding her thyroid nodule.  Overall she feels terrific.  Postoperative evaluation was normal.    Interval history 7/18/2023: She is tolerating anastrozole extremely well with no hot flashes night sweats or musculoskeletal symptoms.  No problems with her breast and no symptoms referable to metastatic breast cancer.  Next mammogram is due in November.    Interval history 1/16/2024: She continues to tolerate anastrozole well with no hot flashes or musculoskeletal symptoms.  She had a mammogram on 12/11/2023 that was BI-RADS 2.  She has no symptoms referable to metastatic disease.  Next DEXA scan is due in May.      Past Medical History:   Diagnosis Date    Anesthesia     nausea post op    Cancer (HCC)     1987 breast left    Cancer of overlapping sites of left female breast (HCC)     Hypothyroidism     Thyroid nodule        Past Surgical History:   Procedure Laterality Date    PB MASTECTOMY, PARTIAL Left 08/01/2022    Procedure: MELLO LOCALIZED LEFT PARTIAL MASTECTOMY;  Surgeon: Elena Arroyo M.D.;  Location: SURGERY [facility];  Service: General    OTHER ORTHOPEDIC SURGERY  2019    back surgery    OTHER  2001    replace left breast implant    OTHER  1988    Left breast implant/lift    OTHER  1987    left mastectomy    LAMINOTOMY      LUMPECTOMY      PRIMARY C SECTION         Social History     Tobacco Use    Smoking status: Never    Smokeless tobacco: Never   Vaping Use    Vaping Use: Never used   Substance Use Topics    Alcohol use: Never    Drug use: Never        Family History   Problem Relation Age of Onset    Cancer Mother         Ovarian    Ovarian Cancer Mother     Dementia Father     Heart Disease  "Father     Hyperlipidemia Neg Hx        Allergies as of 01/16/2024 - Reviewed 01/16/2024   Allergen Reaction Noted    Synthroid [fd&c red #40 al fang-levothyroxine] Hives and Unspecified 05/04/2021         Current Outpatient Medications:     ferrous sulfate 325 (65 Fe) MG tablet, Take 1 Tablet by mouth every day., Disp: 90 Tablet, Rfl: 1    levothyroxine (SYNTHROID) 100 MCG Tab, Take 100 mcg by mouth every morning on an empty stomach., Disp: , Rfl:     anastrozole (ARIMIDEX) 1 MG Tab, Take 1 Tablet by mouth every day., Disp: 90 Tablet, Rfl: 3    vitamin D3 (CHOLECALCIFEROL) 5000 Unit (125 mcg) Tab, Take 1,000 Units by mouth every day., Disp: , Rfl:     Calcium Carbonate (CALCIUM 600 PO), Take  by mouth every day., Disp: , Rfl:     Cyanocobalamin (VITAMIN B12 PO), Take 150 mg by mouth every 7 days., Disp: , Rfl:     Multiple Vitamins-Minerals (SM MULTIPLE VITAMINS WOMENS PO), Take 2 Tablets by mouth every day at 6 PM., Disp: , Rfl:     Review of Systems:  Review of Systems   Constitutional: Negative.    HENT: Negative.     Eyes: Negative.    Respiratory: Negative.     Cardiovascular: Negative.    Gastrointestinal: Negative.    Genitourinary: Negative.    Musculoskeletal: Negative.    Skin: Negative.    Neurological: Negative.    Endo/Heme/Allergies: Negative.    Psychiatric/Behavioral: Negative.            Physical Exam:  Vitals:    01/16/24 0957   BP: 130/82   BP Location: Right arm   Patient Position: Sitting   Pulse: 70   Resp: 14   Temp: 37 °C (98.6 °F)   TempSrc: Temporal   SpO2: 96%   Weight: 72.7 kg (160 lb 2.6 oz)   Height: 1.626 m (5' 4\")         DESC; KARNOFSKY SCALE WITH ECOG EQUIVALENT: 100, Fully active, able to carry on all pre-disease performed without restriction (ECOG equivalent 0)    DISTRESS LEVEL: no acute distress    Physical Exam  Constitutional:       Appearance: Normal appearance.   HENT:      Head: Normocephalic.      Mouth/Throat:      Mouth: Mucous membranes are moist.      Pharynx: " Oropharynx is clear.   Eyes:      Extraocular Movements: Extraocular movements intact.      Conjunctiva/sclera: Conjunctivae normal.      Pupils: Pupils are equal, round, and reactive to light.   Cardiovascular:      Rate and Rhythm: Normal rate and regular rhythm.      Pulses: Normal pulses.   Pulmonary:      Effort: Pulmonary effort is normal.      Breath sounds: Normal breath sounds.   Chest:      Comments: Left mastectomy and reconstruction without nodularity or erythema.  There is an incision in the inframammary fold consistent with excision of the recurrent/new primary without nodularity or erythema.  Right breast shows a mamma pexy without masses nipple discharge or tenderness.  No axillary adenopathy is noted bilaterally  Abdominal:      General: Abdomen is flat. Bowel sounds are normal.      Palpations: Abdomen is soft. There is no mass.   Musculoskeletal:         General: Normal range of motion.   Skin:     General: Skin is warm.   Neurological:      General: No focal deficit present.      Mental Status: She is alert and oriented to person, place, and time.   Psychiatric:         Mood and Affect: Mood normal.         Behavior: Behavior normal.            Labs:  Hospital Outpatient Visit on 11/11/2022   Component Date Value Ref Range Status    TSH 11/11/2022 19.000 (H)  0.380 - 5.330 uIU/mL Final    Comment: The 2011 American Thyroid Association (ALEXANDRO) guidelines  recommended that the interpretation of thyroid function in  pregnancy be based on trimester specific reference ranges.    1st Trimester  0.100-2.500 mIU/L  2nd Trimester  0.200-3.000 mIU/L  3rd Trimester  0.300-3.500 mIU/L    These established reference ranges [facility] validated  [facility].      Free T-4 11/11/2022 0.55 (L)  0.93 - 1.70 ng/dL Final   Hospital Outpatient Visit on 11/01/2022   Component Date Value Ref Range Status    Course ID 11/01/2022 C1 L_breast   Final    Course Start Date 11/01/2022 [id number]    Final    Course First Treatment Date 11/01/2022 10/04/2022   Final    Course Last Treatment Date 11/01/2022 11/01/2022   Final    Course Elapsed Days 11/01/2022 28 @ [id number]   Final    Course Intent 11/01/2022 Curative   Final    RP ID 11/01/2022 L Brst Bst FB   Final    RP Dosage Given to Date (Gy) 11/01/2022 10   Final    RP Session Dosage Given (Gy) 11/01/2022 2   Final    RP ID 11/01/2022 L Brst Bst FB CP   Final    RP Dosage Given to Date (Gy) 11/01/2022 10   Final    RP Session Dosage Given (Gy) 11/01/2022 2   Final    Plan ID 11/01/2022 L Brst Bst FB   Final    Plan Name 11/01/2022 L Brst Bst FB   Final    Plan Fractions Treated to Date 11/01/2022 5 of 5   Final    Plan Prescribed Dose Per Fraction * 11/01/2022 2   Final    Plan Total Prescribed Dose (cGy) 11/01/2022 1,000   Final   Orders Only on 11/01/2022   Component Date Value Ref Range Status    Course ID 11/01/2022 C1 L_breast   Final    Course Start Date 11/01/2022 [id number]   Final    Course End Date 11/01/2022 [id number]   Final    Course First Treatment Date 11/01/2022 10/04/2022   Final    Course Last Treatment Date 11/01/2022 11/01/2022   Final    Course Elapsed Days 11/01/2022 28 @ [id number]   Final    Course Intent 11/01/2022 Curative   Final    RP ID 11/01/2022 DIBH L Breast   Final    RP Dosage Given to Date (Gy) 11/01/2022 40.05   Final    RP ID 11/01/2022 DIBH L Breast CP   Final    RP Dosage Given to Date (Gy) 11/01/2022 40.05   Final    RP ID 11/01/2022 L Brst Bst FB   Final    RP Dosage Given to Date (Gy) 11/01/2022 10   Final    RP ID 11/01/2022 L Brst Bst FB CP   Final    RP Dosage Given to Date (Gy) 11/01/2022 10   Final    Plan ID 11/01/2022 DIBH L Breast   Final    Plan Name 11/01/2022 DIBH L Breast   Final    Plan Fractions Treated to Date 11/01/2022 15 of 15   Final    Plan Prescribed Dose Per Fraction * 11/01/2022 2.67   Final    Plan Total Prescribed Dose (cGy) 11/01/2022 4,005   Final    Plan ID 11/01/2022 L  Brst Bst FB   Final    Plan Name 11/01/2022 L Brst Bst FB   Final    Plan Fractions Treated to Date 11/01/2022 5 of 5   Final    Plan Prescribed Dose Per Fraction * 11/01/2022 2   Final    Plan Total Prescribed Dose (cGy) 11/01/2022 1,000   Final   Hospital Outpatient Visit on 10/31/2022   Component Date Value Ref Range Status    Course ID 10/31/2022 C1 L_breast   Final    Course Start Date 10/31/2022 [id number]   Final    Course First Treatment Date 10/31/2022 10/04/2022   Final    Course Last Treatment Date 10/31/2022 10/31/2022   Final    Course Elapsed Days 10/31/2022 27 @ [id number]   Final    Course Intent 10/31/2022 Curative   Final    RP ID 10/31/2022 L Brst Bst FB   Final    RP Dosage Given to Date (Gy) 10/31/2022 8   Final    RP Session Dosage Given (Gy) 10/31/2022 2   Final    RP ID 10/31/2022 L Brst Bst FB CP   Final    RP Dosage Given to Date (Gy) 10/31/2022 8   Final    RP Session Dosage Given (Gy) 10/31/2022 2   Final    Plan ID 10/31/2022 L Brst Bst FB   Final    Plan Name 10/31/2022 L Brst Bst FB   Final    Plan Fractions Treated to Date 10/31/2022 4 of 5   Final    Plan Prescribed Dose Per Fraction * 10/31/2022 2   Final    Plan Total Prescribed Dose (cGy) 10/31/2022 1,000   Final       Imaging:   All listed images below have been independently reviewed by me. I agree with the findings as summarized below:    No results found.     Pathology:      Assessment & Plan:      1.  History of left breast cancer 34 years ago status postmastectomy without systemic treatment.  2.  Inframammary recurrence on the left side, invasive mammary carcinoma likely occurring in a rest of residual breast tissue versus less likely recurrent disease.  Tumor was grade 2, stage I (pT1c, PN X) ER greater than 95%, NM greater than 95%, HER2 negative Ki-67 low.  Oncotype DX recurrence score of 0.  Status post radiation therapy to the regional area.  On anastrozole from December 2022 with excellent tolerance, no evidence  of disease.    Plan: Continue anastrozole.  DEXA scan in late May 2024.  I will see her back in 6 months.  Any questions and concerns raised by the patient were answered to the best of my ability. Thank you for allowing me to participate in the care for this patient. Please feel free to contact me for any questions or concerns.     Casimiro Fowler M.D.

## 2024-01-17 NOTE — ADDENDUM NOTE
Encounter addended by: Diony Chase, Med Ass't on: 1/16/2024 4:06 PM   Actions taken: Charge Capture section accepted Spiral Flap Text: The defect edges were debeveled with a #15 scalpel blade.  Given the location of the defect, shape of the defect and the proximity to free margins a spiral flap was deemed most appropriate.  Using a sterile surgical marker, an appropriate rotation flap was drawn incorporating the defect and placing the expected incisions within the relaxed skin tension lines where possible. The area thus outlined was incised deep to adipose tissue with a #15 scalpel blade.  The skin margins were undermined to an appropriate distance in all directions utilizing iris scissors.

## 2024-03-11 ENCOUNTER — OFFICE VISIT (OUTPATIENT)
Dept: URGENT CARE | Facility: CLINIC | Age: 79
End: 2024-03-11
Payer: COMMERCIAL

## 2024-03-11 VITALS
DIASTOLIC BLOOD PRESSURE: 70 MMHG | WEIGHT: 159 LBS | HEIGHT: 64 IN | OXYGEN SATURATION: 95 % | SYSTOLIC BLOOD PRESSURE: 142 MMHG | HEART RATE: 85 BPM | BODY MASS INDEX: 27.14 KG/M2 | TEMPERATURE: 98.5 F | RESPIRATION RATE: 16 BRPM

## 2024-03-11 DIAGNOSIS — J06.9 VIRAL URI WITH COUGH: ICD-10-CM

## 2024-03-11 PROCEDURE — 3078F DIAST BP <80 MM HG: CPT | Performed by: NURSE PRACTITIONER

## 2024-03-11 PROCEDURE — 99213 OFFICE O/P EST LOW 20 MIN: CPT | Performed by: NURSE PRACTITIONER

## 2024-03-11 PROCEDURE — 3077F SYST BP >= 140 MM HG: CPT | Performed by: NURSE PRACTITIONER

## 2024-03-11 RX ORDER — BENZONATATE 200 MG/1
200 CAPSULE ORAL 3 TIMES DAILY PRN
Qty: 60 CAPSULE | Refills: 0 | Status: SHIPPED | OUTPATIENT
Start: 2024-03-11

## 2024-03-11 ASSESSMENT — ENCOUNTER SYMPTOMS
SPUTUM PRODUCTION: 1
EYE DISCHARGE: 0
SORE THROAT: 1
MYALGIAS: 0
COUGH: 1
DIARRHEA: 0
NAUSEA: 0
SHORTNESS OF BREATH: 0
CHILLS: 0
ORTHOPNEA: 0
WHEEZING: 0
FEVER: 0

## 2024-03-11 ASSESSMENT — FIBROSIS 4 INDEX: FIB4 SCORE: 1.54

## 2024-03-11 NOTE — PROGRESS NOTES
"Subjective     Rosangela Reena Gudino is a 78 y.o. female who presents with Pharyngitis (X 1 week), Nasal Congestion (X 1 week), and Cough (Dry Cough patient states x 1 week)            HPI  Problem.  Patient is a 78-year-old female presents with a 5-day history of sore throat, nasal congestion, and dry cough.  She denies fever, chills but does endorse fatigue.  She denies any body aches or headache.  She denies nausea or diarrhea.  She has been taking over-the-counter medications for her symptoms.  She is not vaccinated for COVID and declines testing today.    Review of Systems   Constitutional:  Positive for malaise/fatigue. Negative for chills and fever.   HENT:  Positive for congestion and sore throat.    Eyes:  Negative for discharge.   Respiratory:  Positive for cough and sputum production. Negative for shortness of breath and wheezing.    Cardiovascular:  Negative for chest pain and orthopnea.   Gastrointestinal:  Negative for diarrhea and nausea.   Musculoskeletal:  Negative for myalgias.   Endo/Heme/Allergies:  Negative for environmental allergies.   All other systems reviewed and are negative.             Objective     BP (!) 142/70 (BP Location: Left arm, Patient Position: Sitting, BP Cuff Size: Large adult)   Pulse 85   Temp 36.9 °C (98.5 °F)   Resp 16   Ht 1.626 m (5' 4\")   Wt 72.1 kg (159 lb)   LMP  (LMP Unknown)   SpO2 95%   BMI 27.29 kg/m²      Physical Exam  Vitals and nursing note reviewed.   Constitutional:       General: She is not in acute distress.     Appearance: Normal appearance. She is well-developed.   HENT:      Head: Normocephalic.      Right Ear: Tympanic membrane and external ear normal.      Left Ear: Tympanic membrane and external ear normal.      Nose: Mucosal edema, congestion and rhinorrhea present.      Mouth/Throat:      Pharynx: No posterior oropharyngeal erythema.   Eyes:      General:         Right eye: No discharge.         Left eye: No discharge.      " Conjunctiva/sclera: Conjunctivae normal.   Cardiovascular:      Rate and Rhythm: Normal rate and regular rhythm.      Heart sounds: Normal heart sounds.   Pulmonary:      Effort: Pulmonary effort is normal.      Breath sounds: Normal breath sounds.   Musculoskeletal:         General: Normal range of motion.      Cervical back: Normal range of motion and neck supple.   Lymphadenopathy:      Cervical: No cervical adenopathy.   Skin:     General: Skin is warm and dry.   Neurological:      Mental Status: She is alert and oriented to person, place, and time.   Psychiatric:         Behavior: Behavior normal.         Thought Content: Thought content normal.                             Assessment & Plan        1. Viral URI with cough  benzonatate (TESSALON) 200 MG capsule        Viral illness at this time with no indication for antibiotics. Reviewed with patient expected course of illness and also reviewed OTC medications that may be used for symptom relief. Follow up 7-10 days if not improving.

## 2024-05-29 ENCOUNTER — APPOINTMENT (OUTPATIENT)
Dept: RADIOLOGY | Facility: MEDICAL CENTER | Age: 79
End: 2024-05-29
Attending: INTERNAL MEDICINE
Payer: COMMERCIAL

## 2024-06-26 ENCOUNTER — HOSPITAL ENCOUNTER (OUTPATIENT)
Dept: RADIOLOGY | Facility: MEDICAL CENTER | Age: 79
End: 2024-06-26
Attending: INTERNAL MEDICINE
Payer: COMMERCIAL

## 2024-06-26 DIAGNOSIS — K31.9 DISEASE OF STOMACH: ICD-10-CM

## 2024-06-26 PROCEDURE — 700112 HCHG RX REV CODE 229: Performed by: INTERNAL MEDICINE

## 2024-06-26 PROCEDURE — 74246 X-RAY XM UPR GI TRC 2CNTRST: CPT

## 2024-06-26 PROCEDURE — A9270 NON-COVERED ITEM OR SERVICE: HCPCS | Performed by: INTERNAL MEDICINE

## 2024-06-26 RX ADMIN — ANTACID/ANTIFLATULENT 1 PACKET: 380; 550; 10; 10 GRANULE, EFFERVESCENT ORAL at 10:31

## 2024-06-27 ENCOUNTER — OFFICE VISIT (OUTPATIENT)
Dept: MEDICAL GROUP | Age: 79
End: 2024-06-27
Payer: COMMERCIAL

## 2024-06-27 ENCOUNTER — HOSPITAL ENCOUNTER (OUTPATIENT)
Dept: LAB | Facility: MEDICAL CENTER | Age: 79
End: 2024-06-27
Attending: PHYSICIAN ASSISTANT
Payer: COMMERCIAL

## 2024-06-27 VITALS
RESPIRATION RATE: 16 BRPM | BODY MASS INDEX: 26.63 KG/M2 | HEART RATE: 76 BPM | HEIGHT: 64 IN | OXYGEN SATURATION: 97 % | TEMPERATURE: 97 F | WEIGHT: 156 LBS | SYSTOLIC BLOOD PRESSURE: 130 MMHG | DIASTOLIC BLOOD PRESSURE: 70 MMHG

## 2024-06-27 DIAGNOSIS — K31.89 MASS OF STOMACH: ICD-10-CM

## 2024-06-27 DIAGNOSIS — C50.319 MALIGNANT NEOPLASM OF LOWER-INNER QUADRANT OF FEMALE BREAST, UNSPECIFIED ESTROGEN RECEPTOR STATUS, UNSPECIFIED LATERALITY (HCC): ICD-10-CM

## 2024-06-27 LAB
ANION GAP SERPL CALC-SCNC: 10 MMOL/L (ref 7–16)
BUN SERPL-MCNC: 15 MG/DL (ref 8–22)
CALCIUM SERPL-MCNC: 9.3 MG/DL (ref 8.4–10.2)
CHLORIDE SERPL-SCNC: 103 MMOL/L (ref 96–112)
CO2 SERPL-SCNC: 26 MMOL/L (ref 20–33)
CREAT SERPL-MCNC: 0.82 MG/DL (ref 0.5–1.4)
GFR SERPLBLD CREATININE-BSD FMLA CKD-EPI: 73 ML/MIN/1.73 M 2
GLUCOSE SERPL-MCNC: 104 MG/DL (ref 65–99)
POTASSIUM SERPL-SCNC: 4 MMOL/L (ref 3.6–5.5)
SODIUM SERPL-SCNC: 139 MMOL/L (ref 135–145)

## 2024-06-27 PROCEDURE — 80048 BASIC METABOLIC PNL TOTAL CA: CPT

## 2024-06-27 PROCEDURE — 36415 COLL VENOUS BLD VENIPUNCTURE: CPT

## 2024-06-27 RX ORDER — LEVOTHYROXINE SODIUM 0.1 MG/1
TABLET ORAL
COMMUNITY
End: 2024-06-27

## 2024-06-27 RX ORDER — ANASTROZOLE 1 MG/1
1 TABLET ORAL
COMMUNITY
End: 2024-06-27

## 2024-06-27 RX ORDER — OFLOXACIN 3 MG/ML
SOLUTION/ DROPS OPHTHALMIC
COMMUNITY
Start: 2024-05-23 | End: 2024-06-27

## 2024-06-27 RX ORDER — HYDROCODONE BITARTRATE AND ACETAMINOPHEN 5; 325 MG/1; MG/1
TABLET ORAL
COMMUNITY
Start: 2024-05-23 | End: 2024-06-27

## 2024-06-27 RX ORDER — THYROID 60 MG/1
TABLET ORAL
COMMUNITY
End: 2024-06-27

## 2024-06-27 RX ORDER — PREDNISOLONE ACETATE 10 MG/ML
SUSPENSION/ DROPS OPHTHALMIC
COMMUNITY
Start: 2024-05-23 | End: 2024-06-27

## 2024-06-27 RX ORDER — PNV NO.95/FERROUS FUM/FOLIC AC 28MG-0.8MG
TABLET ORAL
COMMUNITY

## 2024-06-27 ASSESSMENT — PATIENT HEALTH QUESTIONNAIRE - PHQ9: CLINICAL INTERPRETATION OF PHQ2 SCORE: 0

## 2024-06-27 ASSESSMENT — FIBROSIS 4 INDEX: FIB4 SCORE: 1.54

## 2024-06-27 NOTE — PROGRESS NOTES
cc: Abnormal imaging    Subjective:     HPI    History of Present Illness  The patient is a 78-year-old female who presents for evaluation of stomach mass.  It has been about 2 years since she has been seen by myself, managed by another PCP within our group.  She would like to eventually reestablish care.    The patient underwent a fluoroscopy procedure yesterday, which was ordered by [facility].  This was completed due to an iron deficiency.  She is continue to take oral iron supplements.  A colonoscopy was performed in 12/2023, which yielded normal results. She has a medical history of breast cancer, which is currently in remission, managed with Armidex. She is scheduled for a follow-up with her oncologist in 07/16/2024, whom she sees biannually.  She is very concerned about the imaging results on the fluoroscopy, with results as below.  She has not been contacted by the gastroenterologist yet.   The patient denies experiencing nausea, vomiting, melena, hematochezia, constipation, diarrhea bloating, abdominal pain, weight loss, hot flashes, or night sweats.       The fluoroscopy results:  1. The stomach has normal contours but a featureless appearance with no discernible folds. These are nonspecific findings on the UGI series.. There is concern for a constricting or infiltrating mass, a CT is recommended.     Review of systems:  See above.       Current Outpatient Medications:     Ferrous Sulfate (IRON) 325 (65 Fe) MG Tab, 1 tablet Orally every other day, Disp: , Rfl:     liothyronine (CYTOMEL) 5 MCG Tab, 1 tablet on an empty stomach Orally Once a day for 90 days, Disp: , Rfl:     ferrous sulfate 325 (65 Fe) MG tablet, Take 1 Tablet by mouth every day., Disp: 90 Tablet, Rfl: 1    levothyroxine (SYNTHROID) 100 MCG Tab, Take 100 mcg by mouth every morning on an empty stomach., Disp: , Rfl:     anastrozole (ARIMIDEX) 1 MG Tab, Take 1 Tablet by mouth every day., Disp: 90 Tablet, Rfl: 3    vitamin D3  "(CHOLECALCIFEROL) 5000 Unit (125 mcg) Tab, Take 1,000 Units by mouth every day., Disp: , Rfl:     Calcium Carbonate (CALCIUM 600 PO), Take  by mouth every day., Disp: , Rfl:     Cyanocobalamin (VITAMIN B12 PO), Take 150 mg by mouth every 7 days., Disp: , Rfl:     Multiple Vitamins-Minerals (SM MULTIPLE VITAMINS WOMENS PO), Take 2 Tablets by mouth every day at 6 PM., Disp: , Rfl:     Allergies, past medical history, past surgical history, family history, social history reviewed and updated    Objective:     Vitals: /70 (BP Location: Left arm, Patient Position: Sitting, BP Cuff Size: Adult)   Pulse 76   Temp 36.1 °C (97 °F) (Temporal)   Resp 16   Ht 1.626 m (5' 4\")   Wt 70.8 kg (156 lb)   LMP  (LMP Unknown)   SpO2 97%   BMI 26.78 kg/m²   General: Alert, pleasant, NAD  HEENT: Normocephalic. Neck supple.   No carotid bruits   Heart: Regular rate and rhythm.  S1 and S2 normal.  No murmurs appreciated.  Respiratory: Normal respiratory effort.  Clear to auscultation bilaterally.  Skin: Warm, dry, no rashes.  Extremities: No leg edema.  Radial pulses 2+ symmetric  Psych:  Affect/mood is normal, judgement is good, memory is intact, grooming is appropriate.    Assessment/Plan:     Rosangela was seen today for requesting labs.    Diagnoses and all orders for this visit:    Mass of stomach  -Concerning finding on fluoroscopy constricting or infiltrating mass with CT recommended.  She does have history of breast cancer.  She has not been contacted by gastroenterology yet to review the results.  Will go ahead and evaluate further with CT chest, abdomen, pelvis.  These have been ordered stat.  She is currently asymptomatic.  She is already established with oncologist, if there are concerning findings on CT scan will advise follow-up sooner with her oncologist.  -     CT-CHEST,ABDOMEN,PELVIS WITH; Future  -     Basic Metabolic Panel; Future    Malignant neoplasm of lower-inner quadrant of female breast, unspecified estrogen " receptor status, unspecified laterality (HCC)  -     CT-CHEST,ABDOMEN,PELVIS WITH; Future  -     Basic Metabolic Panel; Future        Return in about 2 weeks (around 7/11/2024) for Imaging review/reestablish care.

## 2024-06-28 ENCOUNTER — APPOINTMENT (OUTPATIENT)
Dept: RADIOLOGY | Facility: MEDICAL CENTER | Age: 79
End: 2024-06-28
Attending: PHYSICIAN ASSISTANT
Payer: COMMERCIAL

## 2024-06-28 DIAGNOSIS — C50.319 MALIGNANT NEOPLASM OF LOWER-INNER QUADRANT OF FEMALE BREAST, UNSPECIFIED ESTROGEN RECEPTOR STATUS, UNSPECIFIED LATERALITY (HCC): ICD-10-CM

## 2024-06-28 DIAGNOSIS — K86.89 PANCREATIC MASS: ICD-10-CM

## 2024-06-28 DIAGNOSIS — C50.812 CANCER OF OVERLAPPING SITES OF LEFT BREAST (HCC): ICD-10-CM

## 2024-06-28 DIAGNOSIS — K31.89 MASS OF STOMACH: ICD-10-CM

## 2024-06-28 PROCEDURE — 700117 HCHG RX CONTRAST REV CODE 255: Performed by: PHYSICIAN ASSISTANT

## 2024-06-28 PROCEDURE — 71260 CT THORAX DX C+: CPT

## 2024-06-28 RX ORDER — ANASTROZOLE 1 MG/1
1 TABLET ORAL DAILY
Qty: 90 TABLET | Refills: 0 | Status: SHIPPED | OUTPATIENT
Start: 2024-06-28

## 2024-06-28 RX ADMIN — IOHEXOL 100 ML: 350 INJECTION, SOLUTION INTRAVENOUS at 15:11

## 2024-07-02 ENCOUNTER — TELEPHONE (OUTPATIENT)
Dept: HEMATOLOGY ONCOLOGY | Facility: MEDICAL CENTER | Age: 79
End: 2024-07-02
Payer: COMMERCIAL

## 2024-07-02 DIAGNOSIS — K86.89 PANCREATIC MASS: ICD-10-CM

## 2024-07-03 ENCOUNTER — TELEPHONE (OUTPATIENT)
Dept: HEMATOLOGY ONCOLOGY | Facility: MEDICAL CENTER | Age: 79
End: 2024-07-03
Payer: COMMERCIAL

## 2024-07-08 ENCOUNTER — HOSPITAL ENCOUNTER (OUTPATIENT)
Dept: RADIOLOGY | Facility: MEDICAL CENTER | Age: 79
End: 2024-07-08
Attending: INTERNAL MEDICINE
Payer: COMMERCIAL

## 2024-07-08 DIAGNOSIS — C50.812 CANCER OF OVERLAPPING SITES OF LEFT BREAST (HCC): ICD-10-CM

## 2024-07-08 DIAGNOSIS — Z79.811 LONG TERM (CURRENT) USE OF AROMATASE INHIBITORS: ICD-10-CM

## 2024-07-08 PROCEDURE — 77080 DXA BONE DENSITY AXIAL: CPT

## 2024-07-10 PROBLEM — M65.331 TRIGGER FINGER, RIGHT MIDDLE FINGER: Status: ACTIVE | Noted: 2024-07-10

## 2024-07-11 SDOH — ECONOMIC STABILITY: INCOME INSECURITY: IN THE LAST 12 MONTHS, WAS THERE A TIME WHEN YOU WERE NOT ABLE TO PAY THE MORTGAGE OR RENT ON TIME?: NO

## 2024-07-11 SDOH — HEALTH STABILITY: PHYSICAL HEALTH: ON AVERAGE, HOW MANY DAYS PER WEEK DO YOU ENGAGE IN MODERATE TO STRENUOUS EXERCISE (LIKE A BRISK WALK)?: 2 DAYS

## 2024-07-11 SDOH — ECONOMIC STABILITY: FOOD INSECURITY: WITHIN THE PAST 12 MONTHS, THE FOOD YOU BOUGHT JUST DIDN'T LAST AND YOU DIDN'T HAVE MONEY TO GET MORE.: NEVER TRUE

## 2024-07-11 SDOH — ECONOMIC STABILITY: FOOD INSECURITY: WITHIN THE PAST 12 MONTHS, YOU WORRIED THAT YOUR FOOD WOULD RUN OUT BEFORE YOU GOT MONEY TO BUY MORE.: NEVER TRUE

## 2024-07-11 SDOH — HEALTH STABILITY: PHYSICAL HEALTH: ON AVERAGE, HOW MANY MINUTES DO YOU ENGAGE IN EXERCISE AT THIS LEVEL?: 10 MIN

## 2024-07-11 SDOH — ECONOMIC STABILITY: INCOME INSECURITY: HOW HARD IS IT FOR YOU TO PAY FOR THE VERY BASICS LIKE FOOD, HOUSING, MEDICAL CARE, AND HEATING?: NOT HARD AT ALL

## 2024-07-11 SDOH — ECONOMIC STABILITY: HOUSING INSECURITY: IN THE LAST 12 MONTHS, HOW MANY PLACES HAVE YOU LIVED?: 1

## 2024-07-11 ASSESSMENT — SOCIAL DETERMINANTS OF HEALTH (SDOH)
HOW MANY DRINKS CONTAINING ALCOHOL DO YOU HAVE ON A TYPICAL DAY WHEN YOU ARE DRINKING: PATIENT DOES NOT DRINK
IN THE PAST 12 MONTHS, HAS THE ELECTRIC, GAS, OIL, OR WATER COMPANY THREATENED TO SHUT OFF SERVICE IN YOUR HOME?: NO
IN A TYPICAL WEEK, HOW MANY TIMES DO YOU TALK ON THE PHONE WITH FAMILY, FRIENDS, OR NEIGHBORS?: MORE THAN THREE TIMES A WEEK
DO YOU BELONG TO ANY CLUBS OR ORGANIZATIONS SUCH AS CHURCH GROUPS UNIONS, FRATERNAL OR ATHLETIC GROUPS, OR SCHOOL GROUPS?: YES
HOW HARD IS IT FOR YOU TO PAY FOR THE VERY BASICS LIKE FOOD, HOUSING, MEDICAL CARE, AND HEATING?: NOT HARD AT ALL
HOW OFTEN DO YOU GET TOGETHER WITH FRIENDS OR RELATIVES?: TWICE A WEEK
IN A TYPICAL WEEK, HOW MANY TIMES DO YOU TALK ON THE PHONE WITH FAMILY, FRIENDS, OR NEIGHBORS?: MORE THAN THREE TIMES A WEEK
HOW OFTEN DO YOU ATTEND CHURCH OR RELIGIOUS SERVICES?: PATIENT DECLINED
HOW OFTEN DO YOU ATTEND CHURCH OR RELIGIOUS SERVICES?: PATIENT DECLINED
HOW OFTEN DO YOU HAVE SIX OR MORE DRINKS ON ONE OCCASION: NEVER
HOW OFTEN DO YOU ATTENT MEETINGS OF THE CLUB OR ORGANIZATION YOU BELONG TO?: MORE THAN 4 TIMES PER YEAR
DO YOU BELONG TO ANY CLUBS OR ORGANIZATIONS SUCH AS CHURCH GROUPS UNIONS, FRATERNAL OR ATHLETIC GROUPS, OR SCHOOL GROUPS?: YES
HOW OFTEN DO YOU ATTENT MEETINGS OF THE CLUB OR ORGANIZATION YOU BELONG TO?: MORE THAN 4 TIMES PER YEAR
HOW OFTEN DO YOU GET TOGETHER WITH FRIENDS OR RELATIVES?: TWICE A WEEK
HOW OFTEN DO YOU HAVE A DRINK CONTAINING ALCOHOL: MONTHLY OR LESS
WITHIN THE PAST 12 MONTHS, YOU WORRIED THAT YOUR FOOD WOULD RUN OUT BEFORE YOU GOT THE MONEY TO BUY MORE: NEVER TRUE

## 2024-07-11 ASSESSMENT — LIFESTYLE VARIABLES
AUDIT-C TOTAL SCORE: 1
HOW MANY STANDARD DRINKS CONTAINING ALCOHOL DO YOU HAVE ON A TYPICAL DAY: PATIENT DOES NOT DRINK
HOW OFTEN DO YOU HAVE SIX OR MORE DRINKS ON ONE OCCASION: NEVER
HOW OFTEN DO YOU HAVE A DRINK CONTAINING ALCOHOL: MONTHLY OR LESS
SKIP TO QUESTIONS 9-10: 1

## 2024-07-12 ENCOUNTER — OFFICE VISIT (OUTPATIENT)
Dept: MEDICAL GROUP | Age: 79
End: 2024-07-12
Payer: COMMERCIAL

## 2024-07-12 VITALS
BODY MASS INDEX: 27.46 KG/M2 | RESPIRATION RATE: 17 BRPM | DIASTOLIC BLOOD PRESSURE: 80 MMHG | HEART RATE: 72 BPM | TEMPERATURE: 98.1 F | HEIGHT: 63 IN | SYSTOLIC BLOOD PRESSURE: 140 MMHG | OXYGEN SATURATION: 95 % | WEIGHT: 155 LBS

## 2024-07-12 DIAGNOSIS — M65.331 TRIGGER FINGER, RIGHT MIDDLE FINGER: ICD-10-CM

## 2024-07-12 DIAGNOSIS — M81.0 AGE-RELATED OSTEOPOROSIS WITHOUT CURRENT PATHOLOGICAL FRACTURE: ICD-10-CM

## 2024-07-12 DIAGNOSIS — C50.812 CANCER OF OVERLAPPING SITES OF LEFT BREAST (HCC): ICD-10-CM

## 2024-07-12 DIAGNOSIS — E06.3 HASHIMOTO'S THYROIDITIS: ICD-10-CM

## 2024-07-12 DIAGNOSIS — E61.1 IRON DEFICIENCY: ICD-10-CM

## 2024-07-12 DIAGNOSIS — E53.8 VITAMIN B 12 DEFICIENCY: ICD-10-CM

## 2024-07-12 DIAGNOSIS — Z76.89 ESTABLISHING CARE WITH NEW DOCTOR, ENCOUNTER FOR: ICD-10-CM

## 2024-07-12 DIAGNOSIS — K86.89 PANCREATIC MASS: ICD-10-CM

## 2024-07-12 DIAGNOSIS — E55.9 HYPOVITAMINOSIS D: ICD-10-CM

## 2024-07-12 PROBLEM — C50.919 MALIGNANT TUMOR OF BREAST (HCC): Status: RESOLVED | Noted: 2022-07-11 | Resolved: 2024-07-12

## 2024-07-12 PROCEDURE — 99215 OFFICE O/P EST HI 40 MIN: CPT | Performed by: PHYSICIAN ASSISTANT

## 2024-07-12 PROCEDURE — 3079F DIAST BP 80-89 MM HG: CPT | Performed by: PHYSICIAN ASSISTANT

## 2024-07-12 PROCEDURE — 3077F SYST BP >= 140 MM HG: CPT | Performed by: PHYSICIAN ASSISTANT

## 2024-07-12 ASSESSMENT — FIBROSIS 4 INDEX: FIB4 SCORE: 1.54

## 2024-07-16 ENCOUNTER — HOSPITAL ENCOUNTER (OUTPATIENT)
Dept: HEMATOLOGY ONCOLOGY | Facility: MEDICAL CENTER | Age: 79
End: 2024-07-16
Attending: INTERNAL MEDICINE
Payer: COMMERCIAL

## 2024-07-16 VITALS
OXYGEN SATURATION: 97 % | SYSTOLIC BLOOD PRESSURE: 122 MMHG | TEMPERATURE: 98.3 F | HEART RATE: 93 BPM | BODY MASS INDEX: 28.05 KG/M2 | DIASTOLIC BLOOD PRESSURE: 70 MMHG | WEIGHT: 155.87 LBS

## 2024-07-16 DIAGNOSIS — M81.0 AGE-RELATED OSTEOPOROSIS WITHOUT CURRENT PATHOLOGICAL FRACTURE: ICD-10-CM

## 2024-07-16 DIAGNOSIS — C50.812 CANCER OF OVERLAPPING SITES OF LEFT BREAST (HCC): ICD-10-CM

## 2024-07-16 PROCEDURE — 99212 OFFICE O/P EST SF 10 MIN: CPT | Performed by: INTERNAL MEDICINE

## 2024-07-16 PROCEDURE — 99214 OFFICE O/P EST MOD 30 MIN: CPT | Performed by: INTERNAL MEDICINE

## 2024-07-16 RX ORDER — IBANDRONATE SODIUM 150 MG/1
150 TABLET, FILM COATED ORAL
Qty: 3 TABLET | Refills: 4 | Status: SHIPPED | OUTPATIENT
Start: 2024-07-16

## 2024-07-16 RX ORDER — TAMOXIFEN CITRATE 20 MG/1
20 TABLET ORAL DAILY
Qty: 60 TABLET | Refills: 3 | Status: SHIPPED | OUTPATIENT
Start: 2024-07-16

## 2024-07-16 ASSESSMENT — ENCOUNTER SYMPTOMS
CONSTITUTIONAL NEGATIVE: 1
EYES NEGATIVE: 1
GASTROINTESTINAL NEGATIVE: 1
PSYCHIATRIC NEGATIVE: 1
NEUROLOGICAL NEGATIVE: 1
CARDIOVASCULAR NEGATIVE: 1
RESPIRATORY NEGATIVE: 1
MUSCULOSKELETAL NEGATIVE: 1

## 2024-07-16 ASSESSMENT — FIBROSIS 4 INDEX: FIB4 SCORE: 1.54

## 2024-07-16 ASSESSMENT — PAIN SCALES - GENERAL: PAINLEVEL: NO PAIN

## 2024-07-22 LAB
BASOPHILS # BLD AUTO: 0 X10E3/UL (ref 0–0.2)
BASOPHILS NFR BLD AUTO: 0 %
EOSINOPHIL # BLD AUTO: 0 X10E3/UL (ref 0–0.4)
EOSINOPHIL NFR BLD AUTO: 0 %
ERYTHROCYTE [DISTWIDTH] IN BLOOD BY AUTOMATED COUNT: 12.9 % (ref 11.7–15.4)
FERRITIN SERPL-MCNC: 121 NG/ML (ref 15–150)
HCT VFR BLD AUTO: 46.5 % (ref 34–46.6)
HGB BLD-MCNC: 15.1 G/DL (ref 11.1–15.9)
IMM GRANULOCYTES # BLD AUTO: 0 X10E3/UL (ref 0–0.1)
IMM GRANULOCYTES NFR BLD AUTO: 0 %
IMMATURE CELLS  115398: NORMAL
IRON SATN MFR SERPL: 38 % (ref 15–55)
IRON SERPL-MCNC: 120 UG/DL (ref 27–139)
LYMPHOCYTES # BLD AUTO: 1 X10E3/UL (ref 0.7–3.1)
LYMPHOCYTES NFR BLD AUTO: 20 %
MCH RBC QN AUTO: 29.8 PG (ref 26.6–33)
MCHC RBC AUTO-ENTMCNC: 32.5 G/DL (ref 31.5–35.7)
MCV RBC AUTO: 92 FL (ref 79–97)
MONOCYTES # BLD AUTO: 0.3 X10E3/UL (ref 0.1–0.9)
MONOCYTES NFR BLD AUTO: 7 %
MORPHOLOGY BLD-IMP: NORMAL
NEUTROPHILS # BLD AUTO: 3.8 X10E3/UL (ref 1.4–7)
NEUTROPHILS NFR BLD AUTO: 73 %
NRBC BLD AUTO-RTO: NORMAL %
PLATELET # BLD AUTO: 234 X10E3/UL (ref 150–450)
RBC # BLD AUTO: 5.07 X10E6/UL (ref 3.77–5.28)
TIBC SERPL-MCNC: 320 UG/DL (ref 250–450)
UIBC SERPL-MCNC: 200 UG/DL (ref 118–369)
WBC # BLD AUTO: 5.2 X10E3/UL (ref 3.4–10.8)

## 2024-07-30 ENCOUNTER — OFFICE VISIT (OUTPATIENT)
Dept: SURGICAL ONCOLOGY | Facility: MEDICAL CENTER | Age: 79
End: 2024-07-30
Payer: COMMERCIAL

## 2024-07-30 VITALS
DIASTOLIC BLOOD PRESSURE: 76 MMHG | SYSTOLIC BLOOD PRESSURE: 138 MMHG | HEIGHT: 62 IN | WEIGHT: 157 LBS | BODY MASS INDEX: 28.89 KG/M2 | TEMPERATURE: 98.3 F | HEART RATE: 68 BPM | OXYGEN SATURATION: 93 %

## 2024-07-30 DIAGNOSIS — Z92.21 S/P CHEMOTHERAPY, TIME SINCE GREATER THAN 12 WEEKS: ICD-10-CM

## 2024-07-30 DIAGNOSIS — K86.89 PANCREATIC MASS: ICD-10-CM

## 2024-07-30 DIAGNOSIS — Z90.10 HISTORY OF MASTECTOMY, UNSPECIFIED LATERALITY: ICD-10-CM

## 2024-07-30 DIAGNOSIS — C50.319 MALIGNANT NEOPLASM OF LOWER-INNER QUADRANT OF FEMALE BREAST, UNSPECIFIED ESTROGEN RECEPTOR STATUS, UNSPECIFIED LATERALITY (HCC): ICD-10-CM

## 2024-07-30 ASSESSMENT — FIBROSIS 4 INDEX: FIB4 SCORE: 1.65

## 2024-07-31 ENCOUNTER — APPOINTMENT (OUTPATIENT)
Dept: ADMISSIONS | Facility: MEDICAL CENTER | Age: 79
DRG: 407 | End: 2024-07-31
Attending: SURGERY
Payer: COMMERCIAL

## 2024-08-02 ENCOUNTER — APPOINTMENT (OUTPATIENT)
Dept: ADMISSIONS | Facility: MEDICAL CENTER | Age: 79
DRG: 407 | End: 2024-08-02
Attending: SURGERY
Payer: COMMERCIAL

## 2024-08-02 DIAGNOSIS — Z01.812 PRE-OPERATIVE LABORATORY EXAMINATION: ICD-10-CM

## 2024-08-02 LAB — EKG IMPRESSION: NORMAL

## 2024-08-02 PROCEDURE — 93010 ELECTROCARDIOGRAM REPORT: CPT | Performed by: INTERNAL MEDICINE

## 2024-08-02 PROCEDURE — 93005 ELECTROCARDIOGRAM TRACING: CPT

## 2024-08-02 NOTE — OR NURSING
RN pre admit tele appointment complete.  Medication and fasting instructions given per anesthesia protocol.  Instructed to hold all vitamin and herbal supplements for 7 days and hold all NSAIDs for 5 days prior to surgery, hold tamoxifen and ibandronate day of surgery unless otherwise instructed by physician.  Patient stated understanding of all instructions and had no further questions.  Patient would like to be Bloodless.  Informed Mel at Dr. Espinoza's office of the above.

## 2024-08-04 ENCOUNTER — ANESTHESIA EVENT (OUTPATIENT)
Dept: SURGERY | Facility: MEDICAL CENTER | Age: 79
DRG: 407 | End: 2024-08-04
Payer: COMMERCIAL

## 2024-08-05 ENCOUNTER — ANESTHESIA (OUTPATIENT)
Dept: SURGERY | Facility: MEDICAL CENTER | Age: 79
DRG: 407 | End: 2024-08-05
Payer: COMMERCIAL

## 2024-08-05 ENCOUNTER — HOSPITAL ENCOUNTER (INPATIENT)
Facility: MEDICAL CENTER | Age: 79
LOS: 4 days | DRG: 407 | End: 2024-08-09
Attending: SURGERY | Admitting: SURGERY
Payer: COMMERCIAL

## 2024-08-05 DIAGNOSIS — Z98.890 S/P EXPLORATORY LAPAROTOMY: ICD-10-CM

## 2024-08-05 DIAGNOSIS — K86.89 PANCREATIC MASS: ICD-10-CM

## 2024-08-05 DIAGNOSIS — K86.89 MASS OF PANCREAS: ICD-10-CM

## 2024-08-05 DIAGNOSIS — R06.09 DYSPNEA ON EXERTION: ICD-10-CM

## 2024-08-05 PROBLEM — C50.919 MALIGNANT TUMOR OF BREAST (HCC): Status: RESOLVED | Noted: 2022-07-11 | Resolved: 2024-08-05

## 2024-08-05 PROBLEM — Z85.3 HISTORY OF BREAST CANCER IN ADULTHOOD: Status: ACTIVE | Noted: 2024-08-05

## 2024-08-05 LAB
ERYTHROCYTE [DISTWIDTH] IN BLOOD BY AUTOMATED COUNT: 42 FL (ref 35.9–50)
HCT VFR BLD AUTO: 39.9 % (ref 37–47)
HGB BLD-MCNC: 13.5 G/DL (ref 12–16)
MCH RBC QN AUTO: 29.9 PG (ref 27–33)
MCHC RBC AUTO-ENTMCNC: 33.8 G/DL (ref 32.2–35.5)
MCV RBC AUTO: 88.3 FL (ref 81.4–97.8)
PATHOLOGY CONSULT NOTE: NORMAL
PLATELET # BLD AUTO: 195 K/UL (ref 164–446)
PMV BLD AUTO: 10.4 FL (ref 9–12.9)
RBC # BLD AUTO: 4.52 M/UL (ref 4.2–5.4)
WBC # BLD AUTO: 14.8 K/UL (ref 4.8–10.8)

## 2024-08-05 PROCEDURE — 700101 HCHG RX REV CODE 250: Performed by: SURGERY

## 2024-08-05 PROCEDURE — 49905 OMENTAL FLAP INTRA-ABDOM: CPT | Performed by: SURGERY

## 2024-08-05 PROCEDURE — 700111 HCHG RX REV CODE 636 W/ 250 OVERRIDE (IP): Performed by: ANESTHESIOLOGY

## 2024-08-05 PROCEDURE — 38747 REMOVE ABDOMINAL LYMPH NODES: CPT | Mod: 80,59 | Performed by: SURGERY

## 2024-08-05 PROCEDURE — 36415 COLL VENOUS BLD VENIPUNCTURE: CPT

## 2024-08-05 PROCEDURE — 0FBG0ZZ EXCISION OF PANCREAS, OPEN APPROACH: ICD-10-PCS | Performed by: SURGERY

## 2024-08-05 PROCEDURE — 88305 TISSUE EXAM BY PATHOLOGIST: CPT

## 2024-08-05 PROCEDURE — 160042 HCHG SURGERY MINUTES - EA ADDL 1 MIN LEVEL 5: Performed by: SURGERY

## 2024-08-05 PROCEDURE — 700101 HCHG RX REV CODE 250

## 2024-08-05 PROCEDURE — 76998 US GUIDE INTRAOP: CPT | Mod: 26,80 | Performed by: SURGERY

## 2024-08-05 PROCEDURE — A9270 NON-COVERED ITEM OR SERVICE: HCPCS | Performed by: SURGERY

## 2024-08-05 PROCEDURE — 160031 HCHG SURGERY MINUTES - 1ST 30 MINS LEVEL 5: Performed by: SURGERY

## 2024-08-05 PROCEDURE — 700101 HCHG RX REV CODE 250: Performed by: ANESTHESIOLOGY

## 2024-08-05 PROCEDURE — 48140 PARTIAL REMOVAL OF PANCREAS: CPT | Mod: 80 | Performed by: SURGERY

## 2024-08-05 PROCEDURE — 160002 HCHG RECOVERY MINUTES (STAT): Performed by: SURGERY

## 2024-08-05 PROCEDURE — 07TP0ZZ RESECTION OF SPLEEN, OPEN APPROACH: ICD-10-PCS | Performed by: SURGERY

## 2024-08-05 PROCEDURE — 160036 HCHG PACU - EA ADDL 30 MINS PHASE I: Performed by: SURGERY

## 2024-08-05 PROCEDURE — 49905 OMENTAL FLAP INTRA-ABDOM: CPT | Mod: 80 | Performed by: SURGERY

## 2024-08-05 PROCEDURE — 700111 HCHG RX REV CODE 636 W/ 250 OVERRIDE (IP): Performed by: SURGERY

## 2024-08-05 PROCEDURE — 160048 HCHG OR STATISTICAL LEVEL 1-5: Performed by: SURGERY

## 2024-08-05 PROCEDURE — 770001 HCHG ROOM/CARE - MED/SURG/GYN PRIV*

## 2024-08-05 PROCEDURE — 94669 MECHANICAL CHEST WALL OSCILL: CPT

## 2024-08-05 PROCEDURE — 700105 HCHG RX REV CODE 258: Performed by: SURGERY

## 2024-08-05 PROCEDURE — 700105 HCHG RX REV CODE 258: Performed by: ANESTHESIOLOGY

## 2024-08-05 PROCEDURE — 48140 PARTIAL REMOVAL OF PANCREAS: CPT | Performed by: SURGERY

## 2024-08-05 PROCEDURE — 160035 HCHG PACU - 1ST 60 MINS PHASE I: Performed by: SURGERY

## 2024-08-05 PROCEDURE — 88309 TISSUE EXAM BY PATHOLOGIST: CPT

## 2024-08-05 PROCEDURE — 85027 COMPLETE CBC AUTOMATED: CPT

## 2024-08-05 PROCEDURE — 160009 HCHG ANES TIME/MIN: Performed by: SURGERY

## 2024-08-05 PROCEDURE — 07BD0ZX EXCISION OF AORTIC LYMPHATIC, OPEN APPROACH, DIAGNOSTIC: ICD-10-PCS | Performed by: SURGERY

## 2024-08-05 PROCEDURE — 76998 US GUIDE INTRAOP: CPT | Mod: 26 | Performed by: SURGERY

## 2024-08-05 PROCEDURE — 38747 REMOVE ABDOMINAL LYMPH NODES: CPT | Mod: 59 | Performed by: SURGERY

## 2024-08-05 PROCEDURE — 700102 HCHG RX REV CODE 250 W/ 637 OVERRIDE(OP): Performed by: SURGERY

## 2024-08-05 RX ORDER — MOXIFLOXACIN 5 MG/ML
1 SOLUTION/ DROPS OPHTHALMIC 2 TIMES DAILY
Status: DISCONTINUED | OUTPATIENT
Start: 2024-08-05 | End: 2024-08-06

## 2024-08-05 RX ORDER — CEFAZOLIN SODIUM 1 G/3ML
INJECTION, POWDER, FOR SOLUTION INTRAMUSCULAR; INTRAVENOUS PRN
Status: DISCONTINUED | OUTPATIENT
Start: 2024-08-05 | End: 2024-08-05 | Stop reason: SURG

## 2024-08-05 RX ORDER — CELECOXIB 200 MG/1
200 CAPSULE ORAL
Status: DISCONTINUED | OUTPATIENT
Start: 2024-08-10 | End: 2024-08-09 | Stop reason: HOSPADM

## 2024-08-05 RX ORDER — LIDOCAINE 4 G/G
1 PATCH TOPICAL EVERY 24 HOURS
Status: DISCONTINUED | OUTPATIENT
Start: 2024-08-05 | End: 2024-08-09 | Stop reason: HOSPADM

## 2024-08-05 RX ORDER — HYDROMORPHONE HYDROCHLORIDE 1 MG/ML
0.2 INJECTION, SOLUTION INTRAMUSCULAR; INTRAVENOUS; SUBCUTANEOUS
Status: DISCONTINUED | OUTPATIENT
Start: 2024-08-05 | End: 2024-08-05 | Stop reason: HOSPADM

## 2024-08-05 RX ORDER — DEXAMETHASONE SODIUM PHOSPHATE 4 MG/ML
4 INJECTION, SOLUTION INTRA-ARTICULAR; INTRALESIONAL; INTRAMUSCULAR; INTRAVENOUS; SOFT TISSUE
Status: DISCONTINUED | OUTPATIENT
Start: 2024-08-05 | End: 2024-08-09 | Stop reason: HOSPADM

## 2024-08-05 RX ORDER — HALOPERIDOL 5 MG/ML
1 INJECTION INTRAMUSCULAR EVERY 6 HOURS PRN
Status: DISCONTINUED | OUTPATIENT
Start: 2024-08-05 | End: 2024-08-09 | Stop reason: HOSPADM

## 2024-08-05 RX ORDER — SODIUM CHLORIDE, SODIUM LACTATE, POTASSIUM CHLORIDE, CALCIUM CHLORIDE 600; 310; 30; 20 MG/100ML; MG/100ML; MG/100ML; MG/100ML
INJECTION, SOLUTION INTRAVENOUS CONTINUOUS
Status: DISCONTINUED | OUTPATIENT
Start: 2024-08-05 | End: 2024-08-05 | Stop reason: HOSPADM

## 2024-08-05 RX ORDER — ONDANSETRON 2 MG/ML
4 INJECTION INTRAMUSCULAR; INTRAVENOUS EVERY 4 HOURS PRN
Status: DISCONTINUED | OUTPATIENT
Start: 2024-08-05 | End: 2024-08-09 | Stop reason: HOSPADM

## 2024-08-05 RX ORDER — LABETALOL HYDROCHLORIDE 5 MG/ML
5 INJECTION, SOLUTION INTRAVENOUS
Status: DISCONTINUED | OUTPATIENT
Start: 2024-08-05 | End: 2024-08-05 | Stop reason: HOSPADM

## 2024-08-05 RX ORDER — FAMOTIDINE 20 MG/1
20 TABLET, FILM COATED ORAL 2 TIMES DAILY
Status: DISCONTINUED | OUTPATIENT
Start: 2024-08-05 | End: 2024-08-09 | Stop reason: HOSPADM

## 2024-08-05 RX ORDER — OXYCODONE HCL 5 MG/5 ML
5 SOLUTION, ORAL ORAL
Status: DISCONTINUED | OUTPATIENT
Start: 2024-08-05 | End: 2024-08-05 | Stop reason: HOSPADM

## 2024-08-05 RX ORDER — MIDAZOLAM HYDROCHLORIDE 1 MG/ML
1 INJECTION INTRAMUSCULAR; INTRAVENOUS
Status: DISCONTINUED | OUTPATIENT
Start: 2024-08-05 | End: 2024-08-05 | Stop reason: HOSPADM

## 2024-08-05 RX ORDER — LIDOCAINE HYDROCHLORIDE AND EPINEPHRINE 15; 5 MG/ML; UG/ML
INJECTION, SOLUTION EPIDURAL PRN
Status: DISCONTINUED | OUTPATIENT
Start: 2024-08-05 | End: 2024-08-05 | Stop reason: SURG

## 2024-08-05 RX ORDER — ACETAMINOPHEN 650 MG/1
975 SUPPOSITORY RECTAL EVERY 6 HOURS
Status: DISCONTINUED | OUTPATIENT
Start: 2024-08-05 | End: 2024-08-09

## 2024-08-05 RX ORDER — ENOXAPARIN SODIUM 100 MG/ML
40 INJECTION SUBCUTANEOUS DAILY
Status: DISCONTINUED | OUTPATIENT
Start: 2024-08-06 | End: 2024-08-09 | Stop reason: HOSPADM

## 2024-08-05 RX ORDER — CARBOXYMETHYLCELLULOSE SODIUM 5 MG/ML
1 SOLUTION/ DROPS OPHTHALMIC PRN
Status: DISCONTINUED | OUTPATIENT
Start: 2024-08-05 | End: 2024-08-09 | Stop reason: HOSPADM

## 2024-08-05 RX ORDER — HYDROMORPHONE HYDROCHLORIDE 2 MG/ML
INJECTION, SOLUTION INTRAMUSCULAR; INTRAVENOUS; SUBCUTANEOUS PRN
Status: DISCONTINUED | OUTPATIENT
Start: 2024-08-05 | End: 2024-08-05 | Stop reason: SURG

## 2024-08-05 RX ORDER — ROCURONIUM BROMIDE 10 MG/ML
INJECTION, SOLUTION INTRAVENOUS PRN
Status: DISCONTINUED | OUTPATIENT
Start: 2024-08-05 | End: 2024-08-05 | Stop reason: SURG

## 2024-08-05 RX ORDER — CELECOXIB 200 MG/1
200 CAPSULE ORAL DAILY
Status: DISCONTINUED | OUTPATIENT
Start: 2024-08-05 | End: 2024-08-09 | Stop reason: HOSPADM

## 2024-08-05 RX ORDER — OXYCODONE HCL 5 MG/5 ML
10 SOLUTION, ORAL ORAL
Status: DISCONTINUED | OUTPATIENT
Start: 2024-08-05 | End: 2024-08-05 | Stop reason: HOSPADM

## 2024-08-05 RX ORDER — DIPHENHYDRAMINE HYDROCHLORIDE 50 MG/ML
25 INJECTION INTRAMUSCULAR; INTRAVENOUS EVERY 6 HOURS PRN
Status: DISCONTINUED | OUTPATIENT
Start: 2024-08-05 | End: 2024-08-09 | Stop reason: HOSPADM

## 2024-08-05 RX ORDER — ACETAMINOPHEN 650 MG/1
975 SUPPOSITORY RECTAL EVERY 6 HOURS PRN
Status: DISCONTINUED | OUTPATIENT
Start: 2024-08-10 | End: 2024-08-09

## 2024-08-05 RX ORDER — SCOLOPAMINE TRANSDERMAL SYSTEM 1 MG/1
1 PATCH, EXTENDED RELEASE TRANSDERMAL
Status: DISCONTINUED | OUTPATIENT
Start: 2024-08-05 | End: 2024-08-09 | Stop reason: HOSPADM

## 2024-08-05 RX ORDER — SODIUM CHLORIDE, SODIUM LACTATE, POTASSIUM CHLORIDE, CALCIUM CHLORIDE 600; 310; 30; 20 MG/100ML; MG/100ML; MG/100ML; MG/100ML
INJECTION, SOLUTION INTRAVENOUS CONTINUOUS
Status: DISCONTINUED | OUTPATIENT
Start: 2024-08-05 | End: 2024-08-09 | Stop reason: HOSPADM

## 2024-08-05 RX ORDER — DIPHENHYDRAMINE HYDROCHLORIDE 50 MG/ML
12.5 INJECTION INTRAMUSCULAR; INTRAVENOUS
Status: DISCONTINUED | OUTPATIENT
Start: 2024-08-05 | End: 2024-08-05 | Stop reason: HOSPADM

## 2024-08-05 RX ORDER — HYDROMORPHONE HYDROCHLORIDE 1 MG/ML
0.4 INJECTION, SOLUTION INTRAMUSCULAR; INTRAVENOUS; SUBCUTANEOUS
Status: DISCONTINUED | OUTPATIENT
Start: 2024-08-05 | End: 2024-08-05 | Stop reason: HOSPADM

## 2024-08-05 RX ORDER — DEXAMETHASONE SODIUM PHOSPHATE 4 MG/ML
INJECTION, SOLUTION INTRA-ARTICULAR; INTRALESIONAL; INTRAMUSCULAR; INTRAVENOUS; SOFT TISSUE PRN
Status: DISCONTINUED | OUTPATIENT
Start: 2024-08-05 | End: 2024-08-05 | Stop reason: SURG

## 2024-08-05 RX ORDER — ONDANSETRON 2 MG/ML
INJECTION INTRAMUSCULAR; INTRAVENOUS PRN
Status: DISCONTINUED | OUTPATIENT
Start: 2024-08-05 | End: 2024-08-05 | Stop reason: SURG

## 2024-08-05 RX ORDER — HYDROMORPHONE HYDROCHLORIDE 1 MG/ML
0.1 INJECTION, SOLUTION INTRAMUSCULAR; INTRAVENOUS; SUBCUTANEOUS
Status: DISCONTINUED | OUTPATIENT
Start: 2024-08-05 | End: 2024-08-05 | Stop reason: HOSPADM

## 2024-08-05 RX ORDER — SODIUM CHLORIDE, SODIUM LACTATE, POTASSIUM CHLORIDE, CALCIUM CHLORIDE 600; 310; 30; 20 MG/100ML; MG/100ML; MG/100ML; MG/100ML
INJECTION, SOLUTION INTRAVENOUS CONTINUOUS
Status: ACTIVE | OUTPATIENT
Start: 2024-08-05 | End: 2024-08-05

## 2024-08-05 RX ORDER — ONDANSETRON 2 MG/ML
4 INJECTION INTRAMUSCULAR; INTRAVENOUS
Status: DISCONTINUED | OUTPATIENT
Start: 2024-08-05 | End: 2024-08-05 | Stop reason: HOSPADM

## 2024-08-05 RX ADMIN — SUGAMMADEX 200 MG: 100 INJECTION, SOLUTION INTRAVENOUS at 13:34

## 2024-08-05 RX ADMIN — LIDOCAINE 1 PATCH: 4 PATCH TOPICAL at 18:39

## 2024-08-05 RX ADMIN — CELECOXIB 200 MG: 200 CAPSULE ORAL at 18:30

## 2024-08-05 RX ADMIN — FENTANYL CITRATE 50 MCG: 50 INJECTION, SOLUTION INTRAMUSCULAR; INTRAVENOUS at 14:36

## 2024-08-05 RX ADMIN — SODIUM CHLORIDE, POTASSIUM CHLORIDE, SODIUM LACTATE AND CALCIUM CHLORIDE: 600; 310; 30; 20 INJECTION, SOLUTION INTRAVENOUS at 10:08

## 2024-08-05 RX ADMIN — HYDROMORPHONE HYDROCHLORIDE: 1 INJECTION, SOLUTION INTRAMUSCULAR; INTRAVENOUS; SUBCUTANEOUS at 14:16

## 2024-08-05 RX ADMIN — MOXIFLOXACIN 1 DROP: 5 SOLUTION/ DROPS OPHTHALMIC at 19:00

## 2024-08-05 RX ADMIN — ROCURONIUM BROMIDE 10 MG: 50 INJECTION, SOLUTION INTRAVENOUS at 12:58

## 2024-08-05 RX ADMIN — ONDANSETRON 4 MG: 2 INJECTION INTRAMUSCULAR; INTRAVENOUS at 20:31

## 2024-08-05 RX ADMIN — HYDROMORPHONE HYDROCHLORIDE 1 MG: 2 INJECTION INTRAMUSCULAR; INTRAVENOUS; SUBCUTANEOUS at 12:26

## 2024-08-05 RX ADMIN — FAMOTIDINE 20 MG: 20 TABLET, FILM COATED ORAL at 18:30

## 2024-08-05 RX ADMIN — SODIUM CHLORIDE, POTASSIUM CHLORIDE, SODIUM LACTATE AND CALCIUM CHLORIDE: 600; 310; 30; 20 INJECTION, SOLUTION INTRAVENOUS at 17:00

## 2024-08-05 RX ADMIN — LIDOCAINE HYDROCHLORIDE,EPINEPHRINE BITARTRATE 3 ML: 15; .005 INJECTION, SOLUTION EPIDURAL; INFILTRATION; INTRACAUDAL; PERINEURAL at 12:26

## 2024-08-05 RX ADMIN — PROPOFOL 150 MG: 10 INJECTION, EMULSION INTRAVENOUS at 12:31

## 2024-08-05 RX ADMIN — ONDANSETRON 8 MG: 2 INJECTION INTRAMUSCULAR; INTRAVENOUS at 13:34

## 2024-08-05 RX ADMIN — FENTANYL CITRATE 50 MCG: 50 INJECTION, SOLUTION INTRAMUSCULAR; INTRAVENOUS at 14:53

## 2024-08-05 RX ADMIN — DEXAMETHASONE SODIUM PHOSPHATE 8 MG: 4 INJECTION INTRA-ARTICULAR; INTRALESIONAL; INTRAMUSCULAR; INTRAVENOUS; SOFT TISSUE at 12:36

## 2024-08-05 RX ADMIN — CEFAZOLIN 2 G: 1 INJECTION, POWDER, FOR SOLUTION INTRAMUSCULAR; INTRAVENOUS at 12:35

## 2024-08-05 RX ADMIN — CEFAZOLIN 2 G: 2 INJECTION, POWDER, FOR SOLUTION INTRAMUSCULAR; INTRAVENOUS at 19:57

## 2024-08-05 RX ADMIN — LIDOCAINE HYDROCHLORIDE,EPINEPHRINE BITARTRATE 5 ML: 15; .005 INJECTION, SOLUTION EPIDURAL; INFILTRATION; INTRACAUDAL; PERINEURAL at 13:23

## 2024-08-05 RX ADMIN — MIDAZOLAM HYDROCHLORIDE 1 MG: 1 INJECTION, SOLUTION INTRAMUSCULAR; INTRAVENOUS at 15:06

## 2024-08-05 RX ADMIN — ROCURONIUM BROMIDE 50 MG: 50 INJECTION, SOLUTION INTRAVENOUS at 12:32

## 2024-08-05 ASSESSMENT — FIBROSIS 4 INDEX
FIB4 SCORE: 1.65
FIB4 SCORE: 1.974358974358974359

## 2024-08-05 ASSESSMENT — COGNITIVE AND FUNCTIONAL STATUS - GENERAL
SUGGESTED CMS G CODE MODIFIER DAILY ACTIVITY: CK
MOBILITY SCORE: 18
MOVING FROM LYING ON BACK TO SITTING ON SIDE OF FLAT BED: A LITTLE
DRESSING REGULAR UPPER BODY CLOTHING: A LITTLE
SUGGESTED CMS G CODE MODIFIER MOBILITY: CK
TOILETING: A LITTLE
PERSONAL GROOMING: A LITTLE
STANDING UP FROM CHAIR USING ARMS: A LITTLE
DRESSING REGULAR UPPER BODY CLOTHING: A LITTLE
WALKING IN HOSPITAL ROOM: A LITTLE
MOVING TO AND FROM BED TO CHAIR: A LITTLE
MOVING TO AND FROM BED TO CHAIR: A LITTLE
DAILY ACTIVITIY SCORE: 19
CLIMB 3 TO 5 STEPS WITH RAILING: A LITTLE
HELP NEEDED FOR BATHING: A LITTLE
SUGGESTED CMS G CODE MODIFIER MOBILITY: CK
DRESSING REGULAR LOWER BODY CLOTHING: A LITTLE
DRESSING REGULAR LOWER BODY CLOTHING: A LITTLE
TURNING FROM BACK TO SIDE WHILE IN FLAT BAD: A LITTLE
CLIMB 3 TO 5 STEPS WITH RAILING: A LITTLE
PERSONAL GROOMING: A LITTLE
EATING MEALS: A LITTLE
MOVING FROM LYING ON BACK TO SITTING ON SIDE OF FLAT BED: A LITTLE
SUGGESTED CMS G CODE MODIFIER DAILY ACTIVITY: CK
DAILY ACTIVITIY SCORE: 18
TURNING FROM BACK TO SIDE WHILE IN FLAT BAD: A LITTLE
HELP NEEDED FOR BATHING: A LITTLE
STANDING UP FROM CHAIR USING ARMS: A LITTLE
TOILETING: A LITTLE
MOBILITY SCORE: 18
WALKING IN HOSPITAL ROOM: A LITTLE

## 2024-08-05 ASSESSMENT — LIFESTYLE VARIABLES
EVER HAD A DRINK FIRST THING IN THE MORNING TO STEADY YOUR NERVES TO GET RID OF A HANGOVER: NO
CONSUMPTION TOTAL: NEGATIVE
EVER HAD A DRINK FIRST THING IN THE MORNING TO STEADY YOUR NERVES TO GET RID OF A HANGOVER: NO
EVER FELT BAD OR GUILTY ABOUT YOUR DRINKING: NO
TOTAL SCORE: 0
HAVE YOU EVER FELT YOU SHOULD CUT DOWN ON YOUR DRINKING: NO
DOES PATIENT WANT TO STOP DRINKING: NO
TOTAL SCORE: 0
CONSUMPTION TOTAL: INCOMPLETE
HAVE PEOPLE ANNOYED YOU BY CRITICIZING YOUR DRINKING: NO
EVER FELT BAD OR GUILTY ABOUT YOUR DRINKING: NO
TOTAL SCORE: 0
HAVE YOU EVER FELT YOU SHOULD CUT DOWN ON YOUR DRINKING: NO
HOW MANY TIMES IN THE PAST YEAR HAVE YOU HAD 5 OR MORE DRINKS IN A DAY: 0
TOTAL SCORE: 0
ALCOHOL_USE: YES
HAVE PEOPLE ANNOYED YOU BY CRITICIZING YOUR DRINKING: NO
ALCOHOL_USE: NO
ON A TYPICAL DAY WHEN YOU DRINK ALCOHOL HOW MANY DRINKS DO YOU HAVE: 1
AVERAGE NUMBER OF DAYS PER WEEK YOU HAVE A DRINK CONTAINING ALCOHOL: 0

## 2024-08-05 ASSESSMENT — PATIENT HEALTH QUESTIONNAIRE - PHQ9
1. LITTLE INTEREST OR PLEASURE IN DOING THINGS: NOT AT ALL
SUM OF ALL RESPONSES TO PHQ9 QUESTIONS 1 AND 2: 0
2. FEELING DOWN, DEPRESSED, IRRITABLE, OR HOPELESS: NOT AT ALL

## 2024-08-05 ASSESSMENT — PAIN DESCRIPTION - PAIN TYPE
TYPE: ACUTE PAIN;SURGICAL PAIN

## 2024-08-05 ASSESSMENT — SOCIAL DETERMINANTS OF HEALTH (SDOH)

## 2024-08-05 ASSESSMENT — PAIN SCALES - GENERAL: PAIN_LEVEL: 0

## 2024-08-05 ASSESSMENT — COPD QUESTIONNAIRES
DURING THE PAST 4 WEEKS HOW MUCH DID YOU FEEL SHORT OF BREATH: NONE/LITTLE OF THE TIME
DO YOU EVER COUGH UP ANY MUCUS OR PHLEGM?: NO/ONLY WITH OCCASIONAL COLDS OR INFECTIONS
COPD SCREENING SCORE: 2
HAVE YOU SMOKED AT LEAST 100 CIGARETTES IN YOUR ENTIRE LIFE: NO/DON'T KNOW

## 2024-08-05 NOTE — ANESTHESIA PREPROCEDURE EVALUATION
Case: [id number] Date/Time: 08/05/24 1115    Procedures:       OPEN DISTAL PANCREATECTOMY WITH SPLENECTOMY, NODE DISSECTION, OMENTAL FLAP AND ANY OTHER INDICATED PROCEDURES      SPLENECTOMY      CREATION, FLAP, OMENTUM    Pre-op diagnosis: PANCREATIC MASS    Location: [facility] OR 10 / SURGERY [facility]    Surgeons: Sachin Espinoza M.D.            Relevant Problems   ENDO   (positive) Acquired hypothyroidism       Physical Exam    Airway   Mallampati: II  TM distance: >3 FB  Neck ROM: full       Cardiovascular - normal exam  Rhythm: regular  Rate: normal  (-) murmur     Dental - normal exam           Pulmonary - normal exam  Breath sounds clear to auscultation     Abdominal    Neurological - normal exam                   Anesthesia Plan    ASA 2       Plan - epidural   Neuraxial block will be post-op pain control            Induction: intravenous    Postoperative Plan: Postoperative administration of opioids is intended.    Pertinent diagnostic labs and testing reviewed    Informed Consent:    Anesthetic plan and risks discussed with patient.    Use of blood products discussed with: patient whom consented to blood products.

## 2024-08-05 NOTE — ANESTHESIA PROCEDURE NOTES
Airway    Date/Time: 8/5/2024 12:33 PM    Performed by: Dhruv Aranda M.D.  Authorized by: Dhruv Aranda M.D.    Location:  OR  Urgency:  Elective  Indications for Airway Management:  Anesthesia      Spontaneous Ventilation: absent    Sedation Level:  Deep  Preoxygenated: Yes    Patient Position:  Sniffing  Mask Difficulty Assessment:  1 - vent by mask  Final Airway Type:  Endotracheal airway  Final Endotracheal Airway:  ETT  Cuffed: Yes    Technique Used for Successful ETT Placement:  Direct laryngoscopy    Insertion Site:  Oral  Blade Type:  Glide  Laryngoscope Blade/Videolaryngoscope Blade Size:  3  ETT Size (mm):  7.0  Measured from:  Teeth  ETT to Teeth (cm):  21  Placement Verified by: auscultation and capnometry    Cormack-Lehane Classification:  Grade I - full view of glottis  Number of Attempts at Approach:  1   SIVI, ATET, no teeth contact, soft bite block

## 2024-08-05 NOTE — ANESTHESIA TIME REPORT
Anesthesia Start and Stop Event Times       Date Time Event    8/5/2024 1210 Anesthesia Start     1345 Anesthesia Stop          Responsible Staff  08/05/24      Name Role Begin End    Dhruv Aranda M.D. Anesth 1210 1345          Overtime Reason:  no overtime (within assigned shift)    Comments:

## 2024-08-05 NOTE — OP REPORT
DATE OF SERVICE:  08/05/2024     INDICATIONS:  The patient is a 78-year-old female, patient of Roula Ga PA-C and Casimiro Fowler MD, who was referred to me after being diagnosed   with a large mass in the tail of the pancreas.  Based on imaging, it appeared   as though it had the characteristics of microcystic serous cystadenoma.  Thus,   we did not proceed with US and there was no sign of malignant disease and/or   metastatic disease, so she was taken for a distal pancreatectomy with   splenectomy.     SURGEON:  Sachin Espinoza MD     ASSISTANT SURGEON:  Devin Esquivel MD: The indication for a surgical assistant in this surgery were indicated due to the complexity of the procedure.  Their role included aiding in the incision, retraction, holding of devices including cameras for laparoscopic procedures and closure of wounds.     ANESTHESIOLOGIST:  Dr. Aranda.     ANESTHESIA:  General and epidural for postoperative pain management.     ESTIMATED BLOOD LOSS:  Approximately 100 mL.     COMPLICATIONS:  No complications     FINDINGS:  Large polycystic microcystic mass involving the tail of pancreas   and portion of the body.  No sign of metastatic disease.  Normal pancreas   appearance on ultrasound, clear margins.     PREOPERATIVE DIAGNOSIS:  Large pancreatic microcystic mass, tail of pancreas.     POSTOPERATIVE DIAGNOSIS:  Large pancreatic microcystic mass, tail of pancreas.     PROCEDURES:  1.  Exploratory laparotomy.  2.  Intraoperative ultrasound survey of the pancreas using 5/12 MHz T-probe.  3.  Distal pancreatectomy and splenectomy.  4.  Stomach and celiac node dissection.  5.  Omental flap.     CASE CLASS:  Clean.     SPECIMENS:  Distal pancreas, spleen with nodes.     DESCRIPTION OF PROCEDURE:  The patient was brought to OR, placed under general   anesthetic, prepped with chlorhexidine prep, after having an epidural placed   by Dr. Aranda for postoperative pain management with both arms out,  Bose   catheter, shaved, prepped with chlorhexidine prep.  A timeout was adequate   after she had been covered with sterile drapes.  At that point, incision made   from xiphoid down to just above the umbilicus with 10 blade and Bovie   electrocautery.  Upon entering the abdomen, the Omni retractor was inserted.    At that point, we entered the lesser sac with the LigaSure.  We continued   taking down the short gastrics and the areas of the omentum to expose the   lesser sac.  Immediately, it was obvious where the tumor was.  Intraoperative   ultrasound identified the margins easily, which equated perfectly with what we   palpated.  At that point, once the short gastrics were taken down we then   made an inferior margin incision outside of the pancreatic edge and then we   were able to dissect freely underneath the pancreas where our transection   margin was going to be popped out on the cephalad portion.  The splenic artery   was actually contained within the pancreas at this point in the dissections   and margins.  At that point, we placed an Endo-NILA 60 white load and fired it   several times through the pancreas and the artery and the retroperitoneal   tissue.  At that point, we continued our dissection laterally,   retroperitoneally and inferiorly using the LigaSure.  Care to avoid injury to   the duodenum, which we identified as well as the colon, which we identified.    We then took down the retroperitoneal attachments of the spleen with the   LigaSure until the specimen was freed up and pulled out through the abdomen.    The transection margin was marked and sent off to pathology for permanence.    On reinspection there was no bleeding, no bile leak.  There was a small area   of cracking of the pancreas.  We then proceeded with using a 3-0 horizontal   mattress Prolene suture to reapproximate the capsule and compress the area   where the crack was.  This was done without any difficulty or bleeding.  At    that point after all needle, sponge and instrument counts were correct we then   mobilized the omentum off transverse mesocolon, took that portion of it and   secured it to the retroperitoneum overlying our transection margin.  This was   secured with a 3-0 Prolene as well.  All needle, sponge and instrument counts   were correct.  Reinspection showed no bleeding or bile leak or any pancreatic   leak.  At that point, the operation was completed.  A final sweep of the   abdomen was negative.  We closed the abdomen with looped PDS starting both   directions tying in the center and skin closed using staples.  A large Prevena   was placed, extubated and transferred to recovery.        ______________________________  MD VIRGIL Wellington/JOY    DD:  08/05/2024 13:49  DT:  08/05/2024 14:19    Job#:  [id number]    CC:MD Roula Joseph PA-C

## 2024-08-05 NOTE — ANESTHESIA POSTPROCEDURE EVALUATION
Patient: Paige Gudino    Procedure Summary       Date: 08/05/24 Room / Location: [place] [address] / SURGERY [facility]    Anesthesia Start: 1210 Anesthesia Stop: 1345    Procedures:       OPEN DISTAL PANCREATECTOMY WITH SPLENECTOMY, NODE DISSECTION (Abdomen)      SPLENECTOMY (Abdomen)      CREATION, FLAP, OMENTUM (Abdomen)      ULTRASOUND GUIDANCE (Abdomen) Diagnosis: (PANCREATIC MASS)    Surgeons: Sachin Espinoza M.D. Responsible Provider: Dhruv rAanda M.D.    Anesthesia Type: epidural ASA Status: 2            Final Anesthesia Type: epidural  Last vitals  BP   Blood Pressure : (!) 142/78    Temp   36.6 °C (97.8 °F)    Pulse   74   Resp   16    SpO2   100 %      Anesthesia Post Evaluation    Patient location during evaluation: PACU  Patient participation: complete - patient participated  Level of consciousness: awake and alert  Pain score: 0    Airway patency: patent  Anesthetic complications: no  Cardiovascular status: hemodynamically stable  Respiratory status: acceptable  Hydration status: euvolemic    PONV: none          No notable events documented.     Nurse Pain Score: 0 (NPRS)

## 2024-08-05 NOTE — OR NURSING
1441: pt's daughter Teri and  Ryan updated on pt status in Recovery and POC.   1540: family updated on pt status, pt spoke with family via phone.   1602: family updated on pt status in Recovery and transfer to T431/2.

## 2024-08-05 NOTE — OR NURSING
Pt on 10 L oxy-mask per ERAS order, six hours complete at 2015.    No BP on Left Arm.     No c/o nausea, tolerating PO fluids/juice.  Abdominal surgical site CDI, Prevena in place.  Epidural (T6) in place, infusing per order.  Pt abdominal pain 3/10, tolerable.  Pt c/o left anterior trapezius discomfort/stiffness.  Warm packs applied, repositioned frequently, massaged.  Order for Lidocaine patch placed per APRN.    Abdomen semi-firm, soft, Surgeon aware. VSS, afebrile, SOLIS, A/O x4.     Oxygen tank 100% full.     Bedside handoff to Dionna LAWLER.  Pt sate EOB, ambulated to chair, tolerated well.

## 2024-08-05 NOTE — PROGRESS NOTES
Medication history reviewed with PT at bedside    [facility] is complete per PT reporting    Allergies reviewed.     Patient denies any outpatient antibiotics in the last 30 days.     Patient is not taking anticoagulants.    Preferred pharmacy for this visit - [facility] on [address] ([phone])

## 2024-08-05 NOTE — ANESTHESIA PROCEDURE NOTES
Arterial Line    Performed by: Dhruv Aranda M.D.  Authorized by: Dhruv Aranda M.D.    Start Time:  8/5/2024 12:29 PM  Localization: surface landmarks    Patient Location:  OR  Indication: continuous blood pressure monitoring        Catheter Size:  20 G  Seldinger Technique?: Yes    Laterality:  Right  Site:  Radial artery  Line Secured:  Antimicrobial disc, tape and transparent dressing  Events: patient tolerated procedure well with no complications

## 2024-08-05 NOTE — ANESTHESIA PROCEDURE NOTES
Epidural Block    Date/Time: 8/5/2024 12:19 PM    Performed by: Dhruv Aranda M.D.  Authorized by: Dhruv Aranda M.D.    Patient Location:  OR  Start Time:  8/5/2024 12:19 PM  Reason for Block: at surgeon's request and post-op pain management    patient identified, IV checked, site marked, risks and benefits discussed, surgical consent, monitors and equipment checked, pre-op evaluation and timeout performed    Patient Position:  Sitting  Prep: ChloraPrep, patient draped and sterile technique    Monitoring:  Blood pressure, continuous pulse oximetry and heart rate  Location:  T6-T7  Injection Technique:  YEHUDA saline  Needle Type:  Tuohy  Needle Gauge:  17 G  Needle Length:  3.5 in  Loss of resistance::  6  Catheter Size:  19 G  Catheter at Skin Depth:  11   No paresthesias, single pass, aspiration negative, test dose negative. Motor and sensory intact after placement. No complications

## 2024-08-06 ENCOUNTER — ANESTHESIA EVENT (OUTPATIENT)
Dept: ANESTHESIOLOGY | Facility: MEDICAL CENTER | Age: 79
DRG: 407 | End: 2024-08-06
Payer: COMMERCIAL

## 2024-08-06 LAB
ALBUMIN SERPL BCP-MCNC: 3.5 G/DL (ref 3.2–4.9)
ALBUMIN/GLOB SERPL: 1.3 G/DL
ALP SERPL-CCNC: 58 U/L (ref 30–99)
ALT SERPL-CCNC: 30 U/L (ref 2–50)
ANION GAP SERPL CALC-SCNC: 14 MMOL/L (ref 7–16)
AST SERPL-CCNC: 34 U/L (ref 12–45)
BILIRUB SERPL-MCNC: 2.3 MG/DL (ref 0.1–1.5)
BUN SERPL-MCNC: 14 MG/DL (ref 8–22)
CALCIUM ALBUM COR SERPL-MCNC: 8.4 MG/DL (ref 8.5–10.5)
CALCIUM SERPL-MCNC: 8 MG/DL (ref 8.5–10.5)
CHLORIDE SERPL-SCNC: 102 MMOL/L (ref 96–112)
CO2 SERPL-SCNC: 20 MMOL/L (ref 20–33)
CREAT SERPL-MCNC: 0.63 MG/DL (ref 0.5–1.4)
ERYTHROCYTE [DISTWIDTH] IN BLOOD BY AUTOMATED COUNT: 42.7 FL (ref 35.9–50)
GFR SERPLBLD CREATININE-BSD FMLA CKD-EPI: 91 ML/MIN/1.73 M 2
GLOBULIN SER CALC-MCNC: 2.6 G/DL (ref 1.9–3.5)
GLUCOSE SERPL-MCNC: 137 MG/DL (ref 65–99)
HCT VFR BLD AUTO: 40.5 % (ref 37–47)
HGB BLD-MCNC: 13.1 G/DL (ref 12–16)
MCH RBC QN AUTO: 29.2 PG (ref 27–33)
MCHC RBC AUTO-ENTMCNC: 32.3 G/DL (ref 32.2–35.5)
MCV RBC AUTO: 90.4 FL (ref 81.4–97.8)
PLATELET # BLD AUTO: 199 K/UL (ref 164–446)
PMV BLD AUTO: 10.3 FL (ref 9–12.9)
POTASSIUM SERPL-SCNC: 4.4 MMOL/L (ref 3.6–5.5)
PROT SERPL-MCNC: 6.1 G/DL (ref 6–8.2)
RBC # BLD AUTO: 4.48 M/UL (ref 4.2–5.4)
SODIUM SERPL-SCNC: 136 MMOL/L (ref 135–145)
WBC # BLD AUTO: 19.9 K/UL (ref 4.8–10.8)

## 2024-08-06 PROCEDURE — 97163 PT EVAL HIGH COMPLEX 45 MIN: CPT

## 2024-08-06 PROCEDURE — 700105 HCHG RX REV CODE 258: Performed by: SURGERY

## 2024-08-06 PROCEDURE — A9270 NON-COVERED ITEM OR SERVICE: HCPCS

## 2024-08-06 PROCEDURE — 94669 MECHANICAL CHEST WALL OSCILL: CPT

## 2024-08-06 PROCEDURE — 700102 HCHG RX REV CODE 250 W/ 637 OVERRIDE(OP)

## 2024-08-06 PROCEDURE — 770001 HCHG ROOM/CARE - MED/SURG/GYN PRIV*

## 2024-08-06 PROCEDURE — 700111 HCHG RX REV CODE 636 W/ 250 OVERRIDE (IP): Mod: JZ | Performed by: ANESTHESIOLOGY

## 2024-08-06 PROCEDURE — 85027 COMPLETE CBC AUTOMATED: CPT

## 2024-08-06 PROCEDURE — 97535 SELF CARE MNGMENT TRAINING: CPT

## 2024-08-06 PROCEDURE — 700111 HCHG RX REV CODE 636 W/ 250 OVERRIDE (IP): Performed by: SURGERY

## 2024-08-06 PROCEDURE — A9270 NON-COVERED ITEM OR SERVICE: HCPCS | Performed by: SURGERY

## 2024-08-06 PROCEDURE — 700102 HCHG RX REV CODE 250 W/ 637 OVERRIDE(OP): Performed by: SURGERY

## 2024-08-06 PROCEDURE — 36415 COLL VENOUS BLD VENIPUNCTURE: CPT

## 2024-08-06 PROCEDURE — 97166 OT EVAL MOD COMPLEX 45 MIN: CPT

## 2024-08-06 PROCEDURE — 80053 COMPREHEN METABOLIC PANEL: CPT

## 2024-08-06 PROCEDURE — 99024 POSTOP FOLLOW-UP VISIT: CPT

## 2024-08-06 RX ORDER — LIOTHYRONINE SODIUM 5 UG/1
5 TABLET ORAL
Status: DISCONTINUED | OUTPATIENT
Start: 2024-08-07 | End: 2024-08-09 | Stop reason: HOSPADM

## 2024-08-06 RX ORDER — LEVOTHYROXINE SODIUM 50 UG/1
100 TABLET ORAL
Status: DISCONTINUED | OUTPATIENT
Start: 2024-08-07 | End: 2024-08-09 | Stop reason: HOSPADM

## 2024-08-06 RX ORDER — LIOTHYRONINE SODIUM 5 UG/1
2.5-5 TABLET ORAL SEE ADMIN INSTRUCTIONS
Status: DISCONTINUED | OUTPATIENT
Start: 2024-08-07 | End: 2024-08-06

## 2024-08-06 RX ORDER — PREDNISOLN SP/MOXIFLOX/BROMFEN 1 %-0.5 %
1 DROPS OPHTHALMIC (EYE) 2 TIMES DAILY
Status: DISCONTINUED | OUTPATIENT
Start: 2024-08-06 | End: 2024-08-09 | Stop reason: HOSPADM

## 2024-08-06 RX ORDER — LEVOTHYROXINE SODIUM 50 UG/1
100 TABLET ORAL
Status: CANCELLED | OUTPATIENT
Start: 2024-08-06

## 2024-08-06 RX ORDER — CALCIUM CARBONATE 500 MG/1
500 TABLET, CHEWABLE ORAL DAILY
Status: DISCONTINUED | OUTPATIENT
Start: 2024-08-06 | End: 2024-08-09 | Stop reason: HOSPADM

## 2024-08-06 RX ORDER — LIOTHYRONINE SODIUM 5 UG/1
2.5 TABLET ORAL
Status: DISCONTINUED | OUTPATIENT
Start: 2024-08-08 | End: 2024-08-09 | Stop reason: HOSPADM

## 2024-08-06 RX ORDER — VITAMIN B COMPLEX
1000 TABLET ORAL DAILY
Status: DISCONTINUED | OUTPATIENT
Start: 2024-08-06 | End: 2024-08-09 | Stop reason: HOSPADM

## 2024-08-06 RX ORDER — VITAMIN B COMPLEX
1000 TABLET ORAL DAILY
Status: CANCELLED | OUTPATIENT
Start: 2024-08-06

## 2024-08-06 RX ORDER — TAMOXIFEN CITRATE 10 MG/1
20 TABLET ORAL DAILY
Status: DISCONTINUED | OUTPATIENT
Start: 2024-08-06 | End: 2024-08-09 | Stop reason: HOSPADM

## 2024-08-06 RX ORDER — LIOTHYRONINE SODIUM 5 UG/1
2.5-5 TABLET ORAL SEE ADMIN INSTRUCTIONS
Status: CANCELLED | OUTPATIENT
Start: 2024-08-06

## 2024-08-06 RX ORDER — CALCIUM CARBONATE 500 MG/1
500 TABLET, CHEWABLE ORAL DAILY
Status: CANCELLED | OUTPATIENT
Start: 2024-08-06

## 2024-08-06 RX ADMIN — ANTACID TABLETS 500 MG: 500 TABLET, CHEWABLE ORAL at 16:14

## 2024-08-06 RX ADMIN — ONDANSETRON 4 MG: 2 INJECTION INTRAMUSCULAR; INTRAVENOUS at 05:01

## 2024-08-06 RX ADMIN — SODIUM CHLORIDE, POTASSIUM CHLORIDE, SODIUM LACTATE AND CALCIUM CHLORIDE: 600; 310; 30; 20 INJECTION, SOLUTION INTRAVENOUS at 16:19

## 2024-08-06 RX ADMIN — MOXIFLOXACIN 1 DROP: 5 SOLUTION/ DROPS OPHTHALMIC at 05:10

## 2024-08-06 RX ADMIN — Medication 1000 UNITS: at 16:13

## 2024-08-06 RX ADMIN — FAMOTIDINE 20 MG: 20 TABLET, FILM COATED ORAL at 19:25

## 2024-08-06 RX ADMIN — CEFAZOLIN 2 G: 2 INJECTION, POWDER, FOR SOLUTION INTRAMUSCULAR; INTRAVENOUS at 05:08

## 2024-08-06 RX ADMIN — Medication 1 DROP: at 19:25

## 2024-08-06 RX ADMIN — CARBOXYMETHYLCELLULOSE SODIUM 1 DROP: 5 SOLUTION/ DROPS OPHTHALMIC at 19:26

## 2024-08-06 RX ADMIN — ENOXAPARIN SODIUM 40 MG: 100 INJECTION SUBCUTANEOUS at 10:53

## 2024-08-06 ASSESSMENT — ENCOUNTER SYMPTOMS
SHORTNESS OF BREATH: 0
COUGH: 0
DIARRHEA: 0
FEVER: 0
CHILLS: 0
CONSTIPATION: 0
HEARTBURN: 0
NAUSEA: 0
ABDOMINAL PAIN: 1
VOMITING: 0

## 2024-08-06 ASSESSMENT — COGNITIVE AND FUNCTIONAL STATUS - GENERAL
STANDING UP FROM CHAIR USING ARMS: A LITTLE
SUGGESTED CMS G CODE MODIFIER DAILY ACTIVITY: CJ
WALKING IN HOSPITAL ROOM: A LITTLE
DAILY ACTIVITIY SCORE: 20
MOVING TO AND FROM BED TO CHAIR: A LITTLE
SUGGESTED CMS G CODE MODIFIER MOBILITY: CK
TOILETING: A LITTLE
DRESSING REGULAR UPPER BODY CLOTHING: A LITTLE
DRESSING REGULAR LOWER BODY CLOTHING: A LITTLE
MOBILITY SCORE: 18
HELP NEEDED FOR BATHING: A LITTLE
CLIMB 3 TO 5 STEPS WITH RAILING: A LITTLE
TURNING FROM BACK TO SIDE WHILE IN FLAT BAD: A LITTLE
MOVING FROM LYING ON BACK TO SITTING ON SIDE OF FLAT BED: A LITTLE

## 2024-08-06 ASSESSMENT — PAIN DESCRIPTION - PAIN TYPE
TYPE: ACUTE PAIN

## 2024-08-06 ASSESSMENT — GAIT ASSESSMENTS
DISTANCE (FEET): 200
DEVIATION: BRADYKINETIC;SHUFFLED GAIT;DECREASED HEEL STRIKE;DECREASED TOE OFF
ASSISTIVE DEVICE: FRONT WHEEL WALKER
GAIT LEVEL OF ASSIST: STANDBY ASSIST

## 2024-08-06 ASSESSMENT — ACTIVITIES OF DAILY LIVING (ADL): TOILETING: INDEPENDENT

## 2024-08-06 NOTE — PROGRESS NOTES
Date of Service  August 6, 2024     Chief Complaint  No chief complaint on file.        Surgery Completed  1.  Exploratory laparotomy.  2.  Intraoperative ultrasound survey of the pancreas using 5/12 MHz T-probe.  3.  Distal pancreatectomy and splenectomy.  4.  Stomach and celiac node dissection.  5.  Omental flap.    Hospital Course  POD # 1     Interval Problem Update  States nausea, emesis x 1 overnight but achieved relief with Zofran  Minimal tenderness to abdominal incision, Prevena in place  Denies fever, chills  Reported L shoulder pain yesterday post-op, Lidocaine patch ordered. States relief with combination of patch + hot pack  States she would like to remain on full liquids today but trial saltine crackers, OK per Dr. Esquivel  Epidural at 4, tylenol + celebrex PO, states she would like to ambulate today  Bose in place, draining well  Did not receive splenectomy vaccines d/t preference. States she had prior conversation with Dr. Espinoza about this but re-explained in detail regarding post-splenectomy sepsis syndrome and extremely high mortality rate. States she refuses despite these risks    Problem List  Principal Problem:    Mass of pancreas (POA: Yes)  Active Problems:    Cancer of overlapping sites of left breast (HCC) (POA: Yes)    Pancreatic mass (POA: Yes)    History of breast cancer in adulthood (POA: Unknown)  Resolved Problems:    Malignant tumor of breast (HCC) (POA: Yes)      Overview: hx left breast       Subjective  Review of Systems   Constitutional:  Negative for chills, fever and malaise/fatigue.   Respiratory:  Negative for cough and shortness of breath.    Cardiovascular:  Negative for chest pain.   Gastrointestinal:  Positive for abdominal pain (Tenderness). Negative for constipation, diarrhea, heartburn, nausea and vomiting.   Genitourinary:  Negative for dysuria.   Skin:  Negative for itching.         Objective  Temp:  [36 °C (96.8 °F)-37.1 °C (98.7 °F)] 36.8 °C (98.2  "°F)  Pulse:  [60-97] 66  Resp:  [15-38] 18  BP: (123-175)/() 123/49  SpO2:  [92 %-100 %] 96 %      Physical Exam  Vitals and nursing note reviewed.   Constitutional:       General: She is not in acute distress.     Appearance: Normal appearance. She is not ill-appearing.   HENT:      Head: Normocephalic.      Nose: Nose normal.      Mouth/Throat:      Pharynx: Oropharynx is clear.   Eyes:      General: No scleral icterus.     Conjunctiva/sclera: Conjunctivae normal.   Cardiovascular:      Rate and Rhythm: Normal rate.   Pulmonary:      Effort: Pulmonary effort is normal. No respiratory distress.   Abdominal:      General: There is no distension.      Palpations: Abdomen is soft.      Tenderness: There is abdominal tenderness. There is no guarding.      Comments: Prevena in place   Genitourinary:     Comments: Bose in place  Musculoskeletal:         General: No tenderness (Denies L shoulder pain).   Skin:     General: Skin is warm and dry.      Coloration: Skin is not jaundiced.   Neurological:      Mental Status: She is alert and oriented to person, place, and time.   Psychiatric:         Mood and Affect: Mood normal.         Behavior: Behavior normal.          Fluids    Intake/Output Summary (Last 24 hours) at 8/6/2024 1017  Last data filed at 8/6/2024 0916  Gross per 24 hour   Intake 823.7 ml   Output 1101 ml   Net -277.3 ml         Labs  Lab Results   Component Value Date/Time    SODIUM 136 08/06/2024 05:43 AM    POTASSIUM 4.4 08/06/2024 05:43 AM    CHLORIDE 102 08/06/2024 05:43 AM    CO2 20 08/06/2024 05:43 AM    GLUCOSE 137 (H) 08/06/2024 05:43 AM    BUN 14 08/06/2024 05:43 AM    CREATININE 0.63 08/06/2024 05:43 AM         No results found for: \"PROTHROMBTM\", \"INR\"      Lab Results   Component Value Date/Time    WBC 19.9 (H) 08/06/2024 05:43 AM    RBC 4.48 08/06/2024 05:43 AM    HEMOGLOBIN 13.1 08/06/2024 05:43 AM    HEMATOCRIT 40.5 08/06/2024 05:43 AM    MCV 90.4 08/06/2024 05:43 AM    [facility] 29.2 " 08/06/2024 05:43 AM    MCHC 32.3 08/06/2024 05:43 AM    MPV 10.3 08/06/2024 05:43 AM    NEUTSPOLYS 73 07/19/2024 08:19 AM    NEUTSPOLYS 66.40 09/27/2023 08:32 AM    LYMPHOCYTES 20 07/19/2024 08:19 AM    LYMPHOCYTES 23.90 09/27/2023 08:32 AM    MONOCYTES 7 07/19/2024 08:19 AM    MONOCYTES 9.50 09/27/2023 08:32 AM    EOSINOPHILS 0 07/19/2024 08:19 AM    EOSINOPHILS 0.00 09/27/2023 08:32 AM    BASOPHILS 0 07/19/2024 08:19 AM    BASOPHILS 0.00 09/27/2023 08:32 AM         Recent Labs     08/06/24  0543   ASTSGOT 34   ALTSGPT 30   TBILIRUBIN 2.3*   GLOBULIN 2.6        Imaging  CT CHEST/ABDOMEN/PELVIS (6/28/24)  IMPRESSION:  1.  No definite CT evidence of infiltrating gastric mass although there is poor distention of the stomach with possible wall thickening of the gastric antrum.  2.  There is a large solid and cystic necrotic pancreatic tail mass suspicious for malignancy with no surrounding vascular involvement or lymphadenopathy.  3.  Mild hepatic steatosis with no focal hepatic lesions.  4.  Cholelithiasis without biliary dilatation.  5.  Moderate size hiatal hernia.  6.  Colonic diverticulosis without diverticulitis.  7.  Nonspecific borderline enlarged superior mediastinal right paraesophageal lymph node.  8.  There are postoperative changes of left mastectomy and axillary lymph node dissection.  9.  No parenchymal lung metastases.    VTE Prophylaxis: Lovenox, SCDs     Assessment/Plan  Patient is a 78-year old female with PMH of pancreatic tail mass, noninvasive carcinoma of the left breast, s/p total mastectomy with implant reconstruction who underwent surgery by Dr. Espinoza yesterday 8/5/24.    1. Exploratory laparotomy, intraoperative ultrasound survey of the pancreas, distal pancreatectomy and splenectomy, stomach and celiac node dissection, omental flap POD #1  - Continue epidural at 4, garcia in place  - Continue PO celebrex + tylenol  - Continue full liquid diet, saltine crackers OK to trial  - Explained  and encouraged need for splenic vaccines post splenectomy, refuses despite high mortality risk  - Encourage ambulation  - Encourage IS  - PT/OT    2. Leukocystosis  - WBC 19.9, afebrile  - Monitor    Seen with Dr. Esquivel

## 2024-08-06 NOTE — PROGRESS NOTES
Bedside report received from day shift nurse. Assumed care at 1845.   Pt A&Ox4  Full liquid diet, complains of nausea/vomiting. Patient medicated per MAR. Hypoactive bowel sounds, + flatus, LBM PTA. IV access through 22G RFA that is infusing.  Saturating >90% on 2L NC.  Epidural infusing, DIP, old drainage noted.   MLI with prevena WV in place.   Bose catheter in place, +output.  Pt ambulates x1 assist.  Pain is controlled through medication orders. Updated on plan of care. Safety education provided. Bed locked in low. Call light within reach. Rounding in place.

## 2024-08-06 NOTE — PROGRESS NOTES
2 RN skin check complete.     Devices in place: Oxymask, PIV right forearm, former art line dressing right wrist, prevena wound vac, garcia catheter, epidural.   Skin assessed under devices: above as much possible.    Epidural in back, scant serosang drainage noted around site. Left shoulder redness/swelling. BUE/BLE swelling noted. LUE bruising to forearm from former PIV attempts. Left partial breast healed mastectomy. Abdomen round, distended, soft near groin regions. MLI with prevena WV in place. Perineal redness noted. Garcia catheter in place with yellow urine. Stat lock to right upper thigh. Varicose vein noted to left knee/shin region. Blanchable purplish/red toes. Callous noted to right 5th toe and lateral areas of bilateral feet. Blanchable redness noted to heels. No other skin issues noted/reported.     Confirmed pressure ulcers found on: NA.  New potential pressure ulcers noted on: NA.   Wound consult placed: NA   .  The following interventions in place: Up in chair with waffle cushion in place at present time.

## 2024-08-06 NOTE — ANESTHESIA POST-OP FOLLOW-UP NOTE
Anesthesia Pain Service Note    Type:  Epidural catheter    Patient: Rosangela Reena Stockstill    Patient seen and examined.     /49   Pulse 66   Temp 36.8 °C (98.2 °F) (Temporal)   Resp 18   Ht 1.524 m (5')   Wt 70.4 kg (155 lb 3.3 oz)   LMP  (LMP Unknown)   SpO2 97%   BMI 30.31 kg/m²     Complaints:  No complaints.    Pain:   1/10    LOC:   Awake    Rate:  4cc/hr      Exam:  Vital signs stable, Afebrile, and Site clean, dry, intact without tenderness or erythema    Impression:  Adequate analgesia    Assessment & Plan:  Acute post-procedural pain (G89.18)     No change  - continue  Doing well.  Minimal to no pain at rest. Mild nausea.  Sat in chair yesterday with plans to walk today.    Sidra Blunt M.D.  8/6/2024  8:28 AM

## 2024-08-06 NOTE — PROGRESS NOTES
Virtual Nurse rounding complete.    Round Needs: No needs at this time.  Pt alert and resting in bed  Discussed diet and answered questions

## 2024-08-06 NOTE — THERAPY
"Occupational Therapy   Initial Evaluation     Patient Name: Paige Gudino  Age:  78 y.o., Sex:  female  Medical Record #: [id number]  Today's Date: 8/6/2024     Precautions  Comments: (P) abdominal wound vac    Assessment  Patient is a 78 y.o. female with a diagnosis of mass of pancreas and is now POD #1 exploratory laparotomy, distal pancreatectomy and splenectomy, stomach and celiac node dissection and omental flap. Additional factors influencing patient status / progress: PMHx includes left breast cancer with mastectomy and lumpectomy following recurrence under her breast implant, laminotomy, hypothyroidism, thyroid nodule.     During OT eval, pt was receptive to all education provided for abdominal precautions in the context of ADLs and related functional mobility, handout provided. Pt is very motivated to progress her activity as much as she tolerates. Pt needed overall SBA for ADLs and related functional mobility with the FWW. Anticipate pt will continue to make good progress with regular OOB activity. Pt has good support from her spouse at home. Patient will not be actively followed for occupational therapy services at this time, however may be seen if requested by physician for 1 more visit within 30 days to address any discharge or equipment needs.       Plan    Occupational Therapy Initial Treatment Plan   Duration: (P) Discharge Needs Only    DC Equipment Recommendations: (P) Unable to determine at this time  Discharge Recommendations: (P) Anticipate that the patient will have no further occupational therapy needs after discharge from the hospital     Subjective    \"I want to walk at least twice today.\"     Objective     08/06/24 0852   Initial Contact Note    Initial Contact Note Order Received and Verified, Evaluation Only - Patient Does Not Require Further Acute Occupational Therapy at this Time.  However, May Benefit from Post Acute Therapy for Higher Level Functional Deficits.   Prior Living " Situation   Prior Services Home-Independent   Housing / Facility 1 Story House   Steps Into Home 0   Steps In Home 0   Bathroom Set up Walk In Shower;Built-In Shower Chair   Equipment Owned Front-Wheel Walker;Tub / Shower Seat   Lives with - Patient's Self Care Capacity Spouse   Comments Pt's spouse works but can be available to help as needed   Prior Level of ADL Function   Self Feeding Independent   Grooming / Hygiene Independent   Bathing Independent   Dressing Independent   Toileting Independent   Prior Level of IADL Function   Medication Management Independent   Laundry Independent   Kitchen Mobility Independent   Finances Independent   Home Management Independent   Shopping Independent   Prior Level Of Mobility Independent Without Device in Community   Driving / Transportation Driving Independent   Occupation (Pre-Hospital Vocational) Retired Due To Age   History of Falls   History of Falls No   Precautions   Comments abdominal wound vac   Vitals   Pulse Oximetry 96 %   O2 (LPM) 2   O2 Delivery Device Nasal Cannula   Pain   Intervention Declines   Non Verbal Descriptors   Non Verbal Scale  Calm   Cognition    Cognition / Consciousness WDL   Level of Consciousness Alert   Comments Pleasant and cooperative, receptive to education   Active ROM Upper Body   Active ROM Upper Body  WDL   Strength Upper Body   Upper Body Strength  WDL   Sensation Upper Body   Upper Extremity Sensation  Not Tested   Upper Body Muscle Tone   Upper Body Muscle Tone  WDL   Neurological Concerns   Neurological Concerns No   Coordination Upper Body   Coordination WDL   Balance Assessment   Sitting Balance (Static) Fair +   Sitting Balance (Dynamic) Fair   Standing Balance (Static) Fair   Standing Balance (Dynamic) Fair -   Weight Shift Sitting Good   Weight Shift Standing Fair   Comments FWW   Bed Mobility    Supine to Sit Standby Assist   Scooting Standby Assist   Comments HOB elevated, education on log roll   ADL Assessment   Eating  Independent  (setup with tray end of session)   Grooming Supervision;Standing  (oral care, face wash)   Upper Body Dressing Minimal Assist  (down gown like a jacket)   Lower Body Dressing Supervision   Toileting   (garcia, declined need)   How much help from another person does the patient currently need...   Putting on and taking off regular lower body clothing? 3   Bathing (including washing, rinsing, and drying)? 3   Toileting, which includes using a toilet, bedpan, or urinal? 3   Putting on and taking off regular upper body clothing? 3   Taking care of personal grooming such as brushing teeth? 4   Eating meals? 4   6 Clicks Daily Activity Score 20   Functional Mobility   Sit to Stand Standby Assist   Bed, Chair, Wheelchair Transfer Standby Assist   Toilet Transfers Standby Assist   Transfer Method Stand Step   Mobility in room with FWW and SBA   Activity Tolerance   Sitting in Chair post   Sitting Edge of Bed 5 min   Standing 5+ min   Education Group   Role of Occupational Therapist Patient Response Patient;Significant Other;Acceptance;Explanation;Verbal Demonstration   Additional Comments Educaiton on abdominal precautions in the context of ADLs and related mobility, handout provided   Occupational Therapy Initial Treatment Plan    Duration Discharge Needs Only   Problem List   Problem List Decreased Functional Mobility;Decreased Activity Tolerance;Impaired Postural Control / Balance   Anticipated Discharge Equipment and Recommendations   DC Equipment Recommendations Unable to determine at this time   Discharge Recommendations Anticipate that the patient will have no further occupational therapy needs after discharge from the hospital   Interdisciplinary Plan of Care Collaboration   IDT Collaboration with  Nursing;Family / Caregiver   Patient Position at End of Therapy Seated;Call Light within Reach;Tray Table within Reach;Phone within Reach   Collaboration Comments RN updated, pt's spouse present and supportive    Session Information   Date / Session Number  8/6 d/c needs

## 2024-08-06 NOTE — THERAPY
Physical Therapy   Initial Evaluation     Patient Name: Paige Gudino  Age:  78 y.o., Sex:  female  Medical Record #: [id number]  Today's Date: 8/6/2024     Precautions  Comments: (P) Prevena lower abdomin    Assessment  Patient is 78 y.o. female with a diagnosis of pancreatic mass  s/p  distal pancreatectomy with Splenectomy on 8/5/24.  Additional factors influencing patient status / progress  PMHx includes left breast cancer with mastectomy and lumpectomy following recurrence under her breast implant, laminotomy, hypothyroidism, thyroid nodule. .    Pt seen for PT evaluation and education on body mechanics post abdominal sx. Pt able to ambulate with slow but steady gait in hallway with FWW for 200 ft. Pt noted to have SpO2 level at 85 % on RA, place on supplemental O2. Pt will benefit from continued PT while in house, Anticipate home discharge at this time.     Plan    Physical Therapy Initial Treatment Plan   Treatment Plan : (P) Bed Mobility, Equipment, Gait Training, Neuro Re-Education / Balance, Self Care / Home Evaluation, Therapeutic Activities, Therapeutic Exercise  Treatment Frequency: (P) 3 Times per Week  Duration: (P) Until Therapy Goals Met    DC Equipment Recommendations: (P) Unable to determine at this time  Discharge Recommendations: (P) Anticipate that the patient will have no further physical therapy needs after discharge from the hospital         Initial Contact Note    Initial Contact Note Order Received and Verified, Physical Therapy Evaluation in Progress with Full Report to Follow.   Precautions   Comments Prevena lower abdomin   Vitals   Patient BP Position Supine   O2 (LPM) 2   O2 Delivery Device Silicone Nasal Cannula   Pain 0 - 10 Group   Location Abdomen   Therapist Pain Assessment During Activity;2   Non Verbal Descriptors   Non Verbal Scale  Calm   Prior Living Situation   Prior Services Home-Independent   Housing / Facility 1 Story House   Steps Into Home 0   Steps In Home 0    Bathroom Set up Walk In Shower;Built-In Shower Chair   Equipment Owned Front-Wheel Walker;Tub / Shower Seat   Lives with - Patient's Self Care Capacity Spouse   Prior Level of Functional Mobility   Bed Mobility Independent   Transfer Status Independent   Ambulation Independent   Ambulation Distance community   Assistive Devices Used None   History of Falls   History of Falls No   Cognition    Level of Consciousness Alert   Active ROM Lower Body    Active ROM Lower Body  WDL   Strength Lower Body   Lower Body Strength  WDL   Other Treatments   Other Treatments Provided education on body mechanics, pacing with activity, use of FWW for  trunk stability and pain reduction in standing.   Balance Assessment   Sitting Balance (Static) Fair +   Sitting Balance (Dynamic) Fair   Standing Balance (Static) Fair   Standing Balance (Dynamic) Fair -   Weight Shift Sitting Good   Weight Shift Standing Fair   Comments w/FWW   Bed Mobility    Supine to Sit Standby Assist   Sit to Supine Standby Assist   Scooting Supervised   Comments need further education on body mechanics.   Gait Analysis   Gait Level Of Assist Standby Assist   Assistive Device Front Wheel Walker   Distance (Feet) 200   # of Times Distance was Traveled 1   Deviation Bradykinetic;Shuffled Gait;Decreased Heel Strike;Decreased Toe Off  (heavy reliance on FWW.)   # of Stairs Climbed 0   Weight Bearing Status no restrictions.   Comments SpO2 on RA post ambulation 85 % place on supplemental O2 at 2 l/m   Functional Mobility   Sit to Stand Contact Guard Assist   Bed, Chair, Wheelchair Transfer Contact Guard Assist   Toilet Transfers Contact Guard Assist   Transfer Method Stand Step   Mobility supine >EOB> sit to stand. gait in hallway>BTB   6 Clicks Assessment - How much HELP from another person do you currently need... (If the patient hasn't done an activity recently, how much help from another person do you think he/she would need if he/she tried?)   Turning from your  back to your side while in a flat bed without using bedrails? 3   Moving from lying on your back to sitting on the side of a flat bed without using bedrails? 3   Moving to and from a bed to a chair (including a wheelchair)? 3   Standing up from a chair using your arms (e.g., wheelchair, or bedside chair)? 3   Walking in hospital room? 3   Climbing 3-5 steps with a railing? 3   6 clicks Mobility Score 18   Short Term Goals    Short Term Goal # 1 Pt will perform bed mobility at S level with proper mechanics by tx 6   Short Term Goal # 2 Pt will transfer with LRAD at S level by tx 6   Short Term Goal # 3 Pt will ambulate with LRAD at S level for 250 ft by tx 6   Short Term Goal # 4 Pt will maintain SpO2 > 90 % on RA during ambulation.   Education Group   Education Provided Role of Physical Therapist;Gait Training;Use of Assistive Device   Role of Physical Therapist Patient Response Patient;Acceptance;Explanation;Verbal Demonstration   Gait Training Patient Response Patient;Acceptance;Explanation;Action Demonstration   Use of Assistive Device Patient Response Patient;Acceptance;Explanation;Action Demonstration   Additional Comments pt educated on health risks related to chronic  low Sp O2 readings. RN notified.   Physical Therapy Initial Treatment Plan    Treatment Plan  Bed Mobility;Equipment;Gait Training;Neuro Re-Education / Balance;Self Care / Home Evaluation;Therapeutic Activities;Therapeutic Exercise   Treatment Frequency 3 Times per Week   Duration Until Therapy Goals Met   Problem List    Problems Pain;Impaired Bed Mobility;Impaired Transfers;Impaired Ambulation;Decreased Activity Tolerance   Anticipated Discharge Equipment and Recommendations   DC Equipment Recommendations Unable to determine at this time   Discharge Recommendations Anticipate that the patient will have no further physical therapy needs after discharge from the hospital   Interdisciplinary Plan of Care Collaboration   IDT Collaboration with   Nursing;Occupational Therapist   Patient Position at End of Therapy In Bed;Call Light within Reach;Tray Table within Reach;Phone within Reach   Collaboration Comments RN notified of SpO2 findings   Session Information   Date / Session Number  8/6-1 ( 1/3,8/12)

## 2024-08-07 ENCOUNTER — APPOINTMENT (OUTPATIENT)
Dept: RADIOLOGY | Facility: MEDICAL CENTER | Age: 79
DRG: 407 | End: 2024-08-07
Attending: NURSE PRACTITIONER
Payer: COMMERCIAL

## 2024-08-07 LAB
ALBUMIN SERPL BCP-MCNC: 3.2 G/DL (ref 3.2–4.9)
ALBUMIN/GLOB SERPL: 1.3 G/DL
ALP SERPL-CCNC: 60 U/L (ref 30–99)
ALT SERPL-CCNC: 30 U/L (ref 2–50)
ANION GAP SERPL CALC-SCNC: 12 MMOL/L (ref 7–16)
AST SERPL-CCNC: 34 U/L (ref 12–45)
BILIRUB SERPL-MCNC: 3.3 MG/DL (ref 0.1–1.5)
BUN SERPL-MCNC: 12 MG/DL (ref 8–22)
CALCIUM ALBUM COR SERPL-MCNC: 8.5 MG/DL (ref 8.5–10.5)
CALCIUM SERPL-MCNC: 7.9 MG/DL (ref 8.5–10.5)
CHLORIDE SERPL-SCNC: 100 MMOL/L (ref 96–112)
CO2 SERPL-SCNC: 23 MMOL/L (ref 20–33)
CREAT SERPL-MCNC: 0.62 MG/DL (ref 0.5–1.4)
ERYTHROCYTE [DISTWIDTH] IN BLOOD BY AUTOMATED COUNT: 44.3 FL (ref 35.9–50)
GFR SERPLBLD CREATININE-BSD FMLA CKD-EPI: 91 ML/MIN/1.73 M 2
GLOBULIN SER CALC-MCNC: 2.4 G/DL (ref 1.9–3.5)
GLUCOSE SERPL-MCNC: 124 MG/DL (ref 65–99)
HCT VFR BLD AUTO: 36 % (ref 37–47)
HGB BLD-MCNC: 12.2 G/DL (ref 12–16)
MCH RBC QN AUTO: 31 PG (ref 27–33)
MCHC RBC AUTO-ENTMCNC: 33.9 G/DL (ref 32.2–35.5)
MCV RBC AUTO: 91.6 FL (ref 81.4–97.8)
PLATELET # BLD AUTO: 202 K/UL (ref 164–446)
PMV BLD AUTO: 10.6 FL (ref 9–12.9)
POTASSIUM SERPL-SCNC: 4.2 MMOL/L (ref 3.6–5.5)
PROT SERPL-MCNC: 5.6 G/DL (ref 6–8.2)
RBC # BLD AUTO: 3.93 M/UL (ref 4.2–5.4)
SODIUM SERPL-SCNC: 135 MMOL/L (ref 135–145)
WBC # BLD AUTO: 22.9 K/UL (ref 4.8–10.8)

## 2024-08-07 PROCEDURE — 99024 POSTOP FOLLOW-UP VISIT: CPT | Performed by: SURGERY

## 2024-08-07 PROCEDURE — 770001 HCHG ROOM/CARE - MED/SURG/GYN PRIV*

## 2024-08-07 PROCEDURE — 700102 HCHG RX REV CODE 250 W/ 637 OVERRIDE(OP): Performed by: SURGERY

## 2024-08-07 PROCEDURE — A9270 NON-COVERED ITEM OR SERVICE: HCPCS

## 2024-08-07 PROCEDURE — 80053 COMPREHEN METABOLIC PANEL: CPT

## 2024-08-07 PROCEDURE — A9270 NON-COVERED ITEM OR SERVICE: HCPCS | Performed by: SURGERY

## 2024-08-07 PROCEDURE — 700102 HCHG RX REV CODE 250 W/ 637 OVERRIDE(OP)

## 2024-08-07 PROCEDURE — 94669 MECHANICAL CHEST WALL OSCILL: CPT

## 2024-08-07 PROCEDURE — 85027 COMPLETE CBC AUTOMATED: CPT

## 2024-08-07 PROCEDURE — 700111 HCHG RX REV CODE 636 W/ 250 OVERRIDE (IP): Mod: JZ | Performed by: SURGERY

## 2024-08-07 PROCEDURE — 700105 HCHG RX REV CODE 258: Performed by: SURGERY

## 2024-08-07 PROCEDURE — 36415 COLL VENOUS BLD VENIPUNCTURE: CPT

## 2024-08-07 RX ADMIN — CARBOXYMETHYLCELLULOSE SODIUM 1 DROP: 5 SOLUTION/ DROPS OPHTHALMIC at 05:11

## 2024-08-07 RX ADMIN — FAMOTIDINE 20 MG: 20 TABLET, FILM COATED ORAL at 17:14

## 2024-08-07 RX ADMIN — Medication 1 DROP: at 05:11

## 2024-08-07 RX ADMIN — SODIUM CHLORIDE, POTASSIUM CHLORIDE, SODIUM LACTATE AND CALCIUM CHLORIDE: 600; 310; 30; 20 INJECTION, SOLUTION INTRAVENOUS at 17:09

## 2024-08-07 RX ADMIN — ENOXAPARIN SODIUM 40 MG: 100 INJECTION SUBCUTANEOUS at 08:13

## 2024-08-07 RX ADMIN — TAMOXIFEN CITRATE 20 MG: 10 TABLET ORAL at 05:10

## 2024-08-07 RX ADMIN — FAMOTIDINE 20 MG: 20 TABLET, FILM COATED ORAL at 05:11

## 2024-08-07 RX ADMIN — LEVOTHYROXINE SODIUM 100 MCG: 0.05 TABLET ORAL at 05:11

## 2024-08-07 RX ADMIN — ANTACID TABLETS 500 MG: 500 TABLET, CHEWABLE ORAL at 05:09

## 2024-08-07 RX ADMIN — LIOTHYRONINE SODIUM 5 MCG: 5 TABLET ORAL at 05:09

## 2024-08-07 RX ADMIN — Medication 1 DROP: at 17:11

## 2024-08-07 RX ADMIN — Medication 1000 UNITS: at 05:11

## 2024-08-07 RX ADMIN — CELECOXIB 200 MG: 200 CAPSULE ORAL at 05:10

## 2024-08-07 ASSESSMENT — PAIN DESCRIPTION - PAIN TYPE
TYPE: ACUTE PAIN

## 2024-08-07 ASSESSMENT — ENCOUNTER SYMPTOMS
SHORTNESS OF BREATH: 0
DIARRHEA: 0
CHILLS: 0
FEVER: 0
COUGH: 0
VOMITING: 0
ABDOMINAL PAIN: 1
HEARTBURN: 0
NAUSEA: 0
CONSTIPATION: 0

## 2024-08-07 NOTE — PROGRESS NOTES
Received report of patient at start of shift. Patient is AOx4, epidural medications infusing for pain control. Assessment complete. Q4h neuro checks completed, WDL. Prevena to midline abdomen, CD&I. Bose catheter in place with clear/yellow output. Patient ambulates with SBA. Patient updated on plan of care, encouraged to notify staff for any needs/assistance. Call light within reach.

## 2024-08-07 NOTE — ANESTHESIA POST-OP FOLLOW-UP NOTE
Anesthesia Pain Service Note    Type:  Epidural catheter    Patient: Rosangelawendi Valles Stockstill    Patient seen and examined.     /57   Pulse 70   Temp 36.9 °C (98.4 °F) (Temporal)   Resp 16   Ht 1.524 m (5')   Wt 70.4 kg (155 lb 3.3 oz)   LMP  (LMP Unknown)   SpO2 93%   BMI 30.31 kg/m²     Complaints:  No complaints.    Pain:   2/10    LOC:   Awake    Rate:  2    Exam:  Vital signs stable, Afebrile, and Site clean, dry, intact without tenderness or erythema    Impression:  Adequate analgesia    Assessment & Plan:  Acute post-procedural pain (G89.18)     No change  - continue      Anderson Santos M.D.  8/7/2024  9:38 AM

## 2024-08-07 NOTE — PROGRESS NOTES
Date of Service  August 7, 2024     Chief Complaint  No chief complaint on file.        Surgery Completed  1.  Exploratory laparotomy.  2.  Intraoperative ultrasound survey of the pancreas using 5/12 MHz T-probe.  3.  Distal pancreatectomy and splenectomy.  4.  Stomach and celiac node dissection.  5.  Omental flap.    Hospital Course  POD # 2     Interval Problem Update  No acute events overnight  Minimal tenderness to abdominal incision, Prevena in place  WBC elevated 22.9 this AM, denies fever, chills, nausea, vomiting  Calcium slightly low at 7.9 this AM, 8.0 yesterday and started Tums  Tolerating full liquids in smaller portions, trial GI soft diet today  Epidural at 4, celebrex PO with adequate pain relief  Restarted home meds yesterday  Ambulated yesterday with assistance  Bose in place, draining well  Seen by PT/OT yesterday, no anticipated needs after discharge    Problem List  Principal Problem:    Mass of pancreas (POA: Yes)  Active Problems:    Cancer of overlapping sites of left breast (HCC) (POA: Yes)    Pancreatic mass (POA: Yes)    History of breast cancer in adulthood (POA: Unknown)  Resolved Problems:    Malignant tumor of breast (HCC) (POA: Yes)      Overview: hx left breast       Subjective  Review of Systems   Constitutional:  Negative for chills, fever and malaise/fatigue.   Respiratory:  Negative for cough and shortness of breath.    Cardiovascular:  Negative for chest pain.   Gastrointestinal:  Positive for abdominal pain (Tenderness at incision site). Negative for constipation, diarrhea, heartburn, nausea and vomiting.   Genitourinary:  Negative for dysuria.   Skin:  Negative for itching.         Objective  Temp:  [36.9 °C (98.4 °F)-37.4 °C (99.3 °F)] 36.9 °C (98.4 °F)  Pulse:  [60-87] 70  Resp:  [16-18] 16  BP: (120-142)/(55-73) 120/57  SpO2:  [93 %-97 %] 93 %      Physical Exam  Vitals and nursing note reviewed.   Constitutional:       General: She is not in acute distress.      "Appearance: Normal appearance. She is not ill-appearing.   HENT:      Head: Normocephalic.      Nose: Nose normal.      Mouth/Throat:      Pharynx: Oropharynx is clear.   Eyes:      General: No scleral icterus.     Conjunctiva/sclera: Conjunctivae normal.   Cardiovascular:      Rate and Rhythm: Normal rate.   Pulmonary:      Effort: Pulmonary effort is normal. No respiratory distress.   Abdominal:      General: There is distension.      Palpations: Abdomen is soft.      Tenderness: There is abdominal tenderness. There is no guarding.      Comments: Prevena in place   Genitourinary:     Comments: Bose in place  Musculoskeletal:         General: Normal range of motion.   Skin:     General: Skin is warm and dry.      Coloration: Skin is not jaundiced.   Neurological:      General: No focal deficit present.      Mental Status: She is alert and oriented to person, place, and time.      Motor: No weakness.   Psychiatric:         Mood and Affect: Mood normal.         Behavior: Behavior normal.          Fluids    Intake/Output Summary (Last 24 hours) at 8/7/2024 0909  Last data filed at 8/7/2024 0707  Gross per 24 hour   Intake 395 ml   Output 850 ml   Net -455 ml         Labs  Lab Results   Component Value Date/Time    SODIUM 135 08/07/2024 03:07 AM    POTASSIUM 4.2 08/07/2024 03:07 AM    CHLORIDE 100 08/07/2024 03:07 AM    CO2 23 08/07/2024 03:07 AM    GLUCOSE 124 (H) 08/07/2024 03:07 AM    BUN 12 08/07/2024 03:07 AM    CREATININE 0.62 08/07/2024 03:07 AM         No results found for: \"PROTHROMBTM\", \"INR\"      Lab Results   Component Value Date/Time    WBC 22.9 (H) 08/07/2024 03:07 AM    RBC 3.93 (L) 08/07/2024 03:07 AM    HEMOGLOBIN 12.2 08/07/2024 03:07 AM    HEMATOCRIT 36.0 (L) 08/07/2024 03:07 AM    MCV 91.6 08/07/2024 03:07 AM    MCH 31.0 08/07/2024 03:07 AM    MCHC 33.9 08/07/2024 03:07 AM    MPV 10.6 08/07/2024 03:07 AM    NEUTSPOLYS 73 07/19/2024 08:19 AM    NEUTSPOLYS 66.40 09/27/2023 08:32 AM    LYMPHOCYTES 20 " 07/19/2024 08:19 AM    LYMPHOCYTES 23.90 09/27/2023 08:32 AM    MONOCYTES 7 07/19/2024 08:19 AM    MONOCYTES 9.50 09/27/2023 08:32 AM    EOSINOPHILS 0 07/19/2024 08:19 AM    EOSINOPHILS 0.00 09/27/2023 08:32 AM    BASOPHILS 0 07/19/2024 08:19 AM    BASOPHILS 0.00 09/27/2023 08:32 AM         Recent Labs     08/06/24  0543 08/07/24  0307   ASTSGOT 34 34   ALTSGPT 30 30   TBILIRUBIN 2.3* 3.3*   GLOBULIN 2.6 2.4        Imaging  CT CHEST/ABDOMEN/PELVIS (6/28/24)  IMPRESSION:  1.  No definite CT evidence of infiltrating gastric mass although there is poor distention of the stomach with possible wall thickening of the gastric antrum.  2.  There is a large solid and cystic necrotic pancreatic tail mass suspicious for malignancy with no surrounding vascular involvement or lymphadenopathy.  3.  Mild hepatic steatosis with no focal hepatic lesions.  4.  Cholelithiasis without biliary dilatation.  5.  Moderate size hiatal hernia.  6.  Colonic diverticulosis without diverticulitis.  7.  Nonspecific borderline enlarged superior mediastinal right paraesophageal lymph node.  8.  There are postoperative changes of left mastectomy and axillary lymph node dissection.  9.  No parenchymal lung metastases.    Pathology  PATH 8/5/24  PENDING    VTE Prophylaxis: Lovenox, SCDs     Assessment/Plan  Patient is a 78-year old female with PMH of pancreatic tail mass, noninvasive carcinoma of the left breast, s/p total mastectomy with implant reconstruction who underwent surgery by Dr. Espinoza on 8/5/24.    1. Exploratory laparotomy, intraoperative ultrasound survey of the pancreas, distal pancreatectomy and splenectomy, stomach and celiac node dissection, omental flap POD #2  - Continue epidural at 4, keep garcia in place  - Continue PO celebrex  - Advance to GI soft diet, eat slowly and with small portions  - Continue ambulation  - Encourage IS    2. Leukocystosis  - WBC 22.9, s/p splenectomy, afebrile  - Monitor    3. Hypocalcemia  - 7.9  this AM, continue Tums  - Monitor    4. Elevated Tbili  - Tbili 3.3 this AM, no jaundice and no scleral icterus observed  - Monitor    5. Hx of Hashimoto's thyroiditis  - Restarted home Synthroid and Cytomel this AM    6. Hx of invasive mammary carcinoma and osteoporosis  - Restarted Tamoxifen this AM and vit D supplement yesterday    Seen with Dr. Velasco and Dr. Esquivel.

## 2024-08-07 NOTE — PROGRESS NOTES
Report received from RN, assumed care at 0645  Pt is A0X4, and responds appropriately   Pt declines any SOB, chest pain, new onset of numbness/ tingling  Pt rates pain at 0/10, on a scale of 1-10  Pt has garcia present   Pt has epidural present   Pt has Prevena wound vac present to MLI   Pt has + flatus, + bowel sounds, + BM PTA   Pt ambulates with a x1 assist with a FWW   Pt is tolerating a diet, pt denies any nausea/vomiting  Plan of care discussed, all questions answered. Explained importance of calling before getting OOB and pt verbalizes understanding. Explained importance of oral care. Call light is within reach, treaded slipper socks on, bed in lowest/ locked position, hourly rounding in place, all needs met at this time

## 2024-08-07 NOTE — PROGRESS NOTES
Bedside report received from day shift nurse. Assumed care at 1845.   Pt A&Ox4  Full liquid diet, denies nausea/vomiting. Hypoactive bowel sounds, + flatus, LBM PTA. IV access through 22G RFA that is infusing.  Saturating >90% on 2L NC.  Epidural infusing, DIP, old drainage noted.   MLI with prevena [place] in place.   Bose catheter in place, +output.  Pt ambulates x1 assist.  Pain is controlled through medication orders. Updated on plan of care. Safety education provided. Bed locked in low. Call light within reach. Rounding in place.

## 2024-08-08 LAB
ALBUMIN SERPL BCP-MCNC: 2.8 G/DL (ref 3.2–4.9)
ALBUMIN/GLOB SERPL: 1 G/DL
ALP SERPL-CCNC: 66 U/L (ref 30–99)
ALT SERPL-CCNC: 19 U/L (ref 2–50)
ANION GAP SERPL CALC-SCNC: 12 MMOL/L (ref 7–16)
AST SERPL-CCNC: 21 U/L (ref 12–45)
BILIRUB CONJ SERPL-MCNC: 0.6 MG/DL (ref 0.1–0.5)
BILIRUB SERPL-MCNC: 3 MG/DL (ref 0.1–1.5)
BUN SERPL-MCNC: 9 MG/DL (ref 8–22)
CALCIUM ALBUM COR SERPL-MCNC: 9.2 MG/DL (ref 8.5–10.5)
CALCIUM SERPL-MCNC: 8.2 MG/DL (ref 8.5–10.5)
CHLORIDE SERPL-SCNC: 98 MMOL/L (ref 96–112)
CO2 SERPL-SCNC: 24 MMOL/L (ref 20–33)
CREAT SERPL-MCNC: 0.58 MG/DL (ref 0.5–1.4)
ERYTHROCYTE [DISTWIDTH] IN BLOOD BY AUTOMATED COUNT: 42.7 FL (ref 35.9–50)
GFR SERPLBLD CREATININE-BSD FMLA CKD-EPI: 93 ML/MIN/1.73 M 2
GLOBULIN SER CALC-MCNC: 2.8 G/DL (ref 1.9–3.5)
GLUCOSE SERPL-MCNC: 112 MG/DL (ref 65–99)
HCT VFR BLD AUTO: 36.2 % (ref 37–47)
HGB BLD-MCNC: 12 G/DL (ref 12–16)
MCH RBC QN AUTO: 30.1 PG (ref 27–33)
MCHC RBC AUTO-ENTMCNC: 33.1 G/DL (ref 32.2–35.5)
MCV RBC AUTO: 90.7 FL (ref 81.4–97.8)
PLATELET # BLD AUTO: 214 K/UL (ref 164–446)
PMV BLD AUTO: 10.1 FL (ref 9–12.9)
POTASSIUM SERPL-SCNC: 4.2 MMOL/L (ref 3.6–5.5)
PROT SERPL-MCNC: 5.6 G/DL (ref 6–8.2)
RBC # BLD AUTO: 3.99 M/UL (ref 4.2–5.4)
SODIUM SERPL-SCNC: 134 MMOL/L (ref 135–145)
WBC # BLD AUTO: 19.9 K/UL (ref 4.8–10.8)

## 2024-08-08 PROCEDURE — 94669 MECHANICAL CHEST WALL OSCILL: CPT

## 2024-08-08 PROCEDURE — 85027 COMPLETE CBC AUTOMATED: CPT

## 2024-08-08 PROCEDURE — A9270 NON-COVERED ITEM OR SERVICE: HCPCS | Performed by: SURGERY

## 2024-08-08 PROCEDURE — 700102 HCHG RX REV CODE 250 W/ 637 OVERRIDE(OP)

## 2024-08-08 PROCEDURE — 97530 THERAPEUTIC ACTIVITIES: CPT

## 2024-08-08 PROCEDURE — A9270 NON-COVERED ITEM OR SERVICE: HCPCS

## 2024-08-08 PROCEDURE — 700105 HCHG RX REV CODE 258

## 2024-08-08 PROCEDURE — 97535 SELF CARE MNGMENT TRAINING: CPT

## 2024-08-08 PROCEDURE — 82248 BILIRUBIN DIRECT: CPT

## 2024-08-08 PROCEDURE — 770001 HCHG ROOM/CARE - MED/SURG/GYN PRIV*

## 2024-08-08 PROCEDURE — 99024 POSTOP FOLLOW-UP VISIT: CPT | Performed by: SURGERY

## 2024-08-08 PROCEDURE — 700102 HCHG RX REV CODE 250 W/ 637 OVERRIDE(OP): Performed by: SURGERY

## 2024-08-08 PROCEDURE — 80053 COMPREHEN METABOLIC PANEL: CPT

## 2024-08-08 RX ORDER — HYDROMORPHONE HYDROCHLORIDE 1 MG/ML
0.5 INJECTION, SOLUTION INTRAMUSCULAR; INTRAVENOUS; SUBCUTANEOUS
Status: DISCONTINUED | OUTPATIENT
Start: 2024-08-08 | End: 2024-08-09 | Stop reason: HOSPADM

## 2024-08-08 RX ORDER — POLYETHYLENE GLYCOL 3350 17 G/17G
1 POWDER, FOR SOLUTION ORAL DAILY
Status: DISCONTINUED | OUTPATIENT
Start: 2024-08-08 | End: 2024-08-09 | Stop reason: HOSPADM

## 2024-08-08 RX ORDER — TRAMADOL HYDROCHLORIDE 50 MG/1
50-100 TABLET ORAL EVERY 6 HOURS PRN
Status: DISCONTINUED | OUTPATIENT
Start: 2024-08-08 | End: 2024-08-09 | Stop reason: HOSPADM

## 2024-08-08 RX ORDER — DOCUSATE SODIUM 100 MG/1
100 CAPSULE, LIQUID FILLED ORAL 2 TIMES DAILY
Status: DISCONTINUED | OUTPATIENT
Start: 2024-08-08 | End: 2024-08-09 | Stop reason: HOSPADM

## 2024-08-08 RX ADMIN — DOCUSATE SODIUM 100 MG: 100 CAPSULE, LIQUID FILLED ORAL at 08:48

## 2024-08-08 RX ADMIN — DOCUSATE SODIUM 100 MG: 100 CAPSULE, LIQUID FILLED ORAL at 18:16

## 2024-08-08 RX ADMIN — Medication 1 DROP: at 18:00

## 2024-08-08 RX ADMIN — FAMOTIDINE 20 MG: 20 TABLET, FILM COATED ORAL at 05:19

## 2024-08-08 RX ADMIN — CELECOXIB 200 MG: 200 CAPSULE ORAL at 05:17

## 2024-08-08 RX ADMIN — POLYETHYLENE GLYCOL 3350 1 PACKET: 17 POWDER, FOR SOLUTION ORAL at 08:48

## 2024-08-08 RX ADMIN — FAMOTIDINE 20 MG: 20 TABLET, FILM COATED ORAL at 18:16

## 2024-08-08 RX ADMIN — ANTACID TABLETS 500 MG: 500 TABLET, CHEWABLE ORAL at 05:19

## 2024-08-08 RX ADMIN — LEVOTHYROXINE SODIUM 100 MCG: 0.05 TABLET ORAL at 05:17

## 2024-08-08 RX ADMIN — TAMOXIFEN CITRATE 20 MG: 10 TABLET ORAL at 05:21

## 2024-08-08 RX ADMIN — CARBOXYMETHYLCELLULOSE SODIUM 1 DROP: 5 SOLUTION/ DROPS OPHTHALMIC at 05:16

## 2024-08-08 RX ADMIN — Medication 1 DROP: at 05:16

## 2024-08-08 RX ADMIN — LIOTHYRONINE SODIUM 2.5 MCG: 5 TABLET ORAL at 05:20

## 2024-08-08 RX ADMIN — SODIUM CHLORIDE, POTASSIUM CHLORIDE, SODIUM LACTATE AND CALCIUM CHLORIDE: 600; 310; 30; 20 INJECTION, SOLUTION INTRAVENOUS at 20:31

## 2024-08-08 RX ADMIN — CARBOXYMETHYLCELLULOSE SODIUM 1 DROP: 5 SOLUTION/ DROPS OPHTHALMIC at 20:30

## 2024-08-08 RX ADMIN — Medication 1000 UNITS: at 05:18

## 2024-08-08 ASSESSMENT — ENCOUNTER SYMPTOMS
VOMITING: 0
NAUSEA: 0
COUGH: 0
ABDOMINAL PAIN: 1
SHORTNESS OF BREATH: 0
CHILLS: 0
CONSTIPATION: 0
FEVER: 0
DIARRHEA: 0

## 2024-08-08 ASSESSMENT — PAIN DESCRIPTION - PAIN TYPE
TYPE: ACUTE PAIN

## 2024-08-08 ASSESSMENT — COGNITIVE AND FUNCTIONAL STATUS - GENERAL
STANDING UP FROM CHAIR USING ARMS: A LITTLE
MOVING FROM LYING ON BACK TO SITTING ON SIDE OF FLAT BED: A LITTLE
MOVING TO AND FROM BED TO CHAIR: A LITTLE
MOBILITY SCORE: 18
WALKING IN HOSPITAL ROOM: A LITTLE
SUGGESTED CMS G CODE MODIFIER MOBILITY: CK
CLIMB 3 TO 5 STEPS WITH RAILING: A LITTLE
TURNING FROM BACK TO SIDE WHILE IN FLAT BAD: A LITTLE

## 2024-08-08 ASSESSMENT — GAIT ASSESSMENTS
DEVIATION: BRADYKINETIC
ASSISTIVE DEVICE: FRONT WHEEL WALKER
DISTANCE (FEET): 500
GAIT LEVEL OF ASSIST: STANDBY ASSIST

## 2024-08-08 NOTE — ANESTHESIA POST-OP FOLLOW-UP NOTE
Anesthesia Pain Service Note    Type:  Epidural catheter    Patient: Rosangela Reena Stockstill    Patient seen and examined. and Patient chart reviewed.     /61   Pulse 74   Temp 36.7 °C (98.1 °F) (Temporal)   Resp 18   Ht 1.524 m (5')   Wt 70.4 kg (155 lb 3.3 oz)   LMP  (LMP Unknown)   SpO2 95%   BMI 30.31 kg/m²     Complaints:  Pain, however, patient amenable to d/c catheter after discussion with primary team    Pain:   4/10    LOC:   Awake arousable    Rate:  0    Exam:  Vital signs stable, Afebrile, and Site clean, dry, intact without tenderness or erythema    Impression:  Adequate analgesia    Assessment & Plan:  Acute post-procedural pain (G89.18)     D/C by RN. Lovenox was held for 12 hours prior to removal. I was present during removal, tip was intact. May restart lovenox 4 hours after removal.       Gopi Gandhi M.D.  8/8/2024  8:30 AM

## 2024-08-08 NOTE — PROGRESS NOTES
Report received from RN, assumed care at 0645  Pt is A0X4, and responds appropriately   Pt declines any SOB, chest pain, new onset of numbness/ tingling  Pt rates pain at 1/10, on a scale of 1-10, pt declines intervention at this time  Pt has an epidural in place   Pt has a garcia in place   Pt has + flatus, + bowel sounds, + BM PTA  Pt ambulates with a SB assist with a FWW  Pt has a Prevena in place    Pt is tolerating a diet, pt denies any nausea/vomiting  Plan of care discussed, all questions answered. Explained importance of calling before getting OOB and pt verbalizes understanding. Explained importance of oral care. Call light is within reach, treaded slipper socks on, bed in lowest/ locked position, hourly rounding in place, all needs met at this time

## 2024-08-08 NOTE — PROGRESS NOTES
Date of Service  August 8, 2024     Chief Complaint  No chief complaint on file.        Surgery Completed  1.  Exploratory laparotomy.  2.  Intraoperative ultrasound survey of the pancreas using 5/12 MHz T-probe.  3.  Distal pancreatectomy and splenectomy.  4.  Stomach and celiac node dissection.  5.  Omental flap.    Hospital Course  POD # 3     Interval Problem Update  No acute events overnight  Final path noted benign pancreatic serous cystadenoma, margins negative for neoplasm, spleen with no significant pathologic abnormality, nine benign lymph nodes   Minimal tenderness to abdominal incision, Prevena in place  WBC down to 19.9 this AM, denies fever, chills, nausea, vomiting  Calcium up to 8.2 this AM, taking Tums  Tolerated GI soft diet, advance to regular diet today  (+) flatus, no BM yet  Epidural at 4, celebrex PO with adequate pain relief  Ambulating with assistance  Bose in place, draining well    Problem List  Principal Problem:    Mass of pancreas (POA: Yes)  Active Problems:    Cancer of overlapping sites of left breast (HCC) (POA: Yes)    Pancreatic mass (POA: Yes)    History of breast cancer in adulthood (POA: Unknown)  Resolved Problems:    Malignant tumor of breast (HCC) (POA: Yes)      Overview: hx left breast       Subjective  Review of Systems   Constitutional:  Negative for chills, fever and malaise/fatigue.   Respiratory:  Negative for cough and shortness of breath.    Cardiovascular:  Negative for chest pain.   Gastrointestinal:  Positive for abdominal pain (Tenderness at incision site). Negative for constipation, diarrhea, nausea and vomiting.   Genitourinary:  Negative for dysuria.   Skin:  Negative for itching.         Objective  Temp:  [36.7 °C (98.1 °F)-37.6 °C (99.7 °F)] 36.7 °C (98.1 °F)  Pulse:  [69-90] 74  Resp:  [16-18] 18  BP: (121-136)/(57-66) 130/61  SpO2:  [93 %-97 %] 95 %      Physical Exam  Vitals and nursing note reviewed.   Constitutional:       General: She is not in  "acute distress.     Appearance: Normal appearance. She is not ill-appearing.   HENT:      Head: Normocephalic.      Nose: Nose normal.      Mouth/Throat:      Pharynx: Oropharynx is clear.   Eyes:      General: No scleral icterus.     Conjunctiva/sclera: Conjunctivae normal.   Cardiovascular:      Rate and Rhythm: Normal rate.   Pulmonary:      Effort: Pulmonary effort is normal. No respiratory distress.   Abdominal:      Palpations: Abdomen is soft.      Tenderness: There is abdominal tenderness. There is no guarding.      Comments: Prevena in place   Genitourinary:     Comments: Bose in place  Musculoskeletal:         General: Normal range of motion.   Skin:     General: Skin is warm and dry.      Coloration: Skin is not jaundiced.   Neurological:      General: No focal deficit present.      Mental Status: She is alert and oriented to person, place, and time.      Motor: No weakness.   Psychiatric:         Mood and Affect: Mood normal.         Behavior: Behavior normal.          Fluids    Intake/Output Summary (Last 24 hours) at 8/8/2024 0846  Last data filed at 8/8/2024 0600  Gross per 24 hour   Intake 95.1 ml   Output 1150 ml   Net -1054.9 ml         Labs  Lab Results   Component Value Date/Time    SODIUM 134 (L) 08/08/2024 03:59 AM    POTASSIUM 4.2 08/08/2024 03:59 AM    CHLORIDE 98 08/08/2024 03:59 AM    CO2 24 08/08/2024 03:59 AM    GLUCOSE 112 (H) 08/08/2024 03:59 AM    BUN 9 08/08/2024 03:59 AM    CREATININE 0.58 08/08/2024 03:59 AM         No results found for: \"PROTHROMBTM\", \"INR\"      Lab Results   Component Value Date/Time    WBC 19.9 (H) 08/08/2024 03:59 AM    RBC 3.99 (L) 08/08/2024 03:59 AM    HEMOGLOBIN 12.0 08/08/2024 03:59 AM    HEMATOCRIT 36.2 (L) 08/08/2024 03:59 AM    MCV 90.7 08/08/2024 03:59 AM    MCH 30.1 08/08/2024 03:59 AM    MCHC 33.1 08/08/2024 03:59 AM    MPV 10.1 08/08/2024 03:59 AM    NEUTSPOLYS 73 07/19/2024 08:19 AM    NEUTSPOLYS 66.40 09/27/2023 08:32 AM    LYMPHOCYTES 20 " 07/19/2024 08:19 AM    LYMPHOCYTES 23.90 09/27/2023 08:32 AM    MONOCYTES 7 07/19/2024 08:19 AM    MONOCYTES 9.50 09/27/2023 08:32 AM    EOSINOPHILS 0 07/19/2024 08:19 AM    EOSINOPHILS 0.00 09/27/2023 08:32 AM    BASOPHILS 0 07/19/2024 08:19 AM    BASOPHILS 0.00 09/27/2023 08:32 AM         Recent Labs     08/06/24  0543 08/07/24  0307 08/08/24  0359   ASTSGOT 34 34 21   ALTSGPT 30 30 19   TBILIRUBIN 2.3* 3.3* 3.0*   GLOBULIN 2.6 2.4 2.8        Imaging  CT CHEST/ABDOMEN/PELVIS (6/28/24)  IMPRESSION:  1.  No definite CT evidence of infiltrating gastric mass although there is poor distention of the stomach with possible wall thickening of the gastric antrum.  2.  There is a large solid and cystic necrotic pancreatic tail mass suspicious for malignancy with no surrounding vascular involvement or lymphadenopathy.  3.  Mild hepatic steatosis with no focal hepatic lesions.  4.  Cholelithiasis without biliary dilatation.  5.  Moderate size hiatal hernia.  6.  Colonic diverticulosis without diverticulitis.  7.  Nonspecific borderline enlarged superior mediastinal right paraesophageal lymph node.  8.  There are postoperative changes of left mastectomy and axillary lymph node dissection.  9.  No parenchymal lung metastases.    Pathology  [facility] 8/5/24  FINAL DIAGNOSIS:   A. Distal pancreas, spleen and nodes, distal pancreatectomy and   splenectomy:         Benign pancreatic serous cystadenoma (6.8 cm).          Margins are negative for neoplasm.          Background chronic pancreatitis.          Spleen with no significant pathologic abnormality.          Nine benign lymph nodes (0/9).     VTE Prophylaxis: Lovenox, SCDs     Assessment/Plan  Patient is a 78-year old female with PMH of pancreatic tail mass, noninvasive carcinoma of the left breast, s/p total mastectomy with implant reconstruction who underwent surgery by Dr. Espinoza on 8/5/24.    1. Exploratory laparotomy, intraoperative ultrasound survey of the pancreas,  distal pancreatectomy and splenectomy, stomach and celiac node dissection, omental flap POD #3  - Hold lovenox  - Stop epidural today  - Continue PO celebrex, PRN tramadol and dilaudid ordered  - Start bowel regimen  - Advance to regular diet  - Decrease IVF to 50 mL/hr  - Continue ambulation  - Encourage IS  - Possible dc tomorrow    2. Elevated Tbili  - Tbili down to 3.0 this AM, no jaundice or scleral icterus observed  - Direct bilirubin ordered, hold MRCP pending results    3. Leukocystosis  - WBC 19.9, s/p splenectomy, afebrile  - Refusing splenectomy vaccines  - Improving, monitor    4. Hypocalcemia  - 8.2 this AM, continue Tums  - Improving, monitor    5. Hx of Hashimoto's thyroiditis  - Home Synthroid and Cytomel    6. Hx of invasive mammary carcinoma and osteoporosis  - Home Tamoxifen and vit D    Seen with Dr. Velasco.

## 2024-08-08 NOTE — THERAPY
"Physical Therapy   Daily Treatment     Patient Name: Paige Gudino  Age:  78 y.o., Sex:  female  Medical Record #: [id number]  Today's Date: 8/8/2024     Precautions  Precautions: Fall Risk  Comments: prevena    Assessment    Patient received in bed and agreeable to PT session. Pt able to mobilize with SBA-CGA with FWW. Pt continues to require 3L O2 during ambulation to maintain SpO2 >90%. Pt was able to ambulate 500ft with FWW with SBA in the hallways. Upon returning to her room, the pt was able to ambulate in her room with CGA and no AD; pt with slow and steady gait. Anticipate pt will be able to return home with no further PT needs. Will follow for acute care PT needs, likely 1 more session to assess further ambulation and balance without an AD.     Plan    Treatment Plan Status: (P) Continue Current Treatment Plan  Type of Treatment: Bed Mobility, Equipment, Gait Training, Neuro Re-Education / Balance, Self Care / Home Evaluation, Therapeutic Activities, Therapeutic Exercise  Treatment Frequency: 3 Times per Week  Treatment Duration: Until Therapy Goals Met    DC Equipment Recommendations: (P) None (pt will use FWW at home as needed)  Discharge Recommendations: (P) Anticipate that the patient will have no further physical therapy needs after discharge from the hospital      Subjective    \"We are from [place]\".     Objective       08/08/24 0926   Precautions   Precautions Fall Risk   Comments prevena   Vitals   O2 (LPM) 3   O2 Delivery Device Silicone Nasal Cannula   Vitals Comments VSS, pt declines dizziness/light headedness   Pain 0 - 10 Group   Therapist Pain Assessment Post Activity Pain Same as Prior to Activity;Nurse Notified  (pain not rated)   Cognition    Cognition / Consciousness WDL   Level of Consciousness Alert   Comments pleasant and participatory   Active ROM Lower Body    Active ROM Lower Body  WDL   Strength Lower Body   Lower Body Strength  WDL   Sensation Lower Body   Lower " Extremity Sensation   WDL   Lower Body Muscle Tone   Lower Body Muscle Tone  WDL   Balance   Sitting Balance (Static) Fair +   Sitting Balance (Dynamic) Fair   Standing Balance (Static) Fair   Standing Balance (Dynamic) Fair   Weight Shift Sitting Good   Weight Shift Standing Fair   Skilled Intervention Verbal Cuing   Comments w/FWW   Bed Mobility    Supine to Sit Standby Assist   Sit to Supine Standby Assist   Scooting Supervised   Skilled Intervention Verbal Cuing;Sequencing;Compensatory Strategies   Comments HOB slightly elevated, educated on log roll   Gait Analysis   Gait Level Of Assist Standby Assist   Assistive Device Front Wheel Walker   Distance (Feet) 500   # of Times Distance was Traveled 1   Deviation Bradykinetic   Weight Bearing Status no restrictions   Skilled Intervention Verbal Cuing;Compensatory Strategies   Comments pt then able to ambulate in room with no FWW, pt with steady slow gait, no LOB but reproted minor fatigue   Functional Mobility   Sit to Stand Contact Guard Assist   Bed, Chair, Wheelchair Transfer Standby Assist   Mobility bed <> ambualte in halls   Skilled Intervention Verbal Cuing;Compensatory Strategies   6 Clicks Assessment - How much HELP from from another person do you currently need... (If the patient hasn't done an activity recently, how much help from another person do you think he/she would need if he/she tried?)   Turning from your back to your side while in a flat bed without using bedrails? 3   Moving from lying on your back to sitting on the side of a flat bed without using bedrails? 3   Moving to and from a bed to a chair (including a wheelchair)? 3   Standing up from a chair using your arms (e.g., wheelchair, or bedside chair)? 3   Walking in hospital room? 3   Climbing 3-5 steps with a railing? 3   6 clicks Mobility Score 18   Short Term Goals    Short Term Goal # 1 Pt will perform bed mobility at S level with proper mechanics by tx 6   Goal Outcome # 1 Progressing  as expected   Short Term Goal # 2 Pt will transfer with LRAD at S level by tx 6   Goal Outcome # 2 Progressing as expected   Short Term Goal # 3 Pt will ambulate with LRAD at S level for 250 ft by tx 6   Goal Outcome # 3 Progressing as expected   Short Term Goal # 4 Pt will maintain SpO2 > 90 % on RA during ambulation.   Goal Outcome # 4 Goal not met   Education Group   Education Provided Role of Physical Therapist   Role of Physical Therapist Patient Response Patient;Acceptance;Explanation;Verbal Demonstration   Physical Therapy Treatment Plan   Physical Therapy Treatment Plan Continue Current Treatment Plan   Anticipated Discharge Equipment and Recommendations   DC Equipment Recommendations None  (pt will use FWW at home as needed)   Discharge Recommendations Anticipate that the patient will have no further physical therapy needs after discharge from the hospital   Interdisciplinary Plan of Care Collaboration   IDT Collaboration with  Nursing   Patient Position at End of Therapy In Bed;Call Light within Reach;Tray Table within Reach;Phone within Reach   Collaboration Comments RN updated   Session Information   Date / Session Number  8/8 - 2 (2/3, 8/12)

## 2024-08-08 NOTE — PROGRESS NOTES
Bedside report received from day shift nurse. Assumed care at 1845.   Pt A&Ox4  GI soft/Low fiber diet, denies nausea/vomiting. Hypoactive bowel sounds, + flatus, LBM PTA. IV access through 22G RFA that is infusing.  Saturating >90% on 2L NC.  Epidural infusing, DIP, old drainage noted.   MLI with prevena WV in place.   Bose catheter in place, +output.  Pt ambulates x1 assist.  Pain is controlled through medication orders. Updated on plan of care. Safety education provided. Bed locked in low. Call light within reach. Rounding in place.

## 2024-08-09 VITALS
BODY MASS INDEX: 30.47 KG/M2 | DIASTOLIC BLOOD PRESSURE: 71 MMHG | HEIGHT: 60 IN | RESPIRATION RATE: 18 BRPM | TEMPERATURE: 98.1 F | OXYGEN SATURATION: 91 % | WEIGHT: 155.2 LBS | SYSTOLIC BLOOD PRESSURE: 167 MMHG | HEART RATE: 72 BPM

## 2024-08-09 DIAGNOSIS — K86.89 MASS OF PANCREAS: ICD-10-CM

## 2024-08-09 DIAGNOSIS — Z98.890 S/P EXPLORATORY LAPAROTOMY: ICD-10-CM

## 2024-08-09 DIAGNOSIS — K86.89 PANCREATIC MASS: ICD-10-CM

## 2024-08-09 LAB
ALBUMIN SERPL BCP-MCNC: 2.9 G/DL (ref 3.2–4.9)
ALBUMIN/GLOB SERPL: 1 G/DL
ALP SERPL-CCNC: 76 U/L (ref 30–99)
ALT SERPL-CCNC: 20 U/L (ref 2–50)
ANION GAP SERPL CALC-SCNC: 10 MMOL/L (ref 7–16)
AST SERPL-CCNC: 17 U/L (ref 12–45)
BILIRUB SERPL-MCNC: 2 MG/DL (ref 0.1–1.5)
BUN SERPL-MCNC: 8 MG/DL (ref 8–22)
CALCIUM ALBUM COR SERPL-MCNC: 9.2 MG/DL (ref 8.5–10.5)
CALCIUM SERPL-MCNC: 8.3 MG/DL (ref 8.5–10.5)
CHLORIDE SERPL-SCNC: 102 MMOL/L (ref 96–112)
CO2 SERPL-SCNC: 25 MMOL/L (ref 20–33)
CREAT SERPL-MCNC: 0.57 MG/DL (ref 0.5–1.4)
ERYTHROCYTE [DISTWIDTH] IN BLOOD BY AUTOMATED COUNT: 43.5 FL (ref 35.9–50)
GFR SERPLBLD CREATININE-BSD FMLA CKD-EPI: 93 ML/MIN/1.73 M 2
GLOBULIN SER CALC-MCNC: 2.9 G/DL (ref 1.9–3.5)
GLUCOSE SERPL-MCNC: 101 MG/DL (ref 65–99)
HCT VFR BLD AUTO: 35.7 % (ref 37–47)
HGB BLD-MCNC: 11.7 G/DL (ref 12–16)
MCH RBC QN AUTO: 29.9 PG (ref 27–33)
MCHC RBC AUTO-ENTMCNC: 32.8 G/DL (ref 32.2–35.5)
MCV RBC AUTO: 91.3 FL (ref 81.4–97.8)
PLATELET # BLD AUTO: 296 K/UL (ref 164–446)
PMV BLD AUTO: 10.2 FL (ref 9–12.9)
POTASSIUM SERPL-SCNC: 3.4 MMOL/L (ref 3.6–5.5)
PROT SERPL-MCNC: 5.8 G/DL (ref 6–8.2)
RBC # BLD AUTO: 3.91 M/UL (ref 4.2–5.4)
SODIUM SERPL-SCNC: 137 MMOL/L (ref 135–145)
WBC # BLD AUTO: 15 K/UL (ref 4.8–10.8)

## 2024-08-09 PROCEDURE — 85027 COMPLETE CBC AUTOMATED: CPT

## 2024-08-09 PROCEDURE — 700102 HCHG RX REV CODE 250 W/ 637 OVERRIDE(OP): Performed by: SURGERY

## 2024-08-09 PROCEDURE — 80053 COMPREHEN METABOLIC PANEL: CPT

## 2024-08-09 PROCEDURE — 700102 HCHG RX REV CODE 250 W/ 637 OVERRIDE(OP)

## 2024-08-09 PROCEDURE — A9270 NON-COVERED ITEM OR SERVICE: HCPCS | Performed by: SURGERY

## 2024-08-09 PROCEDURE — 99024 POSTOP FOLLOW-UP VISIT: CPT | Performed by: SURGERY

## 2024-08-09 PROCEDURE — A9270 NON-COVERED ITEM OR SERVICE: HCPCS

## 2024-08-09 PROCEDURE — 700111 HCHG RX REV CODE 636 W/ 250 OVERRIDE (IP): Mod: JZ

## 2024-08-09 RX ORDER — TRAMADOL HYDROCHLORIDE 50 MG/1
50-100 TABLET ORAL EVERY 6 HOURS PRN
Qty: 30 TABLET | Refills: 0 | Status: SHIPPED | OUTPATIENT
Start: 2024-08-09 | End: 2024-08-14

## 2024-08-09 RX ORDER — ONDANSETRON 4 MG/1
4 TABLET, ORALLY DISINTEGRATING ORAL EVERY 6 HOURS PRN
Qty: 10 TABLET | Refills: 0 | Status: SHIPPED | OUTPATIENT
Start: 2024-08-09 | End: 2024-08-14

## 2024-08-09 RX ORDER — TRAMADOL HYDROCHLORIDE 50 MG/1
50-100 TABLET ORAL EVERY 6 HOURS PRN
Qty: 30 TABLET | Refills: 0 | Status: SHIPPED | OUTPATIENT
Start: 2024-08-09 | End: 2024-08-09

## 2024-08-09 RX ORDER — CELECOXIB 200 MG/1
200 CAPSULE ORAL DAILY
Qty: 7 CAPSULE | Refills: 0 | Status: SHIPPED | OUTPATIENT
Start: 2024-08-09 | End: 2024-08-14

## 2024-08-09 RX ORDER — POLYETHYLENE GLYCOL 3350 17 G/17G
17 POWDER, FOR SOLUTION ORAL DAILY
Qty: 7 PACKET | Refills: 0 | Status: SHIPPED | OUTPATIENT
Start: 2024-08-09 | End: 2024-08-14

## 2024-08-09 RX ORDER — PSEUDOEPHEDRINE HCL 30 MG
100 TABLET ORAL 2 TIMES DAILY
Qty: 60 CAPSULE | Refills: 0 | Status: SHIPPED | OUTPATIENT
Start: 2024-08-09 | End: 2024-08-14

## 2024-08-09 RX ADMIN — POLYETHYLENE GLYCOL 3350 1 PACKET: 17 POWDER, FOR SOLUTION ORAL at 06:00

## 2024-08-09 RX ADMIN — LIOTHYRONINE SODIUM 5 MCG: 5 TABLET ORAL at 05:29

## 2024-08-09 RX ADMIN — Medication 1 DROP: at 06:00

## 2024-08-09 RX ADMIN — CELECOXIB 200 MG: 200 CAPSULE ORAL at 05:28

## 2024-08-09 RX ADMIN — ENOXAPARIN SODIUM 40 MG: 100 INJECTION SUBCUTANEOUS at 09:22

## 2024-08-09 RX ADMIN — LEVOTHYROXINE SODIUM 100 MCG: 0.05 TABLET ORAL at 05:28

## 2024-08-09 RX ADMIN — CARBOXYMETHYLCELLULOSE SODIUM 1 DROP: 5 SOLUTION/ DROPS OPHTHALMIC at 05:29

## 2024-08-09 RX ADMIN — FAMOTIDINE 20 MG: 20 TABLET, FILM COATED ORAL at 05:28

## 2024-08-09 RX ADMIN — ANTACID TABLETS 500 MG: 500 TABLET, CHEWABLE ORAL at 05:28

## 2024-08-09 RX ADMIN — Medication 1000 UNITS: at 05:28

## 2024-08-09 RX ADMIN — TAMOXIFEN CITRATE 20 MG: 10 TABLET ORAL at 05:29

## 2024-08-09 RX ADMIN — DOCUSATE SODIUM 100 MG: 100 CAPSULE, LIQUID FILLED ORAL at 05:28

## 2024-08-09 ASSESSMENT — PAIN DESCRIPTION - PAIN TYPE
TYPE: ACUTE PAIN

## 2024-08-09 NOTE — DISCHARGE PLANNING
Case Management Discharge Planning    Admission Date: 8/5/2024  GMLOS: 3.5  ALOS: 4    6-Clicks ADL Score: 20  6-Clicks Mobility Score: 18      Anticipated Discharge Dispo: Discharge Disposition: Discharged to home/self care (01)    DME Needed: Yes    DME Ordered: Yes; O2 referral sent per choice form    Action(s) Taken: Chart review completed Patient discussed during IDT rounds.     Per IDT, patient will need home O2 upon discharge.     RNCM met with patient at bedside to obtain DME O2 choice.     Choice form faxed to [facility] and placed into patient's media file; pending DME O2 referral from provider    1250: Referral for DME O2 per patient's choice form    1430: Home O2 delivered to patient's bedside    Escalations Completed: None    Medically Clear: Yes    Next Steps: CM to follow up with IDT regarding DCP needs/barriers    Barriers to Discharge: Oxygen Delivery

## 2024-08-09 NOTE — FACE TO FACE
"Face to Face Note  -  Durable Medical Equipment    SOUMYA Hernandez - NPI: [id number]  I certify that this patient is under my care and that they had a durable medical equipment(DME)face to face encounter by myself that meets the physician DME face-to-face encounter requirements with this patient on:    Date of encounter:   Patient:                    MRN:                       YOB: 2023  Paige Valles [facility]  [id number]  1945     The encounter with the patient was in whole, or in part, for the following medical condition, which is the primary reason for durable medical equipment:  Post-Op Surgery    I certify that, based on my findings, the following durable medical equipment is medically necessary:    Oxygen   HOME O2 Saturation Measurements:(Values must be present for Home Oxygen orders)  Room air sat at rest: 95  Room air sat with amb: 88  With liters of O2: .5, O2 sat at rest with O2: 96  With Liters of O2: 1, O2 sat with amb with O2 : 91  Is the patient mobile?: Yes  If patient feels more short of breath, they can go up to 6 liters per minute and contact healthcare provider.    Supporting Symptoms: The patient requires supplemental oxygen, as the following interventions have been tried with limited or no improvement: \"Incentive spirometry.    My Clinical findings support the need for the above equipment due to:  Hypoxia  "

## 2024-08-09 NOTE — DISCHARGE INSTRUCTIONS
Surgery Discharge Instructions    Paige Gudino   Age: 78 y.o.   : 1945    During this admission you have been treated for:  Other specified diseases of pancreas [K86.89]  Pancreatic mass [K86.89]  Mass of pancreas [K86.89]  Patient Active Problem List    Diagnosis Date Noted    Cancer of overlapping sites of left breast (HCC) 2022    History of breast cancer in adulthood 2024    S/P chemotherapy, time since greater than 12 weeks 2024    History of mastectomy 2024    Trigger finger, right middle finger 07/10/2024    Long term (current) use of aromatase inhibitors 2024    Other chronic sinusitis 2023    Tinnitus of both ears 2023    Sensorineural hearing loss (SNHL) of both ears 2022    Age-related osteoporosis without current pathological fracture 2022    Vitamin B 12 deficiency 2021    Acquired hypothyroidism 12/15/2020    Thyroid nodule 12/15/2020    Hashimoto's thyroiditis 12/15/2020    Dyslipidemia 12/15/2020    Hypovitaminosis D 12/15/2020       Activity:   Resume light to normal activity as tolerated.  (ie - ok to walk, climb stairs, ect)  Do not engage in strenuous activity for 7-10 days.   Do not lift more than 10 pounds for the next 4-6 weeks.   It is important you are up walking several times a day to decrease the risk of a blood clot     Diet:  Resume regular diet as tolerated. To reduce risk of post-operative nausea and vomiting avoid spicy or greasy foods; eat small, frequent meals and maintain adequate fluid intake.    Medication(s):   Current Discharge Medication List        START taking these medications    Details   celecoxib (CELEBREX) 200 MG Cap Take 1 Capsule by mouth every day for 7 days.  Qty: 7 Capsule, Refills: 0    Associated Diagnoses: Pancreatic mass; S/P exploratory laparotomy      docusate sodium 100 MG Cap Take 100 mg by mouth 2 times a day for 7 days.  Qty: 60 Capsule, Refills: 0    Associated Diagnoses:  Pancreatic mass; S/P exploratory laparotomy      polyethylene glycol/lytes (MIRALAX) Pack Take 1 Packet by mouth every day for 7 days.  Qty: 7 Packet, Refills: 0    Associated Diagnoses: Pancreatic mass; S/P exploratory laparotomy      traMADol (ULTRAM) 50 MG Tab Take 1-2 Tablets by mouth every 6 hours as needed for Mild Pain or Moderate Pain for up to 7 days.  Qty: 30 Tablet, Refills: 0    Associated Diagnoses: Mass of pancreas; Pancreatic mass; S/P exploratory laparotomy      ondansetron (ZOFRAN ODT) 4 MG TABLET DISPERSIBLE Take 1 Tablet by mouth every 6 hours as needed for Nausea/Vomiting for up to 7 days.  Qty: 10 Tablet, Refills: 0    Associated Diagnoses: Pancreatic mass; S/P exploratory laparotomy           CONTINUE these medications which have NOT CHANGED    Details   NON SPECIFIED Administer 1 Drop into the right eye 2 times a day. Compounded eye drop from mail order pharmacy.  PT is unsure what is in it.      tamoxifen (NOLVADEX) 20 MG tablet Take 1 Tablet by mouth every day.  Qty: 60 Tablet, Refills: 3    Associated Diagnoses: Cancer of overlapping sites of left breast (HCC); Age-related osteoporosis without current pathological fracture      liothyronine (CYTOMEL) 5 MCG Tab Take 2.5-5 mcg by mouth see administration instructions. 1 tablet 3 times per week - Monday, Wednesday, Friday.  1/2 tablet on other days      levothyroxine (SYNTHROID) 100 MCG Tab Take 100 mcg by mouth every morning on an empty stomach.      ibandronate (BONIVA) 150 MG tablet Take 1 Tablet by mouth every 30 days.  Qty: 3 Tablet, Refills: 4    Associated Diagnoses: Cancer of overlapping sites of left breast (HCC); Age-related osteoporosis without current pathological fracture      Ferrous Sulfate (IRON) 325 (65 Fe) MG Tab Take 325 mg by mouth every day.         Cholecalciferol 1000 UNIT Cap Take 1,000 Units by mouth every day.         Cyanocobalamin (VITAMIN B12 PO) Take 150 mg by mouth see administration instructions. Takes on sat  and sun      Multiple Vitamins-Minerals (SM MULTIPLE VITAMINS WOMENS PO) Take 2 Tablets by mouth every day at 6 PM.               See prescription(s); take as directed.   You do NOT have to take all of your prescription pain medication.  Take only as needed if pain is not controlled with over-the-counter medications (ex: Tylenol, ibuprofen).    If you are taking narcotic pain medications be sure to take a stool softener (available over the counter - ex: miralax, magnesium citrate, dulcolax/bisacoidyl) to reduce risk of constipation.  Additionally, make sure that you are taking in enough fluids.  Do NOT drive or make any important decision while taking prescription pain medication.    Wound Care:  STAPLES    Your incision has been covered with staples. The staples will be removed next time you come to clinic.    Do NOT apply any creams, lotions, etc to the incision unless you have been specifically instructed to do so by your surgical team.    It is normal to have a small amount of clear/yellow/pink drainage from you incision as it heals.    OK to shower and wash gently with soap and water  Please call the surgical clinic if you have:              - increasing drainage from incision              - change in the color of drainage from you incision              - redness more than 1 fingerbreadth (1 centimeter) from the incision edge   - increasing pain   - fever more than 101F  Avoid exposing your incision to the sun    Bathing/Showering:    Please shower daily starting in 24 hours. You may wash over incisions with regular soap and water and pat dry.  Ok to allow water from the shower to run over you incision.  Avoid submerging you incision under water (no baths, swimming or hot tubs) until fully healed.      Reducing post discharge nausea or vomiting:    Limit mobility, take slow, deep breaths.    Restrictions:   No smoking  No alcohol while taking prescription pain medications  No strenuous activity until cleared by  surgery clinic  No heavy lifting (more than 10 lbs) for at least the next 4-6 weeks  No driving while taking prescription pain medication  No driving until you have the mobility to be a safe     Additional Instructions:   If you experience chest pain or shortness of breath, or have an acute emergency - please call 911    Please call clinic or seek evaluation at the ER if you have any of the following:  - Fever >101 F      - Wound infection (increasing redness, swelling, drainage, pus)     - Severe pain not controlled with oral medications     - Severe nausea or vomiting, inability to tolerate PO intake     - Bleeding that does not stop with holding pressure to the area       - Yellowing of the skin or eyes     - Change in mental status (confusion or lethargy)      - New numbness or weakness      - Any other concerns.    Follow-up:    Please follow-up in Dr. Espinoza[facility] in 1 week.  Follow-up with our office for staple removal after 10-14 days from date of surgery  Please call clinic to confirm appointment [phone]  Please keep all follow-up appointments   Exploratory Laparotomy, Adult, Care After  The following information offers guidance on how to care for yourself after your procedure. Your health care provider may also give you more specific instructions. If you have problems or questions, contact your health care provider.  What can I expect after the procedure?  After the procedure, it is common to have:  Abdominal soreness.  Fatigue.  Bloating.  Gas.  A sore throat from having had a breathing or draining tube in your throat.  A lack of appetite.  Follow these instructions at home:  Medicines  Take over-the-counter and prescription medicines only as told by your health care provider.  If you were prescribed an antibiotic medicine, take it as told by your health care provider. Do not stop taking the antibiotic even if you start to feel better.  Ask your health care provider if the  medicine prescribed to you:  Requires you to avoid driving or using machinery.  Can cause constipation. You may need to take these actions to prevent or treat constipation:  Drink enough fluid to keep your urine pale yellow.  Take over-the-counter or prescription medicines. Undergoing surgery and taking pain medicines can make constipation worse.  Eat foods that are high in fiber, such as beans, whole grains, and fresh fruits and vegetables.  Limit foods that are high in fat and processed sugars, such as fried or sweet foods.  Incision care    Follow instructions from your health care provider about how to take care of your incision. Make sure you:  Wash your hands with soap and water for at least 20 seconds before and after you change your bandage (dressing). If soap and water are not available, use hand .  Change your dressing as told by your health care provider.  Leave stitches (sutures), skin glue, or adhesive strips in place. These skin closures may need to stay in place for 2 weeks or longer. If adhesive strip edges start to loosen and curl up, you may trim the loose edges. Do not remove adhesive strips completely unless your health care provider tells you to do that.  If you were sent home with a drain, follow instructions from your health care provider about how to care for it.  Check your incision area every day for signs of infection. Check for:  Redness, swelling, or pain.  Fluid or blood.  Warmth.  Pus or a bad smell.  Activity    Rest as told by your health care provider.  Avoid sitting for a long time without moving. Get up to take short walks every 1-2 hours. This is important to improve blood flow and breathing. Ask for help if you feel weak or unsteady.  Do not lift anything that is heavier than 5 lb (2.3 kg), or the limit that you are told, until your health care provider says that it is safe.  Return to your normal activities as told by your health care provider. Ask your health care  provider what activities are safe for you.  Bathing  Keep your incision clean and dry. Clean it as often as told by your health care provider. You may be told to:  Gently wash the incision with soap and water.  Rinse the incision with water to remove all soap.  Pat the incision dry with a clean towel. Do not rub the incision.  General instructions  Do not use any products that contain nicotine or tobacco. These products include cigarettes, chewing tobacco, and vaping devices, such as e-cigarettes. These can delay incision healing after surgery. If you need help quitting, ask your health care provider.  Wear compression stockings as told by your health care provider. These stockings help to prevent blood clots and reduce swelling in your legs.  Keep all follow-up visits. This is important.  Contact a health care provider if:  You have a fever or chills.  Your pain medicine is not helping.  You have constipation or diarrhea.  You have nausea or vomiting.  You have drainage, redness, swelling, or pain at your incision site.  Get help right away if:  Your pain is getting worse.  You have not had a bowel movement for more than 3 days.  You have ongoing (persistent) vomiting.  The edges of your incision open up.  You have warmth, tenderness, or swelling in your calf.  You have trouble breathing.  You have chest pain.  These symptoms may represent a serious problem that is an emergency. Do not wait to see if the symptoms will go away. Get medical help right away. Call your local emergency services (911 in the [place] Do not drive yourself to the hospital.  Summary  Abdominal soreness is common after exploratory laparotomy. Take over-the-counter and prescription medicines only as told by your health care provider.  Follow instructions from your health care provider about how to take care of your incision.  Do not lift anything that is heavier than 5 lb (2.3 kg), or the limit that you are told, until your health care provider  says that it is safe.  This information is not intended to replace advice given to you by your health care provider. Make sure you discuss any questions you have with your health care provider.  Document Revised: 08/31/2021 Document Reviewed: 08/31/2021  Elsevier Patient Education © 2023 [employer]

## 2024-08-09 NOTE — PROGRESS NOTES
Report received from Glenn LAWLER and care of patient assumed at 0700. Assessment complete. Prevena WV has been removed per nursing communication order and an island dressing placed over incision. Patient tolerated well. Patient has been weaned to 0.5L NC and is using incentive spirometer. Home oxygen necessity evaluation is pending. DC orders are in place.

## 2024-08-09 NOTE — DISCHARGE PLANNING
@1143  Scanned DME O2 choice form in media. Pending orders.    @1240  Nurse Gelason notified pt refusing Oxygen from [facility] and would like different DME  . I called John from  [facility] to verify if they accept [employer] HMO. John will reach out the office and call me back. I submitted the referral to Olivia.     ANANTH Tang notified.      @8091  Per ANANTH Tang and Nurse Gleason pt oxygen is delivered at bedside by Olivia.

## 2024-08-09 NOTE — PROGRESS NOTES
Assumed care of patient at 1845. Bedside report received from No Soria. Assessment complete.  AA&Ox4. Denies CP/SOB.  Reporting 0/10 pain. Declined intervention at this time.  Educated patient regarding pharmacologic and non pharmacologic modalities for pain management.  Skin per flowsheets  Tolerating Regular diet. Denies N/V.  + void. + Flatus. Last BM PTA  Pt ambulates with SBA  All needs met at this time. Call light within reach. Pt calls appropriately. Bed low and locked, non skid socks in place. Hourly rounding in place.

## 2024-08-09 NOTE — DISCHARGE SUMMARY
Discharge Summary    CHIEF COMPLAINT ON ADMISSION  No chief complaint on file.      Reason for Admission  Other specified diseases of pancre*     Admission Date  2024    CODE STATUS  Full Code    HPI & HOSPITAL COURSE  This is a 78 y.o. female patient of Roula Ga PA-C and Casimiro Fowler MD, who was referred to Dr. Espinoza after being diagnosed with a large mass in the tail of the pancreas.  Based on imaging, it appeared as though it had the characteristics of microcystic serous cystadenoma.  Thus, we did not proceed with US and there was no sign of malignant disease and/or metastatic disease, so she was taken for a distal pancreatectomy with splenectomy.    On 24 she completed surgery by Dr. Espinoza, includin.  Exploratory laparotomy.  2.  Intraoperative ultrasound survey of the pancreas using 5/12 MHz T-probe.  3.  Distal pancreatectomy and splenectomy.  4.  Stomach and celiac node dissection.  5.  Omental flap.    Epidural anesthesia was placed preoperatively, along with Bose catheter.   Patient diet was advanced in standard gradual fashion, and patient tolerated well without complaints of N/V.    Patient was OOB and ambulated with support of nursing staff and PT team.   Epidural and Bose was discontinued before discharge, and patient was transitioned to PO pain medications.    Surgical dressing over the abdominal midline surgical incision was removed before discharge.  Wound care instructions provided to patient.    Therefore, she is discharged in good and stable condition to home with close outpatient follow-up.    The patient met 2-midnight criteria for an inpatient stay at the time of discharge.    Discharge Date  24    FOLLOW UP ITEMS POST DISCHARGE  Follow up with Dr. Espinoza/Anderson Gonzalez in 1 week.    DISCHARGE DIAGNOSES  Principal Problem (Resolved):    Mass of pancreas (POA: Yes)  Active Problems:    Cancer of overlapping sites of left breast (HCC) (POA: Yes)     History of breast cancer in adulthood (POA: Unknown)  Resolved Problems:    Malignant tumor of breast (HCC) (POA: Yes)      Overview: hx left breast    Pancreatic mass (POA: Yes)      FOLLOW UP  Future Appointments   Date Time Provider Department Center   8/26/2024  4:40 PM Roula Ga P.A.-C. [facility]   9/13/2024  8:00 AM SOUMYA Rossi [facility]   10/7/2024  9:20 AM Roula Ga P.A.-C. [facility]   10/16/2024 10:30 AM Casimiro Fowler M.D. [facility] None     No follow-up provider specified.    MEDICATIONS ON DISCHARGE     Medication List        START taking these medications        Instructions   celecoxib 200 MG Caps  Commonly known as: CeleBREX   Take 1 Capsule by mouth every day for 7 days.  Dose: 200 mg     docusate sodium 100 MG Caps   Take 100 mg by mouth 2 times a day for 7 days.  Dose: 100 mg     ondansetron 4 MG Tbdp  Commonly known as: Zofran ODT   Take 1 Tablet by mouth every 6 hours as needed for Nausea/Vomiting for up to 7 days.  Dose: 4 mg     polyethylene glycol/lytes Pack  Commonly known as: Miralax   Take 1 Packet by mouth every day for 7 days.  Dose: 17 g     traMADol 50 MG Tabs  Commonly known as: Ultram   Take 1-2 Tablets by mouth every 6 hours as needed for Mild Pain or Moderate Pain for up to 7 days.  Dose:  mg            CONTINUE taking these medications        Instructions   Cholecalciferol 1000 UNIT Caps   Take 1,000 Units by mouth every day.   Dose: 1,000 Units     ibandronate 150 MG tablet  Commonly known as: Boniva   Take 1 Tablet by mouth every 30 days.  Dose: 150 mg     Iron 325 (65 Fe) MG Tabs   Take 325 mg by mouth every day.   Dose: 325 mg     levothyroxine 100 MCG Tabs  Commonly known as: Synthroid   Take 100 mcg by mouth every morning on an empty stomach.  Dose: 100 mcg     liothyronine 5 MCG Tabs  Commonly known as: Cytomel   Take 2.5-5 mcg by mouth see administration instructions. 1 tablet 3 times per week - Monday, Wednesday, Friday.  1/2  tablet on other days  Dose: 2.5-5 mcg     NON SPECIFIED   Administer 1 Drop into the right eye 2 times a day. Compounded eye drop from mail order pharmacy.  PT is unsure what is in it.  Dose: 1 Drop     SM MULTIPLE VITAMINS WOMENS PO   Take 2 Tablets by mouth every day at 6 PM.   Dose: 2 Tablet     tamoxifen 20 MG tablet  Commonly known as: Nolvadex   Take 1 Tablet by mouth every day.  Dose: 20 mg            ASK your doctor about these medications        Instructions   VITAMIN B12 PO   Take 150 mg by mouth see administration instructions. Takes on sat and sun  Dose: 150 mg              Allergies  Allergies   Allergen Reactions    Levothyroxine Hives     Brand specific. Some brands are ok and some are not    Synthroid [Fd&C Red #40 [place]-Levothyroxine] Hives and Unspecified     hives    [place] Thyroid [Thyroid] Diarrhea     diarrhea       DIET  Orders Placed This Encounter   Procedures    Diet Order Diet: Regular     Standing Status:   Standing     Number of Occurrences:   1     Order Specific Question:   Diet:     Answer:   Regular [1]       ACTIVITY  As tolerated.  Weight bearing as tolerated    CONSULTATIONS  N/A    PROCEDURES  8/5/24 by Dr. Espinoza  1.  Exploratory laparotomy.  2.  Intraoperative ultrasound survey of the pancreas using 5/12 MHz T-probe.  3.  Distal pancreatectomy and splenectomy.  4.  Stomach and celiac node dissection.  5.  Omental flap.    LABORATORY  Lab Results   Component Value Date    SODIUM 134 (L) 08/08/2024    POTASSIUM 4.2 08/08/2024    CHLORIDE 98 08/08/2024    CO2 24 08/08/2024    GLUCOSE 112 (H) 08/08/2024    BUN 9 08/08/2024    CREATININE 0.58 08/08/2024        Lab Results   Component Value Date    WBC 19.9 (H) 08/08/2024    HEMOGLOBIN 12.0 08/08/2024    HEMATOCRIT 36.2 (L) 08/08/2024    PLATELETCT 214 08/08/2024        Total time of the discharge process exceeds 30 minutes.

## 2024-08-09 NOTE — DISCHARGE PLANNING
Care Transition Team Assessment    Information Source  Orientation Level: Oriented X4  Information Given By: Patient  Who is responsible for making decisions for patient? : Patient    Readmission Evaluation  Is this a readmission?: No    Elopement Risk  Legal Hold: No  Ambulatory or Self Mobile in Wheelchair: Yes  Disoriented: No  Psychiatric Symptoms: None  History of Wandering: No  Elopement this Admit: No  Vocalizing Wanting to Leave: No  Displays Behaviors, Body Language Wanting to Leave: No-Not at Risk for Elopement  Elopement Risk: Not at Risk for Elopement    Interdisciplinary Discharge Planning  Lives with - Patient's Self Care Capacity: Spouse  Patient or legal guardian wants to designate a caregiver: No  Support Systems: Family Member(s)  Housing / Facility: [address] [facility]  Prior Services: Home-Independent  Durable Medical Equipment: Home Oxygen    Discharge Preparedness  What is your plan after discharge?: Home with help  What are your discharge supports?: Spouse  Prior Functional Level: Ambulatory, Drives Self, Independent with Activities of Daily Living, Independent with Medication Management  Difficulity with ADLs: None  Difficulity with IADLs: None    Functional Assesment  Prior Functional Level: Ambulatory, Drives Self, Independent with Activities of Daily Living, Independent with Medication Management    Finances  Financial Barriers to Discharge: No  Prescription Coverage: Yes    Vision / Hearing Impairment  Vision Impairment : Yes  Right Eye Vision: Impaired, Wears Glasses  Left Eye Vision: Impaired, Wears Glasses  Hearing Impairment : No         Advance Directive  Advance Directive?: None  Advance Directive offered?: AD Booklet refused    Domestic Abuse  Physical Abuse or Sexual Abuse: No  Verbal Abuse or Emotional Abuse: No  Possible Abuse/Neglect Reported to:: Not Applicable         Discharge Risks or Barriers  Discharge risks or barriers?: Complex medical needs  Patient risk factors: Cognitive /  sensory / physical deficit, Complex medical needs, Multiple organizational systems involved, Vulnerable adult    Anticipated Discharge Information  Discharge Disposition: Discharged to home/self care (01)

## 2024-08-09 NOTE — CARE PLAN
Problem: Hyperinflation  Goal: Prevent or improve atelectasis  Description: Target End Date:  3 to 4 days    1. Instruct incentive spirometry usage  2.  Perform hyperinflation therapy as indicated  Outcome: Progressing    PEP QID  -1000  
  Problem: Hyperinflation  Goal: Prevent or improve atelectasis  Description: Target End Date:  3 to 4 days    1. Instruct incentive spirometry usage  2.  Perform hyperinflation therapy as indicated  Outcome: Progressing  Flowsheets (Taken 8/5/2024 1727)  Hyperinflation Protocol Goals/Outcome: Increase and/or stable inspiratory capacity for 24 hours  Hyperinflation Protocol Indications: Abdominal/Thoracic Surgeries (Open Heart)  Note:     Respiratory Update    Treatment modality: PEP  Frequency: QID      Pt tolerating current treatments well with no adverse reactions.      
  Problem: Hyperinflation  Goal: Prevent or improve atelectasis  Description: Target End Date:  3 to 4 days    1. Instruct incentive spirometry usage  2.  Perform hyperinflation therapy as indicated  Outcome: Progressing  Flowsheets (Taken 8/5/2024 1727)  Hyperinflation Protocol Goals/Outcome: Increase and/or stable inspiratory capacity for 24 hours  Hyperinflation Protocol Indications: Abdominal/Thoracic Surgeries (Open Heart)  Note:     Respiratory Update    Treatment modality: PEP  Frequency: QID      Pt tolerating current treatments well with no adverse reactions.      
"The patient is Stable - Low risk of patient condition declining or worsening    Shift Goals  Clinical Goals: pulmonary hygiene, home oxygen evaluation, mobility, discharge home  Patient Goals: \"I want to go home today\"  Family Goals: HAYDEE    Progress made toward(s) clinical / shift goals:  Patient's home oxygen has been delivered to bedside. Patient has ambulated several times. PIV access has been discontinued. Discharge orders are in place. Patient is in the process of getting dressed and gathering her belongings. Patient's spouse is on his way back to the hospital to provide patient with transportation home.     Patient is not progressing towards the following goals: Pending DCL protocol initiation.       "
The patient is Stable - Low risk of patient condition declining or worsening    Shift Goals  Clinical Goals: Pain management, tolerate diet, increased mobility  Patient Goals: Pain control, discharge on Wednesday      Progress made toward(s) clinical / shift goals:  Medications infusing via epidural for pain control. Patient reports 0/10 pain throughout shift. Patient tolerating full liquid diet. Patient sat up in chair and ambulated in hallway today with assistance from PT and OT.    Patient is not progressing towards the following goals: N/A      
The patient is Stable - Low risk of patient condition declining or worsening    Shift Goals  Clinical Goals: Pain/nausea control, MIVF, Epidural monitoring, 2 RN skin check, admission profile, ERAS, rest  Patient Goals: Pain control, eye drop order  Family Goals: Comfort, POC    Progress made toward(s) clinical / shift goals:  Epidural effective for pain; see MAR. Patient tolerating MIVF. 2 RN skin check/admission profile completed. ERAS protocol in place, patient assisted to chair shortly after arrival to floor. Oxymask in place. Patient watching television after family left. Eye drop order placed per APRN [employer] order.     Patient is not progressing towards the following goals: NA.      
The patient is Stable - Low risk of patient condition declining or worsening    Shift Goals  Clinical Goals: Q4 neuros, monitor epidural  Patient Goals: mobility, updates on POC  Family Goals: HAYDEE    Progress made toward(s) clinical / shift goals:  Patient denies pain while on epidural. Neuro exams remain intact. Patient sat EOB for oral care and verbalized wanted to be up to chair for all meals.     Patient is not progressing towards the following goals:      
The patient is Stable - Low risk of patient condition declining or worsening    Shift Goals  Clinical Goals: Q4 neuros, monitor epidural, mobility  Patient Goals: Updates, mobilty  Family Goals: HAYDEE    Progress made toward(s) clinical / shift goals:  Patient pain is controlled with epidural, Q4 neuro checks are in place.       Problem: Pain - Standard  Goal: Alleviation of pain or a reduction in pain to the patient’s comfort goal  Outcome: Progressing     Problem: Fall Risk  Goal: Patient will remain free from falls  Outcome: Progressing     Problem: Psychosocial  Goal: Patient's level of anxiety will decrease  Outcome: Progressing     Problem: Nutrition  Goal: Patient's nutritional and fluid intake will be adequate or improve  Outcome: Progressing         
The patient is Stable - Low risk of patient condition declining or worsening    Shift Goals  Clinical Goals: Q4 neuros, monitor epidural, mobility  Patient Goals: Updates, mobilty  Family Goals: HAYDEE    Progress made toward(s) clinical / shift goals:  Patient's pain controlled with PRN medications per MAR. Patient will ambulate 3x a shift       Problem: Pain - Standard  Goal: Alleviation of pain or a reduction in pain to the patient’s comfort goal  8/8/2024 1657 by No Mcgarry R.N.  Outcome: Progressing  8/8/2024 1205 by No Mcgarry R.N.  Outcome: Progressing     Problem: Fall Risk  Goal: Patient will remain free from falls  8/8/2024 1657 by HARPER Mead.N.  Outcome: Progressing  8/8/2024 1205 by No Mcgarry R.N.  Outcome: Progressing     Problem: Psychosocial  Goal: Patient's level of anxiety will decrease  Outcome: Progressing     Problem: Nutrition  Goal: Patient's nutritional and fluid intake will be adequate or improve  8/8/2024 1657 by No Mcgarry R.N.  Outcome: Progressing  8/8/2024 1205 by No Mcgarry R.N.  Outcome: Progressing     Problem: Urinary Elimination  Goal: Establish and maintain regular urinary output  Outcome: Progressing     Problem: Mobility  Goal: Patient's capacity to carry out activities will improve  Outcome: Progressing         
The patient is Stable - Low risk of patient condition declining or worsening    Shift Goals  Clinical Goals: pain management, increased mobility  Patient Goals: pain control  Family Goals: HAYDEE    Progress made toward(s) clinical / shift goals:  Patient denies pain with epidural. Patients neuro exams remain intact. Patient denies nausea.     Patient is not progressing towards the following goals:      
The patient is Stable - Low risk of patient condition declining or worsening    Shift Goals  Clinical Goals: pain/nausea control, Q4 neuros, rest  Patient Goals: pain control  Family Goals: HAYDEE    Progress made toward(s) clinical / shift goals:  Pain is controlled with epidural, Neuros remain intact.     Patient is not progressing towards the following goals:      
axillary

## 2024-08-12 NOTE — PROGRESS NOTES
Subjective:      Primary care physician: Roula Ga P.A.-C.  Referring Provider: Casimiro Fowler M.D.   Medical Oncologist: Casimiro Fowler M.D.    Chief Complaint: No chief complaint on file.    Diagnosis:   1. Mass of pancreas        2. S/P exploratory laparotomy        3. S/P chemotherapy, time since greater than 12 weeks        4. History of mastectomy, unspecified laterality          History of presenting illness:    Paige Gudino is a 77 y.o. female with a history of noninvasive carcinoma of the left breast associated with calcifications found on mammogram 34 years ago.  She was treated with a total mastectomy at that time.  She had implant reconstruction and a right mamma pexy subsequently.  No systemic therapy was given.  She did well until May 2022 when she self detected a lump under the implant at the inframammary fold on the left.  The tumor was ER positive greater than 90% AL positive greater than 90% HER2 negative IHC 1+.   On 8/1/2022 she underwent resection of the lesion which demonstrated invasive grade 2 mammary carcinoma measuring 1.6 cm with a close posterior margin, all other margins clear.       She was started on anastrozole in Jan 2023, has tolerated well well with no hot flashes or musculoskeletal symptoms.       CT C/A/P on 6/28/24 noted a large solid and cystic necrotic pancreatic tail mass suspicious for malignancy with no surrounding vascular involvement or lymphadenopathy. Mild hepatic steatosis with no focal hepatic lesions. Cholelithiasis without biliary dilatation. Possible wall thickening of the gastric antrum. Moderate size hiatal hernia. Nonspecific borderline enlarged superior mediastinal right paraesophageal lymph node.     She is here today for evaluation of this large pancreatic mass that was found to tail the pancreas.  The characteristics of this tumor which were found incidentally without any  symptoms appear to have characteristics of a microcystic serous cystadenoma.  The patient has no family history of pancreatic cancer.  She does have a history of breast cancer and underwent a mastectomy on the left side.  In any event she is an extremely healthy 78-year-old.  She denies any fever or chills, nausea or vomiting.  She has no weight loss.  She did have a CT scan which I have personally reviewed from 2024 which showed a large mass with no adenopathy and no sign of focal invasion or metastatic disease.  The patient has not had an EUS or any biopsies.  She is not a diabetic.  She has been completely asymptomatic.    Update 24  On 24 she completed surgery by Dr Espinoza, includin.  Exploratory laparotomy.  2.  Intraoperative ultrasound survey of the pancreas using 5/12 MHz T-probe.  3.  Distal pancreatectomy and splenectomy.  4.  Stomach and celiac node dissection.  5.  Omental flap.  She was discharged home on 24    Pathology noted benign pancreatic serous cystadenoma (6.8 cm). Margins are negative for neoplasm.    She is here today for ***    Past Medical History:   Diagnosis Date    Anesthesia     nausea post op    Cancer (HCC)      breast left    Cancer of overlapping sites of left female breast (HCC)     Cataract     IOL bilat    High cholesterol     Hypothyroidism     PONV (postoperative nausea and vomiting)     Just felt nausous after anesthesia    Thyroid nodule      Past Surgical History:   Procedure Laterality Date    MT ULTRASONIC GUIDANCE, INTRAOPERATIVE  2024    Procedure: ULTRASOUND GUIDANCE;  Surgeon: Sachin Espinoza M.D.;  Location: SURGERY [facility];  Service: General    PANCREATECTOMY N/A 2024    Procedure: OPEN DISTAL PANCREATECTOMY WITH SPLENECTOMY, NODE DISSECTION;  Surgeon: Sachin Espinoza M.D.;  Location: SURGERY [facility];  Service: General    SPLENECTOMY N/A 2024    Procedure: SPLENECTOMY;  Surgeon:  Sachin Espinoza M.D.;  Location: SURGERY [facility];  Service: General    CREATION, FLAP, OMENTUM N/A 8/5/2024    Procedure: CREATION, FLAP, OMENTUM;  Surgeon: Sachin Espinoza M.D.;  Location: SURGERY [facility];  Service: General    PB MASTECTOMY, PARTIAL Left 08/01/2022    Procedure: MELLO LOCALIZED LEFT PARTIAL MASTECTOMY;  Surgeon: Elena Arroyo M.D.;  Location: SURGERY [facility];  Service: General    OTHER ORTHOPEDIC SURGERY  2019    back surgery    OTHER  2001    replace left breast implant    OTHER  1988    Left breast implant/lift    OTHER  1987    left mastectomy    LAMINOTOMY      LUMPECTOMY      PRIMARY C SECTION       Allergies   Allergen Reactions    Levothyroxine Hives     Brand specific. Some brands are ok and some are not    Synthroid [Fd&C Red #40 Al Paul-Levothyroxine] Hives and Unspecified     hives    Sligo Thyroid [Thyroid] Diarrhea     diarrhea     Outpatient Encounter Medications as of 8/20/2024   Medication Sig Dispense Refill    celecoxib (CELEBREX) 200 MG Cap Take 1 Capsule by mouth every day for 7 days. 7 Capsule 0    docusate sodium 100 MG Cap Take 100 mg by mouth 2 times a day for 7 days. 60 Capsule 0    polyethylene glycol/lytes (MIRALAX) Pack Take 1 Packet by mouth every day for 7 days. 7 Packet 0    ondansetron (ZOFRAN ODT) 4 MG TABLET DISPERSIBLE Take 1 Tablet by mouth every 6 hours as needed for Nausea/Vomiting for up to 7 days. 10 Tablet 0    traMADol (ULTRAM) 50 MG Tab Take 1-2 Tablets by mouth every 6 hours as needed for Mild Pain or Moderate Pain for up to 7 days. MAX OF 5/DAY 30 Tablet 0    NON SPECIFIED Administer 1 Drop into the right eye 2 times a day. Compounded eye drop from mail order pharmacy.  PT is unsure what is in it.      ibandronate (BONIVA) 150 MG tablet Take 1 Tablet by mouth every 30 days. 3 Tablet 4    tamoxifen (NOLVADEX) 20 MG tablet Take 1 Tablet by mouth every day. 60 Tablet 3    Ferrous Sulfate (IRON) 325 (65 Fe) MG Tab Take  325 mg by mouth every day.         liothyronine (CYTOMEL) 5 MCG Tab Take 2.5-5 mcg by mouth see administration instructions. 1 tablet 3 times per week - Monday, Wednesday, Friday.  1/2 tablet on other days      levothyroxine (SYNTHROID) 100 MCG Tab Take 100 mcg by mouth every morning on an empty stomach.      Cholecalciferol 1000 UNIT Cap Take 1,000 Units by mouth every day.         Cyanocobalamin (VITAMIN B12 PO) Take 150 mg by mouth see administration instructions. Takes on sat and sun      Multiple Vitamins-Minerals (SM MULTIPLE VITAMINS WOMENS PO) Take 2 Tablets by mouth every day at 6 PM.          No facility-administered encounter medications on file as of 8/20/2024.     Social History     Socioeconomic History    Marital status:      Spouse name: Not on file    Number of children: Not on file    Years of education: Not on file    Highest education level: Associate degree: academic program   Occupational History     Comment: retired banker   Tobacco Use    Smoking status: Never     Passive exposure: Past    Smokeless tobacco: Never   Vaping Use    Vaping status: Never Used   Substance and Sexual Activity    Alcohol use: Never    Drug use: Never    Sexual activity: Not Currently     Partners: Male     Birth control/protection: Abstinence, Male Sterilization, Post-Menopausal     Comment:    Other Topics Concern    Not on file   Social History Narrative    Not on file     Social Determinants of Health     Financial Resource Strain: Low Risk  (7/11/2024)    Overall Financial Resource Strain (CARDIA)     Difficulty of Paying Living Expenses: Not hard at all   Food Insecurity: No Food Insecurity (8/5/2024)    Hunger Vital Sign     Worried About Running Out of Food in the Last Year: Never true     Ran Out of Food in the Last Year: Never true   Transportation Needs: No Transportation Needs (8/5/2024)    PRAPARE - Transportation     Lack of Transportation (Medical): No     Lack of Transportation  (Non-Medical): No   Physical Activity: Insufficiently Active (7/11/2024)    Exercise Vital Sign     Days of Exercise per Week: 2 days     Minutes of Exercise per Session: 10 min   Stress: No Stress Concern Present (7/11/2024)    [employer] [place] [employer] - Occupational Stress Questionnaire     Feeling of Stress : Not at all   Social Connections: Unknown (7/11/2024)    Social Connection and Isolation Panel [NHANES]     Frequency of Communication with Friends and Family: More than three times a week     Frequency of Social Gatherings with Friends and Family: Twice a week     Attends Christian Services: Patient declined     Active Member of Clubs or Organizations: Yes     Attends Club or Organization Meetings: More than 4 times per year     Marital Status:    Intimate Partner Violence: Not At Risk (8/5/2024)    Humiliation, Afraid, Rape, and Kick questionnaire     Fear of Current or Ex-Partner: No     Emotionally Abused: No     Physically Abused: No     Sexually Abused: No   Housing Stability: Low Risk  (8/5/2024)    Housing Stability Vital Sign     Unable to Pay for Housing in the Last Year: No     Number of Places Lived in the Last Year: 1     Unstable Housing in the Last Year: No      Social History     Tobacco Use   Smoking Status Never    Passive exposure: Past   Smokeless Tobacco Never     Social History     Substance and Sexual Activity   Alcohol Use Never     Social History     Substance and Sexual Activity   Drug Use Never      Family History   Problem Relation Age of Onset    Cancer Mother         Ovarian    Ovarian Cancer Mother     Dementia Father     Heart Disease Father     Hyperlipidemia Neg Hx        ROS     Objective:   LMP  (LMP Unknown)     Physical Exam    Labs   Latest Reference Range & Units 08/09/24 08:50   WBC 4.8 - 10.8 K/uL 15.0 (H)   RBC 4.20 - 5.40 M/uL 3.91 (L)   Hemoglobin 12.0 - 16.0 g/dL 11.7 (L)   Hematocrit 37.0 - 47.0 % 35.7 (L)   MCV 81.4 - 97.8 fL 91.3   MCH  27.0 - 33.0 pg 29.9   MCHC 32.2 - 35.5 g/dL 32.8   RDW 35.9 - 50.0 fL 43.5   Platelet Count 164 - 446 K/uL 296   MPV 9.0 - 12.9 fL 10.2   (H): Data is abnormally high  (L): Data is abnormally low      Latest Reference Range & Units 08/09/24 08:50   Sodium 135 - 145 mmol/L 137   Potassium 3.6 - 5.5 mmol/L 3.4 (L)   Chloride 96 - 112 mmol/L 102   Co2 20 - 33 mmol/L 25   Anion Gap 7.0 - 16.0  10.0   Glucose 65 - 99 mg/dL 101 (H)   Bun 8 - 22 mg/dL 8   Creatinine 0.50 - 1.40 mg/dL 0.57   GFR (CKD-EPI) >60 mL/min/1.73 m 2 93   Calcium 8.5 - 10.5 mg/dL 8.3 (L)   Correct Calcium 8.5 - 10.5 mg/dL 9.2   AST(SGOT) 12 - 45 U/L 17   ALT(SGPT) 2 - 50 U/L 20   Alkaline Phosphatase 30 - 99 U/L 76   Total Bilirubin 0.1 - 1.5 mg/dL 2.0 (H)   Albumin 3.2 - 4.9 g/dL 2.9 (L)   Total Protein 6.0 - 8.2 g/dL 5.8 (L)   Globulin 1.9 - 3.5 g/dL 2.9   A-G Ratio g/dL 1.0   (L): Data is abnormally low  (H): Data is abnormally high     Imaging  CT C/A/P (6/28/24)  IMPRESSION:  1.  No definite CT evidence of infiltrating gastric mass although there is poor distention of the stomach with possible wall thickening of the gastric antrum.  2.  There is a large solid and cystic necrotic pancreatic tail mass suspicious for malignancy with no surrounding vascular involvement or lymphadenopathy.  3.  Mild hepatic steatosis with no focal hepatic lesions.  4.  Cholelithiasis without biliary dilatation.  5.  Moderate size hiatal hernia.  6.  Colonic diverticulosis without diverticulitis.  7.  Nonspecific borderline enlarged superior mediastinal right paraesophageal lymph node.  8.  There are postoperative changes of left mastectomy and axillary lymph node dissection.  9.  No parenchymal lung metastases.     Pathology  [facility] (8/5/24)  FINAL DIAGNOSIS:   A. Distal pancreas, spleen and nodes, distal pancreatectomy and splenectomy:         Benign pancreatic serous cystadenoma (6.8 cm).          Margins are negative for neoplasm.          Background chronic  pancreatitis.          Spleen with no significant pathologic abnormality.          Nine benign lymph nodes (0/9).      Procedures  SURGERY (8/5/24)  1.  Exploratory laparotomy.  2.  Intraoperative ultrasound survey of the pancreas using 5/12 MHz T-probe.  3.  Distal pancreatectomy and splenectomy.  4.  Stomach and celiac node dissection.  5.  Omental flap.    Mastectomy 2022    Diagnosis:     1. Mass of pancreas        2. S/P exploratory laparotomy        3. S/P chemotherapy, time since greater than 12 weeks        4. History of mastectomy, unspecified laterality          Medical Decision Making:  Today's Assessment / Status / Plan:     ***    I, Anderson Gonzalez NP, have entered, reviewed and confirmed the above diagnoses related to this patient on this date of service, as per the time and date noted at top of this note.

## 2024-08-14 ENCOUNTER — HOSPITAL ENCOUNTER (INPATIENT)
Facility: MEDICAL CENTER | Age: 79
LOS: 9 days | DRG: 853 | End: 2024-08-23
Attending: EMERGENCY MEDICINE | Admitting: INTERNAL MEDICINE
Payer: COMMERCIAL

## 2024-08-14 ENCOUNTER — APPOINTMENT (OUTPATIENT)
Dept: RADIOLOGY | Facility: MEDICAL CENTER | Age: 79
DRG: 853 | End: 2024-08-14
Attending: EMERGENCY MEDICINE
Payer: COMMERCIAL

## 2024-08-14 DIAGNOSIS — R18.8 INTRA-ABDOMINAL FLUID COLLECTION: ICD-10-CM

## 2024-08-14 DIAGNOSIS — I82.403 DEEP VEIN THROMBOSIS (DVT) OF BOTH LOWER EXTREMITIES, UNSPECIFIED CHRONICITY, UNSPECIFIED VEIN (HCC): ICD-10-CM

## 2024-08-14 DIAGNOSIS — I26.09 PULMONARY EMBOLISM WITH ACUTE COR PULMONALE, UNSPECIFIED CHRONICITY, UNSPECIFIED PULMONARY EMBOLISM TYPE (HCC): ICD-10-CM

## 2024-08-14 DIAGNOSIS — A41.9 SEPSIS WITH ACUTE HYPOXIC RESPIRATORY FAILURE WITHOUT SEPTIC SHOCK, DUE TO UNSPECIFIED ORGANISM (HCC): ICD-10-CM

## 2024-08-14 DIAGNOSIS — R09.02 HYPOXIA: ICD-10-CM

## 2024-08-14 DIAGNOSIS — J96.01 SEPSIS WITH ACUTE HYPOXIC RESPIRATORY FAILURE WITHOUT SEPTIC SHOCK, DUE TO UNSPECIFIED ORGANISM (HCC): ICD-10-CM

## 2024-08-14 DIAGNOSIS — I10 ESSENTIAL HYPERTENSION: ICD-10-CM

## 2024-08-14 DIAGNOSIS — J96.01 ACUTE RESPIRATORY FAILURE WITH HYPOXIA (HCC): ICD-10-CM
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
## 2024-08-14 DIAGNOSIS — J18.9 HAP (HOSPITAL-ACQUIRED PNEUMONIA): ICD-10-CM

## 2024-08-14 DIAGNOSIS — I71.21 ANEURYSM OF ASCENDING AORTA WITHOUT RUPTURE (HCC): ICD-10-CM

## 2024-08-14 DIAGNOSIS — R65.20 SEPSIS WITH ACUTE HYPOXIC RESPIRATORY FAILURE WITHOUT SEPTIC SHOCK, DUE TO UNSPECIFIED ORGANISM (HCC): ICD-10-CM

## 2024-08-14 DIAGNOSIS — I51.3 ATRIAL THROMBUS: ICD-10-CM

## 2024-08-14 PROBLEM — Z71.89 ADVANCE CARE PLANNING: Status: ACTIVE | Noted: 2020-12-15

## 2024-08-14 LAB
ALBUMIN SERPL BCP-MCNC: 3.3 G/DL (ref 3.2–4.9)
ALBUMIN/GLOB SERPL: 1.1 G/DL
ALP SERPL-CCNC: 98 U/L (ref 30–99)
ALT SERPL-CCNC: 71 U/L (ref 2–50)
ANION GAP SERPL CALC-SCNC: 16 MMOL/L (ref 7–16)
ANISOCYTOSIS BLD QL SMEAR: ABNORMAL
APTT PPP: 22.3 SEC (ref 24.7–36)
AST SERPL-CCNC: 51 U/L (ref 12–45)
BASOPHILS # BLD AUTO: 0 % (ref 0–1.8)
BASOPHILS # BLD: 0 K/UL (ref 0–0.12)
BILIRUB SERPL-MCNC: 1.5 MG/DL (ref 0.1–1.5)
BUN SERPL-MCNC: 19 MG/DL (ref 8–22)
CALCIUM ALBUM COR SERPL-MCNC: 8.7 MG/DL (ref 8.5–10.5)
CALCIUM SERPL-MCNC: 8.1 MG/DL (ref 8.5–10.5)
CHLORIDE SERPL-SCNC: 102 MMOL/L (ref 96–112)
CO2 SERPL-SCNC: 22 MMOL/L (ref 20–33)
CREAT SERPL-MCNC: 0.84 MG/DL (ref 0.5–1.4)
EKG IMPRESSION: NORMAL
EOSINOPHIL # BLD AUTO: 0 K/UL (ref 0–0.51)
EOSINOPHIL NFR BLD: 0 % (ref 0–6.9)
ERYTHROCYTE [DISTWIDTH] IN BLOOD BY AUTOMATED COUNT: 46.8 FL (ref 35.9–50)
GFR SERPLBLD CREATININE-BSD FMLA CKD-EPI: 71 ML/MIN/1.73 M 2
GLOBULIN SER CALC-MCNC: 3.1 G/DL (ref 1.9–3.5)
GLUCOSE SERPL-MCNC: 158 MG/DL (ref 65–99)
HCT VFR BLD AUTO: 38.4 % (ref 37–47)
HGB BLD-MCNC: 12.4 G/DL (ref 12–16)
INR PPP: 1.21 (ref 0.87–1.13)
LACTATE SERPL-SCNC: 2.5 MMOL/L (ref 0.5–2)
LIPASE SERPL-CCNC: 111 U/L (ref 11–82)
LYMPHOCYTES # BLD AUTO: 0.36 K/UL (ref 1–4.8)
LYMPHOCYTES NFR BLD: 1.8 % (ref 22–41)
MANUAL DIFF BLD: NORMAL
MCH RBC QN AUTO: 30.2 PG (ref 27–33)
MCHC RBC AUTO-ENTMCNC: 32.3 G/DL (ref 32.2–35.5)
MCV RBC AUTO: 93.7 FL (ref 81.4–97.8)
MICROCYTES BLD QL SMEAR: ABNORMAL
MONOCYTES # BLD AUTO: 0.52 K/UL (ref 0–0.85)
MONOCYTES NFR BLD AUTO: 2.6 % (ref 0–13.4)
MORPHOLOGY BLD-IMP: NORMAL
NEUTROPHILS # BLD AUTO: 19.02 K/UL (ref 1.82–7.42)
NEUTROPHILS NFR BLD: 95.6 % (ref 44–72)
NRBC # BLD AUTO: 0.85 K/UL
NRBC BLD-RTO: 4.3 /100 WBC (ref 0–0.2)
NT-PROBNP SERPL IA-MCNC: 8591 PG/ML (ref 0–125)
PLATELET # BLD AUTO: 248 K/UL (ref 164–446)
PLATELET BLD QL SMEAR: NORMAL
PMV BLD AUTO: 10 FL (ref 9–12.9)
POTASSIUM SERPL-SCNC: 3.9 MMOL/L (ref 3.6–5.5)
PROT SERPL-MCNC: 6.4 G/DL (ref 6–8.2)
PROTHROMBIN TIME: 15.5 SEC (ref 12–14.6)
RBC # BLD AUTO: 4.1 M/UL (ref 4.2–5.4)
RBC BLD AUTO: PRESENT
SODIUM SERPL-SCNC: 140 MMOL/L (ref 135–145)
TROPONIN T SERPL-MCNC: 70 NG/L (ref 6–19)
WBC # BLD AUTO: 19.9 K/UL (ref 4.8–10.8)

## 2024-08-14 PROCEDURE — 700117 HCHG RX CONTRAST REV CODE 255: Performed by: EMERGENCY MEDICINE

## 2024-08-14 PROCEDURE — 700111 HCHG RX REV CODE 636 W/ 250 OVERRIDE (IP): Performed by: INTERNAL MEDICINE

## 2024-08-14 PROCEDURE — 87641 MR-STAPH DNA AMP PROBE: CPT

## 2024-08-14 PROCEDURE — 99285 EMERGENCY DEPT VISIT HI MDM: CPT

## 2024-08-14 PROCEDURE — 770000 HCHG ROOM/CARE - INTERMEDIATE ICU *

## 2024-08-14 PROCEDURE — 96367 TX/PROPH/DG ADDL SEQ IV INF: CPT

## 2024-08-14 PROCEDURE — 85007 BL SMEAR W/DIFF WBC COUNT: CPT

## 2024-08-14 PROCEDURE — 85610 PROTHROMBIN TIME: CPT

## 2024-08-14 PROCEDURE — 96375 TX/PRO/DX INJ NEW DRUG ADDON: CPT

## 2024-08-14 PROCEDURE — 96366 THER/PROPH/DIAG IV INF ADDON: CPT

## 2024-08-14 PROCEDURE — 36415 COLL VENOUS BLD VENIPUNCTURE: CPT

## 2024-08-14 PROCEDURE — 71045 X-RAY EXAM CHEST 1 VIEW: CPT

## 2024-08-14 PROCEDURE — 93005 ELECTROCARDIOGRAM TRACING: CPT

## 2024-08-14 PROCEDURE — 83880 ASSAY OF NATRIURETIC PEPTIDE: CPT

## 2024-08-14 PROCEDURE — 83605 ASSAY OF LACTIC ACID: CPT

## 2024-08-14 PROCEDURE — 85730 THROMBOPLASTIN TIME PARTIAL: CPT

## 2024-08-14 PROCEDURE — 84145 PROCALCITONIN (PCT): CPT

## 2024-08-14 PROCEDURE — 84484 ASSAY OF TROPONIN QUANT: CPT

## 2024-08-14 PROCEDURE — 93005 ELECTROCARDIOGRAM TRACING: CPT | Performed by: EMERGENCY MEDICINE

## 2024-08-14 PROCEDURE — 96365 THER/PROPH/DIAG IV INF INIT: CPT

## 2024-08-14 PROCEDURE — 87040 BLOOD CULTURE FOR BACTERIA: CPT

## 2024-08-14 PROCEDURE — 74177 CT ABD & PELVIS W/CONTRAST: CPT

## 2024-08-14 PROCEDURE — 80053 COMPREHEN METABOLIC PANEL: CPT

## 2024-08-14 PROCEDURE — 700111 HCHG RX REV CODE 636 W/ 250 OVERRIDE (IP): Performed by: EMERGENCY MEDICINE

## 2024-08-14 PROCEDURE — 85027 COMPLETE CBC AUTOMATED: CPT

## 2024-08-14 PROCEDURE — 83690 ASSAY OF LIPASE: CPT

## 2024-08-14 PROCEDURE — 99291 CRITICAL CARE FIRST HOUR: CPT | Performed by: INTERNAL MEDICINE

## 2024-08-14 PROCEDURE — 71275 CT ANGIOGRAPHY CHEST: CPT

## 2024-08-14 PROCEDURE — 700105 HCHG RX REV CODE 258: Performed by: EMERGENCY MEDICINE

## 2024-08-14 RX ORDER — ACETAMINOPHEN 500 MG
500 TABLET ORAL
COMMUNITY

## 2024-08-14 RX ORDER — HEPARIN SODIUM 1000 [USP'U]/ML
40 INJECTION, SOLUTION INTRAVENOUS; SUBCUTANEOUS PRN
Status: DISCONTINUED | OUTPATIENT
Start: 2024-08-14 | End: 2024-08-14

## 2024-08-14 RX ORDER — ONDANSETRON 4 MG/1
4 TABLET, ORALLY DISINTEGRATING ORAL EVERY 4 HOURS PRN
Status: DISCONTINUED | OUTPATIENT
Start: 2024-08-14 | End: 2024-08-23 | Stop reason: HOSPADM

## 2024-08-14 RX ORDER — SODIUM CHLORIDE 9 MG/ML
INJECTION, SOLUTION INTRAVENOUS CONTINUOUS
Status: DISCONTINUED | OUTPATIENT
Start: 2024-08-14 | End: 2024-08-16

## 2024-08-14 RX ORDER — OXYCODONE HYDROCHLORIDE 5 MG/1
5 TABLET ORAL
Status: DISCONTINUED | OUTPATIENT
Start: 2024-08-14 | End: 2024-08-23 | Stop reason: HOSPADM

## 2024-08-14 RX ORDER — LIOTHYRONINE SODIUM 5 UG/1
2.5-5 TABLET ORAL DAILY
Status: DISCONTINUED | OUTPATIENT
Start: 2024-08-15 | End: 2024-08-15

## 2024-08-14 RX ORDER — HEPARIN SODIUM 5000 [USP'U]/100ML
0-30 INJECTION, SOLUTION INTRAVENOUS CONTINUOUS
Status: DISCONTINUED | OUTPATIENT
Start: 2024-08-14 | End: 2024-08-14

## 2024-08-14 RX ORDER — OXYCODONE HYDROCHLORIDE 10 MG/1
10 TABLET ORAL
Status: DISCONTINUED | OUTPATIENT
Start: 2024-08-14 | End: 2024-08-23 | Stop reason: HOSPADM

## 2024-08-14 RX ORDER — ONDANSETRON 2 MG/ML
4 INJECTION INTRAMUSCULAR; INTRAVENOUS EVERY 4 HOURS PRN
Status: DISCONTINUED | OUTPATIENT
Start: 2024-08-14 | End: 2024-08-23 | Stop reason: HOSPADM

## 2024-08-14 RX ORDER — POLYETHYLENE GLYCOL 3350 17 G/17G
1 POWDER, FOR SOLUTION ORAL
Status: DISCONTINUED | OUTPATIENT
Start: 2024-08-14 | End: 2024-08-23 | Stop reason: HOSPADM

## 2024-08-14 RX ORDER — MORPHINE SULFATE 4 MG/ML
4 INJECTION INTRAVENOUS
Status: DISCONTINUED | OUTPATIENT
Start: 2024-08-14 | End: 2024-08-23 | Stop reason: HOSPADM

## 2024-08-14 RX ORDER — HEPARIN SODIUM 5000 [USP'U]/100ML
0-30 INJECTION, SOLUTION INTRAVENOUS CONTINUOUS
Status: DISCONTINUED | OUTPATIENT
Start: 2024-08-14 | End: 2024-08-19

## 2024-08-14 RX ORDER — ACETAMINOPHEN 325 MG/1
650 TABLET ORAL EVERY 6 HOURS PRN
Status: DISCONTINUED | OUTPATIENT
Start: 2024-08-14 | End: 2024-08-14

## 2024-08-14 RX ORDER — LEVOTHYROXINE SODIUM 100 UG/1
100 TABLET ORAL
Status: DISCONTINUED | OUTPATIENT
Start: 2024-08-15 | End: 2024-08-23 | Stop reason: HOSPADM

## 2024-08-14 RX ORDER — LABETALOL HYDROCHLORIDE 5 MG/ML
10 INJECTION, SOLUTION INTRAVENOUS EVERY 4 HOURS PRN
Status: DISCONTINUED | OUTPATIENT
Start: 2024-08-14 | End: 2024-08-23 | Stop reason: HOSPADM

## 2024-08-14 RX ORDER — AMOXICILLIN 250 MG
2 CAPSULE ORAL EVERY EVENING
Status: DISCONTINUED | OUTPATIENT
Start: 2024-08-15 | End: 2024-08-23 | Stop reason: HOSPADM

## 2024-08-14 RX ORDER — HEPARIN SODIUM 1000 [USP'U]/ML
40 INJECTION, SOLUTION INTRAVENOUS; SUBCUTANEOUS PRN
Status: DISCONTINUED | OUTPATIENT
Start: 2024-08-14 | End: 2024-08-19

## 2024-08-14 RX ORDER — SODIUM CHLORIDE, SODIUM LACTATE, POTASSIUM CHLORIDE, AND CALCIUM CHLORIDE .6; .31; .03; .02 G/100ML; G/100ML; G/100ML; G/100ML
30 INJECTION, SOLUTION INTRAVENOUS ONCE
Status: COMPLETED | OUTPATIENT
Start: 2024-08-14 | End: 2024-08-15

## 2024-08-14 RX ORDER — HEPARIN SODIUM 1000 [USP'U]/ML
80 INJECTION, SOLUTION INTRAVENOUS; SUBCUTANEOUS ONCE
Status: COMPLETED | OUTPATIENT
Start: 2024-08-14 | End: 2024-08-14

## 2024-08-14 RX ADMIN — VANCOMYCIN HYDROCHLORIDE 1750 MG: 5 INJECTION, POWDER, LYOPHILIZED, FOR SOLUTION INTRAVENOUS at 22:58

## 2024-08-14 RX ADMIN — PIPERACILLIN AND TAZOBACTAM 4.5 G: 4; .5 INJECTION, POWDER, FOR SOLUTION INTRAVENOUS at 22:13

## 2024-08-14 RX ADMIN — SODIUM CHLORIDE, POTASSIUM CHLORIDE, SODIUM LACTATE AND CALCIUM CHLORIDE 2109 ML: 600; 310; 30; 20 INJECTION, SOLUTION INTRAVENOUS at 21:45

## 2024-08-14 RX ADMIN — HEPARIN SODIUM 5600 UNITS: 1000 INJECTION, SOLUTION INTRAVENOUS; SUBCUTANEOUS at 23:56

## 2024-08-14 RX ADMIN — HEPARIN SODIUM 18 UNITS/KG/HR: 5000 INJECTION, SOLUTION INTRAVENOUS at 23:57

## 2024-08-14 RX ADMIN — IOHEXOL 90 ML: 350 INJECTION, SOLUTION INTRAVENOUS at 21:17

## 2024-08-14 ASSESSMENT — ENCOUNTER SYMPTOMS
DOUBLE VISION: 0
HALLUCINATIONS: 0
SPEECH CHANGE: 0
FOCAL WEAKNESS: 0
VOMITING: 0
WEAKNESS: 1
PALPITATIONS: 0
PHOTOPHOBIA: 0
BRUISES/BLEEDS EASILY: 0
FLANK PAIN: 0
HEADACHES: 0
POLYDIPSIA: 0
HEARTBURN: 0
HEMOPTYSIS: 0
BLURRED VISION: 0
SPUTUM PRODUCTION: 0
SHORTNESS OF BREATH: 1
FEVER: 0
CHILLS: 0
NAUSEA: 0
ORTHOPNEA: 0
WEIGHT LOSS: 0
NERVOUS/ANXIOUS: 0
TREMORS: 0
COUGH: 0
NECK PAIN: 0
BACK PAIN: 0

## 2024-08-14 ASSESSMENT — FIBROSIS 4 INDEX: FIB4 SCORE: 0.99

## 2024-08-14 ASSESSMENT — LIFESTYLE VARIABLES: SUBSTANCE_ABUSE: 0

## 2024-08-15 ENCOUNTER — APPOINTMENT (OUTPATIENT)
Dept: RADIOLOGY | Facility: MEDICAL CENTER | Age: 79
DRG: 853 | End: 2024-08-15
Attending: INTERNAL MEDICINE
Payer: COMMERCIAL

## 2024-08-15 ENCOUNTER — APPOINTMENT (OUTPATIENT)
Dept: RADIOLOGY | Facility: MEDICAL CENTER | Age: 79
DRG: 853 | End: 2024-08-15
Attending: SURGERY
Payer: COMMERCIAL

## 2024-08-15 ENCOUNTER — APPOINTMENT (OUTPATIENT)
Dept: CARDIOLOGY | Facility: MEDICAL CENTER | Age: 79
DRG: 853 | End: 2024-08-15
Attending: INTERNAL MEDICINE
Payer: COMMERCIAL

## 2024-08-15 PROBLEM — I82.409 DVT (DEEP VENOUS THROMBOSIS) (HCC): Status: ACTIVE | Noted: 2024-08-15

## 2024-08-15 PROBLEM — I51.3 ATRIAL THROMBUS: Status: ACTIVE | Noted: 2024-08-15

## 2024-08-15 LAB
ALBUMIN SERPL BCP-MCNC: 3 G/DL (ref 3.2–4.9)
ALBUMIN/GLOB SERPL: 1.1 G/DL
ALP SERPL-CCNC: 84 U/L (ref 30–99)
ALT SERPL-CCNC: 60 U/L (ref 2–50)
AMYLASE FLD-CCNC: 3425 U/L
ANION GAP SERPL CALC-SCNC: 13 MMOL/L (ref 7–16)
ANISOCYTOSIS BLD QL SMEAR: ABNORMAL
APPEARANCE UR: CLEAR
ARTERIAL PATENCY WRIST A: ABNORMAL
AST SERPL-CCNC: 40 U/L (ref 12–45)
BACTERIA #/AREA URNS HPF: NEGATIVE /HPF
BASE EXCESS BLDA CALC-SCNC: 0 MMOL/L (ref -4–3)
BASOPHILS # BLD AUTO: 0.3 % (ref 0–1.8)
BASOPHILS # BLD AUTO: 0.9 % (ref 0–1.8)
BASOPHILS # BLD: 0.05 K/UL (ref 0–0.12)
BASOPHILS # BLD: 0.16 K/UL (ref 0–0.12)
BILIRUB SERPL-MCNC: 1.7 MG/DL (ref 0.1–1.5)
BILIRUB UR QL STRIP.AUTO: NEGATIVE
BODY FLD TYPE: NORMAL
BODY TEMPERATURE: ABNORMAL DEGREES
BUN SERPL-MCNC: 17 MG/DL (ref 8–22)
CALCIUM ALBUM COR SERPL-MCNC: 8.1 MG/DL (ref 8.5–10.5)
CALCIUM SERPL-MCNC: 7.3 MG/DL (ref 8.5–10.5)
CAOX CRY #/AREA URNS HPF: NORMAL /HPF
CHLORIDE SERPL-SCNC: 105 MMOL/L (ref 96–112)
CO2 BLDA-SCNC: 20 MMOL/L (ref 20–33)
CO2 SERPL-SCNC: 22 MMOL/L (ref 20–33)
COLOR UR: ABNORMAL
CREAT SERPL-MCNC: 0.72 MG/DL (ref 0.5–1.4)
DELSYS IDSYS: ABNORMAL
EKG IMPRESSION: NORMAL
EOSINOPHIL # BLD AUTO: 0 K/UL (ref 0–0.51)
EOSINOPHIL # BLD AUTO: 0.01 K/UL (ref 0–0.51)
EOSINOPHIL NFR BLD: 0 % (ref 0–6.9)
EOSINOPHIL NFR BLD: 0.1 % (ref 0–6.9)
EPI CELLS #/AREA URNS HPF: NORMAL /HPF
ERYTHROCYTE [DISTWIDTH] IN BLOOD BY AUTOMATED COUNT: 47.1 FL (ref 35.9–50)
ERYTHROCYTE [DISTWIDTH] IN BLOOD BY AUTOMATED COUNT: 48.7 FL (ref 35.9–50)
GFR SERPLBLD CREATININE-BSD FMLA CKD-EPI: 86 ML/MIN/1.73 M 2
GLOBULIN SER CALC-MCNC: 2.8 G/DL (ref 1.9–3.5)
GLUCOSE SERPL-MCNC: 125 MG/DL (ref 65–99)
GLUCOSE UR STRIP.AUTO-MCNC: NEGATIVE MG/DL
GRAM STN SPEC: NORMAL
HCO3 BLDA-SCNC: 19.3 MMOL/L (ref 17–25)
HCT VFR BLD AUTO: 33.5 % (ref 37–47)
HCT VFR BLD AUTO: 34.9 % (ref 37–47)
HGB BLD-MCNC: 10.7 G/DL (ref 12–16)
HGB BLD-MCNC: 11.2 G/DL (ref 12–16)
HYALINE CASTS #/AREA URNS LPF: NORMAL /LPF
IMM GRANULOCYTES # BLD AUTO: 0.54 K/UL (ref 0–0.11)
IMM GRANULOCYTES NFR BLD AUTO: 3.3 % (ref 0–0.9)
KETONES UR STRIP.AUTO-MCNC: 15 MG/DL
LACTATE BLD-SCNC: 1.6 MMOL/L (ref 0.5–2)
LACTATE SERPL-SCNC: 1.4 MMOL/L (ref 0.5–2)
LACTATE SERPL-SCNC: 1.7 MMOL/L (ref 0.5–2)
LEUKOCYTE ESTERASE UR QL STRIP.AUTO: NEGATIVE
LPM ILPM: 15 LPM
LV EJECT FRACT  99904: 65
LYMPHOCYTES # BLD AUTO: 0.71 K/UL (ref 1–4.8)
LYMPHOCYTES # BLD AUTO: 1.1 K/UL (ref 1–4.8)
LYMPHOCYTES NFR BLD: 4.3 % (ref 22–41)
LYMPHOCYTES NFR BLD: 6.1 % (ref 22–41)
MANUAL DIFF BLD: NORMAL
MCH RBC QN AUTO: 29.9 PG (ref 27–33)
MCH RBC QN AUTO: 30.4 PG (ref 27–33)
MCHC RBC AUTO-ENTMCNC: 31.9 G/DL (ref 32.2–35.5)
MCHC RBC AUTO-ENTMCNC: 32.1 G/DL (ref 32.2–35.5)
MCV RBC AUTO: 93.3 FL (ref 81.4–97.8)
MCV RBC AUTO: 95.2 FL (ref 81.4–97.8)
METAMYELOCYTES NFR BLD MANUAL: 0.9 %
MICRO URNS: ABNORMAL
MICROCYTES BLD QL SMEAR: ABNORMAL
MONOCYTES # BLD AUTO: 0.83 K/UL (ref 0–0.85)
MONOCYTES # BLD AUTO: 1.25 K/UL (ref 0–0.85)
MONOCYTES NFR BLD AUTO: 5 % (ref 0–13.4)
MONOCYTES NFR BLD AUTO: 6.9 % (ref 0–13.4)
MORPHOLOGY BLD-IMP: NORMAL
MYELOCYTES NFR BLD MANUAL: 0.9 %
NEUTROPHILS # BLD AUTO: 14.37 K/UL (ref 1.82–7.42)
NEUTROPHILS # BLD AUTO: 15.26 K/UL (ref 1.82–7.42)
NEUTROPHILS NFR BLD: 84.3 % (ref 44–72)
NEUTROPHILS NFR BLD: 87 % (ref 44–72)
NITRITE UR QL STRIP.AUTO: NEGATIVE
NRBC # BLD AUTO: 0.72 K/UL
NRBC # BLD AUTO: 0.86 K/UL
NRBC BLD-RTO: 4.4 /100 WBC (ref 0–0.2)
NRBC BLD-RTO: 4.8 /100 WBC (ref 0–0.2)
PCO2 BLDA: 19.7 MMHG (ref 26–37)
PH BLDA: 7.6 [PH] (ref 7.4–7.5)
PH UR STRIP.AUTO: 5.5 [PH] (ref 5–8)
PLATELET # BLD AUTO: 248 K/UL (ref 164–446)
PLATELET # BLD AUTO: 263 K/UL (ref 164–446)
PLATELET BLD QL SMEAR: NORMAL
PMV BLD AUTO: 10.1 FL (ref 9–12.9)
PMV BLD AUTO: 10.3 FL (ref 9–12.9)
PO2 BLDA: 46 MMHG (ref 64–87)
POTASSIUM SERPL-SCNC: 3.6 MMOL/L (ref 3.6–5.5)
PROCALCITONIN SERPL-MCNC: 0.13 NG/ML
PROT SERPL-MCNC: 5.8 G/DL (ref 6–8.2)
PROT UR QL STRIP: 30 MG/DL
RBC # BLD AUTO: 3.52 M/UL (ref 4.2–5.4)
RBC # BLD AUTO: 3.74 M/UL (ref 4.2–5.4)
RBC # URNS HPF: NORMAL /HPF
RBC BLD AUTO: PRESENT
RBC UR QL AUTO: NEGATIVE
SAO2 % BLDA: 90 % (ref 93–99)
SCCMEC + MECA PNL NOSE NAA+PROBE: NEGATIVE
SIGNIFICANT IND 70042: NORMAL
SITE SITE: NORMAL
SODIUM SERPL-SCNC: 140 MMOL/L (ref 135–145)
SOURCE SOURCE: NORMAL
SP GR UR STRIP.AUTO: >1.045
SPECIMEN DRAWN FROM PATIENT: ABNORMAL
TROPONIN T SERPL-MCNC: 69 NG/L (ref 6–19)
UFH PPP CHRO-ACNC: 0.5 IU/ML
UFH PPP CHRO-ACNC: <0.1 IU/ML
UROBILINOGEN UR STRIP.AUTO-MCNC: 1 MG/DL
WBC # BLD AUTO: 16.5 K/UL (ref 4.8–10.8)
WBC # BLD AUTO: 18.1 K/UL (ref 4.8–10.8)
WBC #/AREA URNS HPF: NORMAL /HPF

## 2024-08-15 PROCEDURE — 71045 X-RAY EXAM CHEST 1 VIEW: CPT

## 2024-08-15 PROCEDURE — 99291 CRITICAL CARE FIRST HOUR: CPT | Performed by: INTERNAL MEDICINE

## 2024-08-15 PROCEDURE — 94640 AIRWAY INHALATION TREATMENT: CPT

## 2024-08-15 PROCEDURE — 700111 HCHG RX REV CODE 636 W/ 250 OVERRIDE (IP): Mod: JZ | Performed by: INTERNAL MEDICINE

## 2024-08-15 PROCEDURE — 93306 TTE W/DOPPLER COMPLETE: CPT | Mod: 26 | Performed by: INTERNAL MEDICINE

## 2024-08-15 PROCEDURE — 82803 BLOOD GASES ANY COMBINATION: CPT

## 2024-08-15 PROCEDURE — A9270 NON-COVERED ITEM OR SERVICE: HCPCS | Performed by: INTERNAL MEDICINE

## 2024-08-15 PROCEDURE — 770022 HCHG ROOM/CARE - ICU (200)

## 2024-08-15 PROCEDURE — 36415 COLL VENOUS BLD VENIPUNCTURE: CPT

## 2024-08-15 PROCEDURE — 99447 NTRPROF PH1/NTRNET/EHR 11-20: CPT | Performed by: INTERNAL MEDICINE

## 2024-08-15 PROCEDURE — 93005 ELECTROCARDIOGRAM TRACING: CPT | Performed by: INTERNAL MEDICINE

## 2024-08-15 PROCEDURE — 99024 POSTOP FOLLOW-UP VISIT: CPT | Performed by: NURSE PRACTITIONER

## 2024-08-15 PROCEDURE — 700102 HCHG RX REV CODE 250 W/ 637 OVERRIDE(OP): Performed by: INTERNAL MEDICINE

## 2024-08-15 PROCEDURE — 82150 ASSAY OF AMYLASE: CPT

## 2024-08-15 PROCEDURE — 80053 COMPREHEN METABOLIC PANEL: CPT

## 2024-08-15 PROCEDURE — 85520 HEPARIN ASSAY: CPT

## 2024-08-15 PROCEDURE — 83605 ASSAY OF LACTIC ACID: CPT | Mod: 91

## 2024-08-15 PROCEDURE — 87205 SMEAR GRAM STAIN: CPT

## 2024-08-15 PROCEDURE — 81001 URINALYSIS AUTO W/SCOPE: CPT

## 2024-08-15 PROCEDURE — 99223 1ST HOSP IP/OBS HIGH 75: CPT | Mod: 25,GC | Performed by: INTERNAL MEDICINE

## 2024-08-15 PROCEDURE — 36600 WITHDRAWAL OF ARTERIAL BLOOD: CPT

## 2024-08-15 PROCEDURE — 93306 TTE W/DOPPLER COMPLETE: CPT

## 2024-08-15 PROCEDURE — 96368 THER/DIAG CONCURRENT INF: CPT

## 2024-08-15 PROCEDURE — 700105 HCHG RX REV CODE 258: Performed by: INTERNAL MEDICINE

## 2024-08-15 PROCEDURE — 87070 CULTURE OTHR SPECIMN AEROBIC: CPT

## 2024-08-15 PROCEDURE — 93970 EXTREMITY STUDY: CPT

## 2024-08-15 PROCEDURE — 85025 COMPLETE CBC W/AUTO DIFF WBC: CPT

## 2024-08-15 PROCEDURE — 84484 ASSAY OF TROPONIN QUANT: CPT

## 2024-08-15 PROCEDURE — 77012 CT SCAN FOR NEEDLE BIOPSY: CPT

## 2024-08-15 PROCEDURE — 0W9H3ZZ DRAINAGE OF RETROPERITONEUM, PERCUTANEOUS APPROACH: ICD-10-PCS | Performed by: RADIOLOGY

## 2024-08-15 PROCEDURE — 85007 BL SMEAR W/DIFF WBC COUNT: CPT

## 2024-08-15 PROCEDURE — 85027 COMPLETE CBC AUTOMATED: CPT

## 2024-08-15 PROCEDURE — 96366 THER/PROPH/DIAG IV INF ADDON: CPT

## 2024-08-15 RX ORDER — LIOTHYRONINE SODIUM 5 UG/1
5 TABLET ORAL
Status: DISCONTINUED | OUTPATIENT
Start: 2024-08-16 | End: 2024-08-23 | Stop reason: HOSPADM

## 2024-08-15 RX ORDER — HYDRALAZINE HYDROCHLORIDE 10 MG/1
10 TABLET, FILM COATED ORAL EVERY 6 HOURS
Status: DISCONTINUED | OUTPATIENT
Start: 2024-08-15 | End: 2024-08-15 | Stop reason: CLARIF

## 2024-08-15 RX ORDER — HYDRALAZINE HYDROCHLORIDE 20 MG/ML
10 INJECTION INTRAMUSCULAR; INTRAVENOUS EVERY 6 HOURS PRN
Status: DISCONTINUED | OUTPATIENT
Start: 2024-08-15 | End: 2024-08-23 | Stop reason: HOSPADM

## 2024-08-15 RX ORDER — LIOTHYRONINE SODIUM 5 UG/1
2.5 TABLET ORAL
Status: DISCONTINUED | OUTPATIENT
Start: 2024-08-15 | End: 2024-08-23 | Stop reason: HOSPADM

## 2024-08-15 RX ADMIN — LEVOTHYROXINE SODIUM 100 MCG: 0.1 TABLET ORAL at 06:22

## 2024-08-15 RX ADMIN — LABETALOL HYDROCHLORIDE 10 MG: 5 INJECTION, SOLUTION INTRAVENOUS at 03:26

## 2024-08-15 RX ADMIN — PIPERACILLIN AND TAZOBACTAM 4.5 G: 4; .5 INJECTION, POWDER, FOR SOLUTION INTRAVENOUS at 13:19

## 2024-08-15 RX ADMIN — PIPERACILLIN AND TAZOBACTAM 4.5 G: 4; .5 INJECTION, POWDER, FOR SOLUTION INTRAVENOUS at 20:17

## 2024-08-15 RX ADMIN — LIOTHYRONINE SODIUM 2.5 MCG: 5 TABLET ORAL at 09:24

## 2024-08-15 RX ADMIN — HEPARIN SODIUM 22 UNITS/KG/HR: 5000 INJECTION, SOLUTION INTRAVENOUS at 22:33

## 2024-08-15 RX ADMIN — SODIUM CHLORIDE: 9 INJECTION, SOLUTION INTRAVENOUS at 09:27

## 2024-08-15 RX ADMIN — SODIUM CHLORIDE: 9 INJECTION, SOLUTION INTRAVENOUS at 18:09

## 2024-08-15 RX ADMIN — PIPERACILLIN AND TAZOBACTAM 4.5 G: 4; .5 INJECTION, POWDER, FOR SOLUTION INTRAVENOUS at 02:33

## 2024-08-15 RX ADMIN — HEPARIN SODIUM 2800 UNITS: 1000 INJECTION, SOLUTION INTRAVENOUS; SUBCUTANEOUS at 18:49

## 2024-08-15 RX ADMIN — LABETALOL HYDROCHLORIDE 10 MG: 5 INJECTION, SOLUTION INTRAVENOUS at 09:30

## 2024-08-15 RX ADMIN — HYDRALAZINE HYDROCHLORIDE 10 MG: 20 INJECTION, SOLUTION INTRAMUSCULAR; INTRAVENOUS at 13:35

## 2024-08-15 ASSESSMENT — ENCOUNTER SYMPTOMS
ABDOMINAL PAIN: 1
EYES NEGATIVE: 1
CARDIOVASCULAR NEGATIVE: 1
GASTROINTESTINAL NEGATIVE: 1
NEUROLOGICAL NEGATIVE: 1
SHORTNESS OF BREATH: 1
CHILLS: 0
COUGH: 0
NAUSEA: 0
VOMITING: 0
SPUTUM PRODUCTION: 1
FEVER: 0
CONSTIPATION: 0
DIARRHEA: 0
MUSCULOSKELETAL NEGATIVE: 1
HEMOPTYSIS: 0
WEIGHT LOSS: 0
SPUTUM PRODUCTION: 0

## 2024-08-15 ASSESSMENT — LIFESTYLE VARIABLES
CONSUMPTION TOTAL: NEGATIVE
TOTAL SCORE: 0
DOES PATIENT WANT TO STOP DRINKING: NO
HOW MANY TIMES IN THE PAST YEAR HAVE YOU HAD 5 OR MORE DRINKS IN A DAY: 0
ON A TYPICAL DAY WHEN YOU DRINK ALCOHOL HOW MANY DRINKS DO YOU HAVE: 0
EVER FELT BAD OR GUILTY ABOUT YOUR DRINKING: NO
TOTAL SCORE: 0
HAVE YOU EVER FELT YOU SHOULD CUT DOWN ON YOUR DRINKING: NO
EVER HAD A DRINK FIRST THING IN THE MORNING TO STEADY YOUR NERVES TO GET RID OF A HANGOVER: NO
ALCOHOL_USE: YES
TOTAL SCORE: 0
AVERAGE NUMBER OF DAYS PER WEEK YOU HAVE A DRINK CONTAINING ALCOHOL: 0
HAVE PEOPLE ANNOYED YOU BY CRITICIZING YOUR DRINKING: NO

## 2024-08-15 ASSESSMENT — SOCIAL DETERMINANTS OF HEALTH (SDOH)
WITHIN THE LAST YEAR, HAVE YOU BEEN HUMILIATED OR EMOTIONALLY ABUSED IN OTHER WAYS BY YOUR PARTNER OR EX-PARTNER?: NO
WITHIN THE LAST YEAR, HAVE YOU BEEN KICKED, HIT, SLAPPED, OR OTHERWISE PHYSICALLY HURT BY YOUR PARTNER OR EX-PARTNER?: NO
WITHIN THE LAST YEAR, HAVE YOU BEEN AFRAID OF YOUR PARTNER OR EX-PARTNER?: NO
WITHIN THE LAST YEAR, HAVE TO BEEN RAPED OR FORCED TO HAVE ANY KIND OF SEXUAL ACTIVITY BY YOUR PARTNER OR EX-PARTNER?: NO

## 2024-08-15 ASSESSMENT — FIBROSIS 4 INDEX
FIB4 SCORE: 1.88
FIB4 SCORE: 1.88

## 2024-08-15 ASSESSMENT — PATIENT HEALTH QUESTIONNAIRE - PHQ9
1. LITTLE INTEREST OR PLEASURE IN DOING THINGS: NOT AT ALL
2. FEELING DOWN, DEPRESSED, IRRITABLE, OR HOPELESS: NOT AT ALL
SUM OF ALL RESPONSES TO PHQ9 QUESTIONS 1 AND 2: 0

## 2024-08-15 ASSESSMENT — PAIN DESCRIPTION - PAIN TYPE
TYPE: ACUTE PAIN

## 2024-08-15 NOTE — H&P
Hospital Medicine History & Physical Note    Date of Service  8/14/2024    Primary Care Physician  Roula Ga P.A.-C.        Code Status  Full Code    Chief Complaint  Chief Complaint   Patient presents with    Shortness of Breath     Brought in by remsa from home. Patient c/o Shortness of breath and weakness x 5 days. Had splenectomy 5th of Aug and DC'd on the 9 th.EMS arrival patient oxygen saturation 76 % RA.       History of Presenting Illness  Paige Gudino is a 78 y.o. female with past medical history of left-sided breast cancer status post mastectomy on tamoxifen, hypothyroidism, recent pancreato/splenectomy on 8/5 by Dr. Espinoza for large pancreatic cystic mass that is benign serous cystadenoma on pathology (Discharged on 8/9), who presented 8/14/2024 with complaints of shortness of breath and generalized weakness for 5 days, decreased oral intake, chills, cough.  Denies chest pain, any significant abdominal pain, diarrhea or constipation.  She presented desaturating to 76% on room air, was placed on 5 L nasal cannula.  Respiratory rate 30, heart rate 90, blood pressure stable, temperature 99.1.  EKG: Nonspecific T wave inversion in lateral leads.  Chest x-ray showed left lower lobe and middle lobe infiltrate.  CTA showed bilateral segmental subsegmental pulmonary embolism with signs of RV strain.  WBC 19.9, lactic acid 2.5, troponin 70, BNP 8591, lipase 111.  CT of the abdomen and pelvis with contrast, postop changes with postop seroma or possibly pancreatic leak.  Patient was given Zosyn, vancomycin, sepsis bolus 2 L with Ringer lactate, and started on IV heparin.  Patient was marked for admission to [facility] after discussion with Dr. Griffith/critical care.      I discussed the plan of care with patient and ERP .    Review of Systems  Review of Systems   Constitutional:  Negative for chills, fever and weight loss.   HENT:  Negative for ear pain, hearing loss and tinnitus.    Eyes:   Negative for blurred vision, double vision and photophobia.   Respiratory:  Positive for shortness of breath. Negative for cough, hemoptysis and sputum production.    Cardiovascular:  Negative for chest pain, palpitations and orthopnea.   Gastrointestinal:  Negative for heartburn, nausea and vomiting.   Genitourinary:  Negative for dysuria, flank pain, frequency and hematuria.   Musculoskeletal:  Negative for back pain, joint pain and neck pain.   Skin:  Negative for itching and rash.   Neurological:  Positive for weakness. Negative for tremors, speech change, focal weakness and headaches.   Endo/Heme/Allergies:  Negative for environmental allergies and polydipsia. Does not bruise/bleed easily.   Psychiatric/Behavioral:  Negative for hallucinations and substance abuse. The patient is not nervous/anxious.        Past Medical History   has a past medical history of Anesthesia, Cancer (HCC), Cancer of overlapping sites of left female breast (HCC), Cataract (2024), High cholesterol, Hypothyroidism, PONV (postoperative nausea and vomiting) (1987), and Thyroid nodule.    Surgical History   has a past surgical history that includes other orthopedic surgery (2019); other (1987); other (1988); other (2001); pr mastectomy, partial (Left, 08/01/2022); primary c section; lumpectomy; laminotomy; pr ultrasonic guidance, intraoperative (8/5/2024); pancreatectomy (N/A, 8/5/2024); splenectomy (N/A, 8/5/2024); and creation, flap, omentum (N/A, 8/5/2024).     Family History  family history includes Cancer in her mother; Dementia in her father; Heart Disease in her father; Ovarian Cancer in her mother.   Family history reviewed with patient. There is no family history that is pertinent to the chief complaint.     Social History   reports that she has never smoked. She has been exposed to tobacco smoke. She has never used smokeless tobacco. She reports that she does not drink alcohol and does not use drugs.    Allergies  Allergies    Allergen Reactions    Synthroid [Fd&C Red #40 Al Paul-Levothyroxine] Hives and Unspecified     Hives, Brand specific. Some brands are ok and some are not    [place] Thyroid [Thyroid] Diarrhea     diarrhea       Medications  Prior to Admission Medications   Prescriptions Last Dose Informant Patient Reported? Taking?   Cholecalciferol 1000 UNIT Cap 8/12/2024 at am Patient Yes No   Sig: Take 1,000 Units by mouth every day.      Cyanocobalamin (VITAMIN B12 PO) 8/12/2024 at am Patient Yes No   Sig: Take 150 mg by mouth every day.   acetaminophen (TYLENOL) 500 MG Tab 8/14/2024 at 1200 Patient Yes Yes   Sig: Take 500 mg by mouth 1 time a day as needed for Mild Pain.   ibandronate (BONIVA) 150 MG tablet 7/20/2024 at unk Patient No No   Sig: Take 1 Tablet by mouth every 30 days.   levothyroxine (SYNTHROID) 100 MCG Tab 8/14/2024 at am Patient Yes Yes   Sig: Take 100 mcg by mouth every morning on an empty stomach.   liothyronine (CYTOMEL) 5 MCG Tab 8/14/2024 at am Patient Yes Yes   Sig: Take 2.5-5 mcg by mouth every day. 1 tablet 3 times per week - Monday, Wednesday, Friday.  1/2 tablet on other days   tamoxifen (NOLVADEX) 20 MG tablet 8/14/2024 at am Patient No Yes   Sig: Take 1 Tablet by mouth every day.      Facility-Administered Medications: None       Physical Exam  Temp:  [37.3 °C (99.2 °F)] 37.3 °C (99.2 °F)  Pulse:  [90] 90  Resp:  [27-30] 27  BP: (136-155)/(56-81) 155/81  SpO2:  [91 %-92 %] 91 %  Blood Pressure : (!) 155/81   Temperature: 37.3 °C (99.2 °F)   Pulse: 90   Respiration: (!) 27   Pulse Oximetry: 91 %       Physical Exam  Vitals and nursing note reviewed.   Constitutional:       General: She is not in acute distress.     Appearance: Normal appearance. She is ill-appearing.   HENT:      Head: Normocephalic and atraumatic.      Nose: Nose normal.      Mouth/Throat:      Mouth: Mucous membranes are moist.   Eyes:      Extraocular Movements: Extraocular movements intact.      Pupils: Pupils are equal, round,  and reactive to light.   Cardiovascular:      Rate and Rhythm: Regular rhythm. Tachycardia present.   Pulmonary:      Effort: Pulmonary effort is normal. Tachypnea present.      Breath sounds: Decreased breath sounds present.   Abdominal:      General: Abdomen is flat. There is no distension.      Tenderness: There is no abdominal tenderness.   Musculoskeletal:         General: No swelling or deformity. Normal range of motion.      Cervical back: Normal range of motion and neck supple.   Skin:     General: Skin is warm and dry.   Neurological:      General: No focal deficit present.      Mental Status: She is alert and oriented to person, place, and time.   Psychiatric:         Mood and Affect: Mood normal.         Behavior: Behavior normal.         Laboratory:  Recent Labs     08/14/24 2050   WBC 19.9*   RBC 4.10*   HEMOGLOBIN 12.4   HEMATOCRIT 38.4   MCV 93.7   MCH 30.2   MCHC 32.3   RDW 46.8   PLATELETCT 248   MPV 10.0     Recent Labs     08/14/24 2050   SODIUM 140   POTASSIUM 3.9   CHLORIDE 102   CO2 22   GLUCOSE 158*   BUN 19   CREATININE 0.84   CALCIUM 8.1*     Recent Labs     08/14/24 2050   ALTSGPT 71*   ASTSGOT 51*   ALKPHOSPHAT 98   TBILIRUBIN 1.5   LIPASE 111*   GLUCOSE 158*     Recent Labs     08/14/24 2050   APTT 22.3*   INR 1.21*     Recent Labs     08/14/24 2050   NTPROBNP 8591*         Recent Labs     08/14/24 2050   TROPONINT 70*       Imaging:  CT-ABDOMEN-PELVIS WITH   Final Result         1.  Postop changes of splenectomy and distal pancreatectomy, fluid collection left upper quadrant is seen which could represent postop seroma or possibly pancreatic leak.   2.  Right lower lobe segmental and subsegmental pulmonary embolus.   3.  Trace right and small left pleural effusions.   4.  Linear densities of the lung bases suggesting atelectasis, component of left lower lobe infiltrate not excluded.   5.  Diverticulosis   6.  Cholelithiasis   7.  Cardiomegaly      These findings were discussed with  the patient's clinician, Teri Lee, on 8/14/2024 9:53 PM.      CT-CTA CHEST PULMONARY ARTERY W/ RECONS   Final Result   .      1.  Bilateral segmental and subsegmental pulmonary emboli with changes compatible with significant right heart strain.   2.  Small left and trace right pleural effusions.   3.  Linear consolidations in the bilateral lung bases suggests atelectasis, component of left lower lobe infiltrate is not excluded.   4.  4.0 cm ascending thoracic aortic aneurysm, radiographic follow-up and surveillance recommended as clinically appropriate.   5.  Atherosclerosis and atherosclerotic coronary artery disease      These findings were discussed with the patient's clinician, Teri Lee, on 8/14/2024 10:05 PM.      DX-CHEST-PORTABLE (1 VIEW)   Final Result         1.  Left midlung and lower lobe infiltrates   2.  Trace left pleural effusion      EC-ECHOCARDIOGRAM COMPLETE W/O CONT    (Results Pending)   US-EXTREMITY VENOUS LOWER BILAT    (Results Pending)       X-Ray:  I have personally reviewed the images and compared with prior images.    Assessment/Plan:  Justification for Admission Status  I anticipate this patient will require at least two midnights for appropriate medical management, necessitating inpatient admission because respiratory failure, pulmonary embolism, sepsis    Patient will need a Intermediate Care (Adult and Pediatrics) bed on MEDICAL service .  The need is secondary to respiratory failure, pulmonary embolism, sepsis.    * Pulmonary embolism with acute cor pulmonale, unspecified chronicity, unspecified pulmonary embolism type (HCC)- (present on admission)  Assessment & Plan  78-year-old female with history of breast cancer on tamoxifen, major abdominal surgery on 8/5, presented with shortness of breath, hypoxia requiring 5 L nasal cannula, CTA of the pulmonary artery showed bilateral segmental and subsegmental PE with RV strain, Troponin 70, BNP 8591  Though not massive,   seems to be high risk due to elevated biomarkers and possible RV strain on CT, follow-up transthoracic echo and bilateral venous ultrasound  continue IV heparin.  Consider thrombectomy in case of hemodynamic or respiratory worsening  Currently hemodynamically stable, on 5 L nasal cannula      Acute respiratory failure with hypoxia (HCC)  Assessment & Plan  Secondary to PE or pneumonia  Requiring 5 L nasal cannula, tachypneic  Wean down oxygen as able  Follow transthoracic echo    Intra-abdominal fluid collection  Assessment & Plan  CT of the abdomen pelvis with contrast showed postop seroma or pancreatic leak.  Lipase 111.  Will plan to consult  tomorrow  Cover with empiric antibiotics for possible infection    Sepsis (HCC)  Assessment & Plan  This is Sepsis Present on admission  SIRS criteria identified on my evaluation include: Tachycardia, with heart rate greater than 90 BPM, Tachypnea, with respirations greater than 20 per minute, and Leukocytosis, with WBC greater than 12,000  Clinical indicators of end organ dysfunction include Lactic Acid greater than 2  Source is intra-abdominal infection or pneumonia  Sepsis protocol initiated  Crystalloid Fluid Administration: Fluid resuscitation ordered per standard protocol - 30 mL/kg per current or ideal body weight  IV antibiotics as appropriate for source of sepsis  Reassessment: I have reassessed the patient's hemodynamic status    Cancer of overlapping sites of left breast (HCC)- (present on admission)  Assessment & Plan  Will hold tamoxifen since it could increase risk of DVT/PE    Acquired hypothyroidism- (present on admission)  Assessment & Plan  Continue levothyroxine, Cytomel        VTE prophylaxis: therapeutic anticoagulation with IV heparin      Patient is critically ill.   The patient continues to have: PE  The vital organ system that is affected is the:   If untreated there is a high chance of deterioration into: cardiogenic shock  And  eventually death.   The critical care that I am providing today is: IV heparin titration, coordination of care  The critical that has been undertaken is medically complex.   There has been no overlap in critical care time.   Critical Care Time not including procedures: 35 min

## 2024-08-15 NOTE — ASSESSMENT & PLAN NOTE
CT of the abdomen pelvis with contrast showed postop seroma or pancreatic leak.  Lipase 111.  Cover with empiric antibiotics for possible infection  She underwent IR guided fluid drainage.  She will need repeat imaging studies.  Surgical oncology evaluated and made recommendations.  Dr. Velasco recommended that patient will need outpatient follow-up.  Continue to monitor

## 2024-08-15 NOTE — CARE PLAN
The patient is Watcher - Medium risk of patient condition declining or worsening    Shift Goals  Clinical Goals: wean O2  Patient Goals: sleep  Family Goals: HAYDEE      Problem: Respiratory  Goal: Patient will achieve/maintain optimum respiratory ventilation and gas exchange  Outcome: Progressing     Problem: Skin Integrity  Goal: Skin integrity is maintained or improved  Outcome: Progressing     Problem: Pain - Standard  Goal: Alleviation of pain or a reduction in pain to the patient’s comfort goal  Outcome: Progressing

## 2024-08-15 NOTE — ASSESSMENT & PLAN NOTE
Secondary to pulmonary embolism  She remains on 1 L of oxygen to maintain oxygen saturation  Continue to monitor closely

## 2024-08-15 NOTE — ASSESSMENT & PLAN NOTE
Submassive PE with DVTs and thrombus in transit  Difficult treatment with limited options.    S/P suction thrombectomy of RV thrombus, PEs remain  Repeat echocardiogram pending.  PESI 147, TANMAY Stage IV

## 2024-08-15 NOTE — PROGRESS NOTES
Pt presents to PCT. Pt was consented by MD at bedside, confirmed by this RN and consent at bedside. Pt transferred to CT table in supine position. Patient underwent a abdominal fluid aspiration by Dr. Ruff. Procedure site was marked by MD and verified using imaging guidance. Pt placed on monitor, prepped and draped in a sterile fashion. Vitals were taken every 5 minutes and remained stable during procedure (see doc flow sheet for results). CO2 waveform capnography was monitored and remained WNL throughout procedure. Report called to chris Nichole RN. Pt transported by stretcher with RN to T618.     Specimen: 400ml removed from patient. 22ml sent to lab and 378ml discarded.

## 2024-08-15 NOTE — ED TRIAGE NOTES
Paige Valles [facility]  78 y.o.  Female  Chief Complaint   Patient presents with    Shortness of Breath     Brought in by geovanna from home. Patient c/o Shortness of breath and weakness x 5 days. Had splenectomy 5th of Aug and DC'd on the 9 th.EMS arrival patient oxygen saturation 76 % RA.

## 2024-08-15 NOTE — PROGRESS NOTES
ERP requests stepdown for bilateral PE (not overly large) and recent abdominal surgery now requiring anticoagulation so she can be closely monitored. [facility] has not been consulted but is available upon request.     Glenn Griffith M.D.

## 2024-08-15 NOTE — PROGRESS NOTES
Patient left to IR, transported by IR nurse in hospital bed, patient family and belongings transferred to [facility].

## 2024-08-15 NOTE — CARE PLAN
The patient is Watcher - Medium risk of patient condition declining or worsening    Shift Goals  Clinical Goals: Xa, stable O2  Patient Goals: sleep  Family Goals: malcom    Progress made toward(s) clinical / shift goals:    Problem: Knowledge Deficit - Standard  Goal: Patient and family/care givers will demonstrate understanding of plan of care, disease process/condition, diagnostic tests and medications  Outcome: Progressing     Problem: Hemodynamics  Goal: Patient's hemodynamics, fluid balance and neurologic status will be stable or improve  Outcome: Progressing     Problem: Fluid Volume  Goal: Fluid volume balance will be maintained  Outcome: Progressing     Problem: Respiratory  Goal: Patient will achieve/maintain optimum respiratory ventilation and gas exchange  Outcome: Progressing     Problem: Skin Integrity  Goal: Skin integrity is maintained or improved  Outcome: Progressing       Patient is not progressing towards the following goals:N/A

## 2024-08-15 NOTE — PROGRESS NOTES
Called by Dr. Coronado to discuss the patient's abnormal echocardiogram result.    Briefly, the patient has a history of recurrent breast cancer being treated with tamoxifen who recently underwent pancreato-splenectomy on 8/5/2024 for a benign serous cystadenoma.  She had been suffering from increasing shortness of breath and weakness for approximately 5 days prior to being brought in by EMS where she was found to have an oxygen saturation of 76%.    Her evaluation was notable for a CTPE scan demonstrating multiple segmental and subsegmental pulmonary emboli without evidence of right heart strain and an echocardiogram that demonstrated a large right atrial mass with right ventricular dilation/reduced systolic function.    I reviewed her ECG, which demonstrates a large mass in the right atrium that most likely is a large thrombus in transit.  Fortunately, she is currently hemodynamically stable, although I would be concerned if this large thrombus further embolized.  In addition to anticoagulation, which already been started, I recommended consultation with the Interventional Radiology service to see if they were able to aspirate the thrombus or perform directed lytics at low-dose.  Given her recent surgery, systemic lytics would be potentially very high risk and she is not currently hemodynamically unstable.    A total of 12 minutes was spent on this patient encounter of which greater than 50% of that time was spent communicating with the consulting provider either directly or through the medical record.

## 2024-08-15 NOTE — PROGRESS NOTES
4 Eyes Skin Assessment Completed by NURIS Rivas and NURIS Lizarraga.    Head WDL  Ears Redness and Blanching  Nose WDL  Mouth WDL  Neck WDL  Breast/Chest WDL  Shoulder Blades WDL  Spine Redness and Blanching  (R) Arm/Elbow/Hand Redness and Blanching  (L) Arm/Elbow/Hand Bruising  Abdomen Incision midline  Groin WDL  Scrotum/Coccyx/Buttocks Redness abrasion   (R) Leg WDL  (L) Leg WDL  (R) Heel/Foot/Toe Redness and Blanching  (L) Heel/Foot/Toe Redness and Blanching           Devices In Places ECG, Blood Pressure Cuff, Pulse Ox, and Nasal Cannula      Interventions In Place NC W/Ear Foams, TAP System, Pillows, Low Air Loss Mattress, and Heels Loaded W/Pillows    Possible Skin Injury Yes    Pictures Uploaded Into Epic Yes  Wound Consult Placed Yes  RN Wound Prevention Protocol Ordered Yes

## 2024-08-15 NOTE — PROGRESS NOTES
Hospital Medicine Daily Progress Note    Date of Service  8/15/2024    Chief Complaint  Paige Gudino is a 78 y.o. female admitted 8/14/2024 with shortness of breath    Hospital Course    Paige Gudino is a 78 y.o. female with past medical history of left-sided breast cancer status post mastectomy on tamoxifen, hypothyroidism, recent pancreato/splenectomy on 8/5 by Dr. Espinoza for large pancreatic cystic mass that is benign serous cystadenoma on pathology (Discharged on 8/9), who presented 8/14/2024 with complaints of shortness of breath and generalized weakness for 5 days, decreased oral intake, chills, cough.  Denies chest pain, any significant abdominal pain, diarrhea or constipation.  She presented desaturating to 76% on room air, was placed on 5 L nasal cannula.  Respiratory rate 30, heart rate 90, blood pressure stable, temperature 99.1.  EKG: Nonspecific T wave inversion in lateral leads.  Chest x-ray showed left lower lobe and middle lobe infiltrate.  CTA showed bilateral segmental subsegmental pulmonary embolism with signs of RV strain.  WBC 19.9, lactic acid 2.5, troponin 70, BNP 8591, lipase 111.  CT of the abdomen and pelvis with contrast, postop changes with postop seroma or possibly pancreatic leak.  Patient was given Zosyn, vancomycin, sepsis bolus 2 L with Ringer lactate, and started on IV heparin.    She admitted to IMCU for close monitoring and diagnosis of hypoxia secondary to pulmonary embolism.        Interval Problem Update  08/15/24    I evaluated and examined her at the bedside.  She reported that she is feeling shortness of breath but it has been improving.  Later this morning I received a message from cardiologist that patient found to have right atrial thrombus/mass.  I requested consultation with pulmonologist Dr. Crocker regarding patient pulmonary embolism and finding of atrial thrombus.  I also requested consultation with cardiology and discussed with   Timothy.  I also requested consultation with Dr. Espinoza and personally discussed plan of care with him.  I discussed plan of care with interventional radiology Dr. Ruff and reviewed images and finding of atrial thrombus and after reviewing images with Dr. Ruff recommended to continue heparin for now.    I have discussed this patient's plan of care and discharge plan at IDT rounds today with Case Management, Nursing, Nursing leadership, and other members of the IDT team.    Consultants/Specialty  cardiology, critical care, and pulmonary    Code Status  Full Code    Disposition  <MEDICALLYCLEARED>  I have placed the appropriate orders for post-discharge needs.    Review of Systems  ROS     Physical Exam  Temp:  [36.9 °C (98.4 °F)-37.3 °C (99.2 °F)] 37 °C (98.6 °F)  Pulse:  [61-90] 74  Resp:  [24-37] 34  BP: (136-195)/() 195/87  SpO2:  [87 %-96 %] 87 %    Physical Exam  Vitals reviewed.   Constitutional:       General: She is in acute distress.      Appearance: Normal appearance. She is ill-appearing.   HENT:      Head: Normocephalic and atraumatic.      Nose: No congestion.      Comments: High flow nasal cannula  Eyes:      General:         Right eye: No discharge.         Left eye: No discharge.      Pupils: Pupils are equal, round, and reactive to light.   Cardiovascular:      Rate and Rhythm: Normal rate and regular rhythm.      Pulses: Normal pulses.      Heart sounds: Normal heart sounds. No murmur heard.  Pulmonary:      Effort: Respiratory distress present.      Breath sounds: Normal breath sounds. No stridor.      Comments: Tachypnea  Abdominal:      General: Bowel sounds are normal. There is no distension.      Palpations: Abdomen is soft.      Tenderness: There is no abdominal tenderness.   Musculoskeletal:         General: No swelling or tenderness. Normal range of motion.      Cervical back: Normal range of motion. No rigidity.   Skin:     General: Skin is warm.      Capillary Refill: Capillary  refill takes less than 2 seconds.      Coloration: Skin is not jaundiced or pale.      Findings: No bruising.   Neurological:      General: No focal deficit present.      Mental Status: She is alert and oriented to person, place, and time.      Cranial Nerves: No cranial nerve deficit.   Psychiatric:         Mood and Affect: Mood normal.         Behavior: Behavior normal.         Fluids    Intake/Output Summary (Last 24 hours) at 8/15/2024 1537  Last data filed at 8/15/2024 0001  Gross per 24 hour   Intake 2109 ml   Output --   Net 2109 ml       Laboratory  Recent Labs     08/14/24  2050 08/15/24  0330   WBC 19.9* 18.1*   RBC 4.10* 3.74*   HEMOGLOBIN 12.4 11.2*   HEMATOCRIT 38.4 34.9*   MCV 93.7 93.3   MCH 30.2 29.9   MCHC 32.3 32.1*   RDW 46.8 47.1   PLATELETCT 248 248   MPV 10.0 10.1     Recent Labs     08/14/24  2050 08/15/24  0330   SODIUM 140 140   POTASSIUM 3.9 3.6   CHLORIDE 102 105   CO2 22 22   GLUCOSE 158* 125*   BUN 19 17   CREATININE 0.84 0.72   CALCIUM 8.1* 7.3*     Recent Labs     08/14/24 2050   APTT 22.3*   INR 1.21*               Imaging  DX-CHEST-PORTABLE (1 VIEW)   Final Result      1. Stable small left pleural effusion with left mid lung and lung base parenchymal opacities.   2. The remainder is stable.      EC-ECHOCARDIOGRAM COMPLETE W/O CONT   Final Result      US-EXTREMITY VENOUS LOWER BILAT   Final Result      CT-ABDOMEN-PELVIS WITH   Final Result         1.  Postop changes of splenectomy and distal pancreatectomy, fluid collection left upper quadrant is seen which could represent postop seroma or possibly pancreatic leak.   2.  Right lower lobe segmental and subsegmental pulmonary embolus.   3.  Trace right and small left pleural effusions.   4.  Linear densities of the lung bases suggesting atelectasis, component of left lower lobe infiltrate not excluded.   5.  Diverticulosis   6.  Cholelithiasis   7.  Cardiomegaly      These findings were discussed with the patient's clinician, Teri  OLIVER Jesus, on 8/14/2024 9:53 PM.      CT-CTA CHEST PULMONARY ARTERY W/ RECONS   Final Result   .      1.  Bilateral segmental and subsegmental pulmonary emboli with changes compatible with significant right heart strain.   2.  Small left and trace right pleural effusions.   3.  Linear consolidations in the bilateral lung bases suggests atelectasis, component of left lower lobe infiltrate is not excluded.   4.  4.0 cm ascending thoracic aortic aneurysm, radiographic follow-up and surveillance recommended as clinically appropriate.   5.  Atherosclerosis and atherosclerotic coronary artery disease      These findings were discussed with the patient's clinician, Teri Lee, on 8/14/2024 10:05 PM.      DX-CHEST-PORTABLE (1 VIEW)   Final Result         1.  Left midlung and lower lobe infiltrates   2.  Trace left pleural effusion      CT-CYST ASPIRATION-MISC    (Results Pending)        Assessment/Plan  * Pulmonary embolism with acute cor pulmonale, unspecified chronicity, unspecified pulmonary embolism type (HCC)- (present on admission)  Assessment & Plan  Submassive PE with DVTs and thrombus in transit  Difficult treatment with limited options.  Per IR, unable to complete suction thrombectomy given risk for propagation.  With intra-abdominal fluid collection of unknown etiology systemic tPA is a high risk.  Will plan to continue heparin and repeat echocardiogram in the morning.  If fluid analysis not bloody would like to recontact hepatobiliary service to discuss tPA.  I discussed plan of care with pulmonologist Dr. Love and intervention radiology Dr. Ruff.  Profoundly high mortality with PESI 147, TANMAY Stage IV  I discussed plan of care with patient and her family multiple times throughout the day.    Atrial thrombus  Assessment & Plan  She found to have atrial thrombus I discussed plan of care with cardiology Dr. Aguirre.  I also discussed with interventional radiologist Dr. Ruff.  I am continuing heparin  infusion    DVT (deep venous thrombosis) (HCC)- (present on admission)  Assessment & Plan  Bilateral DVTs.  Continue heparin.  I am continuing heparin as per Anti Xa levels and monitor for signs of bleeding.    Acute respiratory failure with hypoxia (HCC)- (present on admission)  Assessment & Plan  Secondary to pulmonary embolism  Requiring 5 L nasal cannula, tachypneic  She developed acute respiratory failure and I ordered stat ABG and she found to have hypoxia and alkalosis.  I discussed with RT and place her on high flow nasal cannula she was placed on 40 L of oxygen.  I am continuing titrating hyponasal cannula.  I requested consultation with critical care Dr. Camejo    Intra-abdominal fluid collection- (present on admission)  Assessment & Plan  CT of the abdomen pelvis with contrast showed postop seroma or pancreatic leak.  Lipase 111.  Will plan to consult  tomorrow  Cover with empiric antibiotics for possible infection    Sepsis (HCC)- (present on admission)  Assessment & Plan  This is Sepsis Present on admission  SIRS criteria identified on my evaluation include: Tachycardia, with heart rate greater than 90 BPM, Tachypnea, with respirations greater than 20 per minute, and Leukocytosis, with WBC greater than 12,000  Clinical indicators of end organ dysfunction include Lactic Acid greater than 2  Source is intra-abdominal infection or pneumonia  Sepsis protocol initiated  Crystalloid Fluid Administration: Fluid resuscitation ordered per standard protocol - 30 mL/kg per current or ideal body weight  IV antibiotics as appropriate for source of sepsis  Reassessment: I have reassessed the patient's hemodynamic status    I am continuing IV antibiotics.  Follow-up on culture results.    Cancer of overlapping sites of left breast (HCC)- (present on admission)  Assessment & Plan  Will hold tamoxifen since it could increase risk of DVT/PE    Advance care planning- (present on admission)  Assessment &  Plan  I discussed CODE STATUS with with Ms. Gudino and she would like to be full code I explained the difference between full code and DNR.    Acquired hypothyroidism- (present on admission)  Assessment & Plan  Continue levothyroxine, Cytomel        Throughout the day I evaluated her multiple times and discussed plan of care with patient and her family members including daughter and .    I discussed with multiple subspecialties including cardiology Dr. Aguirre, pulmonology Dr. Love, surgical oncology Dr. Espinoza and interventional radiology Dr. Ruff    Due to her acute change in her respiratory failure and she was placed on high flow nasal cannula I requested consultation with critical care and discussed with Dr. Camejo.    Patient is critically ill.   The patient continues to have: Acute respiratory failure with hypoxia  The vital organ system that is affected is the: Cardiovascular system  If untreated there is a high chance of deterioration into: Cardiovascular collapse  And eventually death.   The critical care that I am providing today is: Patient developed acute respiratory failure.  I ordered stat ABG and I started her on high flow oxygen cannula.  She found to have atrial thrombus and I discussed with cardiologist, pulmonology, critical care and interventional radiology.  I am continuing a titrating heparin infusion as per Anti Xa levels.  The critical that has been undertaken is medically complex.   There has been no overlap in critical care time.   Critical Care Time not including procedures: 42 minutes  I provided critical care services in IMCU room 635.    VTE prophylaxis: Heparin infusion    I have performed a physical exam and reviewed and updated ROS and Plan today (8/15/2024). In review of yesterday's note (8/14/2024), there are no changes except as documented above.

## 2024-08-15 NOTE — ASSESSMENT & PLAN NOTE
Fluid most c/w pancreatic leak. 400 mL removed by IR on 8/15  Seen by hepatobiliary surgery today  Continue antibiotics until fluid analysis complete

## 2024-08-15 NOTE — CONSULTS
Date of Service  August 15, 2024     Reason for Consult: LUQ fluid collection, s/p distal panc/spleen surgery, B/L PE    Requested by: Dr Machado    Location: [id number]    Chief Complaint  Shortness of Breath (Brought in by [facility] from home. Patient c/o Shortness of breath and weakness x 5 days. Had splenectomy 5th of Aug and DC'd on the 9 th.EMS arrival patient oxygen saturation 76 % RA.)      HPI: Paige Gudino is a 78 y.o. female with past medical history of left-sided breast cancer status post mastectomy on tamoxifen, hypothyroidism, recent pancreato/splenectomy on 8/5 by Dr. Espinoza for large pancreatic cystic mass that is benign serous cystadenoma on pathology (Discharged on 8/9/24), who presented 8/14/2024 with complaints of shortness of breath and generalized weakness for 5 days, decreased oral intake, chills, cough.     CTA showed bilateral segmental subsegmental pulmonary embolism with signs of RV strain.  WBC 19.9, lactic acid 2.5, troponin 70, BNP 8591, lipase 111.    CT of the abdomen and pelvis with contrast, postop changes with postop seroma or possibly pancreatic leak.    This morning the patient is awake, oriented, comfortable. AM labs notable for WBC 18.1, on Zosyn. She is not tachycardic, sat well on 6L O2    Past Medical History  Past Medical History:   Diagnosis Date    Anesthesia     nausea post op    Cancer (HCC)     1987 breast left    Cancer of overlapping sites of left female breast (HCC)     Cataract 2024    IOL bilat    High cholesterol     Hypothyroidism     PONV (postoperative nausea and vomiting) 1987    Just felt nausous after anesthesia    Thyroid nodule        Past Surgical History  Past Surgical History:   Procedure Laterality Date    [place] ULTRASONIC GUIDANCE, INTRAOPERATIVE  8/5/2024    Procedure: ULTRASOUND GUIDANCE;  Surgeon: Sachin Espinoza M.D.;  Location: SURGERY [facility];  Service: General    PANCREATECTOMY N/A 8/5/2024    Procedure: OPEN  DISTAL PANCREATECTOMY WITH SPLENECTOMY, NODE DISSECTION;  Surgeon: Sachin Espinoza M.D.;  Location: SURGERY [facility];  Service: General    SPLENECTOMY N/A 8/5/2024    Procedure: SPLENECTOMY;  Surgeon: Sachin Espinoza M.D.;  Location: SURGERY [facility];  Service: General    CREATION, FLAP, OMENTUM N/A 8/5/2024    Procedure: CREATION, FLAP, OMENTUM;  Surgeon: Sachin Espinoza M.D.;  Location: SURGERY [facility];  Service: General    PB MASTECTOMY, PARTIAL Left 08/01/2022    Procedure: MELLO LOCALIZED LEFT PARTIAL MASTECTOMY;  Surgeon: Elena Arroyo M.D.;  Location: SURGERY [facility];  Service: General    OTHER ORTHOPEDIC SURGERY  2019    back surgery    OTHER  2001    replace left breast implant    OTHER  1988    Left breast implant/lift    OTHER  1987    left mastectomy    LAMINOTOMY      LUMPECTOMY      PRIMARY C SECTION       Allergies:   Allergies   Allergen Reactions    Synthroid [Fd&C Red #40 Al Paul-Levothyroxine] Hives and Unspecified     Hives, Brand specific. Some brands are ok and some are not    Concan Thyroid [Thyroid] Diarrhea     diarrhea       Problem List  Principal Problem:    Pulmonary embolism with acute cor pulmonale, unspecified chronicity, unspecified pulmonary embolism type (HCC) (POA: Yes)  Active Problems:    Acquired hypothyroidism (POA: Yes)    Advance care planning (POA: Yes)    Cancer of overlapping sites of left breast (HCC) (POA: Yes)    Sepsis (HCC) (POA: Unknown)    Intra-abdominal fluid collection (POA: Unknown)    Acute respiratory failure with hypoxia (HCC) (POA: Unknown)  Resolved Problems:    * No resolved hospital problems. *     Subjective  Review of Systems   Constitutional:  Positive for malaise/fatigue. Negative for chills, fever and weight loss.   Respiratory:  Positive for shortness of breath. Negative for cough.    Cardiovascular:  Negative for chest pain and leg swelling.   Gastrointestinal:  Positive for abdominal pain.  Negative for constipation, diarrhea, nausea and vomiting.         Objective  Temp:  [36.9 °C (98.4 °F)-37.3 °C (99.2 °F)] 36.9 °C (98.4 °F)  Pulse:  [65-90] 67  Resp:  [24-34] 32  BP: (136-192)/() 184/91  SpO2:  [90 %-95 %] 94 %      Physical Exam  Vitals and nursing note reviewed.   Constitutional:       General: She is not in acute distress.     Appearance: She is ill-appearing.   HENT:      Head: Normocephalic and atraumatic.      Nose: Nose normal.      Mouth/Throat:      Pharynx: Oropharynx is clear.   Eyes:      Conjunctiva/sclera: Conjunctivae normal.   Cardiovascular:      Rate and Rhythm: Normal rate.   Pulmonary:      Effort: Pulmonary effort is normal. No respiratory distress.   Abdominal:      General: There is no distension.      Palpations: Abdomen is soft.      Tenderness: There is abdominal tenderness.   Skin:     General: Skin is warm and dry.      Coloration: Skin is not jaundiced.   Neurological:      Mental Status: She is alert and oriented to person, place, and time.   Psychiatric:         Mood and Affect: Mood normal.         Behavior: Behavior normal.        Fluids    Intake/Output Summary (Last 24 hours) at 8/15/2024 0929  Last data filed at 8/15/2024 0001  Gross per 24 hour   Intake 2109 ml   Output --   Net 2109 ml      Labs  Lab Results   Component Value Date/Time    SODIUM 140 08/15/2024 03:30 AM    POTASSIUM 3.6 08/15/2024 03:30 AM    CHLORIDE 105 08/15/2024 03:30 AM    CO2 22 08/15/2024 03:30 AM    GLUCOSE 125 (H) 08/15/2024 03:30 AM    BUN 17 08/15/2024 03:30 AM    CREATININE 0.72 08/15/2024 03:30 AM         Lab Results   Component Value Date/Time    PROTHROMBTM 15.5 (H) 08/14/2024 08:50 PM    INR 1.21 (H) 08/14/2024 08:50 PM         Lab Results   Component Value Date/Time    WBC 18.1 (H) 08/15/2024 03:30 AM    RBC 3.74 (L) 08/15/2024 03:30 AM    HEMOGLOBIN 11.2 (L) 08/15/2024 03:30 AM    HEMATOCRIT 34.9 (L) 08/15/2024 03:30 AM    MCV 93.3 08/15/2024 03:30 AM    MCH 29.9  08/15/2024 03:30 AM    MCHC 32.1 (L) 08/15/2024 03:30 AM    MPV 10.1 08/15/2024 03:30 AM    NEUTSPOLYS 84.30 (H) 08/15/2024 03:30 AM    LYMPHOCYTES 6.10 (L) 08/15/2024 03:30 AM    MONOCYTES 6.90 08/15/2024 03:30 AM    EOSINOPHILS 0.00 08/15/2024 03:30 AM    BASOPHILS 0.90 08/15/2024 03:30 AM    ANISOCYTOSIS 1+ 08/15/2024 03:30 AM         Recent Labs     08/09/24  0850 08/14/24  2050 08/15/24  0330   ASTSGOT 17 51* 40   ALTSGPT 20 71* 60*   TBILIRUBIN 2.0* 1.5 1.7*   GLOBULIN 2.9 3.1 2.8   INR  --  1.21*  --       Imaging  US EXTREMITY VENOUS LE (8/15/24) - Preliminary   FINDINGS:   Bilateral lower extremities    Acute deep venous thrombosis in the posterior tibial, peroneal and    gastrocnemius veins with areas of partial occlusion and areas of complete    occlusion.    All other veins demonstrate complete color filling and compressibility with    normal venous flow dynamics including spontaneous flow and respiratory    phasicity.    CT ABDOMEN/PELVIS (8/14/24)  IMPRESSION:  1.  Postop changes of splenectomy and distal pancreatectomy, fluid collection left upper quadrant is seen which could represent postop seroma or possibly pancreatic leak.  2.  Right lower lobe segmental and subsegmental pulmonary embolus.  3.  Trace right and small left pleural effusions.  4.  Linear densities of the lung bases suggesting atelectasis, component of left lower lobe infiltrate not excluded.  5.  Diverticulosis  6.  Cholelithiasis  7.  Cardiomegaly    Assessment and Plan  Patient is a 78F readmitted with shortness of breath, fatigue, LUQ pain, s/p ex lap with distal panc / spleen on 8/5/24, noted to have B/L PE on 8/14/24.      LUQ fluid collection, concern post op hematoma vs seroma vs pancreatic leak other  - IR fluid aspiration vs drain placement, discussed with Dr Ruff  - NPO as needed  - Hold heparin as needed    2. Pulmonary Edema, B/L, with RV strain  - US EXTREMITY VENOUS LE on 8/15/24 noted acute deep venous thrombosis in  the posterior tibial, peroneal and gastrocnemius veins with areas of partial occlusion and areas of complete    occlusion.   - ECHO pending  - Continue Heparin drip    3. Leukocytosis, WBC 18.1  - Continue Zosyn  - Send LUQ fluid for cultures      Patient seen with Dr Espinoza

## 2024-08-15 NOTE — ED NOTES
Medication history reviewed with patient at bedside.   Med rec is complete  Allergies reviewed.     Patient has not had any outpatient antibiotics in the last 30 days.   Anticoagulants: No     Jefferson Rg

## 2024-08-15 NOTE — ASSESSMENT & PLAN NOTE
This is Sepsis Present on admission  SIRS criteria identified on my evaluation include: Tachypnea, with respirations greater than 20 per minute and Leukocytosis, with WBC greater than 12,000  Clinical indicators of end organ dysfunction include Acute Respiratory Failure - (mechanical ventilation or BiPap or PaO2/FiO2 ratio < 300)  Source is intra-abdominal fluid collection  Sepsis protocol initiated  Crystalloid Fluid Administration: Fluid resuscitation ordered per standard protocol - 30 mL/kg per current or ideal body weight.  Received on admission, will not repeat with RV dysfunction  IV antibiotics as appropriate for source of sepsis  Reassessment: I have reassessed the patient's hemodynamic status  Continue Zosyn until fluid culture results

## 2024-08-15 NOTE — ASSESSMENT & PLAN NOTE
Submassive PE with DVTs and thrombus in transit  Difficult treatment with limited options.  Per IR, unable to complete suction thrombectomy given risk for propagation.  With intra-abdominal fluid collection of unknown etiology systemic tPA is a high risk and recent surgery given recent surgery.  Profoundly high mortality with PESI 147, TANMAY Stage IV  No signs of active bleeding.  I discussed plan of care with surgical oncology and now will transition her from heparin infusion to loading dose of Eliquis

## 2024-08-15 NOTE — CONSULTS
Critical Care Consultation    Date of consult: 8/15/2024    Referring Physician  Joe Coronado MD    Reason for Consultation  Submassive PE, thrombus in transit, hypoxic respiratory failure    History of Presenting Illness  78 y.o. female with past medical history of breast cancer, hypercholesterolemia, hypothyroidism, Pancreatic mass who presented 8/14/2024 with shortness of breath.  Patient underwent a pancreaticosplenectomy on 8/5/2024 with Dr. Espinoza and was discharged on 8/9/2024 on supplemental oxygen.  Since discharge patient has become increasingly fatigued, lethargic and short of breath.  In the ER a CT scan of her chest, abdomen and pelvis was completed demonstrating multiple segmental and subsegmental PEs with an RV LV ratio of 1.83 and a mildly elevated troponin.  IMCU was requested and she was started on a heparin infusion.  In addition to the findings in her chest CT she was also found to have a 10.4 x 10.8 cm fluid collection in the left upper quadrant within the splenic bed and had significant leukocytosis.  She was started on antibiotics for this and a IR drain has been requested.  Today the patient had a lower extremity venous Doppler which showed acute DVTs in both posterior tibial, peroneal and gastrocnemius veins.  Her echocardiogram showed a very large thrombus in transit in the RA in addition to RV dilation and reduced systolic function.  IR has been contacted regarding this and at this time does not do not feel that suction thrombectomy would be safe and would likely propagate this thrombus causing hemodynamic and respiratory collapse.    The hospitalist service has asked me to see this patient as she has elevating oxygen requirements however remains hemodynamically stable.  Her ABG shows significant respiratory alkalosis and hypoxia.  I have discussed all these findings with the patient as well as her CODE STATUS and her and her family have requested that she remain full code at this  time.    Code Status  Full Code    Review of Systems  Review of Systems   Unable to perform ROS: Critical illness   Respiratory:  Positive for sputum production.    Gastrointestinal:  Positive for abdominal pain.       Past Medical History   has a past medical history of Anesthesia, Cancer (HCC), Cancer of overlapping sites of left female breast (HCC), Cataract (2024), High cholesterol, Hypothyroidism, PONV (postoperative nausea and vomiting) (1987), and Thyroid nodule.    She has no past medical history of Osteoporosis.    Surgical History   has a past surgical history that includes other orthopedic surgery (2019); other (1987); other (1988); other (2001); pr mastectomy, partial (Left, 08/01/2022); primary c section; lumpectomy; laminotomy; pr ultrasonic guidance, intraoperative (8/5/2024); pancreatectomy (N/A, 8/5/2024); splenectomy (N/A, 8/5/2024); and creation, flap, omentum (N/A, 8/5/2024).    Family History  family history includes Cancer in her mother; Dementia in her father; Heart Disease in her father; Ovarian Cancer in her mother.    Social History   reports that she has never smoked. She has been exposed to tobacco smoke. She has never used smokeless tobacco. She reports that she does not drink alcohol and does not use drugs.    Medications  Home Medications       Reviewed by Jefferson Rg (Pharmacy Tech) on 08/14/24 at 2226  Med List Status: Complete     Medication Last Dose Status   acetaminophen (TYLENOL) 500 MG Tab 8/14/2024 Active   Cholecalciferol 1000 UNIT Cap 8/12/2024 Active   Cyanocobalamin (VITAMIN B12 PO) 8/12/2024 Active   ibandronate (BONIVA) 150 MG tablet 7/20/2024 Active   levothyroxine (SYNTHROID) 100 MCG Tab 8/14/2024 Active   liothyronine (CYTOMEL) 5 MCG Tab 8/14/2024 Active   tamoxifen (NOLVADEX) 20 MG tablet 8/14/2024 Active                  Audit from Redirected Encounters    **Home medications have not yet been reviewed for this encounter**       Current Facility-Administered  Medications   Medication Dose Route Frequency Provider Last Rate Last Admin    [START ON 8/16/2024] liothyronine (Cytomel) tablet 5 mcg  5 mcg Oral MO, WE + FR Les Machado M.D.        liothyronine (Cytomel) tablet 2.5 mcg  2.5 mcg Oral Once per day on Sunday Tuesday Thursday Saturday Les Machado M.D.   2.5 mcg at 08/15/24 0924    hydrALAZINE (Apresoline) injection 10 mg  10 mg Intravenous Q6HRS PRN Sadiq Coronado M.D.   10 mg at 08/15/24 1335    NS infusion   Intravenous Continuous Les Machado M.D. 100 mL/hr at 08/15/24 0927 New Bag at 08/15/24 0927    senna-docusate (Pericolace Or Senokot S) 8.6-50 MG per tablet 2 Tablet  2 Tablet Oral Q EVENING Les Machado M.D.        And    polyethylene glycol/lytes (Miralax) Packet 1 Packet  1 Packet Oral QDAY PRN Les Machado M.D.        Pharmacy Consult Request ...Pain Management Review 1 Each  1 Each Other PHARMACY TO DOSE Les Machado M.D.        oxyCODONE immediate-release (Roxicodone) tablet 5 mg  5 mg Oral Q3HRS PRN Les Machado M.D.        Or    oxyCODONE immediate release (Roxicodone) tablet 10 mg  10 mg Oral Q3HRS PRN Les Machado M.D.        Or    morphine 4 MG/ML injection 4 mg  4 mg Intravenous Q3HRS PRN Les Machado M.D.        labetalol (Normodyne/Trandate) injection 10 mg  10 mg Intravenous Q4HRS PRN Les Machado M.D.   10 mg at 08/15/24 0930    ondansetron (Zofran) syringe/vial injection 4 mg  4 mg Intravenous Q4HRS PRN Les Machado M.D.        ondansetron (Zofran ODT) dispertab 4 mg  4 mg Oral Q4HRS PRN Les Machado M.D.        piperacillin-tazobactam (Zosyn) 4.5 g in  mL IVPB  4.5 g Intravenous Q8HRS Les Machado M.D. 25 mL/hr at 08/15/24 1319 4.5 g at 08/15/24 1319    heparin infusion 25,000 units in 500 mL 0.45% NACL  0-30 Units/kg/hr Intravenous Continuous Les Machado M.D. 25.3 mL/hr at 08/15/24 0708 18 Units/kg/hr at 08/15/24 0708    heparin injection 2,800 Units  40  Units/kg Intravenous PRN Les Machado M.D.        levothyroxine (Synthroid) tablet 100 mcg  100 mcg Oral AM ES Les Machado M.D.   100 mcg at 08/15/24 0622       Allergies  Allergies   Allergen Reactions    Synthroid [Fd&C Red #40 Al Paul-Levothyroxine] Hives and Unspecified     Hives, Brand specific. Some brands are ok and some are not    Acton Thyroid [Thyroid] Diarrhea     diarrhea       Vital Signs last 24 hours  Temp:  [36.9 °C (98.4 °F)-37.3 °C (99.2 °F)] 37 °C (98.6 °F)  Pulse:  [61-90] 74  Resp:  [24-37] 34  BP: (136-195)/() 195/87  SpO2:  [87 %-96 %] 87 %    Physical Exam  Physical Exam  Vitals and nursing note reviewed.   Constitutional:       Appearance: She is ill-appearing.      Comments: High flow nasal cannula in place   HENT:      Head: Normocephalic and atraumatic.      Right Ear: External ear normal.      Left Ear: External ear normal.      Nose: Nose normal.      Mouth/Throat:      Mouth: Mucous membranes are dry.   Eyes:      Extraocular Movements: Extraocular movements intact.   Cardiovascular:      Rate and Rhythm: Normal rate and regular rhythm.   Pulmonary:      Effort: Pulmonary effort is normal. Tachypnea present.      Breath sounds: Normal breath sounds.   Abdominal:      General: Bowel sounds are normal.      Palpations: Abdomen is soft.   Musculoskeletal:      Cervical back: Neck supple.      Right lower leg: Edema (2+) present.      Left lower leg: Edema (2+) present.   Skin:     General: Skin is warm and dry.      Capillary Refill: Capillary refill takes less than 2 seconds.   Neurological:      General: No focal deficit present.      Mental Status: She is alert and oriented to person, place, and time. Mental status is at baseline.   Psychiatric:         Mood and Affect: Affect is blunt.         Fluids    Intake/Output Summary (Last 24 hours) at 8/15/2024 1448  Last data filed at 8/15/2024 0001  Gross per 24 hour   Intake 2109 ml   Output --   Net 2109 ml        Laboratory  Recent Results (from the past 48 hour(s))   EKG    Collection Time: 24  7:45 PM   Result Value Ref Range    Report       [facility] Dept[facility]    Test Date:  2024  Pt Name:    ROLANDO VERDIN           Department: ER  MRN:        [id number]                      Room:       [facility]  Gender:     Female                       Technician: [id number]  :        1945                   Requested By:ER TRIAGE PROTOCOL  Order #:    [id number]                    Reading MD: Teri Lee MD    Measurements  Intervals                                Axis  Rate:       98                           P:          57  SD:         236                          QRS:        39  QRSD:       96                           T:          -46  QT:         362  QTc:        463    Interpretive Statements  Sinus rhythm around 98 mild T wave inversions in the inferior lateral leads  kind of diminished overall voltage no abnormal ST elevations or ST  depressions no abnormal Q waves first-degree AV block otherwise normal axis  Compared to ECG 2024 15:07:44  Low QRS voltage now present  T-wave abnormality  still present  Electronically Signed On 2024 23:21:33 PDT by Teri Lee MD     CBC WITH DIFFERENTIAL    Collection Time: 24  8:50 PM   Result Value Ref Range    WBC 19.9 (H) 4.8 - 10.8 K/uL    RBC 4.10 (L) 4.20 - 5.40 M/uL    Hemoglobin 12.4 12.0 - 16.0 g/dL    Hematocrit 38.4 37.0 - 47.0 %    MCV 93.7 81.4 - 97.8 fL    MCH 30.2 27.0 - 33.0 pg    MCHC 32.3 32.2 - 35.5 g/dL    RDW 46.8 35.9 - 50.0 fL    Platelet Count 248 164 - 446 K/uL    MPV 10.0 9.0 - 12.9 fL    Neutrophils-Polys 95.60 (H) 44.00 - 72.00 %    Lymphocytes 1.80 (L) 22.00 - 41.00 %    Monocytes 2.60 0.00 - 13.40 %    Eosinophils 0.00 0.00 - 6.90 %    Basophils 0.00 0.00 - 1.80 %    Nucleated RBC 4.30 (H) 0.00 - 0.20 /100 WBC    Neutrophils (Absolute) 19.02 (H) 1.82 - 7.42 K/uL    Lymphs (Absolute) 0.36 (L)  1.00 - 4.80 K/uL    Monos (Absolute) 0.52 0.00 - 0.85 K/uL    Eos (Absolute) 0.00 0.00 - 0.51 K/uL    Baso (Absolute) 0.00 0.00 - 0.12 K/uL    NRBC (Absolute) 0.85 K/uL    Anisocytosis 1+     Microcytosis 1+    COMP METABOLIC PANEL    Collection Time: 08/14/24  8:50 PM   Result Value Ref Range    Sodium 140 135 - 145 mmol/L    Potassium 3.9 3.6 - 5.5 mmol/L    Chloride 102 96 - 112 mmol/L    Co2 22 20 - 33 mmol/L    Anion Gap 16.0 7.0 - 16.0    Glucose 158 (H) 65 - 99 mg/dL    Bun 19 8 - 22 mg/dL    Creatinine 0.84 0.50 - 1.40 mg/dL    Calcium 8.1 (L) 8.5 - 10.5 mg/dL    Correct Calcium 8.7 8.5 - 10.5 mg/dL    AST(SGOT) 51 (H) 12 - 45 U/L    ALT(SGPT) 71 (H) 2 - 50 U/L    Alkaline Phosphatase 98 30 - 99 U/L    Total Bilirubin 1.5 0.1 - 1.5 mg/dL    Albumin 3.3 3.2 - 4.9 g/dL    Total Protein 6.4 6.0 - 8.2 g/dL    Globulin 3.1 1.9 - 3.5 g/dL    A-G Ratio 1.1 g/dL   TROPONIN    Collection Time: 08/14/24  8:50 PM   Result Value Ref Range    Troponin T 70 (H) 6 - 19 ng/L   proBrain Natriuretic Peptide, NT    Collection Time: 08/14/24  8:50 PM   Result Value Ref Range    NT-proBNP 8591 (H) 0 - 125 pg/mL   LACTIC ACID    Collection Time: 08/14/24  8:50 PM   Result Value Ref Range    Lactic Acid 2.5 (H) 0.5 - 2.0 mmol/L   PROTHROMBIN TIME (INR)    Collection Time: 08/14/24  8:50 PM   Result Value Ref Range    PT 15.5 (H) 12.0 - 14.6 sec    INR 1.21 (H) 0.87 - 1.13   APTT    Collection Time: 08/14/24  8:50 PM   Result Value Ref Range    APTT 22.3 (L) 24.7 - 36.0 sec   DIFFERENTIAL MANUAL    Collection Time: 08/14/24  8:50 PM   Result Value Ref Range    Manual Diff Status PERFORMED    PERIPHERAL SMEAR REVIEW    Collection Time: 08/14/24  8:50 PM   Result Value Ref Range    Peripheral Smear Review see below    PLATELET ESTIMATE    Collection Time: 08/14/24  8:50 PM   Result Value Ref Range    Plt Estimation Normal    MORPHOLOGY    Collection Time: 08/14/24  8:50 PM   Result Value Ref Range    RBC Morphology Present     ESTIMATED GFR    Collection Time: 08/14/24  8:50 PM   Result Value Ref Range    GFR (CKD-EPI) 71 >60 mL/min/1.73 m 2   LIPASE    Collection Time: 08/14/24  8:50 PM   Result Value Ref Range    Lipase 111 (H) 11 - 82 U/L   PROCALCITONIN    Collection Time: 08/14/24  8:50 PM   Result Value Ref Range    Procalcitonin 0.13 <0.25 ng/mL   MRSA By PCR (Amp)    Collection Time: 08/14/24 10:05 PM    Specimen: Nares; Respirate   Result Value Ref Range    MRSA by PCR Negative Negative   Blood Culture    Collection Time: 08/14/24 11:33 PM    Specimen: Peripheral; Blood   Result Value Ref Range    Significant Indicator NEG     Source BLD     Site PERIPHERAL     Culture Result       No Growth  Note: Blood cultures are incubated for 5 days and  are monitored continuously.Positive blood cultures  are called to the RN and reported as soon as  they are identified.     Blood Culture    Collection Time: 08/14/24 11:34 PM    Specimen: Line; Blood   Result Value Ref Range    Significant Indicator NEG     Source BLD     Site Peripheral     Culture Result       No Growth  Note: Blood cultures are incubated for 5 days and  are monitored continuously.Positive blood cultures  are called to the RN and reported as soon as  they are identified.     CBC with Differential    Collection Time: 08/15/24  3:30 AM   Result Value Ref Range    WBC 18.1 (H) 4.8 - 10.8 K/uL    RBC 3.74 (L) 4.20 - 5.40 M/uL    Hemoglobin 11.2 (L) 12.0 - 16.0 g/dL    Hematocrit 34.9 (L) 37.0 - 47.0 %    MCV 93.3 81.4 - 97.8 fL    MCH 29.9 27.0 - 33.0 pg    MCHC 32.1 (L) 32.2 - 35.5 g/dL    RDW 47.1 35.9 - 50.0 fL    Platelet Count 248 164 - 446 K/uL    MPV 10.1 9.0 - 12.9 fL    Neutrophils-Polys 84.30 (H) 44.00 - 72.00 %    Lymphocytes 6.10 (L) 22.00 - 41.00 %    Monocytes 6.90 0.00 - 13.40 %    Eosinophils 0.00 0.00 - 6.90 %    Basophils 0.90 0.00 - 1.80 %    Nucleated RBC 4.80 (H) 0.00 - 0.20 /100 WBC    Neutrophils (Absolute) 15.26 (H) 1.82 - 7.42 K/uL    Lymphs  (Absolute) 1.10 1.00 - 4.80 K/uL    Monos (Absolute) 1.25 (H) 0.00 - 0.85 K/uL    Eos (Absolute) 0.00 0.00 - 0.51 K/uL    Baso (Absolute) 0.16 (H) 0.00 - 0.12 K/uL    NRBC (Absolute) 0.86 K/uL    Anisocytosis 1+     Microcytosis 1+    Comp Metabolic Panel (CMP)    Collection Time: 08/15/24  3:30 AM   Result Value Ref Range    Sodium 140 135 - 145 mmol/L    Potassium 3.6 3.6 - 5.5 mmol/L    Chloride 105 96 - 112 mmol/L    Co2 22 20 - 33 mmol/L    Anion Gap 13.0 7.0 - 16.0    Glucose 125 (H) 65 - 99 mg/dL    Bun 17 8 - 22 mg/dL    Creatinine 0.72 0.50 - 1.40 mg/dL    Calcium 7.3 (L) 8.5 - 10.5 mg/dL    Correct Calcium 8.1 (L) 8.5 - 10.5 mg/dL    AST(SGOT) 40 12 - 45 U/L    ALT(SGPT) 60 (H) 2 - 50 U/L    Alkaline Phosphatase 84 30 - 99 U/L    Total Bilirubin 1.7 (H) 0.1 - 1.5 mg/dL    Albumin 3.0 (L) 3.2 - 4.9 g/dL    Total Protein 5.8 (L) 6.0 - 8.2 g/dL    Globulin 2.8 1.9 - 3.5 g/dL    A-G Ratio 1.1 g/dL   LACTIC ACID    Collection Time: 08/15/24  3:30 AM   Result Value Ref Range    Lactic Acid 1.7 0.5 - 2.0 mmol/L   Troponin    Collection Time: 08/15/24  3:30 AM   Result Value Ref Range    Troponin T 69 (H) 6 - 19 ng/L   ESTIMATED GFR    Collection Time: 08/15/24  3:30 AM   Result Value Ref Range    GFR (CKD-EPI) 86 >60 mL/min/1.73 m 2   DIFFERENTIAL MANUAL    Collection Time: 08/15/24  3:30 AM   Result Value Ref Range    Metamyelocytes 0.90 %    Myelocytes 0.90 %    Manual Diff Status PERFORMED    PERIPHERAL SMEAR REVIEW    Collection Time: 08/15/24  3:30 AM   Result Value Ref Range    Peripheral Smear Review see below    PLATELET ESTIMATE    Collection Time: 08/15/24  3:30 AM   Result Value Ref Range    Plt Estimation Normal    MORPHOLOGY    Collection Time: 08/15/24  3:30 AM   Result Value Ref Range    RBC Morphology Present    Urinalysis    Collection Time: 08/15/24  4:50 AM    Specimen: Urine, Clean Catch   Result Value Ref Range    Color DK Yellow     Character Clear     Specific Gravity >1.045 <1.035    Ph  5.5 5.0 - 8.0    Glucose Negative Negative mg/dL    Ketones 15 (A) Negative mg/dL    Protein 30 (A) Negative mg/dL    Bilirubin Negative Negative    Urobilinogen, Urine 1.0 Negative    Nitrite Negative Negative    Leukocyte Esterase Negative Negative    Occult Blood Negative Negative    Micro Urine Req Microscopic    URINE MICROSCOPIC (W/UA)    Collection Time: 08/15/24  4:50 AM   Result Value Ref Range    WBC 0-2 /hpf    RBC 0-2 /hpf    Bacteria Negative None /hpf    Epithelial Cells Few /hpf    Ca Oxalate Crystal Many /hpf    Hyaline Cast 0-2 /lpf   LACTIC ACID    Collection Time: 08/15/24  6:25 AM   Result Value Ref Range    Lactic Acid 1.4 0.5 - 2.0 mmol/L   Heparin Xa (Unfractionated)    Collection Time: 08/15/24  6:25 AM   Result Value Ref Range    Heparin Xa (UFH) 0.50 IU/mL   EKG    Collection Time: 08/15/24  8:00 AM   Result Value Ref Range    Report       [facility]    Test Date:  2024-08-15  Pt Name:    ROLANDO VERDIN           Department: [facility]  MRN:        [id number]                      [place]       [facility]  Gender:     Female                       Technician: LESTER  :        1945                   Requested By:GHAZALA FINN  Order #:    [id number]                    Reading MD:    Measurements  Intervals                                Axis  Rate:       68                           P:          -55  WV:         235                          QRS:        38  QRSD:       92                           T:          -16  QT:         475  QTc:        506    Interpretive Statements  Sinus or ectopic atrial rhythm  Prolonged WV interval  Low voltage, precordial leads  Prolonged QT interval  Compared to ECG 2024 19:45:03  Ectopic atrial rhythm now present  First degree AV block now present  Low QRS voltage now present  Prolonged QT interval now present  Sinus rhythm no longer present  ST (T wave) deviation no longer prese nt  Q waves no longer present     EC-ECHOCARDIOGRAM COMPLETE W/O CONT     Collection Time: 08/15/24 10:49 AM   Result Value Ref Range    Left Ventrical Ejection Fraction 65    POCT arterial blood gas device results    Collection Time: 08/15/24  2:26 PM   Result Value Ref Range    Ph 7.600 (H) 7.400 - 7.500    Pco2 19.7 (L) 26.0 - 37.0 mmHg    Po2 46 (LL) 64 - 87 mmHg    Tco2 20 20 - 33 mmol/L    S02 90 (L) 93 - 99 %    Hco3 19.3 17.0 - 25.0 mmol/L    BE 0 -4 - 3 mmol/L    Body Temp see below degrees    Specimen Arterial     Geovanny Test Pass     DelSys Other     LPM 15 lpm   POCT lactate device results    Collection Time: 08/15/24  2:26 PM   Result Value Ref Range    iStat Lactate 1.6 0.5 - 2.0 mmol/L       Imaging  DX-CHEST-PORTABLE (1 VIEW)   Final Result      1. Stable small left pleural effusion with left mid lung and lung base parenchymal opacities.   2. The remainder is stable.      EC-ECHOCARDIOGRAM COMPLETE W/O CONT   Final Result      US-EXTREMITY VENOUS LOWER BILAT   Final Result      CT-ABDOMEN-PELVIS WITH   Final Result         1.  Postop changes of splenectomy and distal pancreatectomy, fluid collection left upper quadrant is seen which could represent postop seroma or possibly pancreatic leak.   2.  Right lower lobe segmental and subsegmental pulmonary embolus.   3.  Trace right and small left pleural effusions.   4.  Linear densities of the lung bases suggesting atelectasis, component of left lower lobe infiltrate not excluded.   5.  Diverticulosis   6.  Cholelithiasis   7.  Cardiomegaly      These findings were discussed with the patient's clinician, Teri Lee, on 8/14/2024 9:53 PM.      CT-CTA CHEST PULMONARY ARTERY W/ RECONS   Final Result   .      1.  Bilateral segmental and subsegmental pulmonary emboli with changes compatible with significant right heart strain.   2.  Small left and trace right pleural effusions.   3.  Linear consolidations in the bilateral lung bases suggests atelectasis, component of left lower lobe infiltrate is not excluded.   4.  4.0 cm  ascending thoracic aortic aneurysm, radiographic follow-up and surveillance recommended as clinically appropriate.   5.  Atherosclerosis and atherosclerotic coronary artery disease      These findings were discussed with the patient's clinician, Teri Lee, on 8/14/2024 10:05 PM.      DX-CHEST-PORTABLE (1 VIEW)   Final Result         1.  Left midlung and lower lobe infiltrates   2.  Trace left pleural effusion      CT-CYST ASPIRATION-MISC    (Results Pending)       Assessment/Plan  * Pulmonary embolism with acute cor pulmonale, unspecified chronicity, unspecified pulmonary embolism type (HCC)- (present on admission)  Assessment & Plan  Submassive PE with DVTs and thrombus in transit  Difficult treatment with limited options.  Per IR, unable to complete suction thrombectomy given risk for propagation.  With intra-abdominal fluid collection of unknown etiology systemic tPA is a high risk.  Will plan to continue heparin and repeat echocardiogram in the morning.  If fluid analysis not bloody would like to recontact hepatobiliary service to discuss tPA.  Profoundly high mortality with PESI 147, TANMAY Stage IV    DVT (deep venous thrombosis) (HCC)- (present on admission)  Assessment & Plan  Bilateral lower extremity  Continue heparin    Acute respiratory failure with hypoxia (HCC)- (present on admission)  Assessment & Plan  Due to pulmonary embolus  Placed on high flow nasal cannula, will transition to BiPAP for work of breathing upon her arrival in the ICU  RT/O2 Protocols  Titrate supplemental FiO2 to maintain SpO2 >88%    Intra-abdominal fluid collection- (present on admission)  Assessment & Plan  Seroma versus hematoma versus pancreatic leak  Seen by hepatobiliary surgery today, plan for IR drainage  Continue antibiotics until fluid analysis complete    Sepsis (HCC)- (present on admission)  Assessment & Plan  This is Sepsis Present on admission  SIRS criteria identified on my evaluation include: Tachypnea, with  respirations greater than 20 per minute and Leukocytosis, with WBC greater than 12,000  Clinical indicators of end organ dysfunction include Acute Respiratory Failure - (mechanical ventilation or BiPap or PaO2/FiO2 ratio < 300)  Source is intra-abdominal fluid collection  Sepsis protocol initiated  Crystalloid Fluid Administration: Fluid resuscitation ordered per standard protocol - 30 mL/kg per current or ideal body weight.  Received on admission, will not repeat with RV dysfunction  IV antibiotics as appropriate for source of sepsis  Reassessment: I have reassessed the patient's hemodynamic status  Continue Zosyn    Cancer of overlapping sites of left breast (HCC)- (present on admission)  Assessment & Plan  Holding tamoxifen    Advance care planning- (present on admission)  Assessment & Plan  Patient again requesting full code, aware that she is in dire straits with possible intra-abdominal hemorrhage and thromboembolic disease    Acquired hypothyroidism- (present on admission)  Assessment & Plan  Continue Synthroid and cytomel        Discussed patient condition and risk of morbidity and/or mortality with Hospitalist, Family, RN, RT, Pharmacy, Code status disscussed, Patient, and cardiology.      The patient remains critically ill.  Critical care time = 50 minutes in directly providing and coordinating critical care and extensive data review.  No time overlap and excludes procedures.    Please note that this dictation was created using voice recognition software. The accuracy of the dictation is limited to the abilities of the software. I have made every reasonable attempt to correct obvious errors, but I expect that there are errors of grammar and possibly content that I did not discover before finalizing the note.

## 2024-08-15 NOTE — CONSULTS
Pulmonary Consult Note    Date of consult: 8/15/2024  Resident: Maegan TABOR D.O.  Attending:  Dr. Love    Referring Physician  OLIVER Pereira*    Reason for Consultation  Shortness of Breath (Brought in by remsa from home. Patient c/o Shortness of breath and weakness x 5 days. Had splenectomy 5th of Aug and DC'd on the 9 th.EMS arrival patient oxygen saturation 76 % RA.)      History of Present Illness  Paige Gudino is a 78 y.o. 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 Chest x-ray showed let lower lobe and middle lobe infiltrates. CTA showed bilateral segmental subsegmental pulmonary embolism with signs of RV strain. CTAP show postoperative changes with postoperative seroma or possible pancreatic leak. She was admitted for acute hypoxic respiratory failure on 5L and started on sepsis protocol with broad-spectrum antibiotics as well as an IV heparin drip for the PE. Lower extremity U/S subsequently showed bilateral DVTs. Transthoracic ECHO done morning of 8/15/14 showed a large right atrial mobile mass, favored to be an atrial thrombus, with a severely dilated RV and reduced systolic function. Pulmonology was consulted for management of PE due to potential for worsening hemodynamic/respiratory status.    Patient seen at bedside. Appeared hemodynamically stable and comfortable on 5L. She denied chest pain, palpitations, orthopnea. She denies a family history of blood clots. Is up-to-date with her cancer screening.     Code Status  Full code    Past Medical History   has a past medical history of Anesthesia, Cancer (HCC), Cancer of overlapping sites of left  female breast (HCC), Cataract (2024), High cholesterol, Hypothyroidism, PONV (postoperative nausea and vomiting) (1987), and Thyroid nodule.    She has no past medical history of Osteoporosis.    Past Surgical History   has a past surgical history that includes other orthopedic surgery (2019); other (1987); other (1988); other (2001); pr mastectomy, partial (Left, 08/01/2022); primary c section; lumpectomy; laminotomy; pr ultrasonic guidance, intraoperative (8/5/2024); pancreatectomy (N/A, 8/5/2024); splenectomy (N/A, 8/5/2024); and creation, flap, omentum (N/A, 8/5/2024).    Medications  Home Medications       Reviewed by Jefferson Rg (Pharmacy Tech) on 08/14/24 at 2226  Med List Status: Complete     Medication Last Dose Status   acetaminophen (TYLENOL) 500 MG Tab 8/14/2024 Active   Cholecalciferol 1000 UNIT Cap 8/12/2024 Active   Cyanocobalamin (VITAMIN B12 PO) 8/12/2024 Active   ibandronate (BONIVA) 150 MG tablet 7/20/2024 Active   levothyroxine (SYNTHROID) 100 MCG Tab 8/14/2024 Active   liothyronine (CYTOMEL) 5 MCG Tab 8/14/2024 Active   tamoxifen (NOLVADEX) 20 MG tablet 8/14/2024 Active                  Audit from Redirected Encounters    **Home medications have not yet been reviewed for this encounter**         Allergies  Allergies   Allergen Reactions    Synthroid [Fd&C Red #40 Al Paul-Levothyroxine] Hives and Unspecified     Hives, Brand specific. Some brands are ok and some are not    [place] Thyroid [Thyroid] Diarrhea     diarrhea       Social History   reports that she has never smoked. She has been exposed to tobacco smoke. She has never used smokeless tobacco. She reports that she does not drink alcohol and does not use drugs.     Family History  family history includes Cancer in her mother; Dementia in her father; Heart Disease in her father; Ovarian Cancer in her mother.    Review of Systems  Review of Systems   Constitutional:  Negative for chills and fever.   HENT: Negative.     Eyes: Negative.     Respiratory:  Positive for shortness of breath. Negative for cough, hemoptysis and sputum production.    Cardiovascular: Negative.    Gastrointestinal: Negative.    Genitourinary: Negative.    Musculoskeletal: Negative.    Skin: Negative.    Neurological: Negative.        Vital Signs last 24 hours  Temp:  [36.9 °C (98.4 °F)-37.3 °C (99.2 °F)] 37 °C (98.6 °F)  Pulse:  [61-90] 71  Resp:  [24-37] 29  BP: (136-192)/() 164/72  SpO2:  [90 %-96 %] 90 %  O2 therapy: Pulse Oximetry: 90 %, O2 (LPM): 5, O2 Delivery Device: Nasal Cannula          Fluids  Intake/Output                               08/13/24 0700 - 08/14/24 0659 (Not Admitted) 08/14/24 0700 - 08/15/24 0659 08/15/24 0700 - 08/16/24 0659     0700-1859 3620-0395 Total 0700-1859 1900-0659 Total [id number] 1900-0659 Total                    Intake    I.V.  --  -- --  --  2109 2109  --  -- --    Volume (mL) (LR (Bolus) infusion 2,109 mL) -- -- -- -- 2109 2109 -- -- --    Total Intake -- -- -- -- 2109 2109 -- -- --       Output    Urine  --  -- --  --  -- --  --  -- --    Number of Times Voided -- -- -- -- 1 x 1 x -- -- --    Number of Times Incontinent of Urine -- -- -- -- -- -- 1 x -- 1 x    Stool  --  -- --  --  -- --  --  -- --    Number of Times Stooled -- -- -- -- -- -- 1 x -- 1 x    Total Output -- -- -- -- -- -- -- -- --       Net I/O     -- -- -- -- 2109 2109 -- -- --             Physical Exam  Physical Exam  Constitutional:       Appearance: Normal appearance.   HENT:      Head: Normocephalic.      Right Ear: External ear normal.      Left Ear: External ear normal.      Nose: Nose normal.      Mouth/Throat:      Mouth: Mucous membranes are moist.   Eyes:      Pupils: Pupils are equal, round, and reactive to light.   Cardiovascular:      Rate and Rhythm: Normal rate and regular rhythm.      Pulses: Normal pulses.   Pulmonary:      Effort: Pulmonary effort is normal. No respiratory distress.      Breath sounds: No wheezing.   Abdominal:      General:  Abdomen is flat.      Palpations: Abdomen is soft.      Tenderness: There is abdominal tenderness.      Comments: Abdominal dressing c/di   Musculoskeletal:         General: Normal range of motion.      Cervical back: Normal range of motion.      Right lower leg: No edema.      Left lower leg: No edema.   Skin:     General: Skin is warm.      Capillary Refill: Capillary refill takes less than 2 seconds.   Neurological:      General: No focal deficit present.      Mental Status: She is alert and oriented to person, place, and time.   Psychiatric:         Mood and Affect: Mood normal.         Laboratory  Recent Labs     08/14/24  2050 08/15/24  0330   WBC 19.9* 18.1*   NEUTSPOLYS 95.60* 84.30*   LYMPHOCYTES 1.80* 6.10*   MONOCYTES 2.60 6.90   EOSINOPHILS 0.00 0.00   BASOPHILS 0.00 0.90   ASTSGOT 51* 40   ALTSGPT 71* 60*   ALKPHOSPHAT 98 84   TBILIRUBIN 1.5 1.7*     Recent Labs     08/14/24  2050 08/15/24  0330   SODIUM 140 140   POTASSIUM 3.9 3.6   CHLORIDE 102 105   CO2 22 22   BUN 19 17   CREATININE 0.84 0.72   CALCIUM 8.1* 7.3*     Recent Labs     08/14/24  2050 08/15/24  0330   ALTSGPT 71* 60*   ASTSGOT 51* 40   ALKPHOSPHAT 98 84   TBILIRUBIN 1.5 1.7*   LIPASE 111*  --    GLUCOSE 158* 125*           Imaging  EC-ECHOCARDIOGRAM COMPLETE W/O CONT   Final Result      US-EXTREMITY VENOUS LOWER BILAT   Final Result      CT-ABDOMEN-PELVIS WITH   Final Result         1.  Postop changes of splenectomy and distal pancreatectomy, fluid collection left upper quadrant is seen which could represent postop seroma or possibly pancreatic leak.   2.  Right lower lobe segmental and subsegmental pulmonary embolus.   3.  Trace right and small left pleural effusions.   4.  Linear densities of the lung bases suggesting atelectasis, component of left lower lobe infiltrate not excluded.   5.  Diverticulosis   6.  Cholelithiasis   7.  Cardiomegaly      These findings were discussed with the patient's clinician, Teri Lee, on  8/14/2024 9:53 PM.      CT-CTA CHEST PULMONARY ARTERY W/ RECONS   Final Result   .      1.  Bilateral segmental and subsegmental pulmonary emboli with changes compatible with significant right heart strain.   2.  Small left and trace right pleural effusions.   3.  Linear consolidations in the bilateral lung bases suggests atelectasis, component of left lower lobe infiltrate is not excluded.   4.  4.0 cm ascending thoracic aortic aneurysm, radiographic follow-up and surveillance recommended as clinically appropriate.   5.  Atherosclerosis and atherosclerotic coronary artery disease      These findings were discussed with the patient's clinician, Teri Lee, on 8/14/2024 10:05 PM.      DX-CHEST-PORTABLE (1 VIEW)   Final Result         1.  Left midlung and lower lobe infiltrates   2.  Trace left pleural effusion      CT-CYST ASPIRATION-MISC    (Results Pending)       Assessment and Plan    #Provoked submassive segmental pulmonary embolism with evidence of acute cor pulmonale    Suspect provoked PE due to either recent surgery, new right atrial mass (right-sided heart masses tend to be more thrombogenic), and/or breast cancer on tamoxifen. Needs work up for atrial mass, which we will defer to cardiology. Would favor continuing heparin for now given potential for future procedures and transition to DOAC on discharge. Her CTA was reviewed and her PEs are likely too distal to benefit from catheter-directed suction thrombectomy and given hypertension at the time of my exam, would not recommend catheter-directed thrombolysis at this time.    -Defer to cardiology for workup right atrial mass  -Continue heparin for now  -Recommend 3 months of anticoagulation on discharge    Thanks for the consult.  Pulmonology will continue to follow. I'm available on Voalte.  Discussed with my attending, Dr. Love.    Maegan TABOR D.O.  PGY-2 Internal Medicine Resident

## 2024-08-15 NOTE — ASSESSMENT & PLAN NOTE
Due to pulmonary embolus  RT/O2 Protocols  Titrate supplemental FiO2 to maintain SpO2 >88%  Heparin gtt, transition to DOAC in the near future when OK with surgery

## 2024-08-15 NOTE — ED PROVIDER NOTES
ED Provider Note    CHIEF COMPLAINT  Chief Complaint   Patient presents with    Shortness of Breath     Brought in by remsa from home. Patient c/o Shortness of breath and weakness x 5 days. Had splenectomy 5th of Aug and DC'd on the 9 th.EMS arrival patient oxygen saturation 76 % RA.       EXTERNAL RECORDS REVIEWED  Inpatient Notes patient was admitted on 8/5 status post splenectomy and partial pancreatectomy due to a large mass in the tail of the pancreas likely a serous cystadenoma she was discharged on 8/9 with home oxygen just in case because her oxygen was borderline 90% on discharge    Does have a prior history of left breast cancer mastectomy    HPI/ROS  LIMITATION TO HISTORY   Select: : None  OUTSIDE HISTORIAN(S):  Brought in by EMS from home for hypoxia and shortness of breath for the last several days she was found to be 76% on room air    Paige Gudino is a 78 y.o. female who presents to the emergency department chief complaint of worsening shortness of breath.  According to her family the last few days she just been more fatigued she has been drinking but not eating she has had decreased energy she has had chills as well as feeling short of breath and cough.  Unknown if she has had fevers but they have been putting cold wash rag to her face last evening.  She states she feels a lot better now that she is on oxygen.  She states she is felt some abnormal sounds in the left side of her chest denies any unilateral bilateral leg swelling.  States her abdominal pain is actually pretty good considering the surgery she just had it only has minimal discomfort.  She is having normal bowel movements as well    PAST MEDICAL HISTORY   has a past medical history of Anesthesia, Cancer (HCC), Cancer of overlapping sites of left female breast (HCC), Cataract (2024), High cholesterol, Hypothyroidism, PONV (postoperative nausea and vomiting) (1987), and Thyroid nodule.    SURGICAL HISTORY   has a past surgical  history that includes other orthopedic surgery (2019); other (1987); other (1988); other (2001); mastectomy, partial (Left, 08/01/2022); primary c section; lumpectomy; laminotomy; ultrasonic guidance, intraoperative (8/5/2024); pancreatectomy (N/A, 8/5/2024); splenectomy (N/A, 8/5/2024); and creation, flap, omentum (N/A, 8/5/2024).    FAMILY HISTORY  Family History   Problem Relation Age of Onset    Cancer Mother         Ovarian    Ovarian Cancer Mother     Dementia Father     Heart Disease Father     Hyperlipidemia Neg Hx        SOCIAL HISTORY  Social History     Tobacco Use    Smoking status: Never     Passive exposure: Past    Smokeless tobacco: Never   Vaping Use    Vaping status: Never Used   Substance and Sexual Activity    Alcohol use: Never    Drug use: Never    Sexual activity: Not Currently     Partners: Male     Birth control/protection: Abstinence, Male Sterilization, Post-Menopausal     Comment:        CURRENT MEDICATIONS  Home Medications       Reviewed by Homero Ferraro R.N. (Registered Nurse) on 08/14/24 at [facility]  Med List Status: Partial     Medication Last Dose Status   celecoxib (CELEBREX) 200 MG Cap  Active   Cholecalciferol 1000 UNIT Cap  Active   Cyanocobalamin (VITAMIN B12 PO)  Active   docusate sodium 100 MG Cap  Active   Ferrous Sulfate (IRON) 325 (65 Fe) MG Tab  Active   ibandronate (BONIVA) 150 MG tablet  Active   levothyroxine (SYNTHROID) 100 MCG Tab  Active   liothyronine (CYTOMEL) 5 MCG Tab  Active   Multiple Vitamins-Minerals (SM MULTIPLE VITAMINS WOMENS PO)  Active   NON SPECIFIED  Active   ondansetron (ZOFRAN ODT) 4 MG TABLET DISPERSIBLE  Active   polyethylene glycol/lytes (MIRALAX) Pack  Active   tamoxifen (NOLVADEX) 20 MG tablet  Active   traMADol (ULTRAM) 50 MG Tab  Active                    ALLERGIES  Allergies   Allergen Reactions    Levothyroxine Hives     Brand specific. Some brands are ok and some are not    Synthroid [Fd&C Red #40 Al Paul-Levothyroxine] Hives and  "Unspecified     hives    [place] Thyroid [Thyroid] Diarrhea     diarrhea       PHYSICAL EXAM  VITAL SIGNS: BP (!) 149/78   Pulse 90   Temp 37.3 °C (99.2 °F) (Temporal)   Resp (!) 30   Ht 1.626 m (5' 4\")   Wt 70.3 kg (155 lb)   LMP  (LMP Unknown)   SpO2 91%   BMI 26.61 kg/m²    Pulse OX: Pulse Oxygen level is low on room air low normal on 5L NC  Constitutional: Alert in no apparent distress.  HENT: Normocephalic, Atraumatic, mildly dry mucous membrane  Eyes: PERound. Conjunctiva normal, non-icteric.   Heart: Regular rate and rhythm, intact distal pulses   Lungs: Symmetrical movement, no resp distress rhonchorous breath sounds heard on the left with mild tachypnea without accessory muscle use speaking full sentences  Abdomen: Non-tender, non-distended, normal bowel sounds central abdominal staple line is clean dry and intact  EXT/Back no edema  Skin: Warm, Dry, No erythema, No rash.   Neurologic: Alert and oriented, Grossly non-focal.       EKG/LABS  Labs Reviewed   CBC WITH DIFFERENTIAL - Abnormal; Notable for the following components:       Result Value    WBC 19.9 (*)     RBC 4.10 (*)     Neutrophils-Polys 95.60 (*)     Lymphocytes 1.80 (*)     Nucleated RBC 4.30 (*)     Neutrophils (Absolute) 19.02 (*)     Lymphs (Absolute) 0.36 (*)     All other components within normal limits   COMP METABOLIC PANEL - Abnormal; Notable for the following components:    Glucose 158 (*)     Calcium 8.1 (*)     AST(SGOT) 51 (*)     ALT(SGPT) 71 (*)     All other components within normal limits   TROPONIN - Abnormal; Notable for the following components:    Troponin T 70 (*)     All other components within normal limits   PROBRAIN NATRIURETIC PEPTIDE, NT - Abnormal; Notable for the following components:    NT-proBNP 8591 (*)     All other components within normal limits   LACTIC ACID - Abnormal; Notable for the following components:    Lactic Acid 2.5 (*)     All other components within normal limits   PROTHROMBIN TIME - " Abnormal; Notable for the following components:    PT 15.5 (*)     INR 1.21 (*)     All other components within normal limits   APTT - Abnormal; Notable for the following components:    APTT 22.3 (*)     All other components within normal limits   LIPASE - Abnormal; Notable for the following components:    Lipase 111 (*)     All other components within normal limits   DIFFERENTIAL MANUAL   PERIPHERAL SMEAR REVIEW   PLATELET ESTIMATE   MORPHOLOGY   ESTIMATED GFR   MRSA BY PCR (AMP)   APTT   PROTHROMBIN TIME   HEPARIN XA (UNFRACTIONATED)   CBC WITH DIFFERENTIAL   COMP METABOLIC PANEL   LACTIC ACID   BLOOD CULTURE   BLOOD CULTURE   URINALYSIS   PROCALCITONIN   TROPONIN   POCT COV-2, FLU A/B, RSV BY PCR     Results for orders placed or performed during the hospital encounter of 24   EKG   Result Value Ref Range    Report       [facility] Emergency Dept.    Test Date:  2024  Pt Name:    ROLANDO VERDIN           Department: ER  MRN:        [id number]                      Room:       [facility]  Gender:     Female                       Technician: [id number]  :        [id number]                   Requested By:ER TRIAGE PROTOCOL  Order #:    [id number]                    Reading MD: Teri Lee MD    Measurements  Intervals                                Axis  Rate:       98                           P:          57  ND:         236                          QRS:        39  QRSD:       96                           T:          -46  QT:         362  QTc:        463    Interpretive Statements  Sinus rhythm around 98 mild T wave inversions in the inferior lateral leads  kind of diminished overall voltage no abnormal ST elevations or ST  depressions no abnormal Q waves first-degree AV block otherwise normal axis  Compared to ECG 2024 15:07:44  Low QRS voltage now present  T-wave abnormality  still present  Electronically Signed On 2024 23:21:33 PDT by Teri Lee MD         I have  independently interpreted this EKG    RADIOLOGY/PROCEDURES   I have independently interpreted the diagnostic imaging associated with this visit and am waiting the final reading from the radiologist.   My preliminary interpretation is as follows:     CXR - LLL and mid lung PNA with mild effusion     Radiologist interpretation:  CT-ABDOMEN-PELVIS WITH   Final Result         1.  Postop changes of splenectomy and distal pancreatectomy, fluid collection left upper quadrant is seen which could represent postop seroma or possibly pancreatic leak.   2.  Right lower lobe segmental and subsegmental pulmonary embolus.   3.  Trace right and small left pleural effusions.   4.  Linear densities of the lung bases suggesting atelectasis, component of left lower lobe infiltrate not excluded.   5.  Diverticulosis   6.  Cholelithiasis   7.  Cardiomegaly      These findings were discussed with the patient's clinician, Teri Lee, on 8/14/2024 9:53 PM.      CT-CTA CHEST PULMONARY ARTERY W/ RECONS   Final Result   .      1.  Bilateral segmental and subsegmental pulmonary emboli with changes compatible with significant right heart strain.   2.  Small left and trace right pleural effusions.   3.  Linear consolidations in the bilateral lung bases suggests atelectasis, component of left lower lobe infiltrate is not excluded.   4.  4.0 cm ascending thoracic aortic aneurysm, radiographic follow-up and surveillance recommended as clinically appropriate.   5.  Atherosclerosis and atherosclerotic coronary artery disease      These findings were discussed with the patient's clinician, Teri Lee, on 8/14/2024 10:05 PM.      DX-CHEST-PORTABLE (1 VIEW)   Final Result         1.  Left midlung and lower lobe infiltrates   2.  Trace left pleural effusion      EC-ECHOCARDIOGRAM COMPLETE W/O CONT    (Results Pending)   US-EXTREMITY VENOUS LOWER BILAT    (Results Pending)       COURSE & MEDICAL DECISION MAKING    ASSESSMENT, COURSE AND  PLAN  Care Narrative:     Patient is a 78-year-old female the recent pancreatectomy and splenectomy now here with hypoxia and shortness of breath.  She does have rhonchorous breath sounds on the left concerning for possible hospital-acquired pneumonia versus pulmonary embolism versus effusion.  Her abdomen is pretty actually soft mildly distended but otherwise not significantly peritoneal but with the recent surgery we will just evaluate to ensure were not missing any diaphragmatic injury or abscess in the abdomen pelvis.  She does not require increase in oxygen at this time she is doing well on the 5 L as has a mild tachypnea will keep a close eye and has had she has increased work of breathing that may need to move up to high flow nasal cannula.  CT pulm angiogram of the chest was ordered as well as CT ab pelvis.    DISPOSITION AND DISCUSSIONS    9:25 PM chest x-ray was reviewed and she indeed does have left-sided pneumonia associated with healthcare associate pneumonia was treated with IV antibiotics IV fluids were ordered and I discussed with the patient and her partner at the bedside the plan she is cut back from CT so we will still evaluate for the pulmonary embolism.    Patient CT scan comes back with bilateral pulmonary emboli as well as possible seroma versus pancreatic leak in the left upper quadrant.  There is no evidence of abscess.  She does have left-sided pneumonia which consistent with healthcare associated ammonia.  I reassessed patient at bedside she getting IV fluid she is getting IV antibiotics.  She still mildly tachypneic but has not had severe worsening of respiratory distress.  Going to start her on heparin for bilateral PEs but do not need EKOS at this time due to hemodynamic stability but we will keep that on the radar.  Ongoing discussed case with intensivist on-call for possible IMCU due to the complication of her symptoms and findings.  Unfortunately her results came back after 10 PM so  is unable to contact about a biliary but they can be consulted in the morning    She does have mildly elevated troponin proBNP and is likely secondary to mild strain secondary pulmonary emboli.  Without evidence of ST elevation on EKG I low concern for ACS      10:53 PM  Spoke w DR Nacho carreon the ICU plan for IM at this time after review the imaging and laboratory analysis feels that is appropriate for the patient.    11:07 PM  Spoke velma [facility] on-call for the medicine team and has accepted the patient.      I spoke with the patient's family alerting them that her care.  She still mildly tachypneic requiring 5 to 6 L nasal cannula but does not have significant worsen her symptoms at this time and is going to be admitted and critical care to the IMCU    Hydration: Based on the patient's presentation of Sepsis the patient was given IV fluids. IV Hydration was used because oral hydration was not adequate alone. Upon recheck following hydration, the patient was improved.    CRITICAL CARE  The very real possibilty of a deterioration of this patient's condition required the highest level of my preparedness for sudden, emergent intervention.  I provided critical care services, which included medication orders, frequent reevaluations of the patient's condition and response to treatment, ordering and reviewing test results, and discussing the case with various consultants.  The critical care time associated with the care of the patient was 45 minutes. Review chart for interventions. This time is exclusive of any other billable procedures.           I have discussed management of the patient with the following physicians and CRISSY's:  Dr Amanuel Griffith    Discussion of management with other [facility] or appropriate source(s): Pharmacy for meds, drips and antibx        FINAL DIAGNOSIS  1. HAP (hospital-acquired pneumonia)    2. Hypoxia    3. Bilateral pulmonary emboli  4. Elevated troponin  5. Fluid collection LUQ - post operative     CCT 45  min      Electronically signed by: Teri Lee M.D., 8/14/2024 8:14 PM

## 2024-08-15 NOTE — ASSESSMENT & PLAN NOTE
I this is Sepsis Present on admission  SIRS criteria identified on my evaluation include: Tachycardia, with heart rate greater than 90 BPM, Tachypnea, with respirations greater than 20 per minute, and Leukocytosis, with WBC greater than 12,000  Clinical indicators of end organ dysfunction include Lactic Acid greater than 2  Source is intra-abdominal infection or pneumonia  Sepsis protocol initiated  Crystalloid Fluid Administration: Fluid resuscitation ordered per standard protocol - 30 mL/kg per current or ideal body weight  IV antibiotics as appropriate for source of sepsis  Reassessment: I have reassessed the patient's hemodynamic status    I am continuing IV Zosyn.  Cultures remain negative to date.  Tomorrow would be the last day of antibiotic.  I discussed plan of care with him and answered all her questions.

## 2024-08-16 ENCOUNTER — APPOINTMENT (OUTPATIENT)
Dept: RADIOLOGY | Facility: MEDICAL CENTER | Age: 79
DRG: 853 | End: 2024-08-16
Attending: INTERNAL MEDICINE
Payer: COMMERCIAL

## 2024-08-16 ENCOUNTER — APPOINTMENT (OUTPATIENT)
Dept: CARDIOLOGY | Facility: MEDICAL CENTER | Age: 79
DRG: 853 | End: 2024-08-16
Attending: INTERNAL MEDICINE
Payer: COMMERCIAL

## 2024-08-16 PROBLEM — I44.1 MOBITZ TYPE 2 SECOND DEGREE HEART BLOCK: Status: ACTIVE | Noted: 2024-08-16

## 2024-08-16 LAB
ACT BLD: 146 SEC (ref 74–137)
ALBUMIN SERPL BCP-MCNC: 2.7 G/DL (ref 3.2–4.9)
ALBUMIN/GLOB SERPL: 1.3 G/DL
ALP SERPL-CCNC: 66 U/L (ref 30–99)
ALT SERPL-CCNC: 50 U/L (ref 2–50)
ANION GAP SERPL CALC-SCNC: 13 MMOL/L (ref 7–16)
AST SERPL-CCNC: 31 U/L (ref 12–45)
BILIRUB SERPL-MCNC: 1.2 MG/DL (ref 0.1–1.5)
BUN SERPL-MCNC: 15 MG/DL (ref 8–22)
CA-I SERPL-SCNC: 0.9 MMOL/L (ref 1.1–1.3)
CALCIUM ALBUM COR SERPL-MCNC: 7.6 MG/DL (ref 8.5–10.5)
CALCIUM SERPL-MCNC: 6.6 MG/DL (ref 8.5–10.5)
CHLORIDE SERPL-SCNC: 107 MMOL/L (ref 96–112)
CO2 SERPL-SCNC: 20 MMOL/L (ref 20–33)
CREAT SERPL-MCNC: 0.63 MG/DL (ref 0.5–1.4)
EKG IMPRESSION: NORMAL
ERYTHROCYTE [DISTWIDTH] IN BLOOD BY AUTOMATED COUNT: 48.1 FL (ref 35.9–50)
GFR SERPLBLD CREATININE-BSD FMLA CKD-EPI: 91 ML/MIN/1.73 M 2
GLOBULIN SER CALC-MCNC: 2.1 G/DL (ref 1.9–3.5)
GLUCOSE SERPL-MCNC: 101 MG/DL (ref 65–99)
HCT VFR BLD AUTO: 30.5 % (ref 37–47)
HGB BLD-MCNC: 9.8 G/DL (ref 12–16)
MAGNESIUM SERPL-MCNC: 1.9 MG/DL (ref 1.5–2.5)
MCH RBC QN AUTO: 30.2 PG (ref 27–33)
MCHC RBC AUTO-ENTMCNC: 32.1 G/DL (ref 32.2–35.5)
MCV RBC AUTO: 93.8 FL (ref 81.4–97.8)
PATHOLOGY CONSULT NOTE: NORMAL
PHOSPHATE SERPL-MCNC: 2.8 MG/DL (ref 2.5–4.5)
PLATELET # BLD AUTO: 241 K/UL (ref 164–446)
PMV BLD AUTO: 10.3 FL (ref 9–12.9)
POTASSIUM SERPL-SCNC: 3.5 MMOL/L (ref 3.6–5.5)
PROT SERPL-MCNC: 4.8 G/DL (ref 6–8.2)
RBC # BLD AUTO: 3.25 M/UL (ref 4.2–5.4)
SODIUM SERPL-SCNC: 140 MMOL/L (ref 135–145)
TSH SERPL DL<=0.005 MIU/L-ACNC: 4.57 UIU/ML (ref 0.38–5.33)
UFH PPP CHRO-ACNC: 0.16 IU/ML
UFH PPP CHRO-ACNC: >1.1 IU/ML
UFH PPP CHRO-ACNC: >1.1 IU/ML
WBC # BLD AUTO: 15 K/UL (ref 4.8–10.8)

## 2024-08-16 PROCEDURE — 84100 ASSAY OF PHOSPHORUS: CPT

## 2024-08-16 PROCEDURE — 99291 CRITICAL CARE FIRST HOUR: CPT | Mod: GC | Performed by: INTERNAL MEDICINE

## 2024-08-16 PROCEDURE — 99232 SBSQ HOSP IP/OBS MODERATE 35: CPT | Performed by: SURGERY

## 2024-08-16 PROCEDURE — 93325 DOPPLER ECHO COLOR FLOW MAPG: CPT

## 2024-08-16 PROCEDURE — 93005 ELECTROCARDIOGRAM TRACING: CPT | Performed by: INTERNAL MEDICINE

## 2024-08-16 PROCEDURE — A9270 NON-COVERED ITEM OR SERVICE: HCPCS | Performed by: INTERNAL MEDICINE

## 2024-08-16 PROCEDURE — C1894 INTRO/SHEATH, NON-LASER: HCPCS

## 2024-08-16 PROCEDURE — 85347 COAGULATION TIME ACTIVATED: CPT

## 2024-08-16 PROCEDURE — 82330 ASSAY OF CALCIUM: CPT

## 2024-08-16 PROCEDURE — 85520 HEPARIN ASSAY: CPT

## 2024-08-16 PROCEDURE — 94760 N-INVAS EAR/PLS OXIMETRY 1: CPT

## 2024-08-16 PROCEDURE — 700101 HCHG RX REV CODE 250

## 2024-08-16 PROCEDURE — 80053 COMPREHEN METABOLIC PANEL: CPT

## 2024-08-16 PROCEDURE — 02C63ZZ EXTIRPATION OF MATTER FROM RIGHT ATRIUM, PERCUTANEOUS APPROACH: ICD-10-PCS | Performed by: STUDENT IN AN ORGANIZED HEALTH CARE EDUCATION/TRAINING PROGRAM

## 2024-08-16 PROCEDURE — 770022 HCHG ROOM/CARE - ICU (200)

## 2024-08-16 PROCEDURE — 94640 AIRWAY INHALATION TREATMENT: CPT

## 2024-08-16 PROCEDURE — 93308 TTE F-UP OR LMTD: CPT | Mod: 26 | Performed by: INTERNAL MEDICINE

## 2024-08-16 PROCEDURE — 700111 HCHG RX REV CODE 636 W/ 250 OVERRIDE (IP)

## 2024-08-16 PROCEDURE — 700111 HCHG RX REV CODE 636 W/ 250 OVERRIDE (IP): Mod: JZ

## 2024-08-16 PROCEDURE — 0644T TCAT RMVL/DBLK ICAR MAS PERQ: CPT

## 2024-08-16 PROCEDURE — 700105 HCHG RX REV CODE 258

## 2024-08-16 PROCEDURE — 306637 HCHG MISC ORTHO ITEM RC 0274

## 2024-08-16 PROCEDURE — 700117 HCHG RX CONTRAST REV CODE 255: Performed by: INTERNAL MEDICINE

## 2024-08-16 PROCEDURE — 33999 UNLISTED PX CARDIAC SURGERY: CPT

## 2024-08-16 PROCEDURE — 700105 HCHG RX REV CODE 258: Performed by: INTERNAL MEDICINE

## 2024-08-16 PROCEDURE — 700111 HCHG RX REV CODE 636 W/ 250 OVERRIDE (IP): Performed by: INTERNAL MEDICINE

## 2024-08-16 PROCEDURE — 84443 ASSAY THYROID STIM HORMONE: CPT

## 2024-08-16 PROCEDURE — 85027 COMPLETE CBC AUTOMATED: CPT

## 2024-08-16 PROCEDURE — 88304 TISSUE EXAM BY PATHOLOGIST: CPT

## 2024-08-16 PROCEDURE — 700102 HCHG RX REV CODE 250 W/ 637 OVERRIDE(OP): Performed by: INTERNAL MEDICINE

## 2024-08-16 PROCEDURE — 83735 ASSAY OF MAGNESIUM: CPT

## 2024-08-16 RX ORDER — LIDOCAINE HYDROCHLORIDE 10 MG/ML
INJECTION, SOLUTION INFILTRATION; PERINEURAL
Status: COMPLETED
Start: 2024-08-16 | End: 2024-08-16

## 2024-08-16 RX ORDER — HEPARIN SODIUM 1000 [USP'U]/ML
INJECTION, SOLUTION INTRAVENOUS; SUBCUTANEOUS
Status: COMPLETED
Start: 2024-08-16 | End: 2024-08-16

## 2024-08-16 RX ADMIN — HUMAN ALBUMIN MICROSPHERES AND PERFLUTREN 3 ML: 10; .22 INJECTION, SOLUTION INTRAVENOUS at 18:15

## 2024-08-16 RX ADMIN — HEPARIN SODIUM 6000 UNITS: 1000 INJECTION, SOLUTION INTRAVENOUS; SUBCUTANEOUS at 10:28

## 2024-08-16 RX ADMIN — PIPERACILLIN AND TAZOBACTAM 4.5 G: 4; .5 INJECTION, POWDER, FOR SOLUTION INTRAVENOUS at 13:28

## 2024-08-16 RX ADMIN — LEVOTHYROXINE SODIUM 100 MCG: 0.1 TABLET ORAL at 05:23

## 2024-08-16 RX ADMIN — HEPARIN SODIUM 16 UNITS/KG/HR: 5000 INJECTION, SOLUTION INTRAVENOUS at 20:36

## 2024-08-16 RX ADMIN — SODIUM CHLORIDE: 9 INJECTION, SOLUTION INTRAVENOUS at 03:56

## 2024-08-16 RX ADMIN — LABETALOL HYDROCHLORIDE 10 MG: 5 INJECTION, SOLUTION INTRAVENOUS at 08:20

## 2024-08-16 RX ADMIN — PIPERACILLIN AND TAZOBACTAM 4.5 G: 4; .5 INJECTION, POWDER, FOR SOLUTION INTRAVENOUS at 05:23

## 2024-08-16 RX ADMIN — LIOTHYRONINE SODIUM 5 MCG: 5 TABLET ORAL at 08:20

## 2024-08-16 RX ADMIN — HEPARIN SODIUM 2800 UNITS: 1000 INJECTION, SOLUTION INTRAVENOUS; SUBCUTANEOUS at 22:40

## 2024-08-16 RX ADMIN — CALCIUM CHLORIDE 1000 MG: 100 INJECTION, SOLUTION INTRAVENOUS at 03:47

## 2024-08-16 RX ADMIN — PIPERACILLIN AND TAZOBACTAM 4.5 G: 4; .5 INJECTION, POWDER, FOR SOLUTION INTRAVENOUS at 20:38

## 2024-08-16 RX ADMIN — LIDOCAINE HYDROCHLORIDE: 10 INJECTION, SOLUTION INFILTRATION; PERINEURAL at 10:14

## 2024-08-16 ASSESSMENT — ENCOUNTER SYMPTOMS
CONSTIPATION: 0
NAUSEA: 0
LOSS OF CONSCIOUSNESS: 0
CHILLS: 0
FEVER: 0
HEARTBURN: 0
HEMOPTYSIS: 0
SHORTNESS OF BREATH: 1
WHEEZING: 0
COUGH: 0
PALPITATIONS: 0
DIZZINESS: 0
SORE THROAT: 0
BLOOD IN STOOL: 0
ORTHOPNEA: 0
ABDOMINAL PAIN: 0
VOMITING: 0
DIARRHEA: 0
SPUTUM PRODUCTION: 0

## 2024-08-16 ASSESSMENT — PAIN DESCRIPTION - PAIN TYPE
TYPE: ACUTE PAIN

## 2024-08-16 NOTE — OR SURGEON
Immediate Post- Operative Note        Findings: Right atrial thrombus      Procedure(s): Right atrial thrombectomy      Estimated Blood Loss: Less than 5 ml        Complications: None            8/16/2024     10:45 AM     Glenn Bowen M.D.

## 2024-08-16 NOTE — HOSPITAL COURSE
Patient is a 78-year-old female with PMH of pancreatic cystadenoma s/p pancreaticosplenectomy on 8/5/2024 with Dr. Espinoza and was discharged 8/9/2024.  However, after discharge, patient became more short of breath and finally went to the ED where she was found to have bilateral DVTs and bilateral submassive PEs.  Patient was initially admitted to the IMCU and was started on heparin infusion.  An echocardiogram was performed and showed very large thrombus in transit in the RA with RV dilation and reduced systolic function.  Patient began having increased oxygen requirements, ABG showed respiratory alkalosis and hypoxia, and therefore was transferred to the ICU.

## 2024-08-16 NOTE — PROGRESS NOTES
[facility] Internal Medicine Daily Progress Note    Date of Service  8/16/2024    [facility] Team: [facility] ICU Gold Team   Attending: Jose Camejo Jr., D.o.  Senior Resident: Dr. Marc Martinez  Contact Number: [phone]    Chief Complaint  Paige Gudino is a 78 y.o. female admitted 8/14/2024 with bilateral submassive PE.    Hospital Course  Patient is a 78-year-old female with PMH of pancreatic cystadenoma s/p pancreaticosplenectomy on 8/5/2024 with Dr. Espinoza and was discharged 8/9/2024.  However, after discharge, patient became more short of breath and finally went to the ED where she was found to have bilateral DVTs and bilateral submassive PEs.  Patient was initially admitted to the [facility] and was started on heparin infusion.  An echocardiogram was performed and showed very large thrombus in transit in the RA with RV dilation and reduced systolic function.  Patient began having increased oxygen requirements, ABG showed respiratory alkalosis and hypoxia, and therefore was transferred to the ICU.    Interval Problem Update  -Patient transferred from [facility] last night given increased oxygen requirements in the setting of respiratory alkalosis and hypoxia.  Pending right atrial thrombectomy today.  Oxygen requirements were down to 12 L during prerounding.    I have discussed this patient's plan of care and discharge plan at IDT rounds today with Case Management, Nursing, Nursing leadership, and other members of the IDT team.    Consultants/Specialty  cardiology and IR    Code Status  Full Code    Disposition  The patient is not medically cleared for discharge to home or a post-acute facility.      I have placed the appropriate orders for post-discharge needs.    Review of Systems  Review of Systems   Constitutional:  Negative for chills, fever and malaise/fatigue.   HENT:  Negative for nosebleeds and sore throat.    Respiratory:  Positive for shortness of breath. Negative for cough, hemoptysis, sputum production  and wheezing.    Cardiovascular:  Negative for chest pain, palpitations, orthopnea and leg swelling.   Gastrointestinal:  Negative for abdominal pain, blood in stool, constipation, diarrhea, heartburn, melena, nausea and vomiting.   Genitourinary:  Negative for hematuria.   Neurological:  Negative for dizziness and loss of consciousness.   All other systems reviewed and are negative.       Physical Exam  Temp:  [36.2 °C (97.2 °F)-36.6 °C (97.8 °F)] 36.2 °C (97.2 °F)  Pulse:  [56-88] 60  Resp:  [19-41] 28  BP: (119-195)/() 166/78  SpO2:  [85 %-99 %] 97 %    Physical Exam  Constitutional:       General: She is not in acute distress.     Appearance: She is normal weight. She is ill-appearing. She is not toxic-appearing or diaphoretic.   HENT:      Head: Normocephalic and atraumatic.      Nose: Nose normal. No rhinorrhea.      Mouth/Throat:      Mouth: Mucous membranes are dry.      Pharynx: Oropharynx is clear. No oropharyngeal exudate or posterior oropharyngeal erythema.      Comments: No dried blood  Eyes:      General: No scleral icterus.        Right eye: No discharge.         Left eye: No discharge.      Extraocular Movements: Extraocular movements intact.      Conjunctiva/sclera: Conjunctivae normal.   Cardiovascular:      Rate and Rhythm: Normal rate and regular rhythm.      Pulses: Normal pulses.      Heart sounds: Normal heart sounds. No murmur heard.     No friction rub. No gallop.   Pulmonary:      Effort: Pulmonary effort is normal. No respiratory distress.      Breath sounds: Normal breath sounds. No stridor. No wheezing, rhonchi or rales.   Abdominal:      General: Bowel sounds are normal. There is no distension.      Palpations: Abdomen is soft. There is no mass.      Tenderness: There is no abdominal tenderness. There is no guarding or rebound.   Musculoskeletal:         General: Normal range of motion.      Cervical back: Normal range of motion. No rigidity.      Right lower leg: No edema.       Left lower leg: No edema.   Skin:     General: Skin is warm and dry.      Capillary Refill: Capillary refill takes less than 2 seconds.   Neurological:      General: No focal deficit present.      Mental Status: She is alert and oriented to person, place, and time. Mental status is at baseline.   Psychiatric:         Mood and Affect: Mood normal.         Behavior: Behavior normal.         Fluids    Intake/Output Summary (Last 24 hours) at 8/16/2024 1312  Last data filed at 8/16/2024 1200  Gross per 24 hour   Intake 2691.07 ml   Output 600 ml   Net 2091.07 ml       Laboratory  Recent Labs     08/15/24  0330 08/15/24  1915 08/16/24  0100   WBC 18.1* 16.5* 15.0*   RBC 3.74* 3.52* 3.25*   HEMOGLOBIN 11.2* 10.7* 9.8*   HEMATOCRIT 34.9* 33.5* 30.5*   MCV 93.3 95.2 93.8   MCH 29.9 30.4 30.2   MCHC 32.1* 31.9* 32.1*   RDW 47.1 48.7 48.1   PLATELETCT 248 263 241   MPV 10.1 10.3 10.3     Recent Labs     08/14/24  2050 08/15/24  0330 08/16/24  0100   SODIUM 140 140 140   POTASSIUM 3.9 3.6 3.5*   CHLORIDE 102 105 107   CO2 22 22 20   GLUCOSE 158* 125* 101*   BUN 19 17 15   CREATININE 0.84 0.72 0.63   CALCIUM 8.1* 7.3* 6.6*     Recent Labs     08/14/24 2050   APTT 22.3*   INR 1.21*               Imaging  CT-CYST ASPIRATION-MISC   Final Result      1.  CT GUIDED RETROPERITONEAL LEFT UPPER QUADRANT NEEDLE ASPIRATION DRAINAGE. THE OBTAINED BROWN WATERY FLUID RESEMBLES PANCREATIC PSEUDOCYST FLUID. THE FLUID WAS NOT PURULENT NOR MALODOROUS.   2.  THE CURRENT PLAN IS TO CHECK CULTURE AND LAB RESULTS.      DX-CHEST-PORTABLE (1 VIEW)   Final Result      1. Stable small left pleural effusion with left mid lung and lung base parenchymal opacities.   2. The remainder is stable.      EC-ECHOCARDIOGRAM COMPLETE W/O CONT   Final Result      US-EXTREMITY VENOUS LOWER BILAT   Final Result      CT-ABDOMEN-PELVIS WITH   Final Result         1.  Postop changes of splenectomy and distal pancreatectomy, fluid collection left upper quadrant is seen  which could represent postop seroma or possibly pancreatic leak.   2.  Right lower lobe segmental and subsegmental pulmonary embolus.   3.  Trace right and small left pleural effusions.   4.  Linear densities of the lung bases suggesting atelectasis, component of left lower lobe infiltrate not excluded.   5.  Diverticulosis   6.  Cholelithiasis   7.  Cardiomegaly      These findings were discussed with the patient's clinician, Teri Lee, on 8/14/2024 9:53 PM.      CT-CTA CHEST PULMONARY ARTERY W/ RECONS   Final Result   .      1.  Bilateral segmental and subsegmental pulmonary emboli with changes compatible with significant right heart strain.   2.  Small left and trace right pleural effusions.   3.  Linear consolidations in the bilateral lung bases suggests atelectasis, component of left lower lobe infiltrate is not excluded.   4.  4.0 cm ascending thoracic aortic aneurysm, radiographic follow-up and surveillance recommended as clinically appropriate.   5.  Atherosclerosis and atherosclerotic coronary artery disease      These findings were discussed with the patient's clinician, Teri Lee, on 8/14/2024 10:05 PM.      DX-CHEST-PORTABLE (1 VIEW)   Final Result         1.  Left midlung and lower lobe infiltrates   2.  Trace left pleural effusion      EC-ECHOCARDIOGRAM LTD W/O CONT    (Results Pending)   IR-THROMBO MECHANICAL ARTERY,INIT    (Results Pending)        Assessment/Plan  Problem Representation:    * Pulmonary embolism with acute cor pulmonale, unspecified chronicity, unspecified pulmonary embolism type (HCC)- (present on admission)  Assessment & Plan  Submassive PE with DVTs and thrombus in transit  Difficult treatment with limited options.  Per IR, unable to complete suction thrombectomy given risk for propagation.  With intra-abdominal fluid collection of unknown etiology systemic tPA is a high risk and recent surgery given recent surgery.  Profoundly high mortality with PESI 147, ATNMAY Stage  IV    Mobitz type 2 second degree heart block  Assessment & Plan  During the night of 5/15 into 5/16, patient was noted to have occasional Mobitz type II.  Patient was asymptomatic during these episodes.  This is likely secondary to PE. Otherwise, she is mostly in sinus rhythm.  Patient appliance in place.  Telemetry.  CTM.  If no improvement, consult cardiology.    Atrial thrombus- (present on admission)  Assessment & Plan  She found to have atrial thrombus.  Continue heparin infusion  Plan for right atrial thrombectomy today.    DVT (deep venous thrombosis) ([facility])- (present on admission)  Assessment & Plan  Bilateral DVTs.  Continue heparin.  I am continuing heparin as per Anti Xa levels and monitor for signs of bleeding.    Acute respiratory failure with hypoxia ([facility])- (present on admission)  Assessment & Plan  Secondary to pulmonary embolism  Requiring 5 L nasal cannula, tachypneic  She developed acute respiratory failure and I ordered stat ABG and she found to have hypoxia and alkalosis.  I discussed with RT and place her on high flow nasal cannula she was placed on 40 L of oxygen.    Her oxygen requirement has titrated down in the ICU.    Intra-abdominal fluid collection- (present on admission)  Assessment & Plan  CT of the abdomen pelvis with contrast showed postop seroma or pancreatic leak.  Lipase 111.  Cover with empiric antibiotics for possible infection  Surgical oncology consulted, appreciate recs.    Sepsis ([facility])- (present on admission)  Assessment & Plan  This is Sepsis Present on admission  SIRS criteria identified on my evaluation include: Tachycardia, with heart rate greater than 90 BPM, Tachypnea, with respirations greater than 20 per minute, and Leukocytosis, with WBC greater than 12,000  Clinical indicators of end organ dysfunction include Lactic Acid greater than 2  Source is intra-abdominal infection or pneumonia  Sepsis protocol initiated  Crystalloid Fluid Administration: Fluid resuscitation  ordered per standard protocol - 30 mL/kg per current or ideal body weight  IV antibiotics as appropriate for source of sepsis  Reassessment: I have reassessed the patient's hemodynamic status    I am continuing IV antibiotics.  Follow-up on culture results.    Cancer of overlapping sites of left breast (HCC)- (present on admission)  Assessment & Plan  Will hold tamoxifen since it could increase risk of DVT/PE    Advance care planning- (present on admission)  Assessment & Plan  CODE STATUS discussed with Ms. Hodgesdamien and she would like to be full code.    Acquired hypothyroidism- (present on admission)  Assessment & Plan  Continue levothyroxine, Cytomel         VTE prophylaxis: SCDs/TEDs    I have performed a physical exam and reviewed and updated ROS and Plan today (8/16/2024). In review of yesterday's note (8/15/2024), there are no changes except as documented above.

## 2024-08-16 NOTE — PROGRESS NOTES
4 Eyes Skin Assessment Completed by Tio BOYD RN and Bao DUFFY RN.    Head WDL  Ears Redness and Blanching  Nose WDL  Mouth WDL  Neck WDL  Breast/Chest WDL  Shoulder Blades WDL  Spine WDL  (R) Arm/Elbow/Hand Redness and Blanching  (L) Arm/Elbow/Hand Redness and Blanching  Abdomen Bruising and Incision midline abd incision, closed with sutures.  Groin WDL  Scrotum/Coccyx/Buttocks Redness and Blanching, small tear on L buttock    (R) Leg Redness and Blanching  (L) Leg Redness and Blanching  (R) Heel/Foot/Toe Boggy  (L) Heel/Foot/Toe Boggy          Devices In Places Blood Pressure Cuff, Pulse Ox, SCD's, and Nasal Cannula      Interventions In Place NC W/Ear Foams, Heel Mepilex, Pillows, Q2 Turns, Low Air Loss Mattress, Barrier Cream, Dri-Maximino Pads, Heels Loaded W/Pillows, and Pressure Redistribution Mattress    Possible Skin Injury Yes    Pictures Uploaded Into Epic Yes  Wound Consult Placed N/A  RN Wound Prevention Protocol Ordered No , previously ordered

## 2024-08-16 NOTE — PROGRESS NOTES
Notified by monitors that patient appears to have periodic rhythm change. RN reviewed strips to show what looks like Second Degree HB type 2 with dropped QRS.   MD notified, STAT EKG ordered that confirms appropriately 12 seconds of Second Degree type 2. Results reviewed with MD.   Patient is asymptomatic and normotensive and currently in normal sinus rhythm.

## 2024-08-16 NOTE — HOSPITAL COURSE
"\"78 y.o. female with past medical history of breast cancer, hypercholesterolemia, hypothyroidism, Pancreatic mass who presented 8/14/2024 with shortness of breath.  Patient underwent a pancreaticosplenectomy on 8/5/2024 with Dr. Espinoza and was discharged on 8/9/2024 on supplemental oxygen.  Since discharge patient has become increasingly fatigued, lethargic and short of breath.  In the ER a CT scan of her chest, abdomen and pelvis was completed demonstrating multiple segmental and subsegmental PEs with an RV LV ratio of 1.83 and a mildly elevated troponin.  IMCU was requested and she was started on a heparin infusion.  In addition to the findings in her chest CT she was also found to have a 10.4 x 10.8 cm fluid collection in the left upper quadrant within the splenic bed and had significant leukocytosis.  She was started on antibiotics for this and a IR drain has been requested.  Today the patient had a lower extremity venous Doppler which showed acute DVTs in both posterior tibial, peroneal and gastrocnemius veins.  Her echocardiogram showed a very large thrombus in transit in the RA in addition to RV dilation and reduced systolic function.  IR has been contacted regarding this and at this time does not do not feel that suction thrombectomy would be safe and would likely propagate this thrombus causing hemodynamic and respiratory collapse.     The hospitalist service has asked me to see this patient as she has elevating oxygen requirements however remains hemodynamically stable.  Her ABG shows significant respiratory alkalosis and hypoxia.  I have discussed all these findings with the patient as well as her CODE STATUS and her and her family have requested that she remain full code at this time.\"    8/16 RV suction thrombectomy complete  "

## 2024-08-16 NOTE — CARE PLAN
Problem: Humidified High Flow Nasal Cannula  Goal: Maintain adequate oxygenation dependent on patient condition  Description: Target End Date:  resolve prior to discharge or when underlying condition is resolved/stabilized    1.  Implement humidified high flow oxygen therapy  2.  Titrate high flow oxygen to maintain appropriate SpO2  Outcome: Progressing      Respiratory Update    Treatment modality: HHFNC  30/60%      Pt tolerating current treatments well with no adverse reactions.

## 2024-08-16 NOTE — PROGRESS NOTES
Surgical Progress Note    Author: Chaim Velasco M.D. Date & Time created: 2024   9:16 AM     Interval Events:  No acute events overnight.  Patient remains quite ill.  Aspiration yesterday noted.    Hemodynamics:  Temp (24hrs), Av.6 °C (97.8 °F), Min:36.2 °C (97.2 °F), Max:37 °C (98.6 °F)  Temperature: 36.2 °C (97.2 °F)  Pulse  Av  Min: 59  Max: 90   Blood Pressure : (!) 184/88     Respiratory:    Respiration: (!) 33, Pulse Oximetry: 93 %     Work Of Breathing / Effort: Mild;Shallow  RUL Breath Sounds: Clear, RML Breath Sounds: Diminished, RLL Breath Sounds: Diminished, KADEN Breath Sounds: Clear;Diminished, LLL Breath Sounds: Diminished  Neuro:  GCS       Fluids:    Intake/Output Summary (Last 24 hours) at 2024 0916  Last data filed at 2024 0800  Gross per 24 hour   Intake 2488.46 ml   Output 400 ml   Net 2088.46 ml        Current Diet Order   Procedures    Diet NPO Restrict to: Sips with Medications     Physical Exam  Constitutional:       General: She is not in acute distress.     Appearance: She is ill-appearing.   Eyes:      General: No scleral icterus.     Extraocular Movements: Extraocular movements intact.      Pupils: Pupils are equal, round, and reactive to light.   Abdominal:      Palpations: Abdomen is soft.      Tenderness: There is no abdominal tenderness.   Neurological:      Mental Status: She is alert.       Labs:  Recent Results (from the past 24 hour(s))   EC-ECHOCARDIOGRAM COMPLETE W/O CONT    Collection Time: 08/15/24 10:49 AM   Result Value Ref Range    Left Ventrical Ejection Fraction 65    POCT arterial blood gas device results    Collection Time: 08/15/24  2:26 PM   Result Value Ref Range    Ph 7.600 (H) 7.400 - 7.500    Pco2 19.7 (L) 26.0 - 37.0 mmHg    Po2 46 (LL) 64 - 87 mmHg    Tco2 20 20 - 33 mmol/L    S02 90 (L) 93 - 99 %    Hco3 19.3 17.0 - 25.0 mmol/L    BE 0 -4 - 3 mmol/L    Body Temp see below degrees    Specimen Arterial     Geovanny Test Pass     DelSys  Other     LPM 15 lpm   POCT lactate device results    Collection Time: 08/15/24  2:26 PM   Result Value Ref Range    iStat Lactate 1.6 0.5 - 2.0 mmol/L   FLUID AMYLASE    Collection Time: 08/15/24  4:50 PM   Result Value Ref Range    Fluid Type Pleural     Body Fluid Amylase 3425 U/L   FLUID CULTURE W/GRAM STAIN    Collection Time: 08/15/24  4:50 PM    Specimen: Body Fluid   Result Value Ref Range    Significant Indicator NEG     Source BF     Site Pleural (LUQ Fluid)     Culture Result -     Gram Stain Result Rare WBCs.  No organisms seen.      GRAM STAIN    Collection Time: 08/15/24  4:50 PM    Specimen: Body Fluid   Result Value Ref Range    Significant Indicator .     Source BF     Site Pleural (LUQ Fluid)     Gram Stain Result Rare WBCs.  No organisms seen.      Heparin Xa (Unfractionated)    Collection Time: 08/15/24  5:45 PM   Result Value Ref Range    Heparin Xa (UFH) <0.10 IU/mL   CBC WITH DIFFERENTIAL    Collection Time: 08/15/24  7:15 PM   Result Value Ref Range    WBC 16.5 (H) 4.8 - 10.8 K/uL    RBC 3.52 (L) 4.20 - 5.40 M/uL    Hemoglobin 10.7 (L) 12.0 - 16.0 g/dL    Hematocrit 33.5 (L) 37.0 - 47.0 %    MCV 95.2 81.4 - 97.8 fL    MCH 30.4 27.0 - 33.0 pg    MCHC 31.9 (L) 32.2 - 35.5 g/dL    RDW 48.7 35.9 - 50.0 fL    Platelet Count 263 164 - 446 K/uL    MPV 10.3 9.0 - 12.9 fL    Neutrophils-Polys 87.00 (H) 44.00 - 72.00 %    Lymphocytes 4.30 (L) 22.00 - 41.00 %    Monocytes 5.00 0.00 - 13.40 %    Eosinophils 0.10 0.00 - 6.90 %    Basophils 0.30 0.00 - 1.80 %    Immature Granulocytes 3.30 (H) 0.00 - 0.90 %    Nucleated RBC 4.40 (H) 0.00 - 0.20 /100 WBC    Neutrophils (Absolute) 14.37 (H) 1.82 - 7.42 K/uL    Lymphs (Absolute) 0.71 (L) 1.00 - 4.80 K/uL    Monos (Absolute) 0.83 0.00 - 0.85 K/uL    Eos (Absolute) 0.01 0.00 - 0.51 K/uL    Baso (Absolute) 0.05 0.00 - 0.12 K/uL    Immature Granulocytes (abs) 0.54 (H) 0.00 - 0.11 K/uL    NRBC (Absolute) 0.72 K/uL   PHOSPHORUS    Collection Time: 08/16/24  1:00 AM    Result Value Ref Range    Phosphorus 2.8 2.5 - 4.5 mg/dL   Comp Metabolic Panel    Collection Time: 08/16/24  1:00 AM   Result Value Ref Range    Sodium 140 135 - 145 mmol/L    Potassium 3.5 (L) 3.6 - 5.5 mmol/L    Chloride 107 96 - 112 mmol/L    Co2 20 20 - 33 mmol/L    Anion Gap 13.0 7.0 - 16.0    Glucose 101 (H) 65 - 99 mg/dL    Bun 15 8 - 22 mg/dL    Creatinine 0.63 0.50 - 1.40 mg/dL    Calcium 6.6 (LL) 8.5 - 10.5 mg/dL    Correct Calcium 7.6 (L) 8.5 - 10.5 mg/dL    AST(SGOT) 31 12 - 45 U/L    ALT(SGPT) 50 2 - 50 U/L    Alkaline Phosphatase 66 30 - 99 U/L    Total Bilirubin 1.2 0.1 - 1.5 mg/dL    Albumin 2.7 (L) 3.2 - 4.9 g/dL    Total Protein 4.8 (L) 6.0 - 8.2 g/dL    Globulin 2.1 1.9 - 3.5 g/dL    A-G Ratio 1.3 g/dL   CBC WITHOUT DIFFERENTIAL    Collection Time: 08/16/24  1:00 AM   Result Value Ref Range    WBC 15.0 (H) 4.8 - 10.8 K/uL    RBC 3.25 (L) 4.20 - 5.40 M/uL    Hemoglobin 9.8 (L) 12.0 - 16.0 g/dL    Hematocrit 30.5 (L) 37.0 - 47.0 %    MCV 93.8 81.4 - 97.8 fL    MCH 30.2 27.0 - 33.0 pg    MCHC 32.1 (L) 32.2 - 35.5 g/dL    RDW 48.1 35.9 - 50.0 fL    Platelet Count 241 164 - 446 K/uL    MPV 10.3 9.0 - 12.9 fL   MAGNESIUM    Collection Time: 08/16/24  1:00 AM   Result Value Ref Range    Magnesium 1.9 1.5 - 2.5 mg/dL   Heparin Xa (Unfractionated)    Collection Time: 08/16/24  1:00 AM   Result Value Ref Range    Heparin Xa (UFH) >1.10 (HH) IU/mL   ESTIMATED GFR    Collection Time: 08/16/24  1:00 AM   Result Value Ref Range    GFR (CKD-EPI) 91 >60 mL/min/1.73 m 2   IONIZED CALCIUM    Collection Time: 08/16/24  3:15 AM   Result Value Ref Range    Ionized Calcium 0.9 (L) 1.1 - 1.3 mmol/L   TSH WITH REFLEX TO FT4    Collection Time: 08/16/24  3:15 AM   Result Value Ref Range    TSH 4.570 0.380 - 5.330 uIU/mL   EKG    Collection Time: 08/16/24  4:33 AM   Result Value Ref Range    Report       [facility]    Test Date:  2024-08-16  Pt Name:    ROLANDO VERDIN           Department: 161  MRN:         [id number]                      Room:       T618  Gender:     Female                       Technician: HANS  :        [id number]                   Requested By:BERNARD FLOWER JR  Order #:    [id number]                    Reading MD:    Measurements  Intervals                                Axis  Rate:       46                           P:          0  MS:         297                          QRS:        39  QRSD:       101                          T:          -19  QT:         463  QTc:        405    Interpretive Statements  Second degree AV block, Mobitz II  Atrial premature complexes  Low voltage, precordial leads  Abnormal R-wave progression, late transition  Nonspecific T abnormalities, anterior leads  Compared to ECG 08/15/2024 08:00:00  Second-degree AV block, Mobitz type I (Wenckebach) now present  Atrial premature complex(es) now present  T-wave abnormality now presen t  Ectopic atrial rhythm no longer present  First degree AV block no longer present  Prolonged QT interval no longer present       Medical Decision Making, by Problem:  Active Hospital Problems    Diagnosis     Cancer of overlapping sites of left breast (HCC) [C50.812]      Priority: High    DVT (deep venous thrombosis) ([facility]) [I82.409]     Atrial thrombus [I51.3]     Pulmonary embolism with acute cor pulmonale, unspecified chronicity, unspecified pulmonary embolism type (HCC) [I26.09]     Sepsis (HCC) [A41.9]     Intra-abdominal fluid collection [R18.8]     Acute respiratory failure with hypoxia (HCC) [J96.01]     Acquired hypothyroidism [E03.9]     Advance care planning [Z71.89]      Plan:  Patient with pulmonary embolus and drainage of left upper quadrant fluid collection.    Continue current care.    Would likely reimage abdomen in 7 to 10 days to rule out recollection of fluid.  If that were to occur the patient would need a drain.    We will follow        Discussed patient condition with Patient

## 2024-08-16 NOTE — PROGRESS NOTES
4 Eyes Skin Assessment Completed by NURIS Benjamin and NURIS Warner.    Head WDL  Ears Redness and Non-Blanching  Nose Redness and Blanching  Mouth WDL  Neck WDL  Breast/Chest WDL  Shoulder Blades Redness and Blanching  Spine Redness and Blanching  (R) Arm/Elbow/Hand Redness and Blanching  (L) Arm/Elbow/Hand Redness and Blanching  Abdomen Incision  Groin Redness and Blanching  Scrotum/Coccyx/Buttocks Redness and Blanching  (R) Leg Blanching, redness  (L) Leg Blanching  (R) Heel/Foot/Toe Redness, Blanching, and Boggy  (L) Heel/Foot/Toe Redness, Blanching, and Boggy          Devices In Places ECG, Tele Box, Blood Pressure Cuff, Pulse Ox, and Oxy Mask      Interventions In Place Pillows, Q2 Turns, Low Air Loss Mattress, and Heels Loaded W/Pillows    Possible Skin Injury No    Pictures Uploaded Into Epic Yes  Wound Consult Placed Yes  RN Wound Prevention Protocol Ordered Yes

## 2024-08-16 NOTE — CARE PLAN
The patient is Watcher - Medium risk of patient condition declining or worsening    Shift Goals  Clinical Goals: wean oxygen  Patient Goals: sleep  Family Goals: HAYDEE    Progress made toward(s) clinical / shift goals:    Problem: Knowledge Deficit - Standard  Goal: Patient and family/care givers will demonstrate understanding of plan of care, disease process/condition, diagnostic tests and medications  Outcome: Progressing     Problem: Hemodynamics  Goal: Patient's hemodynamics, fluid balance and neurologic status will be stable or improve  Outcome: Progressing  Note: When rounding on the patient they have had a stable heart rate, oxygen saturation, and systolic blood pressure below 180. Able to wean the patient's supplemental oxygen      Problem: Fluid Volume  Goal: Fluid volume balance will be maintained  Outcome: Progressing  Note: Patient is following NPO order. Patient is producing urine.      Problem: Respiratory  Goal: Patient will achieve/maintain optimum respiratory ventilation and gas exchange  Outcome: Progressing  Note: We have been able to wean the patient's supplemental oxygen. Patient is currently on heated high flow nasal canula at 30L and 50%.      Problem: Pain - Standard  Goal: Alleviation of pain or a reduction in pain to the patient’s comfort goal  Outcome: Progressing  Note: Patient has not reported any pain, on a 0 to 10 pain scale, since the beginning of the shift at 1900.

## 2024-08-16 NOTE — PROGRESS NOTES
Heparin gtt restarted s/p IR procedure and off heparin gtt for multiple hours.  Per MD Jasmine, recollect Xa and restart at previous gtt rate. Based on Xa result, will treat as continuation of protocol and not restarting protocol.  Based on 1745 Xa result of <0.10, patient to receive heparin bolus dose as well as increase in heparin gtt rate.

## 2024-08-16 NOTE — OR SURGEON
Immediate Post- Operative Note        PostOp Diagnosis: LUQ/SUBPHRENIC FLUID COLLECTION AT FORMER SPLENIC FOSSA      Procedure(s): CT GUIDED CATHETER NEEDLE ASPIRATION DRAINAGE  LUQ QUADRANT    EVACUATION: 400 ML BROWN WATERY FLUID RESEMBLING PANCREATIC PSEUDOCYST FLUID    SENT FOR: C+S, GRAM, AMYLASE    FINDINGS: NEEDLE CONFIRMED. COLLECTION NEARLY COLLAPSED POST ASPIRATION/DRAINAGE      Estimated Blood Loss: <1 CC        Complications: NONE        8/15/2024     5:31 PM     Jesus Ruff M.D.

## 2024-08-16 NOTE — ASSESSMENT & PLAN NOTE
During the night of 8/15 into 8/16, patient was noted to have occasional Mobitz type II.  Patient was asymptomatic during these episodes.  This is likely secondary to PE. Otherwise, she is mostly in sinus rhythm.  Patient appliance in place.  Remained stable on telemetry.  Continue to monitor closely on telemetry.  If no improvement, consult cardiology.  Continue to monitor

## 2024-08-16 NOTE — PROGRESS NOTES
Xa drawn following pt's return to unit from IR1 and following placement of new IV (off schedule due to procedure).

## 2024-08-16 NOTE — PROGRESS NOTES
Pt presents to IR 1. Mrs. Gudino was consented by MD at bedside, confirmed by this RN and consent at bedside. Pt transferred to IR 1 table in supine position. Patient underwent a Right Atrial Thrombectomy by Dr. Bowen. Procedure site was marked by MD and verified using imaging guidance. Pt placed on monitor, prepped and draped in a sterile fashion. Vitals were taken every 5 minutes and remained stable during procedure (see doc flow sheet for results). CO2 waveform capnography was monitored and remained WNL throughout procedure. Report called to NURIS Kinsey. Pt transported by stretcher with RN to CICU.     Specimen: 1 right atrial mass sample in normal saline & 1 right atrial mass sample in formalin hand delivered to lab.

## 2024-08-17 PROBLEM — I71.21 THORACIC ASCENDING AORTIC ANEURYSM (HCC): Status: ACTIVE | Noted: 2024-08-17

## 2024-08-17 LAB
ALBUMIN SERPL BCP-MCNC: 2.6 G/DL (ref 3.2–4.9)
ALBUMIN/GLOB SERPL: 1 G/DL
ALP SERPL-CCNC: 73 U/L (ref 30–99)
ALT SERPL-CCNC: 54 U/L (ref 2–50)
ANION GAP SERPL CALC-SCNC: 8 MMOL/L (ref 7–16)
AST SERPL-CCNC: 33 U/L (ref 12–45)
BASOPHILS # BLD AUTO: 0.1 % (ref 0–1.8)
BASOPHILS # BLD: 0.01 K/UL (ref 0–0.12)
BILIRUB SERPL-MCNC: 1 MG/DL (ref 0.1–1.5)
BUN SERPL-MCNC: 17 MG/DL (ref 8–22)
CALCIUM ALBUM COR SERPL-MCNC: 8.6 MG/DL (ref 8.5–10.5)
CALCIUM SERPL-MCNC: 7.5 MG/DL (ref 8.5–10.5)
CHLORIDE SERPL-SCNC: 107 MMOL/L (ref 96–112)
CO2 SERPL-SCNC: 21 MMOL/L (ref 20–33)
CREAT SERPL-MCNC: 0.69 MG/DL (ref 0.5–1.4)
EKG IMPRESSION: NORMAL
EOSINOPHIL # BLD AUTO: 0.02 K/UL (ref 0–0.51)
EOSINOPHIL NFR BLD: 0.1 % (ref 0–6.9)
ERYTHROCYTE [DISTWIDTH] IN BLOOD BY AUTOMATED COUNT: 47.9 FL (ref 35.9–50)
GFR SERPLBLD CREATININE-BSD FMLA CKD-EPI: 89 ML/MIN/1.73 M 2
GLOBULIN SER CALC-MCNC: 2.5 G/DL (ref 1.9–3.5)
GLUCOSE SERPL-MCNC: 100 MG/DL (ref 65–99)
HCT VFR BLD AUTO: 32.7 % (ref 37–47)
HGB BLD-MCNC: 10.4 G/DL (ref 12–16)
IMM GRANULOCYTES # BLD AUTO: 0.3 K/UL (ref 0–0.11)
IMM GRANULOCYTES NFR BLD AUTO: 2.2 % (ref 0–0.9)
LYMPHOCYTES # BLD AUTO: 1 K/UL (ref 1–4.8)
LYMPHOCYTES NFR BLD: 7.3 % (ref 22–41)
MAGNESIUM SERPL-MCNC: 1.9 MG/DL (ref 1.5–2.5)
MCH RBC QN AUTO: 29.8 PG (ref 27–33)
MCHC RBC AUTO-ENTMCNC: 31.8 G/DL (ref 32.2–35.5)
MCV RBC AUTO: 93.7 FL (ref 81.4–97.8)
MONOCYTES # BLD AUTO: 0.75 K/UL (ref 0–0.85)
MONOCYTES NFR BLD AUTO: 5.5 % (ref 0–13.4)
NEUTROPHILS # BLD AUTO: 11.66 K/UL (ref 1.82–7.42)
NEUTROPHILS NFR BLD: 84.8 % (ref 44–72)
NRBC # BLD AUTO: 0.3 K/UL
NRBC BLD-RTO: 2.2 /100 WBC (ref 0–0.2)
PHOSPHATE SERPL-MCNC: 3.1 MG/DL (ref 2.5–4.5)
PLATELET # BLD AUTO: 324 K/UL (ref 164–446)
PMV BLD AUTO: 10.2 FL (ref 9–12.9)
POTASSIUM SERPL-SCNC: 3.6 MMOL/L (ref 3.6–5.5)
PROT SERPL-MCNC: 5.1 G/DL (ref 6–8.2)
RBC # BLD AUTO: 3.49 M/UL (ref 4.2–5.4)
SODIUM SERPL-SCNC: 136 MMOL/L (ref 135–145)
UFH PPP CHRO-ACNC: 0.29 IU/ML
UFH PPP CHRO-ACNC: 0.5 IU/ML
UFH PPP CHRO-ACNC: 0.73 IU/ML
WBC # BLD AUTO: 13.7 K/UL (ref 4.8–10.8)

## 2024-08-17 PROCEDURE — 99233 SBSQ HOSP IP/OBS HIGH 50: CPT | Performed by: INTERNAL MEDICINE

## 2024-08-17 PROCEDURE — A9270 NON-COVERED ITEM OR SERVICE: HCPCS | Performed by: INTERNAL MEDICINE

## 2024-08-17 PROCEDURE — 700102 HCHG RX REV CODE 250 W/ 637 OVERRIDE(OP): Performed by: INTERNAL MEDICINE

## 2024-08-17 PROCEDURE — 85025 COMPLETE CBC W/AUTO DIFF WBC: CPT

## 2024-08-17 PROCEDURE — 770000 HCHG ROOM/CARE - INTERMEDIATE ICU *

## 2024-08-17 PROCEDURE — 85520 HEPARIN ASSAY: CPT

## 2024-08-17 PROCEDURE — 700105 HCHG RX REV CODE 258: Performed by: INTERNAL MEDICINE

## 2024-08-17 PROCEDURE — 80053 COMPREHEN METABOLIC PANEL: CPT

## 2024-08-17 PROCEDURE — 93005 ELECTROCARDIOGRAM TRACING: CPT | Performed by: INTERNAL MEDICINE

## 2024-08-17 PROCEDURE — 84100 ASSAY OF PHOSPHORUS: CPT

## 2024-08-17 PROCEDURE — 700111 HCHG RX REV CODE 636 W/ 250 OVERRIDE (IP): Performed by: INTERNAL MEDICINE

## 2024-08-17 PROCEDURE — 83735 ASSAY OF MAGNESIUM: CPT

## 2024-08-17 RX ORDER — CARBOXYMETHYLCELLULOSE SODIUM 5 MG/ML
2 SOLUTION/ DROPS OPHTHALMIC PRN
Status: DISCONTINUED | OUTPATIENT
Start: 2024-08-17 | End: 2024-08-23 | Stop reason: HOSPADM

## 2024-08-17 RX ORDER — AMLODIPINE BESYLATE 5 MG/1
5 TABLET ORAL
Status: DISCONTINUED | OUTPATIENT
Start: 2024-08-17 | End: 2024-08-23 | Stop reason: HOSPADM

## 2024-08-17 RX ADMIN — HEPARIN SODIUM 2800 UNITS: 1000 INJECTION, SOLUTION INTRAVENOUS; SUBCUTANEOUS at 11:51

## 2024-08-17 RX ADMIN — HEPARIN SODIUM 20 UNITS/KG/HR: 5000 INJECTION, SOLUTION INTRAVENOUS at 14:11

## 2024-08-17 RX ADMIN — PIPERACILLIN AND TAZOBACTAM 4.5 G: 4; .5 INJECTION, POWDER, FOR SOLUTION INTRAVENOUS at 13:25

## 2024-08-17 RX ADMIN — LEVOTHYROXINE SODIUM 100 MCG: 0.1 TABLET ORAL at 05:58

## 2024-08-17 RX ADMIN — LIOTHYRONINE SODIUM 2.5 MCG: 5 TABLET ORAL at 05:58

## 2024-08-17 RX ADMIN — PIPERACILLIN AND TAZOBACTAM 4.5 G: 4; .5 INJECTION, POWDER, FOR SOLUTION INTRAVENOUS at 04:51

## 2024-08-17 RX ADMIN — CARBOXYMETHYLCELLULOSE SODIUM 2 DROP: 0.5 SOLUTION/ DROPS OPHTHALMIC at 19:39

## 2024-08-17 RX ADMIN — PIPERACILLIN AND TAZOBACTAM 4.5 G: 4; .5 INJECTION, POWDER, FOR SOLUTION INTRAVENOUS at 21:07

## 2024-08-17 RX ADMIN — AMLODIPINE BESYLATE 5 MG: 10 TABLET ORAL at 08:28

## 2024-08-17 ASSESSMENT — PATIENT HEALTH QUESTIONNAIRE - PHQ9
SUM OF ALL RESPONSES TO PHQ9 QUESTIONS 1 AND 2: 0
1. LITTLE INTEREST OR PLEASURE IN DOING THINGS: NOT AT ALL
2. FEELING DOWN, DEPRESSED, IRRITABLE, OR HOPELESS: NOT AT ALL

## 2024-08-17 ASSESSMENT — ENCOUNTER SYMPTOMS
EYE PAIN: 0
COUGH: 0
WEIGHT LOSS: 0
DIARRHEA: 0
HALLUCINATIONS: 0
HEADACHES: 0
ORTHOPNEA: 0
SPEECH CHANGE: 0
CHILLS: 0
DIZZINESS: 0
WEAKNESS: 1
FEVER: 0
SHORTNESS OF BREATH: 1
SHORTNESS OF BREATH: 0
BACK PAIN: 0
FOCAL WEAKNESS: 0
BLURRED VISION: 0
VOMITING: 0
NECK PAIN: 0
NAUSEA: 0
TINGLING: 0
STRIDOR: 0
TREMORS: 0
PHOTOPHOBIA: 0
ABDOMINAL PAIN: 0
MYALGIAS: 0
SPUTUM PRODUCTION: 0
DOUBLE VISION: 0
CONSTIPATION: 0
SENSORY CHANGE: 0
PALPITATIONS: 0

## 2024-08-17 ASSESSMENT — COGNITIVE AND FUNCTIONAL STATUS - GENERAL
MOVING TO AND FROM BED TO CHAIR: A LITTLE
SUGGESTED CMS G CODE MODIFIER MOBILITY: CJ
HELP NEEDED FOR BATHING: A LITTLE
CLIMB 3 TO 5 STEPS WITH RAILING: A LITTLE
MOBILITY SCORE: 20
DRESSING REGULAR LOWER BODY CLOTHING: A LITTLE
WALKING IN HOSPITAL ROOM: A LITTLE
STANDING UP FROM CHAIR USING ARMS: A LITTLE

## 2024-08-17 ASSESSMENT — PAIN DESCRIPTION - PAIN TYPE
TYPE: ACUTE PAIN

## 2024-08-17 ASSESSMENT — LIFESTYLE VARIABLES: SUBSTANCE_ABUSE: 0

## 2024-08-17 ASSESSMENT — FIBROSIS 4 INDEX: FIB4 SCORE: 1.07

## 2024-08-17 NOTE — PROGRESS NOTES
4 Eyes Skin Assessment Completed by Tio BOYD RN and Kassi QUINTANILLA RN.    Head WDL  Ears Redness and Blanching L ear    Nose WDL  Mouth WDL  Neck WDL  Breast/Chest WDL  Shoulder Blades WDL  Spine WDL  (R) Arm/Elbow/Hand Redness, Blanching, and Bruising  (L) Arm/Elbow/Hand Redness, Blanching, and Bruising  Abdomen Redness and Incision  Groin WDL  Scrotum/Coccyx/Buttocks Redness, skin tear L buttock   (R) Leg WDL  (L) Leg WDL  (R) Heel/Foot/Toe Redness, Blanching, and Discoloration, heel, bunion  (L) Heel/Foot/Toe Redness and Blanching, heel          Devices In Places Tele Box, Blood Pressure Cuff, Pulse Ox, and Nasal Cannula      Interventions In Place NC W/Ear Foams, Chair Waffle, Pillows, Low Air Loss Mattress, Heels Loaded W/Pillows, and Pressure Redistribution Mattress    Possible Skin Injury Yes, non-blanching redness behind L ear    Pictures Uploaded Into Epic Yes  Wound Consult Placed N/A  RN Wound Prevention Protocol Ordered No , previously ordered

## 2024-08-17 NOTE — PROGRESS NOTES
Radiology Progress Note   Author: SOUMYA Eason Date & Time created: 8/17/2024  2:34 PM   Date of admission  8/14/2024  Note to reader: this note follows the APSO format rather than the historical SOAP format. Assessment and plan located at the top of the note for ease of use.    Chief Complaint  78 y.o. female admitted 8/14/2024 with   Chief Complaint   Patient presents with    Shortness of Breath     Brought in by remsa from home. Patient c/o Shortness of breath and weakness x 5 days. Had splenectomy 5th of Aug and DC'd on the 9 th.EMS arrival patient oxygen saturation 76 % RA.         HPI  Patient is a 78 y.o. female with pmh of breast cancer, hypercholesterolemia, hypothyroidism, Pancreatic mass who presented 8/14/2024 with shortness of breath. Patient underwent a pancreaticosplenectomy on 8/5/2024 with Dr. Espinoza and was discharged on 8/9/2024 on supplemental oxygen. Since discharge patient has become increasingly fatigued, lethargic and short of breath. In the ER a CT scan of her chest, abdomen and pelvis was completed demonstrating multiple segmental and subsegmental PEs with an RV LV ratio of 1.83 and a mildly elevated troponin. She was started on a heparin infusion. In addition to the findings in her chest CT she was also found to have a 10.4 x 10.8 cm fluid collection in the left upper quadrant within the splenic bed and had significant leukocytosis. She was started on antibiotics for this and a IR drain has been requested. Her echocardiogram showed a very large thrombus in transit in the RA in addition to RV dilation and reduced systolic function. IR was consulted for this as well. IR Dr. Bowen performed right atrial thrombectomy on 8/16/24.    Interval History:   8/17/24 - Today the patient had a lower extremity venous Doppler which showed acute DVTs in both posterior tibial, peroneal and gastrocnemius veins. Flowstasis removed by hospital nursing staff overnight. Right groin access  site soft, non-tender, without ecchymosis; dressing cdi. Labs I reviewed: H/H 10.4/32.7, WBC 13.7, Heparin Xa 0.29. Discussed care with patient, hospital nursing staff, IR Dr. Bowen, and I reviewed IDT notes.     Assessment/Plan     Principal Problem:    Pulmonary embolism with acute cor pulmonale, unspecified chronicity, unspecified pulmonary embolism type (HCC)  Active Problems:    Acquired hypothyroidism    Advance care planning    Cancer of overlapping sites of left breast (HCC)    Sepsis (HCC)    Intra-abdominal fluid collection    Acute respiratory failure with hypoxia (HCC)    DVT (deep venous thrombosis) (HCC)    Atrial thrombus    Second degree heart block      Plan IR  - S/P right atrial thrombectomy.  - Post access site instructions: no lifting greater than 5 lbs and no baths/swimming/soaking in tub for 5 days. Shower OK. OK to change dressing in 48 hrs or if soiled/wet.  - EZ signing off.  -  -Thank you for allowing Interventional Radiology team to participate in the patients care, if any additional care or requests are needed in the future please do not hesitate to call or place IR order.        Review of Systems  Physical Exam   Review of Systems   Constitutional:  Negative for chills, fever and malaise/fatigue.   Respiratory:  Positive for shortness of breath (improved since R atrial thrombectomy.). Negative for cough.    Cardiovascular:  Positive for leg swelling. Negative for chest pain.   Gastrointestinal:  Negative for abdominal pain, nausea and vomiting.      Vitals:    08/17/24 1400   BP: (!) 160/75   Pulse: 62   Resp:    Temp:    SpO2: 94%        Physical Exam  Vitals and nursing note reviewed.   Constitutional:       General: She is not in acute distress.     Appearance: She is ill-appearing.   Cardiovascular:      Rate and Rhythm: Normal rate.      Comments: Right groin access site soft, non-tender, without ecchymosis; dressing cdi.    Pulmonary:      Effort: Pulmonary effort is normal. No  respiratory distress.   Abdominal:      Palpations: Abdomen is soft.      Tenderness: There is no abdominal tenderness.   Musculoskeletal:      Right lower leg: Edema present.      Left lower leg: Edema present.   Skin:     General: Skin is warm and dry.   Neurological:      General: No focal deficit present.      Mental Status: She is alert and oriented to person, place, and time.   Psychiatric:         Mood and Affect: Mood normal.         Behavior: Behavior normal.          Labs    Recent Labs     08/15/24  1915 08/16/24 0100 08/17/24  0445   WBC 16.5* 15.0* 13.7*   RBC 3.52* 3.25* 3.49*   HEMOGLOBIN 10.7* 9.8* 10.4*   HEMATOCRIT 33.5* 30.5* 32.7*   MCV 95.2 93.8 93.7   MCH 30.4 30.2 29.8   MCHC 31.9* 32.1* 31.8*   RDW 48.7 48.1 47.9   PLATELETCT 263 241 324   MPV 10.3 10.3 10.2     Recent Labs     08/15/24  0330 08/16/24  0100 08/17/24  0445   SODIUM 140 140 136   POTASSIUM 3.6 3.5* 3.6   CHLORIDE 105 107 107   CO2 22 20 21   GLUCOSE 125* 101* 100*   BUN 17 15 17   CREATININE 0.72 0.63 0.69   CALCIUM 7.3* 6.6* 7.5*     Recent Labs     08/15/24  0330 08/16/24  0100 08/17/24  0445   ALBUMIN 3.0* 2.7* 2.6*   TBILIRUBIN 1.7* 1.2 1.0   ALKPHOSPHAT 84 66 73   TOTPROTEIN 5.8* 4.8* 5.1*   ALTSGPT 60* 50 54*   ASTSGOT 40 31 33   CREATININE 0.72 0.63 0.69     EC-ECHOCARDIOGRAM LTD W/ CONT   Final Result      IR-THROMBO MECHANICAL ARTERY,INIT   Final Result      1.  Ultrasound guided access bilateral common femoral veins.   2.  Intracardiac echocardiogram demonstrating mobile right atrial thrombus.   3.  Technically successful right atrial thrombectomy.         CT-CYST ASPIRATION-MISC   Final Result      1.  CT GUIDED RETROPERITONEAL LEFT UPPER QUADRANT NEEDLE ASPIRATION DRAINAGE. THE OBTAINED BROWN WATERY FLUID RESEMBLES PANCREATIC PSEUDOCYST FLUID. THE FLUID WAS NOT PURULENT NOR MALODOROUS.   2.  THE CURRENT PLAN IS TO CHECK CULTURE AND LAB RESULTS.      DX-CHEST-PORTABLE (1 VIEW)   Final Result      1. Stable small  "left pleural effusion with left mid lung and lung base parenchymal opacities.   2. The remainder is stable.      EC-ECHOCARDIOGRAM COMPLETE W/O CONT   Final Result      US-EXTREMITY VENOUS LOWER BILAT   Final Result      CT-ABDOMEN-PELVIS WITH   Final Result         1.  Postop changes of splenectomy and distal pancreatectomy, fluid collection left upper quadrant is seen which could represent postop seroma or possibly pancreatic leak.   2.  Right lower lobe segmental and subsegmental pulmonary embolus.   3.  Trace right and small left pleural effusions.   4.  Linear densities of the lung bases suggesting atelectasis, component of left lower lobe infiltrate not excluded.   5.  Diverticulosis   6.  Cholelithiasis   7.  Cardiomegaly      These findings were discussed with the patient's clinician, Teri Lee, on 8/14/2024 9:53 PM.      CT-CTA CHEST PULMONARY ARTERY W/ RECONS   Final Result   .      1.  Bilateral segmental and subsegmental pulmonary emboli with changes compatible with significant right heart strain.   2.  Small left and trace right pleural effusions.   3.  Linear consolidations in the bilateral lung bases suggests atelectasis, component of left lower lobe infiltrate is not excluded.   4.  4.0 cm ascending thoracic aortic aneurysm, radiographic follow-up and surveillance recommended as clinically appropriate.   5.  Atherosclerosis and atherosclerotic coronary artery disease      These findings were discussed with the patient's clinician, Teri Lee, on 8/14/2024 10:05 PM.      DX-CHEST-PORTABLE (1 VIEW)   Final Result         1.  Left midlung and lower lobe infiltrates   2.  Trace left pleural effusion        INR   Date Value Ref Range Status   08/14/2024 1.21 (H) 0.87 - 1.13 Final     Comment:     INR - Non-therapeutic Reference Range: 0.87-1.13  INR - Therapeutic Reference Range: 2.0-4.0       No results found for: \"POCINR\"     Intake/Output Summary (Last 24 hours) at 8/17/2024 1434  Last " data filed at 8/17/2024 0600  Gross per 24 hour   Intake 248.27 ml   Output 250 ml   Net -1.73 ml      I have personally reviewed the above labs and imaging      I have performed a physical exam and reviewed and updated ROS and Plan today (8/17/2024).     50 minutes in directly providing and coordinating care and extensive data review.  No time overlap and excludes procedures.

## 2024-08-17 NOTE — PROGRESS NOTES
Hospital Medicine Daily Progress Note    Date of Service  8/17/2024    Chief Complaint  Paige Gudino is a 78 y.o. female admitted 8/14/2024 with shortness of breath    Hospital Course    Paige Gudino is a 78 y.o. female with past medical history of left-sided breast cancer status post mastectomy on tamoxifen, hypothyroidism, recent pancreato/splenectomy on 8/5 by Dr. Espinoza for large pancreatic cystic mass that is benign serous cystadenoma on pathology (Discharged on 8/9), who presented 8/14/2024 with complaints of shortness of breath and generalized weakness for 5 days, decreased oral intake, chills, cough.  Denies chest pain, any significant abdominal pain, diarrhea or constipation.  She presented desaturating to 76% on room air, was placed on 5 L nasal cannula.  Respiratory rate 30, heart rate 90, blood pressure stable, temperature 99.1.  EKG: Nonspecific T wave inversion in lateral leads.  Chest x-ray showed left lower lobe and middle lobe infiltrate.  CTA showed bilateral segmental subsegmental pulmonary embolism with signs of RV strain.  WBC 19.9, lactic acid 2.5, troponin 70, BNP 8591, lipase 111.  CT of the abdomen and pelvis with contrast, postop changes with postop seroma or possibly pancreatic leak.  Patient was given Zosyn, vancomycin, sepsis bolus 2 L with Ringer lactate, and started on IV heparin.    She admitted to IMCU for close monitoring and diagnosis of hypoxia secondary to pulmonary embolism.    Patient transferred to ICU on August 15, 2024 and she underwent right atrial thrombectomy.    Interval Problem Update    08/17/24    I evaluated and examined her at the bedside.  She reported that she is feeling significantly better and denies complaint of shortness of breath and severe pain.  I discussed plan of care with the and her daughter at the bedside and answered all of their questions been  I am continuing heparin infusion and I am titrating heparin as per Anti Xa  levels and monitor for signs of bleeding.      I have discussed this patient's plan of care and discharge plan at IDT rounds today with Case Management, Nursing, Nursing leadership, and other members of the IDT team.    Consultants/Specialty  cardiology, critical care, and pulmonary    Code Status  Full Code    Disposition  The patient is not medically cleared for discharge to home or a post-acute facility.      I have placed the appropriate orders for post-discharge needs.    Review of Systems  Review of Systems   Constitutional:  Positive for malaise/fatigue. Negative for chills, fever and weight loss.   HENT:  Negative for hearing loss and tinnitus.    Eyes:  Negative for blurred vision, double vision, photophobia and pain.   Respiratory:  Negative for cough, sputum production and shortness of breath.    Cardiovascular:  Negative for chest pain, palpitations, orthopnea and leg swelling.   Gastrointestinal:  Negative for abdominal pain, constipation, diarrhea, nausea and vomiting.   Genitourinary:  Negative for dysuria, frequency and urgency.   Musculoskeletal:  Negative for back pain, joint pain, myalgias and neck pain.   Skin:  Negative for rash.   Neurological:  Positive for weakness. Negative for dizziness, tingling, tremors, sensory change, speech change, focal weakness and headaches.   Psychiatric/Behavioral:  Negative for hallucinations and substance abuse.    All other systems reviewed and are negative.       Physical Exam  Temp:  [36.2 °C (97.1 °F)] 36.2 °C (97.1 °F)  Pulse:  [53-96] 62  Resp:  [19-32] 30  BP: (125-197)/(58-85) 157/71  SpO2:  [86 %-95 %] 92 %    Physical Exam  Vitals reviewed.   Constitutional:       General: She is not in acute distress.     Appearance: Normal appearance. She is ill-appearing.   HENT:      Head: Normocephalic and atraumatic.      Nose: No congestion.      Comments: Oxygen nasal cannula  Eyes:      General:         Right eye: No discharge.         Left eye: No discharge.       Pupils: Pupils are equal, round, and reactive to light.   Cardiovascular:      Rate and Rhythm: Normal rate and regular rhythm.      Pulses: Normal pulses.      Heart sounds: Normal heart sounds. No murmur heard.  Pulmonary:      Effort: Respiratory distress present.      Breath sounds: Normal breath sounds. No stridor.   Abdominal:      General: Bowel sounds are normal. There is no distension.      Palpations: Abdomen is soft.      Tenderness: There is no abdominal tenderness.   Musculoskeletal:         General: No swelling or tenderness. Normal range of motion.      Cervical back: Normal range of motion. No rigidity.   Skin:     General: Skin is warm.      Capillary Refill: Capillary refill takes less than 2 seconds.      Coloration: Skin is not jaundiced or pale.      Findings: No bruising.   Neurological:      General: No focal deficit present.      Mental Status: She is alert and oriented to person, place, and time.      Cranial Nerves: No cranial nerve deficit.   Psychiatric:         Mood and Affect: Mood normal.         Behavior: Behavior normal.         Fluids    Intake/Output Summary (Last 24 hours) at 8/17/2024 1530  Last data filed at 8/17/2024 1500  Gross per 24 hour   Intake 1491.92 ml   Output 250 ml   Net 1241.92 ml       Laboratory  Recent Labs     08/15/24  1915 08/16/24  0100 08/17/24  0445   WBC 16.5* 15.0* 13.7*   RBC 3.52* 3.25* 3.49*   HEMOGLOBIN 10.7* 9.8* 10.4*   HEMATOCRIT 33.5* 30.5* 32.7*   MCV 95.2 93.8 93.7   MCH 30.4 30.2 29.8   MCHC 31.9* 32.1* 31.8*   RDW 48.7 48.1 47.9   PLATELETCT 263 241 324   MPV 10.3 10.3 10.2     Recent Labs     08/15/24  0330 08/16/24  0100 08/17/24  0445   SODIUM 140 140 136   POTASSIUM 3.6 3.5* 3.6   CHLORIDE 105 107 107   CO2 22 20 21   GLUCOSE 125* 101* 100*   BUN 17 15 17   CREATININE 0.72 0.63 0.69   CALCIUM 7.3* 6.6* 7.5*     Recent Labs     08/14/24 2050   APTT 22.3*   INR 1.21*               Imaging  EC-ECHOCARDIOGRAM LTD W/ CONT   Final Result       IR-THROMBO MECHANICAL ARTERY,INIT   Final Result      1.  Ultrasound guided access bilateral common femoral veins.   2.  Intracardiac echocardiogram demonstrating mobile right atrial thrombus.   3.  Technically successful right atrial thrombectomy.         CT-CYST ASPIRATION-MISC   Final Result      1.  CT GUIDED RETROPERITONEAL LEFT UPPER QUADRANT NEEDLE ASPIRATION DRAINAGE. THE OBTAINED BROWN WATERY FLUID RESEMBLES PANCREATIC PSEUDOCYST FLUID. THE FLUID WAS NOT PURULENT NOR MALODOROUS.   2.  THE CURRENT PLAN IS TO CHECK CULTURE AND LAB RESULTS.      DX-CHEST-PORTABLE (1 VIEW)   Final Result      1. Stable small left pleural effusion with left mid lung and lung base parenchymal opacities.   2. The remainder is stable.      EC-ECHOCARDIOGRAM COMPLETE W/O CONT   Final Result      US-EXTREMITY VENOUS LOWER BILAT   Final Result      CT-ABDOMEN-PELVIS WITH   Final Result         1.  Postop changes of splenectomy and distal pancreatectomy, fluid collection left upper quadrant is seen which could represent postop seroma or possibly pancreatic leak.   2.  Right lower lobe segmental and subsegmental pulmonary embolus.   3.  Trace right and small left pleural effusions.   4.  Linear densities of the lung bases suggesting atelectasis, component of left lower lobe infiltrate not excluded.   5.  Diverticulosis   6.  Cholelithiasis   7.  Cardiomegaly      These findings were discussed with the patient's clinician, Teri Lee, on 8/14/2024 9:53 PM.      CT-CTA CHEST PULMONARY ARTERY W/ RECONS   Final Result   .      1.  Bilateral segmental and subsegmental pulmonary emboli with changes compatible with significant right heart strain.   2.  Small left and trace right pleural effusions.   3.  Linear consolidations in the bilateral lung bases suggests atelectasis, component of left lower lobe infiltrate is not excluded.   4.  4.0 cm ascending thoracic aortic aneurysm, radiographic follow-up and surveillance recommended as  clinically appropriate.   5.  Atherosclerosis and atherosclerotic coronary artery disease      These findings were discussed with the patient's clinician, Teri Lee, on 8/14/2024 10:05 PM.      DX-CHEST-PORTABLE (1 VIEW)   Final Result         1.  Left midlung and lower lobe infiltrates   2.  Trace left pleural effusion           Assessment/Plan  * Pulmonary embolism with acute cor pulmonale, unspecified chronicity, unspecified pulmonary embolism type (HCC)- (present on admission)  Assessment & Plan  Submassive PE with DVTs and thrombus in transit  Difficult treatment with limited options.  Per IR, unable to complete suction thrombectomy given risk for propagation.  With intra-abdominal fluid collection of unknown etiology systemic tPA is a high risk and recent surgery given recent surgery.  Profoundly high mortality with PESI 147, TANMAY Stage IV  I am continuing heparin gtt. and I am titrating as per Anti Xa levels and monitor for signs of bleeding.      Thoracic ascending aortic aneurysm (HCC)  Assessment & Plan  On imaging studies patient found to have 4.0 cm ascending thoracic aortic aneurysm and she will need outpatient follow-up.    Second degree heart block- (present on admission)  Assessment & Plan  During the night of 8/15 into 8/16, patient was noted to have occasional Mobitz type II.  Patient was asymptomatic during these episodes.  This is likely secondary to PE. Otherwise, she is mostly in sinus rhythm.  Patient appliance in place.  Continue monitor closely on telemetry  If no improvement, consult cardiology.    Atrial thrombus- (present on admission)  Assessment & Plan  suction thrombectomy 8/16 successful  I am continuing heparin gtt. and I am titrating as per Anti Xa levels and monitor for signs of bleeding.      DVT (deep venous thrombosis) (HCC)- (present on admission)  Assessment & Plan  Bilateral DVTs.  Continue heparin.  I am continuing heparin as per Anti Xa levels and monitor for signs  of bleeding.    Acute respiratory failure with hypoxia (HCC)- (present on admission)  Assessment & Plan  Secondary to pulmonary embolism  Currently she is requiring 2 L of oxygen to maintain oxygen saturation  Continue monitor closely in IMCU    Intra-abdominal fluid collection- (present on admission)  Assessment & Plan  CT of the abdomen pelvis with contrast showed postop seroma or pancreatic leak.  Lipase 111.  Cover with empiric antibiotics for possible infection  She underwent IR guided fluid drainage.  She will need repeat imaging studies.  Surgical oncology is following  Surgical oncology consulted, appreciate recs.    Sepsis (HCC)- (present on admission)  Assessment & Plan  This is Sepsis Present on admission  SIRS criteria identified on my evaluation include: Tachycardia, with heart rate greater than 90 BPM, Tachypnea, with respirations greater than 20 per minute, and Leukocytosis, with WBC greater than 12,000  Clinical indicators of end organ dysfunction include Lactic Acid greater than 2  Source is intra-abdominal infection or pneumonia  Sepsis protocol initiated  Crystalloid Fluid Administration: Fluid resuscitation ordered per standard protocol - 30 mL/kg per current or ideal body weight  IV antibiotics as appropriate for source of sepsis  Reassessment: I have reassessed the patient's hemodynamic status    I am continuing IV Zosyn.  Follow-up on culture results.    Cancer of overlapping sites of left breast (HCC)- (present on admission)  Assessment & Plan  Will hold tamoxifen since it could increase risk of DVT/PE    Advance care planning- (present on admission)  Assessment & Plan  CODE STATUS discussed with Ms. Gudino and she would like to be full code.    Acquired hypothyroidism- (present on admission)  Assessment & Plan  Continue levothyroxine, Cytomel        I discussed plan of care with bedside RN and ICU P    I personally discussed case with intensivist Dr. Camejo.    VTE prophylaxis: Heparin  infusion    I have performed a physical exam and reviewed and updated ROS and Plan today (8/17/2024). In review of yesterday's note (8/16/2024), there are no changes except as documented above.

## 2024-08-17 NOTE — PROGRESS NOTES
Report given to Josr LAWLER at bedside. Pt transferred to [facility] with belongings, on tele box, accompanied by family.

## 2024-08-17 NOTE — ASSESSMENT & PLAN NOTE
On imaging studies patient found to have 4.0 cm ascending thoracic aortic aneurysm and she will need outpatient follow-up.

## 2024-08-17 NOTE — CARE PLAN
The patient is Watcher - Medium risk of patient condition declining or worsening    Shift Goals  Clinical Goals: maintain stable hemodynamics  Patient Goals: sleep, updates  Family Goals: updates    Progress made toward(s) clinical / shift goals:    Problem: Knowledge Deficit - Standard  Goal: Patient and family/care givers will demonstrate understanding of plan of care, disease process/condition, diagnostic tests and medications  Outcome: Progressing     Problem: Hemodynamics  Goal: Patient's hemodynamics, fluid balance and neurologic status will be stable or improve  Outcome: Progressing  Note: Have not had to administer PRN labetalol or hydralazine for systolic blood pressures over 180. Hemodynamics have appeared stable.      Problem: Fluid Volume  Goal: Fluid volume balance will be maintained  Outcome: Progressing  Note: Patient was ordered a diet and is adequately producing urine.      Problem: Respiratory  Goal: Patient will achieve/maintain optimum respiratory ventilation and gas exchange  Outcome: Progressing  Note: Patient has not reported any SOB. Patient was weaned down to 2L NC since yesterday night shift.      Problem: Skin Integrity  Goal: Skin integrity is maintained or improved  Outcome: Progressing  Note: Patient has proper skin integrity interventions in place, such as offloading using pillows.      Problem: Pain - Standard  Goal: Alleviation of pain or a reduction in pain to the patient’s comfort goal  Outcome: Progressing  Note: Patient has not reported any pain.        Patient is not progressing towards the following goals:

## 2024-08-17 NOTE — CARE PLAN
The patient is Stable - Low risk of patient condition declining or worsening    Shift Goals  Clinical Goals: Pt will remain hemodynamically stable during shift  Patient Goals: Eat, rest, go home  Family Goals: HAYDEE    Progress made toward(s) clinical / shift goals:        Problem: Knowledge Deficit - Standard  Goal: Patient and family/care givers will demonstrate understanding of plan of care, disease process/condition, diagnostic tests and medications  Outcome: Progressing  Note: Pt educated regarding plan of care and medications. All questions answered.      Problem: Pain - Standard  Goal: Alleviation of pain or a reduction in pain to the patient’s comfort goal  Outcome: Progressing  Note: Pt assessed for pain regularly and medicated PRN per MAR.      Problem: Fall Risk  Goal: Patient will remain free from falls  Outcome: Progressing  Note: Fall precautions in place. Bed in lowest position. Non-skid socks in place. Personal possessions within reach. Mobility sign on door. Bed-alarm on. Call light within reach. Pt educated regarding fall prevention and states understanding.

## 2024-08-17 NOTE — ASSESSMENT & PLAN NOTE
Chapin type 1  Tele monitoring, likely due to RV strain  Cardiology consult if fails to improve  F/U EKG today

## 2024-08-17 NOTE — PROGRESS NOTES
Flow Stasis assess and removed. No complications or excessive bleeding with removal. Patient had no complains of pain and vital signs remained stable. Site dressed with guaze and a transparent Tegaderm. Site is soft and does not appear to have a hematoma.

## 2024-08-17 NOTE — PROGRESS NOTES
"Critical Care Progress Note    Date of admission  8/14/2024    Chief Complaint  78 y.o. female admitted 8/14/2024 with PE, intra-abdominal fluid collection    Hospital Course  \"78 y.o. female with past medical history of breast cancer, hypercholesterolemia, hypothyroidism, Pancreatic mass who presented 8/14/2024 with shortness of breath.  Patient underwent a pancreaticosplenectomy on 8/5/2024 with Dr. Espinoza and was discharged on 8/9/2024 on supplemental oxygen.  Since discharge patient has become increasingly fatigued, lethargic and short of breath.  In the ER a CT scan of her chest, abdomen and pelvis was completed demonstrating multiple segmental and subsegmental PEs with an RV LV ratio of 1.83 and a mildly elevated troponin.  IMCU was requested and she was started on a heparin infusion.  In addition to the findings in her chest CT she was also found to have a 10.4 x 10.8 cm fluid collection in the left upper quadrant within the splenic bed and had significant leukocytosis.  She was started on antibiotics for this and a IR drain has been requested.  Today the patient had a lower extremity venous Doppler which showed acute DVTs in both posterior tibial, peroneal and gastrocnemius veins.  Her echocardiogram showed a very large thrombus in transit in the RA in addition to RV dilation and reduced systolic function.  IR has been contacted regarding this and at this time does not do not feel that suction thrombectomy would be safe and would likely propagate this thrombus causing hemodynamic and respiratory collapse.     The hospitalist service has asked me to see this patient as she has elevating oxygen requirements however remains hemodynamically stable.  Her ABG shows significant respiratory alkalosis and hypoxia.  I have discussed all these findings with the patient as well as her CODE STATUS and her and her family have requested that she remain full code at this time.\"    8/16 RV suction thrombectomy " complete    Interval Problem Update  Reviewed last 24 hour events:   - NAEON   - Neuro: AOx4   - HR: 50s-60s   - SBP: 120s-190s   - GI: tolerating diet, last BM 8/14   - UOP: poorly recorded   - Bose: no   - Tm: 36.2   - Lines: PIV   - PPx: GI not indicated, DVT heparin gtt   - Repeat echo with absent thrombus post-thrombectomy   - CXR (personally reviewed and compared to prior): no new   - Transfer to [facility]    Yesterday   - Fluid collection brown, non-turbid   - Neuro: AOx4   - HR: 50s-70s   - SBP: 120s-160s   - GI: NPO, last BM 8/14   - UOP: poorly recorded   - Bose: no   - Tm: 37   - Lines: PIV   - PPx: GI not indicated, DVT heparin gtt   - HFNC 50%, 30 L   - CXR (personally reviewed and compared to prior): no new   - Replace Ca2+, Mg, K   - Spoke with Dr Bowen and Dr Aguirre, agree with thrombectomy. Scheduled for suction thrombectomy today    Review of Systems  Review of Systems   Constitutional:  Negative for chills and fever.   Eyes:  Negative for blurred vision.   Respiratory:  Positive for shortness of breath. Negative for cough, sputum production and stridor.    Cardiovascular:  Positive for leg swelling. Negative for chest pain.   Gastrointestinal:  Negative for abdominal pain, nausea and vomiting.   Genitourinary:  Negative for dysuria.   Musculoskeletal:  Negative for myalgias.   Skin:  Negative for rash.   Neurological:  Negative for dizziness, sensory change and focal weakness.        Vital Signs for last 24 hours   Pulse:  [53-71] 55  Resp:  [19-42] 32  BP: (125-197)/(58-90) 197/85  SpO2:  [89 %-98 %] 94 %    Hemodynamic parameters for last 24 hours       Respiratory Information for the last 24 hours       Physical Exam   Physical Exam  Vitals and nursing note reviewed.   Constitutional:       Appearance: She is ill-appearing.      Interventions: Nasal cannula in place.   HENT:      Head: Normocephalic and atraumatic.      Right Ear: External ear normal.      Left Ear: External ear normal.      Nose:  Nose normal.      Mouth/Throat:      Mouth: Mucous membranes are dry.      Pharynx: Oropharynx is clear.   Eyes:      Extraocular Movements: Extraocular movements intact.      Conjunctiva/sclera: Conjunctivae normal.   Cardiovascular:      Rate and Rhythm: Regular rhythm. Bradycardia present.      Pulses: Normal pulses.   Pulmonary:      Effort: Pulmonary effort is normal.   Abdominal:      General: Bowel sounds are normal.      Palpations: Abdomen is soft.   Musculoskeletal:      Cervical back: Neck supple.      Right lower leg: Edema present.      Left lower leg: Edema present.   Skin:     General: Skin is warm and dry.      Capillary Refill: Capillary refill takes less than 2 seconds.   Neurological:      General: No focal deficit present.      Mental Status: She is alert and oriented to person, place, and time. Mental status is at baseline.   Psychiatric:         Mood and Affect: Mood normal.         Behavior: Behavior normal.         Medications  Current Facility-Administered Medications   Medication Dose Route Frequency Provider Last Rate Last Admin    amLODIPine (Norvasc) tablet 5 mg  5 mg Oral Q DAY Jose Camejo Jr., D.OLuly   5 mg at 08/17/24 0828    liothyronine (Cytomel) tablet 5 mcg  5 mcg Oral MO, WE + FR Les Machado M.D.   5 mcg at 08/16/24 0820    liothyronine (Cytomel) tablet 2.5 mcg  2.5 mcg Oral Once per day on Sunday Tuesday Thursday Saturday Les Machado M.D.   2.5 mcg at 08/17/24 0558    hydrALAZINE (Apresoline) injection 10 mg  10 mg Intravenous Q6HRS PRN Sadiq Coronado M.D.   10 mg at 08/15/24 1335    senna-docusate (Pericolace Or Senokot S) 8.6-50 MG per tablet 2 Tablet  2 Tablet Oral Q EVENING Les Machado M.D.        And    polyethylene glycol/lytes (Miralax) Packet 1 Packet  1 Packet Oral QDAY PRN Les Machado M.D.        Pharmacy Consult Request ...Pain Management Review 1 Each  1 Each Other PHARMACY TO DOSE Les Machado M.D.        oxyCODONE  immediate-release (Roxicodone) tablet 5 mg  5 mg Oral Q3HRS PRN Les Machado M.D.        Or    oxyCODONE immediate release (Roxicodone) tablet 10 mg  10 mg Oral Q3HRS PRN Les Machado M.D.        Or    morphine 4 MG/ML injection 4 mg  4 mg Intravenous Q3HRS PRN Les Machado M.D.        labetalol (Normodyne/Trandate) injection 10 mg  10 mg Intravenous Q4HRS PRN Les Machado M.D.   10 mg at 08/16/24 0820    ondansetron (Zofran) syringe/vial injection 4 mg  4 mg Intravenous Q4HRS PRN Les Machado M.D.        ondansetron (Zofran ODT) dispertab 4 mg  4 mg Oral Q4HRS PRN Les Machado M.D.        piperacillin-tazobactam (Zosyn) 4.5 g in  mL IVPB  4.5 g Intravenous Q8HRS Les Machado M.D.   Stopped at 08/17/24 0851    heparin infusion 25,000 units in 500 mL 0.45% NACL  0-30 Units/kg/hr Intravenous Continuous Les Machado M.D. 25.3 mL/hr at 08/17/24 0659 18 Units/kg/hr at 08/17/24 0659    heparin injection 2,800 Units  40 Units/kg Intravenous PRN Les Machado M.D.   2,800 Units at 08/16/24 2240    levothyroxine (Synthroid) tablet 100 mcg  100 mcg Oral AM ES Les Machado M.D.   100 mcg at 08/17/24 0558       Fluids    Intake/Output Summary (Last 24 hours) at 8/17/2024 1037  Last data filed at 8/17/2024 0600  Gross per 24 hour   Intake 450.88 ml   Output 450 ml   Net 0.88 ml       Laboratory  Recent Labs     08/15/24  1426   ISTATAPH 7.600*   ISTATAPCO2 19.7*   ISTATAPO2 46*   ISTATATCO2 20   AEPMQET8HQM 90*   ISTATARTHCO3 19.3   ISTATARTBE 0   ISTATTEMP see below   ISTATSPEC Arterial         Recent Labs     08/15/24  0330 08/16/24  0100 08/17/24  0445   SODIUM 140 140 136   POTASSIUM 3.6 3.5* 3.6   CHLORIDE 105 107 107   CO2 22 20 21   BUN 17 15 17   CREATININE 0.72 0.63 0.69   MAGNESIUM  --  1.9 1.9   PHOSPHORUS  --  2.8 3.1   CALCIUM 7.3* 6.6* 7.5*     Recent Labs     08/14/24  2050 08/15/24  0330 08/16/24  0100 08/17/24  0445   ALTSGPT 71* 60* 50 54*   ASTSGOT 51* 40  31 33   ALKPHOSPHAT 98 84 66 73   TBILIRUBIN 1.5 1.7* 1.2 1.0   LIPASE 111*  --   --   --    GLUCOSE 158* 125* 101* 100*     Recent Labs     08/15/24  0330 08/15/24  1915 08/16/24  0100 08/17/24  0445   WBC 18.1* 16.5* 15.0* 13.7*   NEUTSPOLYS 84.30* 87.00*  --  84.80*   LYMPHOCYTES 6.10* 4.30*  --  7.30*   MONOCYTES 6.90 5.00  --  5.50   EOSINOPHILS 0.00 0.10  --  0.10   BASOPHILS 0.90 0.30  --  0.10   ASTSGOT 40  --  31 33   ALTSGPT 60*  --  50 54*   ALKPHOSPHAT 84  --  66 73   TBILIRUBIN 1.7*  --  1.2 1.0     Recent Labs     08/14/24  2050 08/15/24  0330 08/15/24  1915 08/16/24  0100 08/17/24  0445   RBC 4.10*   < > 3.52* 3.25* 3.49*   HEMOGLOBIN 12.4   < > 10.7* 9.8* 10.4*   HEMATOCRIT 38.4   < > 33.5* 30.5* 32.7*   PLATELETCT 248   < > 263 241 324   PROTHROMBTM 15.5*  --   --   --   --    APTT 22.3*  --   --   --   --    INR 1.21*  --   --   --   --     < > = values in this interval not displayed.       Imaging  CT:    Reviewed  Echo:   Reviewed    Assessment/Plan  * Pulmonary embolism with acute cor pulmonale, unspecified chronicity, unspecified pulmonary embolism type (HCC)- (present on admission)  Assessment & Plan  Submassive PE with DVTs and thrombus in transit  Difficult treatment with limited options.    S/P suction thrombectomy of RV thrombus, PEs remain  Repeat echocardiogram pending.  PESI 147, TANMAY Stage IV    Second degree heart block- (present on admission)  Assessment & Plan  Mobitz type 1  Tele monitoring, likely due to RV strain  Cardiology consult if fails to improve  F/U EKG today    Atrial thrombus- (present on admission)  Assessment & Plan  suction thrombectomy 8/16 successful  Continue heparin gtt    DVT (deep venous thrombosis) (HCC)- (present on admission)  Assessment & Plan  Bilateral lower extremity  Continue heparin gtt    Acute respiratory failure with hypoxia (HCC)- (present on admission)  Assessment & Plan  Due to pulmonary embolus  RT/O2 Protocols  Titrate supplemental FiO2 to  maintain SpO2 >88%  Heparin gtt, transition to DOAC in the near future when OK with surgery    Intra-abdominal fluid collection- (present on admission)  Assessment & Plan  Fluid most c/w pancreatic leak. 400 mL removed by IR on 8/15  Seen by hepatobiliary surgery today  Continue antibiotics until fluid analysis complete    Sepsis (HCC)- (present on admission)  Assessment & Plan  This is Sepsis Present on admission  SIRS criteria identified on my evaluation include: Tachypnea, with respirations greater than 20 per minute and Leukocytosis, with WBC greater than 12,000  Clinical indicators of end organ dysfunction include Acute Respiratory Failure - (mechanical ventilation or BiPap or PaO2/FiO2 ratio < 300)  Source is intra-abdominal fluid collection  Sepsis protocol initiated  Crystalloid Fluid Administration: Fluid resuscitation ordered per standard protocol - 30 mL/kg per current or ideal body weight.  Received on admission, will not repeat with RV dysfunction  IV antibiotics as appropriate for source of sepsis  Reassessment: I have reassessed the patient's hemodynamic status  Continue Zosyn until fluid culture results    Cancer of overlapping sites of left breast (HCC)- (present on admission)  Assessment & Plan  Holding tamoxifen with VTE    Advance care planning- (present on admission)  Assessment & Plan  Patient requesting full code    Acquired hypothyroidism- (present on admission)  Assessment & Plan  Continue Synthroid and cytomel         VTE:  Heparin  Ulcer: Not Indicated  Lines: None    I have performed a physical exam and reviewed and updated ROS and Plan today (8/17/2024). In review of yesterday's note (8/16/2024), there are no changes except as documented above.     Discussed patient condition and risk of morbidity and/or mortality with Hospitalist, RN, RT, Pharmacy, Charge nurse / hot rounds, and Patient    Patient stable to be transferred out of ICU today to [facility].  I have discussed this case with  hospitalist Dr. Coronado, he will assume care at this time. [facility] will sign off. Please call with any questions.    Please note that this dictation was created using voice recognition software. The accuracy of the dictation is limited to the abilities of the software. I have made every reasonable attempt to correct obvious errors, but I expect that there are errors of grammar and possibly content that I did not discover before finalizing the note.

## 2024-08-17 NOTE — CARE PLAN
The patient is Watcher - Medium risk of patient condition declining or worsening    Shift Goals  Clinical Goals: Maintain hemodynamic stability, wean O2, comfort, pain mgmt prn  Patient Goals: Rest, get better ,comfort  Family Goals: Get better, successful thrombectomy    Progress made toward(s) clinical / shift goals:    Problem: Knowledge Deficit - Standard  Goal: Patient and family/care givers will demonstrate understanding of plan of care, disease process/condition, diagnostic tests and medications  Outcome: Progressing     Problem: Hemodynamics  Goal: Patient's hemodynamics, fluid balance and neurologic status will be stable or improve  Outcome: Progressing     Problem: Respiratory  Goal: Patient will achieve/maintain optimum respiratory ventilation and gas exchange  Outcome: Progressing     Problem: Pain - Standard  Goal: Alleviation of pain or a reduction in pain to the patient’s comfort goal  Outcome: Progressing     Problem: Skin Integrity  Goal: Skin integrity is maintained or improved  Outcome: Progressing     Problem: Fall Risk  Goal: Patient will remain free from falls  Outcome: Progressing       Patient is not progressing towards the following goals:

## 2024-08-17 NOTE — PROGRESS NOTES
4 Eyes Skin Assessment Completed by NURIS Ovalles and NURIS Riley.    Head WDL  Ears Redness and Blanching left ear non-blanching/ slow to tyrone  Nose WDL  Mouth WDL  Neck WDL  Breast/Chest WDL  Shoulder Blades WDL  Spine WDL  (R) Arm/Elbow/Hand Redness, Blanching, and Bruising  (L) Arm/Elbow/Hand Blanching and Bruising  Abdomen Redness incision on abdomen  Groin WDL  Scrotum/Coccyx/Buttocks Redness skin tear Left buttock  (R) Leg WDL  (L) Leg WDL  (R) Heel/Foot/Toe Redness, Blanching, and Discoloration  (L) Heel/Foot/Toe Redness and Blanching heel        Devices In Places Tele Box, Blood Pressure Cuff, Pulse Ox, and Nasal Cannula      Interventions In Place NC W/Ear Foams, Pillows, Low Air Loss Mattress, and Heels Loaded W/Pillows pressure redistribution mattress    Possible Skin Injury Yes    Pictures Uploaded Into Epic Yes  Wound Consult Placed N/A  RN Wound Prevention Protocol Ordered No

## 2024-08-17 NOTE — PROGRESS NOTES
Monitor Summary:  Sinus Rhythm and rare, unsustained, non-symptomatic second degree heart block type 2. Notified by monitor tech, strip not available.   TX: 0.229   QRS:0.088  QT: 0.565

## 2024-08-18 LAB
ALBUMIN SERPL BCP-MCNC: 2.7 G/DL (ref 3.2–4.9)
ALBUMIN/GLOB SERPL: 1 G/DL
ALP SERPL-CCNC: 71 U/L (ref 30–99)
ALT SERPL-CCNC: 64 U/L (ref 2–50)
ANION GAP SERPL CALC-SCNC: 11 MMOL/L (ref 7–16)
AST SERPL-CCNC: 43 U/L (ref 12–45)
BACTERIA FLD AEROBE CULT: NORMAL
BASOPHILS # BLD AUTO: 0.3 % (ref 0–1.8)
BASOPHILS # BLD: 0.04 K/UL (ref 0–0.12)
BILIRUB SERPL-MCNC: 1 MG/DL (ref 0.1–1.5)
BUN SERPL-MCNC: 14 MG/DL (ref 8–22)
CALCIUM ALBUM COR SERPL-MCNC: 8.5 MG/DL (ref 8.5–10.5)
CALCIUM SERPL-MCNC: 7.5 MG/DL (ref 8.5–10.5)
CHLORIDE SERPL-SCNC: 107 MMOL/L (ref 96–112)
CO2 SERPL-SCNC: 19 MMOL/L (ref 20–33)
CREAT SERPL-MCNC: 0.58 MG/DL (ref 0.5–1.4)
EOSINOPHIL # BLD AUTO: 0.01 K/UL (ref 0–0.51)
EOSINOPHIL NFR BLD: 0.1 % (ref 0–6.9)
ERYTHROCYTE [DISTWIDTH] IN BLOOD BY AUTOMATED COUNT: 49.1 FL (ref 35.9–50)
GFR SERPLBLD CREATININE-BSD FMLA CKD-EPI: 93 ML/MIN/1.73 M 2
GLOBULIN SER CALC-MCNC: 2.8 G/DL (ref 1.9–3.5)
GLUCOSE SERPL-MCNC: 105 MG/DL (ref 65–99)
GRAM STN SPEC: NORMAL
HCT VFR BLD AUTO: 36.3 % (ref 37–47)
HGB BLD-MCNC: 11.4 G/DL (ref 12–16)
IMM GRANULOCYTES # BLD AUTO: 0.35 K/UL (ref 0–0.11)
IMM GRANULOCYTES NFR BLD AUTO: 2.4 % (ref 0–0.9)
LYMPHOCYTES # BLD AUTO: 0.93 K/UL (ref 1–4.8)
LYMPHOCYTES NFR BLD: 6.3 % (ref 22–41)
MAGNESIUM SERPL-MCNC: 1.9 MG/DL (ref 1.5–2.5)
MCH RBC QN AUTO: 30 PG (ref 27–33)
MCHC RBC AUTO-ENTMCNC: 31.4 G/DL (ref 32.2–35.5)
MCV RBC AUTO: 95.5 FL (ref 81.4–97.8)
MONOCYTES # BLD AUTO: 0.82 K/UL (ref 0–0.85)
MONOCYTES NFR BLD AUTO: 5.5 % (ref 0–13.4)
NEUTROPHILS # BLD AUTO: 12.68 K/UL (ref 1.82–7.42)
NEUTROPHILS NFR BLD: 85.4 % (ref 44–72)
NRBC # BLD AUTO: 0.12 K/UL
NRBC BLD-RTO: 0.8 /100 WBC (ref 0–0.2)
PHOSPHATE SERPL-MCNC: 2.7 MG/DL (ref 2.5–4.5)
PLATELET # BLD AUTO: 351 K/UL (ref 164–446)
PMV BLD AUTO: 10.2 FL (ref 9–12.9)
POTASSIUM SERPL-SCNC: 3.6 MMOL/L (ref 3.6–5.5)
PROT SERPL-MCNC: 5.5 G/DL (ref 6–8.2)
RBC # BLD AUTO: 3.8 M/UL (ref 4.2–5.4)
SIGNIFICANT IND 70042: NORMAL
SITE SITE: NORMAL
SODIUM SERPL-SCNC: 137 MMOL/L (ref 135–145)
SOURCE SOURCE: NORMAL
UFH PPP CHRO-ACNC: 0.41 IU/ML
UFH PPP CHRO-ACNC: 0.47 IU/ML
WBC # BLD AUTO: 14.8 K/UL (ref 4.8–10.8)

## 2024-08-18 PROCEDURE — 97602 WOUND(S) CARE NON-SELECTIVE: CPT

## 2024-08-18 PROCEDURE — 700102 HCHG RX REV CODE 250 W/ 637 OVERRIDE(OP): Performed by: INTERNAL MEDICINE

## 2024-08-18 PROCEDURE — 83735 ASSAY OF MAGNESIUM: CPT

## 2024-08-18 PROCEDURE — A9270 NON-COVERED ITEM OR SERVICE: HCPCS | Performed by: INTERNAL MEDICINE

## 2024-08-18 PROCEDURE — 770000 HCHG ROOM/CARE - INTERMEDIATE ICU *

## 2024-08-18 PROCEDURE — 84100 ASSAY OF PHOSPHORUS: CPT

## 2024-08-18 PROCEDURE — 99233 SBSQ HOSP IP/OBS HIGH 50: CPT | Performed by: INTERNAL MEDICINE

## 2024-08-18 PROCEDURE — 85520 HEPARIN ASSAY: CPT

## 2024-08-18 PROCEDURE — 700105 HCHG RX REV CODE 258: Performed by: INTERNAL MEDICINE

## 2024-08-18 PROCEDURE — 85025 COMPLETE CBC W/AUTO DIFF WBC: CPT

## 2024-08-18 PROCEDURE — 80053 COMPREHEN METABOLIC PANEL: CPT

## 2024-08-18 PROCEDURE — 700111 HCHG RX REV CODE 636 W/ 250 OVERRIDE (IP): Performed by: INTERNAL MEDICINE

## 2024-08-18 RX ADMIN — CARBOXYMETHYLCELLULOSE SODIUM 2 DROP: 0.5 SOLUTION/ DROPS OPHTHALMIC at 17:19

## 2024-08-18 RX ADMIN — HEPARIN SODIUM 18 UNITS/KG/HR: 5000 INJECTION, SOLUTION INTRAVENOUS at 08:16

## 2024-08-18 RX ADMIN — LEVOTHYROXINE SODIUM 100 MCG: 0.1 TABLET ORAL at 05:37

## 2024-08-18 RX ADMIN — CARBOXYMETHYLCELLULOSE SODIUM 2 DROP: 0.5 SOLUTION/ DROPS OPHTHALMIC at 11:57

## 2024-08-18 RX ADMIN — PIPERACILLIN AND TAZOBACTAM 4.5 G: 4; .5 INJECTION, POWDER, FOR SOLUTION INTRAVENOUS at 20:41

## 2024-08-18 RX ADMIN — PIPERACILLIN AND TAZOBACTAM 4.5 G: 4; .5 INJECTION, POWDER, FOR SOLUTION INTRAVENOUS at 12:03

## 2024-08-18 RX ADMIN — PIPERACILLIN AND TAZOBACTAM 4.5 G: 4; .5 INJECTION, POWDER, FOR SOLUTION INTRAVENOUS at 05:40

## 2024-08-18 RX ADMIN — LIOTHYRONINE SODIUM 2.5 MCG: 5 TABLET ORAL at 05:38

## 2024-08-18 RX ADMIN — AMLODIPINE BESYLATE 5 MG: 10 TABLET ORAL at 05:37

## 2024-08-18 RX ADMIN — CARBOXYMETHYLCELLULOSE SODIUM 2 DROP: 0.5 SOLUTION/ DROPS OPHTHALMIC at 03:04

## 2024-08-18 RX ADMIN — CARBOXYMETHYLCELLULOSE SODIUM 2 DROP: 0.5 SOLUTION/ DROPS OPHTHALMIC at 20:42

## 2024-08-18 ASSESSMENT — COGNITIVE AND FUNCTIONAL STATUS - GENERAL
DRESSING REGULAR LOWER BODY CLOTHING: A LITTLE
DAILY ACTIVITIY SCORE: 23
SUGGESTED CMS G CODE MODIFIER DAILY ACTIVITY: CI
MOBILITY SCORE: 23
CLIMB 3 TO 5 STEPS WITH RAILING: A LITTLE
SUGGESTED CMS G CODE MODIFIER MOBILITY: CI

## 2024-08-18 ASSESSMENT — ENCOUNTER SYMPTOMS
HEADACHES: 0
WEIGHT LOSS: 0
EYE PAIN: 0
SPUTUM PRODUCTION: 0
COUGH: 0
ABDOMINAL PAIN: 0
TREMORS: 0
FEVER: 0
SENSORY CHANGE: 0
DIARRHEA: 0
BACK PAIN: 0
BLURRED VISION: 0
ORTHOPNEA: 0
SHORTNESS OF BREATH: 0
VOMITING: 0
SPEECH CHANGE: 0
DOUBLE VISION: 0
CHILLS: 0
PHOTOPHOBIA: 0
WEAKNESS: 1
NAUSEA: 0
MYALGIAS: 0
NECK PAIN: 0
TINGLING: 0
HALLUCINATIONS: 0
PALPITATIONS: 0
CONSTIPATION: 0
DIZZINESS: 0
FOCAL WEAKNESS: 0

## 2024-08-18 ASSESSMENT — PAIN DESCRIPTION - PAIN TYPE
TYPE: ACUTE PAIN

## 2024-08-18 ASSESSMENT — LIFESTYLE VARIABLES: SUBSTANCE_ABUSE: 0

## 2024-08-18 NOTE — WOUND TEAM
[facility]  Inpatient Services  Initial Wound and Skin Care Evaluation    Admission Date: 8/14/2024     Last order of IP CONSULT TO WOUND CARE was found on 8/17/2024 from Hospital Encounter on 8/14/2024     HPI, PMH, SH: Reviewed    Past Surgical History:   Procedure Laterality Date    WA ULTRASONIC GUIDANCE, INTRAOPERATIVE  8/5/2024    Procedure: ULTRASOUND GUIDANCE;  Surgeon: Sachin Espinoza M.D.;  Location: SURGERY [facility];  Service: General    PANCREATECTOMY N/A 8/5/2024    Procedure: OPEN DISTAL PANCREATECTOMY WITH SPLENECTOMY, NODE DISSECTION;  Surgeon: Sachin Espinoza M.D.;  Location: SURGERY [facility];  Service: General    SPLENECTOMY N/A 8/5/2024    Procedure: SPLENECTOMY;  Surgeon: Sachin Espinoza M.D.;  Location: SURGERY [facility];  Service: General    CREATION, FLAP, OMENTUM N/A 8/5/2024    Procedure: CREATION, FLAP, OMENTUM;  Surgeon: Sachin Espinoza M.D.;  Location: SURGERY [facility];  Service: General    PB MASTECTOMY, PARTIAL Left 08/01/2022    Procedure: MELLO LOCALIZED LEFT PARTIAL MASTECTOMY;  Surgeon: Elena Arroyo M.D.;  Location: SURGERY [facility];  Service: General    OTHER ORTHOPEDIC SURGERY  2019    back surgery    OTHER  2001    replace left breast implant    OTHER  1988    Left breast implant/lift    OTHER  1987    left mastectomy    LAMINOTOMY      LUMPECTOMY      PRIMARY C SECTION       Social History     Tobacco Use    Smoking status: Never     Passive exposure: Past    Smokeless tobacco: Never   Substance Use Topics    Alcohol use: Never     Chief Complaint   Patient presents with    Shortness of Breath     Brought in by [facility] from home. Patient c/o Shortness of breath and weakness x 5 days. Had splenectomy 5th of Aug and DC'd on the 9 th.EMS arrival patient oxygen saturation 76 % RA.     Diagnosis: Pulmonary embolism with acute cor pulmonale, unspecified chronicity, unspecified pulmonary embolism type (HCC)  [I26.09]    Unit where seen by Wound Team: [id number]     WOUND CONSULT RELATED TO:  Cain feet and toes    WOUND TEAM PLAN OF CARE - Frequency of Follow-up:   Nursing to follow dressing orders written for wound care. Contact wound team if area fails to progress, deteriorates or with any questions/concerns if something comes up before next scheduled follow up (See below as to whether wound is following and frequency of wound follow up)   Not following, consult as needed  - any area    WOUND HISTORY:   Pt is a 78yr old female with history of left sided breast CA (s/p mastectomy), hypothyroidism, recent pancreato/splenectomy on 8/5 by Dr. Steele for large pancreatic cystic mass that was benign, pt was discharged on 8/9 and then presented on 8/14 with complaints of SOB, weakness, decreased oral intact, chills and cough. Pt was placed on 5L O2 (does not use o2 at home). Wound team was consulted to evaluate bilateral feet/legs.        WOUND ASSESSMENT/LDA                           Vascular:    TEJA:   No results found.    Lab Values:    Lab Results   Component Value Date/Time    WBC 14.8 (H) 08/18/2024 04:47 AM    RBC 3.80 (L) 08/18/2024 04:47 AM    HEMOGLOBIN 11.4 (L) 08/18/2024 04:47 AM    HEMATOCRIT 36.3 (L) 08/18/2024 04:47 AM      Culture Results show:  No results found for this or any previous visit (from the past 720 hour(s)).    Pain Level/Medicated:  None, Tolerated without pain medication       INTERVENTIONS BY WOUND TEAM:  Chart and images reviewed. Discussed with bedside RN. All areas of concern (based on picture review, LDA review and discussion with bedside RN) have been thoroughly assessed. Documentation of areas based on significant findings. This RN in to assess patient. Performed standard wound care which includes appropriate positioning, dressing removal and non-selective debridement. Pictures and measurements obtained weekly if/when required.  Area Assessed:  Posterior Head  Area manually palpated,  no wounds appreciated, no pain noted     Area Assessed:  Bilateral Ears  Area intact, gray foam in use     Area Assessed:  Bilateral Elbows & Arms  Area intact,     Area Assessed: Abdomen/Pannus  Area intact,     Area Assessed: Back  Area intact,     Area Assessed:  Sacrococcygeal area  Area intact, Offloading dressing ordered    Area Assessed:  Bilateral I.T.s  Area intact,     Area Assessed:  BLE & Knees  Area intact,     Area Assessed:  Bilateral ankles  Area intact,     Area Assessed:  Bilateral heels  Area intact, heel offloading dressings ordered      Advanced Wound Care Discharge Planning  Number of Clinicians necessary to complete wound care: 1  Is patient requiring IV pain medications for dressing changes:  No   Length of time for dressing change 30 min. (This does not include chart review, pre-medication time, set up, clean up or time spent charting.)    Interdisciplinary consultation: Patient, Bedside RN (Josr), Sandra MAY (Wound RN).  Pressure injury and staging reviewed with N/A.    EVALUATION / RATIONALE FOR TREATMENT:     Date:  08/18/24  Wound Status:  Initial evaluation    Pt sitting up in chair at bedside. Able to ambulate in room and stand independently. Offloading measures ordered         Goals: Steady decrease in wound area and depth weekly.    NURSING PLAN OF CARE ORDERS:  RN Prevention Protocol    NUTRITION RECOMMENDATIONS   Wound Team Recommendations:  N/A    DIET ORDERS (From admission to next 24h)       Start     Ordered    08/16/24 2143  Diet Order Diet: Cardiac  ALL MEALS        Question:  Diet:  Answer:  Cardiac    08/16/24 2142                    PREVENTATIVE INTERVENTIONS:    Q shift Imer - performed per nursing policy  Q shift pressure point assessments - performed per nursing policy    Surface/Positioning  ICU Low Airloss - Currently in Place  Reposition q 2 hours - Currently in Place    Offloading/Redistribution  Sacral offloading dressing (Silicone dressing) - Ordered  Heel  offloading dressing (Silicone dressing) - Ordered      Respiratory  Silicone O2 tubing - Currently in Place  Gray Foam Ear protectors - Currently in Place    Containment/Moisture Prevention    Dri-devorah pad - Currently in Place    Anticipated discharge plans:  TBD        Vac Discharge Needs:  Vac Discharge plan is purely a recommendation from wound team and not a requirement for discharge unless otherwise stated by physician.  Not Applicable Pt not on a wound vac

## 2024-08-18 NOTE — WOUND TEAM
Assisted Wound RN Blair with skin assessment and wound consult evaluation for pressure injury.  Additional staff necessary for turning/standing from chair, repositioning patient, assisting with dressing application and supplies and determining progress, assessment and staging of pressure injuries and/or complicated wound care.

## 2024-08-18 NOTE — PROGRESS NOTES
Hospital Medicine Daily Progress Note    Date of Service  8/18/2024    Chief Complaint  Paige Gudino is a 78 y.o. female admitted 8/14/2024 with shortness of breath    Hospital Course    Paige Gudino is a 78 y.o. female with past medical history of left-sided breast cancer status post mastectomy on tamoxifen, hypothyroidism, recent pancreato/splenectomy on 8/5 by Dr. Espinoza for large pancreatic cystic mass that is benign serous cystadenoma on pathology (Discharged on 8/9), who presented 8/14/2024 with complaints of shortness of breath and generalized weakness for 5 days, decreased oral intake, chills, cough.  Denies chest pain, any significant abdominal pain, diarrhea or constipation.  She presented desaturating to 76% on room air, was placed on 5 L nasal cannula.  Respiratory rate 30, heart rate 90, blood pressure stable, temperature 99.1.  EKG: Nonspecific T wave inversion in lateral leads.  Chest x-ray showed left lower lobe and middle lobe infiltrate.  CTA showed bilateral segmental subsegmental pulmonary embolism with signs of RV strain.  WBC 19.9, lactic acid 2.5, troponin 70, BNP 8591, lipase 111.  CT of the abdomen and pelvis with contrast, postop changes with postop seroma or possibly pancreatic leak.  Patient was given Zosyn, vancomycin, sepsis bolus 2 L with Ringer lactate, and started on IV heparin.    She admitted to IMCU for close monitoring and diagnosis of hypoxia secondary to pulmonary embolism.    Patient transferred to ICU on August 15, 2024 and she underwent right atrial thrombectomy.    Interval Problem Update    08/17/24    I evaluated and examined her at the bedside.  She reported that she is feeling significantly better and denies complaint of shortness of breath and severe pain.  I discussed plan of care with the and her daughter at the bedside and answered all of their questions been  I am continuing heparin infusion and I am titrating heparin as per Anti Xa  levels and monitor for signs of bleeding.    08/18/24    I evaluated and examined him her at the bedside.  She reported that she is felt significantly better and after long time she had a good breakfast.  She is requiring 2 L of oxygen to maintain oxygen saturation.  She denies any complaint of severe pain.  I discussed plan of care with her and answered all her questions.  I am continuing heparin gtt. and I am titrating as per Anti Xa levels and monitor for signs of bleeding.  Plan is to discuss it with surgery tomorrow for possibility of transitioning her to DOAC        I have discussed this patient's plan of care and discharge plan at IDT rounds today with Case Management, Nursing, Nursing leadership, and other members of the IDT team.    Consultants/Specialty  cardiology, critical care, and pulmonary    Code Status  Full Code    Disposition  The patient is not medically cleared for discharge to home or a post-acute facility.      I have placed the appropriate orders for post-discharge needs.    Review of Systems  Review of Systems   Constitutional:  Positive for malaise/fatigue. Negative for chills, fever and weight loss.   HENT:  Negative for hearing loss and tinnitus.    Eyes:  Negative for blurred vision, double vision, photophobia and pain.   Respiratory:  Negative for cough, sputum production and shortness of breath.    Cardiovascular:  Negative for chest pain, palpitations, orthopnea and leg swelling.   Gastrointestinal:  Negative for abdominal pain, constipation, diarrhea, nausea and vomiting.   Genitourinary:  Negative for dysuria, frequency and urgency.   Musculoskeletal:  Negative for back pain, joint pain, myalgias and neck pain.   Skin:  Negative for rash.   Neurological:  Positive for weakness. Negative for dizziness, tingling, tremors, sensory change, speech change, focal weakness and headaches.   Psychiatric/Behavioral:  Negative for hallucinations and substance abuse.    All other systems  reviewed and are negative.       Physical Exam  Temp:  [36.8 °C (98.2 °F)-37 °C (98.6 °F)] 36.8 °C (98.2 °F)  Pulse:  [] 70  Resp:  [20-25] 25  BP: (115-173)/(56-81) 136/67  SpO2:  [82 %-97 %] 95 %    Physical Exam  Vitals reviewed.   Constitutional:       General: She is not in acute distress.     Appearance: Normal appearance. She is not ill-appearing.      Comments: She was sitting in chair without any acute distress   HENT:      Head: Normocephalic and atraumatic.      Nose: No congestion.      Comments: Oxygen nasal cannula  Eyes:      General:         Right eye: No discharge.         Left eye: No discharge.      Pupils: Pupils are equal, round, and reactive to light.   Cardiovascular:      Rate and Rhythm: Normal rate and regular rhythm.      Pulses: Normal pulses.      Heart sounds: Normal heart sounds. No murmur heard.  Pulmonary:      Effort: No respiratory distress.      Breath sounds: Normal breath sounds. No stridor.   Abdominal:      General: Bowel sounds are normal. There is no distension.      Palpations: Abdomen is soft.      Tenderness: There is no abdominal tenderness.   Musculoskeletal:         General: No swelling or tenderness. Normal range of motion.      Cervical back: Normal range of motion. No rigidity.   Skin:     General: Skin is warm.      Capillary Refill: Capillary refill takes less than 2 seconds.      Coloration: Skin is not jaundiced or pale.      Findings: No bruising.   Neurological:      General: No focal deficit present.      Mental Status: She is alert and oriented to person, place, and time.      Cranial Nerves: No cranial nerve deficit.   Psychiatric:         Mood and Affect: Mood normal.         Behavior: Behavior normal.         Fluids    Intake/Output Summary (Last 24 hours) at 8/18/2024 1550  Last data filed at 8/18/2024 0800  Gross per 24 hour   Intake 918.83 ml   Output --   Net 918.83 ml       Laboratory  Recent Labs     08/16/24  0100 08/17/24  5445  08/18/24  0447   WBC 15.0* 13.7* 14.8*   RBC 3.25* 3.49* 3.80*   HEMOGLOBIN 9.8* 10.4* 11.4*   HEMATOCRIT 30.5* 32.7* 36.3*   MCV 93.8 93.7 95.5   MCH 30.2 29.8 30.0   MCHC 32.1* 31.8* 31.4*   RDW 48.1 47.9 49.1   PLATELETCT 241 324 351   MPV 10.3 10.2 10.2     Recent Labs     08/16/24  0100 08/17/24  0445 08/18/24 0447   SODIUM 140 136 137   POTASSIUM 3.5* 3.6 3.6   CHLORIDE 107 107 107   CO2 20 21 19*   GLUCOSE 101* 100* 105*   BUN 15 17 14   CREATININE 0.63 0.69 0.58   CALCIUM 6.6* 7.5* 7.5*                     Imaging  EC-ECHOCARDIOGRAM LTD W/ CONT   Final Result      IR-THROMBO MECHANICAL ARTERY,INIT   Final Result      1.  Ultrasound guided access bilateral common femoral veins.   2.  Intracardiac echocardiogram demonstrating mobile right atrial thrombus.   3.  Technically successful right atrial thrombectomy.         CT-CYST ASPIRATION-MISC   Final Result      1.  CT GUIDED RETROPERITONEAL LEFT UPPER QUADRANT NEEDLE ASPIRATION DRAINAGE. THE OBTAINED BROWN WATERY FLUID RESEMBLES PANCREATIC PSEUDOCYST FLUID. THE FLUID WAS NOT PURULENT NOR MALODOROUS.   2.  THE CURRENT PLAN IS TO CHECK CULTURE AND LAB RESULTS.      DX-CHEST-PORTABLE (1 VIEW)   Final Result      1. Stable small left pleural effusion with left mid lung and lung base parenchymal opacities.   2. The remainder is stable.      EC-ECHOCARDIOGRAM COMPLETE W/O CONT   Final Result      US-EXTREMITY VENOUS LOWER BILAT   Final Result      CT-ABDOMEN-PELVIS WITH   Final Result         1.  Postop changes of splenectomy and distal pancreatectomy, fluid collection left upper quadrant is seen which could represent postop seroma or possibly pancreatic leak.   2.  Right lower lobe segmental and subsegmental pulmonary embolus.   3.  Trace right and small left pleural effusions.   4.  Linear densities of the lung bases suggesting atelectasis, component of left lower lobe infiltrate not excluded.   5.  Diverticulosis   6.  Cholelithiasis   7.  Cardiomegaly       These findings were discussed with the patient's clinician, Teri Lee, on 8/14/2024 9:53 PM.      CT-CTA CHEST PULMONARY ARTERY W/ RECONS   Final Result   .      1.  Bilateral segmental and subsegmental pulmonary emboli with changes compatible with significant right heart strain.   2.  Small left and trace right pleural effusions.   3.  Linear consolidations in the bilateral lung bases suggests atelectasis, component of left lower lobe infiltrate is not excluded.   4.  4.0 cm ascending thoracic aortic aneurysm, radiographic follow-up and surveillance recommended as clinically appropriate.   5.  Atherosclerosis and atherosclerotic coronary artery disease      These findings were discussed with the patient's clinician, Teri Lee, on 8/14/2024 10:05 PM.      DX-CHEST-PORTABLE (1 VIEW)   Final Result         1.  Left midlung and lower lobe infiltrates   2.  Trace left pleural effusion           Assessment/Plan  * Pulmonary embolism with acute cor pulmonale, unspecified chronicity, unspecified pulmonary embolism type (HCC)- (present on admission)  Assessment & Plan  Submassive PE with DVTs and thrombus in transit  Difficult treatment with limited options.  Per IR, unable to complete suction thrombectomy given risk for propagation.  With intra-abdominal fluid collection of unknown etiology systemic tPA is a high risk and recent surgery given recent surgery.  Profoundly high mortality with PESI 147, TANMAY Stage IV  No signs of active bleeding.  I am continuing heparin gtt. and I am titrating as per Anti Xa levels and monitor for signs of bleeding.  If she will remain stable plan is to discuss it with surgery tomorrow to transition her to a DOAC.        Thoracic ascending aortic aneurysm (HCC)  Assessment & Plan  On imaging studies patient found to have 4.0 cm ascending thoracic aortic aneurysm and she will need outpatient follow-up.    Second degree heart block- (present on admission)  Assessment &  Plan  During the night of 8/15 into 8/16, patient was noted to have occasional Mobitz type II.  Patient was asymptomatic during these episodes.  This is likely secondary to PE. Otherwise, she is mostly in sinus rhythm.  Patient appliance in place.  No further episodes of heart block.  Continue to monitor closely on telemetry.  If no improvement, consult cardiology.    Atrial thrombus- (present on admission)  Assessment & Plan  suction thrombectomy 8/16 successful  I am continuing heparin gtt. and I am titrating as per Anti Xa levels and monitor for signs of bleeding.      DVT (deep venous thrombosis) (HCC)- (present on admission)  Assessment & Plan  8/18/2024   Bilateral DVTs.  Continue heparin.  I am continuing heparin as per Anti Xa levels and monitor for signs of bleeding.    Acute respiratory failure with hypoxia (HCC)- (present on admission)  Assessment & Plan  Secondary to pulmonary embolism  She remains on 2 L of oxygen to maintain oxygen saturation  Continue monitor closely in IMCU    Intra-abdominal fluid collection- (present on admission)  Assessment & Plan  CT of the abdomen pelvis with contrast showed postop seroma or pancreatic leak.  Lipase 111.  Cover with empiric antibiotics for possible infection  She underwent IR guided fluid drainage.  She will need repeat imaging studies.  Surgical oncology is following  Surgical oncology consulted, appreciate recs.  Continue to monitor    Sepsis (HCC)- (present on admission)  Assessment & Plan  I this is Sepsis Present on admission  SIRS criteria identified on my evaluation include: Tachycardia, with heart rate greater than 90 BPM, Tachypnea, with respirations greater than 20 per minute, and Leukocytosis, with WBC greater than 12,000  Clinical indicators of end organ dysfunction include Lactic Acid greater than 2  Source is intra-abdominal infection or pneumonia  Sepsis protocol initiated  Crystalloid Fluid Administration: Fluid resuscitation ordered per  standard protocol - 30 mL/kg per current or ideal body weight  IV antibiotics as appropriate for source of sepsis  Reassessment: I have reassessed the patient's hemodynamic status    I am continuing IV Zosyn.  Follow-up on culture results.  I discussed plan of care with her.    Cancer of overlapping sites of left breast (HCC)- (present on admission)  Assessment & Plan  Will hold tamoxifen since it could increase risk of DVT/PE    Advance care planning- (present on admission)  Assessment & Plan  CODE STATUS discussed with Ms. Gudino and she would like to be full code.    Acquired hypothyroidism- (present on admission)  Assessment & Plan  Continue levothyroxine, Cytomel        I discussed plan of care during multidisciplinary rounds regarding patient's current medical condition and plan of care.        VTE prophylaxis: Heparin infusion    I have performed a physical exam and reviewed and updated ROS and Plan today (8/18/2024). In review of yesterday's note (8/17/2024), there are no changes except as documented above.

## 2024-08-18 NOTE — CARE PLAN
The patient is Watcher - Medium risk of patient condition declining or worsening    Shift Goals  Clinical Goals: hemodynamic stability  Patient Goals: go home, better appetite  Family Goals: updates    Progress made toward(s) clinical / shift goals:        Problem: Knowledge Deficit - Standard  Goal: Patient and family/care givers will demonstrate understanding of plan of care, disease process/condition, diagnostic tests and medications  Outcome: Progressing  Note: Pt educated regarding plan of care and medications. All questions answered.      Problem: Skin Integrity  Goal: Skin integrity is maintained or improved  Outcome: Progressing  Note: Pt turned and positioned every two hours. Incontinence care provided and barrier paste applied as needed.      Problem: Fall Risk  Goal: Patient will remain free from falls  Outcome: Progressing  Note: Fall precautions in place. Bed in lowest position. Non-skid socks in place. Personal possessions within reach. Mobility sign on door. Bed-alarm on. Call light within reach. Pt educated regarding fall prevention and states understanding.

## 2024-08-18 NOTE — CARE PLAN
The patient is Watcher - Medium risk of patient condition declining or worsening    Shift Goals  Clinical Goals: hemodynamic stability  Patient Goals: go home, better appetite  Family Goals: updates    Progress made toward(s) clinical / shift goals:    Problem: Knowledge Deficit - Standard  Goal: Patient and family/care givers will demonstrate understanding of plan of care, disease process/condition, diagnostic tests and medications  Outcome: Progressing     Problem: Hemodynamics  Goal: Patient's hemodynamics, fluid balance and neurologic status will be stable or improve  Outcome: Progressing     Problem: Fluid Volume  Goal: Fluid volume balance will be maintained  Outcome: Progressing     Problem: Urinary - Renal Perfusion  Goal: Ability to achieve and maintain adequate renal perfusion and functioning will improve  Outcome: Progressing     Problem: Respiratory  Goal: Patient will achieve/maintain optimum respiratory ventilation and gas exchange  Outcome: Progressing     Problem: Physical Regulation  Goal: Diagnostic test results will improve  Outcome: Progressing     Problem: Skin Integrity  Goal: Skin integrity is maintained or improved  Outcome: Progressing     Problem: Pain - Standard  Goal: Alleviation of pain or a reduction in pain to the patient’s comfort goal  Outcome: Progressing     Problem: Fall Risk  Goal: Patient will remain free from falls  Outcome: Progressing       Patient is not progressing towards the following goals:

## 2024-08-19 LAB
ALBUMIN SERPL BCP-MCNC: 3 G/DL (ref 3.2–4.9)
ALBUMIN/GLOB SERPL: 1 G/DL
ALP SERPL-CCNC: 73 U/L (ref 30–99)
ALT SERPL-CCNC: 83 U/L (ref 2–50)
ANION GAP SERPL CALC-SCNC: 14 MMOL/L (ref 7–16)
AST SERPL-CCNC: 51 U/L (ref 12–45)
BASOPHILS # BLD AUTO: 0.3 % (ref 0–1.8)
BASOPHILS # BLD: 0.04 K/UL (ref 0–0.12)
BILIRUB SERPL-MCNC: 1 MG/DL (ref 0.1–1.5)
BUN SERPL-MCNC: 10 MG/DL (ref 8–22)
CALCIUM ALBUM COR SERPL-MCNC: 8.7 MG/DL (ref 8.5–10.5)
CALCIUM SERPL-MCNC: 7.9 MG/DL (ref 8.5–10.5)
CHLORIDE SERPL-SCNC: 104 MMOL/L (ref 96–112)
CO2 SERPL-SCNC: 21 MMOL/L (ref 20–33)
CREAT SERPL-MCNC: 0.62 MG/DL (ref 0.5–1.4)
EOSINOPHIL # BLD AUTO: 0.02 K/UL (ref 0–0.51)
EOSINOPHIL NFR BLD: 0.2 % (ref 0–6.9)
ERYTHROCYTE [DISTWIDTH] IN BLOOD BY AUTOMATED COUNT: 47.2 FL (ref 35.9–50)
GFR SERPLBLD CREATININE-BSD FMLA CKD-EPI: 91 ML/MIN/1.73 M 2
GLOBULIN SER CALC-MCNC: 2.9 G/DL (ref 1.9–3.5)
GLUCOSE SERPL-MCNC: 97 MG/DL (ref 65–99)
HCT VFR BLD AUTO: 39.3 % (ref 37–47)
HGB BLD-MCNC: 12.7 G/DL (ref 12–16)
IMM GRANULOCYTES # BLD AUTO: 0.28 K/UL (ref 0–0.11)
IMM GRANULOCYTES NFR BLD AUTO: 2.2 % (ref 0–0.9)
LYMPHOCYTES # BLD AUTO: 0.9 K/UL (ref 1–4.8)
LYMPHOCYTES NFR BLD: 7.2 % (ref 22–41)
MAGNESIUM SERPL-MCNC: 2 MG/DL (ref 1.5–2.5)
MCH RBC QN AUTO: 30 PG (ref 27–33)
MCHC RBC AUTO-ENTMCNC: 32.3 G/DL (ref 32.2–35.5)
MCV RBC AUTO: 92.9 FL (ref 81.4–97.8)
MONOCYTES # BLD AUTO: 0.74 K/UL (ref 0–0.85)
MONOCYTES NFR BLD AUTO: 5.9 % (ref 0–13.4)
NEUTROPHILS # BLD AUTO: 10.48 K/UL (ref 1.82–7.42)
NEUTROPHILS NFR BLD: 84.2 % (ref 44–72)
NRBC # BLD AUTO: 0.04 K/UL
NRBC BLD-RTO: 0.3 /100 WBC (ref 0–0.2)
PHOSPHATE SERPL-MCNC: 2.9 MG/DL (ref 2.5–4.5)
PLATELET # BLD AUTO: 455 K/UL (ref 164–446)
PMV BLD AUTO: 10.4 FL (ref 9–12.9)
POTASSIUM SERPL-SCNC: 3.4 MMOL/L (ref 3.6–5.5)
PROT SERPL-MCNC: 5.9 G/DL (ref 6–8.2)
RBC # BLD AUTO: 4.23 M/UL (ref 4.2–5.4)
SODIUM SERPL-SCNC: 139 MMOL/L (ref 135–145)
UFH PPP CHRO-ACNC: 0.12 IU/ML
WBC # BLD AUTO: 12.5 K/UL (ref 4.8–10.8)

## 2024-08-19 PROCEDURE — 85520 HEPARIN ASSAY: CPT

## 2024-08-19 PROCEDURE — 770020 HCHG ROOM/CARE - TELE (206)

## 2024-08-19 PROCEDURE — 83735 ASSAY OF MAGNESIUM: CPT

## 2024-08-19 PROCEDURE — 80053 COMPREHEN METABOLIC PANEL: CPT

## 2024-08-19 PROCEDURE — 84100 ASSAY OF PHOSPHORUS: CPT

## 2024-08-19 PROCEDURE — 700102 HCHG RX REV CODE 250 W/ 637 OVERRIDE(OP): Performed by: INTERNAL MEDICINE

## 2024-08-19 PROCEDURE — A9270 NON-COVERED ITEM OR SERVICE: HCPCS | Mod: JZ | Performed by: INTERNAL MEDICINE

## 2024-08-19 PROCEDURE — A9270 NON-COVERED ITEM OR SERVICE: HCPCS | Performed by: INTERNAL MEDICINE

## 2024-08-19 PROCEDURE — 85025 COMPLETE CBC W/AUTO DIFF WBC: CPT

## 2024-08-19 PROCEDURE — 99233 SBSQ HOSP IP/OBS HIGH 50: CPT | Performed by: INTERNAL MEDICINE

## 2024-08-19 PROCEDURE — 700111 HCHG RX REV CODE 636 W/ 250 OVERRIDE (IP): Performed by: INTERNAL MEDICINE

## 2024-08-19 PROCEDURE — 700102 HCHG RX REV CODE 250 W/ 637 OVERRIDE(OP): Mod: JZ | Performed by: INTERNAL MEDICINE

## 2024-08-19 PROCEDURE — 700105 HCHG RX REV CODE 258: Performed by: INTERNAL MEDICINE

## 2024-08-19 RX ORDER — POTASSIUM CHLORIDE 1500 MG/1
40 TABLET, EXTENDED RELEASE ORAL ONCE
Status: COMPLETED | OUTPATIENT
Start: 2024-08-19 | End: 2024-08-19

## 2024-08-19 RX ADMIN — AMLODIPINE BESYLATE 5 MG: 10 TABLET ORAL at 05:36

## 2024-08-19 RX ADMIN — CARBOXYMETHYLCELLULOSE SODIUM 2 DROP: 0.5 SOLUTION/ DROPS OPHTHALMIC at 03:56

## 2024-08-19 RX ADMIN — POTASSIUM CHLORIDE 40 MEQ: 1500 TABLET, EXTENDED RELEASE ORAL at 09:45

## 2024-08-19 RX ADMIN — HEPARIN SODIUM 18 UNITS/KG/HR: 5000 INJECTION, SOLUTION INTRAVENOUS at 04:01

## 2024-08-19 RX ADMIN — LEVOTHYROXINE SODIUM 100 MCG: 0.1 TABLET ORAL at 05:36

## 2024-08-19 RX ADMIN — PIPERACILLIN AND TAZOBACTAM 4.5 G: 4; .5 INJECTION, POWDER, FOR SOLUTION INTRAVENOUS at 21:56

## 2024-08-19 RX ADMIN — PIPERACILLIN AND TAZOBACTAM 4.5 G: 4; .5 INJECTION, POWDER, FOR SOLUTION INTRAVENOUS at 13:03

## 2024-08-19 RX ADMIN — APIXABAN 10 MG: 5 TABLET, FILM COATED ORAL at 18:09

## 2024-08-19 RX ADMIN — PIPERACILLIN AND TAZOBACTAM 4.5 G: 4; .5 INJECTION, POWDER, FOR SOLUTION INTRAVENOUS at 05:35

## 2024-08-19 RX ADMIN — LIOTHYRONINE SODIUM 5 MCG: 5 TABLET ORAL at 09:44

## 2024-08-19 RX ADMIN — APIXABAN 10 MG: 5 TABLET, FILM COATED ORAL at 10:59

## 2024-08-19 RX ADMIN — HEPARIN SODIUM 2800 UNITS: 1000 INJECTION, SOLUTION INTRAVENOUS; SUBCUTANEOUS at 06:59

## 2024-08-19 RX ADMIN — CARBOXYMETHYLCELLULOSE SODIUM 2 DROP: 0.5 SOLUTION/ DROPS OPHTHALMIC at 13:18

## 2024-08-19 ASSESSMENT — ENCOUNTER SYMPTOMS
HALLUCINATIONS: 0
DIARRHEA: 0
TINGLING: 0
WEAKNESS: 1
NECK PAIN: 0
SPEECH CHANGE: 0
WEIGHT LOSS: 0
FEVER: 0
DOUBLE VISION: 0
ORTHOPNEA: 0
ABDOMINAL PAIN: 0
CHILLS: 0
TREMORS: 0
EYE PAIN: 0
FOCAL WEAKNESS: 0
PHOTOPHOBIA: 0
SENSORY CHANGE: 0
MYALGIAS: 0
BLURRED VISION: 0
HEADACHES: 0
VOMITING: 0
NAUSEA: 0
SHORTNESS OF BREATH: 0
DIZZINESS: 0
PALPITATIONS: 0
SPUTUM PRODUCTION: 0
BACK PAIN: 0
CONSTIPATION: 0
COUGH: 0

## 2024-08-19 ASSESSMENT — FIBROSIS 4 INDEX
FIB4 SCORE: 0.95
FIB4 SCORE: 1.18

## 2024-08-19 ASSESSMENT — LIFESTYLE VARIABLES: SUBSTANCE_ABUSE: 0

## 2024-08-19 ASSESSMENT — PAIN DESCRIPTION - PAIN TYPE: TYPE: ACUTE PAIN

## 2024-08-19 NOTE — CARE PLAN
The patient is Stable - Low risk of patient condition declining or worsening    Shift Goals  Clinical Goals: oxygen saturation greater than 90%  Patient Goals: get ready for bed  Family Goals: updates    Progress made toward(s) clinical / shift goals:    Problem: Respiratory  Goal: Patient will achieve/maintain optimum respiratory ventilation and gas exchange  Outcome: Progressing     Problem: Skin Integrity  Goal: Skin integrity is maintained or improved  Outcome: Progressing     Problem: Pain - Standard  Goal: Alleviation of pain or a reduction in pain to the patient’s comfort goal  Outcome: Progressing  Note: Medication available per MAR.      Problem: Fall Risk  Goal: Patient will remain free from falls  Outcome: Progressing

## 2024-08-19 NOTE — PROGRESS NOTES
4 Eyes Skin Assessment Completed by NURIS Fleming and NURIS Perkins.    Head WDL  Ears WDL  Nose WDL  Mouth WDL  Neck WDL  Breast/Chest WDL  Shoulder Blades WDL  Spine WDL  (R) Arm/Elbow/Hand Discoloration  (L) Arm/Elbow/Hand WDL  Abdomen WDL  Groin WDL  Scrotum/Coccyx/Buttocks Redness and Excoriation  (R) Leg WDL  (L) Leg WDL  (R) Heel/Foot/Toe Redness and Blanching  (L) Heel/Foot/Toe Redness and Blanching          Devices In Places Tele Box, Blood Pressure Cuff, and Pulse Ox      Interventions In Place Pillows, Q2 Turns, Barrier Cream, Heels Loaded W/Pillows, and Pressure Redistribution Mattress    Possible Skin Injury Yes    Pictures Uploaded Into Epic Yes  Wound Consult Placed Yes and being followed by Wound Care  RN Wound Prevention Protocol Ordered Yes

## 2024-08-19 NOTE — PROGRESS NOTES
Hospital Medicine Daily Progress Note    Date of Service  8/19/2024    Chief Complaint  Paige Gudino is a 78 y.o. female admitted 8/14/2024 with shortness of breath    Hospital Course    Paige Gudino is a 78 y.o. female with past medical history of left-sided breast cancer status post mastectomy on tamoxifen, hypothyroidism, recent pancreato/splenectomy on 8/5 by Dr. Espinoza for large pancreatic cystic mass that is benign serous cystadenoma on pathology (Discharged on 8/9), who presented 8/14/2024 with complaints of shortness of breath and generalized weakness for 5 days, decreased oral intake, chills, cough.  Denies chest pain, any significant abdominal pain, diarrhea or constipation.  She presented desaturating to 76% on room air, was placed on 5 L nasal cannula.  Respiratory rate 30, heart rate 90, blood pressure stable, temperature 99.1.  EKG: Nonspecific T wave inversion in lateral leads.  Chest x-ray showed left lower lobe and middle lobe infiltrate.  CTA showed bilateral segmental subsegmental pulmonary embolism with signs of RV strain.  WBC 19.9, lactic acid 2.5, troponin 70, BNP 8591, lipase 111.  CT of the abdomen and pelvis with contrast, postop changes with postop seroma or possibly pancreatic leak.  Patient was given Zosyn, vancomycin, sepsis bolus 2 L with Ringer lactate, and started on IV heparin.    She admitted to IMCU for close monitoring and diagnosis of hypoxia secondary to pulmonary embolism.    Patient transferred to ICU on August 15, 2024 and she underwent right atrial thrombectomy.    Interval Problem Update    08/17/24    I evaluated and examined her at the bedside.  She reported that she is feeling significantly better and denies complaint of shortness of breath and severe pain.  I discussed plan of care with the and her daughter at the bedside and answered all of their questions been  I am continuing heparin infusion and I am titrating heparin as per Anti Xa  levels and monitor for signs of bleeding.    08/18/24    I evaluated and examined him her at the bedside.  She reported that she is felt significantly better and after long time she had a good breakfast.  She is requiring 2 L of oxygen to maintain oxygen saturation.  She denies any complaint of severe pain.  I discussed plan of care with her and answered all her questions.  I am continuing heparin gtt. and I am titrating as per Anti Xa levels and monitor for signs of bleeding.  Plan is to discuss it with surgery tomorrow for possibility of transitioning her to DOAC    08/19/24    I evaluated and examined her at the bedside.  She reported that she is feeling significantly better and she is able to eat food.  I titrated down her oxygen from 1 L to 0.5 L and requested bedside RN to wean her off from oxygen.  If she will remain stable plan is for discharge tomorrow.  I requested physical therapy evaluation.  Currently she is on monitor of oxygen and maintaining oxygen saturation.  Blood pressures have been fluctuating.  CBC showed white blood cell count of 12.5, hemoglobin of 5.7 and platelet count of 445.  CMP showed sodium of 139, potassium of 3.4, BUN of 10 and creatinine of 0.62.  Cultures remain negative to date.  I discussed with surgical oncologist Dr. Velasco regarding patient anticoagulation.  He recommended we can decide on anticoagulation and no further plan for procedure at this moment and patient want to follow-up with Dr. Espinoza as outpatient.  I transitioned her from heparin infusion to Eliquis loading dose.    I had a lengthy discussion with patient and her  at the bedside that not to take any NSAIDs for pain and I gave them examples of NSAIDs and only to take Tylenol for pain or discuss it with primary care provider before taking any new medication or over-the-counter products or medications.  They expressed understanding.      Plan is to discharge her tomorrow if she will remain stable  overnight on Eliquis.      I have discussed this patient's plan of care and discharge plan at IDT rounds today with Case Management, Nursing, Nursing leadership, and other members of the IDT team.    Consultants/Specialty  cardiology, critical care, and pulmonary    Code Status  Full Code    Disposition  The patient is not medically cleared for discharge to home or a post-acute facility.      I have placed the appropriate orders for post-discharge needs.    Review of Systems  Review of Systems   Constitutional:  Positive for malaise/fatigue. Negative for chills, fever and weight loss.   HENT:  Negative for hearing loss and tinnitus.    Eyes:  Negative for blurred vision, double vision, photophobia and pain.   Respiratory:  Negative for cough, sputum production and shortness of breath.    Cardiovascular:  Negative for chest pain, palpitations, orthopnea and leg swelling.   Gastrointestinal:  Negative for abdominal pain, constipation, diarrhea, nausea and vomiting.   Genitourinary:  Negative for dysuria, frequency and urgency.   Musculoskeletal:  Negative for back pain, joint pain, myalgias and neck pain.   Skin:  Negative for rash.   Neurological:  Positive for weakness. Negative for dizziness, tingling, tremors, sensory change, speech change, focal weakness and headaches.   Psychiatric/Behavioral:  Negative for hallucinations and substance abuse.    All other systems reviewed and are negative.       Physical Exam  Temp:  [36.1 °C (96.9 °F)-36.8 °C (98.2 °F)] 36.1 °C (96.9 °F)  Pulse:  [55-94] 75  Resp:  [16-30] 20  BP: (104-169)/(53-76) 155/73  SpO2:  [82 %-96 %] 96 %    Physical Exam  Vitals reviewed.   Constitutional:       General: She is not in acute distress.     Appearance: Normal appearance. She is not ill-appearing.      Comments: Clinically she has been improving  She is sitting in chair without any acute distress.   HENT:      Head: Normocephalic and atraumatic.      Nose: No congestion.      Comments:  Oxygen nasal cannula  Eyes:      General:         Right eye: No discharge.         Left eye: No discharge.      Pupils: Pupils are equal, round, and reactive to light.   Cardiovascular:      Rate and Rhythm: Normal rate and regular rhythm.      Pulses: Normal pulses.      Heart sounds: Normal heart sounds. No murmur heard.  Pulmonary:      Effort: No respiratory distress.      Breath sounds: Normal breath sounds. No stridor.   Abdominal:      General: Bowel sounds are normal. There is no distension.      Palpations: Abdomen is soft.      Tenderness: There is no abdominal tenderness.   Musculoskeletal:         General: No swelling or tenderness. Normal range of motion.      Cervical back: Normal range of motion. No rigidity.   Skin:     General: Skin is warm.      Capillary Refill: Capillary refill takes less than 2 seconds.      Coloration: Skin is not jaundiced or pale.      Findings: No bruising.   Neurological:      General: No focal deficit present.      Mental Status: She is alert and oriented to person, place, and time.      Cranial Nerves: No cranial nerve deficit.   Psychiatric:         Mood and Affect: Mood normal.         Behavior: Behavior normal.         Fluids    Intake/Output Summary (Last 24 hours) at 8/19/2024 0833  Last data filed at 8/19/2024 0600  Gross per 24 hour   Intake 1279.59 ml   Output 100 ml   Net 1179.59 ml       Laboratory  Recent Labs     08/17/24 0445 08/18/24  0447 08/19/24  0550   WBC 13.7* 14.8* 12.5*   RBC 3.49* 3.80* 4.23   HEMOGLOBIN 10.4* 11.4* 12.7   HEMATOCRIT 32.7* 36.3* 39.3   MCV 93.7 95.5 92.9   MCH 29.8 30.0 30.0   MCHC 31.8* 31.4* 32.3   RDW 47.9 49.1 47.2   PLATELETCT 324 351 455*   MPV 10.2 10.2 10.4     Recent Labs     08/17/24  0445 08/18/24  0447 08/19/24  0550   SODIUM 136 137 139   POTASSIUM 3.6 3.6 3.4*   CHLORIDE 107 107 104   CO2 21 19* 21   GLUCOSE 100* 105* 97   BUN 17 14 10   CREATININE 0.69 0.58 0.62   CALCIUM 7.5* 7.5* 7.9*                      Imaging  EC-ECHOCARDIOGRAM LTD W/ CONT   Final Result      IR-THROMBO MECHANICAL ARTERY,INIT   Final Result      1.  Ultrasound guided access bilateral common femoral veins.   2.  Intracardiac echocardiogram demonstrating mobile right atrial thrombus.   3.  Technically successful right atrial thrombectomy.         CT-CYST ASPIRATION-MISC   Final Result      1.  CT GUIDED RETROPERITONEAL LEFT UPPER QUADRANT NEEDLE ASPIRATION DRAINAGE. THE OBTAINED BROWN WATERY FLUID RESEMBLES PANCREATIC PSEUDOCYST FLUID. THE FLUID WAS NOT PURULENT NOR MALODOROUS.   2.  THE CURRENT PLAN IS TO CHECK CULTURE AND LAB RESULTS.      DX-CHEST-PORTABLE (1 VIEW)   Final Result      1. Stable small left pleural effusion with left mid lung and lung base parenchymal opacities.   2. The remainder is stable.      EC-ECHOCARDIOGRAM COMPLETE W/O CONT   Final Result      US-EXTREMITY VENOUS LOWER BILAT   Final Result      CT-ABDOMEN-PELVIS WITH   Final Result         1.  Postop changes of splenectomy and distal pancreatectomy, fluid collection left upper quadrant is seen which could represent postop seroma or possibly pancreatic leak.   2.  Right lower lobe segmental and subsegmental pulmonary embolus.   3.  Trace right and small left pleural effusions.   4.  Linear densities of the lung bases suggesting atelectasis, component of left lower lobe infiltrate not excluded.   5.  Diverticulosis   6.  Cholelithiasis   7.  Cardiomegaly      These findings were discussed with the patient's clinician, Teri Lee, on 8/14/2024 9:53 PM.      CT-CTA CHEST PULMONARY ARTERY W/ RECONS   Final Result   .      1.  Bilateral segmental and subsegmental pulmonary emboli with changes compatible with significant right heart strain.   2.  Small left and trace right pleural effusions.   3.  Linear consolidations in the bilateral lung bases suggests atelectasis, component of left lower lobe infiltrate is not excluded.   4.  4.0 cm ascending thoracic aortic  aneurysm, radiographic follow-up and surveillance recommended as clinically appropriate.   5.  Atherosclerosis and atherosclerotic coronary artery disease      These findings were discussed with the patient's clinician, Teri Lee, on 8/14/2024 10:05 PM.      DX-CHEST-PORTABLE (1 VIEW)   Final Result         1.  Left midlung and lower lobe infiltrates   2.  Trace left pleural effusion           Assessment/Plan  * Pulmonary embolism with acute cor pulmonale, unspecified chronicity, unspecified pulmonary embolism type (HCC)- (present on admission)  Assessment & Plan  Submassive PE with DVTs and thrombus in transit  Difficult treatment with limited options.  Per IR, unable to complete suction thrombectomy given risk for propagation.  With intra-abdominal fluid collection of unknown etiology systemic tPA is a high risk and recent surgery given recent surgery.  Profoundly high mortality with PESI 147, TANMAY Stage IV  No signs of active bleeding.  I discussed plan of care with surgical oncology and now will transition her from heparin infusion to loading dose of Eliquis        Thoracic ascending aortic aneurysm (HCC)  Assessment & Plan  On imaging studies patient found to have 4.0 cm ascending thoracic aortic aneurysm and she will need outpatient follow-up.    Second degree heart block- (present on admission)  Assessment & Plan  During the night of 8/15 into 8/16, patient was noted to have occasional Mobitz type II.  Patient was asymptomatic during these episodes.  This is likely secondary to PE. Otherwise, she is mostly in sinus rhythm.  Patient appliance in place.  Remained stable on telemetry.  Continue to monitor closely on telemetry.  If no improvement, consult cardiology.  Continue to monitor    Atrial thrombus- (present on admission)  Assessment & Plan  suction thrombectomy 8/16 successful  Now transitioned today on loading dose of Eliquis      DVT (deep venous thrombosis) (HCC)- (present on  admission)  Assessment & Plan    Bilateral DVTs.  Continue heparin.  Now I transitioned her to Eliquis.    Acute respiratory failure with hypoxia (HCC)- (present on admission)  Assessment & Plan  Secondary to pulmonary embolism  She remains on 1 L of oxygen to maintain oxygen saturation  Continue to monitor closely    Intra-abdominal fluid collection- (present on admission)  Assessment & Plan  CT of the abdomen pelvis with contrast showed postop seroma or pancreatic leak.  Lipase 111.  Cover with empiric antibiotics for possible infection  She underwent IR guided fluid drainage.  She will need repeat imaging studies.  Surgical oncology evaluated and made recommendations.  Dr. Velasco recommended that patient will need outpatient follow-up.  Continue to monitor    Sepsis (HCC)- (present on admission)  Assessment & Plan  I this is Sepsis Present on admission  SIRS criteria identified on my evaluation include: Tachycardia, with heart rate greater than 90 BPM, Tachypnea, with respirations greater than 20 per minute, and Leukocytosis, with WBC greater than 12,000  Clinical indicators of end organ dysfunction include Lactic Acid greater than 2  Source is intra-abdominal infection or pneumonia  Sepsis protocol initiated  Crystalloid Fluid Administration: Fluid resuscitation ordered per standard protocol - 30 mL/kg per current or ideal body weight  IV antibiotics as appropriate for source of sepsis  Reassessment: I have reassessed the patient's hemodynamic status    I am continuing IV Zosyn.  Cultures remain negative to date.  Tomorrow would be the last day of antibiotic.  I discussed plan of care with him and answered all her questions.    Cancer of overlapping sites of left breast (HCC)- (present on admission)  Assessment & Plan  Will hold tamoxifen since it could increase risk of DVT/PE    Advance care planning- (present on admission)  Assessment & Plan  CODE STATUS discussed with Ms. Gudino and she would like to  be full code.    Acquired hypothyroidism- (present on admission)  Assessment & Plan  I am continuing levothyroxine, Cytomel        I discussed plan of care during multidisciplinary rounds regarding patient's current medical condition and plan of care.    I discussed plan of care with surgical oncologist Dr. Velasco regarding patient anticoagulation.        VTE prophylaxis: Therapeutic Eliquis    I have performed a physical exam and reviewed and updated ROS and Plan today (8/19/2024). In review of yesterday's note (8/18/2024), there are no changes except as documented above.

## 2024-08-19 NOTE — CARE PLAN
The patient is Stable - Low risk of patient condition declining or worsening    Shift Goals  Clinical Goals: Decrease oxygen  Patient Goals: Wash/style hair  Family Goals: updates    Progress made toward(s) clinical / shift goals:  Room air   Problem: Knowledge Deficit - Standard  Goal: Patient and family/care givers will demonstrate understanding of plan of care, disease process/condition, diagnostic tests and medications  Outcome: Progressing     Problem: Hemodynamics  Goal: Patient's hemodynamics, fluid balance and neurologic status will be stable or improve  Outcome: Progressing     Problem: Respiratory  Goal: Patient will achieve/maintain optimum respiratory ventilation and gas exchange  Outcome: Progressing     Problem: Pain - Standard  Goal: Alleviation of pain or a reduction in pain to the patient’s comfort goal  Outcome: Progressing     Problem: Fall Risk  Goal: Patient will remain free from falls  Outcome: Progressing       Patient is not progressing towards the following goals:

## 2024-08-20 ENCOUNTER — APPOINTMENT (OUTPATIENT)
Dept: RADIOLOGY | Facility: MEDICAL CENTER | Age: 79
DRG: 853 | End: 2024-08-20
Attending: NURSE PRACTITIONER
Payer: COMMERCIAL

## 2024-08-20 ENCOUNTER — APPOINTMENT (OUTPATIENT)
Dept: SURGICAL ONCOLOGY | Facility: MEDICAL CENTER | Age: 79
End: 2024-08-20
Payer: COMMERCIAL

## 2024-08-20 DIAGNOSIS — K86.89 MASS OF PANCREAS: ICD-10-CM

## 2024-08-20 DIAGNOSIS — R18.8 ABDOMINAL FLUID COLLECTION: ICD-10-CM

## 2024-08-20 DIAGNOSIS — Z98.890 S/P EXPLORATORY LAPAROTOMY: ICD-10-CM

## 2024-08-20 DIAGNOSIS — Z90.10 HISTORY OF MASTECTOMY, UNSPECIFIED LATERALITY: ICD-10-CM

## 2024-08-20 DIAGNOSIS — Z92.21 S/P CHEMOTHERAPY, TIME SINCE GREATER THAN 12 WEEKS: ICD-10-CM

## 2024-08-20 LAB
ALBUMIN SERPL BCP-MCNC: 2.9 G/DL (ref 3.2–4.9)
ALBUMIN/GLOB SERPL: 1.1 G/DL
ALP SERPL-CCNC: 62 U/L (ref 30–99)
ALT SERPL-CCNC: 81 U/L (ref 2–50)
ANION GAP SERPL CALC-SCNC: 13 MMOL/L (ref 7–16)
AST SERPL-CCNC: 48 U/L (ref 12–45)
BACTERIA BLD CULT: NORMAL
BACTERIA BLD CULT: NORMAL
BILIRUB SERPL-MCNC: 1 MG/DL (ref 0.1–1.5)
BUN SERPL-MCNC: 10 MG/DL (ref 8–22)
CALCIUM ALBUM COR SERPL-MCNC: 9.1 MG/DL (ref 8.5–10.5)
CALCIUM SERPL-MCNC: 8.2 MG/DL (ref 8.5–10.5)
CHLORIDE SERPL-SCNC: 106 MMOL/L (ref 96–112)
CO2 SERPL-SCNC: 20 MMOL/L (ref 20–33)
CREAT SERPL-MCNC: 0.59 MG/DL (ref 0.5–1.4)
EKG IMPRESSION: NORMAL
ERYTHROCYTE [DISTWIDTH] IN BLOOD BY AUTOMATED COUNT: 48.9 FL (ref 35.9–50)
GFR SERPLBLD CREATININE-BSD FMLA CKD-EPI: 92 ML/MIN/1.73 M 2
GLOBULIN SER CALC-MCNC: 2.6 G/DL (ref 1.9–3.5)
GLUCOSE SERPL-MCNC: 105 MG/DL (ref 65–99)
HCT VFR BLD AUTO: 35.7 % (ref 37–47)
HGB BLD-MCNC: 11.5 G/DL (ref 12–16)
MAGNESIUM SERPL-MCNC: 2 MG/DL (ref 1.5–2.5)
MCH RBC QN AUTO: 29.9 PG (ref 27–33)
MCHC RBC AUTO-ENTMCNC: 32.2 G/DL (ref 32.2–35.5)
MCV RBC AUTO: 93 FL (ref 81.4–97.8)
PHOSPHATE SERPL-MCNC: 2.8 MG/DL (ref 2.5–4.5)
PLATELET # BLD AUTO: 485 K/UL (ref 164–446)
PMV BLD AUTO: 10.2 FL (ref 9–12.9)
POTASSIUM SERPL-SCNC: 3.6 MMOL/L (ref 3.6–5.5)
PROT SERPL-MCNC: 5.5 G/DL (ref 6–8.2)
RBC # BLD AUTO: 3.84 M/UL (ref 4.2–5.4)
SIGNIFICANT IND 70042: NORMAL
SIGNIFICANT IND 70042: NORMAL
SITE SITE: NORMAL
SITE SITE: NORMAL
SODIUM SERPL-SCNC: 139 MMOL/L (ref 135–145)
SOURCE SOURCE: NORMAL
SOURCE SOURCE: NORMAL
WBC # BLD AUTO: 11.4 K/UL (ref 4.8–10.8)

## 2024-08-20 PROCEDURE — A9270 NON-COVERED ITEM OR SERVICE: HCPCS | Performed by: INTERNAL MEDICINE

## 2024-08-20 PROCEDURE — 99233 SBSQ HOSP IP/OBS HIGH 50: CPT | Performed by: STUDENT IN AN ORGANIZED HEALTH CARE EDUCATION/TRAINING PROGRAM

## 2024-08-20 PROCEDURE — 700102 HCHG RX REV CODE 250 W/ 637 OVERRIDE(OP): Performed by: INTERNAL MEDICINE

## 2024-08-20 PROCEDURE — 85027 COMPLETE CBC AUTOMATED: CPT

## 2024-08-20 PROCEDURE — 770020 HCHG ROOM/CARE - TELE (206)

## 2024-08-20 PROCEDURE — 36415 COLL VENOUS BLD VENIPUNCTURE: CPT

## 2024-08-20 PROCEDURE — 84100 ASSAY OF PHOSPHORUS: CPT

## 2024-08-20 PROCEDURE — 74177 CT ABD & PELVIS W/CONTRAST: CPT

## 2024-08-20 PROCEDURE — 700105 HCHG RX REV CODE 258: Performed by: INTERNAL MEDICINE

## 2024-08-20 PROCEDURE — 93005 ELECTROCARDIOGRAM TRACING: CPT

## 2024-08-20 PROCEDURE — 80053 COMPREHEN METABOLIC PANEL: CPT

## 2024-08-20 PROCEDURE — 700111 HCHG RX REV CODE 636 W/ 250 OVERRIDE (IP): Mod: JZ | Performed by: INTERNAL MEDICINE

## 2024-08-20 PROCEDURE — 99024 POSTOP FOLLOW-UP VISIT: CPT | Performed by: SURGERY

## 2024-08-20 PROCEDURE — 83735 ASSAY OF MAGNESIUM: CPT

## 2024-08-20 PROCEDURE — 700117 HCHG RX CONTRAST REV CODE 255: Performed by: NURSE PRACTITIONER

## 2024-08-20 RX ADMIN — LIOTHYRONINE SODIUM 2.5 MCG: 5 TABLET ORAL at 05:23

## 2024-08-20 RX ADMIN — PIPERACILLIN AND TAZOBACTAM 4.5 G: 4; .5 INJECTION, POWDER, FOR SOLUTION INTRAVENOUS at 21:32

## 2024-08-20 RX ADMIN — IOHEXOL 100 ML: 350 INJECTION, SOLUTION INTRAVENOUS at 16:12

## 2024-08-20 RX ADMIN — PIPERACILLIN AND TAZOBACTAM 4.5 G: 4; .5 INJECTION, POWDER, FOR SOLUTION INTRAVENOUS at 05:34

## 2024-08-20 RX ADMIN — APIXABAN 10 MG: 5 TABLET, FILM COATED ORAL at 18:05

## 2024-08-20 RX ADMIN — AMLODIPINE BESYLATE 5 MG: 10 TABLET ORAL at 05:24

## 2024-08-20 RX ADMIN — APIXABAN 10 MG: 5 TABLET, FILM COATED ORAL at 05:24

## 2024-08-20 RX ADMIN — PIPERACILLIN AND TAZOBACTAM 4.5 G: 4; .5 INJECTION, POWDER, FOR SOLUTION INTRAVENOUS at 13:13

## 2024-08-20 RX ADMIN — LEVOTHYROXINE SODIUM 100 MCG: 0.1 TABLET ORAL at 05:24

## 2024-08-20 ASSESSMENT — PAIN DESCRIPTION - PAIN TYPE
TYPE: ACUTE PAIN

## 2024-08-20 ASSESSMENT — ENCOUNTER SYMPTOMS
ABDOMINAL PAIN: 0
VOMITING: 0
COUGH: 0
FEVER: 0
NAUSEA: 0
CHILLS: 0
SHORTNESS OF BREATH: 0
WEIGHT LOSS: 0
DIARRHEA: 0

## 2024-08-20 ASSESSMENT — FIBROSIS 4 INDEX: FIB4 SCORE: 0.95

## 2024-08-20 NOTE — CARE PLAN
The patient is Watcher - Medium risk of patient condition declining or worsening    Shift Goals  Clinical Goals: mobility; maintain or improve respiratory status; pain control; POC  Patient Goals: rest; get better; go home soon  Family Goals: updates    Progress made toward(s) clinical / shift goals:    Problem: Knowledge Deficit - Standard  Goal: Patient and family/care givers will demonstrate understanding of plan of care, disease process/condition, diagnostic tests and medications  Outcome: Progressing     Problem: Hemodynamics  Goal: Patient's hemodynamics, fluid balance and neurologic status will be stable or improve  Outcome: Progressing     Problem: Respiratory  Goal: Patient will achieve/maintain optimum respiratory ventilation and gas exchange  Outcome: Progressing     Problem: Pain - Standard  Goal: Alleviation of pain or a reduction in pain to the patient’s comfort goal  Outcome: Progressing

## 2024-08-20 NOTE — PROGRESS NOTES
Received bedside report from RN, pt care assumed, VSS, pt assessment complete. Pt AAOx4, with no complaints of pain at this time. No signs of acute distress noted at this time. Plan of care discussed with pt and verbalizes no questions. Pt denies any additional needs at this time. Bed locked/in lowest position, bed alarm on, pt educated on fall risk and verbalized understanding, call light within reach, hourly rounding initiated.

## 2024-08-20 NOTE — CARE PLAN
The patient is Stable - Low risk of patient condition declining or worsening    Shift Goals  Clinical Goals: VSS, safety, rest  Patient Goals: rest, comfort  Family Goals: updates    Progress made toward(s) clinical / shift goals:      Problem: Knowledge Deficit - Standard  Goal: Patient and family/care givers will demonstrate understanding of plan of care, disease process/condition, diagnostic tests and medications  Outcome: Progressing  Note: Patient educated on all tests, medications, and procedures. All questions addressed in regards to plan of care.      Problem: Respiratory  Goal: Patient will achieve/maintain optimum respiratory ventilation and gas exchange  Outcome: Progressing  Note: Patient able to maintain oxygenation greater than 90% on 2L NC.      Problem: Skin Integrity  Goal: Skin integrity is maintained or improved  Outcome: Progressing  Note: Skin integrity maintained through Q2 turns and use of barrier paste.      Problem: Fall Risk  Goal: Patient will remain free from falls  Outcome: Progressing  Note: Patient remains free from falls. Non-slip socks and bed alarm in place. Call light is within reach, patient educated on importance to call for assistance. Patient able to demonstrate appropriate use of the call light.       Problem: Hemodynamics  Goal: Patient's hemodynamics, fluid balance and neurologic status will be stable or improve  Outcome: Progressing  Note: Vital signs and neurological status remain stable.

## 2024-08-20 NOTE — PROGRESS NOTES
Date of Service  August 20, 2024     Chief Complaint  Shortness of Breath (Brought in by [facility] from home. Patient c/o Shortness of breath and weakness x 5 days. Had splenectomy 5th of Aug and DC'd on the 9 th.EMS arrival patient oxygen saturation 76 % RA.)      Surgery Completed  CT GUIDED CATHETER NEEDLE ASPIRATION DRAINAGE  LUQ QUADRANT  (Dr Ruff)    Hospital Course  POD # 5     Interval Problem Update  No acute events  Patient noted increased abdominal distension/discomfort  Episodes of moderate serous discharge from abdominal midline incision  WBC 11.4, afebrile, last dose Zosyn today  Tolerating diet without N/V  On Eliquis for PE    Problem List  Principal Problem:    Pulmonary embolism with acute cor pulmonale, unspecified chronicity, unspecified pulmonary embolism type (HCC) (POA: Yes)  Active Problems:    Acquired hypothyroidism (POA: Yes)    Advance care planning (POA: Yes)    Cancer of overlapping sites of left breast (HCC) (POA: Yes)    Sepsis (HCC) (POA: Yes)    Intra-abdominal fluid collection (POA: Yes)    Acute respiratory failure with hypoxia (HCC) (POA: Yes)    DVT (deep venous thrombosis) (HCC) (POA: Yes)    Atrial thrombus (POA: Yes)    Second degree heart block (POA: Yes)    Thoracic ascending aortic aneurysm (HCC) (POA: Unknown)  Resolved Problems:    * No resolved hospital problems. *     Subjective  Review of Systems   Constitutional:  Negative for chills, fever, malaise/fatigue and weight loss.   Respiratory:  Negative for cough and shortness of breath.    Cardiovascular:  Negative for chest pain and leg swelling.   Gastrointestinal:  Negative for abdominal pain, diarrhea, nausea and vomiting.       Objective  Temp:  [36.4 °C (97.5 °F)-37.2 °C (99 °F)] 36.4 °C (97.5 °F)  Pulse:  [63-75] 67  Resp:  [12-20] 18  BP: (150-162)/(78-85) 150/78  SpO2:  [90 %-95 %] 92 %      Physical Exam  Vitals and nursing note reviewed.   Constitutional:       General: She is not in acute distress.      Appearance: Normal appearance. She is obese.   HENT:      Head: Normocephalic and atraumatic.      Mouth/Throat:      Pharynx: Oropharynx is clear.   Eyes:      Conjunctiva/sclera: Conjunctivae normal.   Cardiovascular:      Rate and Rhythm: Normal rate.   Pulmonary:      Effort: Pulmonary effort is normal. No respiratory distress.   Skin:     General: Skin is warm and dry.      Coloration: Skin is not jaundiced.   Neurological:      Mental Status: She is alert and oriented to person, place, and time.   Psychiatric:         Mood and Affect: Mood normal.         Behavior: Behavior normal.        Fluids    Intake/Output Summary (Last 24 hours) at 8/20/2024 1328  Last data filed at 8/20/2024 0905  Gross per 24 hour   Intake 560 ml   Output --   Net 560 ml       Labs  Lab Results   Component Value Date/Time    SODIUM 139 08/20/2024 03:21 AM    POTASSIUM 3.6 08/20/2024 03:21 AM    CHLORIDE 106 08/20/2024 03:21 AM    CO2 20 08/20/2024 03:21 AM    GLUCOSE 105 (H) 08/20/2024 03:21 AM    BUN 10 08/20/2024 03:21 AM    CREATININE 0.59 08/20/2024 03:21 AM         Lab Results   Component Value Date/Time    PROTHROMBTM 15.5 (H) 08/14/2024 08:50 PM    INR 1.21 (H) 08/14/2024 08:50 PM         Lab Results   Component Value Date/Time    WBC 11.4 (H) 08/20/2024 03:21 AM    RBC 3.84 (L) 08/20/2024 03:21 AM    HEMOGLOBIN 11.5 (L) 08/20/2024 03:21 AM    HEMATOCRIT 35.7 (L) 08/20/2024 03:21 AM    MCV 93.0 08/20/2024 03:21 AM    MCH 29.9 08/20/2024 03:21 AM    MCHC 32.2 08/20/2024 03:21 AM    MPV 10.2 08/20/2024 03:21 AM    NEUTSPOLYS 84.20 (H) 08/19/2024 05:50 AM    LYMPHOCYTES 7.20 (L) 08/19/2024 05:50 AM    MONOCYTES 5.90 08/19/2024 05:50 AM    EOSINOPHILS 0.20 08/19/2024 05:50 AM    BASOPHILS 0.30 08/19/2024 05:50 AM    ANISOCYTOSIS 1+ 08/15/2024 03:30 AM         Recent Labs     08/14/24  2050 08/15/24  0330 08/16/24  0100 08/17/24  0445 08/18/24  0447 08/19/24  0550 08/20/24  0321   ASTSGOT 51* 40 31 33 43 51* 48*   ALTSGPT 71* 60* 50  54* 64* 83* 81*   TBILIRUBIN 1.5 1.7* 1.2 1.0 1.0 1.0 1.0   GLOBULIN 3.1 2.8 2.1 2.5 2.8 2.9 2.6   INR 1.21*  --   --   --   --   --   --       Imaging  US EXTREMITY VENOUS LE (8/15/24) - Preliminary   FINDINGS:   Bilateral lower extremities    Acute deep venous thrombosis in the posterior tibial, peroneal and    gastrocnemius veins with areas of partial occlusion and areas of complete    occlusion.    All other veins demonstrate complete color filling and compressibility with    normal venous flow dynamics including spontaneous flow and respiratory    phasicity.     CT ABDOMEN/PELVIS (8/14/24)  IMPRESSION:  1.  Postop changes of splenectomy and distal pancreatectomy, fluid collection left upper quadrant is seen which could represent postop seroma or possibly pancreatic leak.  2.  Right lower lobe segmental and subsegmental pulmonary embolus.  3.  Trace right and small left pleural effusions.  4.  Linear densities of the lung bases suggesting atelectasis, component of left lower lobe infiltrate not excluded.  5.  Diverticulosis  6.  Cholelithiasis  7.  Cardiomegaly     Assessment/Plan  Patient is a 78F readmitted with shortness of breath, fatigue, LUQ pain, s/p ex lap with distal panc / spleen on 8/5/24, noted to have B/L PE on 8/14/24 s/p right atrial thrombectomy on 8/16/24 by Dr Bowen. Found to have LUQ fluid collection s/p IR fluid aspiration 400 mL by Dr Ruff on 8/15/24.     Ex lap distal panc/spleen, now abdominal fullness question recurrence of LUQ fluid collection vs pancreas leak vs seroma  - CT A/P      2. Pulmonary Edema, B/L, with RV strain, s/p right atrial thrombectomy  - Continue Eliquis     3. Leukocytosis, WBC 11.4  - Last day of Zosyn today  - Monitor    4. Serous discharge from Abdominal midline incision  - Reinforce dressing as needed  - Monitor  - If worsens - may consider wound vac    Patient seen with Dr Espinoza

## 2024-08-20 NOTE — PROGRESS NOTES
Monitor Summary    SR, SB 58-68  (R) PVC, (R) Couplet  .20/.05/.39

## 2024-08-21 LAB
ALBUMIN SERPL BCP-MCNC: 3.2 G/DL (ref 3.2–4.9)
ALBUMIN/GLOB SERPL: 1.1 G/DL
ALP SERPL-CCNC: 70 U/L (ref 30–99)
ALT SERPL-CCNC: 96 U/L (ref 2–50)
ANION GAP SERPL CALC-SCNC: 12 MMOL/L (ref 7–16)
APTT PPP: 40.6 SEC (ref 24.7–36)
APTT PPP: 42.3 SEC (ref 24.7–36)
AST SERPL-CCNC: 50 U/L (ref 12–45)
BILIRUB SERPL-MCNC: 1 MG/DL (ref 0.1–1.5)
BUN SERPL-MCNC: 8 MG/DL (ref 8–22)
CALCIUM ALBUM COR SERPL-MCNC: 8.8 MG/DL (ref 8.5–10.5)
CALCIUM SERPL-MCNC: 8.2 MG/DL (ref 8.5–10.5)
CHLORIDE SERPL-SCNC: 106 MMOL/L (ref 96–112)
CO2 SERPL-SCNC: 20 MMOL/L (ref 20–33)
CREAT SERPL-MCNC: 0.66 MG/DL (ref 0.5–1.4)
ERYTHROCYTE [DISTWIDTH] IN BLOOD BY AUTOMATED COUNT: 51.1 FL (ref 35.9–50)
GFR SERPLBLD CREATININE-BSD FMLA CKD-EPI: 90 ML/MIN/1.73 M 2
GLOBULIN SER CALC-MCNC: 2.8 G/DL (ref 1.9–3.5)
GLUCOSE SERPL-MCNC: 103 MG/DL (ref 65–99)
HCT VFR BLD AUTO: 39.2 % (ref 37–47)
HGB BLD-MCNC: 12.4 G/DL (ref 12–16)
INR PPP: 1.75 (ref 0.87–1.13)
MCH RBC QN AUTO: 30 PG (ref 27–33)
MCHC RBC AUTO-ENTMCNC: 31.6 G/DL (ref 32.2–35.5)
MCV RBC AUTO: 94.7 FL (ref 81.4–97.8)
PLATELET # BLD AUTO: 568 K/UL (ref 164–446)
PMV BLD AUTO: 9.8 FL (ref 9–12.9)
POTASSIUM SERPL-SCNC: 3.7 MMOL/L (ref 3.6–5.5)
PROT SERPL-MCNC: 6 G/DL (ref 6–8.2)
PROTHROMBIN TIME: 20.7 SEC (ref 12–14.6)
RBC # BLD AUTO: 4.14 M/UL (ref 4.2–5.4)
SODIUM SERPL-SCNC: 138 MMOL/L (ref 135–145)
UFH PPP CHRO-ACNC: >1.1 IU/ML
UFH PPP CHRO-ACNC: >1.1 IU/ML
WBC # BLD AUTO: 10.8 K/UL (ref 4.8–10.8)

## 2024-08-21 PROCEDURE — 80053 COMPREHEN METABOLIC PANEL: CPT

## 2024-08-21 PROCEDURE — A9270 NON-COVERED ITEM OR SERVICE: HCPCS | Performed by: INTERNAL MEDICINE

## 2024-08-21 PROCEDURE — 770020 HCHG ROOM/CARE - TELE (206)

## 2024-08-21 PROCEDURE — 85610 PROTHROMBIN TIME: CPT

## 2024-08-21 PROCEDURE — 700102 HCHG RX REV CODE 250 W/ 637 OVERRIDE(OP): Performed by: INTERNAL MEDICINE

## 2024-08-21 PROCEDURE — 85027 COMPLETE CBC AUTOMATED: CPT

## 2024-08-21 PROCEDURE — 36415 COLL VENOUS BLD VENIPUNCTURE: CPT

## 2024-08-21 PROCEDURE — 700111 HCHG RX REV CODE 636 W/ 250 OVERRIDE (IP): Performed by: STUDENT IN AN ORGANIZED HEALTH CARE EDUCATION/TRAINING PROGRAM

## 2024-08-21 PROCEDURE — 99024 POSTOP FOLLOW-UP VISIT: CPT | Performed by: SURGERY

## 2024-08-21 PROCEDURE — 85730 THROMBOPLASTIN TIME PARTIAL: CPT | Mod: 91

## 2024-08-21 PROCEDURE — 85520 HEPARIN ASSAY: CPT | Mod: 91

## 2024-08-21 PROCEDURE — 99233 SBSQ HOSP IP/OBS HIGH 50: CPT | Performed by: STUDENT IN AN ORGANIZED HEALTH CARE EDUCATION/TRAINING PROGRAM

## 2024-08-21 RX ORDER — HEPARIN SODIUM 1000 [USP'U]/ML
40 INJECTION, SOLUTION INTRAVENOUS; SUBCUTANEOUS PRN
Status: DISCONTINUED | OUTPATIENT
Start: 2024-08-21 | End: 2024-08-21

## 2024-08-21 RX ORDER — HEPARIN SODIUM 5000 [USP'U]/100ML
INJECTION, SOLUTION INTRAVENOUS CONTINUOUS
Status: DISCONTINUED | OUTPATIENT
Start: 2024-08-21 | End: 2024-08-22

## 2024-08-21 RX ORDER — HEPARIN SODIUM 5000 [USP'U]/100ML
0-30 INJECTION, SOLUTION INTRAVENOUS CONTINUOUS
Status: DISCONTINUED | OUTPATIENT
Start: 2024-08-21 | End: 2024-08-21

## 2024-08-21 RX ORDER — HEPARIN SODIUM 1000 [USP'U]/ML
2600 INJECTION, SOLUTION INTRAVENOUS; SUBCUTANEOUS PRN
Status: DISCONTINUED | OUTPATIENT
Start: 2024-08-21 | End: 2024-08-22

## 2024-08-21 RX ADMIN — HEPARIN SODIUM 1050 UNITS/HR: 5000 INJECTION, SOLUTION INTRAVENOUS at 18:25

## 2024-08-21 RX ADMIN — AMLODIPINE BESYLATE 5 MG: 10 TABLET ORAL at 05:01

## 2024-08-21 RX ADMIN — APIXABAN 10 MG: 5 TABLET, FILM COATED ORAL at 05:01

## 2024-08-21 RX ADMIN — LEVOTHYROXINE SODIUM 100 MCG: 0.1 TABLET ORAL at 05:01

## 2024-08-21 RX ADMIN — LIOTHYRONINE SODIUM 5 MCG: 5 TABLET ORAL at 08:29

## 2024-08-21 ASSESSMENT — COGNITIVE AND FUNCTIONAL STATUS - GENERAL
MOBILITY SCORE: 24
SUGGESTED CMS G CODE MODIFIER MOBILITY: CH
DAILY ACTIVITIY SCORE: 24
SUGGESTED CMS G CODE MODIFIER DAILY ACTIVITY: CH

## 2024-08-21 ASSESSMENT — ENCOUNTER SYMPTOMS
BACK PAIN: 0
NAUSEA: 0
BLURRED VISION: 0
SENSORY CHANGE: 0
SPUTUM PRODUCTION: 0
COUGH: 0
VOMITING: 0
PALPITATIONS: 0
WEAKNESS: 1
FEVER: 0
DIARRHEA: 0
ABDOMINAL PAIN: 0
EYE PAIN: 0
ORTHOPNEA: 0
DOUBLE VISION: 0
FOCAL WEAKNESS: 0
SHORTNESS OF BREATH: 0
PHOTOPHOBIA: 0
CHILLS: 0
HALLUCINATIONS: 0
WEIGHT LOSS: 0
TINGLING: 0
HEADACHES: 0
NECK PAIN: 0
DIZZINESS: 0
SPEECH CHANGE: 0
TREMORS: 0
MYALGIAS: 0
CONSTIPATION: 0

## 2024-08-21 ASSESSMENT — PAIN DESCRIPTION - PAIN TYPE: TYPE: ACUTE PAIN

## 2024-08-21 ASSESSMENT — FIBROSIS 4 INDEX
FIB4 SCORE: 0.85
FIB4 SCORE: 0.69

## 2024-08-21 ASSESSMENT — LIFESTYLE VARIABLES: SUBSTANCE_ABUSE: 0

## 2024-08-21 NOTE — DISCHARGE PLANNING
"Care Transition Team Assessment    LSW met with pt at bedside to complete DC assessment. LSW Introduced self and department roles. Pt A&Ox4 and able to verify the information on the face sheet. Pt lives with her spouse and daughter in a single-story house that has one step to enter. Prior to this hospitalization pt was independent at home with ADLs and all IADLs. Pt does not use any DME at baseline but has a FWW in the home if needed.    Pt reported that her  and daughter are her support system however Pt reports \" I really would like to have home healthcare.\" Pt choice form was signed for [facility] as first choice. Faxed to [facility]    Pt denies any SA or MH concerns. Pt does not have an advance directive. Pt family will transport pt home.     Information Source  Orientation Level: Oriented X4  Information Given By: Patient  Who is responsible for making decisions for patient? : Patient    Readmission Evaluation  Is this a readmission?: Yes - unplanned readmission  Why do you think you were readmitted?: \" I was discharged too soon\"    Elopement Risk  Legal Hold: No  Ambulatory or Self Mobile in Wheelchair: Yes  Disoriented: No  Psychiatric Symptoms: None  History of Wandering: No  Elopement this Admit: No  Vocalizing Wanting to Leave: No  Displays Behaviors, Body Language Wanting to Leave: No-Not at Risk for Elopement  Elopement Risk: Not at Risk for Elopement    Interdisciplinary Discharge Planning  Patient or legal guardian wants to designate a caregiver: No    Discharge Preparedness  What is your plan after discharge?: Home health care  What are your discharge supports?: Spouse, Child  Prior Functional Level: Ambulatory  Difficulity with ADLs: None  Difficulity with IADLs: None (Pt reports \" I have not been able to drove since my last admission.\")    Functional Assesment  Prior Functional Level: Ambulatory    Finances  Financial Barriers to Discharge: No  Prescription Coverage: Yes    Vision / Hearing " Impairment  Vision Impairment : No  Hearing Impairment : No  Advance Directive  Advance Directive?: None    Domestic Abuse  Have you ever been the victim of abuse or violence?: No  Possible Abuse/Neglect Reported to:: Not Applicable    Psychological Assessment  History of Substance Abuse: None  History of Psychiatric Problems: No  Non-compliant with Treatment: No    Discharge Risks or Barriers  Discharge risks or barriers?: Complex medical needs  Patient risk factors: Complex medical needs    Anticipated Discharge Information  Discharge Disposition: D/T to home under A care in anticipation of covered skilled care (06)  Discharge Address: [address] [place] [address]  [place] [id number]  Discharge Contact Phone Number:  (Spouse's cell)

## 2024-08-21 NOTE — CARE PLAN
The patient is Stable - Low risk of patient condition declining or worsening    Shift Goals  Clinical Goals: monitor incision, monitor O2, VSS  Patient Goals: rest, updates  Family Goals: updates    Progress made toward(s) clinical / shift goals:      Problem: Knowledge Deficit - Standard  Goal: Patient and family/care givers will demonstrate understanding of plan of care, disease process/condition, diagnostic tests and medications  Outcome: Progressing  Note: Patient educated on all tests, medications, and procedures. All questions addressed in regards to plan of care.        Problem: Hemodynamics  Goal: Patient's hemodynamics, fluid balance and neurologic status will be stable or improve  Outcome: Progressing  Note: Vital signs and neurological status remain stable throughout shift.        Problem: Respiratory  Goal: Patient will achieve/maintain optimum respiratory ventilation and gas exchange  Outcome: Progressing  Note: Patient maintain oxygenation greater than 90% on  1L NC       Problem: Skin Integrity  Goal: Skin integrity is maintained or improved  Outcome: Progressing  Note: Skin integrity maintained through Q2 turns using wedges, application of barrier paste, Low air loss mattress in use.      Problem: Fall Risk  Goal: Patient will remain free from falls  Outcome: Progressing  Note: Patient remains free from falls. Non-slip socks and bed alarm in place. Call light within reach and patient educated on importance to call for assistance when getting up. Patient demonstrate calling appropriately.

## 2024-08-21 NOTE — PROGRESS NOTES
Date of Service  August 21, 2024     Chief Complaint  Shortness of Breath (Brought in by geovanna from home. Patient c/o Shortness of breath and weakness x 5 days. Had splenectomy 5th of Aug and DC'd on the 9 th.EMS arrival patient oxygen saturation 76 % RA.)      Surgery Completed  CT GUIDED CATHETER NEEDLE ASPIRATION DRAINAGE  LUQ QUADRANT  (Dr Ruff)    Hospital Course  POD # 6     Interval Problem Update  No acute events  CT ABDOMEN yesterday noted recurrence of LUQ fluid collection  WBC 10.8 from 11.4, afebrile, completed Zosyn yesterday  Tolerating diet without N/V  Transitioned to Heparin (from Eliquis)    Problem List  Principal Problem:    Pulmonary embolism with acute cor pulmonale, unspecified chronicity, unspecified pulmonary embolism type (HCC) (POA: Yes)  Active Problems:    Acquired hypothyroidism (POA: Yes)    Advance care planning (POA: Yes)    Cancer of overlapping sites of left breast (HCC) (POA: Yes)    Sepsis (HCC) (POA: Yes)    Intra-abdominal fluid collection (POA: Yes)    Acute respiratory failure with hypoxia (HCC) (POA: Yes)    DVT (deep venous thrombosis) (HCC) (POA: Yes)    Atrial thrombus (POA: Yes)    Second degree heart block (POA: Yes)    Thoracic ascending aortic aneurysm (HCC) (POA: Unknown)  Resolved Problems:    * No resolved hospital problems. *     Subjective  Review of Systems   Constitutional:  Negative for chills, fever, malaise/fatigue and weight loss.   Respiratory:  Negative for cough and shortness of breath.    Cardiovascular:  Negative for chest pain and leg swelling.   Gastrointestinal:  Negative for abdominal pain, diarrhea, nausea and vomiting.       Objective  Temp:  [36.4 °C (97.5 °F)-37.4 °C (99.3 °F)] 37.4 °C (99.3 °F)  Pulse:  [60-73] 60  Resp:  [16-20] 18  BP: (142-152)/(66-78) 143/66  SpO2:  [91 %-96 %] 94 %      Physical Exam  Vitals and nursing note reviewed.   Constitutional:       General: She is not in acute distress.     Appearance: Normal appearance.  She is obese.   HENT:      Head: Normocephalic and atraumatic.      Mouth/Throat:      Pharynx: Oropharynx is clear.   Eyes:      Conjunctiva/sclera: Conjunctivae normal.   Cardiovascular:      Rate and Rhythm: Normal rate.   Pulmonary:      Effort: Pulmonary effort is normal. No respiratory distress.   Abdominal:      Comments: Midline incision, steristrips, more scant serous discharge   Skin:     General: Skin is warm and dry.      Coloration: Skin is not jaundiced.   Neurological:      Mental Status: She is alert and oriented to person, place, and time.   Psychiatric:         Mood and Affect: Mood normal.         Behavior: Behavior normal.        Fluids    Intake/Output Summary (Last 24 hours) at 8/21/2024 0906  Last data filed at 8/20/2024 1600  Gross per 24 hour   Intake 230 ml   Output --   Net 230 ml       Labs  Lab Results   Component Value Date/Time    SODIUM 138 08/21/2024 07:53 AM    POTASSIUM 3.7 08/21/2024 07:53 AM    CHLORIDE 106 08/21/2024 07:53 AM    CO2 20 08/21/2024 07:53 AM    GLUCOSE 103 (H) 08/21/2024 07:53 AM    BUN 8 08/21/2024 07:53 AM    CREATININE 0.66 08/21/2024 07:53 AM         Lab Results   Component Value Date/Time    PROTHROMBTM 20.7 (H) 08/21/2024 07:53 AM    INR 1.75 (H) 08/21/2024 07:53 AM         Lab Results   Component Value Date/Time    WBC 10.8 08/21/2024 07:53 AM    RBC 4.14 (L) 08/21/2024 07:53 AM    HEMOGLOBIN 12.4 08/21/2024 07:53 AM    HEMATOCRIT 39.2 08/21/2024 07:53 AM    MCV 94.7 08/21/2024 07:53 AM    MCH 30.0 08/21/2024 07:53 AM    MCHC 31.6 (L) 08/21/2024 07:53 AM    MPV 9.8 08/21/2024 07:53 AM    NEUTSPOLYS 84.20 (H) 08/19/2024 05:50 AM    LYMPHOCYTES 7.20 (L) 08/19/2024 05:50 AM    MONOCYTES 5.90 08/19/2024 05:50 AM    EOSINOPHILS 0.20 08/19/2024 05:50 AM    BASOPHILS 0.30 08/19/2024 05:50 AM    ANISOCYTOSIS 1+ 08/15/2024 03:30 AM         Recent Labs     08/14/24  2050 08/15/24  0330 08/16/24  0100 08/17/24  0445 08/18/24  0447 08/19/24  0550 08/20/24  0321  08/21/24  0753   ASTSGOT 51* 40 31 33 43 51* 48* 50*   ALTSGPT 71* 60* 50 54* 64* 83* 81* 96*   TBILIRUBIN 1.5 1.7* 1.2 1.0 1.0 1.0 1.0 1.0   GLOBULIN 3.1 2.8 2.1 2.5 2.8 2.9 2.6 2.8   INR 1.21*  --   --   --   --   --   --  1.75*      Imaging  CT ABDOMEN (8/20/24)  IMPRESSION:  1.  There is postoperative change consistent with distal pancreatectomy and splenectomy.  2.  The simple appearing left upper quadrant subphrenic fluid collection is again seen very similar to the recent prior study, possibly postoperative seroma as there is no rim enhancement to suggest abscess. Pancreatic leak is considered unlikely as the   majority of the fluid is in the splenic fossa rather than adjacent to the surgical margin of the pancreas.  3.  Stable bibasilar atelectasis and pleural effusions.  4.  Cholelithiasis again noted.  5.  Pulmonary arteries not well assessed on this exam in the right atrial thrombus is no longer evident. Small defect in the right ventricular apex is unchanged.    US EXTREMITY VENOUS LE (8/15/24) - Preliminary   FINDINGS:   Bilateral lower extremities    Acute deep venous thrombosis in the posterior tibial, peroneal and    gastrocnemius veins with areas of partial occlusion and areas of complete    occlusion.    All other veins demonstrate complete color filling and compressibility with    normal venous flow dynamics including spontaneous flow and respiratory    phasicity.     CT ABDOMEN/PELVIS (8/14/24)  IMPRESSION:  1.  Postop changes of splenectomy and distal pancreatectomy, fluid collection left upper quadrant is seen which could represent postop seroma or possibly pancreatic leak.  2.  Right lower lobe segmental and subsegmental pulmonary embolus.  3.  Trace right and small left pleural effusions.  4.  Linear densities of the lung bases suggesting atelectasis, component of left lower lobe infiltrate not excluded.  5.  Diverticulosis  6.  Cholelithiasis  7.  Cardiomegaly     Assessment/Plan  Patient is  a 78F re-admitted with shortness of breath, fatigue, LUQ pain, s/p ex lap with distal panc / spleen on 8/5/24, noted to have B/L PE on 8/14/24 s/p right atrial thrombectomy on 8/16/24 by Dr Bowen. Found to have LUQ fluid collection s/p IR fluid aspiration 400 mL by Dr Ruff on 8/15/24. CT on 8/20/24 noted recurrence of fluid collection.    Ex lap distal panc/spleen, CT on 8/20/24 noted recurrence of LUQ fluid collection  - IR drain ordered, date pending coming off Eliquis   - NPO when appropriate     2. Pulmonary Embolus, B/L, with RV strain, s/p right atrial thrombectomy  - Transitioned to Heparin     3. Serous discharge from abdominal midline incision  - Reinforce dressing as needed  - Monitor  - If worsens - may consider wound vac    Patient seen with Dr Velasco  Hospitalist updated

## 2024-08-21 NOTE — PROGRESS NOTES
Hospital Medicine Daily Progress Note    Date of Service  8/20/2024    Chief Complaint  Paige Gudino is a 78 y.o. female admitted 8/14/2024 with shortness of breath    Hospital Course    Paige Gudino is a 78 y.o. female with past medical history of left-sided breast cancer status post mastectomy on tamoxifen, hypothyroidism, recent pancreato/splenectomy on 8/5 by Dr. Espinoza for large pancreatic cystic mass that is benign serous cystadenoma on pathology (Discharged on 8/9), who presented 8/14/2024 with complaints of shortness of breath and generalized weakness for 5 days, decreased oral intake, chills, cough.  Denies chest pain, any significant abdominal pain, diarrhea or constipation.  She presented desaturating to 76% on room air, was placed on 5 L nasal cannula.  Respiratory rate 30, heart rate 90, blood pressure stable, temperature 99.1.  EKG: Nonspecific T wave inversion in lateral leads.  Chest x-ray showed left lower lobe and middle lobe infiltrate.  CTA showed bilateral segmental subsegmental pulmonary embolism with signs of RV strain.  WBC 19.9, lactic acid 2.5, troponin 70, BNP 8591, lipase 111.  CT of the abdomen and pelvis with contrast, postop changes with postop seroma or possibly pancreatic leak.  Patient was given Zosyn, vancomycin, sepsis bolus 2 L with Ringer lactate, and started on IV heparin.    She admitted to IMCU for close monitoring and diagnosis of hypoxia secondary to pulmonary embolism.    Patient transferred to ICU on August 15, 2024 and she underwent right atrial thrombectomy.    Interval Problem Update  08/17/24  I evaluated and examined her at the bedside.  She reported that she is feeling significantly better and denies complaint of shortness of breath and severe pain.  I discussed plan of care with the and her daughter at the bedside and answered all of their questions been  I am continuing heparin infusion and I am titrating heparin as per Anti Xa  levels and monitor for signs of bleeding.  08/18/24  I evaluated and examined him her at the bedside.  She reported that she is felt significantly better and after long time she had a good breakfast.  She is requiring 2 L of oxygen to maintain oxygen saturation.  She denies any complaint of severe pain.  I discussed plan of care with her and answered all her questions.  I am continuing heparin gtt. and I am titrating as per Anti Xa levels and monitor for signs of bleeding.  Plan is to discuss it with surgery tomorrow for possibility of transitioning her to DOAC  08/19/24  I evaluated and examined her at the bedside.  She reported that she is feeling significantly better and she is able to eat food.  I titrated down her oxygen from 1 L to 0.5 L and requested bedside RN to wean her off from oxygen.  If she will remain stable plan is for discharge tomorrow.  I requested physical therapy evaluation.  Currently she is on monitor of oxygen and maintaining oxygen saturation.  Blood pressures have been fluctuating.  CBC showed white blood cell count of 12.5, hemoglobin of 5.7 and platelet count of 445.  CMP showed sodium of 139, potassium of 3.4, BUN of 10 and creatinine of 0.62.  Cultures remain negative to date.  I discussed with surgical oncologist Dr. Velasco regarding patient anticoagulation.  He recommended we can decide on anticoagulation and no further plan for procedure at this moment and patient want to follow-up with Dr. Espinoza as outpatient.  I transitioned her from heparin infusion to Eliquis loading dose.    8/20  Evaluated patient bedside, family present.  Afebrile normal pulse and respiratory rate systolic blood pressure 140s to 150s pulse ox 90 to 96% on 1 L nasal cannula.  Leukocytosis improving, stable anemia platelets 485 glucose 105 AST ALT 48/81 low albumin and total protein.  She is having leakage of her abdominal surgical site, CT performed showing simple appearing left upper quadrant  subphrenic fluid collection similar to recent study similar size, suspected postoperative seroma as there is no rim enhancement to suggest abscess, pancreatic leak considered unlikely.  Right atrial thrombus no longer evident on the CT.  Will follow-up with surgery recommendations.    I had a lengthy discussion with patient and her  at the bedside that not to take any NSAIDs for pain and I gave them examples of NSAIDs and only to take Tylenol for pain or discuss it with primary care provider before taking any new medication or over-the-counter products or medications.  They expressed understanding.    I have discussed this patient's plan of care and discharge plan at IDT rounds today with Case Management, Nursing, Nursing leadership, and other members of the IDT team.    Consultants/Specialty  cardiology, critical care, and pulmonary    Code Status  Full Code    Disposition  Medically Cleared  I have placed the appropriate orders for post-discharge needs.    Review of Systems  Review of Systems   Constitutional:  Positive for malaise/fatigue. Negative for chills, fever and weight loss.   HENT:  Negative for hearing loss and tinnitus.    Eyes:  Negative for blurred vision, double vision, photophobia and pain.   Respiratory:  Negative for cough, sputum production and shortness of breath.    Cardiovascular:  Negative for chest pain, palpitations, orthopnea and leg swelling.   Gastrointestinal:  Negative for abdominal pain, constipation, diarrhea, nausea and vomiting.   Genitourinary:  Negative for dysuria, frequency and urgency.   Musculoskeletal:  Negative for back pain, joint pain, myalgias and neck pain.   Skin:  Negative for rash.   Neurological:  Positive for weakness. Negative for dizziness, tingling, tremors, sensory change, speech change, focal weakness and headaches.   Psychiatric/Behavioral:  Negative for hallucinations and substance abuse.    All other systems reviewed and are negative.       Physical  Exam  Temp:  [36.4 °C (97.5 °F)-37.3 °C (99.1 °F)] 36.9 °C (98.4 °F)  Pulse:  [63-73] 63  Resp:  [16-20] 16  BP: (142-153)/(71-78) 145/76  SpO2:  [90 %-96 %] 93 %    Physical Exam  Vitals reviewed.   Constitutional:       General: She is not in acute distress.     Appearance: Normal appearance. She is not ill-appearing.      Comments: Clinically she has been improving  She is sitting in chair without any acute distress.   HENT:      Head: Normocephalic and atraumatic.      Nose: No congestion.      Comments: Oxygen nasal cannula  Eyes:      General:         Right eye: No discharge.         Left eye: No discharge.      Pupils: Pupils are equal, round, and reactive to light.   Cardiovascular:      Rate and Rhythm: Normal rate and regular rhythm.      Pulses: Normal pulses.      Heart sounds: Normal heart sounds. No murmur heard.  Pulmonary:      Effort: No respiratory distress.      Breath sounds: Normal breath sounds. No stridor.   Abdominal:      General: Bowel sounds are normal. There is no distension.      Palpations: Abdomen is soft.      Tenderness: There is no abdominal tenderness.      Comments: Leakage from her surgical site, nontender   Musculoskeletal:         General: No swelling or tenderness. Normal range of motion.      Cervical back: Normal range of motion. No rigidity.   Skin:     General: Skin is warm.      Capillary Refill: Capillary refill takes less than 2 seconds.      Coloration: Skin is not jaundiced or pale.      Findings: No bruising.   Neurological:      General: No focal deficit present.      Mental Status: She is alert and oriented to person, place, and time.      Cranial Nerves: No cranial nerve deficit.   Psychiatric:         Mood and Affect: Mood normal.         Behavior: Behavior normal.         Fluids    Intake/Output Summary (Last 24 hours) at 8/21/2024 0702  Last data filed at 8/20/2024 1600  Gross per 24 hour   Intake 690 ml   Output --   Net 690 ml       Laboratory  Recent Labs      08/19/24  0550 08/20/24  0321   WBC 12.5* 11.4*   RBC 4.23 3.84*   HEMOGLOBIN 12.7 11.5*   HEMATOCRIT 39.3 35.7*   MCV 92.9 93.0   MCH 30.0 29.9   MCHC 32.3 32.2   RDW 47.2 48.9   PLATELETCT 455* 485*   MPV 10.4 10.2     Recent Labs     08/19/24  0550 08/20/24  0321   SODIUM 139 139   POTASSIUM 3.4* 3.6   CHLORIDE 104 106   CO2 21 20   GLUCOSE 97 105*   BUN 10 10   CREATININE 0.62 0.59   CALCIUM 7.9* 8.2*                     Imaging  CT-ABDOMEN-PELVIS WITH   Final Result      1.  There is postoperative change consistent with distal pancreatectomy and splenectomy.   2.  The simple appearing left upper quadrant subphrenic fluid collection is again seen very similar to the recent prior study, possibly postoperative seroma as there is no rim enhancement to suggest abscess. Pancreatic leak is considered unlikely as the    majority of the fluid is in the splenic fossa rather than adjacent to the surgical margin of the pancreas.   3.  Stable bibasilar atelectasis and pleural effusions.   4.  Cholelithiasis again noted.   5.  Pulmonary arteries not well assessed on this exam in the right atrial thrombus is no longer evident. Small defect in the right ventricular apex is unchanged.      EC-ECHOCARDIOGRAM LTD W/ CONT   Final Result      IR-THROMBO MECHANICAL ARTERY,INIT   Final Result      1.  Ultrasound guided access bilateral common femoral veins.   2.  Intracardiac echocardiogram demonstrating mobile right atrial thrombus.   3.  Technically successful right atrial thrombectomy.         CT-CYST ASPIRATION-MISC   Final Result      1.  CT GUIDED RETROPERITONEAL LEFT UPPER QUADRANT NEEDLE ASPIRATION DRAINAGE. THE OBTAINED BROWN WATERY FLUID RESEMBLES PANCREATIC PSEUDOCYST FLUID. THE FLUID WAS NOT PURULENT NOR MALODOROUS.   2.  THE CURRENT PLAN IS TO CHECK CULTURE AND LAB RESULTS.      DX-CHEST-PORTABLE (1 VIEW)   Final Result      1. Stable small left pleural effusion with left mid lung and lung base parenchymal opacities.    2. The remainder is stable.      EC-ECHOCARDIOGRAM COMPLETE W/O CONT   Final Result      US-EXTREMITY VENOUS LOWER BILAT   Final Result      CT-ABDOMEN-PELVIS WITH   Final Result         1.  Postop changes of splenectomy and distal pancreatectomy, fluid collection left upper quadrant is seen which could represent postop seroma or possibly pancreatic leak.   2.  Right lower lobe segmental and subsegmental pulmonary embolus.   3.  Trace right and small left pleural effusions.   4.  Linear densities of the lung bases suggesting atelectasis, component of left lower lobe infiltrate not excluded.   5.  Diverticulosis   6.  Cholelithiasis   7.  Cardiomegaly      These findings were discussed with the patient's clinician, Teri Lee, on 8/14/2024 9:53 PM.      CT-CTA CHEST PULMONARY ARTERY W/ RECONS   Final Result   .      1.  Bilateral segmental and subsegmental pulmonary emboli with changes compatible with significant right heart strain.   2.  Small left and trace right pleural effusions.   3.  Linear consolidations in the bilateral lung bases suggests atelectasis, component of left lower lobe infiltrate is not excluded.   4.  4.0 cm ascending thoracic aortic aneurysm, radiographic follow-up and surveillance recommended as clinically appropriate.   5.  Atherosclerosis and atherosclerotic coronary artery disease      These findings were discussed with the patient's clinician, Teri Lee, on 8/14/2024 10:05 PM.      DX-CHEST-PORTABLE (1 VIEW)   Final Result         1.  Left midlung and lower lobe infiltrates   2.  Trace left pleural effusion           Assessment/Plan  * Pulmonary embolism with acute cor pulmonale, unspecified chronicity, unspecified pulmonary embolism type (HCC)- (present on admission)  Assessment & Plan  Submassive PE with DVTs and thrombus in transit  Difficult treatment with limited options.  Per IR, unable to complete suction thrombectomy given risk for propagation.  With intra-abdominal  fluid collection of unknown etiology systemic tPA is a high risk and recent surgery given recent surgery.  Profoundly high mortality with PESI 147, TANMAY Stage IV  No signs of active bleeding.  I discussed plan of care with surgical oncology and now will transition her from heparin infusion to loading dose of Eliquis        Thoracic ascending aortic aneurysm (HCC)  Assessment & Plan  On imaging studies patient found to have 4.0 cm ascending thoracic aortic aneurysm and she will need outpatient follow-up.    Second degree heart block- (present on admission)  Assessment & Plan  During the night of 8/15 into 8/16, patient was noted to have occasional Mobitz type II.  Patient was asymptomatic during these episodes.  This is likely secondary to PE. Otherwise, she is mostly in sinus rhythm.  Patient appliance in place.  Remained stable on telemetry.  Continue to monitor closely on telemetry.  If no improvement, consult cardiology.  Continue to monitor    Atrial thrombus- (present on admission)  Assessment & Plan  suction thrombectomy 8/16 successful  Now transitioned today on loading dose of Eliquis      DVT (deep venous thrombosis) ([facility])- (present on admission)  Assessment & Plan    Bilateral DVTs.  Continue heparin.  Now I transitioned her to Eliquis.    Acute respiratory failure with hypoxia ([facility])- (present on admission)  Assessment & Plan  Secondary to pulmonary embolism  She remains on 1 L of oxygen to maintain oxygen saturation  Continue to monitor closely    Intra-abdominal fluid collection- (present on admission)  Assessment & Plan  CT of the abdomen pelvis with contrast showed postop seroma or pancreatic leak.  Lipase 111.  Cover with empiric antibiotics for possible infection  She underwent IR guided fluid drainage.  She will need repeat imaging studies.  Surgical oncology evaluated and made recommendations.  Dr. Velasco recommended that patient will need outpatient follow-up.  Continue to monitor    Sepsis  (HCC)- (present on admission)  Assessment & Plan  I this is Sepsis Present on admission  SIRS criteria identified on my evaluation include: Tachycardia, with heart rate greater than 90 BPM, Tachypnea, with respirations greater than 20 per minute, and Leukocytosis, with WBC greater than 12,000  Clinical indicators of end organ dysfunction include Lactic Acid greater than 2  Source is intra-abdominal infection or pneumonia  Sepsis protocol initiated  Crystalloid Fluid Administration: Fluid resuscitation ordered per standard protocol - 30 mL/kg per current or ideal body weight  IV antibiotics as appropriate for source of sepsis  Reassessment: I have reassessed the patient's hemodynamic status    I am continuing IV Zosyn.  Cultures remain negative to date.  Tomorrow would be the last day of antibiotic.  I discussed plan of care with him and answered all her questions.    Cancer of overlapping sites of left breast (HCC)- (present on admission)  Assessment & Plan  Will hold tamoxifen since it could increase risk of DVT/PE    Advance care planning- (present on admission)  Assessment & Plan  CODE STATUS discussed with Ms. Gudino and she would like to be full code.    Acquired hypothyroidism- (present on admission)  Assessment & Plan  I am continuing levothyroxine, Cytomel        I discussed plan of care during multidisciplinary rounds regarding patient's current medical condition and plan of care.    I discussed plan of care with surgical oncologist Anderson KEITH with HBS team regarding patient.        VTE prophylaxis: Therapeutic Eliquis    I have performed a physical exam and reviewed and updated ROS and Plan today (8/20/2024). In review of yesterday's note (8/19/2024), there are no changes except as documented above.  Total time spent 55 minutes. I spent greater than 50% of the time for patient care, counseling, and coordination on this date, including unit/floor time, and face-to-face time with the patient as per  interval events, my own review of patient's imaging and lab analysis and developing my assessment and plan above.

## 2024-08-22 ENCOUNTER — APPOINTMENT (OUTPATIENT)
Dept: RADIOLOGY | Facility: MEDICAL CENTER | Age: 79
DRG: 853 | End: 2024-08-22
Attending: NURSE PRACTITIONER
Payer: COMMERCIAL

## 2024-08-22 LAB
ANION GAP SERPL CALC-SCNC: 12 MMOL/L (ref 7–16)
APTT PPP: 120.1 SEC (ref 24.7–36)
APTT PPP: 56.8 SEC (ref 24.7–36)
BASOPHILS # BLD AUTO: 0.3 % (ref 0–1.8)
BASOPHILS # BLD: 0.03 K/UL (ref 0–0.12)
BUN SERPL-MCNC: 8 MG/DL (ref 8–22)
CALCIUM SERPL-MCNC: 7.9 MG/DL (ref 8.5–10.5)
CHLORIDE SERPL-SCNC: 105 MMOL/L (ref 96–112)
CO2 SERPL-SCNC: 21 MMOL/L (ref 20–33)
CREAT SERPL-MCNC: 0.58 MG/DL (ref 0.5–1.4)
EOSINOPHIL # BLD AUTO: 0.03 K/UL (ref 0–0.51)
EOSINOPHIL NFR BLD: 0.3 % (ref 0–6.9)
ERYTHROCYTE [DISTWIDTH] IN BLOOD BY AUTOMATED COUNT: 51.6 FL (ref 35.9–50)
GFR SERPLBLD CREATININE-BSD FMLA CKD-EPI: 93 ML/MIN/1.73 M 2
GLUCOSE SERPL-MCNC: 96 MG/DL (ref 65–99)
GRAM STN SPEC: NORMAL
HCT VFR BLD AUTO: 38.2 % (ref 37–47)
HGB BLD-MCNC: 12.2 G/DL (ref 12–16)
IMM GRANULOCYTES # BLD AUTO: 0.17 K/UL (ref 0–0.11)
IMM GRANULOCYTES NFR BLD AUTO: 1.6 % (ref 0–0.9)
LYMPHOCYTES # BLD AUTO: 1.13 K/UL (ref 1–4.8)
LYMPHOCYTES NFR BLD: 10.9 % (ref 22–41)
MAGNESIUM SERPL-MCNC: 2.1 MG/DL (ref 1.5–2.5)
MCH RBC QN AUTO: 30.6 PG (ref 27–33)
MCHC RBC AUTO-ENTMCNC: 31.9 G/DL (ref 32.2–35.5)
MCV RBC AUTO: 95.7 FL (ref 81.4–97.8)
MONOCYTES # BLD AUTO: 0.93 K/UL (ref 0–0.85)
MONOCYTES NFR BLD AUTO: 9 % (ref 0–13.4)
NEUTROPHILS # BLD AUTO: 8.09 K/UL (ref 1.82–7.42)
NEUTROPHILS NFR BLD: 77.9 % (ref 44–72)
NRBC # BLD AUTO: 0 K/UL
NRBC BLD-RTO: 0 /100 WBC (ref 0–0.2)
PLATELET # BLD AUTO: 589 K/UL (ref 164–446)
PMV BLD AUTO: 10.3 FL (ref 9–12.9)
POTASSIUM SERPL-SCNC: 4.1 MMOL/L (ref 3.6–5.5)
RBC # BLD AUTO: 3.99 M/UL (ref 4.2–5.4)
SIGNIFICANT IND 70042: NORMAL
SITE SITE: NORMAL
SODIUM SERPL-SCNC: 138 MMOL/L (ref 135–145)
SOURCE SOURCE: NORMAL
WBC # BLD AUTO: 10.4 K/UL (ref 4.8–10.8)

## 2024-08-22 PROCEDURE — 700111 HCHG RX REV CODE 636 W/ 250 OVERRIDE (IP): Mod: JZ

## 2024-08-22 PROCEDURE — A9270 NON-COVERED ITEM OR SERVICE: HCPCS | Performed by: INTERNAL MEDICINE

## 2024-08-22 PROCEDURE — 83735 ASSAY OF MAGNESIUM: CPT

## 2024-08-22 PROCEDURE — 700102 HCHG RX REV CODE 250 W/ 637 OVERRIDE(OP): Performed by: INTERNAL MEDICINE

## 2024-08-22 PROCEDURE — 99233 SBSQ HOSP IP/OBS HIGH 50: CPT | Performed by: STUDENT IN AN ORGANIZED HEALTH CARE EDUCATION/TRAINING PROGRAM

## 2024-08-22 PROCEDURE — 85025 COMPLETE CBC W/AUTO DIFF WBC: CPT

## 2024-08-22 PROCEDURE — 700102 HCHG RX REV CODE 250 W/ 637 OVERRIDE(OP): Performed by: STUDENT IN AN ORGANIZED HEALTH CARE EDUCATION/TRAINING PROGRAM

## 2024-08-22 PROCEDURE — A9270 NON-COVERED ITEM OR SERVICE: HCPCS

## 2024-08-22 PROCEDURE — A9270 NON-COVERED ITEM OR SERVICE: HCPCS | Performed by: STUDENT IN AN ORGANIZED HEALTH CARE EDUCATION/TRAINING PROGRAM

## 2024-08-22 PROCEDURE — 80048 BASIC METABOLIC PNL TOTAL CA: CPT

## 2024-08-22 PROCEDURE — 85730 THROMBOPLASTIN TIME PARTIAL: CPT | Mod: 91

## 2024-08-22 PROCEDURE — 99024 POSTOP FOLLOW-UP VISIT: CPT | Performed by: SURGERY

## 2024-08-22 PROCEDURE — 4401649 CT-IMAGE-GUIDED DRAIN PERITONEAL

## 2024-08-22 PROCEDURE — 770020 HCHG ROOM/CARE - TELE (206)

## 2024-08-22 PROCEDURE — 87205 SMEAR GRAM STAIN: CPT

## 2024-08-22 PROCEDURE — 0W9G30Z DRAINAGE OF PERITONEAL CAVITY WITH DRAINAGE DEVICE, PERCUTANEOUS APPROACH: ICD-10-PCS | Performed by: STUDENT IN AN ORGANIZED HEALTH CARE EDUCATION/TRAINING PROGRAM

## 2024-08-22 PROCEDURE — 700102 HCHG RX REV CODE 250 W/ 637 OVERRIDE(OP)

## 2024-08-22 PROCEDURE — 87070 CULTURE OTHR SPECIMN AEROBIC: CPT

## 2024-08-22 RX ORDER — ONDANSETRON 2 MG/ML
4 INJECTION INTRAMUSCULAR; INTRAVENOUS PRN
Status: DISCONTINUED | OUTPATIENT
Start: 2024-08-22 | End: 2024-08-22

## 2024-08-22 RX ORDER — ONDANSETRON 2 MG/ML
4 INJECTION INTRAMUSCULAR; INTRAVENOUS PRN
Status: ACTIVE | OUTPATIENT
Start: 2024-08-22 | End: 2024-08-22

## 2024-08-22 RX ORDER — MIDAZOLAM HYDROCHLORIDE 1 MG/ML
.5-2 INJECTION INTRAMUSCULAR; INTRAVENOUS PRN
Status: ACTIVE | OUTPATIENT
Start: 2024-08-22 | End: 2024-08-22

## 2024-08-22 RX ORDER — SODIUM CHLORIDE 9 MG/ML
500 INJECTION, SOLUTION INTRAVENOUS
Status: DISCONTINUED | OUTPATIENT
Start: 2024-08-22 | End: 2024-08-22

## 2024-08-22 RX ORDER — MIDAZOLAM HYDROCHLORIDE 1 MG/ML
.5-2 INJECTION INTRAMUSCULAR; INTRAVENOUS PRN
Status: DISCONTINUED | OUTPATIENT
Start: 2024-08-22 | End: 2024-08-22

## 2024-08-22 RX ORDER — SODIUM CHLORIDE 9 MG/ML
500 INJECTION, SOLUTION INTRAVENOUS
Status: ACTIVE | OUTPATIENT
Start: 2024-08-22 | End: 2024-08-22

## 2024-08-22 RX ORDER — MIDAZOLAM HYDROCHLORIDE 1 MG/ML
INJECTION INTRAMUSCULAR; INTRAVENOUS
Status: COMPLETED
Start: 2024-08-22 | End: 2024-08-22

## 2024-08-22 RX ORDER — ACETAMINOPHEN 325 MG/1
650 TABLET ORAL EVERY 4 HOURS PRN
Status: DISCONTINUED | OUTPATIENT
Start: 2024-08-22 | End: 2024-08-23 | Stop reason: HOSPADM

## 2024-08-22 RX ADMIN — ACETAMINOPHEN 650 MG: 325 TABLET ORAL at 19:57

## 2024-08-22 RX ADMIN — APIXABAN 10 MG: 5 TABLET, FILM COATED ORAL at 17:42

## 2024-08-22 RX ADMIN — MIDAZOLAM HYDROCHLORIDE 1 MG: 1 INJECTION INTRAMUSCULAR; INTRAVENOUS at 15:54

## 2024-08-22 RX ADMIN — LIOTHYRONINE SODIUM 2.5 MCG: 5 TABLET ORAL at 04:59

## 2024-08-22 RX ADMIN — MIDAZOLAM HYDROCHLORIDE 1 MG: 2 INJECTION, SOLUTION INTRAMUSCULAR; INTRAVENOUS at 15:54

## 2024-08-22 RX ADMIN — FENTANYL CITRATE 50 MCG: 50 INJECTION, SOLUTION INTRAMUSCULAR; INTRAVENOUS at 15:53

## 2024-08-22 RX ADMIN — LEVOTHYROXINE SODIUM 100 MCG: 0.1 TABLET ORAL at 04:59

## 2024-08-22 RX ADMIN — AMLODIPINE BESYLATE 5 MG: 10 TABLET ORAL at 04:59

## 2024-08-22 ASSESSMENT — ENCOUNTER SYMPTOMS
DOUBLE VISION: 0
EYE PAIN: 0
BLURRED VISION: 0
TREMORS: 0
WEAKNESS: 1
WEIGHT LOSS: 0
MYALGIAS: 0
SPEECH CHANGE: 0
PHOTOPHOBIA: 0
DIARRHEA: 0
CONSTIPATION: 0
HEADACHES: 0
SHORTNESS OF BREATH: 0
PALPITATIONS: 0
SENSORY CHANGE: 0
SPUTUM PRODUCTION: 0
ORTHOPNEA: 0
NECK PAIN: 0
ABDOMINAL PAIN: 0
BACK PAIN: 0
VOMITING: 0
TINGLING: 0
COUGH: 0
FOCAL WEAKNESS: 0
NAUSEA: 0
CHILLS: 0
FEVER: 0
HALLUCINATIONS: 0
DIZZINESS: 0

## 2024-08-22 ASSESSMENT — PAIN DESCRIPTION - PAIN TYPE
TYPE: ACUTE PAIN

## 2024-08-22 ASSESSMENT — LIFESTYLE VARIABLES: SUBSTANCE_ABUSE: 0

## 2024-08-22 ASSESSMENT — FIBROSIS 4 INDEX: FIB4 SCORE: 0.69

## 2024-08-22 NOTE — CARE PLAN
The patient is Stable - Low risk of patient condition declining or worsening    Shift Goals  Clinical Goals: VSS, monitor incision  Patient Goals: rest, updates  Family Goals: updates    Progress made toward(s) clinical / shift goals:      Problem: Knowledge Deficit - Standard  Goal: Patient and family/care givers will demonstrate understanding of plan of care, disease process/condition, diagnostic tests and medications  Outcome: Progressing  Note: Patient educated on all tests, medications, and procedures. All questions addressed in regards to plan of care.        Problem: Hemodynamics  Goal: Patient's hemodynamics, fluid balance and neurologic status will be stable or improve  Outcome: Progressing  Note: Vital signs and neurological status remain stable throughout shift.        Problem: Respiratory  Goal: Patient will achieve/maintain optimum respiratory ventilation and gas exchange  Outcome: Progressing  Note: Patient maintaining oxygenation greater than 90% on room air.      Problem: Skin Integrity  Goal: Skin integrity is maintained or improved  Outcome: Progressing  Note: Skin integrity maintained through Q2 turns, encouraging ambulation, and up in chair during the day.        Problem: Pain - Standard  Goal: Alleviation of pain or a reduction in pain to the patient’s comfort goal  Outcome: Progressing  Note: No complaints of pain throughout shift.      Problem: Fall Risk  Goal: Patient will remain free from falls  Outcome: Progressing  Note: Patient remains free from falls. Non-slip socks and bed alarm in place. Call light within reach and patient educated on importance to call for assistance when getting up. Patient demonstrate calling appropriately.

## 2024-08-22 NOTE — OR SURGEON
Immediate Post- Operative Note        Findings: LUQ fluid collection      Procedure(s): Drain placement      Estimated Blood Loss: Less than 5 ml        Complications: None            8/22/2024     4:44 PM     Glenn Bowen M.D.

## 2024-08-22 NOTE — CARE PLAN
The patient is Stable - Low risk of patient condition declining or worsening    Shift Goals  Clinical Goals: monitor incision and O2  Patient Goals: rest and updates  Family Goals: updates    Progress made toward(s) clinical / shift goals:  progressing    Patient is not progressing towards the following goals:

## 2024-08-22 NOTE — PROGRESS NOTES
Hospital Medicine Daily Progress Note    Date of Service  8/21/2024    Chief Complaint  Paige Gudino is a 78 y.o. female admitted 8/14/2024 with shortness of breath    Hospital Course    Paige Gudino is a 78 y.o. female with past medical history of left-sided breast cancer status post mastectomy on tamoxifen, hypothyroidism, recent pancreato/splenectomy on 8/5 by Dr. Espinoza for large pancreatic cystic mass that is benign serous cystadenoma on pathology (Discharged on 8/9), who presented 8/14/2024 with complaints of shortness of breath and generalized weakness for 5 days, decreased oral intake, chills, cough.  Denies chest pain, any significant abdominal pain, diarrhea or constipation.  She presented desaturating to 76% on room air, was placed on 5 L nasal cannula.  Respiratory rate 30, heart rate 90, blood pressure stable, temperature 99.1.  EKG: Nonspecific T wave inversion in lateral leads.  Chest x-ray showed left lower lobe and middle lobe infiltrate.  CTA showed bilateral segmental subsegmental pulmonary embolism with signs of RV strain.  WBC 19.9, lactic acid 2.5, troponin 70, BNP 8591, lipase 111.  CT of the abdomen and pelvis with contrast, postop changes with postop seroma or possibly pancreatic leak.  Patient was given Zosyn, vancomycin, sepsis bolus 2 L with Ringer lactate, and started on IV heparin.    She admitted to IMCU for close monitoring and diagnosis of hypoxia secondary to pulmonary embolism.    Patient transferred to ICU on August 15, 2024 and she underwent right atrial thrombectomy.    Interval Problem Update  08/17/24  I evaluated and examined her at the bedside.  She reported that she is feeling significantly better and denies complaint of shortness of breath and severe pain.  I discussed plan of care with the and her daughter at the bedside and answered all of their questions been  I am continuing heparin infusion and I am titrating heparin as per Anti Xa  levels and monitor for signs of bleeding.  08/18/24  I evaluated and examined him her at the bedside.  She reported that she is felt significantly better and after long time she had a good breakfast.  She is requiring 2 L of oxygen to maintain oxygen saturation.  She denies any complaint of severe pain.  I discussed plan of care with her and answered all her questions.  I am continuing heparin gtt. and I am titrating as per Anti Xa levels and monitor for signs of bleeding.  Plan is to discuss it with surgery tomorrow for possibility of transitioning her to DOAC  08/19/24  I evaluated and examined her at the bedside.  She reported that she is feeling significantly better and she is able to eat food.  I titrated down her oxygen from 1 L to 0.5 L and requested bedside RN to wean her off from oxygen.  If she will remain stable plan is for discharge tomorrow.  I requested physical therapy evaluation.  Currently she is on monitor of oxygen and maintaining oxygen saturation.  Blood pressures have been fluctuating.  CBC showed white blood cell count of 12.5, hemoglobin of 5.7 and platelet count of 445.  CMP showed sodium of 139, potassium of 3.4, BUN of 10 and creatinine of 0.62.  Cultures remain negative to date.  I discussed with surgical oncologist Dr. Velasco regarding patient anticoagulation.  He recommended we can decide on anticoagulation and no further plan for procedure at this moment and patient want to follow-up with Dr. Espinoza as outpatient.  I transitioned her from heparin infusion to Eliquis loading dose.    8/20  Evaluated patient bedside, family present.  Afebrile normal pulse and respiratory rate systolic blood pressure 140s to 150s pulse ox 90 to 96% on 1 L nasal cannula.  Leukocytosis improving, stable anemia platelets 485 glucose 105 AST ALT 48/81 low albumin and total protein.  She is having leakage of her abdominal surgical site, CT performed showing simple appearing left upper quadrant  subphrenic fluid collection similar to recent study similar size, suspected postoperative seroma as there is no rim enhancement to suggest abscess, pancreatic leak considered unlikely.  Right atrial thrombus no longer evident on the CT.  Will follow-up with surgery recommendations.    8/21  Evaluated bedside, family present, patient out of bed to chair.  Afebrile normal pulse and respiratory rate systolic blood pressure 130s to 160s pulse ox 90 to 95% on 1 L nasal cannula.  Leukocytosis resolved platelets 568 CMP glucose 103 AST 50 ALT 96.  For planned IR drain placement I have switched over to heparin drip, I discussed with IR and patient needs at least 4 missed doses of her Eliquis prior to drain placement, suspect will be Friday morning but will see if Thursday evening is possible since she only had 1 Eliquis dose.  No new complaints, patient reports intermittent increasing pressure in her abdomen followed by subsequent leakage from the insertion site, hospital gown had to be changed today due to the drainage.  She expressed understanding of the plan.  Discussed with surgical team.    I have discussed this patient's plan of care and discharge plan at IDT rounds today with Case Management, Nursing, Nursing leadership, and other members of the IDT team.    Consultants/Specialty  cardiology, critical care, and pulmonary    Code Status  Full Code    Disposition  Medically Cleared  I have placed the appropriate orders for post-discharge needs.    Review of Systems  Review of Systems   Constitutional:  Positive for malaise/fatigue. Negative for chills, fever and weight loss.   HENT:  Negative for hearing loss and tinnitus.    Eyes:  Negative for blurred vision, double vision, photophobia and pain.   Respiratory:  Negative for cough, sputum production and shortness of breath.    Cardiovascular:  Negative for chest pain, palpitations, orthopnea and leg swelling.   Gastrointestinal:  Negative for abdominal pain,  constipation, diarrhea, nausea and vomiting.   Genitourinary:  Negative for dysuria, frequency and urgency.   Musculoskeletal:  Negative for back pain, joint pain, myalgias and neck pain.   Skin:  Negative for rash.   Neurological:  Positive for weakness. Negative for dizziness, tingling, tremors, sensory change, speech change, focal weakness and headaches.   Psychiatric/Behavioral:  Negative for hallucinations and substance abuse.    All other systems reviewed and are negative.       Physical Exam  Temp:  [36.9 °C (98.4 °F)-37.4 °C (99.3 °F)] 37 °C (98.6 °F)  Pulse:  [60-73] 61  Resp:  [12-18] 12  BP: (134-166)/(64-86) 149/73  SpO2:  [90 %-95 %] 93 %    Physical Exam  Vitals reviewed.   Constitutional:       General: She is not in acute distress.     Appearance: Normal appearance. She is not ill-appearing.      Comments: Clinically she has been improving  She is sitting in chair without any acute distress.   HENT:      Head: Normocephalic and atraumatic.      Nose: No congestion.      Comments: Oxygen nasal cannula     Mouth/Throat:      Mouth: Mucous membranes are moist.   Eyes:      General:         Right eye: No discharge.         Left eye: No discharge.      Pupils: Pupils are equal, round, and reactive to light.   Cardiovascular:      Rate and Rhythm: Normal rate and regular rhythm.      Pulses: Normal pulses.      Heart sounds: Normal heart sounds. No murmur heard.  Pulmonary:      Effort: No respiratory distress.      Breath sounds: Normal breath sounds. No stridor. No wheezing or rhonchi.   Abdominal:      General: Bowel sounds are normal. There is no distension.      Palpations: Abdomen is soft.      Tenderness: There is no abdominal tenderness.      Comments: Leakage from her surgical site, nontender   Musculoskeletal:         General: No swelling or tenderness. Normal range of motion.      Cervical back: Normal range of motion. No rigidity.   Skin:     General: Skin is warm.      Capillary Refill:  Capillary refill takes less than 2 seconds.      Coloration: Skin is not jaundiced or pale.      Findings: No bruising.   Neurological:      General: No focal deficit present.      Mental Status: She is alert and oriented to person, place, and time. Mental status is at baseline.      Cranial Nerves: No cranial nerve deficit.   Psychiatric:         Mood and Affect: Mood normal.         Behavior: Behavior normal.         Thought Content: Thought content normal.         Judgment: Judgment normal.         Fluids  No intake or output data in the 24 hours ending 08/22/24 0649      Laboratory  Recent Labs     08/20/24  0321 08/21/24  0753   WBC 11.4* 10.8   RBC 3.84* 4.14*   HEMOGLOBIN 11.5* 12.4   HEMATOCRIT 35.7* 39.2   MCV 93.0 94.7   MCH 29.9 30.0   MCHC 32.2 31.6*   RDW 48.9 51.1*   PLATELETCT 485* 568*   MPV 10.2 9.8     Recent Labs     08/20/24  0321 08/21/24  0753   SODIUM 139 138   POTASSIUM 3.6 3.7   CHLORIDE 106 106   CO2 20 20   GLUCOSE 105* 103*   BUN 10 8   CREATININE 0.59 0.66   CALCIUM 8.2* 8.2*     Recent Labs     08/21/24  0753 08/21/24  1238 08/22/24  0155   APTT 40.6* 42.3* 120.1*   INR 1.75*  --   --                  Imaging  CT-ABDOMEN-PELVIS WITH   Final Result      1.  There is postoperative change consistent with distal pancreatectomy and splenectomy.   2.  The simple appearing left upper quadrant subphrenic fluid collection is again seen very similar to the recent prior study, possibly postoperative seroma as there is no rim enhancement to suggest abscess. Pancreatic leak is considered unlikely as the    majority of the fluid is in the splenic fossa rather than adjacent to the surgical margin of the pancreas.   3.  Stable bibasilar atelectasis and pleural effusions.   4.  Cholelithiasis again noted.   5.  Pulmonary arteries not well assessed on this exam in the right atrial thrombus is no longer evident. Small defect in the right ventricular apex is unchanged.      EC-ECHOCARDIOGRAM LTD W/ CONT    Final Result      IR-THROMBO MECHANICAL ARTERY,INIT   Final Result      1.  Ultrasound guided access bilateral common femoral veins.   2.  Intracardiac echocardiogram demonstrating mobile right atrial thrombus.   3.  Technically successful right atrial thrombectomy.         CT-CYST ASPIRATION-MISC   Final Result      1.  CT GUIDED RETROPERITONEAL LEFT UPPER QUADRANT NEEDLE ASPIRATION DRAINAGE. THE OBTAINED BROWN WATERY FLUID RESEMBLES PANCREATIC PSEUDOCYST FLUID. THE FLUID WAS NOT PURULENT NOR MALODOROUS.   2.  THE CURRENT PLAN IS TO CHECK CULTURE AND LAB RESULTS.      DX-CHEST-PORTABLE (1 VIEW)   Final Result      1. Stable small left pleural effusion with left mid lung and lung base parenchymal opacities.   2. The remainder is stable.      EC-ECHOCARDIOGRAM COMPLETE W/O CONT   Final Result      US-EXTREMITY VENOUS LOWER BILAT   Final Result      CT-ABDOMEN-PELVIS WITH   Final Result         1.  Postop changes of splenectomy and distal pancreatectomy, fluid collection left upper quadrant is seen which could represent postop seroma or possibly pancreatic leak.   2.  Right lower lobe segmental and subsegmental pulmonary embolus.   3.  Trace right and small left pleural effusions.   4.  Linear densities of the lung bases suggesting atelectasis, component of left lower lobe infiltrate not excluded.   5.  Diverticulosis   6.  Cholelithiasis   7.  Cardiomegaly      These findings were discussed with the patient's clinician, Teri Lee, on 8/14/2024 9:53 PM.      CT-CTA CHEST PULMONARY ARTERY W/ RECONS   Final Result   .      1.  Bilateral segmental and subsegmental pulmonary emboli with changes compatible with significant right heart strain.   2.  Small left and trace right pleural effusions.   3.  Linear consolidations in the bilateral lung bases suggests atelectasis, component of left lower lobe infiltrate is not excluded.   4.  4.0 cm ascending thoracic aortic aneurysm, radiographic follow-up and surveillance  recommended as clinically appropriate.   5.  Atherosclerosis and atherosclerotic coronary artery disease      These findings were discussed with the patient's clinician, Teri Lee, on 8/14/2024 10:05 PM.      DX-CHEST-PORTABLE (1 VIEW)   Final Result         1.  Left midlung and lower lobe infiltrates   2.  Trace left pleural effusion      CT-IMAGE-GUIDED DRAIN PERITONEAL    (Results Pending)        Assessment/Plan  * Pulmonary embolism with acute cor pulmonale, unspecified chronicity, unspecified pulmonary embolism type (HCC)- (present on admission)  Assessment & Plan  Submassive PE with DVTs and thrombus in transit  Difficult treatment with limited options.  Per IR, unable to complete suction thrombectomy given risk for propagation.  With intra-abdominal fluid collection of unknown etiology systemic tPA is a high risk and recent surgery given recent surgery.  Profoundly high mortality with PESI 147, TANMAY Stage IV  No signs of active bleeding.  I discussed plan of care with surgical oncology and now will transition her from heparin infusion to loading dose of Eliquis        Thoracic ascending aortic aneurysm (HCC)  Assessment & Plan  On imaging studies patient found to have 4.0 cm ascending thoracic aortic aneurysm and she will need outpatient follow-up.    Second degree heart block- (present on admission)  Assessment & Plan  During the night of 8/15 into 8/16, patient was noted to have occasional Mobitz type II.  Patient was asymptomatic during these episodes.  This is likely secondary to PE. Otherwise, she is mostly in sinus rhythm.  Patient appliance in place.  Remained stable on telemetry.  Continue to monitor closely on telemetry.  If no improvement, consult cardiology.  Continue to monitor    Atrial thrombus- (present on admission)  Assessment & Plan  suction thrombectomy 8/16 successful  Now transitioned today on loading dose of Eliquis      DVT (deep venous thrombosis) (HCC)- (present on  admission)  Assessment & Plan    Bilateral DVTs.  Continue heparin.  Now I transitioned her to Eliquis.    Acute respiratory failure with hypoxia (HCC)- (present on admission)  Assessment & Plan  Secondary to pulmonary embolism  She remains on 1 L of oxygen to maintain oxygen saturation  Continue to monitor closely    Intra-abdominal fluid collection- (present on admission)  Assessment & Plan  CT of the abdomen pelvis with contrast showed postop seroma or pancreatic leak.  Lipase 111.  Cover with empiric antibiotics for possible infection  She underwent IR guided fluid drainage.  She will need repeat imaging studies.  Surgical oncology evaluated and made recommendations.  Dr. Velasco recommended that patient will need outpatient follow-up.  Continue to monitor    Sepsis (HCC)- (present on admission)  Assessment & Plan  I this is Sepsis Present on admission  SIRS criteria identified on my evaluation include: Tachycardia, with heart rate greater than 90 BPM, Tachypnea, with respirations greater than 20 per minute, and Leukocytosis, with WBC greater than 12,000  Clinical indicators of end organ dysfunction include Lactic Acid greater than 2  Source is intra-abdominal infection or pneumonia  Sepsis protocol initiated  Crystalloid Fluid Administration: Fluid resuscitation ordered per standard protocol - 30 mL/kg per current or ideal body weight  IV antibiotics as appropriate for source of sepsis  Reassessment: I have reassessed the patient's hemodynamic status    I am continuing IV Zosyn.  Cultures remain negative to date.  Tomorrow would be the last day of antibiotic.  I discussed plan of care with him and answered all her questions.    Cancer of overlapping sites of left breast (HCC)- (present on admission)  Assessment & Plan  Will hold tamoxifen since it could increase risk of DVT/PE    Advance care planning- (present on admission)  Assessment & Plan  CODE STATUS discussed with Ms. Gudino and she would like to  be full code.    Acquired hypothyroidism- (present on admission)  Assessment & Plan  I am continuing levothyroxine, Cytomel        I discussed plan of care during multidisciplinary rounds regarding patient's current medical condition and plan of care.          VTE prophylaxis: Therapeutic heparin gtt    I have performed a physical exam and reviewed and updated ROS and Plan today (8/21/2024). In review of yesterday's note (8/20/2024), there are no changes except as documented above.  Total time spent 53 minutes. I spent greater than 50% of the time for patient care, counseling, and coordination on this date, including unit/floor time, and face-to-face time with the patient as per interval events, my own review of patient's imaging and lab analysis and developing my assessment and plan above.

## 2024-08-22 NOTE — DIETARY
"Nutrition Services: Initial Assessment     Day 8 of admit. Paige Gudino is a 78 y.o. female with admitting DX of Pulmonary embolism with acute cor pulmonale, unspecified chronicity, unspecified pulmonary embolism type (HCC) [I26.09]     Upon screening, pt w/ poor PO.      Nutrition Assessment:      Height: 162.6 cm (5' 4\")  Weight: 71.8 kg (158 lb 4.6 oz)  Weight taken via standing scale on 8/22  Body mass index is 27.17 kg/m². BMI classification: Overweight.  Wt Readings from Last 6 Encounters:   08/22/24 71.8 kg (158 lb 4.6 oz)   08/05/24 70.4 kg (155 lb 3.3 oz)   07/30/24 71.2 kg (157 lb)   07/16/24 70.7 kg (155 lb 13.8 oz)   07/12/24 70.3 kg (155 lb)   06/27/24 70.8 kg (156 lb)     Objective:  Pertinent medical hx: Left sided breast cancer s/p mastectomy  Pertinent labs: Reviewed  Pertinent meds: zofran PRN, oxycodone PRN, BM protocol  Skin/wounds: No documented wounds, no edema   Food Allergies: Red 40  Last BM: 08/22/24 per flowsheets     Current diet order:   Cardiac diet  Per ADLs, PO variable from <25% to % w/ avg of 48%    Subjective:   RD visited pt at bedside. Pt said that she is enjoying the cardiac meals and that the meals have quite a big helping. RD offered snacks in between meals. Pt readily agreed to have snacks. Pt expressed food preferences (bananas, fermin crackers w/ peanut butter, greek yogurt, tea, etc).    Nutrition Focused Physical Exam (NFPE)   Weight loss: No significant wt loss noted.   Muscle mass: Well-nourished  Subcutaneous fat: Well-nourished  Fluid Accumulation: NA  Reduced  Strength: N/A in acute care setting.     Nutrition Diagnosis:      Based on RD assessment at this time, pt does not meet criteria in congruence with ASPEN/Academy guidelines for malnutrition.        Nutrition Interventions:      RD to adjust meals per pt preference and add snacks in between meals.  Patient aware of active plan of care as appropriate.     Nutrition Monitoring and " Evaluation:     Monitor nutrition POC  Monitor vital signs pertinent to nutrition       RD following and will provide updated recommendations as indicated.

## 2024-08-22 NOTE — PROGRESS NOTES
Pt presents to CT. Patient was consented by MD at bedside. Pt transferred to CT table in supine position. Patient underwent a peritoneal drain placement by Dr. Bowen. Procedure site was marked by MD and verified using imaging guidance. Pt placed on monitor, prepped and draped in a sterile fashion. Vitals were taken every 5 minutes and remained stable during procedure (see doc flow sheet for results). CO2 waveform capnography was monitored and remained WNL throughout procedure. Report called to Margot LAWLER. Pt transported by stretcher with RN to [id number].     Specimen: 20mL syringe hand delivered to lab.

## 2024-08-22 NOTE — PROGRESS NOTES
Date of Service  August 22, 2024     Chief Complaint  Shortness of Breath (Brought in by matsa from home. Patient c/o Shortness of breath and weakness x 5 days. Had splenectomy 5th of Aug and DC'd on the 9 th.EMS arrival patient oxygen saturation 76 % RA.)      Surgery Completed  CT GUIDED CATHETER NEEDLE ASPIRATION DRAINAGE LUQ (8/15/24) - Dr Ruff  IR Thrombectomy (8/16/24) - Dr Bowen    Hospital Course  POD # 7     Interval Problem Update  No acute events  Tolerating diet without N/V  Transitioned to Heparin (from Eliquis)  IR drain of fluid collection planned, possibly today  Modest serous discharge from left femoral IR access site    Problem List  Principal Problem:    Pulmonary embolism with acute cor pulmonale, unspecified chronicity, unspecified pulmonary embolism type (HCC) (POA: Yes)  Active Problems:    Acquired hypothyroidism (POA: Yes)    Advance care planning (POA: Yes)    Cancer of overlapping sites of left breast (HCC) (POA: Yes)    Sepsis (HCC) (POA: Yes)    Intra-abdominal fluid collection (POA: Yes)    Acute respiratory failure with hypoxia (HCC) (POA: Yes)    DVT (deep venous thrombosis) (HCC) (POA: Yes)    Atrial thrombus (POA: Yes)    Second degree heart block (POA: Yes)    Thoracic ascending aortic aneurysm (HCC) (POA: Unknown)  Resolved Problems:    * No resolved hospital problems. *     Subjective  Review of Systems   Constitutional:  Negative for chills, fever, malaise/fatigue and weight loss.   Respiratory:  Negative for cough and shortness of breath.    Cardiovascular:  Negative for chest pain and leg swelling.   Gastrointestinal:  Negative for abdominal pain, diarrhea, nausea and vomiting.       Objective  Temp:  [36.9 °C (98.4 °F)-37.4 °C (99.3 °F)] 37 °C (98.6 °F)  Pulse:  [61-73] 70  Resp:  [12-18] 18  BP: (134-166)/(64-86) 148/73  SpO2:  [90 %-95 %] 94 %      Physical Exam  Vitals and nursing note reviewed.   Constitutional:       General: She is not in acute distress.      Appearance: Normal appearance. She is obese.   HENT:      Head: Normocephalic and atraumatic.      Mouth/Throat:      Pharynx: Oropharynx is clear.   Eyes:      Conjunctiva/sclera: Conjunctivae normal.   Cardiovascular:      Rate and Rhythm: Normal rate.   Pulmonary:      Effort: Pulmonary effort is normal. No respiratory distress.   Abdominal:      Comments: Midline incision, steristrips  Scant serous discharge from left femoral site   Skin:     General: Skin is warm and dry.      Coloration: Skin is not jaundiced.   Neurological:      Mental Status: She is alert and oriented to person, place, and time.   Psychiatric:         Mood and Affect: Mood normal.         Behavior: Behavior normal.        Fluids  No intake or output data in the 24 hours ending 08/22/24 0855      Labs  Lab Results   Component Value Date/Time    SODIUM 138 08/21/2024 07:53 AM    POTASSIUM 3.7 08/21/2024 07:53 AM    CHLORIDE 106 08/21/2024 07:53 AM    CO2 20 08/21/2024 07:53 AM    GLUCOSE 103 (H) 08/21/2024 07:53 AM    BUN 8 08/21/2024 07:53 AM    CREATININE 0.66 08/21/2024 07:53 AM         Lab Results   Component Value Date/Time    PROTHROMBTM 20.7 (H) 08/21/2024 07:53 AM    INR 1.75 (H) 08/21/2024 07:53 AM         Lab Results   Component Value Date/Time    WBC 10.8 08/21/2024 07:53 AM    RBC 4.14 (L) 08/21/2024 07:53 AM    HEMOGLOBIN 12.4 08/21/2024 07:53 AM    HEMATOCRIT 39.2 08/21/2024 07:53 AM    MCV 94.7 08/21/2024 07:53 AM    MCH 30.0 08/21/2024 07:53 AM    MCHC 31.6 (L) 08/21/2024 07:53 AM    MPV 9.8 08/21/2024 07:53 AM    NEUTSPOLYS 84.20 (H) 08/19/2024 05:50 AM    LYMPHOCYTES 7.20 (L) 08/19/2024 05:50 AM    MONOCYTES 5.90 08/19/2024 05:50 AM    EOSINOPHILS 0.20 08/19/2024 05:50 AM    BASOPHILS 0.30 08/19/2024 05:50 AM    ANISOCYTOSIS 1+ 08/15/2024 03:30 AM         Recent Labs     08/16/24  0100 08/17/24  0445 08/18/24  0447 08/19/24  0550 08/20/24  0321 08/21/24  0753   ASTSGOT 31 33 43 51* 48* 50*   ALTSGPT 50 54* 64* 83* 81* 96*    TBILIRUBIN 1.2 1.0 1.0 1.0 1.0 1.0   GLOBULIN 2.1 2.5 2.8 2.9 2.6 2.8   INR  --   --   --   --   --  1.75*      Imaging  CT ABDOMEN (8/20/24)  IMPRESSION:  1.  There is postoperative change consistent with distal pancreatectomy and splenectomy.  2.  The simple appearing left upper quadrant subphrenic fluid collection is again seen very similar to the recent prior study, possibly postoperative seroma as there is no rim enhancement to suggest abscess. Pancreatic leak is considered unlikely as the   majority of the fluid is in the splenic fossa rather than adjacent to the surgical margin of the pancreas.  3.  Stable bibasilar atelectasis and pleural effusions.  4.  Cholelithiasis again noted.  5.  Pulmonary arteries not well assessed on this exam in the right atrial thrombus is no longer evident. Small defect in the right ventricular apex is unchanged.    US EXTREMITY VENOUS LE (8/15/24) - Preliminary   FINDINGS:   Bilateral lower extremities    Acute deep venous thrombosis in the posterior tibial, peroneal and    gastrocnemius veins with areas of partial occlusion and areas of complete    occlusion.    All other veins demonstrate complete color filling and compressibility with    normal venous flow dynamics including spontaneous flow and respiratory    phasicity.     CT ABDOMEN/PELVIS (8/14/24)  IMPRESSION:  1.  Postop changes of splenectomy and distal pancreatectomy, fluid collection left upper quadrant is seen which could represent postop seroma or possibly pancreatic leak.  2.  Right lower lobe segmental and subsegmental pulmonary embolus.  3.  Trace right and small left pleural effusions.  4.  Linear densities of the lung bases suggesting atelectasis, component of left lower lobe infiltrate not excluded.  5.  Diverticulosis  6.  Cholelithiasis  7.  Cardiomegaly     Assessment/Plan  Patient is a 78F re-admitted with shortness of breath, fatigue, LUQ pain, s/p ex lap with distal panc / spleen on 8/5/24, noted to  have B/L PE on 8/14/24 s/p right atrial thrombectomy on 8/16/24 by Dr Bowen. Found to have LUQ fluid collection s/p IR fluid aspiration 400 mL by Dr Ruff on 8/15/24. CT on 8/20/24 noted recurrence of fluid collection.    Ex lap distal panc/spleen, CT on 8/20/24 noted recurrence of LUQ fluid collection  - IR drain ordered, timing pending coming off Eliquis   - NPO when appropriate     2. Pulmonary Embolus, B/L, with RV strain, s/p right atrial thrombectomy  - Transitioned to Heparin IV     3. Serous discharge from left femoral access site  - Reinforce dressing as needed  - Monitor    Patient seen with Dr Esquivel

## 2024-08-22 NOTE — PROGRESS NOTES
Monitor Summary    SR, SB 54-64  First degree heart block  (O) PVC, (R) PVC  .24/.06/.42

## 2024-08-22 NOTE — PROGRESS NOTES
Pt returned from IR. Drained placed in LUQ and 350cc output while in PACU. Pt AO x4, on 1LNC, snacks provided. Post op vitals initiated. Denies pain. Dermabond applied to left groin site and covered with gauze and tegaderm with no signs of drainage. No other needs at this time.

## 2024-08-22 NOTE — PROGRESS NOTES
Bedside report received, assumed care of patient. Ambulated to bathroom standby assist, breathing even and unlabored on 1LNC. Denies pain. A&O x4. Replaced dressing on left groin site because of drainage. Received call from Jovanna in IR to keep pt NPO for possible drain placement. Tele monitoring in place. Educated on fall risk, all fall precautions in place. Call light within reach, bed locked and in lowest position, denied other needs at this time.

## 2024-08-23 ENCOUNTER — PHARMACY VISIT (OUTPATIENT)
Dept: PHARMACY | Facility: MEDICAL CENTER | Age: 79
End: 2024-08-23
Payer: COMMERCIAL

## 2024-08-23 ENCOUNTER — HOME HEALTH ADMISSION (OUTPATIENT)
Dept: HOME HEALTH SERVICES | Facility: HOME HEALTHCARE | Age: 79
End: 2024-08-23
Payer: COMMERCIAL

## 2024-08-23 VITALS
HEIGHT: 64 IN | HEART RATE: 71 BPM | OXYGEN SATURATION: 92 % | RESPIRATION RATE: 16 BRPM | WEIGHT: 153.88 LBS | SYSTOLIC BLOOD PRESSURE: 136 MMHG | DIASTOLIC BLOOD PRESSURE: 71 MMHG | TEMPERATURE: 98.8 F | BODY MASS INDEX: 26.27 KG/M2

## 2024-08-23 LAB
AMYLASE FLD-CCNC: 2457 U/L
BODY FLD TYPE: NORMAL
GRAM STN SPEC: NORMAL
SIGNIFICANT IND 70042: NORMAL
SITE SITE: NORMAL
SOURCE SOURCE: NORMAL

## 2024-08-23 PROCEDURE — 87186 SC STD MICRODIL/AGAR DIL: CPT

## 2024-08-23 PROCEDURE — 700101 HCHG RX REV CODE 250: Performed by: NURSE PRACTITIONER

## 2024-08-23 PROCEDURE — 700102 HCHG RX REV CODE 250 W/ 637 OVERRIDE(OP): Performed by: STUDENT IN AN ORGANIZED HEALTH CARE EDUCATION/TRAINING PROGRAM

## 2024-08-23 PROCEDURE — A9270 NON-COVERED ITEM OR SERVICE: HCPCS

## 2024-08-23 PROCEDURE — A9270 NON-COVERED ITEM OR SERVICE: HCPCS | Performed by: STUDENT IN AN ORGANIZED HEALTH CARE EDUCATION/TRAINING PROGRAM

## 2024-08-23 PROCEDURE — 87077 CULTURE AEROBIC IDENTIFY: CPT

## 2024-08-23 PROCEDURE — 700102 HCHG RX REV CODE 250 W/ 637 OVERRIDE(OP): Performed by: INTERNAL MEDICINE

## 2024-08-23 PROCEDURE — RXMED WILLOW AMBULATORY MEDICATION CHARGE: Performed by: STUDENT IN AN ORGANIZED HEALTH CARE EDUCATION/TRAINING PROGRAM

## 2024-08-23 PROCEDURE — A9270 NON-COVERED ITEM OR SERVICE: HCPCS | Performed by: INTERNAL MEDICINE

## 2024-08-23 PROCEDURE — 82150 ASSAY OF AMYLASE: CPT

## 2024-08-23 PROCEDURE — 87205 SMEAR GRAM STAIN: CPT

## 2024-08-23 PROCEDURE — 99024 POSTOP FOLLOW-UP VISIT: CPT | Performed by: SURGERY

## 2024-08-23 PROCEDURE — 700102 HCHG RX REV CODE 250 W/ 637 OVERRIDE(OP)

## 2024-08-23 PROCEDURE — 87070 CULTURE OTHR SPECIMN AEROBIC: CPT

## 2024-08-23 RX ORDER — SODIUM CHLORIDE 0.9 % (FLUSH) 0.9 %
10 SYRINGE (ML) INJECTION EVERY 12 HOURS
Status: DISCONTINUED | OUTPATIENT
Start: 2024-08-23 | End: 2024-08-23 | Stop reason: HOSPADM

## 2024-08-23 RX ORDER — SODIUM CHLORIDE 0.9 % (FLUSH) 0.9 %
10 SYRINGE (ML) INJECTION EVERY 12 HOURS
Qty: 400 ML | Refills: 1 | Status: SHIPPED | OUTPATIENT
Start: 2024-08-23

## 2024-08-23 RX ORDER — OXYCODONE HYDROCHLORIDE 5 MG/1
5 TABLET ORAL EVERY 8 HOURS PRN
Qty: 12 TABLET | Refills: 0 | Status: SHIPPED | OUTPATIENT
Start: 2024-08-23 | End: 2024-08-27

## 2024-08-23 RX ORDER — AMLODIPINE BESYLATE 5 MG/1
5 TABLET ORAL DAILY
Qty: 30 TABLET | Refills: 1 | Status: SHIPPED | OUTPATIENT
Start: 2024-08-24 | End: 2024-08-26 | Stop reason: SDUPTHER

## 2024-08-23 RX ADMIN — AMLODIPINE BESYLATE 5 MG: 10 TABLET ORAL at 05:25

## 2024-08-23 RX ADMIN — APIXABAN 10 MG: 5 TABLET, FILM COATED ORAL at 05:25

## 2024-08-23 RX ADMIN — LEVOTHYROXINE SODIUM 100 MCG: 0.1 TABLET ORAL at 05:25

## 2024-08-23 RX ADMIN — LIOTHYRONINE SODIUM 5 MCG: 5 TABLET ORAL at 09:09

## 2024-08-23 RX ADMIN — SODIUM CHLORIDE, PRESERVATIVE FREE 10 ML: 5 INJECTION INTRAVENOUS at 11:30

## 2024-08-23 RX ADMIN — ACETAMINOPHEN 650 MG: 325 TABLET ORAL at 05:30

## 2024-08-23 RX ADMIN — ACETAMINOPHEN 650 MG: 325 TABLET ORAL at 11:28

## 2024-08-23 ASSESSMENT — SOCIAL DETERMINANTS OF HEALTH (SDOH)
IN THE PAST 12 MONTHS, HAS THE ELECTRIC, GAS, OIL, OR WATER COMPANY THREATENED TO SHUT OFF SERVICE IN YOUR HOME?: NO
WITHIN THE LAST YEAR, HAVE YOU BEEN HUMILIATED OR EMOTIONALLY ABUSED IN OTHER WAYS BY YOUR PARTNER OR EX-PARTNER?: NO
WITHIN THE PAST 12 MONTHS, THE FOOD YOU BOUGHT JUST DIDN'T LAST AND YOU DIDN'T HAVE MONEY TO GET MORE: NEVER TRUE
WITHIN THE LAST YEAR, HAVE TO BEEN RAPED OR FORCED TO HAVE ANY KIND OF SEXUAL ACTIVITY BY YOUR PARTNER OR EX-PARTNER?: NO
WITHIN THE LAST YEAR, HAVE YOU BEEN AFRAID OF YOUR PARTNER OR EX-PARTNER?: NO
WITHIN THE PAST 12 MONTHS, YOU WORRIED THAT YOUR FOOD WOULD RUN OUT BEFORE YOU GOT THE MONEY TO BUY MORE: NEVER TRUE
WITHIN THE LAST YEAR, HAVE YOU BEEN KICKED, HIT, SLAPPED, OR OTHERWISE PHYSICALLY HURT BY YOUR PARTNER OR EX-PARTNER?: NO

## 2024-08-23 ASSESSMENT — ENCOUNTER SYMPTOMS
COUGH: 0
ORTHOPNEA: 0
WEAKNESS: 1
PALPITATIONS: 0
CONSTIPATION: 0
BACK PAIN: 0
FOCAL WEAKNESS: 0
TREMORS: 0
HEARTBURN: 0
SPEECH CHANGE: 0
WEAKNESS: 0
MYALGIAS: 0
DOUBLE VISION: 0
SHORTNESS OF BREATH: 0
FEVER: 0
SPUTUM PRODUCTION: 0
PHOTOPHOBIA: 0
WEIGHT LOSS: 0
BLURRED VISION: 0
CHILLS: 0
NECK PAIN: 0
HALLUCINATIONS: 0
EYE PAIN: 0
ABDOMINAL PAIN: 0
DIZZINESS: 0
TINGLING: 0
NAUSEA: 0
SENSORY CHANGE: 0
VOMITING: 0
HEADACHES: 0
DIARRHEA: 0

## 2024-08-23 ASSESSMENT — FIBROSIS 4 INDEX: FIB4 SCORE: 0.67

## 2024-08-23 ASSESSMENT — LIFESTYLE VARIABLES: SUBSTANCE_ABUSE: 0

## 2024-08-23 ASSESSMENT — PAIN DESCRIPTION - PAIN TYPE: TYPE: ACUTE PAIN

## 2024-08-23 NOTE — PROGRESS NOTES
Handoff report received from day shift nurse. Pt care assumed. Pt is currently resting in bed. POC discussed with Pt and Pt verbalizes no questions at this time. Pt is AAOx4, on 1L NC, on Tele monitoring, and VSS. Call light and belongings within reach, bed in lowest and locked position, and Pt educated on use of call light. Pt in 3/10 pain in the abdomen area where drain was place. Medicating per MAR with tylenol. Hourly rounding in place.

## 2024-08-23 NOTE — CARE PLAN
The patient is Stable - Low risk of patient condition declining or worsening    Shift Goals  Clinical Goals: monitor incision sites  Patient Goals: pain control, sleep  Family Goals: keep pt comfortable    Progress made toward(s) clinical / shift goals:    Problem: Pain - Standard  Goal: Alleviation of pain or a reduction in pain to the patient’s comfort goal  Outcome: Progressing  Note: Administered tylenol per MAR for pt's 3/10 pain at her abdominal incision site where drain was placed. Pt's pain improved & was able to sleep comfortably.      Problem: Fall Risk  Goal: Patient will remain free from falls  Outcome: Progressing  Note: Pt remained free from falls. Bed in low & locked position and educated on using call light. Pt used call light when needing assistance up to bathroom.        Patient is not progressing towards the following goals:

## 2024-08-23 NOTE — PROGRESS NOTES
Date of Service  August 23, 2024     Chief Complaint  Shortness of Breath (Brought in by matsa from home. Patient c/o Shortness of breath and weakness x 5 days. Had splenectomy 5th of Aug and DC'd on the 9 th.EMS arrival patient oxygen saturation 76 % RA.)      Surgery Completed  CT GUIDED CATHETER NEEDLE ASPIRATION DRAINAGE LUQ (8/15/24) - Dr Ruff  IR Thrombectomy (8/16/24) - Dr Bowen  IR drain placement (8/22/24) - Dr Hill    Hospital Course  POD # 8     Interval Problem Update  No acute events  Tolerating diet without N/V  Transitioned to Heparin (from Eliquis)  IR drain of fluid collection completed 8/22/24  Left femoral site appears intact, new dressing in place    Problem List  Principal Problem:    Pulmonary embolism with acute cor pulmonale, unspecified chronicity, unspecified pulmonary embolism type (HCC) (POA: Yes)  Active Problems:    Acquired hypothyroidism (POA: Yes)    Advance care planning (POA: Yes)    Cancer of overlapping sites of left breast (HCC) (POA: Yes)    Sepsis (HCC) (POA: Yes)    Intra-abdominal fluid collection (POA: Yes)    Acute respiratory failure with hypoxia (HCC) (POA: Yes)    DVT (deep venous thrombosis) (HCC) (POA: Yes)    Atrial thrombus (POA: Yes)    Second degree heart block (POA: Yes)    Thoracic ascending aortic aneurysm (HCC) (POA: Unknown)  Resolved Problems:    * No resolved hospital problems. *     Subjective  Review of Systems   Constitutional:  Negative for chills, fever, malaise/fatigue and weight loss.   Respiratory:  Negative for cough and shortness of breath.    Cardiovascular:  Negative for chest pain and leg swelling.   Gastrointestinal:  Negative for abdominal pain, diarrhea, nausea and vomiting.       Objective  Temp:  [36.2 °C (97.2 °F)-37.6 °C (99.7 °F)] 37.2 °C (99 °F)  Pulse:  [63-84] 72  Resp:  [14-22] 16  BP: (126-181)/(60-81) 137/74  SpO2:  [90 %-98 %] 94 %      Physical Exam  Vitals and nursing note reviewed.   Constitutional:       General: She is  not in acute distress.     Appearance: Normal appearance. She is obese.   HENT:      Head: Normocephalic and atraumatic.      Mouth/Throat:      Pharynx: Oropharynx is clear.   Eyes:      Conjunctiva/sclera: Conjunctivae normal.   Cardiovascular:      Rate and Rhythm: Normal rate.   Pulmonary:      Effort: Pulmonary effort is normal. No respiratory distress.   Abdominal:      Comments: Midline incision, steristrips  IR drain at left abdomen, moderate tan colored output   Skin:     General: Skin is warm and dry.      Coloration: Skin is not jaundiced.   Neurological:      Mental Status: She is alert and oriented to person, place, and time.   Psychiatric:         Mood and Affect: Mood normal.         Behavior: Behavior normal.        Fluids    Intake/Output Summary (Last 24 hours) at 8/23/2024 0812  Last data filed at 8/22/2024 1815  Gross per 24 hour   Intake 240 ml   Output 50 ml   Net 190 ml         Labs  Lab Results   Component Value Date/Time    SODIUM 138 08/22/2024 01:55 AM    POTASSIUM 4.1 08/22/2024 01:55 AM    CHLORIDE 105 08/22/2024 01:55 AM    CO2 21 08/22/2024 01:55 AM    GLUCOSE 96 08/22/2024 01:55 AM    BUN 8 08/22/2024 01:55 AM    CREATININE 0.58 08/22/2024 01:55 AM         Lab Results   Component Value Date/Time    PROTHROMBTM 20.7 (H) 08/21/2024 07:53 AM    INR 1.75 (H) 08/21/2024 07:53 AM         Lab Results   Component Value Date/Time    WBC 10.4 08/22/2024 01:55 AM    RBC 3.99 (L) 08/22/2024 01:55 AM    HEMOGLOBIN 12.2 08/22/2024 01:55 AM    HEMATOCRIT 38.2 08/22/2024 01:55 AM    MCV 95.7 08/22/2024 01:55 AM    MCH 30.6 08/22/2024 01:55 AM    MCHC 31.9 (L) 08/22/2024 01:55 AM    MPV 10.3 08/22/2024 01:55 AM    NEUTSPOLYS 77.90 (H) 08/22/2024 01:55 AM    LYMPHOCYTES 10.90 (L) 08/22/2024 01:55 AM    MONOCYTES 9.00 08/22/2024 01:55 AM    EOSINOPHILS 0.30 08/22/2024 01:55 AM    BASOPHILS 0.30 08/22/2024 01:55 AM    ANISOCYTOSIS 1+ 08/15/2024 03:30 AM         Recent Labs     08/17/24  0445  08/18/24  0447 08/19/24  0550 08/20/24  0321 08/21/24  0753   ASTSGOT 33 43 51* 48* 50*   ALTSGPT 54* 64* 83* 81* 96*   TBILIRUBIN 1.0 1.0 1.0 1.0 1.0   GLOBULIN 2.5 2.8 2.9 2.6 2.8   INR  --   --   --   --  1.75*      Imaging  CT ABDOMEN (8/20/24)  IMPRESSION:  1.  There is postoperative change consistent with distal pancreatectomy and splenectomy.  2.  The simple appearing left upper quadrant subphrenic fluid collection is again seen very similar to the recent prior study, possibly postoperative seroma as there is no rim enhancement to suggest abscess. Pancreatic leak is considered unlikely as the   majority of the fluid is in the splenic fossa rather than adjacent to the surgical margin of the pancreas.  3.  Stable bibasilar atelectasis and pleural effusions.  4.  Cholelithiasis again noted.  5.  Pulmonary arteries not well assessed on this exam in the right atrial thrombus is no longer evident. Small defect in the right ventricular apex is unchanged.    US EXTREMITY VENOUS LE (8/15/24) - Preliminary   FINDINGS:   Bilateral lower extremities    Acute deep venous thrombosis in the posterior tibial, peroneal and    gastrocnemius veins with areas of partial occlusion and areas of complete    occlusion.    All other veins demonstrate complete color filling and compressibility with    normal venous flow dynamics including spontaneous flow and respiratory    phasicity.     CT ABDOMEN/PELVIS (8/14/24)  IMPRESSION:  1.  Postop changes of splenectomy and distal pancreatectomy, fluid collection left upper quadrant is seen which could represent postop seroma or possibly pancreatic leak.  2.  Right lower lobe segmental and subsegmental pulmonary embolus.  3.  Trace right and small left pleural effusions.  4.  Linear densities of the lung bases suggesting atelectasis, component of left lower lobe infiltrate not excluded.  5.  Diverticulosis  6.  Cholelithiasis  7.  Cardiomegaly     Assessment/Plan  Patient is a 78F  re-admitted with shortness of breath, fatigue, LUQ pain, s/p ex lap with distal panc / spleen on 8/5/24, noted to have B/L PE on 8/14/24 s/p right atrial thrombectomy on 8/16/24 by Dr Bowen. Found to have LUQ fluid collection s/p IR fluid aspiration 400 mL by Dr Ruff on 8/15/24. CT on 8/20/24 noted recurrence of fluid collection. IR drain placed on 8/22/24.    Ex lap distal panc/spleen, CT on 8/20/24 noted recurrence of LUQ fluid collection  - Monitor IR drain output  - Drain amylase  - Follow drain culture  - Advance diet     2. Pulmonary Embolus, B/L, with RV strain, s/p right atrial thrombectomy  - Anticoagulation     3. Serous discharge from left femoral access site  - Reinforce dressing as needed  - Monitor    Patient seen with Dr Esquivel

## 2024-08-23 NOTE — DISCHARGE PLANNING
ATTN: Case Management  RE: Referral for Home Health    As of 8/23/24, we have accepted the Home Health referral for the patient listed above.    A [facility] clinician will be out to see the patient within 48 hours. If you have any questions or concerns regarding the patient’s transition to Home Health, please do not hesitate to contact us at [phone].      We look forward to collaborating with you,  [facility] Home Health Team

## 2024-08-23 NOTE — PROGRESS NOTES
Radiology Progress Note   Author: SOUMYA Cabello Date & Time created: 8/23/2024  10:36 AM   Date of admission  8/14/2024  Note to reader: this note follows the APSO format rather than the historical SOAP format. Assessment and plan located at the top of the note for ease of use.    Chief Complaint  78 y.o. female admitted 8/14/2024 with SOB  Chief Complaint   Patient presents with    Shortness of Breath     Brought in by [facility] from home. Patient c/o Shortness of breath and weakness x 5 days. Had splenectomy 5th of Aug and DC'd on the 9 th.EMS arrival patient oxygen saturation 76 % RA.     RAVINDRA Gudino is a 78 y.o. female with pmh of breast cancer, hypercholesterolemia, hypothyroidism, Pancreatic mass who presented 8/14/2024 with shortness of breath. Patient underwent a pancreaticosplenectomy on 8/5/2024 with Dr. Espinoza and was discharged on 8/9/2024 on supplemental oxygen. Since discharge patient has become increasingly fatigued, lethargic and short of breath. In the ER a CT scan of her chest, abdomen and pelvis was completed demonstrating multiple segmental and subsegmental PEs with an RV LV ratio of 1.83 and a mildly elevated troponin. She was started on a heparin infusion. In addition to the findings in her chest CT she was also found to have a 10.4 x 10.8 cm fluid collection in the left upper quadrant within the splenic bed and had significant leukocytosis. She was started on antibiotics for this and on 08/15/24 Dr. Ruff (performed needle aspiratonof drainage in LUQ. Her echocardiogram showed a very large thrombus in transit in the RA in addition to RV dilation and reduced systolic function. IR was consulted for this as well. IR Dr. Bowen performed right atrial thrombectomy on 8/16/24. FU CT on 08/20/24 showed LUQ fluid colletion sp IR fluid aspiratin ( 08/15).  IR re consulted for drain placement.  On 08/22/24 Dr. Bowen placed a LUQ10 German drain into fluid collection      Interval History:    08/23/24-  LUQ 10 fr drain to bulb suction with 50 mL yellowish-tan output in the last 24 hours.  I easily flushed drain with 10 mL's of sterile NSS. I reviewed today's labs: WBC 10.4; Hgb 12.2/38.2, Cr 0.58; Coags- PTT 56.8,  Micro- LUQ abscess drainage sent for culture and pending.  I discussed t plan of care with the pt, Dr. Tinsley, Dr. Bowen and RN. I reviewed IDT notes.    Assessment/Plan     Principal Problem:    Pulmonary embolism with acute cor pulmonale, unspecified chronicity, unspecified pulmonary embolism type (HCC)  Active Problems:    Acquired hypothyroidism    Advance care planning    Cancer of overlapping sites of left breast (HCC)    Sepsis (HCC)    Intra-abdominal fluid collection    Acute respiratory failure with hypoxia (HCC)    DVT (deep venous thrombosis) (HCC)    Atrial thrombus    Second degree heart block    Thoracic ascending aortic aneurysm (HCC)      Plan IR  -- Order placed to irrigate 10 Samoan drain with 10 ml of sterile saline each shift  - Fluid cultures pending   -Surgery/hepatobiliary team following    - Recommend follow up CT scan once drain output is approximately 10-15 mL in 24 hours in 5-7 days (proximal 09/01)  -Patient okay to discharge with drain however she will need education upon discharge regarding drain management/drain care (flushing of drain).  She will also need NSS supplies upon discharge.  The plan will be for her to follow-up with surgical oncology/hepatobiliary team for outpatient drain management.  - Continue to monitor drains, VS, and labs     -Thank you for allowing Interventional Radiology team to participate in the patients care, if any additional care or requests are needed in the future please do not hesitate to call or place IR order           Review of Systems  Physical Exam   Review of Systems   Constitutional:  Negative for fever.   HENT:  Negative for hearing loss.    Respiratory:  Negative for cough.    Cardiovascular:  Negative for chest pain.    Gastrointestinal:  Negative for abdominal pain, heartburn, nausea and vomiting.   Musculoskeletal:  Negative for myalgias.   Skin:  Negative for rash.   Neurological:  Negative for weakness and headaches.      Vitals:    08/23/24 0748   BP: 137/74   Pulse: 72   Resp: 16   Temp: 37.2 °C (99 °F)   SpO2: 94%        Physical Exam  Vitals and nursing note reviewed.   Constitutional:       General: She is awake.   HENT:      Head: Normocephalic and atraumatic.      Mouth/Throat:      Mouth: Mucous membranes are dry.      Pharynx: Oropharynx is clear.   Cardiovascular:      Pulses:           Radial pulses are 2+ on the right side and 2+ on the left side.        Dorsalis pedis pulses are 1+ on the right side and 1+ on the left side.      Comments: Left groin IR access site with clean dry dressing intact without ecchymosis; soft to palpate-no active bleeding  Pulmonary:      Effort: Pulmonary effort is normal. No respiratory distress.   Abdominal:      Comments: Soft, round, non distended  Left upper quadrant drain with yellowish-tan output  Dressing CDI   Skin:     General: Skin is warm and dry.      Capillary Refill: Capillary refill takes less than 2 seconds.   Neurological:      General: No focal deficit present.      Mental Status: She is alert and oriented to person, place, and time. Mental status is at baseline.   Psychiatric:         Attention and Perception: Attention normal.         Mood and Affect: Mood normal.         Speech: Speech normal.         Behavior: Behavior is cooperative.             Labs    Recent Labs     08/21/24  0753 08/22/24  0155   WBC 10.8 10.4   RBC 4.14* 3.99*   HEMOGLOBIN 12.4 12.2   HEMATOCRIT 39.2 38.2   MCV 94.7 95.7   MCH 30.0 30.6   MCHC 31.6* 31.9*   RDW 51.1* 51.6*   PLATELETCT 568* 589*   MPV 9.8 10.3     Recent Labs     08/21/24  0753 08/22/24  0155   SODIUM 138 138   POTASSIUM 3.7 4.1   CHLORIDE 106 105   CO2 20 21   GLUCOSE 103* 96   BUN 8 8   CREATININE 0.66 0.58   CALCIUM 8.2*  7.9*     Recent Labs     08/21/24  0753 08/22/24  0155   ALBUMIN 3.2  --    TBILIRUBIN 1.0  --    ALKPHOSPHAT 70  --    TOTPROTEIN 6.0  --    ALTSGPT 96*  --    ASTSGOT 50*  --    CREATININE 0.66 0.58     CT-ABDOMEN-PELVIS WITH   Final Result      1.  There is postoperative change consistent with distal pancreatectomy and splenectomy.   2.  The simple appearing left upper quadrant subphrenic fluid collection is again seen very similar to the recent prior study, possibly postoperative seroma as there is no rim enhancement to suggest abscess. Pancreatic leak is considered unlikely as the    majority of the fluid is in the splenic fossa rather than adjacent to the surgical margin of the pancreas.   3.  Stable bibasilar atelectasis and pleural effusions.   4.  Cholelithiasis again noted.   5.  Pulmonary arteries not well assessed on this exam in the right atrial thrombus is no longer evident. Small defect in the right ventricular apex is unchanged.      EC-ECHOCARDIOGRAM LTD W/ CONT   Final Result      IR-THROMBO MECHANICAL ARTERY,INIT   Final Result      1.  Ultrasound guided access bilateral common femoral veins.   2.  Intracardiac echocardiogram demonstrating mobile right atrial thrombus.   3.  Technically successful right atrial thrombectomy.         CT-CYST ASPIRATION-MISC   Final Result      1.  CT GUIDED RETROPERITONEAL LEFT UPPER QUADRANT NEEDLE ASPIRATION DRAINAGE. THE OBTAINED BROWN WATERY FLUID RESEMBLES PANCREATIC PSEUDOCYST FLUID. THE FLUID WAS NOT PURULENT NOR MALODOROUS.   2.  THE CURRENT PLAN IS TO CHECK CULTURE AND LAB RESULTS.      DX-CHEST-PORTABLE (1 VIEW)   Final Result      1. Stable small left pleural effusion with left mid lung and lung base parenchymal opacities.   2. The remainder is stable.      EC-ECHOCARDIOGRAM COMPLETE W/O CONT   Final Result      US-EXTREMITY VENOUS LOWER BILAT   Final Result      CT-ABDOMEN-PELVIS WITH   Final Result         1.  Postop changes of splenectomy and distal  "pancreatectomy, fluid collection left upper quadrant is seen which could represent postop seroma or possibly pancreatic leak.   2.  Right lower lobe segmental and subsegmental pulmonary embolus.   3.  Trace right and small left pleural effusions.   4.  Linear densities of the lung bases suggesting atelectasis, component of left lower lobe infiltrate not excluded.   5.  Diverticulosis   6.  Cholelithiasis   7.  Cardiomegaly      These findings were discussed with the patient's clinician, Teri Lee, on 8/14/2024 9:53 PM.      CT-CTA CHEST PULMONARY ARTERY W/ RECONS   Final Result   .      1.  Bilateral segmental and subsegmental pulmonary emboli with changes compatible with significant right heart strain.   2.  Small left and trace right pleural effusions.   3.  Linear consolidations in the bilateral lung bases suggests atelectasis, component of left lower lobe infiltrate is not excluded.   4.  4.0 cm ascending thoracic aortic aneurysm, radiographic follow-up and surveillance recommended as clinically appropriate.   5.  Atherosclerosis and atherosclerotic coronary artery disease      These findings were discussed with the patient's clinician, Teri Lee, on 8/14/2024 10:05 PM.      DX-CHEST-PORTABLE (1 VIEW)   Final Result         1.  Left midlung and lower lobe infiltrates   2.  Trace left pleural effusion      CT-IMAGE-GUIDED DRAIN PERITONEAL    (Results Pending)     INR   Date Value Ref Range Status   08/21/2024 1.75 (H) 0.87 - 1.13 Final     Comment:     INR - Non-therapeutic Reference Range: 0.87-1.13  INR - Therapeutic Reference Range: 2.0-4.0       No results found for: \"POCINR\"     Intake/Output Summary (Last 24 hours) at 8/23/2024 1036  Last data filed at 8/22/2024 1815  Gross per 24 hour   Intake --   Output 50 ml   Net -50 ml      I have personally reviewed the above labs and imaging      I have performed a physical exam and reviewed and updated ROS and Plan today (8/23/2024).     50 minutes in " directly providing and coordinating care and extensive data review.  No time overlap and excludes procedures.

## 2024-08-23 NOTE — DISCHARGE PLANNING
Case Management Discharge Planning    Admission Date: 8/14/2024  GMLOS: 9.9  ALOS: 9    6-Clicks ADL Score: 24  6-Clicks Mobility Score: 24      Anticipated Discharge Dispo: Discharge Disposition: D/T to home under HHA care in anticipation of covered skilled care (06)  [facility] [place] [address]  Discharge Contact Phone Number:  (Spouse's cell)    DME Needed: No    Action(s) Taken: Updated Provider/Nurse on Discharge Plan    1100, Pt was discussed in IDT rounds for discharge today.     1230, ROSELYN Hawkins informed to send Home health referral.    No further CM needs identified.    Escalations Completed: None    Medically Clear: Yes    Next Steps: CM will continue to assist Pt with discharge needs.      Barriers to Discharge: None

## 2024-08-23 NOTE — CARE PLAN
The patient is Stable - Low risk of patient condition declining or worsening    Shift Goals  Clinical Goals: heparin gtt, monitor drainage from incision, NPO  Patient Goals: drain placement  Family Goals: keep pt comfortable      Problem: Knowledge Deficit - Standard  Goal: Patient and family/care givers will demonstrate understanding of plan of care, disease process/condition, diagnostic tests and medications  Outcome: Progressing   Patient understands plan of care, acute condition and medications being used.     Problem: Hemodynamics  Goal: Patient's hemodynamics, fluid balance and neurologic status will be stable or improve  Outcome: Progressing     Problem: Fluid Volume  Goal: Fluid volume balance will be maintained  Outcome: Progressing     Problem: Urinary - Renal Perfusion  Goal: Ability to achieve and maintain adequate renal perfusion and functioning will improve  Outcome: Progressing     Problem: Respiratory  Goal: Patient will achieve/maintain optimum respiratory ventilation and gas exchange  Outcome: Progressing   Patient is on room air and is not requiring 1L of O2 anymore    Problem: Physical Regulation  Goal: Diagnostic test results will improve  Outcome: Progressing  Goal: Signs and symptoms of infection will decrease  Outcome: Progressing   Patient had no sign/symptoms of infection. VSS, afebrile.     Problem: Skin Integrity  Goal: Skin integrity is maintained or improved  Outcome: Progressing     Problem: Pain - Standard  Goal: Alleviation of pain or a reduction in pain to the patient’s comfort goal  Outcome: Progressing   Patient did not require any pain medication and had no pain throughout the day.     Problem: Fall Risk  Goal: Patient will remain free from falls  Outcome: Progressing

## 2024-08-23 NOTE — DISCHARGE PLANNING
1232  Received Choice form at 1204  Agency/Facility Name: [facility]   Referral sent per Choice form @ 0899

## 2024-08-23 NOTE — PROGRESS NOTES
Bedside report received and patient care assumed. Pt is resting in bed, A&O4, with no complaints of pain, and is on 0.5L NC. Tele box on. All fall precautions are in place, belongings at bedside table.  Pt was updated on POC, no questions or concerns. Pt educated on use of call light for assistance.

## 2024-08-23 NOTE — PROGRESS NOTES
Hospital Medicine Daily Progress Note    Date of Service  8/22/2024    Chief Complaint  Paige Gudino is a 78 y.o. female admitted 8/14/2024 with shortness of breath    Hospital Course    Paige Gudino is a 78 y.o. female with past medical history of left-sided breast cancer status post mastectomy on tamoxifen, hypothyroidism, recent pancreato/splenectomy on 8/5 by Dr. Espinoza for large pancreatic cystic mass that is benign serous cystadenoma on pathology (Discharged on 8/9), who presented 8/14/2024 with complaints of shortness of breath and generalized weakness for 5 days, decreased oral intake, chills, cough.  Denies chest pain, any significant abdominal pain, diarrhea or constipation.  She presented desaturating to 76% on room air, was placed on 5 L nasal cannula.  Respiratory rate 30, heart rate 90, blood pressure stable, temperature 99.1.  EKG: Nonspecific T wave inversion in lateral leads.  Chest x-ray showed left lower lobe and middle lobe infiltrate.  CTA showed bilateral segmental subsegmental pulmonary embolism with signs of RV strain.  WBC 19.9, lactic acid 2.5, troponin 70, BNP 8591, lipase 111.  CT of the abdomen and pelvis with contrast, postop changes with postop seroma or possibly pancreatic leak.  Patient was given Zosyn, vancomycin, sepsis bolus 2 L with Ringer lactate, and started on IV heparin.    She admitted to IMCU for close monitoring and diagnosis of hypoxia secondary to pulmonary embolism.    Patient transferred to ICU on August 15, 2024 and she underwent right atrial thrombectomy.    Interval Problem Update  08/17/24  I evaluated and examined her at the bedside.  She reported that she is feeling significantly better and denies complaint of shortness of breath and severe pain.  I discussed plan of care with the and her daughter at the bedside and answered all of their questions been  I am continuing heparin infusion and I am titrating heparin as per Anti Xa  levels and monitor for signs of bleeding.  08/18/24  I evaluated and examined him her at the bedside.  She reported that she is felt significantly better and after long time she had a good breakfast.  She is requiring 2 L of oxygen to maintain oxygen saturation.  She denies any complaint of severe pain.  I discussed plan of care with her and answered all her questions.  I am continuing heparin gtt. and I am titrating as per Anti Xa levels and monitor for signs of bleeding.  Plan is to discuss it with surgery tomorrow for possibility of transitioning her to DOAC  08/19/24  I evaluated and examined her at the bedside.  She reported that she is feeling significantly better and she is able to eat food.  I titrated down her oxygen from 1 L to 0.5 L and requested bedside RN to wean her off from oxygen.  If she will remain stable plan is for discharge tomorrow.  I requested physical therapy evaluation.  Currently she is on monitor of oxygen and maintaining oxygen saturation.  Blood pressures have been fluctuating.  CBC showed white blood cell count of 12.5, hemoglobin of 5.7 and platelet count of 445.  CMP showed sodium of 139, potassium of 3.4, BUN of 10 and creatinine of 0.62.  Cultures remain negative to date.  I discussed with surgical oncologist Dr. Velasco regarding patient anticoagulation.  He recommended we can decide on anticoagulation and no further plan for procedure at this moment and patient want to follow-up with Dr. Espinoza as outpatient.  I transitioned her from heparin infusion to Eliquis loading dose.    8/20  Evaluated patient bedside, family present.  Afebrile normal pulse and respiratory rate systolic blood pressure 140s to 150s pulse ox 90 to 96% on 1 L nasal cannula.  Leukocytosis improving, stable anemia platelets 485 glucose 105 AST ALT 48/81 low albumin and total protein.  She is having leakage of her abdominal surgical site, CT performed showing simple appearing left upper quadrant  subphrenic fluid collection similar to recent study similar size, suspected postoperative seroma as there is no rim enhancement to suggest abscess, pancreatic leak considered unlikely.  Right atrial thrombus no longer evident on the CT.  Will follow-up with surgery recommendations.    8/21  Evaluated bedside, family present, patient out of bed to chair.  Afebrile normal pulse and respiratory rate systolic blood pressure 130s to 160s pulse ox 90 to 95% on 1 L nasal cannula.  Leukocytosis resolved platelets 568 CMP glucose 103 AST 50 ALT 96.  For planned IR drain placement I have switched over to heparin drip, I discussed with IR and patient needs at least 4 missed doses of her Eliquis prior to drain placement, suspect will be Friday morning but will see if Thursday evening is possible since she only had 1 Eliquis dose.  No new complaints, patient reports intermittent increasing pressure in her abdomen followed by subsequent leakage from the insertion site, hospital gown had to be changed today due to the drainage.  She expressed understanding of the plan.  Discussed with surgical team.    8/22  Went for her IR drain placement today, tolerated well no complaints.   She is feeling improved, looking forward to when she is discharged. Tolerating PO intake. Restart Eliquis. Will touch base with HBS team in regards to long-term plan of drain, watching inpatient and rescanning in a few days or discharge with drain and follow up outpatient in clinic. No acute complaints.   Follow cultures.    I have discussed this patient's plan of care and discharge plan at IDT rounds today with Case Management, Nursing, Nursing leadership, and other members of the IDT team.    Consultants/Specialty  cardiology, critical care, and pulmonary    Code Status  Full Code    Disposition  Medically Cleared  I have placed the appropriate orders for post-discharge needs.    Review of Systems  Review of Systems   Constitutional:  Positive for  malaise/fatigue. Negative for chills, fever and weight loss.   HENT:  Negative for hearing loss and tinnitus.    Eyes:  Negative for blurred vision, double vision, photophobia and pain.   Respiratory:  Negative for cough, sputum production and shortness of breath.    Cardiovascular:  Negative for chest pain, palpitations, orthopnea and leg swelling.   Gastrointestinal:  Negative for abdominal pain, constipation, diarrhea, nausea and vomiting.   Genitourinary:  Negative for dysuria, frequency and urgency.   Musculoskeletal:  Negative for back pain, joint pain, myalgias and neck pain.   Skin:  Negative for rash.   Neurological:  Positive for weakness. Negative for dizziness, tingling, tremors, sensory change, speech change, focal weakness and headaches.   Psychiatric/Behavioral:  Negative for hallucinations and substance abuse.    All other systems reviewed and are negative.       Physical Exam  Temp:  [36.2 °C (97.2 °F)-37.6 °C (99.7 °F)] 36.2 °C (97.2 °F)  Pulse:  [63-84] 63  Resp:  [14-22] 16  BP: (126-181)/(60-81) 156/76  SpO2:  [90 %-98 %] 93 %    Physical Exam  Vitals reviewed.   Constitutional:       General: She is not in acute distress.     Appearance: Normal appearance. She is not ill-appearing or toxic-appearing.      Comments: Clinically she has been improving  She is sitting in chair without any acute distress.   HENT:      Head: Normocephalic and atraumatic.      Nose: No congestion.      Comments: Oxygen nasal cannula     Mouth/Throat:      Mouth: Mucous membranes are moist.   Eyes:      General:         Right eye: No discharge.         Left eye: No discharge.      Pupils: Pupils are equal, round, and reactive to light.   Cardiovascular:      Rate and Rhythm: Normal rate and regular rhythm.      Pulses: Normal pulses.      Heart sounds: Normal heart sounds. No murmur heard.  Pulmonary:      Effort: No respiratory distress.      Breath sounds: Normal breath sounds. No stridor. No wheezing or rhonchi.    Abdominal:      General: There is no distension.      Palpations: Abdomen is soft.      Tenderness: There is no abdominal tenderness. There is no guarding or rebound.      Comments: nontendersurgical dressing in place with YEMI drain exiting   Musculoskeletal:         General: No swelling or tenderness. Normal range of motion.      Cervical back: Normal range of motion. No rigidity.   Skin:     General: Skin is warm.      Capillary Refill: Capillary refill takes less than 2 seconds.      Coloration: Skin is not jaundiced or pale.      Findings: No bruising.   Neurological:      General: No focal deficit present.      Mental Status: She is alert and oriented to person, place, and time. Mental status is at baseline.      Cranial Nerves: No cranial nerve deficit.   Psychiatric:         Mood and Affect: Mood normal.         Behavior: Behavior normal.         Thought Content: Thought content normal.         Judgment: Judgment normal.         Fluids    Intake/Output Summary (Last 24 hours) at 8/23/2024 0518  Last data filed at 8/22/2024 1815  Gross per 24 hour   Intake 240 ml   Output 50 ml   Net 190 ml         Laboratory  Recent Labs     08/21/24  0753 08/22/24  0155   WBC 10.8 10.4   RBC 4.14* 3.99*   HEMOGLOBIN 12.4 12.2   HEMATOCRIT 39.2 38.2   MCV 94.7 95.7   MCH 30.0 30.6   MCHC 31.6* 31.9*   RDW 51.1* 51.6*   PLATELETCT 568* 589*   MPV 9.8 10.3     Recent Labs     08/21/24  0753 08/22/24  0155   SODIUM 138 138   POTASSIUM 3.7 4.1   CHLORIDE 106 105   CO2 20 21   GLUCOSE 103* 96   BUN 8 8   CREATININE 0.66 0.58   CALCIUM 8.2* 7.9*     Recent Labs     08/21/24  0753 08/21/24  1238 08/22/24  0155 08/22/24  0951   APTT 40.6* 42.3* 120.1* 56.8*   INR 1.75*  --   --   --                  Imaging  CT-ABDOMEN-PELVIS WITH   Final Result      1.  There is postoperative change consistent with distal pancreatectomy and splenectomy.   2.  The simple appearing left upper quadrant subphrenic fluid collection is again seen very  similar to the recent prior study, possibly postoperative seroma as there is no rim enhancement to suggest abscess. Pancreatic leak is considered unlikely as the    majority of the fluid is in the splenic fossa rather than adjacent to the surgical margin of the pancreas.   3.  Stable bibasilar atelectasis and pleural effusions.   4.  Cholelithiasis again noted.   5.  Pulmonary arteries not well assessed on this exam in the right atrial thrombus is no longer evident. Small defect in the right ventricular apex is unchanged.      EC-ECHOCARDIOGRAM LTD W/ CONT   Final Result      IR-THROMBO MECHANICAL ARTERY,INIT   Final Result      1.  Ultrasound guided access bilateral common femoral veins.   2.  Intracardiac echocardiogram demonstrating mobile right atrial thrombus.   3.  Technically successful right atrial thrombectomy.         CT-CYST ASPIRATION-MISC   Final Result      1.  CT GUIDED RETROPERITONEAL LEFT UPPER QUADRANT NEEDLE ASPIRATION DRAINAGE. THE OBTAINED BROWN WATERY FLUID RESEMBLES PANCREATIC PSEUDOCYST FLUID. THE FLUID WAS NOT PURULENT NOR MALODOROUS.   2.  THE CURRENT PLAN IS TO CHECK CULTURE AND LAB RESULTS.      DX-CHEST-PORTABLE (1 VIEW)   Final Result      1. Stable small left pleural effusion with left mid lung and lung base parenchymal opacities.   2. The remainder is stable.      EC-ECHOCARDIOGRAM COMPLETE W/O CONT   Final Result      US-EXTREMITY VENOUS LOWER BILAT   Final Result      CT-ABDOMEN-PELVIS WITH   Final Result         1.  Postop changes of splenectomy and distal pancreatectomy, fluid collection left upper quadrant is seen which could represent postop seroma or possibly pancreatic leak.   2.  Right lower lobe segmental and subsegmental pulmonary embolus.   3.  Trace right and small left pleural effusions.   4.  Linear densities of the lung bases suggesting atelectasis, component of left lower lobe infiltrate not excluded.   5.  Diverticulosis   6.  Cholelithiasis   7.  Cardiomegaly       These findings were discussed with the patient's clinician, Teri Lee, on 8/14/2024 9:53 PM.      CT-CTA CHEST PULMONARY ARTERY W/ RECONS   Final Result   .      1.  Bilateral segmental and subsegmental pulmonary emboli with changes compatible with significant right heart strain.   2.  Small left and trace right pleural effusions.   3.  Linear consolidations in the bilateral lung bases suggests atelectasis, component of left lower lobe infiltrate is not excluded.   4.  4.0 cm ascending thoracic aortic aneurysm, radiographic follow-up and surveillance recommended as clinically appropriate.   5.  Atherosclerosis and atherosclerotic coronary artery disease      These findings were discussed with the patient's clinician, Teri Lee, on 8/14/2024 10:05 PM.      DX-CHEST-PORTABLE (1 VIEW)   Final Result         1.  Left midlung and lower lobe infiltrates   2.  Trace left pleural effusion      CT-IMAGE-GUIDED DRAIN PERITONEAL    (Results Pending)        Assessment/Plan  * Pulmonary embolism with acute cor pulmonale, unspecified chronicity, unspecified pulmonary embolism type (HCC)- (present on admission)  Assessment & Plan  Submassive PE with DVTs and thrombus in transit  Difficult treatment with limited options.  Per IR, unable to complete suction thrombectomy given risk for propagation.  With intra-abdominal fluid collection of unknown etiology systemic tPA is a high risk and recent surgery given recent surgery.  Profoundly high mortality with PESI 147, TANMAY Stage IV  No signs of active bleeding.  I discussed plan of care with surgical oncology and now will transition her from heparin infusion to loading dose of Eliquis        Thoracic ascending aortic aneurysm (HCC)  Assessment & Plan  On imaging studies patient found to have 4.0 cm ascending thoracic aortic aneurysm and she will need outpatient follow-up.    Second degree heart block- (present on admission)  Assessment & Plan  During the night of 8/15  into 8/16, patient was noted to have occasional Mobitz type II.  Patient was asymptomatic during these episodes.  This is likely secondary to PE. Otherwise, she is mostly in sinus rhythm.  Patient appliance in place.  Remained stable on telemetry.  Continue to monitor closely on telemetry.  If no improvement, consult cardiology.  Continue to monitor    Atrial thrombus- (present on admission)  Assessment & Plan  suction thrombectomy 8/16 successful  Now transitioned today on loading dose of Eliquis      DVT (deep venous thrombosis) ([facility])- (present on admission)  Assessment & Plan    Bilateral DVTs.  Continue heparin.  Now I transitioned her to Eliquis.    Acute respiratory failure with hypoxia ([facility])- (present on admission)  Assessment & Plan  Secondary to pulmonary embolism  She remains on 1 L of oxygen to maintain oxygen saturation  Continue to monitor closely    Intra-abdominal fluid collection- (present on admission)  Assessment & Plan  CT of the abdomen pelvis with contrast showed postop seroma or pancreatic leak.  Lipase 111.  Cover with empiric antibiotics for possible infection  She underwent IR guided fluid drainage.  She will need repeat imaging studies.  Surgical oncology evaluated and made recommendations.  Dr. Velasco recommended that patient will need outpatient follow-up.  Continue to monitor    Sepsis ([facility])- (present on admission)  Assessment & Plan  I this is Sepsis Present on admission  SIRS criteria identified on my evaluation include: Tachycardia, with heart rate greater than 90 BPM, Tachypnea, with respirations greater than 20 per minute, and Leukocytosis, with WBC greater than 12,000  Clinical indicators of end organ dysfunction include Lactic Acid greater than 2  Source is intra-abdominal infection or pneumonia  Sepsis protocol initiated  Crystalloid Fluid Administration: Fluid resuscitation ordered per standard protocol - 30 mL/kg per current or ideal body weight  IV antibiotics as  appropriate for source of sepsis  Reassessment: I have reassessed the patient's hemodynamic status    I am continuing IV Zosyn.  Cultures remain negative to date.  Tomorrow would be the last day of antibiotic.  I discussed plan of care with him and answered all her questions.    Cancer of overlapping sites of left breast (HCC)- (present on admission)  Assessment & Plan  Will hold tamoxifen since it could increase risk of DVT/PE    Advance care planning- (present on admission)  Assessment & Plan  CODE STATUS discussed with Ms. Gudino and she would like to be full code.    Acquired hypothyroidism- (present on admission)  Assessment & Plan  I am continuing levothyroxine, Cytomel        I discussed plan of care during multidisciplinary rounds regarding patient's current medical condition and plan of care as well as with family, HBS team, IR team..          VTE prophylaxis: Eliquis    I have performed a physical exam and reviewed and updated ROS and Plan today (8/22/2024). In review of yesterday's note (8/21/2024), there are no changes except as documented above.  Total time spent 55 minutes. I spent greater than 50% of the time for patient care, counseling, and coordination on this date, including unit/floor time, and face-to-face time with the patient as per interval events, my own review of patient's imaging and lab analysis and developing my assessment and plan above.

## 2024-08-26 ENCOUNTER — OFFICE VISIT (OUTPATIENT)
Dept: MEDICAL GROUP | Age: 79
End: 2024-08-26
Payer: COMMERCIAL

## 2024-08-26 VITALS
BODY MASS INDEX: 25.61 KG/M2 | HEIGHT: 64 IN | WEIGHT: 150 LBS | DIASTOLIC BLOOD PRESSURE: 78 MMHG | OXYGEN SATURATION: 92 % | SYSTOLIC BLOOD PRESSURE: 130 MMHG | HEART RATE: 90 BPM | TEMPERATURE: 98.9 F

## 2024-08-26 DIAGNOSIS — R18.8 INTRA-ABDOMINAL FLUID COLLECTION: ICD-10-CM

## 2024-08-26 DIAGNOSIS — Z09 HOSPITAL DISCHARGE FOLLOW-UP: ICD-10-CM

## 2024-08-26 DIAGNOSIS — I26.09 PULMONARY EMBOLISM WITH ACUTE COR PULMONALE, UNSPECIFIED CHRONICITY, UNSPECIFIED PULMONARY EMBOLISM TYPE (HCC): ICD-10-CM

## 2024-08-26 DIAGNOSIS — I51.3 ATRIAL THROMBUS: ICD-10-CM

## 2024-08-26 DIAGNOSIS — J18.9 PNEUMONIA DUE TO INFECTIOUS ORGANISM, UNSPECIFIED LATERALITY, UNSPECIFIED PART OF LUNG: ICD-10-CM

## 2024-08-26 DIAGNOSIS — I71.21 ANEURYSM OF ASCENDING AORTA WITHOUT RUPTURE (HCC): ICD-10-CM

## 2024-08-26 DIAGNOSIS — I82.403 DEEP VEIN THROMBOSIS (DVT) OF BOTH LOWER EXTREMITIES, UNSPECIFIED CHRONICITY, UNSPECIFIED VEIN (HCC): ICD-10-CM

## 2024-08-26 DIAGNOSIS — I10 ESSENTIAL HYPERTENSION: ICD-10-CM

## 2024-08-26 PROBLEM — A41.9 SEPSIS (HCC): Status: RESOLVED | Noted: 2024-08-14 | Resolved: 2024-08-26

## 2024-08-26 LAB
BACTERIA FLD AEROBE CULT: ABNORMAL
BACTERIA FLD AEROBE CULT: ABNORMAL
BACTERIA WND AEROBE CULT: NORMAL
GRAM STN SPEC: ABNORMAL
GRAM STN SPEC: NORMAL
SIGNIFICANT IND 70042: ABNORMAL
SIGNIFICANT IND 70042: NORMAL
SITE SITE: ABNORMAL
SITE SITE: NORMAL
SOURCE SOURCE: ABNORMAL
SOURCE SOURCE: NORMAL

## 2024-08-26 PROCEDURE — 99215 OFFICE O/P EST HI 40 MIN: CPT | Performed by: PHYSICIAN ASSISTANT

## 2024-08-26 PROCEDURE — 3078F DIAST BP <80 MM HG: CPT | Performed by: PHYSICIAN ASSISTANT

## 2024-08-26 PROCEDURE — 3075F SYST BP GE 130 - 139MM HG: CPT | Performed by: PHYSICIAN ASSISTANT

## 2024-08-26 RX ORDER — AMLODIPINE BESYLATE 5 MG/1
5 TABLET ORAL DAILY
Qty: 30 TABLET | Refills: 1 | Status: SHIPPED | OUTPATIENT
Start: 2024-08-26

## 2024-08-26 RX ORDER — THYROID 60 MG/1
TABLET ORAL
COMMUNITY

## 2024-08-26 RX ORDER — ANASTROZOLE 1 MG/1
1 TABLET ORAL
COMMUNITY

## 2024-08-26 ASSESSMENT — FIBROSIS 4 INDEX: FIB4 SCORE: 0.67

## 2024-08-26 NOTE — PROGRESS NOTES
cc: Hospital follow-up    Subjective:     HPI      History of Present Illness  The patient is a 78-year-old female presenting for a hospital follow-up.    She was noted to have an incidental finding of a large mass in the tail of the pancreas based on imaging, which appeared to have characteristics of a microcystic serous cystadenoma. An ultrasound was not completed as there was no concern for malignant or metastatic disease. 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    However, she returned to the hospital on 08/15/2024 and was admitted with complaints of shortness of breath, generalized weakness, chills, and cough. Her oxygen saturation was 76 percent on room air, and she was placed on 5 L nasal cannula. Her respiratory rate was 30, and her heart rate was 90. A chest x-ray showed left lower lobe and middle lobe infiltrate. A CTA revealed bilateral segmental pulmonary embolism with signs of right ventricular strain. Laboratory results showed a white blood cell count of 19.9, lactic acid of 2.5, troponin of 70, and BNP of 8591. A CT of the abdomen and pelvis with contrast indicated postoperative changes with a postoperative seroma or possible pancreatic leak.  There is also noted to be a 4 cm ascending thoracic aneurysm.  She was started on 5 mg of amlodipine for blood pressure control.  Advised to follow-up outpatient for monitoring.    She was started on Zosyn and vancomycin for sepsis, along with IV fluids and IV heparin. She was admitted to the ICU.  IV antibiotics were completed, no outpatient antibiotics for pneumonia.  A lower extremity venous ultrasound showed acute DVTs in both the posterior tibial, peroneal, and gastrocnemius veins. An echocardiogram revealed a very large thrombus in the right atrium, in addition to right ventricular dilation and reduced systolic function. Interventional radiology was consulted, suction  thrombectomy was completed successfully. She was transitioned to Eliquis.    In regards to the seroma/pancreatic leak this was drained twice.  And then subsequent drain was placed.    Clinic today overall she is feeling better, however she is still fatigued.  Drain is causing her the most issues.  It is not secured on tightly, so with dangle she has to to get into her pants.  It did pull earlier today.  Still noting some drainage.  She is following up with surgery 9/4 for drain removal.  She is doing incentive spirometry.  She is wondering if she can be referred to physical therapy to help with strengthening/conditioning.  Currently denies fever, chills, sweats, wheezing, cough, shortness of breath              Review of systems:  See above.       Current Outpatient Medications:     thyroid (ARMOUR THYROID) 60 MG Tab, not specified, Disp: , Rfl:     anastrozole (ARIMIDEX) 1 MG Tab, Take 1 Tablet by mouth every day., Disp: , Rfl:     amLODIPine (NORVASC) 5 MG Tab, Take 1 Tablet by mouth every day., Disp: 30 Tablet, Rfl: 1    apixaban (ELIQUIS) 5mg Tab, Take 1 Tablet by mouth 2 times a day for 90 days. Indications: DVT/PE, Disp: 180 Tablet, Rfl: 0    normal saline flush 0.9 % Solution, 10 mL by Tube route every 12 hours., Disp: 400 mL, Rfl: 1    oxyCODONE immediate-release (ROXICODONE) 5 MG Tab, Take 1 Tablet by mouth every 8 hours as needed for Severe Pain for up to 4 days., Disp: 12 Tablet, Rfl: 0    acetaminophen (TYLENOL) 500 MG Tab, Take 500 mg by mouth 1 time a day as needed for Mild Pain., Disp: , Rfl:     ibandronate (BONIVA) 150 MG tablet, Take 1 Tablet by mouth every 30 days., Disp: 3 Tablet, Rfl: 4    liothyronine (CYTOMEL) 5 MCG Tab, Take 2.5-5 mcg by mouth every day. 1 tablet 3 times per week - Monday, Wednesday, Friday.  1/2 tablet on other days, Disp: , Rfl:     levothyroxine (SYNTHROID) 100 MCG Tab, Take 100 mcg by mouth every morning on an empty stomach., Disp: , Rfl:     Cholecalciferol 1000 UNIT  "Cap, Take 1,000 Units by mouth every day. , Disp: , Rfl:     Allergies, past medical history, past surgical history, family history, social history reviewed and updated    Objective:     Vitals: /78 (BP Location: Left arm, Patient Position: Sitting, BP Cuff Size: Adult)   Pulse 90   Temp 37.2 °C (98.9 °F) (Temporal)   Ht 1.626 m (5' 4\")   Wt 68 kg (150 lb)   LMP  (LMP Unknown)   SpO2 92%   BMI 25.75 kg/m²   General: Alert, pleasant, NAD  HEENT: Normocephalic. Neck supple.   No carotid bruits   Heart: Regular rate and rhythm.  S1 and S2 normal.  No murmurs appreciated.  Respiratory: Normal respiratory effort.  Clear to auscultation bilaterally.  Abdomen:  Normoactive bowel sounds.  soft, non-tender, no guarding/rebound.  Drain is in place, scant amount of dried blood around the drain.  Drain is patent.  Did attempt to secure the drain a little bit better with tape.  Skin: Warm, dry, no rashes.  Psych:  Affect/mood is normal, judgement is good, memory is intact, grooming is appropriate.    Assessment/Plan:     Rosangela was seen today for ed follow-up.    Diagnoses and all orders for this visit:    Deep vein thrombosis (DVT) of both lower extremities, unspecified chronicity, unspecified vein (HCC)  -Transition to Eliquis-has been tapering down to 5 mg twice daily.  Does need a refill of the medication.  Referral placed to vascular medicine for continued monitoring  -     Referral to Vascular Medicine    Pulmonary embolism with acute cor pulmonale, unspecified chronicity, unspecified pulmonary embolism type (HCC)  -Repeat BMP, CBC.  Referral placed to vascular medicine for continued monitoring.  Continue Eliquis  -     Referral to Vascular Medicine  -     CBC WITH DIFFERENTIAL; Future  -     Comp Metabolic Panel; Future  -     proBrain Natriuretic Peptide, NT; Future  -     apixaban (ELIQUIS) 5mg Tab; Take 1 Tablet by mouth 2 times a day for 90 days. Indications: DVT/PE    Aneurysm of ascending aorta without " rupture (HCC)  -Blood pressure is well-controlled.  Continue 5 mg of amlodipine.  Referral to vascular medicine for continued monitoring  -     Referral to Vascular Medicine  -     amLODIPine (NORVASC) 5 MG Tab; Take 1 Tablet by mouth every day.    Intra-abdominal fluid collection  Complication from distal pancreatomy.  Still have drain in place.  Advised to contact her general surgeon tomorrow to see if they can get the drain out sooner or sooner appointment as the fluid collection has slowly been declining, also low see if they can get better attachment to adhere the drain in place    Pneumonia due to infectious organism, unspecified lateral, unspecified part of lung  Treated with IV antibiotics.  Lungs CTA.  Continue with incentive spirometry.  Referral placed to physical therapy to help with weakness, gait training.  Did advise to not start PT until she is cleared by general surgery for activity    Atrial thrombus  Successfully removed.  On anticoagulation    Essential hypertension  -Stable.  Continue 5 mg of amlodipine  -     amLODIPine (NORVASC) 5 MG Tab; Take 1 Tablet by mouth every day.    Hospital discharge follow-up  Previous records reviewed      My total time spent caring for the patient on the day of the encounter was 40 minutes.   This does not include time spent on separately billable procedures/tests.      Return in about 4 weeks (around 9/23/2024).

## 2024-08-26 NOTE — DISCHARGE PLANNING
ATTN: Case Management  RE: Referral for Home Health    Apologies as we must rescind acceptance.    Reason for referral denial: We are not contracted with patient's insurance.              Unfortunately, we are not able to accept this referral for the reason listed above. If further clarity is needed, our Transitional Care Specialists are available to discuss any barriers to service at [phone].      We look forward to collaborating with you in the future,  [facility]

## 2024-08-27 ENCOUNTER — TELEPHONE (OUTPATIENT)
Dept: SURGICAL ONCOLOGY | Facility: MEDICAL CENTER | Age: 79
End: 2024-08-27
Payer: COMMERCIAL

## 2024-08-27 ENCOUNTER — HOSPITAL ENCOUNTER (OUTPATIENT)
Dept: RADIOLOGY | Facility: MEDICAL CENTER | Age: 79
End: 2024-08-27
Payer: COMMERCIAL

## 2024-08-27 DIAGNOSIS — Z98.890 S/P EXPLORATORY LAPAROTOMY: ICD-10-CM

## 2024-08-27 DIAGNOSIS — K86.89 PANCREATIC MASS: ICD-10-CM

## 2024-08-27 DIAGNOSIS — K86.89 MASS OF PANCREAS: ICD-10-CM

## 2024-08-27 DIAGNOSIS — R18.8 ABDOMINAL FLUID COLLECTION: ICD-10-CM

## 2024-08-27 PROCEDURE — 700117 HCHG RX CONTRAST REV CODE 255

## 2024-08-27 PROCEDURE — 74170 CT ABD WO CNTRST FLWD CNTRST: CPT

## 2024-08-27 RX ADMIN — IOHEXOL 100 ML: 350 INJECTION, SOLUTION INTRAVENOUS at 19:12

## 2024-08-27 NOTE — TELEPHONE ENCOUNTER
Patient's daughter Teri called this morning regarding patient's drain. States output has been very minimal since discharge, about less than 1 mL daily since she got home. Reports that patient is sleeping a lot and is slowly recovering, no other symptoms of note. Had questions about possibility of removing drain prior to visit with Dr. Velasco next week as patient finds the drain uncomfortable and painful at times. Discussed that we usually remove drains if output is less than 30 cc daily, however discussed with Dr. Esquivel and plan is to order CT pancreas protocol first to confirm that drain is in good position and fluid collection has not recurred. If CT negative we can proceed to remove drain. Orders placed, daughter agreeable to plan and instructed to call us back for any other questions or concerns.

## 2024-08-28 ENCOUNTER — OFFICE VISIT (OUTPATIENT)
Dept: SURGICAL ONCOLOGY | Facility: MEDICAL CENTER | Age: 79
End: 2024-08-28
Payer: COMMERCIAL

## 2024-08-28 ENCOUNTER — HOSPITAL ENCOUNTER (OUTPATIENT)
Facility: MEDICAL CENTER | Age: 79
End: 2024-08-28
Payer: COMMERCIAL

## 2024-08-28 ENCOUNTER — APPOINTMENT (OUTPATIENT)
Dept: HEMATOLOGY ONCOLOGY | Facility: MEDICAL CENTER | Age: 79
End: 2024-08-28
Attending: PHYSICIAN ASSISTANT
Payer: COMMERCIAL

## 2024-08-28 ENCOUNTER — HOSPITAL ENCOUNTER (OUTPATIENT)
Dept: LAB | Facility: MEDICAL CENTER | Age: 79
End: 2024-08-28
Attending: PHYSICIAN ASSISTANT
Payer: COMMERCIAL

## 2024-08-28 ENCOUNTER — TELEPHONE (OUTPATIENT)
Dept: MEDICAL GROUP | Age: 79
End: 2024-08-28

## 2024-08-28 VITALS
BODY MASS INDEX: 25.61 KG/M2 | TEMPERATURE: 97.2 F | HEIGHT: 64 IN | OXYGEN SATURATION: 91 % | HEART RATE: 89 BPM | SYSTOLIC BLOOD PRESSURE: 130 MMHG | DIASTOLIC BLOOD PRESSURE: 72 MMHG | WEIGHT: 150 LBS

## 2024-08-28 DIAGNOSIS — K86.89 PANCREATIC MASS: ICD-10-CM

## 2024-08-28 DIAGNOSIS — Z98.890 S/P EXPLORATORY LAPAROTOMY: ICD-10-CM

## 2024-08-28 DIAGNOSIS — C50.319 MALIGNANT NEOPLASM OF LOWER-INNER QUADRANT OF FEMALE BREAST, UNSPECIFIED ESTROGEN RECEPTOR STATUS, UNSPECIFIED LATERALITY (HCC): ICD-10-CM

## 2024-08-28 DIAGNOSIS — I26.09 PULMONARY EMBOLISM WITH ACUTE COR PULMONALE, UNSPECIFIED CHRONICITY, UNSPECIFIED PULMONARY EMBOLISM TYPE (HCC): ICD-10-CM

## 2024-08-28 DIAGNOSIS — R18.8 ABDOMINAL FLUID COLLECTION: ICD-10-CM

## 2024-08-28 LAB
BASOPHILS # BLD AUTO: 0.1 % (ref 0–1.8)
BASOPHILS # BLD: 0.01 K/UL (ref 0–0.12)
EOSINOPHIL # BLD AUTO: 0.06 K/UL (ref 0–0.51)
EOSINOPHIL NFR BLD: 0.7 % (ref 0–6.9)
ERYTHROCYTE [DISTWIDTH] IN BLOOD BY AUTOMATED COUNT: 54.8 FL (ref 35.9–50)
HCT VFR BLD AUTO: 43.5 % (ref 37–47)
HGB BLD-MCNC: 13.2 G/DL (ref 12–16)
IMM GRANULOCYTES # BLD AUTO: 0.07 K/UL (ref 0–0.11)
IMM GRANULOCYTES NFR BLD AUTO: 0.9 % (ref 0–0.9)
LYMPHOCYTES # BLD AUTO: 1.06 K/UL (ref 1–4.8)
LYMPHOCYTES NFR BLD: 13 % (ref 22–41)
MCH RBC QN AUTO: 29.5 PG (ref 27–33)
MCHC RBC AUTO-ENTMCNC: 30.3 G/DL (ref 32.2–35.5)
MCV RBC AUTO: 97.3 FL (ref 81.4–97.8)
MONOCYTES # BLD AUTO: 0.75 K/UL (ref 0–0.85)
MONOCYTES NFR BLD AUTO: 9.2 % (ref 0–13.4)
NEUTROPHILS # BLD AUTO: 6.2 K/UL (ref 1.82–7.42)
NEUTROPHILS NFR BLD: 76.1 % (ref 44–72)
NRBC # BLD AUTO: 0 K/UL
NRBC BLD-RTO: 0 /100 WBC (ref 0–0.2)
PLATELET # BLD AUTO: 642 K/UL (ref 164–446)
PMV BLD AUTO: 10.2 FL (ref 9–12.9)
RBC # BLD AUTO: 4.47 M/UL (ref 4.2–5.4)
WBC # BLD AUTO: 8.2 K/UL (ref 4.8–10.8)

## 2024-08-28 PROCEDURE — 80053 COMPREHEN METABOLIC PANEL: CPT

## 2024-08-28 PROCEDURE — 83880 ASSAY OF NATRIURETIC PEPTIDE: CPT

## 2024-08-28 PROCEDURE — 82150 ASSAY OF AMYLASE: CPT

## 2024-08-28 PROCEDURE — 85025 COMPLETE CBC W/AUTO DIFF WBC: CPT

## 2024-08-28 PROCEDURE — 36415 COLL VENOUS BLD VENIPUNCTURE: CPT

## 2024-08-28 ASSESSMENT — ENCOUNTER SYMPTOMS
CHILLS: 0
SHORTNESS OF BREATH: 0
ABDOMINAL PAIN: 1
COUGH: 0
CONSTIPATION: 0
WHEEZING: 0
VOMITING: 0
FEVER: 0
NAUSEA: 0
WEIGHT LOSS: 0
DIARRHEA: 0

## 2024-08-28 ASSESSMENT — FIBROSIS 4 INDEX: FIB4 SCORE: 0.67

## 2024-08-28 NOTE — PROGRESS NOTES
Subjective:   8/28/2024 12:51 PM  Primary care physician: Roula Ga P.A.-C.  Referring Provider: Casimiro Fowler M.D.   Medical Oncologist: Casimiro Fowler M.D.    Chief Complaint:   Chief Complaint   Patient presents with    Post-op     DRAIN CHECK     Diagnosis:   1. Abdominal fluid collection  FLUID AMYLASE      2. S/P exploratory laparotomy  FLUID AMYLASE      3. Pancreatic mass  FLUID AMYLASE      4. Malignant neoplasm of lower-inner quadrant of female breast, unspecified estrogen receptor status, unspecified laterality (HCC)            History of presenting illness:    Paige Gudino is a 77 y.o. female with a history of noninvasive carcinoma of the left breast associated with calcifications found on mammogram 34 years ago.  She was treated with a total mastectomy at that time.  She had implant reconstruction and a right mamma pexy subsequently.  No systemic therapy was given.  She did well until May 2022 when she self detected a lump under the implant at the inframammary fold on the left.  The tumor was ER positive greater than 90% SC positive greater than 90% HER2 negative IHC 1+.   On 8/1/2022 she underwent resection of the lesion which demonstrated invasive grade 2 mammary carcinoma measuring 1.6 cm with a close posterior margin, all other margins clear.       She was started on anastrozole in Jan 2023, has tolerated well well with no hot flashes or musculoskeletal symptoms.       CT C/A/P on 6/28/24 noted a large solid and cystic necrotic pancreatic tail mass suspicious for malignancy with no surrounding vascular involvement or lymphadenopathy. Mild hepatic steatosis with no focal hepatic lesions. Cholelithiasis without biliary dilatation. Possible wall thickening of the gastric antrum. Moderate size hiatal hernia. Nonspecific borderline enlarged superior mediastinal right paraesophageal lymph node.     She is here today for  evaluation of this large pancreatic mass that was found to tail the pancreas.  The characteristics of this tumor which were found incidentally without any symptoms appear to have characteristics of a microcystic serous cystadenoma.  The patient has no family history of pancreatic cancer.  She does have a history of breast cancer and underwent a mastectomy on the left side.  In any event she is an extremely healthy 78-year-old.  She denies any fever or chills, nausea or vomiting.  She has no weight loss.  She did have a CT scan which I have personally reviewed from 2024 which showed a large mass with no adenopathy and no sign of focal invasion or metastatic disease.  The patient has not had an EUS or any biopsies.  She is not a diabetic.  She has been completely asymptomatic.    Update 24  On 24 she completed surgery by Dr. Espinoza, includin.  Exploratory laparotomy.  2.  Intraoperative ultrasound survey of the pancreas using 5/12 MHz T-probe.  3.  Distal pancreatectomy and splenectomy.  4.  Stomach and celiac node dissection.  5.  Omental flap.  Pathology noted benign pancreatic serous cystadenoma (6.8 cm), margins are negative for neoplasm, spleen with no significant pathologic abnormality.   She was eventually discharged on 24 with home oxygen and close outpatient follow-up.    On 24 she presented to the ED d/t worsening short of breath. CT CHEST on 24 noted bilateral segmental and subsegmental pulmonary emboli with changes compatible with significant right heart strain. CT ABD/PELVIS on 24 noted postop changes of splenectomy and distal pancreatectomy, fluid collection left upper quadrant is seen which could represent postop seroma or possibly pancreatic leak and right lower lobe segmental and subsegmental pulmonary embolus. She was subsequently admitted and started on antibiotics. On 8/15/24 she underwent CT-guided retroperitoneal abscess drainage and LUQ  "retroperitoneal catheter drainage with CT guidance. On   8/16/24 she underwent right atrial thrombectomy by Dr. Bowen, pathology noted benign thrombus. On 8/22/24 she underwent CT guided left upper quadrant fluid collection catheter drainage. Her condition eventually improved and she was discharged home on 8/23/24 with the drain in place and instructions to follow up with our office for drain management.    Yesterday 8/27/24 I received a call from patient's daughter Teri regarding minimal drain output of \"less than 1 mL daily\" since the patient was discharged home and that they would like to see if drain can be removed as the patient has been finding this uncomfortable and painful at times. Discussed with Dr. Esquivel, imaging ordered for confirmation. CT PANCREAS on 8/27/24 noted LEFT upper quadrant drain in place with minimal adjacent peritoneal gas and no significant residual or recurrent fluid collection demonstrated. Appointment scheduled with patient today to remove drain, patient arrived with spouse. Upon closer inspection, fluid in drain appears to be about 25 mL and patient states this is the output so far for today. Also received drain output record from patient, however measurements appear to be in ounces instead of mL. Based on log, patient has had about 1.5-1.6 oz (44-47 mL) per day. Discussed with patient that indications for removal of drain are output of 30 mL or less per day for 2 consecutive days and that we will need to keep the drain in place for now. However, also discussed that we can draw a fluid amylase from her drain and consider removing the drain this upcoming Friday if this comes back as normal. She is agreeable to this. Steris in place over abdominal incision and this appears to be healing well. Patient also brought up concerns regarding a \"small swollen area\" on her back measuring about 2-3 inches by 4 inches that migrated below her bra strap. Patient states that this has since " resolved and denies shortness of breath or difficulty breathing. Also had questions regarding rescheduling CT abdomen scheduled for 9/16/24, advised to keep this appointment for now. Reports no problems with eating or drinking, she has been taking multiple naps daily and states she feels energized afterwards. Denies fever, chills, nausea, or vomiting.    Past Medical History:   Diagnosis Date    Anesthesia     nausea post op    Cancer (HCC)     1987 breast left    Cancer of overlapping sites of left female breast (HCC)     Cataract 2024    IOL bilat    High cholesterol     Hypothyroidism     PONV (postoperative nausea and vomiting) 1987    Just felt nausous after anesthesia    Thyroid nodule      Past Surgical History:   Procedure Laterality Date    CA ULTRASONIC GUIDANCE, INTRAOPERATIVE  8/5/2024    Procedure: ULTRASOUND GUIDANCE;  Surgeon: Sachin Espinoza M.D.;  Location: [facility];  Service: General    PANCREATECTOMY N/A 8/5/2024    Procedure: OPEN DISTAL PANCREATECTOMY WITH SPLENECTOMY, NODE DISSECTION;  Surgeon: Sachin Espinoza M.D.;  Location: [facility];  Service: General    SPLENECTOMY N/A 8/5/2024    Procedure: SPLENECTOMY;  Surgeon: Sachin Espinoza M.D.;  Location: [facility];  Service: General    CREATION, FLAP, OMENTUM N/A 8/5/2024    Procedure: CREATION, FLAP, OMENTUM;  Surgeon: Sachin Espinoza M.D.;  Location: [facility];  Service: General    PB MASTECTOMY, PARTIAL Left 08/01/2022    Procedure: MELLO LOCALIZED LEFT PARTIAL MASTECTOMY;  Surgeon: Elean Arroyo M.D.;  Location: [facility];  Service: General    OTHER ORTHOPEDIC SURGERY  2019    back surgery    OTHER  2001    replace left breast implant    OTHER  1988    Left breast implant/lift    OTHER  1987    left mastectomy    LAMINOTOMY      LUMPECTOMY      PRIMARY C SECTION       Allergies   Allergen Reactions    Synthroid [Fd&C Red #40 Al Paul-Levothyroxine]  Hives and Unspecified     Hives, Brand specific. Some brands are ok and some are not    [place] Thyroid [Thyroid] Diarrhea     diarrhea     Outpatient Encounter Medications as of 8/28/2024   Medication Sig Dispense Refill    thyroid (ARMOUR THYROID) 60 MG Tab not specified      anastrozole (ARIMIDEX) 1 MG Tab Take 1 Tablet by mouth every day.      amLODIPine (NORVASC) 5 MG Tab Take 1 Tablet by mouth every day. 30 Tablet 1    apixaban (ELIQUIS) 5mg Tab Take 1 Tablet by mouth 2 times a day for 90 days. Indications: DVT/ Tablet 0    normal saline flush 0.9 % Solution 10 mL by Tube route every 12 hours. 400 mL 1    acetaminophen (TYLENOL) 500 MG Tab Take 500 mg by mouth 1 time a day as needed for Mild Pain.      ibandronate (BONIVA) 150 MG tablet Take 1 Tablet by mouth every 30 days. 3 Tablet 4    liothyronine (CYTOMEL) 5 MCG Tab Take 2.5-5 mcg by mouth every day. 1 tablet 3 times per week - Monday, Wednesday, Friday.  1/2 tablet on other days      levothyroxine (SYNTHROID) 100 MCG Tab Take 100 mcg by mouth every morning on an empty stomach.      Cholecalciferol 1000 UNIT Cap Take 1,000 Units by mouth every day.          No facility-administered encounter medications on file as of 8/28/2024.     Social History     Socioeconomic History    Marital status:      Spouse name: Not on file    Number of children: Not on file    Years of education: Not on file    Highest education level: Associate degree: academic program   Occupational History     Comment: retired banker   Tobacco Use    Smoking status: Never     Passive exposure: Past    Smokeless tobacco: Never   Vaping Use    Vaping status: Never Used   Substance and Sexual Activity    Alcohol use: Never    Drug use: Never    Sexual activity: Not Currently     Partners: Male     Birth control/protection: Abstinence, Male Sterilization, Post-Menopausal     Comment:    Other Topics Concern    Not on file   Social History Narrative    Not on file     Social  Determinants of Health     Financial Resource Strain: Low Risk  (7/11/2024)    Overall Financial Resource Strain (CARDIA)     Difficulty of Paying Living Expenses: Not hard at all   Food Insecurity: No Food Insecurity (8/23/2024)    Hunger Vital Sign     Worried About Running Out of Food in the Last Year: Never true     Ran Out of Food in the Last Year: Never true   Transportation Needs: No Transportation Needs (8/23/2024)    PRAPARE - Transportation     Lack of Transportation (Medical): No     Lack of Transportation (Non-Medical): No   Physical Activity: Insufficiently Active (7/11/2024)    Exercise Vital Sign     Days of Exercise per Week: 2 days     Minutes of Exercise per Session: 10 min   Stress: No Stress Concern Present (7/11/2024)    [place] [place] [employer] - Occupational Stress Questionnaire     Feeling of Stress : Not at all   Social Connections: Unknown (7/11/2024)    Social Connection and Isolation Panel [NHANES]     Frequency of Communication with Friends and Family: More than three times a week     Frequency of Social Gatherings with Friends and Family: Twice a week     Attends Jainism Services: Patient declined     Active Member of Clubs or Organizations: Yes     Attends Club or Organization Meetings: More than 4 times per year     Marital Status:    Intimate Partner Violence: Not At Risk (8/23/2024)    Humiliation, Afraid, Rape, and Kick questionnaire     Fear of Current or Ex-Partner: No     Emotionally Abused: No     Physically Abused: No     Sexually Abused: No   Housing Stability: Unknown (8/23/2024)    Housing Stability Vital Sign     Unable to Pay for Housing in the Last Year: No     Number of Times Moved in the Last Year: Not on file     Homeless in the Last Year: No      Social History     Tobacco Use   Smoking Status Never    Passive exposure: Past   Smokeless Tobacco Never     Social History     Substance and Sexual Activity   Alcohol Use Never     Social History  "    Substance and Sexual Activity   Drug Use Never      Family History   Problem Relation Age of Onset    Cancer Mother         Ovarian    Ovarian Cancer Mother     Dementia Father     Heart Disease Father     Hyperlipidemia Neg Hx        Review of Systems   Constitutional:  Negative for chills, fever, malaise/fatigue and weight loss.   Respiratory:  Negative for cough, shortness of breath and wheezing.    Cardiovascular:  Negative for chest pain.   Gastrointestinal:  Positive for abdominal pain (Discomfort from LUQ drain). Negative for constipation, diarrhea, nausea and vomiting.   Skin:  Negative for itching.        Objective:   /72 (BP Location: Right arm, Patient Position: Sitting, BP Cuff Size: Adult)   Pulse 89   Temp 36.2 °C (97.2 °F) (Temporal)   Ht 1.626 m (5' 4\")   Wt 68 kg (150 lb)   LMP  (LMP Unknown)   SpO2 91%   BMI 25.75 kg/m²     Physical Exam  Vitals and nursing note reviewed.   Constitutional:       General: She is not in acute distress.     Appearance: Normal appearance. She is not ill-appearing.   HENT:      Head: Normocephalic.      Nose: Nose normal.      Mouth/Throat:      Pharynx: Oropharynx is clear.   Eyes:      General: No scleral icterus.     Conjunctiva/sclera: Conjunctivae normal.   Cardiovascular:      Rate and Rhythm: Normal rate.      Pulses: Normal pulses.   Pulmonary:      Effort: Pulmonary effort is normal. No respiratory distress.   Abdominal:      General: There is no distension.      Palpations: Abdomen is soft.      Tenderness: There is abdominal tenderness (From LUQ drain site). There is no guarding.      Comments: Well-healing abdominal incision with steri strips in place  LUQ drain in place with serous output   Musculoskeletal:         General: Normal range of motion.   Skin:     General: Skin is warm and dry.      Coloration: Skin is not jaundiced.      Findings: No erythema.   Neurological:      Mental Status: She is alert and oriented to person, place, and " time.   Psychiatric:         Mood and Affect: Mood normal.         Behavior: Behavior normal.         Labs   Latest Reference Range & Units 08/16/24 01:00   WBC 4.8 - 10.8 K/uL 15.0 (H)   RBC 4.20 - 5.40 M/uL 3.25 (L)   Hemoglobin 12.0 - 16.0 g/dL 9.8 (L)   Hematocrit 37.0 - 47.0 % 30.5 (L)   MCV 81.4 - 97.8 fL 93.8   MCH 27.0 - 33.0 pg 30.2   MCHC 32.2 - 35.5 g/dL 32.1 (L)   RDW 35.9 - 50.0 fL 48.1   Platelet Count 164 - 446 K/uL 241   MPV 9.0 - 12.9 fL 10.3   (H): Data is abnormally high  (L): Data is abnormally low      Latest Reference Range & Units 08/16/24 01:00 08/16/24 03:15   Sodium 135 - 145 mmol/L 140    Potassium 3.6 - 5.5 mmol/L 3.5 (L)    Chloride 96 - 112 mmol/L 107    Co2 20 - 33 mmol/L 20    Anion Gap 7.0 - 16.0  13.0    Glucose 65 - 99 mg/dL 101 (H)    Bun 8 - 22 mg/dL 15    Creatinine 0.50 - 1.40 mg/dL 0.63    GFR (CKD-EPI) >60 mL/min/1.73 m 2 91    Calcium 8.5 - 10.5 mg/dL 6.6 (LL)    Correct Calcium 8.5 - 10.5 mg/dL 7.6 (L)    AST(SGOT) 12 - 45 U/L 31    ALT(SGPT) 2 - 50 U/L 50    Alkaline Phosphatase 30 - 99 U/L 66    Total Bilirubin 0.1 - 1.5 mg/dL 1.2    Albumin 3.2 - 4.9 g/dL 2.7 (L)    Total Protein 6.0 - 8.2 g/dL 4.8 (L)    Globulin 1.9 - 3.5 g/dL 2.1    A-G Ratio g/dL 1.3    Phosphorus 2.5 - 4.5 mg/dL 2.8    Magnesium 1.5 - 2.5 mg/dL 1.9    Ionized Calcium 1.1 - 1.3 mmol/L  0.9 (L)   (LL): Data is critically low  (L): Data is abnormally low  (H): Data is abnormally high     Imaging  CT PANCREAS (8/27/24)  IMPRESSION:  1.  Prior distal pancreatectomy and splenectomy.  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  2.  LEFT upper quadrant drain in place with minimal adjacent peritoneal gas.  No significant residual or recurrent fluid collection demonstrated.  3.  Gallstones and minimal gallbladder wall thickening.  Cholecystitis is not excluded.  4.  Bilateral pleural fluid collections with associated atelectasis, worse on the LEFT.    CT ABD/PELVIS  (8/20/24)  IMPRESSION:  1.  There is postoperative change consistent with distal pancreatectomy and splenectomy.  2.  The simple appearing left upper quadrant subphrenic fluid collection is again seen very similar to the recent prior study, possibly postoperative seroma as there is no rim enhancement to suggest abscess. Pancreatic leak is considered unlikely as the   majority of the fluid is in the splenic fossa rather than adjacent to the surgical margin of the pancreas.  3.  Stable bibasilar atelectasis and pleural effusions.  4.  Cholelithiasis again noted.  5.  Pulmonary arteries not well assessed on this exam in the right atrial thrombus is no longer evident. Small defect in the right ventricular apex is unchanged.    CT ABD/PELVIS (8/14/24)  IMPRESSION:  1.  Postop changes of splenectomy and distal pancreatectomy, fluid collection left upper quadrant is seen which could represent postop seroma or possibly pancreatic leak.  2.  Right lower lobe segmental and subsegmental pulmonary embolus.  3.  Trace right and small left pleural effusions.  4.  Linear densities of the lung bases suggesting atelectasis, component of left lower lobe infiltrate not excluded.  5.  Diverticulosis  6.  Cholelithiasis  7.  Cardiomegaly    CT CHEST (8/14/24)  IMPRESSION:  1.  Bilateral segmental and subsegmental pulmonary emboli with changes compatible with significant right heart strain.  2.  Small left and trace right pleural effusions.  3.  Linear consolidations in the bilateral lung bases suggests atelectasis, component of left lower lobe infiltrate is not excluded.  4.  4.0 cm ascending thoracic aortic aneurysm, radiographic follow-up and surveillance recommended as clinically appropriate.  5.  Atherosclerosis and atherosclerotic coronary artery disease    CT C/A/P (6/28/24)  IMPRESSION:  1.  No definite CT evidence of infiltrating gastric mass although there is poor distention of the stomach with possible wall thickening of the  gastric antrum.  2.  There is a large solid and cystic necrotic pancreatic tail mass suspicious for malignancy with no surrounding vascular involvement or lymphadenopathy.  3.  Mild hepatic steatosis with no focal hepatic lesions.  4.  Cholelithiasis without biliary dilatation.  5.  Moderate size hiatal hernia.  6.  Colonic diverticulosis without diverticulitis.  7.  Nonspecific borderline enlarged superior mediastinal right paraesophageal lymph node.  8.  There are postoperative changes of left mastectomy and axillary lymph node dissection.  9.  No parenchymal lung metastases.     Pathology  PATH 8/16/24  FINAL DIAGNOSIS:   A. Right atrial mass:          Benign thrombus.     PATH 8/5/24  FINAL DIAGNOSIS:   A. Distal pancreas, spleen and nodes, distal pancreatectomy and   splenectomy:         Benign pancreatic serous cystadenoma (6.8 cm).          Margins are negative for neoplasm.          Background chronic pancreatitis.          Spleen with no significant pathologic abnormality.          Nine benign lymph nodes (0/9).      Procedures  8/22/24 CT guided left upper quadrant fluid collection catheter drainage     8/16/24 Right atrial thrombectomy by Dr. Bowen    8/15/24 CT-guided retroperitoneal abscess drainage  LUQ retroperitoneal catheter drainage with CT guidance.    8/5/24 by Dr. sEpinoza  1.  Exploratory laparotomy.  2.  Intraoperative ultrasound survey of the pancreas using 5/12 MHz T-probe.  3.  Distal pancreatectomy and splenectomy.  4.  Stomach and celiac node dissection.  5.  Omental flap.    Mastectomy 2022    Diagnosis:     1. Abdominal fluid collection  FLUID AMYLASE      2. S/P exploratory laparotomy  FLUID AMYLASE      3. Pancreatic mass  FLUID AMYLASE      4. Malignant neoplasm of lower-inner quadrant of female breast, unspecified estrogen receptor status, unspecified laterality (HCC)            Medical Decision Making:  Today's Assessment / Status / Plan:     Overall, Paige pavon  to be recovering well from recent surgery. The patient is tolerating a regular diet and is back to basic daily activities.     Surgical pain appears to be controlled, and she denies N/V. The surgical incisions appear to be healing as expected.     Serous output from drain collected and orders placed for fluid amylase. If this is normal we will proceed with removing the drain later this week.    The patient is scheduled for next follow up visit with Dr. Velasco next week.    [place], have entered, reviewed and confirmed the above diagnoses related to this patient on this date of service, as per the time and date noted at top of this note.

## 2024-08-28 NOTE — TELEPHONE ENCOUNTER
One-time call to pt per PCP request. Contacted pt to see if she still needs assistance obtaining a  lock for her surgical drain. Pt's daughter answered (approved contact). She said the pt is unavailable because she will be having a her drain removed in an hour. She said they left a message for Roula LUI requesting physical therapy. No further questions, concerns, or needs.

## 2024-08-29 ENCOUNTER — TELEPHONE (OUTPATIENT)
Dept: MEDICAL GROUP | Age: 79
End: 2024-08-29
Payer: COMMERCIAL

## 2024-08-29 ENCOUNTER — TELEPHONE (OUTPATIENT)
Dept: SURGICAL ONCOLOGY | Facility: MEDICAL CENTER | Age: 79
End: 2024-08-29
Payer: COMMERCIAL

## 2024-08-29 DIAGNOSIS — R26.81 GAIT INSTABILITY: ICD-10-CM

## 2024-08-29 DIAGNOSIS — R53.1 GENERAL WEAKNESS: ICD-10-CM

## 2024-08-29 LAB
ALBUMIN SERPL BCP-MCNC: 3.7 G/DL (ref 3.2–4.9)
ALBUMIN/GLOB SERPL: 1.3 G/DL
ALP SERPL-CCNC: 72 U/L (ref 30–99)
ALT SERPL-CCNC: 27 U/L (ref 2–50)
AMYLASE FLD-CCNC: 6554 U/L
ANION GAP SERPL CALC-SCNC: 12 MMOL/L (ref 7–16)
AST SERPL-CCNC: 21 U/L (ref 12–45)
BILIRUB SERPL-MCNC: 0.8 MG/DL (ref 0.1–1.5)
BODY FLD TYPE: NORMAL
BUN SERPL-MCNC: 11 MG/DL (ref 8–22)
CALCIUM ALBUM COR SERPL-MCNC: 8.5 MG/DL (ref 8.5–10.5)
CALCIUM SERPL-MCNC: 8.3 MG/DL (ref 8.5–10.5)
CHLORIDE SERPL-SCNC: 105 MMOL/L (ref 96–112)
CO2 SERPL-SCNC: 22 MMOL/L (ref 20–33)
CREAT SERPL-MCNC: 0.6 MG/DL (ref 0.5–1.4)
GFR SERPLBLD CREATININE-BSD FMLA CKD-EPI: 92 ML/MIN/1.73 M 2
GLOBULIN SER CALC-MCNC: 2.9 G/DL (ref 1.9–3.5)
GLUCOSE SERPL-MCNC: 106 MG/DL (ref 65–99)
NT-PROBNP SERPL IA-MCNC: 476 PG/ML (ref 0–125)
POTASSIUM SERPL-SCNC: 3.8 MMOL/L (ref 3.6–5.5)
PROT SERPL-MCNC: 6.6 G/DL (ref 6–8.2)
SODIUM SERPL-SCNC: 139 MMOL/L (ref 135–145)

## 2024-08-29 NOTE — TELEPHONE ENCOUNTER
Called and spoke with patients daughter and she gave me the information for the physical therapy place. I informed her I would send a message to Roula with all of the information.

## 2024-08-29 NOTE — TELEPHONE ENCOUNTER
Called primary phone number on Epic, spoke to daughter Teri. Based on recorded drain output and fluid amylase level yesterday of 6554, discussed that drain must stay in for now. Patient has follow-up appointment with Dr. Velasco next Wednesday 9/4/24, if drain output is less than 30 mL for two consecutive days then drain will be removed. She is agreeable to this plan.

## 2024-08-30 ENCOUNTER — TELEPHONE (OUTPATIENT)
Dept: HEALTH INFORMATION MANAGEMENT | Facility: OTHER | Age: 79
End: 2024-08-30
Payer: COMMERCIAL

## 2024-08-30 ENCOUNTER — TELEPHONE (OUTPATIENT)
Dept: SURGICAL ONCOLOGY | Facility: MEDICAL CENTER | Age: 79
End: 2024-08-30
Payer: COMMERCIAL

## 2024-08-30 DIAGNOSIS — R79.89 ABNORMAL CBC: ICD-10-CM

## 2024-08-30 DIAGNOSIS — R79.89 ELEVATED BRAIN NATRIURETIC PEPTIDE (BNP) LEVEL: ICD-10-CM

## 2024-08-30 DIAGNOSIS — E83.51 HYPOCALCEMIA: ICD-10-CM

## 2024-08-30 NOTE — TELEPHONE ENCOUNTER
Patient's daughter called to discuss PT referral from PCP. States patient is currently scheduled for late September but would like to move it up to a couple of weeks earlier to help with patient's mobility and ambulation post D/C. Discussed that we can evaluate at her appointment next week but would have no problems with this if earlier PT appointments are available.

## 2024-09-02 NOTE — PROGRESS NOTES
Subjective:   9/4/2024 11:36 AM  Primary care physician: Roula Ga P.A.-C.  Referring Provider: Casimiro Fowler M.D.   Medical Oncologist: Casimiro Fowler M.D.    Chief Complaint:   Chief Complaint   Patient presents with    Post-op     DIST PANC/SPLN  8/5  PATH: BENIGN  FOLLOW UP        Diagnosis:   1. Pancreatic mass        2. Malignant neoplasm of lower-inner quadrant of female breast, unspecified estrogen receptor status, unspecified laterality (HCC)        3. S/P chemotherapy, time since greater than 12 weeks        4. S/P exploratory laparotomy          History of presenting illness:    Paige Gudino is a 77 y.o. female with a history of noninvasive carcinoma of the left breast associated with calcifications found on mammogram 34 years ago.  She was treated with a total mastectomy at that time.  She had implant reconstruction and a right mamma pexy subsequently.  No systemic therapy was given.  She did well until May 2022 when she self detected a lump under the implant at the inframammary fold on the left.  The tumor was ER positive greater than 90% DC positive greater than 90% HER2 negative IHC 1+.   On 8/1/2022 she underwent resection of the lesion which demonstrated invasive grade 2 mammary carcinoma measuring 1.6 cm with a close posterior margin, all other margins clear.       She was started on anastrozole in Jan 2023, has tolerated well well with no hot flashes or musculoskeletal symptoms.       CT C/A/P on 6/28/24 noted a large solid and cystic necrotic pancreatic tail mass suspicious for malignancy with no surrounding vascular involvement or lymphadenopathy. Mild hepatic steatosis with no focal hepatic lesions. Cholelithiasis without biliary dilatation. Possible wall thickening of the gastric antrum. Moderate size hiatal hernia. Nonspecific borderline enlarged superior mediastinal right paraesophageal lymph node.     She is  here today for evaluation of this large pancreatic mass that was found to tail the pancreas.  The characteristics of this tumor which were found incidentally without any symptoms appear to have characteristics of a microcystic serous cystadenoma.  The patient has no family history of pancreatic cancer.  She does have a history of breast cancer and underwent a mastectomy on the left side.  In any event she is an extremely healthy 78-year-old.  She denies any fever or chills, nausea or vomiting.  She has no weight loss.  She did have a CT scan which I have personally reviewed from 2024 which showed a large mass with no adenopathy and no sign of focal invasion or metastatic disease.  The patient has not had an EUS or any biopsies.  She is not a diabetic.  She has been completely asymptomatic.     Update 24  On 24 she completed surgery by Dr. Espinoza, includin.  Exploratory laparotomy.  2.  Intraoperative ultrasound survey of the pancreas using 5/12 MHz T-probe.  3.  Distal pancreatectomy and splenectomy.  4.  Stomach and celiac node dissection.  5.  Omental flap.    Pathology noted benign pancreatic serous cystadenoma (6.8 cm), margins are negative for neoplasm, spleen with no significant pathologic abnormality.  She was eventually discharged on 24 with home oxygen and close outpatient follow-up.     On 24 she presented to the ED d/t worsening short of breath. CT CHEST on 24 noted bilateral segmental and subsegmental pulmonary emboli with changes compatible with significant right heart strain. CT ABD/PELVIS on 24 noted postop changes of splenectomy and distal pancreatectomy, fluid collection left upper quadrant is seen which could represent postop seroma or possibly pancreatic leak and right lower lobe segmental and subsegmental pulmonary embolus. She was subsequently admitted and started on antibiotics. On 8/15/24 she underwent CT-guided retroperitoneal abscess drainage  "and LUQ retroperitoneal catheter drainage with CT guidance. On 8/16/24 she underwent right atrial thrombectomy by Dr. Bowen, pathology noted benign thrombus. On 8/22/24 she underwent CT guided left upper quadrant fluid collection catheter drainage. Her condition eventually improved and she was discharged home on 8/23/24 with the drain in place and instructions to follow up with our office for drain management.     Yesterday 8/27/24 I received a call from patient's daughter Teri regarding minimal drain output of \"less than 1 mL daily\" since the patient was discharged home and that they would like to see if drain can be removed as the patient has been finding this uncomfortable and painful at times. Discussed with Dr. Esquivel, imaging ordered for confirmation. CT PANCREAS on 8/27/24 noted LEFT upper quadrant drain in place with minimal adjacent peritoneal gas and no significant residual or recurrent fluid collection demonstrated. Appointment scheduled with patient today to remove drain, patient arrived with spouse. Upon closer inspection, fluid in drain appears to be about 25 mL and patient states this is the output so far for today. Also received drain output record from patient, however measurements appear to be in ounces instead of mL. Based on log, patient has had about 1.5-1.6 oz (44-47 mL) per day. Discussed with patient that indications for removal of drain are output of 30 mL or less per day for 2 consecutive days and that we will need to keep the drain in place for now. However, also discussed that we can draw a fluid amylase from her drain and consider removing the drain this upcoming Friday if this comes back as normal. She is agreeable to this. Steris in place over abdominal incision and this appears to be healing well. Patient also brought up concerns regarding a \"small swollen area\" on her back measuring about 2-3 inches by 4 inches that migrated below her bra strap. Patient states that this has since " resolved and denies shortness of breath or difficulty breathing. Also had questions regarding rescheduling CT abdomen scheduled for 9/16/24, advised to keep this appointment for now. She reports no problems with eating or drinking, she has been taking multiple naps daily and states she feels energized afterwards. Denies fever, chills, nausea, or vomiting.    Update 9/4/24    She is here today for further follow-up.  She is feeling pretty well.  Drain is putting out about 25 cc of thick fluid a day.  Last amylase a week ago as 5000    Past Medical History:   Diagnosis Date    Anesthesia     nausea post op    Cancer (HCC)     1987 breast left    Cancer of overlapping sites of left female breast (HCC)     Cataract 2024    IOL bilat    High cholesterol     Hypothyroidism     PONV (postoperative nausea and vomiting) 1987    Just felt nausous after anesthesia    Thyroid nodule      Past Surgical History:   Procedure Laterality Date    MT ULTRASONIC GUIDANCE, INTRAOPERATIVE  8/5/2024    Procedure: ULTRASOUND GUIDANCE;  Surgeon: Sachin Espinoza M.D.;  Location: [facility];  Service: General    PANCREATECTOMY N/A 8/5/2024    Procedure: OPEN DISTAL PANCREATECTOMY WITH SPLENECTOMY, NODE DISSECTION;  Surgeon: Sachin Espinoza M.D.;  Location: [facility];  Service: General    SPLENECTOMY N/A 8/5/2024    Procedure: SPLENECTOMY;  Surgeon: Sachin Espinoza M.D.;  Location: [facility];  Service: General    CREATION, FLAP, OMENTUM N/A 8/5/2024    Procedure: CREATION, FLAP, OMENTUM;  Surgeon: Sachin Espinoza M.D.;  Location: [facility];  Service: General    PB MASTECTOMY, PARTIAL Left 08/01/2022    Procedure: MELLO LOCALIZED LEFT PARTIAL MASTECTOMY;  Surgeon: Elena Arroyo M.D.;  Location: [facility];  Service: General    OTHER ORTHOPEDIC SURGERY  2019    back surgery    OTHER  2001    replace left breast implant    OTHER  1988    Left breast  implant/lift    OTHER  1987    left mastectomy    LAMINOTOMY      LUMPECTOMY      PRIMARY C SECTION       Allergies   Allergen Reactions    Synthroid [Fd&C Red #40 Al Paul-Levothyroxine] Hives and Unspecified     Hives, Brand specific. Some brands are ok and some are not    [place] Thyroid [Thyroid] Diarrhea     diarrhea     Outpatient Encounter Medications as of 9/4/2024   Medication Sig Dispense Refill    thyroid (ARMOUR THYROID) 60 MG Tab not specified      anastrozole (ARIMIDEX) 1 MG Tab Take 1 Tablet by mouth every day.      amLODIPine (NORVASC) 5 MG Tab Take 1 Tablet by mouth every day. 30 Tablet 1    apixaban (ELIQUIS) 5mg Tab Take 1 Tablet by mouth 2 times a day for 90 days. Indications: DVT/ Tablet 0    normal saline flush 0.9 % Solution 10 mL by Tube route every 12 hours. 400 mL 1    acetaminophen (TYLENOL) 500 MG Tab Take 500 mg by mouth 1 time a day as needed for Mild Pain.      ibandronate (BONIVA) 150 MG tablet Take 1 Tablet by mouth every 30 days. 3 Tablet 4    liothyronine (CYTOMEL) 5 MCG Tab Take 2.5-5 mcg by mouth every day. 1 tablet 3 times per week - Monday, Wednesday, Friday.  1/2 tablet on other days      levothyroxine (SYNTHROID) 100 MCG Tab Take 100 mcg by mouth every morning on an empty stomach.      Cholecalciferol 1000 UNIT Cap Take 1,000 Units by mouth every day.          No facility-administered encounter medications on file as of 9/4/2024.     Social History     Socioeconomic History    Marital status:      Spouse name: Not on file    Number of children: Not on file    Years of education: Not on file    Highest education level: Associate degree: academic program   Occupational History     Comment: retired banker   Tobacco Use    Smoking status: Never     Passive exposure: Past    Smokeless tobacco: Never   Vaping Use    Vaping status: Never Used   Substance and Sexual Activity    Alcohol use: Never    Drug use: Never    Sexual activity: Not Currently     Partners: Male      Birth control/protection: Abstinence, Male Sterilization, Post-Menopausal     Comment:    Other Topics Concern    Not on file   Social History Narrative    Not on file     Social Determinants of Health     Financial Resource Strain: Low Risk  (7/11/2024)    Overall Financial Resource Strain (CARDIA)     Difficulty of Paying Living Expenses: Not hard at all   Food Insecurity: No Food Insecurity (8/23/2024)    Hunger Vital Sign     Worried About Running Out of Food in the Last Year: Never true     Ran Out of Food in the Last Year: Never true   Transportation Needs: No Transportation Needs (8/23/2024)    PRAPARE - Transportation     Lack of Transportation (Medical): No     Lack of Transportation (Non-Medical): No   Physical Activity: Insufficiently Active (7/11/2024)    Exercise Vital Sign     Days of Exercise per Week: 2 days     Minutes of Exercise per Session: 10 min   Stress: No Stress Concern Present (7/11/2024)    Citizen of [place] of Occupational Health - Occupational Stress Questionnaire     Feeling of Stress : Not at all   Social Connections: Unknown (7/11/2024)    Social Connection and Isolation Panel [NHANES]     Frequency of Communication with Friends and Family: More than three times a week     Frequency of Social Gatherings with Friends and Family: Twice a week     Attends Confucianism Services: Patient declined     Active Member of Clubs or Organizations: Yes     Attends Club or Organization Meetings: More than 4 times per year     Marital Status:    Intimate Partner Violence: Not At Risk (8/23/2024)    Humiliation, Afraid, Rape, and Kick questionnaire     Fear of Current or Ex-Partner: No     Emotionally Abused: No     Physically Abused: No     Sexually Abused: No   Housing Stability: Unknown (8/23/2024)    Housing Stability Vital Sign     Unable to Pay for Housing in the Last Year: No     Number of Times Moved in the Last Year: Not on file     Homeless in the Last Year: No      Social  "History     Tobacco Use   Smoking Status Never    Passive exposure: Past   Smokeless Tobacco Never     Social History     Substance and Sexual Activity   Alcohol Use Never     Social History     Substance and Sexual Activity   Drug Use Never      Family History   Problem Relation Age of Onset    Cancer Mother         Ovarian    Ovarian Cancer Mother     Dementia Father     Heart Disease Father     Hyperlipidemia Neg Hx        ROS     Objective:   /64 (BP Location: Right arm, Patient Position: Sitting)   Pulse (!) 103   Temp 36.3 °C (97.4 °F) (Temporal)   Ht 1.626 m (5' 4\")   Wt 65.3 kg (144 lb)   LMP  (LMP Unknown)   SpO2 93%   BMI 24.72 kg/m²     Physical Exam  Constitutional:       General: She is not in acute distress.     Appearance: Normal appearance. She is not ill-appearing.   Abdominal:      Palpations: Abdomen is soft.      Tenderness: There is no abdominal tenderness.   Neurological:      Mental Status: She is alert.         Labs    Latest Reference Range & Units 08/16/24 01:00   WBC 4.8 - 10.8 K/uL 15.0 (H)   RBC 4.20 - 5.40 M/uL 3.25 (L)   Hemoglobin 12.0 - 16.0 g/dL 9.8 (L)   Hematocrit 37.0 - 47.0 % 30.5 (L)   MCV 81.4 - 97.8 fL 93.8   MCH 27.0 - 33.0 pg 30.2   MCHC 32.2 - 35.5 g/dL 32.1 (L)   RDW 35.9 - 50.0 fL 48.1   Platelet Count 164 - 446 K/uL 241   MPV 9.0 - 12.9 fL 10.3   (H): Data is abnormally high  (L): Data is abnormally low       Latest Reference Range & Units 08/16/24 01:00 08/16/24 03:15   Sodium 135 - 145 mmol/L 140     Potassium 3.6 - 5.5 mmol/L 3.5 (L)     Chloride 96 - 112 mmol/L 107     Co2 20 - 33 mmol/L 20     Anion Gap 7.0 - 16.0  13.0     Glucose 65 - 99 mg/dL 101 (H)     Bun 8 - 22 mg/dL 15     Creatinine 0.50 - 1.40 mg/dL 0.63     GFR (CKD-EPI) >60 mL/min/1.73 m 2 91     Calcium 8.5 - 10.5 mg/dL 6.6 (LL)     Correct Calcium 8.5 - 10.5 mg/dL 7.6 (L)     AST(SGOT) 12 - 45 U/L 31     ALT(SGPT) 2 - 50 U/L 50     Alkaline Phosphatase 30 - 99 U/L 66     Total Bilirubin " 0.1 - 1.5 mg/dL 1.2     Albumin 3.2 - 4.9 g/dL 2.7 (L)     Total Protein 6.0 - 8.2 g/dL 4.8 (L)     Globulin 1.9 - 3.5 g/dL 2.1     A-G Ratio g/dL 1.3     Phosphorus 2.5 - 4.5 mg/dL 2.8     Magnesium 1.5 - 2.5 mg/dL 1.9     Ionized Calcium 1.1 - 1.3 mmol/L   0.9 (L)   (LL): Data is critically low  (L): Data is abnormally low  (H): Data is abnormally high     Imaging  CT PANCREAS (8/27/24)  IMPRESSION:  1.  Prior distal pancreatectomy and splenectomy.  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  2.  LEFT upper quadrant drain in place with minimal adjacent peritoneal gas.  No significant residual or recurrent fluid collection demonstrated.  3.  Gallstones and minimal gallbladder wall thickening.  Cholecystitis is not excluded.  4.  Bilateral pleural fluid collections with associated atelectasis, worse on the LEFT.     CT ABD/PELVIS (8/20/24)  IMPRESSION:  1.  There is postoperative change consistent with distal pancreatectomy and splenectomy.  2.  The simple appearing left upper quadrant subphrenic fluid collection is again seen very similar to the recent prior study, possibly postoperative seroma as there is no rim enhancement to suggest abscess. Pancreatic leak is considered unlikely as the   majority of the fluid is in the splenic fossa rather than adjacent to the surgical margin of the pancreas.  3.  Stable bibasilar atelectasis and pleural effusions.  4.  Cholelithiasis again noted.  5.  Pulmonary arteries not well assessed on this exam in the right atrial thrombus is no longer evident. Small defect in the right ventricular apex is unchanged.     CT ABD/PELVIS (8/14/24)  IMPRESSION:  1.  Postop changes of splenectomy and distal pancreatectomy, fluid collection left upper quadrant is seen which could represent postop seroma or possibly pancreatic leak.  2.  Right lower lobe segmental and subsegmental pulmonary embolus.  3.  Trace right and small left pleural  effusions.  4.  Linear densities of the lung bases suggesting atelectasis, component of left lower lobe infiltrate not excluded.  5.  Diverticulosis  6.  Cholelithiasis  7.  Cardiomegaly     CT CHEST (8/14/24)  IMPRESSION:  1.  Bilateral segmental and subsegmental pulmonary emboli with changes compatible with significant right heart strain.  2.  Small left and trace right pleural effusions.  3.  Linear consolidations in the bilateral lung bases suggests atelectasis, component of left lower lobe infiltrate is not excluded.  4.  4.0 cm ascending thoracic aortic aneurysm, radiographic follow-up and surveillance recommended as clinically appropriate.  5.  Atherosclerosis and atherosclerotic coronary artery disease     CT C/A/P (6/28/24)  IMPRESSION:  1.  No definite CT evidence of infiltrating gastric mass although there is poor distention of the stomach with possible wall thickening of the gastric antrum.  2.  There is a large solid and cystic necrotic pancreatic tail mass suspicious for malignancy with no surrounding vascular involvement or lymphadenopathy.  3.  Mild hepatic steatosis with no focal hepatic lesions.  4.  Cholelithiasis without biliary dilatation.  5.  Moderate size hiatal hernia.  6.  Colonic diverticulosis without diverticulitis.  7.  Nonspecific borderline enlarged superior mediastinal right paraesophageal lymph node.  8.  There are postoperative changes of left mastectomy and axillary lymph node dissection.  9.  No parenchymal lung metastases.     Pathology  PATH 8/16/24  FINAL DIAGNOSIS:   A. Right atrial mass:          Benign thrombus.      PATH 8/5/24  FINAL DIAGNOSIS:   A. Distal pancreas, spleen and nodes, distal pancreatectomy and   splenectomy:         Benign pancreatic serous cystadenoma (6.8 cm).          Margins are negative for neoplasm.          Background chronic pancreatitis.          Spleen with no significant pathologic abnormality.          Nine benign lymph nodes (0/9).       Procedures  8/22/24 CT guided left upper quadrant fluid collection catheter drainage      8/16/24 Right atrial thrombectomy by Dr. Bowen     8/15/24 CT-guided retroperitoneal abscess drainage  LUQ retroperitoneal catheter drainage with CT guidance.     8/5/24 by Dr. Espinoza  1.  Exploratory laparotomy.  2.  Intraoperative ultrasound survey of the pancreas using 5/12 MHz T-probe.  3.  Distal pancreatectomy and splenectomy.  4.  Stomach and celiac node dissection.  5.  Omental flap.     Mastectomy 2022    Diagnosis:     1. Pancreatic mass        2. Malignant neoplasm of lower-inner quadrant of female breast, unspecified estrogen receptor status, unspecified laterality (HCC)        3. S/P chemotherapy, time since greater than 12 weeks        4. S/P exploratory laparotomy            Medical Decision Making:  Today's Assessment / Status / Plan:     The patient has a drain in place after distal pancreatectomy and has evidence of a leak.  Her drain output is going down but it still is about 20 or 25 cc a day.    I will see her back next week for reevaluation.    I did ask them to stop flushing the drain.    I, Dr Velasco, have entered, reviewed and confirmed the above diagnoses related to this patient on this date of service, as per the time and date noted at top of this note.

## 2024-09-04 ENCOUNTER — OFFICE VISIT (OUTPATIENT)
Dept: SURGICAL ONCOLOGY | Facility: MEDICAL CENTER | Age: 79
End: 2024-09-04
Payer: COMMERCIAL

## 2024-09-04 VITALS
SYSTOLIC BLOOD PRESSURE: 112 MMHG | WEIGHT: 144 LBS | HEART RATE: 103 BPM | OXYGEN SATURATION: 93 % | BODY MASS INDEX: 24.59 KG/M2 | HEIGHT: 64 IN | DIASTOLIC BLOOD PRESSURE: 64 MMHG | TEMPERATURE: 97.4 F

## 2024-09-04 DIAGNOSIS — Z92.21 S/P CHEMOTHERAPY, TIME SINCE GREATER THAN 12 WEEKS: ICD-10-CM

## 2024-09-04 DIAGNOSIS — Z98.890 S/P EXPLORATORY LAPAROTOMY: ICD-10-CM

## 2024-09-04 DIAGNOSIS — K86.89 PANCREATIC MASS: ICD-10-CM

## 2024-09-04 DIAGNOSIS — C50.319 MALIGNANT NEOPLASM OF LOWER-INNER QUADRANT OF FEMALE BREAST, UNSPECIFIED ESTROGEN RECEPTOR STATUS, UNSPECIFIED LATERALITY (HCC): ICD-10-CM

## 2024-09-04 PROCEDURE — 99024 POSTOP FOLLOW-UP VISIT: CPT | Performed by: SURGERY

## 2024-09-04 PROCEDURE — 3074F SYST BP LT 130 MM HG: CPT | Performed by: SURGERY

## 2024-09-04 PROCEDURE — 3078F DIAST BP <80 MM HG: CPT | Performed by: SURGERY

## 2024-09-04 ASSESSMENT — FIBROSIS 4 INDEX: FIB4 SCORE: 0.48

## 2024-09-04 NOTE — PROGRESS NOTES
Subjective:   9/11/2024  1:54 PM  Primary care physician: Roula Ga P.A.-C.  Referring Provider: Casimiro Fowler M.D.   Medical Oncologist: Casimiro Fowler M.D.    Chief Complaint:   Chief Complaint   Patient presents with    Follow-Up     DIST PANC/SPLN  8/5  PATH: BENIGN  DRAIN CHECK        Diagnosis:   1. Pancreatic mass        2. Malignant neoplasm of lower-inner quadrant of female breast, unspecified estrogen receptor status, unspecified laterality (HCC)        3. S/P chemotherapy, time since greater than 12 weeks        4. S/P exploratory laparotomy        5. Abdominal fluid collection          History of presenting illness:    Paige Gudino is a 77 y.o. female with a history of noninvasive carcinoma of the left breast associated with calcifications found on mammogram 34 years ago.  She was treated with a total mastectomy at that time.  She had implant reconstruction and a right mamma pexy subsequently.  No systemic therapy was given.  She did well until May 2022 when she self detected a lump under the implant at the inframammary fold on the left.  The tumor was ER positive greater than 90% IA positive greater than 90% HER2 negative IHC 1+.   On 8/1/2022 she underwent resection of the lesion which demonstrated invasive grade 2 mammary carcinoma measuring 1.6 cm with a close posterior margin, all other margins clear.       She was started on anastrozole in Jan 2023, has tolerated well well with no hot flashes or musculoskeletal symptoms.       CT C/A/P on 6/28/24 noted a large solid and cystic necrotic pancreatic tail mass suspicious for malignancy with no surrounding vascular involvement or lymphadenopathy. Mild hepatic steatosis with no focal hepatic lesions. Cholelithiasis without biliary dilatation. Possible wall thickening of the gastric antrum. Moderate size hiatal hernia. Nonspecific borderline enlarged superior mediastinal  right paraesophageal lymph node.     She is here today for evaluation of this large pancreatic mass that was found to tail the pancreas.  The characteristics of this tumor which were found incidentally without any symptoms appear to have characteristics of a microcystic serous cystadenoma.  The patient has no family history of pancreatic cancer.  She does have a history of breast cancer and underwent a mastectomy on the left side.  In any event she is an extremely healthy 78-year-old.  She denies any fever or chills, nausea or vomiting.  She has no weight loss.  She did have a CT scan which I have personally reviewed from 2024 which showed a large mass with no adenopathy and no sign of focal invasion or metastatic disease.  The patient has not had an EUS or any biopsies.  She is not a diabetic.  She has been completely asymptomatic.     Update 24  On 24 she completed surgery by Dr. Espinoza, includin.  Exploratory laparotomy.  2.  Intraoperative ultrasound survey of the pancreas using 5/12 MHz T-probe.  3.  Distal pancreatectomy and splenectomy.  4.  Stomach and celiac node dissection.  5.  Omental flap.     Pathology noted benign pancreatic serous cystadenoma (6.8 cm), margins are negative for neoplasm, spleen with no significant pathologic abnormality.  She was eventually discharged on 24 with home oxygen and close outpatient follow-up.     On 24 she presented to the ED d/t worsening short of breath. CT CHEST on 24 noted bilateral segmental and subsegmental pulmonary emboli with changes compatible with significant right heart strain. CT ABD/PELVIS on 24 noted postop changes of splenectomy and distal pancreatectomy, fluid collection left upper quadrant is seen which could represent postop seroma or possibly pancreatic leak and right lower lobe segmental and subsegmental pulmonary embolus. She was subsequently admitted and started on antibiotics. On 8/15/24 she underwent  "CT-guided retroperitoneal abscess drainage and LUQ retroperitoneal catheter drainage with CT guidance. On 8/16/24 she underwent right atrial thrombectomy by Dr. Bowen, pathology noted benign thrombus. On 8/22/24 she underwent CT guided left upper quadrant fluid collection catheter drainage. Her condition eventually improved and she was discharged home on 8/23/24 with the drain in place and instructions to follow up with our office for drain management.     Yesterday 8/27/24 I received a call from patient's daughter Teri regarding minimal drain output of \"less than 1 mL daily\" since the patient was discharged home and that they would like to see if drain can be removed as the patient has been finding this uncomfortable and painful at times. Discussed with Dr. Esquivel, imaging ordered for confirmation. CT PANCREAS on 8/27/24 noted LEFT upper quadrant drain in place with minimal adjacent peritoneal gas and no significant residual or recurrent fluid collection demonstrated. Appointment scheduled with patient today to remove drain, patient arrived with spouse. Upon closer inspection, fluid in drain appears to be about 25 mL and patient states this is the output so far for today. Also received drain output record from patient, however measurements appear to be in ounces instead of mL. Based on log, patient has had about 1.5-1.6 oz (44-47 mL) per day. Discussed with patient that indications for removal of drain are output of 30 mL or less per day for 2 consecutive days and that we will need to keep the drain in place for now. However, also discussed that we can draw a fluid amylase from her drain and consider removing the drain this upcoming Friday if this comes back as normal. She is agreeable to this. Steris in place over abdominal incision and this appears to be healing well. Patient also brought up concerns regarding a \"small swollen area\" on her back measuring about 2-3 inches by 4 inches that migrated below her " bra strap. Patient states that this has since resolved and denies shortness of breath or difficulty breathing. Also had questions regarding rescheduling CT abdomen scheduled for 9/16/24, advised to keep this appointment for now. She reports no problems with eating or drinking, she has been taking multiple naps daily and states she feels energized afterwards. Denies fever, chills, nausea, or vomiting.     Update 9/4/24  She is here today for further follow-up. She is feeling pretty well. Drain is putting out about 25 cc of thick fluid a day. Last amylase a week ago as 5000    Update 9/11/24  She is here today for follow up regarding pancreatic drain.  The drain is putting out only about 5 cc a day.  She is feeling well.    Past Medical History:   Diagnosis Date    Anesthesia     nausea post op    Cancer (HCC)     1987 breast left    Cancer of overlapping sites of left female breast (HCC)     Cataract 2024    IOL bilat    High cholesterol     Hypothyroidism     PONV (postoperative nausea and vomiting) 1987    Just felt nausous after anesthesia    Thyroid nodule      Past Surgical History:   Procedure Laterality Date    MS ULTRASONIC GUIDANCE, INTRAOPERATIVE  8/5/2024    Procedure: ULTRASOUND GUIDANCE;  Surgeon: Sachin Espinoza M.D.;  Location: [facility];  Service: General    PANCREATECTOMY N/A 8/5/2024    Procedure: OPEN DISTAL PANCREATECTOMY WITH SPLENECTOMY, NODE DISSECTION;  Surgeon: Sachin Espinoza M.D.;  Location: [facility];  Service: General    SPLENECTOMY N/A 8/5/2024    Procedure: SPLENECTOMY;  Surgeon: Sachin Espinoza M.D.;  Location: [facility];  Service: General    CREATION, FLAP, OMENTUM N/A 8/5/2024    Procedure: CREATION, FLAP, OMENTUM;  Surgeon: Sachin Espinoza M.D.;  Location: [facility];  Service: General    PB MASTECTOMY, PARTIAL Left 08/01/2022    Procedure: MELLO LOCALIZED LEFT PARTIAL MASTECTOMY;  Surgeon: Elena DUFFY  KARLENE Arroyo;  Location: SUR[facility];  Service: General    OTHER ORTHOPEDIC SURGERY  2019    back surgery    OTHER  2001    replace left breast implant    OTHER  1988    Left breast implant/lift    OTHER  1987    left mastectomy    LAMINOTOMY      LUMPECTOMY      PRIMARY C SECTION       Allergies   Allergen Reactions    Synthroid [Fd&C Red #40 Al Paul-Levothyroxine] Hives and Unspecified     Hives, Brand specific. Some brands are ok and some are not    [place] Thyroid [Thyroid] Diarrhea     diarrhea     Outpatient Encounter Medications as of 9/11/2024   Medication Sig Dispense Refill    thyroid (ARMOUR THYROID) 60 MG Tab not specified      anastrozole (ARIMIDEX) 1 MG Tab Take 1 Tablet by mouth every day.      amLODIPine (NORVASC) 5 MG Tab Take 1 Tablet by mouth every day. 30 Tablet 1    apixaban (ELIQUIS) 5mg Tab Take 1 Tablet by mouth 2 times a day for 90 days. Indications: DVT/ Tablet 0    normal saline flush 0.9 % Solution 10 mL by Tube route every 12 hours. 400 mL 1    acetaminophen (TYLENOL) 500 MG Tab Take 500 mg by mouth 1 time a day as needed for Mild Pain.      ibandronate (BONIVA) 150 MG tablet Take 1 Tablet by mouth every 30 days. 3 Tablet 4    liothyronine (CYTOMEL) 5 MCG Tab Take 2.5-5 mcg by mouth every day. 1 tablet 3 times per week - Monday, Wednesday, Friday.  1/2 tablet on other days      levothyroxine (SYNTHROID) 100 MCG Tab Take 100 mcg by mouth every morning on an empty stomach.      Cholecalciferol 1000 UNIT Cap Take 1,000 Units by mouth every day.          No facility-administered encounter medications on file as of 9/11/2024.     Social History     Socioeconomic History    Marital status:      Spouse name: Not on file    Number of children: Not on file    Years of education: Not on file    Highest education level: Associate degree: academic program   Occupational History     Comment: retired banker   Tobacco Use    Smoking status: Never     Passive exposure: Past     Smokeless tobacco: Never   Vaping Use    Vaping status: Never Used   Substance and Sexual Activity    Alcohol use: Never    Drug use: Never    Sexual activity: Not Currently     Partners: Male     Birth control/protection: Abstinence, Male Sterilization, Post-Menopausal     Comment:    Other Topics Concern    Not on file   Social History Narrative    Not on file     Social Determinants of Health     Financial Resource Strain: Low Risk  (7/11/2024)    Overall Financial Resource Strain (CARDIA)     Difficulty of Paying Living Expenses: Not hard at all   Food Insecurity: No Food Insecurity (8/23/2024)    Hunger Vital Sign     Worried About Running Out of Food in the Last Year: Never true     Ran Out of Food in the Last Year: Never true   Transportation Needs: No Transportation Needs (8/23/2024)    PRAPARE - Transportation     Lack of Transportation (Medical): No     Lack of Transportation (Non-Medical): No   Physical Activity: Insufficiently Active (7/11/2024)    Exercise Vital Sign     Days of Exercise per Week: 2 days     Minutes of Exercise per Session: 10 min   Stress: No Stress Concern Present (7/11/2024)    [place] [employer] - Occupational Stress Questionnaire     Feeling of Stress : Not at all   Social Connections: Unknown (7/11/2024)    Social Connection and Isolation Panel [NHANES]     Frequency of Communication with Friends and Family: More than three times a week     Frequency of Social Gatherings with Friends and Family: Twice a week     Attends Latter day Services: Patient declined     Active Member of Clubs or Organizations: Yes     Attends Club or Organization Meetings: More than 4 times per year     Marital Status:    Intimate Partner Violence: Not At Risk (8/23/2024)    Humiliation, Afraid, Rape, and Kick questionnaire     Fear of Current or Ex-Partner: No     Emotionally Abused: No     Physically Abused: No     Sexually Abused: No   Housing Stability: Unknown  "(8/23/2024)    Housing Stability Vital Sign     Unable to Pay for Housing in the Last Year: No     Number of Times Moved in the Last Year: Not on file     Homeless in the Last Year: No      Social History     Tobacco Use   Smoking Status Never    Passive exposure: Past   Smokeless Tobacco Never     Social History     Substance and Sexual Activity   Alcohol Use Never     Social History     Substance and Sexual Activity   Drug Use Never      Family History   Problem Relation Age of Onset    Cancer Mother         Ovarian    Ovarian Cancer Mother     Dementia Father     Heart Disease Father     Hyperlipidemia Neg Hx             Objective:   /66 (BP Location: Left arm, Patient Position: Sitting, BP Cuff Size: Adult)   Pulse (!) 107   Temp 36.4 °C (97.5 °F) (Temporal)   Ht 1.626 m (5' 4\")   Wt 65.3 kg (144 lb)   LMP  (LMP Unknown)   SpO2 96%   BMI 24.72 kg/m²     Physical Exam  Abdominal:      Comments: Abdomen is soft and nontender.  Drain removed without incident         Labs    Latest Reference Range & Units 08/16/24 01:00   WBC 4.8 - 10.8 K/uL 15.0 (H)   RBC 4.20 - 5.40 M/uL 3.25 (L)   Hemoglobin 12.0 - 16.0 g/dL 9.8 (L)   Hematocrit 37.0 - 47.0 % 30.5 (L)   MCV 81.4 - 97.8 fL 93.8   MCH 27.0 - 33.0 pg 30.2   MCHC 32.2 - 35.5 g/dL 32.1 (L)   RDW 35.9 - 50.0 fL 48.1   Platelet Count 164 - 446 K/uL 241   MPV 9.0 - 12.9 fL 10.3   (H): Data is abnormally high  (L): Data is abnormally low       Latest Reference Range & Units 08/16/24 01:00 08/16/24 03:15   Sodium 135 - 145 mmol/L 140     Potassium 3.6 - 5.5 mmol/L 3.5 (L)     Chloride 96 - 112 mmol/L 107     Co2 20 - 33 mmol/L 20     Anion Gap 7.0 - 16.0  13.0     Glucose 65 - 99 mg/dL 101 (H)     Bun 8 - 22 mg/dL 15     Creatinine 0.50 - 1.40 mg/dL 0.63     GFR (CKD-EPI) >60 mL/min/1.73 m 2 91     Calcium 8.5 - 10.5 mg/dL 6.6 (LL)     Correct Calcium 8.5 - 10.5 mg/dL 7.6 (L)     AST(SGOT) 12 - 45 U/L 31     ALT(SGPT) 2 - 50 U/L 50     Alkaline Phosphatase 30 " - 99 U/L 66     Total Bilirubin 0.1 - 1.5 mg/dL 1.2     Albumin 3.2 - 4.9 g/dL 2.7 (L)     Total Protein 6.0 - 8.2 g/dL 4.8 (L)     Globulin 1.9 - 3.5 g/dL 2.1     A-G Ratio g/dL 1.3     Phosphorus 2.5 - 4.5 mg/dL 2.8     Magnesium 1.5 - 2.5 mg/dL 1.9     Ionized Calcium 1.1 - 1.3 mmol/L   0.9 (L)   (LL): Data is critically low  (L): Data is abnormally low  (H): Data is abnormally high     Imaging  CT PANCREAS (8/27/24)  IMPRESSION:  1.  Prior distal pancreatectomy and splenectomy.  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  2.  LEFT upper quadrant drain in place with minimal adjacent peritoneal gas.  No significant residual or recurrent fluid collection demonstrated.  3.  Gallstones and minimal gallbladder wall thickening.  Cholecystitis is not excluded.  4.  Bilateral pleural fluid collections with associated atelectasis, worse on the LEFT.     CT ABD/PELVIS (8/20/24)  IMPRESSION:  1.  There is postoperative change consistent with distal pancreatectomy and splenectomy.  2.  The simple appearing left upper quadrant subphrenic fluid collection is again seen very similar to the recent prior study, possibly postoperative seroma as there is no rim enhancement to suggest abscess. Pancreatic leak is considered unlikely as the   majority of the fluid is in the splenic fossa rather than adjacent to the surgical margin of the pancreas.  3.  Stable bibasilar atelectasis and pleural effusions.  4.  Cholelithiasis again noted.  5.  Pulmonary arteries not well assessed on this exam in the right atrial thrombus is no longer evident. Small defect in the right ventricular apex is unchanged.     CT ABD/PELVIS (8/14/24)  IMPRESSION:  1.  Postop changes of splenectomy and distal pancreatectomy, fluid collection left upper quadrant is seen which could represent postop seroma or possibly pancreatic leak.  2.  Right lower lobe segmental and subsegmental pulmonary embolus.  3.  Trace right and  small left pleural effusions.  4.  Linear densities of the lung bases suggesting atelectasis, component of left lower lobe infiltrate not excluded.  5.  Diverticulosis  6.  Cholelithiasis  7.  Cardiomegaly     CT CHEST (8/14/24)  IMPRESSION:  1.  Bilateral segmental and subsegmental pulmonary emboli with changes compatible with significant right heart strain.  2.  Small left and trace right pleural effusions.  3.  Linear consolidations in the bilateral lung bases suggests atelectasis, component of left lower lobe infiltrate is not excluded.  4.  4.0 cm ascending thoracic aortic aneurysm, radiographic follow-up and surveillance recommended as clinically appropriate.  5.  Atherosclerosis and atherosclerotic coronary artery disease     CT C/A/P (6/28/24)  IMPRESSION:  1.  No definite CT evidence of infiltrating gastric mass although there is poor distention of the stomach with possible wall thickening of the gastric antrum.  2.  There is a large solid and cystic necrotic pancreatic tail mass suspicious for malignancy with no surrounding vascular involvement or lymphadenopathy.  3.  Mild hepatic steatosis with no focal hepatic lesions.  4.  Cholelithiasis without biliary dilatation.  5.  Moderate size hiatal hernia.  6.  Colonic diverticulosis without diverticulitis.  7.  Nonspecific borderline enlarged superior mediastinal right paraesophageal lymph node.  8.  There are postoperative changes of left mastectomy and axillary lymph node dissection.  9.  No parenchymal lung metastases.     Pathology  PATH 8/16/24  FINAL DIAGNOSIS:   A. Right atrial mass:          Benign thrombus.      PATH 8/5/24  FINAL DIAGNOSIS:   A. Distal pancreas, spleen and nodes, distal pancreatectomy and   splenectomy:         Benign pancreatic serous cystadenoma (6.8 cm).          Margins are negative for neoplasm.          Background chronic pancreatitis.          Spleen with no significant pathologic abnormality.          Nine benign lymph nodes  (0/9).      Procedures  8/22/24 CT guided left upper quadrant fluid collection catheter drainage      8/16/24 Right atrial thrombectomy by Dr. Bowen     8/15/24 CT-guided retroperitoneal abscess drainage  LUQ retroperitoneal catheter drainage with CT guidance.     8/5/24 by Dr. Espinoza  1.  Exploratory laparotomy.  2.  Intraoperative ultrasound survey of the pancreas using 5/12 MHz T-probe.  3.  Distal pancreatectomy and splenectomy.  4.  Stomach and celiac node dissection.  5.  Omental flap.     Mastectomy 2022    Diagnosis:     1. Pancreatic mass        2. Malignant neoplasm of lower-inner quadrant of female breast, unspecified estrogen receptor status, unspecified laterality (HCC)        3. S/P chemotherapy, time since greater than 12 weeks        4. S/P exploratory laparotomy        5. Abdominal fluid collection            Medical Decision Making:  Today's Assessment / Status / Plan:     The patient overall is doing well.  I have removed her drain today.  I will see her back next week to make sure she is doing okay.  She knows to call the office should she start feeling unwell.    I, Dr Velasco, have entered, reviewed and confirmed the above diagnoses related to this patient on this date of service, as per the time and date noted at top of this note.

## 2024-09-11 ENCOUNTER — OFFICE VISIT (OUTPATIENT)
Dept: SURGICAL ONCOLOGY | Facility: MEDICAL CENTER | Age: 79
End: 2024-09-11
Payer: COMMERCIAL

## 2024-09-11 ENCOUNTER — TELEPHONE (OUTPATIENT)
Dept: VASCULAR LAB | Facility: MEDICAL CENTER | Age: 79
End: 2024-09-11

## 2024-09-11 VITALS
SYSTOLIC BLOOD PRESSURE: 118 MMHG | TEMPERATURE: 97.5 F | BODY MASS INDEX: 24.59 KG/M2 | DIASTOLIC BLOOD PRESSURE: 66 MMHG | HEIGHT: 64 IN | HEART RATE: 107 BPM | OXYGEN SATURATION: 96 % | WEIGHT: 144 LBS

## 2024-09-11 DIAGNOSIS — Z98.890 S/P EXPLORATORY LAPAROTOMY: ICD-10-CM

## 2024-09-11 DIAGNOSIS — Z92.21 S/P CHEMOTHERAPY, TIME SINCE GREATER THAN 12 WEEKS: ICD-10-CM

## 2024-09-11 DIAGNOSIS — R18.8 ABDOMINAL FLUID COLLECTION: ICD-10-CM

## 2024-09-11 DIAGNOSIS — C50.319 MALIGNANT NEOPLASM OF LOWER-INNER QUADRANT OF FEMALE BREAST, UNSPECIFIED ESTROGEN RECEPTOR STATUS, UNSPECIFIED LATERALITY (HCC): ICD-10-CM

## 2024-09-11 DIAGNOSIS — K86.89 PANCREATIC MASS: ICD-10-CM

## 2024-09-11 PROCEDURE — 3078F DIAST BP <80 MM HG: CPT | Performed by: SURGERY

## 2024-09-11 PROCEDURE — 99024 POSTOP FOLLOW-UP VISIT: CPT | Performed by: SURGERY

## 2024-09-11 PROCEDURE — 3074F SYST BP LT 130 MM HG: CPT | Performed by: SURGERY

## 2024-09-11 ASSESSMENT — FIBROSIS 4 INDEX: FIB4 SCORE: 0.48

## 2024-09-11 NOTE — TELEPHONE ENCOUNTER
Called to confirm if this will be first time patient is Vascular Med provider and review if any recent testing completed or hospital admissions.     Correct appointment type? YES   Referral attached to appointment? YES   New patient packet sent via "[employer]?  YES     ** Daughter confirmed appointment

## 2024-09-12 NOTE — PROGRESS NOTES
Subjective:   9/17/2024  2:01 PM  Primary care physician: Roula Ga P.A.-C.  Referring Provider: Casimiro Fowler M.D.   Medical Oncologist: Casimiro Fowler M.D.    Chief Complaint:   Chief Complaint   Patient presents with    Follow-Up     DIST PANC/SPLN  8/5  PATH: BENIGN  F/U DC DRAIN        Diagnosis:   1. Pancreatic mass  CT-ABDOMEN-PELVIS WITH      2. Malignant neoplasm of lower-inner quadrant of female breast, unspecified estrogen receptor status, unspecified laterality (HCC)        3. S/P chemotherapy, time since greater than 12 weeks        4. S/P exploratory laparotomy  CT-ABDOMEN-PELVIS WITH      5. Abdominal fluid collection  CT-ABDOMEN-PELVIS WITH        History of presenting illness:    Paige Gudino is a 77 y.o. female with a history of noninvasive carcinoma of the left breast associated with calcifications found on mammogram 34 years ago.  She was treated with a total mastectomy at that time.  She had implant reconstruction and a right mamma pexy subsequently.  No systemic therapy was given.  She did well until May 2022 when she self detected a lump under the implant at the inframammary fold on the left.  The tumor was ER positive greater than 90% MN positive greater than 90% HER2 negative IHC 1+.   On 8/1/2022 she underwent resection of the lesion which demonstrated invasive grade 2 mammary carcinoma measuring 1.6 cm with a close posterior margin, all other margins clear.       She was started on anastrozole in Jan 2023, has tolerated well well with no hot flashes or musculoskeletal symptoms.       CT C/A/P on 6/28/24 noted a large solid and cystic necrotic pancreatic tail mass suspicious for malignancy with no surrounding vascular involvement or lymphadenopathy. Mild hepatic steatosis with no focal hepatic lesions. Cholelithiasis without biliary dilatation. Possible wall thickening of the gastric antrum. Moderate size  hiatal hernia. Nonspecific borderline enlarged superior mediastinal right paraesophageal lymph node.     She is here today for evaluation of this large pancreatic mass that was found to tail the pancreas.  The characteristics of this tumor which were found incidentally without any symptoms appear to have characteristics of a microcystic serous cystadenoma.  The patient has no family history of pancreatic cancer.  She does have a history of breast cancer and underwent a mastectomy on the left side.  In any event she is an extremely healthy 78-year-old.  She denies any fever or chills, nausea or vomiting.  She has no weight loss.  She did have a CT scan which I have personally reviewed from 2024 which showed a large mass with no adenopathy and no sign of focal invasion or metastatic disease.  The patient has not had an EUS or any biopsies.  She is not a diabetic.  She has been completely asymptomatic.     Update 24  On 24 she completed surgery by Dr. Espinoza, includin.  Exploratory laparotomy.  2.  Intraoperative ultrasound survey of the pancreas using 5/12 MHz T-probe.  3.  Distal pancreatectomy and splenectomy.  4.  Stomach and celiac node dissection.  5.  Omental flap.     Pathology noted benign pancreatic serous cystadenoma (6.8 cm), margins are negative for neoplasm, spleen with no significant pathologic abnormality.  She was eventually discharged on 24 with home oxygen and close outpatient follow-up.     On 24 she presented to the ED d/t worsening short of breath. CT CHEST on 24 noted bilateral segmental and subsegmental pulmonary emboli with changes compatible with significant right heart strain. CT ABD/PELVIS on 24 noted postop changes of splenectomy and distal pancreatectomy, fluid collection left upper quadrant is seen which could represent postop seroma or possibly pancreatic leak and right lower lobe segmental and subsegmental pulmonary embolus. She was  "subsequently admitted and started on antibiotics. On 8/15/24 she underwent CT-guided retroperitoneal abscess drainage and LUQ retroperitoneal catheter drainage with CT guidance. On 8/16/24 she underwent right atrial thrombectomy by Dr. Bowen, pathology noted benign thrombus. On 8/22/24 she underwent CT guided left upper quadrant fluid collection catheter drainage. Her condition eventually improved and she was discharged home on 8/23/24 with the drain in place and instructions to follow up with our office for drain management.     Yesterday 8/27/24 I received a call from patient's daughter Teri regarding minimal drain output of \"less than 1 mL daily\" since the patient was discharged home and that they would like to see if drain can be removed as the patient has been finding this uncomfortable and painful at times. Discussed with Dr. Esquivel, imaging ordered for confirmation. CT PANCREAS on 8/27/24 noted LEFT upper quadrant drain in place with minimal adjacent peritoneal gas and no significant residual or recurrent fluid collection demonstrated. Appointment scheduled with patient today to remove drain, patient arrived with spouse. Upon closer inspection, fluid in drain appears to be about 25 mL and patient states this is the output so far for today. Also received drain output record from patient, however measurements appear to be in ounces instead of mL. Based on log, patient has had about 1.5-1.6 oz (44-47 mL) per day. Discussed with patient that indications for removal of drain are output of 30 mL or less per day for 2 consecutive days and that we will need to keep the drain in place for now. However, also discussed that we can draw a fluid amylase from her drain and consider removing the drain this upcoming Friday if this comes back as normal. She is agreeable to this. Steris in place over abdominal incision and this appears to be healing well. Patient also brought up concerns regarding a \"small swollen area\" on " her back measuring about 2-3 inches by 4 inches that migrated below her bra strap. Patient states that this has since resolved and denies shortness of breath or difficulty breathing. Also had questions regarding rescheduling CT abdomen scheduled for 9/16/24, advised to keep this appointment for now. She reports no problems with eating or drinking, she has been taking multiple naps daily and states she feels energized afterwards. Denies fever, chills, nausea, or vomiting.     Update 9/4/24  She is here today for further follow-up. She is feeling pretty well. Drain is putting out about 25 cc of thick fluid a day. Last amylase a week ago as 5000     Update 9/11/24  She is here today for follow up regarding pancreatic drain.  The drain is putting out only about 5 cc a day.  She is feeling well.    Update 9/17/24  She is here today for reevaluation.  She has been feeling tired and a little bit off for the last day.  She had some nausea this morning.  Denies fevers    Past Medical History:   Diagnosis Date    Anesthesia     nausea post op    Cancer (HCC)     1987 breast left    Cancer of overlapping sites of left female breast (HCC)     Cataract 2024    IOL bilat    High cholesterol     Hypothyroidism     PONV (postoperative nausea and vomiting) 1987    Just felt nausous after anesthesia    Thyroid nodule      Past Surgical History:   Procedure Laterality Date    MS ULTRASONIC GUIDANCE, INTRAOPERATIVE  8/5/2024    Procedure: ULTRASOUND GUIDANCE;  Surgeon: Sachin Espinoza M.D.;  Location: SURGERY [facility];  Service: General    PANCREATECTOMY N/A 8/5/2024    Procedure: OPEN DISTAL PANCREATECTOMY WITH SPLENECTOMY, NODE DISSECTION;  Surgeon: Sachin Espinoza M.D.;  Location: SURGERY [facility];  Service: General    SPLENECTOMY N/A 8/5/2024    Procedure: SPLENECTOMY;  Surgeon: Sachin Espinoza M.D.;  Location: SURGERY [facility];  Service: General    CREATION, FLAP, OMENTUM N/A 8/5/2024     Procedure: CREATION, FLAP, OMENTUM;  Surgeon: Sachin Espinoza M.D.;  Location: SURGERY [facility];  Service: General    PB MASTECTOMY, PARTIAL Left 08/01/2022    Procedure: MELLO LOCALIZED LEFT PARTIAL MASTECTOMY;  Surgeon: Elena Arroyo M.D.;  Location: SURGERY [facility];  Service: General    OTHER ORTHOPEDIC SURGERY  2019    back surgery    OTHER  2001    replace left breast implant    OTHER  1988    Left breast implant/lift    OTHER  1987    left mastectomy    LAMINOTOMY      LUMPECTOMY      PRIMARY C SECTION       Allergies   Allergen Reactions    Synthroid [Fd&C Red #40 Al Paul-Levothyroxine] Hives and Unspecified     Hives, Brand specific. Some brands are ok and some are not    [place] Thyroid [Thyroid] Diarrhea     diarrhea     Outpatient Encounter Medications as of 9/17/2024   Medication Sig Dispense Refill    thyroid (ARMOUR THYROID) 60 MG Tab not specified      anastrozole (ARIMIDEX) 1 MG Tab Take 1 Tablet by mouth every day.      amLODIPine (NORVASC) 5 MG Tab Take 1 Tablet by mouth every day. 30 Tablet 1    apixaban (ELIQUIS) 5mg Tab Take 1 Tablet by mouth 2 times a day for 90 days. Indications: DVT/ Tablet 0    normal saline flush 0.9 % Solution 10 mL by Tube route every 12 hours. 400 mL 1    acetaminophen (TYLENOL) 500 MG Tab Take 500 mg by mouth 1 time a day as needed for Mild Pain.      ibandronate (BONIVA) 150 MG tablet Take 1 Tablet by mouth every 30 days. 3 Tablet 4    liothyronine (CYTOMEL) 5 MCG Tab Take 2.5-5 mcg by mouth every day. 1 tablet 3 times per week - Monday, Wednesday, Friday.  1/2 tablet on other days      levothyroxine (SYNTHROID) 100 MCG Tab Take 100 mcg by mouth every morning on an empty stomach.      Cholecalciferol 1000 UNIT Cap Take 1,000 Units by mouth every day.          No facility-administered encounter medications on file as of 9/17/2024.     Social History     Socioeconomic History    Marital status:      Spouse name: Not on file    Number of  children: Not on file    Years of education: Not on file    Highest education level: Associate degree: academic program   Occupational History     Comment: retired banker   Tobacco Use    Smoking status: Never     Passive exposure: Past    Smokeless tobacco: Never   Vaping Use    Vaping status: Never Used   Substance and Sexual Activity    Alcohol use: Never    Drug use: Never    Sexual activity: Not Currently     Partners: Male     Birth control/protection: Abstinence, Male Sterilization, Post-Menopausal     Comment:    Other Topics Concern    Not on file   Social History Narrative    Not on file     Social Determinants of Health     Financial Resource Strain: Low Risk  (7/11/2024)    Overall Financial Resource Strain (CARDIA)     Difficulty of Paying Living Expenses: Not hard at all   Food Insecurity: No Food Insecurity (8/23/2024)    Hunger Vital Sign     Worried About Running Out of Food in the Last Year: Never true     Ran Out of Food in the Last Year: Never true   Transportation Needs: No Transportation Needs (8/23/2024)    PRAPARE - Transportation     Lack of Transportation (Medical): No     Lack of Transportation (Non-Medical): No   Physical Activity: Insufficiently Active (7/11/2024)    Exercise Vital Sign     Days of Exercise per Week: 2 days     Minutes of Exercise per Session: 10 min   Stress: No Stress Concern Present (7/11/2024)    [employer] [place] [employer] - Occupational Stress Questionnaire     Feeling of Stress : Not at all   Social Connections: Unknown (7/11/2024)    Social Connection and Isolation Panel [NHANES]     Frequency of Communication with Friends and Family: More than three times a week     Frequency of Social Gatherings with Friends and Family: Twice a week     Attends Voodoo Services: Patient declined     Active Member of Clubs or Organizations: Yes     Attends Club or Organization Meetings: More than 4 times per year     Marital Status:    Intimate  "Partner Violence: Not At Risk (8/23/2024)    Humiliation, Afraid, Rape, and Kick questionnaire     Fear of Current or Ex-Partner: No     Emotionally Abused: No     Physically Abused: No     Sexually Abused: No   Housing Stability: Unknown (8/23/2024)    Housing Stability Vital Sign     Unable to Pay for Housing in the Last Year: No     Number of Times Moved in the Last Year: Not on file     Homeless in the Last Year: No      Social History     Tobacco Use   Smoking Status Never    Passive exposure: Past   Smokeless Tobacco Never     Social History     Substance and Sexual Activity   Alcohol Use Never     Social History     Substance and Sexual Activity   Drug Use Never      Family History   Problem Relation Age of Onset    Cancer Mother         Ovarian    Ovarian Cancer Mother     Dementia Father     Heart Disease Father     Hyperlipidemia Neg Hx             Objective:   /60 (BP Location: Left arm, Patient Position: Sitting, BP Cuff Size: Adult)   Pulse (!) 117   Temp 37.2 °C (98.9 °F) (Temporal)   Ht 1.626 m (5' 4\")   Wt 64.9 kg (143 lb)   LMP  (LMP Unknown)   SpO2 91%   BMI 24.55 kg/m²     Physical Exam  Abdominal:      Comments: Abdomen is soft and nontender.         Labs   Latest Reference Range & Units 08/28/24 08:05   WBC 4.8 - 10.8 K/uL 8.2   RBC 4.20 - 5.40 M/uL 4.47   Hemoglobin 12.0 - 16.0 g/dL 13.2   Hematocrit 37.0 - 47.0 % 43.5   MCV 81.4 - 97.8 fL 97.3   MCH 27.0 - 33.0 pg 29.5   MCHC 32.2 - 35.5 g/dL 30.3 (L)   RDW 35.9 - 50.0 fL 54.8 (H)   Platelet Count 164 - 446 K/uL 642 (H)   MPV 9.0 - 12.9 fL 10.2   (L): Data is abnormally low  (H): Data is abnormally high      Latest Reference Range & Units 08/28/24 08:05   Sodium 135 - 145 mmol/L 139   Potassium 3.6 - 5.5 mmol/L 3.8   Chloride 96 - 112 mmol/L 105   Co2 20 - 33 mmol/L 22   Anion Gap 7.0 - 16.0  12.0   Glucose 65 - 99 mg/dL 106 (H)   Bun 8 - 22 mg/dL 11   Creatinine 0.50 - 1.40 mg/dL 0.60   GFR (CKD-EPI) >60 mL/min/1.73 m 2 92 "   Calcium 8.5 - 10.5 mg/dL 8.3 (L)   Correct Calcium 8.5 - 10.5 mg/dL 8.5   AST(SGOT) 12 - 45 U/L 21   ALT(SGPT) 2 - 50 U/L 27   Alkaline Phosphatase 30 - 99 U/L 72   Total Bilirubin 0.1 - 1.5 mg/dL 0.8   Albumin 3.2 - 4.9 g/dL 3.7   Total Protein 6.0 - 8.2 g/dL 6.6   Globulin 1.9 - 3.5 g/dL 2.9   A-G Ratio g/dL 1.3   (H): Data is abnormally high  (L): Data is abnormally low      Latest Reference Range & Units 08/28/24 16:10   Body Fluid Amylase U/L 6554       Imaging  CT PANCREAS (8/27/24)  IMPRESSION:  1.  Prior distal pancreatectomy and splenectomy.  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  2.  LEFT upper quadrant drain in place with minimal adjacent peritoneal gas.  No significant residual or recurrent fluid collection demonstrated.  3.  Gallstones and minimal gallbladder wall thickening.  Cholecystitis is not excluded.  4.  Bilateral pleural fluid collections with associated atelectasis, worse on the LEFT.     CT ABD/PELVIS (8/20/24)  IMPRESSION:  1.  There is postoperative change consistent with distal pancreatectomy and splenectomy.  2.  The simple appearing left upper quadrant subphrenic fluid collection is again seen very similar to the recent prior study, possibly postoperative seroma as there is no rim enhancement to suggest abscess. Pancreatic leak is considered unlikely as the   majority of the fluid is in the splenic fossa rather than adjacent to the surgical margin of the pancreas.  3.  Stable bibasilar atelectasis and pleural effusions.  4.  Cholelithiasis again noted.  5.  Pulmonary arteries not well assessed on this exam in the right atrial thrombus is no longer evident. Small defect in the right ventricular apex is unchanged.     CT ABD/PELVIS (8/14/24)  IMPRESSION:  1.  Postop changes of splenectomy and distal pancreatectomy, fluid collection left upper quadrant is seen which could represent postop seroma or possibly pancreatic leak.  2.  Right lower  lobe segmental and subsegmental pulmonary embolus.  3.  Trace right and small left pleural effusions.  4.  Linear densities of the lung bases suggesting atelectasis, component of left lower lobe infiltrate not excluded.  5.  Diverticulosis  6.  Cholelithiasis  7.  Cardiomegaly     CT CHEST (8/14/24)  IMPRESSION:  1.  Bilateral segmental and subsegmental pulmonary emboli with changes compatible with significant right heart strain.  2.  Small left and trace right pleural effusions.  3.  Linear consolidations in the bilateral lung bases suggests atelectasis, component of left lower lobe infiltrate is not excluded.  4.  4.0 cm ascending thoracic aortic aneurysm, radiographic follow-up and surveillance recommended as clinically appropriate.  5.  Atherosclerosis and atherosclerotic coronary artery disease     CT C/A/P (6/28/24)  IMPRESSION:  1.  No definite CT evidence of infiltrating gastric mass although there is poor distention of the stomach with possible wall thickening of the gastric antrum.  2.  There is a large solid and cystic necrotic pancreatic tail mass suspicious for malignancy with no surrounding vascular involvement or lymphadenopathy.  3.  Mild hepatic steatosis with no focal hepatic lesions.  4.  Cholelithiasis without biliary dilatation.  5.  Moderate size hiatal hernia.  6.  Colonic diverticulosis without diverticulitis.  7.  Nonspecific borderline enlarged superior mediastinal right paraesophageal lymph node.  8.  There are postoperative changes of left mastectomy and axillary lymph node dissection.  9.  No parenchymal lung metastases.     Pathology  PATH 8/16/24  FINAL DIAGNOSIS:   A. Right atrial mass:          Benign thrombus.      PATH 8/5/24  FINAL DIAGNOSIS:   A. Distal pancreas, spleen and nodes, distal pancreatectomy and   splenectomy:         Benign pancreatic serous cystadenoma (6.8 cm).          Margins are negative for neoplasm.          Background chronic pancreatitis.          Spleen with  no significant pathologic abnormality.          Nine benign lymph nodes (0/9).      Procedures  8/22/24 CT guided left upper quadrant fluid collection catheter drainage      8/16/24 Right atrial thrombectomy by Dr. Bowen     8/15/24 CT-guided retroperitoneal abscess drainage  LUQ retroperitoneal catheter drainage with CT guidance.     8/5/24 by Dr. Espinoza  1.  Exploratory laparotomy.  2.  Intraoperative ultrasound survey of the pancreas using 5/12 MHz T-probe.  3.  Distal pancreatectomy and splenectomy.  4.  Stomach and celiac node dissection.  5.  Omental flap.     Mastectomy 2022    Diagnosis:     1. Pancreatic mass  CT-ABDOMEN-PELVIS WITH      2. Malignant neoplasm of lower-inner quadrant of female breast, unspecified estrogen receptor status, unspecified laterality (HCC)        3. S/P chemotherapy, time since greater than 12 weeks        4. S/P exploratory laparotomy  CT-ABDOMEN-PELVIS WITH      5. Abdominal fluid collection  CT-ABDOMEN-PELVIS WITH            Medical Decision Making:  Today's Assessment / Status / Plan:     The patient is feeling a bit off today.  We will check a complete set of labs on her and I would like to repeat her CAT scan to make sure that her pancreatic fluid collection has not recurred.  All of this will happen in the next day or so and I will call her after I see results.  She does understand that if her fluid collection returns she will need a drain put back in it.        I, Dr Velasco, have entered, reviewed and confirmed the above diagnoses related to this patient on this date of service, as per the time and date noted at top of this note.

## 2024-09-16 ENCOUNTER — APPOINTMENT (OUTPATIENT)
Dept: RADIOLOGY | Facility: MEDICAL CENTER | Age: 79
End: 2024-09-16
Attending: NURSE PRACTITIONER
Payer: COMMERCIAL

## 2024-09-17 ENCOUNTER — HOSPITAL ENCOUNTER (OUTPATIENT)
Dept: LAB | Facility: MEDICAL CENTER | Age: 79
End: 2024-09-17
Attending: PHYSICIAN ASSISTANT
Payer: COMMERCIAL

## 2024-09-17 ENCOUNTER — HOSPITAL ENCOUNTER (INPATIENT)
Facility: MEDICAL CENTER | Age: 79
End: 2024-09-17
Attending: EMERGENCY MEDICINE | Admitting: STUDENT IN AN ORGANIZED HEALTH CARE EDUCATION/TRAINING PROGRAM
Payer: COMMERCIAL

## 2024-09-17 ENCOUNTER — APPOINTMENT (OUTPATIENT)
Dept: RADIOLOGY | Facility: MEDICAL CENTER | Age: 79
DRG: 438 | End: 2024-09-17
Attending: EMERGENCY MEDICINE
Payer: COMMERCIAL

## 2024-09-17 ENCOUNTER — OFFICE VISIT (OUTPATIENT)
Dept: SURGICAL ONCOLOGY | Facility: MEDICAL CENTER | Age: 79
End: 2024-09-17
Payer: COMMERCIAL

## 2024-09-17 VITALS
TEMPERATURE: 98.9 F | DIASTOLIC BLOOD PRESSURE: 60 MMHG | BODY MASS INDEX: 24.41 KG/M2 | SYSTOLIC BLOOD PRESSURE: 118 MMHG | HEIGHT: 64 IN | WEIGHT: 143 LBS | HEART RATE: 117 BPM | OXYGEN SATURATION: 91 %

## 2024-09-17 DIAGNOSIS — R18.8 ABDOMINAL FLUID COLLECTION: ICD-10-CM

## 2024-09-17 DIAGNOSIS — K65.9 ABDOMINAL INFECTION (HCC): ICD-10-CM

## 2024-09-17 DIAGNOSIS — R79.89 ELEVATED BRAIN NATRIURETIC PEPTIDE (BNP) LEVEL: ICD-10-CM

## 2024-09-17 DIAGNOSIS — E83.51 HYPOCALCEMIA: ICD-10-CM

## 2024-09-17 DIAGNOSIS — Z98.890 S/P EXPLORATORY LAPAROTOMY: ICD-10-CM

## 2024-09-17 DIAGNOSIS — R18.8 INTRA-ABDOMINAL FLUID COLLECTION: ICD-10-CM

## 2024-09-17 DIAGNOSIS — R79.89 ABNORMAL CBC: ICD-10-CM

## 2024-09-17 DIAGNOSIS — K65.1 INTRA-ABDOMINAL ABSCESS (HCC): ICD-10-CM

## 2024-09-17 DIAGNOSIS — D72.829 LEUKOCYTOSIS, UNSPECIFIED TYPE: Primary | ICD-10-CM

## 2024-09-17 DIAGNOSIS — Z92.21 S/P CHEMOTHERAPY, TIME SINCE GREATER THAN 12 WEEKS: ICD-10-CM

## 2024-09-17 DIAGNOSIS — E86.0 DEHYDRATION: ICD-10-CM

## 2024-09-17 DIAGNOSIS — K86.89 PANCREATIC MASS: ICD-10-CM

## 2024-09-17 DIAGNOSIS — R00.0 SINUS TACHYCARDIA: ICD-10-CM

## 2024-09-17 DIAGNOSIS — C50.319 MALIGNANT NEOPLASM OF LOWER-INNER QUADRANT OF FEMALE BREAST, UNSPECIFIED ESTROGEN RECEPTOR STATUS, UNSPECIFIED LATERALITY (HCC): ICD-10-CM

## 2024-09-17 PROBLEM — I10 PRIMARY HYPERTENSION: Status: ACTIVE | Noted: 2024-09-17

## 2024-09-17 PROBLEM — K85.90 PANCREATIC ABSCESS: Status: ACTIVE | Noted: 2024-09-17

## 2024-09-17 LAB
ALBUMIN SERPL BCP-MCNC: 3.4 G/DL (ref 3.2–4.9)
ALBUMIN/GLOB SERPL: 1 G/DL
ALP SERPL-CCNC: 92 U/L (ref 30–99)
ALT SERPL-CCNC: 28 U/L (ref 2–50)
ANION GAP SERPL CALC-SCNC: 14 MMOL/L (ref 7–16)
AST SERPL-CCNC: 35 U/L (ref 12–45)
BASOPHILS # BLD AUTO: 0 % (ref 0–1.8)
BASOPHILS # BLD AUTO: 0.2 % (ref 0–1.8)
BASOPHILS # BLD: 0 K/UL (ref 0–0.12)
BASOPHILS # BLD: 0.05 K/UL (ref 0–0.12)
BILIRUB SERPL-MCNC: 1.4 MG/DL (ref 0.1–1.5)
BUN SERPL-MCNC: 13 MG/DL (ref 8–22)
BURR CELLS BLD QL SMEAR: NORMAL
CALCIUM ALBUM COR SERPL-MCNC: 8.8 MG/DL (ref 8.5–10.5)
CALCIUM SERPL-MCNC: 8.3 MG/DL (ref 8.5–10.5)
CHLORIDE SERPL-SCNC: 101 MMOL/L (ref 96–112)
CO2 SERPL-SCNC: 21 MMOL/L (ref 20–33)
CREAT SERPL-MCNC: 0.69 MG/DL (ref 0.5–1.4)
EOSINOPHIL # BLD AUTO: 0 K/UL (ref 0–0.51)
EOSINOPHIL # BLD AUTO: 0.01 K/UL (ref 0–0.51)
EOSINOPHIL NFR BLD: 0 % (ref 0–6.9)
EOSINOPHIL NFR BLD: 0 % (ref 0–6.9)
ERYTHROCYTE [DISTWIDTH] IN BLOOD BY AUTOMATED COUNT: 47.4 FL (ref 35.9–50)
ERYTHROCYTE [DISTWIDTH] IN BLOOD BY AUTOMATED COUNT: 47.4 FL (ref 35.9–50)
GFR SERPLBLD CREATININE-BSD FMLA CKD-EPI: 89 ML/MIN/1.73 M 2
GLOBULIN SER CALC-MCNC: 3.4 G/DL (ref 1.9–3.5)
GLUCOSE SERPL-MCNC: 143 MG/DL (ref 65–99)
HCT VFR BLD AUTO: 41.7 % (ref 37–47)
HCT VFR BLD AUTO: 45.3 % (ref 37–47)
HGB BLD-MCNC: 13.6 G/DL (ref 12–16)
HGB BLD-MCNC: 14.7 G/DL (ref 12–16)
IMM GRANULOCYTES # BLD AUTO: 0.25 K/UL (ref 0–0.11)
IMM GRANULOCYTES NFR BLD AUTO: 1.1 % (ref 0–0.9)
LACTATE SERPL-SCNC: 1.7 MMOL/L (ref 0.5–2)
LIPASE SERPL-CCNC: 22 U/L (ref 11–82)
LYMPHOCYTES # BLD AUTO: 0.71 K/UL (ref 1–4.8)
LYMPHOCYTES # BLD AUTO: 0.87 K/UL (ref 1–4.8)
LYMPHOCYTES NFR BLD: 3.5 % (ref 22–41)
LYMPHOCYTES NFR BLD: 3.7 % (ref 22–41)
MANUAL DIFF BLD: NORMAL
MCH RBC QN AUTO: 29.9 PG (ref 27–33)
MCH RBC QN AUTO: 29.9 PG (ref 27–33)
MCHC RBC AUTO-ENTMCNC: 32.5 G/DL (ref 32.2–35.5)
MCHC RBC AUTO-ENTMCNC: 32.6 G/DL (ref 32.2–35.5)
MCV RBC AUTO: 91.6 FL (ref 81.4–97.8)
MCV RBC AUTO: 92.1 FL (ref 81.4–97.8)
MONOCYTES # BLD AUTO: 1.06 K/UL (ref 0–0.85)
MONOCYTES # BLD AUTO: 1.76 K/UL (ref 0–0.85)
MONOCYTES NFR BLD AUTO: 5.2 % (ref 0–13.4)
MONOCYTES NFR BLD AUTO: 7.4 % (ref 0–13.4)
MORPHOLOGY BLD-IMP: NORMAL
MYELOCYTES NFR BLD MANUAL: 0.9 %
NEUTROPHILS # BLD AUTO: 18.44 K/UL (ref 1.82–7.42)
NEUTROPHILS # BLD AUTO: 20.82 K/UL (ref 1.82–7.42)
NEUTROPHILS NFR BLD: 87.6 % (ref 44–72)
NEUTROPHILS NFR BLD: 90.4 % (ref 44–72)
NRBC # BLD AUTO: 0 K/UL
NRBC # BLD AUTO: 0 K/UL
NRBC BLD-RTO: 0 /100 WBC (ref 0–0.2)
NRBC BLD-RTO: 0 /100 WBC (ref 0–0.2)
PLATELET # BLD AUTO: 574 K/UL (ref 164–446)
PLATELET # BLD AUTO: 668 K/UL (ref 164–446)
PLATELET BLD QL SMEAR: NORMAL
PMV BLD AUTO: 9.2 FL (ref 9–12.9)
PMV BLD AUTO: 9.7 FL (ref 9–12.9)
POIKILOCYTOSIS BLD QL SMEAR: NORMAL
POTASSIUM SERPL-SCNC: 3.5 MMOL/L (ref 3.6–5.5)
PROT SERPL-MCNC: 6.8 G/DL (ref 6–8.2)
RBC # BLD AUTO: 4.55 M/UL (ref 4.2–5.4)
RBC # BLD AUTO: 4.92 M/UL (ref 4.2–5.4)
RBC BLD AUTO: PRESENT
SODIUM SERPL-SCNC: 136 MMOL/L (ref 135–145)
WBC # BLD AUTO: 20.4 K/UL (ref 4.8–10.8)
WBC # BLD AUTO: 23.8 K/UL (ref 4.8–10.8)

## 2024-09-17 PROCEDURE — 80053 COMPREHEN METABOLIC PANEL: CPT

## 2024-09-17 PROCEDURE — 3078F DIAST BP <80 MM HG: CPT | Performed by: SURGERY

## 2024-09-17 PROCEDURE — 83880 ASSAY OF NATRIURETIC PEPTIDE: CPT

## 2024-09-17 PROCEDURE — A9270 NON-COVERED ITEM OR SERVICE: HCPCS | Performed by: EMERGENCY MEDICINE

## 2024-09-17 PROCEDURE — 96365 THER/PROPH/DIAG IV INF INIT: CPT

## 2024-09-17 PROCEDURE — 85025 COMPLETE CBC W/AUTO DIFF WBC: CPT

## 2024-09-17 PROCEDURE — 99285 EMERGENCY DEPT VISIT HI MDM: CPT

## 2024-09-17 PROCEDURE — 770020 HCHG ROOM/CARE - TELE (206)

## 2024-09-17 PROCEDURE — 700111 HCHG RX REV CODE 636 W/ 250 OVERRIDE (IP): Mod: JZ | Performed by: EMERGENCY MEDICINE

## 2024-09-17 PROCEDURE — 83605 ASSAY OF LACTIC ACID: CPT

## 2024-09-17 PROCEDURE — 700117 HCHG RX CONTRAST REV CODE 255: Performed by: EMERGENCY MEDICINE

## 2024-09-17 PROCEDURE — 83690 ASSAY OF LIPASE: CPT

## 2024-09-17 PROCEDURE — 700105 HCHG RX REV CODE 258: Performed by: EMERGENCY MEDICINE

## 2024-09-17 PROCEDURE — 3074F SYST BP LT 130 MM HG: CPT | Performed by: SURGERY

## 2024-09-17 PROCEDURE — 99024 POSTOP FOLLOW-UP VISIT: CPT | Performed by: SURGERY

## 2024-09-17 PROCEDURE — 99223 1ST HOSP IP/OBS HIGH 75: CPT

## 2024-09-17 PROCEDURE — 36415 COLL VENOUS BLD VENIPUNCTURE: CPT

## 2024-09-17 PROCEDURE — 85007 BL SMEAR W/DIFF WBC COUNT: CPT

## 2024-09-17 PROCEDURE — 74177 CT ABD & PELVIS W/CONTRAST: CPT

## 2024-09-17 PROCEDURE — 700102 HCHG RX REV CODE 250 W/ 637 OVERRIDE(OP): Performed by: EMERGENCY MEDICINE

## 2024-09-17 PROCEDURE — 80053 COMPREHEN METABOLIC PANEL: CPT | Mod: 91

## 2024-09-17 PROCEDURE — 85027 COMPLETE CBC AUTOMATED: CPT

## 2024-09-17 RX ORDER — AMLODIPINE BESYLATE 10 MG/1
5 TABLET ORAL DAILY
Status: DISCONTINUED | OUTPATIENT
Start: 2024-09-18 | End: 2024-10-02 | Stop reason: HOSPADM

## 2024-09-17 RX ORDER — LEVOTHYROXINE SODIUM 50 UG/1
100 TABLET ORAL
Status: DISCONTINUED | OUTPATIENT
Start: 2024-09-18 | End: 2024-10-02 | Stop reason: HOSPADM

## 2024-09-17 RX ORDER — AMOXICILLIN 250 MG
2 CAPSULE ORAL EVERY EVENING
Status: DISCONTINUED | OUTPATIENT
Start: 2024-09-17 | End: 2024-10-02 | Stop reason: HOSPADM

## 2024-09-17 RX ORDER — ANASTROZOLE 1 MG/1
1 TABLET ORAL
Status: DISCONTINUED | OUTPATIENT
Start: 2024-09-17 | End: 2024-09-18

## 2024-09-17 RX ORDER — POLYETHYLENE GLYCOL 3350 17 G/17G
1 POWDER, FOR SOLUTION ORAL
Status: DISCONTINUED | OUTPATIENT
Start: 2024-09-17 | End: 2024-10-02 | Stop reason: HOSPADM

## 2024-09-17 RX ORDER — LIOTHYRONINE SODIUM 5 UG/1
2.5-5 TABLET ORAL DAILY
Status: DISCONTINUED | OUTPATIENT
Start: 2024-09-18 | End: 2024-09-18

## 2024-09-17 RX ORDER — ONDANSETRON 4 MG/1
4 TABLET, ORALLY DISINTEGRATING ORAL ONCE
Status: COMPLETED | OUTPATIENT
Start: 2024-09-17 | End: 2024-09-17

## 2024-09-17 RX ORDER — ACETAMINOPHEN 325 MG/1
650 TABLET ORAL EVERY 6 HOURS PRN
Status: DISCONTINUED | OUTPATIENT
Start: 2024-09-17 | End: 2024-10-02 | Stop reason: HOSPADM

## 2024-09-17 RX ORDER — LABETALOL HYDROCHLORIDE 5 MG/ML
10 INJECTION, SOLUTION INTRAVENOUS EVERY 4 HOURS PRN
Status: DISCONTINUED | OUTPATIENT
Start: 2024-09-17 | End: 2024-09-19

## 2024-09-17 RX ORDER — SODIUM CHLORIDE 9 MG/ML
1000 INJECTION, SOLUTION INTRAVENOUS ONCE
Status: COMPLETED | OUTPATIENT
Start: 2024-09-17 | End: 2024-09-18

## 2024-09-17 RX ORDER — POTASSIUM CHLORIDE 1500 MG/1
40 TABLET, EXTENDED RELEASE ORAL ONCE
Status: COMPLETED | OUTPATIENT
Start: 2024-09-17 | End: 2024-09-18

## 2024-09-17 RX ADMIN — ONDANSETRON 4 MG: 4 TABLET, ORALLY DISINTEGRATING ORAL at 20:27

## 2024-09-17 RX ADMIN — PIPERACILLIN AND TAZOBACTAM 4.5 G: 4; .5 INJECTION, POWDER, FOR SOLUTION INTRAVENOUS at 22:41

## 2024-09-17 RX ADMIN — IOHEXOL 100 ML: 350 INJECTION, SOLUTION INTRAVENOUS at 21:47

## 2024-09-17 RX ADMIN — SODIUM CHLORIDE 1000 ML: 9 INJECTION, SOLUTION INTRAVENOUS at 20:49

## 2024-09-17 RX ADMIN — APIXABAN 5 MG: 5 TABLET, FILM COATED ORAL at 21:32

## 2024-09-17 ASSESSMENT — ENCOUNTER SYMPTOMS
MYALGIAS: 0
CONSTIPATION: 0
CHILLS: 0
WHEEZING: 0
PALPITATIONS: 0
FEVER: 1
HEADACHES: 0
NECK PAIN: 0
VOMITING: 0
SHORTNESS OF BREATH: 0
COUGH: 0
NAUSEA: 0
DIARRHEA: 0
DIZZINESS: 0
ABDOMINAL PAIN: 1
HEMOPTYSIS: 0
HEARTBURN: 0

## 2024-09-17 ASSESSMENT — FIBROSIS 4 INDEX
FIB4 SCORE: 0.47
FIB4 SCORE: 0.48

## 2024-09-18 PROBLEM — A41.9 SEPSIS (HCC): Status: RESOLVED | Noted: 2024-08-14 | Resolved: 2024-09-18

## 2024-09-18 LAB
ALBUMIN SERPL BCP-MCNC: 3 G/DL (ref 3.2–4.9)
ALBUMIN SERPL BCP-MCNC: 3.8 G/DL (ref 3.2–4.9)
ALBUMIN/GLOB SERPL: 1 G/DL
ALBUMIN/GLOB SERPL: 1.1 G/DL
ALP SERPL-CCNC: 105 U/L (ref 30–99)
ALP SERPL-CCNC: 81 U/L (ref 30–99)
ALT SERPL-CCNC: 28 U/L (ref 2–50)
ALT SERPL-CCNC: 30 U/L (ref 2–50)
ANION GAP SERPL CALC-SCNC: 11 MMOL/L (ref 7–16)
ANION GAP SERPL CALC-SCNC: 15 MMOL/L (ref 7–16)
APPEARANCE UR: CLEAR
AST SERPL-CCNC: 32 U/L (ref 12–45)
AST SERPL-CCNC: 39 U/L (ref 12–45)
BACTERIA #/AREA URNS HPF: NEGATIVE /HPF
BILIRUB SERPL-MCNC: 1.7 MG/DL (ref 0.1–1.5)
BILIRUB SERPL-MCNC: 1.7 MG/DL (ref 0.1–1.5)
BILIRUB UR QL STRIP.AUTO: NEGATIVE
BUN SERPL-MCNC: 11 MG/DL (ref 8–22)
BUN SERPL-MCNC: 13 MG/DL (ref 8–22)
CALCIUM ALBUM COR SERPL-MCNC: 8.7 MG/DL (ref 8.5–10.5)
CALCIUM ALBUM COR SERPL-MCNC: 9.3 MG/DL (ref 8.5–10.5)
CALCIUM SERPL-MCNC: 7.9 MG/DL (ref 8.5–10.5)
CALCIUM SERPL-MCNC: 9.1 MG/DL (ref 8.5–10.5)
CHLORIDE SERPL-SCNC: 102 MMOL/L (ref 96–112)
CHLORIDE SERPL-SCNC: 98 MMOL/L (ref 96–112)
CO2 SERPL-SCNC: 21 MMOL/L (ref 20–33)
CO2 SERPL-SCNC: 21 MMOL/L (ref 20–33)
COLOR UR: ABNORMAL
CREAT SERPL-MCNC: 0.72 MG/DL (ref 0.5–1.4)
CREAT SERPL-MCNC: 0.83 MG/DL (ref 0.5–1.4)
EKG IMPRESSION: NORMAL
EPI CELLS #/AREA URNS HPF: ABNORMAL /HPF
ERYTHROCYTE [DISTWIDTH] IN BLOOD BY AUTOMATED COUNT: 47.5 FL (ref 35.9–50)
GFR SERPLBLD CREATININE-BSD FMLA CKD-EPI: 72 ML/MIN/1.73 M 2
GFR SERPLBLD CREATININE-BSD FMLA CKD-EPI: 86 ML/MIN/1.73 M 2
GLOBULIN SER CALC-MCNC: 3 G/DL (ref 1.9–3.5)
GLOBULIN SER CALC-MCNC: 3.5 G/DL (ref 1.9–3.5)
GLUCOSE SERPL-MCNC: 122 MG/DL (ref 65–99)
GLUCOSE SERPL-MCNC: 156 MG/DL (ref 65–99)
GLUCOSE UR STRIP.AUTO-MCNC: NEGATIVE MG/DL
HCT VFR BLD AUTO: 39 % (ref 37–47)
HGB BLD-MCNC: 12.6 G/DL (ref 12–16)
HYALINE CASTS #/AREA URNS LPF: ABNORMAL /LPF
KETONES UR STRIP.AUTO-MCNC: ABNORMAL MG/DL
LEUKOCYTE ESTERASE UR QL STRIP.AUTO: ABNORMAL
MCH RBC QN AUTO: 29.6 PG (ref 27–33)
MCHC RBC AUTO-ENTMCNC: 32.3 G/DL (ref 32.2–35.5)
MCV RBC AUTO: 91.8 FL (ref 81.4–97.8)
MICRO URNS: ABNORMAL
NITRITE UR QL STRIP.AUTO: NEGATIVE
NT-PROBNP SERPL IA-MCNC: 324 PG/ML (ref 0–125)
PH UR STRIP.AUTO: 5 [PH] (ref 5–8)
PLATELET # BLD AUTO: 560 K/UL (ref 164–446)
PMV BLD AUTO: 9.2 FL (ref 9–12.9)
POTASSIUM SERPL-SCNC: 3.9 MMOL/L (ref 3.6–5.5)
POTASSIUM SERPL-SCNC: 4.2 MMOL/L (ref 3.6–5.5)
PROT SERPL-MCNC: 6 G/DL (ref 6–8.2)
PROT SERPL-MCNC: 7.3 G/DL (ref 6–8.2)
PROT UR QL STRIP: NEGATIVE MG/DL
RBC # BLD AUTO: 4.25 M/UL (ref 4.2–5.4)
RBC # URNS HPF: ABNORMAL /HPF
RBC UR QL AUTO: NEGATIVE
SODIUM SERPL-SCNC: 134 MMOL/L (ref 135–145)
SODIUM SERPL-SCNC: 134 MMOL/L (ref 135–145)
SP GR UR STRIP.AUTO: 1.03
UROBILINOGEN UR STRIP.AUTO-MCNC: 1 MG/DL
WBC # BLD AUTO: 19.4 K/UL (ref 4.8–10.8)
WBC #/AREA URNS HPF: ABNORMAL /HPF
YEAST BUDDING URNS QL: PRESENT /HPF

## 2024-09-18 PROCEDURE — 99233 SBSQ HOSP IP/OBS HIGH 50: CPT | Mod: GC | Performed by: INTERNAL MEDICINE

## 2024-09-18 PROCEDURE — 700111 HCHG RX REV CODE 636 W/ 250 OVERRIDE (IP): Mod: JZ

## 2024-09-18 PROCEDURE — 80053 COMPREHEN METABOLIC PANEL: CPT

## 2024-09-18 PROCEDURE — A9270 NON-COVERED ITEM OR SERVICE: HCPCS

## 2024-09-18 PROCEDURE — 700102 HCHG RX REV CODE 250 W/ 637 OVERRIDE(OP): Mod: JZ

## 2024-09-18 PROCEDURE — 700102 HCHG RX REV CODE 250 W/ 637 OVERRIDE(OP)

## 2024-09-18 PROCEDURE — 700105 HCHG RX REV CODE 258

## 2024-09-18 PROCEDURE — 770020 HCHG ROOM/CARE - TELE (206)

## 2024-09-18 PROCEDURE — A9270 NON-COVERED ITEM OR SERVICE: HCPCS | Mod: JZ

## 2024-09-18 PROCEDURE — 93010 ELECTROCARDIOGRAM REPORT: CPT | Performed by: INTERNAL MEDICINE

## 2024-09-18 PROCEDURE — 87106 FUNGI IDENTIFICATION YEAST: CPT

## 2024-09-18 PROCEDURE — 81001 URINALYSIS AUTO W/SCOPE: CPT

## 2024-09-18 PROCEDURE — 93005 ELECTROCARDIOGRAM TRACING: CPT

## 2024-09-18 PROCEDURE — 87086 URINE CULTURE/COLONY COUNT: CPT

## 2024-09-18 PROCEDURE — 85027 COMPLETE CBC AUTOMATED: CPT

## 2024-09-18 PROCEDURE — 99024 POSTOP FOLLOW-UP VISIT: CPT | Performed by: NURSE PRACTITIONER

## 2024-09-18 RX ORDER — POTASSIUM CHLORIDE 1500 MG/1
20 TABLET, EXTENDED RELEASE ORAL DAILY
Status: DISCONTINUED | OUTPATIENT
Start: 2024-09-18 | End: 2024-09-29

## 2024-09-18 RX ORDER — LIOTHYRONINE SODIUM 5 UG/1
5 TABLET ORAL
Status: DISCONTINUED | OUTPATIENT
Start: 2024-09-18 | End: 2024-10-02 | Stop reason: HOSPADM

## 2024-09-18 RX ORDER — LIOTHYRONINE SODIUM 5 UG/1
2.5 TABLET ORAL
Status: DISCONTINUED | OUTPATIENT
Start: 2024-09-19 | End: 2024-10-02 | Stop reason: HOSPADM

## 2024-09-18 RX ADMIN — LEVOTHYROXINE SODIUM 100 MCG: 0.1 TABLET ORAL at 05:07

## 2024-09-18 RX ADMIN — LIOTHYRONINE SODIUM 5 MCG: 5 TABLET ORAL at 06:11

## 2024-09-18 RX ADMIN — POTASSIUM CHLORIDE 40 MEQ: 1500 TABLET, EXTENDED RELEASE ORAL at 00:01

## 2024-09-18 RX ADMIN — PIPERACILLIN AND TAZOBACTAM 4.5 G: 4; .5 INJECTION, POWDER, FOR SOLUTION INTRAVENOUS at 01:34

## 2024-09-18 RX ADMIN — PIPERACILLIN AND TAZOBACTAM 3.38 G: 3; .375 INJECTION, POWDER, FOR SOLUTION INTRAVENOUS at 16:08

## 2024-09-18 RX ADMIN — POTASSIUM CHLORIDE 20 MEQ: 1500 TABLET, EXTENDED RELEASE ORAL at 09:07

## 2024-09-18 RX ADMIN — PIPERACILLIN AND TAZOBACTAM 3.38 G: 3; .375 INJECTION, POWDER, FOR SOLUTION INTRAVENOUS at 09:09

## 2024-09-18 RX ADMIN — AMLODIPINE BESYLATE 5 MG: 10 TABLET ORAL at 05:07

## 2024-09-18 ASSESSMENT — ENCOUNTER SYMPTOMS
ABDOMINAL PAIN: 1
PALPITATIONS: 0
WEIGHT LOSS: 0
FLANK PAIN: 0
FEVER: 0
SHORTNESS OF BREATH: 0
ORTHOPNEA: 0
EYES NEGATIVE: 1
FEVER: 1
BLOOD IN STOOL: 0
NAUSEA: 0
HEMOPTYSIS: 0
DIARRHEA: 0
CONSTIPATION: 0
VOMITING: 0
NERVOUS/ANXIOUS: 1
CONSTITUTIONAL NEGATIVE: 1
RESPIRATORY NEGATIVE: 1
MUSCULOSKELETAL NEGATIVE: 1
GASTROINTESTINAL NEGATIVE: 1
CHILLS: 0
CARDIOVASCULAR NEGATIVE: 1
COUGH: 0
DIAPHORESIS: 0
NEUROLOGICAL NEGATIVE: 1

## 2024-09-18 ASSESSMENT — PATIENT HEALTH QUESTIONNAIRE - PHQ9
1. LITTLE INTEREST OR PLEASURE IN DOING THINGS: NOT AT ALL
SUM OF ALL RESPONSES TO PHQ9 QUESTIONS 1 AND 2: 0
1. LITTLE INTEREST OR PLEASURE IN DOING THINGS: NOT AT ALL
2. FEELING DOWN, DEPRESSED, IRRITABLE, OR HOPELESS: NOT AT ALL
2. FEELING DOWN, DEPRESSED, IRRITABLE, OR HOPELESS: NOT AT ALL
SUM OF ALL RESPONSES TO PHQ9 QUESTIONS 1 AND 2: 0

## 2024-09-18 ASSESSMENT — LIFESTYLE VARIABLES
AVERAGE NUMBER OF DAYS PER WEEK YOU HAVE A DRINK CONTAINING ALCOHOL: 0
EVER FELT BAD OR GUILTY ABOUT YOUR DRINKING: NO
CONSUMPTION TOTAL: NEGATIVE
TOTAL SCORE: 0
EVER HAD A DRINK FIRST THING IN THE MORNING TO STEADY YOUR NERVES TO GET RID OF A HANGOVER: NO
ON A TYPICAL DAY WHEN YOU DRINK ALCOHOL HOW MANY DRINKS DO YOU HAVE: 1
ALCOHOL_USE: YES
TOTAL SCORE: 0
DOES PATIENT WANT TO STOP DRINKING: NO
HAVE PEOPLE ANNOYED YOU BY CRITICIZING YOUR DRINKING: NO
HAVE YOU EVER FELT YOU SHOULD CUT DOWN ON YOUR DRINKING: NO
TOTAL SCORE: 0
HOW MANY TIMES IN THE PAST YEAR HAVE YOU HAD 5 OR MORE DRINKS IN A DAY: 0

## 2024-09-18 ASSESSMENT — COGNITIVE AND FUNCTIONAL STATUS - GENERAL
SUGGESTED CMS G CODE MODIFIER MOBILITY: CH
DAILY ACTIVITIY SCORE: 24
MOBILITY SCORE: 24
SUGGESTED CMS G CODE MODIFIER DAILY ACTIVITY: CH

## 2024-09-18 ASSESSMENT — SOCIAL DETERMINANTS OF HEALTH (SDOH)

## 2024-09-18 ASSESSMENT — PAIN DESCRIPTION - PAIN TYPE
TYPE: ACUTE PAIN
TYPE: ACUTE PAIN

## 2024-09-18 ASSESSMENT — FIBROSIS 4 INDEX
FIB4 SCORE: 0.89
FIB4 SCORE: 0.89

## 2024-09-18 NOTE — CARE PLAN
The patient is Stable - Low risk of patient condition declining or worsening    Shift Goals  Clinical Goals: vitals, surgery  Patient Goals: go home  Family Goals: HAYDEE    Progress made toward(s) clinical / shift goals:        Problem: Pain - Standard  Goal: Alleviation of pain or a reduction in pain to the patient’s comfort goal  Outcome: Progressing - does not complain of pain      Problem: Safety  Goal: Will remain free from injury  Outcome: Progressing - calls appropriately for help      Problem: Respiratory:  Goal: Respiratory status will improve  Outcome: Progressing - O2 sats appropriate on 2L NC     Problem: Mobility  Goal: Risk for activity intolerance will decrease  Outcome: Progressing - steady when ambulating        Patient is not progressing towards the following goals:

## 2024-09-18 NOTE — ASSESSMENT & PLAN NOTE
noninvasive carcinoma of L breast 34 year ago (s/p total mastectomy) and invasive grade 2 mammary carcinoma 2022 also left breast.   -outpt f/u

## 2024-09-18 NOTE — ED PROVIDER NOTES
ED Provider Note    Scribed for Sanjay Wilkerson by Lily Escudero. 9/17/2024  7:53 PM    Primary care provider: Roula Ga P.A.-C.  Means of arrival: Walk-In  History obtained from: Patient  History limited by: None    CHIEF COMPLAINT  Chief Complaint   Patient presents with    Sent by MD     Sent by surgeon for elevated WBC. Pt reports abdominal drain removed last week and is concerned for infection at the site. Denies pain.      EXTERNAL RECORDS REVIEWED  Outpatient Notes Patient was seen at surgical oncology today for follow up of pancreatic mass and exploratory laparotomy procedure. Labs ordered at this time.     HPI/ROS  LIMITATION TO HISTORY   Select: : None  OUTSIDE HISTORIAN(S):  Family present at bedside.    HPI  Paige Gudino is a 78 y.o. female who presents to the Emergency Department for evaluation of elevated WBC onset today. Patient reports that she has recently had a surgery of a exploratory laparotomy. She had follow up labs done and her surgeon advised her to come in after reviewing the results with an elevated WBC. She reports that the drain was removed last week and there is concern for possible infection. She notes some nausea, she denies any pain. She notes having diarrhea today. Family notes she has been eating less for the past few days. She denies cough, congestion, or other flu symptoms. She denies redness or drainage at the site. She confirms the result was 23.8 WBC. Dr. Baez, her surgical oncologist, also noted wanting a CT-Abdomen done to evaluate for fluid collection. There are no known alleviating or exacerbating factors.      REVIEW OF SYSTEMS  As above, all other systems reviewed and are negative.   See HPI for further details.     PAST MEDICAL HISTORY   has a past medical history of Anesthesia, Cancer (HCC), Cancer of overlapping sites of left female breast (HCC), Cataract (2024), High cholesterol, Hypothyroidism, PONV (postoperative nausea and vomiting) (1987),  and Thyroid nodule.  SURGICAL HISTORY   has a past surgical history that includes other orthopedic surgery (2019); other (1987); other (1988); other (2001); mastectomy, partial (Left, 08/01/2022); primary c section; lumpectomy; laminotomy; ultrasonic guidance, intraoperative (8/5/2024); pancreatectomy (N/A, 8/5/2024); splenectomy (N/A, 8/5/2024); and creation, flap, omentum (N/A, 8/5/2024).  SOCIAL HISTORY  Social History     Tobacco Use    Smoking status: Never     Passive exposure: Past    Smokeless tobacco: Never   Vaping Use    Vaping status: Never Used   Substance Use Topics    Alcohol use: Never    Drug use: Never      Social History     Substance and Sexual Activity   Drug Use Never     FAMILY HISTORY  Family History   Problem Relation Age of Onset    Cancer Mother         Ovarian    Ovarian Cancer Mother     Dementia Father     Heart Disease Father     Hyperlipidemia Neg Hx      CURRENT MEDICATIONS  Home Medications       Reviewed by Vonnie Holt R.N. (Registered Nurse) on 09/17/24 at 1859  Med List Status: Partial     Medication Last Dose Status   acetaminophen (TYLENOL) 500 MG Tab  Active   amLODIPine (NORVASC) 5 MG Tab  Active   anastrozole (ARIMIDEX) 1 MG Tab  Active   apixaban (ELIQUIS) 5mg Tab  Active   Cholecalciferol 1000 UNIT Cap  Active   ibandronate (BONIVA) 150 MG tablet  Active   levothyroxine (SYNTHROID) 100 MCG Tab  Active   liothyronine (CYTOMEL) 5 MCG Tab  Active   normal saline flush 0.9 % Solution  Active   thyroid (ARMOUR THYROID) 60 MG Tab  Active                  Audit from Redirected Encounters    **Home medications have not yet been reviewed for this encounter**       ALLERGIES  Allergies   Allergen Reactions    Synthroid [Fd&C Red #40 Al Paul-Levothyroxine] Hives and Unspecified     Hives, Brand specific. Some brands are ok and some are not    [place] Thyroid [Thyroid] Diarrhea     diarrhea       PHYSICAL EXAM    VITAL SIGNS:   Vitals:    09/17/24 2052 09/17/24 2107  09/17/24 2137 09/17/24 2152   BP: 128/72 129/63 117/61    Pulse: 87 85 91 84   Resp: (!) 22 (!) 21     Temp:       TempSrc:       SpO2: 90% 89% (!) 87% 91%   Weight:       Height:           Vitals: My interpretation: hypertensive, tachycardic, afebrile, not hypoxic    Reinterpretation of vitals: Improving.    PE:   Gen: sitting comfortably, speaking clearly, appears in no acute distress   ENT: Mucous membranes moist, posterior pharynx clear, uvula midline, nares patent bilaterally   Neck: Supple, FROM  Pulmonary: Lungs are clear to auscultation bilaterally. No tachypnea  CV:  RRR, no murmur appreciated, pulses 2+ in both upper and lower extremities  Abdomen: soft, NT/ND; no rebound/guarding, Small, healed, clean, dry, and intact incision from the drainage site in the left upper abdomen. Midline surgical incision is also clean, dry, and intact.   : no CVA or suprapubic tenderness   Neuro: A&Ox4 (person, place, time, situation), speech fluent, gait steady, no focal deficits appreciated  Skin: No rash or lesions.  No pallor or jaundice.  No cyanosis.  Warm and dry.     DIAGNOSTIC STUDIES / PROCEDURES    LABS  Results for orders placed or performed during the hospital encounter of 09/17/24   Lactic Acid   Result Value Ref Range    Lactic Acid 1.7 0.5 - 2.0 mmol/L   CBC with Differential   Result Value Ref Range    WBC 20.4 (H) 4.8 - 10.8 K/uL    RBC 4.55 4.20 - 5.40 M/uL    Hemoglobin 13.6 12.0 - 16.0 g/dL    Hematocrit 41.7 37.0 - 47.0 %    MCV 91.6 81.4 - 97.8 fL    MCH 29.9 27.0 - 33.0 pg    MCHC 32.6 32.2 - 35.5 g/dL    RDW 47.4 35.9 - 50.0 fL    Platelet Count 574 (H) 164 - 446 K/uL    MPV 9.2 9.0 - 12.9 fL    Neutrophils-Polys 90.40 (H) 44.00 - 72.00 %    Lymphocytes 3.50 (L) 22.00 - 41.00 %    Monocytes 5.20 0.00 - 13.40 %    Eosinophils 0.00 0.00 - 6.90 %    Basophils 0.00 0.00 - 1.80 %    Nucleated RBC 0.00 0.00 - 0.20 /100 WBC    Neutrophils (Absolute) 18.44 (H) 1.82 - 7.42 K/uL    Lymphs (Absolute) 0.71 (L)  1.00 - 4.80 K/uL    Monos (Absolute) 1.06 (H) 0.00 - 0.85 K/uL    Eos (Absolute) 0.00 0.00 - 0.51 K/uL    Baso (Absolute) 0.00 0.00 - 0.12 K/uL    NRBC (Absolute) 0.00 K/uL   Complete Metabolic Panel   Result Value Ref Range    Sodium 136 135 - 145 mmol/L    Potassium 3.5 (L) 3.6 - 5.5 mmol/L    Chloride 101 96 - 112 mmol/L    Co2 21 20 - 33 mmol/L    Anion Gap 14.0 7.0 - 16.0    Glucose 143 (H) 65 - 99 mg/dL    Bun 13 8 - 22 mg/dL    Creatinine 0.69 0.50 - 1.40 mg/dL    Calcium 8.3 (L) 8.5 - 10.5 mg/dL    Correct Calcium 8.8 8.5 - 10.5 mg/dL    AST(SGOT) 35 12 - 45 U/L    ALT(SGPT) 28 2 - 50 U/L    Alkaline Phosphatase 92 30 - 99 U/L    Total Bilirubin 1.4 0.1 - 1.5 mg/dL    Albumin 3.4 3.2 - 4.9 g/dL    Total Protein 6.8 6.0 - 8.2 g/dL    Globulin 3.4 1.9 - 3.5 g/dL    A-G Ratio 1.0 g/dL   LIPASE   Result Value Ref Range    Lipase 22 11 - 82 U/L   DIFFERENTIAL MANUAL   Result Value Ref Range    Myelocytes 0.90 %    Manual Diff Status PERFORMED    PERIPHERAL SMEAR REVIEW   Result Value Ref Range    Peripheral Smear Review see below    PLATELET ESTIMATE   Result Value Ref Range    Plt Estimation Increased    MORPHOLOGY   Result Value Ref Range    RBC Morphology Present     Poikilocytosis 2+     Echinocytes 2+    ESTIMATED GFR   Result Value Ref Range    GFR (CKD-EPI) 89 >60 mL/min/1.73 m 2      All labs reviewed by me. Labs were compared to prior labs if they were available. Significant for leukocytosis of 28, no anemia, normal lactic acid, normal electrolytes, normal glucose, normal renal function, normal liver enzymes, normal bilirubin, lipase normal.    RADIOLOGY  I have independently interpreted the diagnostic imaging associated with this visit and am waiting the final reading from the radiologist.   My preliminary interpretation is a follows: No free air, no signs of obstructive process, no kidney stones  Radiologist interpretation is as follows:  CT-ABDOMEN-PELVIS WITH   Final Result      1.  Post distal  pancreatectomy.      2.  Fluid collection with some peripheral enhancement is identified in the distal pancreatic region including the region of previous pancreatectomy. Developing pseudocyst is a possibility. No emphysema or hemorrhage is appreciated. Remaining pancreas    enhances normally.      3.  There is cholelithiasis.      4.  Left pleural effusion and consolidations in the left lung base.      5.  No change in hiatal hernia.      6.  Prominent endometrial cavity again noted.        COURSE & MEDICAL DECISION MAKING  Nursing notes, VS, PMSFHx, labs, imaging, EKG reviewed in chart.    ED Observation Status? No; Patient does not meet criteria for ED Observation.     Ddx: Postoperative abscess, cellulitis, postoperative complication    MDM: 7:53 PM Paige Gudino is a 78 y.o. female who presented with possible postoperative complication.  Patient has a significant medical history and just recently underwent pancreatic mass investigation with exploratory laparotomy, splenectomy and pancreatectomy for pancreatic mass which eventually histologically was negative for cancer.  She had a drain in place that was continuing to drain and was removed a week ago.  Patient went to see her follow-up appointment today and physician was concerned as she had decreased appetite, nausea and fatigue and he did a white blood cell count which was elevated 23,000 and he said the patient for evaluation with CT imaging and further laboratory investigation.  Patient arrives here with her  at bedside.  She feels nauseous but has no pain currently.  Treated with Zofran and IV fluids as she is slightly tachycardic and there is concerns of possible dehydration.  Her physical exam is fairly benign, her surgical laparotomy scar is clean dry and intact and healed, surgical incision site for the recent drain is healing and no surrounding fluctuance, induration or cellulitic changes.  Labs ordered, CT imaging ordered per  outpatient provider recommendations. All labs reviewed by me. Labs were compared to prior labs if they were available. Significant for leukocytosis of 28, no anemia, normal lactic acid, normal electrolytes, normal glucose, normal renal function, normal liver enzymes, normal bilirubin, lipase normal.  CT scan does show a fluid collection with some peripheral enhancement concerning for possible pseudocyst versus abscess.  As the patient is a leukocytosis of 20,000 and is symptomatic we will start her empirically on Zosyn.  Unfortunately surgical oncology is no longer on-call and will have the patient admitted to the hospitalist and they can consult not emergently in the morning.  Patient updated and is resting comfortably and verbalized understanding and is amenable.    Patient has had high blood pressure while in the emergency department, felt likely secondary to medical condition. Counseled patient to monitor blood pressure at home and follow up with primary care physician.    10:29 PM I discussed the patient's case and the above findings with Dr. Hernandez  (Internal Medicine) who agrees to admit the patient for further care and evaluation.     ADDITIONAL PROBLEM LIST AND DISPOSITION    I have discussed management of the patient with the following physicians and CRISSY's:  Dr. Velasco Surgical Oncology. Dr. Hernandez  (Internal Medicine).    Discussion of management with other QHP or appropriate source(s): None     Barriers to care at this time, including but not limited to:  None .     Decision tools and prescription drugs considered including, but not limited to: Antibiotics   .    FINAL IMPRESSION  1. Leukocytosis, unspecified type Acute   2. Sinus tachycardia Acute   3. Dehydration Acute      Lily FOREMAN (Monica), am scribing for, and in the presence of, Sanjay Wilkerson.    Electronically signed by: Lily Escudero (Monica), 9/17/2024    ISanjay personally performed the services described in this  documentation, as scribed by Lily Escudero in my presence, and it is both accurate and complete.    The note accurately reflects work and decisions made by me.  Sanjay Wilkerson  9/17/2024  8:13 PM

## 2024-09-18 NOTE — ASSESSMENT & PLAN NOTE
8/22/24 she underwent CT guided left upper quadrant fluid collection catheter drainage. Drain in placed from 8/22-9/11. At time of removal was draining only 1-2 cc/day.   Prior cx 8/23/24 positive for staph epi.   -Zosyn   -Holding home Apixaban pending possible procedure   -NPO midnight   -Consult patient's surgical oncologist (Dr. Velasco) in AM  -Consult IR in AM

## 2024-09-18 NOTE — ASSESSMENT & PLAN NOTE
CT 8/14/24 with bilateral segmental and subsegmental pulmonary emboli with findings compatible with significant R heart strain.   S/p R atrial thrombectomy by Dr. Bowen   -Holding home Apixaban pending possible procedure

## 2024-09-18 NOTE — PROGRESS NOTES
Telemetry Monitoring     Ectopy:Rare PVC, Frequent SHANICE  Rate: 62  Rhythm: 2nd degree type 1    -/0.10/.41

## 2024-09-18 NOTE — ASSESSMENT & PLAN NOTE
This is Sepsis Present on admission  SIRS criteria identified on my evaluation include: Tachycardia, with heart rate greater than 90 BPM, Tachypnea, with respirations greater than 20 per minute, and Leukocytosis, with WBC greater than 12,000  Clinical indicators of end organ dysfunction include Hypoxia   Source is Abdominal   Sepsis protocol initiated  Crystalloid Fluid Administration: Fluid resuscitation ordered per standard protocol - 30 mL/kg per current or ideal body weight  IV antibiotics as appropriate for source of sepsis  Reassessment: I have reassessed the patient's hemodynamic status  -See plan for pancreatic abscess

## 2024-09-18 NOTE — ASSESSMENT & PLAN NOTE
-Discussed code status with patient and her  at bedside. She requested to be full code at this time.

## 2024-09-18 NOTE — CARE PLAN
The patient is Stable - Low risk of patient condition declining or worsening    Shift Goals  Clinical Goals: to go to IR tomorrow to have procedure, IV abx  Patient Goals: To go down to IR tomorrow for procedure and then to go home  Family Goals: HAYDEE    Progress made toward(s) clinical / shift goals:        Problem: Respiratory:  Goal: Respiratory status will improve  Outcome: Progressing     Problem: Skin Integrity  Goal: Risk for impaired skin integrity will decrease  Outcome: Progressing

## 2024-09-18 NOTE — ASSESSMENT & PLAN NOTE
Is on Boniva outpt- not due for 7 more days.   -If patient still admitted 9/23 will need Boniva ordered.

## 2024-09-18 NOTE — DISCHARGE PLANNING
Care Transition Team Assessment    LSW met with pt at bedside to complete assessment. Pt A&Ox4 and able to verify the information on the face sheet. Pt lives with her spouse and daughter in a single story house with no steps to enter. Pt is independent with all ADLs/IADLs, drives independently, and does not use any DME at baseline. Pt expressed frustration from previous discharge on 8/9 home with O2 and reported she will not DC home with O2 or any other DME again. Pt confirmed she was previously able to wean off of O2 and has not been needing it at home prior to this hospitalization. Pt has good family support and confirmed her , at bedside, would be able to provide transportation home upon DC. No financial, SA, or MH concerns at this time. Pt reports already having advance directive documents. No ACP documents on file.    Information Source  Orientation Level: Oriented X4  Information Given By: Patient  Informant's Name: Rosangela Gudino  Who is responsible for making decisions for patient? : Patient    Readmission Evaluation  Is this a readmission?: Yes - unplanned readmission    Elopement Risk  Legal Hold: No  Ambulatory or Self Mobile in Wheelchair: No-Not an Elopement Risk  Disoriented: No  Psychiatric Symptoms: None  History of Wandering: No  Elopement this Admit: No  Vocalizing Wanting to Leave: No  Displays Behaviors, Body Language Wanting to Leave: No-Not at Risk for Elopement    Interdisciplinary Discharge Planning  Lives with - Patient's Self Care Capacity: Spouse  Patient or legal guardian wants to designate a caregiver: No  Support Systems: Spouse / Significant Other  Housing / Facility: 1 Story House    Discharge Preparedness  What is your plan after discharge?: Home with help  What are your discharge supports?: Child, Spouse  Prior Functional Level: Ambulatory, Drives Self, Independent with Activities of Daily Living, Independent with Medication Management  Difficulity with ADLs:  None  Difficulity with IADLs: None    Functional Assesment  Prior Functional Level: Ambulatory, Drives Self, Independent with Activities of Daily Living, Independent with Medication Management    Finances  Financial Barriers to Discharge: No  Prescription Coverage: Yes    Vision / Hearing Impairment  Vision Impairment : No  Hearing Impairment : No    Advance Directive  Advance Directive?: Living Will    Domestic Abuse  Have you ever been the victim of abuse or violence?: No  Possible Abuse/Neglect Reported to:: Not Applicable    Psychological Assessment  History of Substance Abuse: None  History of Psychiatric Problems: No  Non-compliant with Treatment: No  Newly Diagnosed Illness: No    Discharge Risks or Barriers  Discharge risks or barriers?: No  Patient risk factors: Readmission    Anticipated Discharge Information  Discharge Disposition: Discharged to home/self care (01)  Discharge Address: [address] [place] [address]  [place]OO [id number]

## 2024-09-18 NOTE — DIETARY
"Nutrition Services: Initial Assessment     Day 1 of admit. Paige Gudino is a 78 y.o. female with admitting DX of Pancreatic abscess     Consult received for MST score >/= 2.     Nutrition Assessment:      Height: 162.6 cm (5' 4\")  Weight: 65.6 kg (144 lb 10 oz)  Weight taken via standing scale  Body mass index is 24.82 kg/m². BMI classification: Normal.    Wt Readings from Last 6 Encounters:   09/18/24 65.6 kg (144 lb 10 oz)   09/17/24 64.9 kg (143 lb)   09/11/24 65.3 kg (144 lb)   09/04/24 65.3 kg (144 lb)   08/28/24 68 kg (150 lb)   08/26/24 68 kg (150 lb)      Objective:  Pertinent medical hx: breast cancer, hypothyroidism, benign pancreatic cystadenoma.   Pt was sent by MD yesterday for elevated WBC.  Admitted 8/14 with shortness of breath and weakness.   Ex lap with distal pancreatectomy and splenectomy, stomach and celiac node dissection, omental flap 8/5/24.  Pertinent labs: Sodium 134, Glucose 122, Albumin 3.0  Pertinent meds:   Skin/wounds: no pressure injuries  Food Allergies: none known  No BM per flowsheets     Current diet order:   Regular diet; no PO recorded per flow sheet; per visual observation of tray at bedside, pt consumed 50% of breakfast    Subjective:   Met with pt at bedside. Pt shared that she has a fair to good appetite, she eats small amounts of food throughout the day at home (lunch meat slices, bread with peanut butter and jelly, pepsi and dr araujo, yoplait yogurt). Discussed meal/snack options with pt. She prefers to stick with food vs oral nutrition supplements at this time. RD will adjust daily menu to include vanilla pudding, fermin crackers with peanut butter and cottage cheese and fruit. Pt also likes tuna and egg salad.   Patient reported UBW: 157  Dietary recall/energy intake: 50%. This indicates sufficient energy intake.     Nutrition Focused Physical Exam (NFPE)   Weight loss: 4% in < 1 month is not significant, but noteworthy. RD suspects this change related to " surgery in August.  Muscle mass: Well-nourished  Subcutaneous fat: Well-nourished  Fluid Accumulation: none  Reduced  Strength: N/A in acute care setting.     Nutrition Diagnosis:      Unintended weight loss related to surgery pancreatectomy and splenectomy as evidenced by reduced PO intake.    Based on RD assessment at this time, pt does not meet criteria in congruence with ASPEN/Academy guidelines for malnutrition.     Nutrition Interventions:      Encourage intake of meals.  Add snacks per pt preference.   Patient aware of active plan of care as appropriate.     Nutrition Monitoring and Evaluation:     Monitor nutrition POC  Additional fluids per MD/DO  Monitor vital signs pertinent to nutrition       RD following and will provide updated recommendations as indicated.     Zonia Doll R.D.

## 2024-09-18 NOTE — ED TRIAGE NOTES
Chief Complaint   Patient presents with    Sent by MD     Sent by surgeon for elevated WBC. Pt reports abdominal drain removed last week and is concerned for infection at the site. Denies pain.       No drainage or redness at previous drain site. WBC 23.8    Patient ambulatory to triage for above complaint. Patient A&Ox4, GCS 15, patient speaking in full sentences. Equal and unlabored respirations. Patient educated on triage process and encouraged to notify staff if condition worsens.  Patient returned to the lobby in stable condition.

## 2024-09-18 NOTE — H&P
"[facility] Internal Medicine History & Physical Note    Date of Service  9/18/2024    [facility] Team: REY   Attending: Doe Hernandez D.o.  Senior Resident: Dr. Josr Tiwari   Contact Number: [phone]    Primary Care Physician  Roula Ga P.A.-C.    Consultants  None     Code Status  Full Code    Chief Complaint  Chief Complaint   Patient presents with    Sent by MD     Sent by surgeon for elevated WBC. Pt reports abdominal drain removed last week and is concerned for infection at the site. Denies pain.        History of Presenting Illness (HPI):   Paige Gudino is a 78 y.o. 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 Per patient, for the past 1-2 days she had a \"striking\" pain in her epigastric area that lasts a few seconds each time and occurs several times per day. She rated the pain as a 4/10 but said she had been through a lot and had high pain tolerance. She also noted intermittent feverish feeling. Denied chills, no chest pain, no shortness of breath etc (see full ROS below).     On presenting to the ED patient afebrile with HR 70s-110s, RR 16-22, BP 110s-140s/50s-70s, and O2 ranging from 87-93 on room air. Patient adamantly declined supplemental oxygen when seen and O2 was able to return to low 90%s with deep breaths. Labs notable for WBCs 20.4, , K of 3.5. CT Abd-Pelvis performed and notable for fluid collection in distal pancreatic region . Given her hx and leukocytosis this is suspected to be a pancreatic abscess. Code status with patient and her  " at bedside and she would like to remain full code at this time. Patient then admitted for close monitoring and possible drain placement tomorrow.     I discussed the plan of care with patient, family, and attending physician  .    Review of Systems  Review of Systems   Constitutional:  Positive for fever. Negative for chills.   Respiratory:  Negative for cough, hemoptysis, shortness of breath and wheezing.    Cardiovascular:  Negative for chest pain, palpitations and leg swelling.   Gastrointestinal:  Positive for abdominal pain. Negative for constipation, diarrhea, heartburn, nausea and vomiting.   Genitourinary:  Negative for dysuria and urgency.   Musculoskeletal:  Negative for myalgias and neck pain.   Neurological:  Negative for dizziness and headaches.       Past Medical History   has a past medical history of Anesthesia, Cancer (HCC), Cancer of overlapping sites of left female breast (HCC), Cataract (2024), High cholesterol, Hypothyroidism, PONV (postoperative nausea and vomiting) (1987), and Thyroid nodule.    Surgical History   has a past surgical history that includes other orthopedic surgery (2019); other (1987); other (1988); other (2001); pr mastectomy, partial (Left, 08/01/2022); primary c section; lumpectomy; laminotomy; pr ultrasonic guidance, intraoperative (8/5/2024); pancreatectomy (N/A, 8/5/2024); splenectomy (N/A, 8/5/2024); and creation, flap, omentum (N/A, 8/5/2024).     Family History  family history includes Cancer in her mother; Dementia in her father; Heart Disease in her father; Ovarian Cancer in her mother.   Family history reviewed with patient.     Social History  Tobacco: none  Alcohol: socially  Recreational drugs (illegal or prescription): none  Employment: retired  Living Situation: at home with her    Recent Travel: none  Primary Care Provider: Reviewed Roula BEAN   Other (stressors, spirituality, exposures): Multiple surgeries this year    Allergies  Allergies    Allergen Reactions    Synthroid [Fd&C Red #40 Al Paul-Levothyroxine] Hives and Unspecified     Hives, Brand specific. Some brands are ok and some are not    [place] Thyroid [Thyroid] Diarrhea     diarrhea       Medications  Prior to Admission Medications   Prescriptions Last Dose Informant Patient Reported? Taking?   Cholecalciferol 1000 UNIT Cap  Patient Yes No   Sig: Take 1,000 Units by mouth every day.      acetaminophen (TYLENOL) 500 MG Tab  Patient Yes No   Sig: Take 500 mg by mouth 1 time a day as needed for Mild Pain.   amLODIPine (NORVASC) 5 MG Tab   No No   Sig: Take 1 Tablet by mouth every day.   anastrozole (ARIMIDEX) 1 MG Tab   Yes No   Sig: Take 1 Tablet by mouth every day.   apixaban (ELIQUIS) 5mg Tab   No No   Sig: Take 1 Tablet by mouth 2 times a day for 90 days. Indications: DVT/PE   ibandronate (BONIVA) 150 MG tablet  Patient No No   Sig: Take 1 Tablet by mouth every 30 days.   levothyroxine (SYNTHROID) 100 MCG Tab  Patient Yes No   Sig: Take 100 mcg by mouth every morning on an empty stomach.   liothyronine (CYTOMEL) 5 MCG Tab  Patient Yes No   Sig: Take 2.5-5 mcg by mouth every day. 1 tablet 3 times per week - Monday, Wednesday, Friday.  1/2 tablet on other days   normal saline flush 0.9 % Solution   No No   Sig: 10 mL by Tube route every 12 hours.   thyroid (ARMOUR THYROID) 60 MG Tab   Yes No   Sig: not specified      Facility-Administered Medications: None       Physical Exam  Temp:  [37.1 °C (98.7 °F)-37.2 °C (98.9 °F)] 37.1 °C (98.7 °F)  Pulse:  [] 84  Resp:  [16-22] 17  BP: (117-143)/(58-74) 132/63  SpO2:  [87 %-93 %] 91 %  Blood Pressure : 117/61   Temperature: 37.1 °C (98.7 °F)   Pulse: 84   Respiration: (!) 21   Pulse Oximetry: 91 %       Physical Exam  Vitals and nursing note reviewed.   Constitutional:       Appearance: She is ill-appearing.   HENT:      Head: Normocephalic and atraumatic.      Mouth/Throat:      Mouth: Mucous membranes are dry.      Pharynx: Oropharynx is clear.  "  Cardiovascular:      Rate and Rhythm: Regular rhythm. Tachycardia present.      Pulses: Normal pulses.      Heart sounds: Normal heart sounds. No murmur heard.     No friction rub. No gallop.   Pulmonary:      Effort: Pulmonary effort is normal. No respiratory distress.      Breath sounds: Normal breath sounds. No wheezing, rhonchi or rales.   Abdominal:      General: Abdomen is flat. There is no distension.      Palpations: Abdomen is soft.      Tenderness: There is no abdominal tenderness. There is no guarding or rebound.      Comments: Patient confirmed area of intermittent pain not tender on palpation    Musculoskeletal:      Right lower leg: No edema.      Left lower leg: No edema.   Skin:     General: Skin is warm and dry.      Capillary Refill: Capillary refill takes less than 2 seconds.   Neurological:      Mental Status: She is alert and oriented to person, place, and time.         Laboratory:  Recent Labs     09/17/24  1448 09/17/24 2050   WBC 23.8* 20.4*   RBC 4.92 4.55   HEMOGLOBIN 14.7 13.6   HEMATOCRIT 45.3 41.7   MCV 92.1 91.6   MCH 29.9 29.9   MCHC 32.5 32.6   RDW 47.4 47.4   PLATELETCT 668* 574*   MPV 9.7 9.2     Recent Labs     09/17/24 2050   SODIUM 136   POTASSIUM 3.5*   CHLORIDE 101   CO2 21   GLUCOSE 143*   BUN 13   CREATININE 0.69   CALCIUM 8.3*     Recent Labs     09/17/24 2050   ALTSGPT 28   ASTSGOT 35   ALKPHOSPHAT 92   TBILIRUBIN 1.4   LIPASE 22   GLUCOSE 143*         No results for input(s): \"NTPROBNP\" in the last 72 hours.      No results for input(s): \"TROPONINT\" in the last 72 hours.    Imaging:  CT-ABDOMEN-PELVIS WITH   Final Result      1.  Post distal pancreatectomy.      2.  Fluid collection with some peripheral enhancement is identified in the distal pancreatic region including the region of previous pancreatectomy. Developing pseudocyst is a possibility. No emphysema or hemorrhage is appreciated. Remaining pancreas    enhances normally.      3.  There is cholelithiasis.    "   4.  Left pleural effusion and consolidations in the left lung base.      5.  No change in hiatal hernia.      6.  Prominent endometrial cavity again noted.          CT-ABDOMEN-PELVIS WITH    Result Date: 9/17/2024  1.  Post distal pancreatectomy. 2.  Fluid collection with some peripheral enhancement is identified in the distal pancreatic region including the region of previous pancreatectomy. Developing pseudocyst is a possibility. No emphysema or hemorrhage is appreciated. Remaining pancreas enhances normally. 3.  There is cholelithiasis. 4.  Left pleural effusion and consolidations in the left lung base. 5.  No change in hiatal hernia. 6.  Prominent endometrial cavity again noted.    CT-ABDOMEN-PELVIS WITH    Result Date: 8/20/2024  1.  There is postoperative change consistent with distal pancreatectomy and splenectomy. 2.  The simple appearing left upper quadrant subphrenic fluid collection is again seen very similar to the recent prior study, possibly postoperative seroma as there is no rim enhancement to suggest abscess. Pancreatic leak is considered unlikely as the majority of the fluid is in the splenic fossa rather than adjacent to the surgical margin of the pancreas. 3.  Stable bibasilar atelectasis and pleural effusions. 4.  Cholelithiasis again noted. 5.  Pulmonary arteries not well assessed on this exam in the right atrial thrombus is no longer evident. Small defect in the right ventricular apex is unchanged.    CT-ABDOMEN-PELVIS WITH    Result Date: 8/14/2024  1.  Postop changes of splenectomy and distal pancreatectomy, fluid collection left upper quadrant is seen which could represent postop seroma or possibly pancreatic leak. 2.  Right lower lobe segmental and subsegmental pulmonary embolus. 3.  Trace right and small left pleural effusions. 4.  Linear densities of the lung bases suggesting atelectasis, component of left lower lobe infiltrate not excluded. 5.  Diverticulosis 6.  Cholelithiasis 7.   Cardiomegaly These findings were discussed with the patient's clinician, Teri Lee, on 8/14/2024 9:53 PM.    CT-IMAGE-GUIDED DRAIN PERITONEAL    Result Date: 8/23/2024  1.  CT guided left upper quadrant fluid collection catheter drainage.    CT-CYST ASPIRATION-MISC    Result Date: 8/15/2024  1.  CT GUIDED RETROPERITONEAL LEFT UPPER QUADRANT NEEDLE ASPIRATION DRAINAGE. THE OBTAINED BROWN WATERY FLUID RESEMBLES PANCREATIC PSEUDOCYST FLUID. THE FLUID WAS NOT PURULENT NOR MALODOROUS. 2.  THE CURRENT PLAN IS TO CHECK CULTURE AND LAB RESULTS.    CT-PANCREAS AND ABDOMEN WITH & W/O    Result Date: 8/27/2024  1.  Prior distal pancreatectomy and splenectomy.  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 2.  LEFT upper quadrant drain in place with minimal adjacent peritoneal gas.  No significant residual or recurrent fluid collection demonstrated. 3.  Gallstones and minimal gallbladder wall thickening.  Cholecystitis is not excluded. 4.  Bilateral pleural fluid collections with associated atelectasis, worse on the LEFT.     DX-CHEST-PORTABLE (1 VIEW)    Result Date: 8/15/2024  1. Stable small left pleural effusion with left mid lung and lung base parenchymal opacities. 2. The remainder is stable.    DX-CHEST-PORTABLE (1 VIEW)    Result Date: 8/14/2024  1.  Left midlung and lower lobe infiltrates 2.  Trace left pleural effusion    IR-THROMBO MECHANICAL ARTERY,INIT    Result Date: 8/16/2024  1.  Ultrasound guided access bilateral common femoral veins. 2.  Intracardiac echocardiogram demonstrating mobile right atrial thrombus. 3.  Technically successful right atrial thrombectomy.     CT-CTA CHEST PULMONARY ARTERY W/ RECONS    Result Date: 8/14/2024  . 1.  Bilateral segmental and subsegmental pulmonary emboli with changes compatible with significant right heart strain. 2.  Small left and trace right pleural effusions. 3.  Linear consolidations in the bilateral lung bases suggests  atelectasis, component of left lower lobe infiltrate is not excluded. 4.  4.0 cm ascending thoracic aortic aneurysm, radiographic follow-up and surveillance recommended as clinically appropriate. 5.  Atherosclerosis and atherosclerotic coronary artery disease These findings were discussed with the patient's clinician, Teri Lee, on 8/14/2024 10:05 PM.        Assessment/Plan:  Problem Representation:   Paige Gudino is a 78 y.o. 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 I anticipate this patient will require at least two midnights for appropriate medical management, necessitating inpatient admission because of need for IV antibiotics and source control of her pancreatic abscess.     Patient will need a Telemetry bed on TELEMETRY service .  The need is secondary to need for IV antibiotics and source control of her pancreatic abscess. She will need telemetry specifically as patient currently tachycardic.    * Pancreatic abscess- (present on admission)  Assessment & Plan  8/22/24 she underwent CT guided left upper quadrant fluid collection catheter drainage. Drain in placed from 8/22-9/11. At time of removal was draining only 1-2 cc/day.   Prior cx 8/23/24 positive for staph epi.   -Zosyn   -Holding home Apixaban pending possible procedure   -NPO midnight   -Consult patient's surgical oncologist (Dr. Velasco) in AM  -Consult IR in AM     Pulmonary embolism with acute cor pulmonale, unspecified chronicity, unspecified pulmonary embolism type (HCC)- (present on admission)  Assessment & Plan  CT 8/14/24  with bilateral segmental and subsegmental pulmonary emboli with findings compatible with significant R heart strain.   S/p R atrial thrombectomy by Dr. 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   -If patient still admitted 9/23 will need Boniva ordered.     Advance care planning- (present on admission)  Assessment & Plan  -Discussed code status with patient and her  at bedside. She requested to be full code at this time.    Acquired hypothyroidism- (present on admission)  Assessment & Plan  -continue home levothyroxine and liothyronine         VTE prophylaxis: SCDs/TEDs

## 2024-09-18 NOTE — PROGRESS NOTES
4 Eyes Skin Assessment Completed by Rina MANDUJANO RN and Thelma BOYD RN.    Head WDL  Ears WDL  Nose WDL  Mouth WDL  Neck WDL  Breast/Chest Scar  Shoulder Blades WDL  Spine WDL  (R) Arm/Elbow/Hand WDL  (L) Arm/Elbow/Hand WDL  Abdomen Scar  Groin WDL  Scrotum/Coccyx/Buttocks WDL  (R) Leg WDL - dry  (L) Leg WDL - dry   (R) Heel/Foot/Toe WDL  (L) Heel/Foot/Toe WDL          Devices In Places Nasal Cannula      Interventions In Place Gray Ear Foams    Possible Skin Injury No    Pictures Uploaded Into Epic - No  Wound Consult Placed N/A  RN Wound Prevention Protocol Ordered No

## 2024-09-18 NOTE — ED NOTES
Med rec complete per patient  Allergies reviewed  Outpatient antibiotics in the last 30 days? No   Anticoagulants taken in the last 14 days? Yes  Anticoagulant: Eliquis, last dose: 9/17/24 PM    Pharm[facility] patient utilizes: [facility]

## 2024-09-18 NOTE — CONSULTS
Date of Service  September 18, 2024     Reason for Consult: Abdominal fluid collection, s/p distal pancreatectomy    Requested by: Hospitalist    Location: [facility]    Chief Complaint  Sent by MD (Sent by surgeon for elevated WBC. Pt reports abdominal drain removed last week and is concerned for infection at the site. Denies pain. )      HPI: Paige Gudino is a 78 y.o. 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     In ED found to have labs notable for WBC 20.4, and CT A/P on 9/17/24 noted fluid collection with some peripheral enhancement is identified in the distal pancreatic region including the region of previous pancreatectomy. Developing pseudocyst is a possibility. No emphysema or hemorrhage is appreciated. Remaining pancreas enhances normally.     This morning the patient is awake, oriented, and resting comfortable in bed.    Concern pancreatic leak vs pancreatic abscess vs pseudocyst vs other. Discussed need for repeat IR drain placement to remove recurring abdominal fluid collection.    Past Medical History  Past Medical History:   Diagnosis Date    Anesthesia     nausea post op    Cancer (HCC)     1987 breast left    Cancer of overlapping sites of left female breast (HCC)     Cataract 2024    IOL bilat    High cholesterol     Hypothyroidism     PONV (postoperative nausea and vomiting) 1987    Just felt nausous after anesthesia    Thyroid nodule        Past Surgical History  Past Surgical History:   Procedure Laterality Date    OK ULTRASONIC GUIDANCE,  INTRAOPERATIVE  8/5/2024    Procedure: ULTRASOUND GUIDANCE;  Surgeon: Sachin Espinoza M.D.;  Location: SURGERY [facility];  Service: General    PANCREATECTOMY N/A 8/5/2024    Procedure: OPEN DISTAL PANCREATECTOMY WITH SPLENECTOMY, NODE DISSECTION;  Surgeon: Sachin Espinoza M.D.;  Location: SURGERY [facility];  Service: General    SPLENECTOMY N/A 8/5/2024    Procedure: SPLENECTOMY;  Surgeon: Sachin Espinoza M.D.;  Location: SURGERY [facility];  Service: General    CREATION, FLAP, OMENTUM N/A 8/5/2024    Procedure: CREATION, FLAP, OMENTUM;  Surgeon: Sachin Espinoza M.D.;  Location: SURGERY [facility];  Service: General    PB MASTECTOMY, PARTIAL Left 08/01/2022    Procedure: MELLO LOCALIZED LEFT PARTIAL MASTECTOMY;  Surgeon: Elena Arroyo M.D.;  Location: SURGERY [facility];  Service: General    OTHER ORTHOPEDIC SURGERY  2019    back surgery    OTHER  2001    replace left breast implant    OTHER  1988    Left breast implant/lift    OTHER  1987    left mastectomy    LAMINOTOMY      LUMPECTOMY      PRIMARY C SECTION       Anticoagulation:  Previously ion Apixaban for PE    Allergies:   Allergies   Allergen Reactions    Synthroid [Fd&C Red #40 Al Paul-Levothyroxine] Hives and Unspecified     Hives, Brand specific. Some brands are ok and some are not    [place] Thyroid [Thyroid] Diarrhea     diarrhea       Problem List  Principal Problem:    Pancreatic abscess (POA: Yes)  Active Problems:    Acquired hypothyroidism (POA: Yes)    Advance care planning (POA: Yes)    Age-related osteoporosis without current pathological fracture (POA: Yes)    History of breast cancer in adulthood (POA: Yes)    Pulmonary embolism with acute cor pulmonale, unspecified chronicity, unspecified pulmonary embolism type (HCC) (POA: Yes)    Primary hypertension (POA: Unknown)  Resolved Problems:    Sepsis (HCC) (POA: Yes)       Subjective  Review of Systems   Constitutional:  Positive for  malaise/fatigue. Negative for chills, fever and weight loss.   Respiratory:  Negative for cough and shortness of breath.    Cardiovascular:  Negative for chest pain.   Gastrointestinal:  Positive for abdominal pain. Negative for nausea and vomiting.         Objective  Temp:  [36.2 °C (97.2 °F)-37.1 °C (98.7 °F)] 36.6 °C (97.9 °F)  Pulse:  [] 68  Resp:  [16-22] 20  BP: (111-143)/(58-74) 111/69  SpO2:  [87 %-95 %] 93 %      Physical Exam  Constitutional:       General: She is not in acute distress.     Appearance: Normal appearance. She is ill-appearing.   HENT:      Head: Normocephalic and atraumatic.      Nose: Nose normal.      Mouth/Throat:      Pharynx: Oropharynx is clear.   Eyes:      Conjunctiva/sclera: Conjunctivae normal.   Cardiovascular:      Rate and Rhythm: Normal rate.   Pulmonary:      Effort: Pulmonary effort is normal. No respiratory distress.   Abdominal:      General: Abdomen is flat. There is no distension.      Palpations: Abdomen is soft.      Tenderness: There is abdominal tenderness.   Skin:     General: Skin is warm and dry.      Coloration: Skin is not jaundiced.   Neurological:      Mental Status: She is alert and oriented to person, place, and time.   Psychiatric:         Mood and Affect: Mood normal.         Behavior: Behavior normal.       Fluids    Intake/Output Summary (Last 24 hours) at 9/18/2024 0955  Last data filed at 9/18/2024 0300  Gross per 24 hour   Intake 1100 ml   Output --   Net 1100 ml         Labs  Lab Results   Component Value Date/Time    SODIUM 134 (L) 09/18/2024 06:00 AM    POTASSIUM 4.2 09/18/2024 06:00 AM    CHLORIDE 102 09/18/2024 06:00 AM    CO2 21 09/18/2024 06:00 AM    GLUCOSE 122 (H) 09/18/2024 06:00 AM    BUN 11 09/18/2024 06:00 AM    CREATININE 0.72 09/18/2024 06:00 AM         Lab Results   Component Value Date/Time    PROTHROMBTM 20.7 (H) 08/21/2024 07:53 AM    INR 1.75 (H) 08/21/2024 07:53 AM         Lab Results   Component Value Date/Time    WBC 19.4  (H) 09/18/2024 06:00 AM    RBC 4.25 09/18/2024 06:00 AM    HEMOGLOBIN 12.6 09/18/2024 06:00 AM    HEMATOCRIT 39.0 09/18/2024 06:00 AM    MCV 91.8 09/18/2024 06:00 AM    MCH 29.6 09/18/2024 06:00 AM    MCHC 32.3 09/18/2024 06:00 AM    MPV 9.2 09/18/2024 06:00 AM    NEUTSPOLYS 90.40 (H) 09/17/2024 08:50 PM    LYMPHOCYTES 3.50 (L) 09/17/2024 08:50 PM    MONOCYTES 5.20 09/17/2024 08:50 PM    EOSINOPHILS 0.00 09/17/2024 08:50 PM    BASOPHILS 0.00 09/17/2024 08:50 PM    ANISOCYTOSIS 1+ 08/15/2024 03:30 AM         Recent Labs     09/17/24  1448 09/17/24  2050 09/18/24  0600   ASTSGOT 39 35 32   ALTSGPT 30 28 28   TBILIRUBIN 1.7* 1.4 1.7*   GLOBULIN 3.5 3.4 3.0      Imaging  CT A/P (9/17/24)  IMPRESSION:     1.  Post distal pancreatectomy.     2.  Fluid collection with some peripheral enhancement is identified in the distal pancreatic region including the region of previous pancreatectomy. Developing pseudocyst is a possibility. No emphysema or hemorrhage is appreciated. Remaining pancreas   enhances normally.     3.  There is cholelithiasis.     4.  Left pleural effusion and consolidations in the left lung base.     5.  No change in hiatal hernia.     6.  Prominent endometrial cavity again noted.    Pathology  N/A     Assessment and Plan  Patient is a 78F with recent pancreatectomy and splenectomy, subsequent retroperitoneal abscess with drain placement. Now admitted from ED with leukocytosis WBC 23.8 noted to have recurrence of fluid collection in the distal pancreatic region.     Katelynn-pancreatic fluid collection, s/p distal pancreatectomy / splenectomy  - Plan for IR drain - possibly on 9/19/24  - Patient recently on Apixaban - now held  - NPO as appropriate for IR procedure - midnight?  - Continue Zosyn      Patient seen with Dr Esquivel

## 2024-09-18 NOTE — ASSESSMENT & PLAN NOTE
CT guided peritoneal left upper quadrant abdominal abscess drainage, placement of 10 Bulgarian locking loop catheter  9/19/2024  IR abscessogram - small residual fluid collection with drainage catheter at the peripheral aspect  9/24/2024  IV Unasyn  Pain control

## 2024-09-18 NOTE — PROGRESS NOTES
"[facility] Internal Medicine Daily Progress Note    Date of Service  9/18/2024    [facility] Team: R IM Green Team   Attending: Alphonse Lemons M.d.  Senior Resident: Dr. Laura Traore  Intern:  Dr. Tracy Lara   Contact Number: [phone]    Chief Complaint    Paige Gudino is a 78 y.o. female admitted 9/17/2024 with elevated WBC and abdominal pain    Hospital Course    Paige Gudino is a 78 y.o. 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 Per patient, for the past 1-2 days she had a \"striking\" pain in her epigastric area that lasts a few seconds each time and occurs several times per day. She rated the pain as a 4/10 but said she had been through a lot and had high pain tolerance. She also noted intermittent feverish feeling. Denied chills, no chest pain, no shortness of breath etc (see full ROS below).      On presenting to the ED patient afebrile with HR 70s-110s, RR 16-22, BP 110s-140s/50s-70s, and O2 ranging from 87-93 on room air. Patient adamantly declined supplemental oxygen when seen and O2 was able to return to low 90%s with deep breaths. Labs notable for WBCs 20.4, , K of 3.5. CT Abd-Pelvis performed and notable for fluid collection in distal pancreatic region . Given her hx and leukocytosis this is suspected to be a pancreatic abscess. Code status with patient and her  at bedside and she would like to remain full code at this time.     Patient then admitted for close monitoring and possible drain placement.     Interval Problem Update    Overnight " events: None     Patient is a pleasant lady but is slightly anxious today.    - She shared that previously when she was not on a blood thinners, she developed multiple clots in her legs and her lungs therefore is very worried since she has been off anticoagulants for IR-guided drainage.   - She was initially scheduled for an IR guided drainage on 09/18 but the procedure was postponed until tomorrow 09/19. Will be NPO midnight  - She also expresses her wish of not having oncology onboard during her admission since she already has a follow-up in October with the oncologist.  - Her vital signs remained stable.  - Her WBC count has decreased to 19.4 today 09/18.  - Her hemoglobin is stable.  - She has had persistently high platelets.Today, the platelets have decreased to 560.  - She also states that intermittently she experiences a striking abdominal pain but also add that 'It's manageable and nothing gabapentin cannot do.'  - Her potassium was found to be 3.5, it was replaced.  - She is currently on Zosyn.    I have discussed this patient's plan of care and discharge plan at IDT rounds today with Case Management, Nursing, Nursing leadership, and other members of the IDT team.    Consultants/Specialty  Surgery   Interventional Radiology     Code Status  Full Code    Disposition  The patient is not medically cleared for discharge to home or a post-acute facility.      I have placed the appropriate orders for post-discharge needs.    Review of Systems  Review of Systems   Constitutional: Negative.    HENT: Negative.     Eyes: Negative.    Respiratory: Negative.     Cardiovascular: Negative.    Gastrointestinal: Negative.    Genitourinary: Negative.    Musculoskeletal: Negative.    Skin: Negative.    Neurological: Negative.    Endo/Heme/Allergies: Negative.    Psychiatric/Behavioral:  The patient is nervous/anxious.         Physical Exam  Temp:  [36.2 °C (97.2 °F)-37.2 °C (98.9 °F)] 36.7 °C (98.1 °F)  Pulse:  []  94  Resp:  [16-22] 18  BP: (111-143)/(58-74) 128/66  SpO2:  [87 %-95 %] 91 %    Physical Exam  Constitutional:       Appearance: Normal appearance. She is normal weight.   HENT:      Head: Normocephalic and atraumatic.      Right Ear: Tympanic membrane, ear canal and external ear normal.      Left Ear: Tympanic membrane, ear canal and external ear normal.      Nose: Nose normal.      Mouth/Throat:      Mouth: Mucous membranes are moist.   Eyes:      Extraocular Movements: Extraocular movements intact.      Pupils: Pupils are equal, round, and reactive to light.      Comments: Mild redness and burning of the eyes    Cardiovascular:      Rate and Rhythm: Normal rate and regular rhythm.      Pulses: Normal pulses.      Heart sounds: Normal heart sounds.   Pulmonary:      Effort: Pulmonary effort is normal.      Breath sounds: Normal breath sounds.      Comments: Left breast- Implant in place for the past 15-20 years. No  signs of infection.   Abdominal:      General: Bowel sounds are normal.      Palpations: Abdomen is soft.      Comments: Midline incision scar from previous surgery, healing well.    Musculoskeletal:         General: Normal range of motion.      Cervical back: Normal range of motion and neck supple.   Skin:     General: Skin is warm.      Capillary Refill: Capillary refill takes 2 to 3 seconds.   Neurological:      General: No focal deficit present.      Mental Status: She is alert. Mental status is at baseline.   Psychiatric:         Mood and Affect: Mood normal.         Behavior: Behavior normal.         Thought Content: Thought content normal.         Judgment: Judgment normal.         Fluids    Intake/Output Summary (Last 24 hours) at 9/18/2024 1253  Last data filed at 9/18/2024 1000  Gross per 24 hour   Intake 1900 ml   Output --   Net 1900 ml       Laboratory  Recent Labs     09/17/24  1448 09/17/24  2050 09/18/24  0600   WBC 23.8* 20.4* 19.4*   RBC 4.92 4.55 4.25   HEMOGLOBIN 14.7 13.6 12.6    HEMATOCRIT 45.3 41.7 39.0   MCV 92.1 91.6 91.8   MCH 29.9 29.9 29.6   MCHC 32.5 32.6 32.3   RDW 47.4 47.4 47.5   PLATELETCT 668* 574* 560*   MPV 9.7 9.2 9.2     Recent Labs     09/17/24  1448 09/17/24  2050 09/18/24  0600   SODIUM 134* 136 134*   POTASSIUM 3.9 3.5* 4.2   CHLORIDE 98 101 102   CO2 21 21 21   GLUCOSE 156* 143* 122*   BUN 13 13 11   CREATININE 0.83 0.69 0.72   CALCIUM 9.1 8.3* 7.9*                   Imaging  CT-ABDOMEN-PELVIS WITH   Final Result      1.  Post distal pancreatectomy.      2.  Fluid collection with some peripheral enhancement is identified in the distal pancreatic region including the region of previous pancreatectomy. Developing pseudocyst is a possibility. No emphysema or hemorrhage is appreciated. Remaining pancreas    enhances normally.      3.  There is cholelithiasis.      4.  Left pleural effusion and consolidations in the left lung base.      5.  No change in hiatal hernia.      6.  Prominent endometrial cavity again noted.      IR-CONSULT AND TREAT    (Results Pending)        Assessment/Plan  Problem Representation:    * Pancreatic abscess- (present on admission)  Assessment & Plan  8/22/24 she underwent CT guided left upper quadrant fluid collection catheter drainage. Drain in place from 8/22-9/11. At time of removal was draining only 1-2 cc/day.   Prior cx 8/23/24 positive for staph epi.   - Zosyn continued  - Holding home Apixaban pending possible procedure   - NPO midnight   - Patient's surgical oncologist (Dr. Velasco) on board  - IR on board   - Will consider starting heparin infusion after IR guided procedure     Primary hypertension  Assessment & Plan  -Continue home amlodipine     Pulmonary embolism with acute cor pulmonale, unspecified chronicity, unspecified pulmonary embolism type (HCC)- (present on admission)  Assessment & Plan  CT 8/14/24 with bilateral segmental and subsegmental pulmonary emboli with findings compatible with significant R heart strain.   S/p R  atrial thrombectomy by Dr. 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   -If patient still admitted 9/23 will need Boniva ordered.     Advance care planning- (present on admission)  Assessment & Plan  -Discussed code status with patient and her  at bedside. She requested to be full code at this time.    Acquired hypothyroidism- (present on admission)  Assessment & Plan  -continue home levothyroxine and liothyronine          VTE prophylaxis: SCDs/TEDs    I have performed a physical exam and reviewed and updated ROS and Plan today (9/18/2024). In review of yesterday's note (9/17/2024), there are no changes except as documented above.

## 2024-09-18 NOTE — NON-PROVIDER
"  [facility] INTERNAL MEDICINE PROGRESS NOTE   Medical Student Note   ** Educational Purposes ONLY - Please Refer to Note(s) Written by Attending/Residents **    Attending: Alphonse Lemons M.D.  Senior Resident: León Sanchez M.D. (PGY-2)  [place] Resident: Tracy Lara M.D. (PGY-1)  Medical Student: Vicky Phillips, MS3  PATIENT: Paige Gudino; [id number]; 1945; Full Code    Hospital Day # Hospital Day: 2    SUBJECTIVE:   ID: 78 y.o. female with past medical history of benign pancreatic serous cystadenoma s/p pancreatectomy, splenectomy, as well as stomach and celiac node dissection in addition to subsequent retroperitoneal abscess with drain placement (placed 8/22/2024 -> removed 9/11/2024) was admitted on 9/17/2024 with elevated WBC and abdominal pain concerning for suspected pancreatic abscess.    Hospital Course:   Paige \"Rosangela\" Shahab is a 78 y.o. female with past medical history of benign pancreatic serous cystadenoma s/p pancreatectomy, splenectomy, as well as stomach and celiac node dissection; subsequent retroperitoneal abscess with drain placement (placed 8/22/2024 -> removed 9/11/2024); noninvasive carcinoma of L breast 34 years ago s/p total mastectomy; and invasive grade 2 mammary carcinoma of L breast in 2022. She presented on 9/17/2024 at the recommendation of her surgical oncologist due to leukocytosis on recent labs. Prior to admission, she reports having an intermittent fever, fatigue beyond baseline, and a \"striking\" pain in her mid-abdominal/epigastric region that lasts for a few seconds and occurs up to x5/day. She previously reported this pain as a 4/10 but noted that she has a high pain tolerance.  ED course: Afebrile, HR 70s-110s, RR 16-22, BP 110s-140s/50s-70s, and O2 ranging from 87-93 on room air. She declined supplemental oxygen and was able to return to low 90s% with deep breathing. Labs notable for WBCs 20.4, Plts 574, and K 3.5. CT abd/pelvis was notable for fluid collection in " distal pancreatic region with peripheral enhancement.  9/17: She was admitted due to suspicion of pancreatic abscess due to her history, leukocytosis, and imaging findings.    Interval Update:   No acute overnight events.  K of 3.5 was repleted.  At bedside this morning, pt reported that she is feeling improved from yesterday although she states she is not exactly sure why. She expresses concern over having to require medical attention with an increased frequency lately, and that she usually is the one who takes care of her daughter--who is wheelchair-bound due to transverse myelitis--and her . She becomes tearful when talking about her medical history. Pt was visited by surgery while I was still at bedside, who informed patient that they recommended the drain be re-placed and that a pancreatic duct stent may be needed in the future. Later, during bedside rounds, she shared that she previously developed multiple clots in her legs/lungs when not on anticoagulants in the past. She does not wish to have oncology consulted inpatient as she has plans for follow-up in October with her oncologist. Pt's questions were answered and she has no additional questions at this time. ROS below.  9/19: Scheduled for IR guided drainage.    ROS  Review of Systems   Constitutional:  Positive for fever and malaise/fatigue. Negative for chills and diaphoresis.   HENT: Negative.     Eyes: Negative.    Respiratory:  Negative for cough, hemoptysis and shortness of breath.    Cardiovascular:  Negative for chest pain, palpitations, orthopnea and leg swelling.   Gastrointestinal:  Positive for abdominal pain. Negative for blood in stool, constipation, diarrhea, nausea and vomiting.   Genitourinary:  Negative for dysuria, flank pain, frequency, hematuria and urgency.   Musculoskeletal: Negative.    Skin: Negative.    Neurological: Negative.    Endo/Heme/Allergies: Negative.    Psychiatric/Behavioral:  The patient is nervous/anxious.         Pt's plan of care and discharge plan was discussed at IDT rounds today with Case Management, Nursing, Nursing Leadership, Pharmacy, and other members of the IDT.      OBJECTIVE:  PE:  Temp:  [36.2 °C (97.2 °F)-37.2 °C (98.9 °F)] 36.7 °C (98.1 °F)  Pulse:  [] 94  Resp:  [16-22] 18  BP: (111-143)/(58-74) 128/66  SpO2:  [87 %-95 %] 91 %    Physical Exam  Constitutional:       General: She is not in acute distress.     Appearance: Normal appearance. She is normal weight. She is not ill-appearing.   HENT:      Head: Normocephalic and atraumatic.      Right Ear: External ear normal.      Left Ear: External ear normal.      Nose: Nose normal.      Mouth/Throat:      Mouth: Mucous membranes are moist.   Eyes:      Extraocular Movements: Extraocular movements intact.      Pupils: Pupils are equal, round, and reactive to light.      Comments: Mild bilateral eye redness.   Cardiovascular:      Rate and Rhythm: Normal rate and regular rhythm.      Pulses: Normal pulses.      Heart sounds: Normal heart sounds.   Pulmonary:      Effort: Pulmonary effort is normal.      Breath sounds: Normal breath sounds.      Comments: L breast implant in place; no tenderness/signs of infection.  Abdominal:      General: Bowel sounds are normal.      Palpations: Abdomen is soft.      Comments: Midline incision dry and healing well from previous surgery; scar present.   Skin:     General: Skin is warm and dry.   Neurological:      General: No focal deficit present.      Mental Status: She is alert and oriented to person, place, and time. Mental status is at baseline.   Psychiatric:         Mood and Affect: Mood normal.         Behavior: Behavior normal.         LABS:  Recent Labs     09/17/24  1448 09/17/24  2050 09/18/24  0600   WBC 23.8* 20.4* 19.4*   RBC 4.92 4.55 4.25   HEMOGLOBIN 14.7 13.6 12.6   HEMATOCRIT 45.3 41.7 39.0   MCV 92.1 91.6 91.8   MCH 29.9 29.9 29.6   RDW 47.4 47.4 47.5   PLATELETCT 668* 574* 560*   MPV 9.7 9.2 9.2  "  NEUTSPOLYS 87.60* 90.40*  --    LYMPHOCYTES 3.70* 3.50*  --    MONOCYTES 7.40 5.20  --    EOSINOPHILS 0.00 0.00  --    BASOPHILS 0.20 0.00  --    RBCMORPHOLO  --  Present  --      Recent Labs     09/17/24 1448 09/17/24 2050 09/18/24  0600   SODIUM 134* 136 134*   POTASSIUM 3.9 3.5* 4.2   CHLORIDE 98 101 102   CO2 21 21 21   BUN 13 13 11   CREATININE 0.83 0.69 0.72   CALCIUM 9.1 8.3* 7.9*   ALBUMIN 3.8 3.4 3.0*     Estimated GFR/CRCL = Estimated Creatinine Clearance: 56.5 mL/min (by C-G formula based on SCr of 0.72 mg/dL).  Recent Labs     09/17/24 1448 09/17/24 2050 09/18/24  0600   GLUCOSE 156* 143* 122*     Recent Labs     09/17/24 1448 09/17/24 2050 09/18/24  0600   ASTSGOT 39 35 32   ALTSGPT 30 28 28   TBILIRUBIN 1.7* 1.4 1.7*   ALKPHOSPHAT 105* 92 81   GLOBULIN 3.5 3.4 3.0             No results for input(s): \"INR\", \"APTT\", \"FIBRINOGEN\" in the last 72 hours.    Invalid input(s): \"D-DIMER\"    Intake/Output Summary (Last 24 hours) at 9/18/2024 1254  Last data filed at 9/18/2024 1000  Gross per 24 hour   Intake 1900 ml   Output --   Net 1900 ml       MICROBIOLOGY:   No results found for: \"BLOODCULTU\", \"BLDCULT\", \"BCHOLD\"     IMAGING:   CT-ABDOMEN-PELVIS WITH   Final Result      1.  Post distal pancreatectomy.      2.  Fluid collection with some peripheral enhancement is identified in the distal pancreatic region including the region of previous pancreatectomy. Developing pseudocyst is a possibility. No emphysema or hemorrhage is appreciated. Remaining pancreas    enhances normally.      3.  There is cholelithiasis.      4.  Left pleural effusion and consolidations in the left lung base.      5.  No change in hiatal hernia.      6.  Prominent endometrial cavity again noted.      IR-CONSULT AND TREAT    (Results Pending)       MEDS:  Current Facility-Administered Medications   Medication Last Admin    liothyronine (Cytomel) tablet 5 mcg 5 mcg at 09/18/24 0611    And    [START ON 9/19/2024] liothyronine " "(Cytomel) tablet 2.5 mcg      piperacillin-tazobactam (Zosyn) 3.375 g in  mL IVPB 3.375 g at 09/18/24 0909    potassium chloride SA (Kdur) tablet 20 mEq 20 mEq at 09/18/24 0907    acetaminophen (Tylenol) tablet 650 mg      senna-docusate (Pericolace Or Senokot S) 8.6-50 MG per tablet 2 Tablet      And    polyethylene glycol/lytes (Miralax) Packet 1 Packet      labetalol (Normodyne/Trandate) injection 10 mg      amLODIPine (Norvasc) tablet 5 mg 5 mg at 09/18/24 0507    [Held by provider] apixaban (Eliquis) tablet 5 mg      levothyroxine (Synthroid) tablet 100 mcg 100 mcg at 09/18/24 0507         ASSESSMENT/PLAN: Paige \"Rosangela\" Shahab is a 78 y.o. female with past medical history of benign pancreatic serous cystadenoma s/p pancreatectomy, splenectomy, as well as stomach and celiac node dissection in addition to subsequent retroperitoneal abscess with drain placement (placed 8/22/2024 -> removed 9/11/2024). Based on her history, recently developed leukocytosis, and CT abd/pelvis notable for fluid collection in distal pancreatic region with peripheral enhancement, she was admitted for management of suspected pancreatic abscess.    #Pancreatic abscess  8/22/2024: CT guided LUQ fluid collection catheter drainage. Drain in place 8/22/2024-9/11/2024. Prior to removal, drainage volume was 1-2cc/day.  Continue piperacillin-tazobactam (Zosyn); pt's WBC count and Plts are downtrending with stable H&H  Pt's surgical oncologist (Dr. Velasco) on board  IR on board  Hold home apixaban pending IR procedure scheduled for 9/19  Make pt NPO at midnight pending IR procedure scheduled for 9/19  Consider starting heparin infusion after IR guided procedure    #Pulmonary embolism with acute cor pulmonale  8/14/2022: CTA chest revealed bilateral segmental and subsegmental pulmonary emboli with findings indicative of significant R heart strain. S/p R atrial thrombectomy performed by Dr. Bowen.  Hold home apixaban pending IR " procedure scheduled for 9/19    #Primary hypertension  Continue home amlodipine    #Hypokalemia - resolved  Pt's K was successfully repleted overnight 9/17-18    #History of breast cancer in adulthood  Noninvasive carcinoma of L breast 34 years ago (s/p total mastectomy and subsequent implant placement) and invasive grade 2 mammary carcinoma in 2022 also of L breast  Outpatient f/u    #Age-related osteoporosis without current pathological fracture  Pt is on Boniva outpatient, but is not due for 7 more days  If pt is still admitted 9/23, she will need Boniva ordered    #Acquired hypothyroidism  Continue home levothyroxine and liothyronine    #Advance care planning  Per Dr. Tiwari's H&P note, she discussed code status with the patient and her  at bedside. She requested to be full code at that time.      **Overnight plan for sign-out:**  -Pt to be made NPO at midnight in anticipation of 9/19 IR procedure  -Monitor for s/sx of PEs in event pt deteriorates and requires emergent anticoagulation  -Monitor and replete electrolytes as needed      VTE ppx: SCDs/TEDs    CODE STATUS: Full Code    DISPOSITION: Pt is not medically cleared for discharge to home or a post-acute facility at this time.      Vicky Phillips, MS3  [facility] [employer]

## 2024-09-19 ENCOUNTER — APPOINTMENT (OUTPATIENT)
Dept: RADIOLOGY | Facility: MEDICAL CENTER | Age: 79
End: 2024-09-19
Payer: COMMERCIAL

## 2024-09-19 PROBLEM — I44.2 AV BLOCK, 3RD DEGREE (HCC): Status: ACTIVE | Noted: 2024-09-19

## 2024-09-19 LAB
ALBUMIN SERPL BCP-MCNC: 2.9 G/DL (ref 3.2–4.9)
ALBUMIN/GLOB SERPL: 1 G/DL
ALP SERPL-CCNC: 87 U/L (ref 30–99)
ALT SERPL-CCNC: 21 U/L (ref 2–50)
ANION GAP SERPL CALC-SCNC: 12 MMOL/L (ref 7–16)
AST SERPL-CCNC: 27 U/L (ref 12–45)
BASOPHILS # BLD AUTO: 0.2 % (ref 0–1.8)
BASOPHILS # BLD: 0.04 K/UL (ref 0–0.12)
BILIRUB SERPL-MCNC: 1.3 MG/DL (ref 0.1–1.5)
BUN SERPL-MCNC: 9 MG/DL (ref 8–22)
CALCIUM ALBUM COR SERPL-MCNC: 9 MG/DL (ref 8.5–10.5)
CALCIUM SERPL-MCNC: 8.1 MG/DL (ref 8.5–10.5)
CHLORIDE SERPL-SCNC: 104 MMOL/L (ref 96–112)
CO2 SERPL-SCNC: 21 MMOL/L (ref 20–33)
CREAT SERPL-MCNC: 0.7 MG/DL (ref 0.5–1.4)
EOSINOPHIL # BLD AUTO: 0.1 K/UL (ref 0–0.51)
EOSINOPHIL NFR BLD: 0.6 % (ref 0–6.9)
ERYTHROCYTE [DISTWIDTH] IN BLOOD BY AUTOMATED COUNT: 48.1 FL (ref 35.9–50)
FUNGUS SPEC FUNGUS STN: NORMAL
GFR SERPLBLD CREATININE-BSD FMLA CKD-EPI: 88 ML/MIN/1.73 M 2
GLOBULIN SER CALC-MCNC: 3 G/DL (ref 1.9–3.5)
GLUCOSE SERPL-MCNC: 111 MG/DL (ref 65–99)
GRAM STN SPEC: ABNORMAL
HCT VFR BLD AUTO: 38.5 % (ref 37–47)
HGB BLD-MCNC: 12.4 G/DL (ref 12–16)
IMM GRANULOCYTES # BLD AUTO: 0.16 K/UL (ref 0–0.11)
IMM GRANULOCYTES NFR BLD AUTO: 0.9 % (ref 0–0.9)
INR PPP: 1.36 (ref 0.87–1.13)
LYMPHOCYTES # BLD AUTO: 0.98 K/UL (ref 1–4.8)
LYMPHOCYTES NFR BLD: 5.7 % (ref 22–41)
MAGNESIUM SERPL-MCNC: 1.9 MG/DL (ref 1.5–2.5)
MCH RBC QN AUTO: 30 PG (ref 27–33)
MCHC RBC AUTO-ENTMCNC: 32.2 G/DL (ref 32.2–35.5)
MCV RBC AUTO: 93 FL (ref 81.4–97.8)
MONOCYTES # BLD AUTO: 1.43 K/UL (ref 0–0.85)
MONOCYTES NFR BLD AUTO: 8.3 % (ref 0–13.4)
NEUTROPHILS # BLD AUTO: 14.62 K/UL (ref 1.82–7.42)
NEUTROPHILS NFR BLD: 84.3 % (ref 44–72)
NRBC # BLD AUTO: 0 K/UL
NRBC BLD-RTO: 0 /100 WBC (ref 0–0.2)
PHOSPHATE SERPL-MCNC: 3 MG/DL (ref 2.5–4.5)
PLATELET # BLD AUTO: 535 K/UL (ref 164–446)
PMV BLD AUTO: 9.3 FL (ref 9–12.9)
POTASSIUM SERPL-SCNC: 4.1 MMOL/L (ref 3.6–5.5)
PROT SERPL-MCNC: 5.9 G/DL (ref 6–8.2)
PROTHROMBIN TIME: 16.8 SEC (ref 12–14.6)
RBC # BLD AUTO: 4.14 M/UL (ref 4.2–5.4)
SIGNIFICANT IND 70042: ABNORMAL
SIGNIFICANT IND 70042: NORMAL
SITE SITE: ABNORMAL
SITE SITE: NORMAL
SODIUM SERPL-SCNC: 137 MMOL/L (ref 135–145)
SOURCE SOURCE: ABNORMAL
SOURCE SOURCE: NORMAL
WBC # BLD AUTO: 17.3 K/UL (ref 4.8–10.8)

## 2024-09-19 PROCEDURE — 87186 SC STD MICRODIL/AGAR DIL: CPT

## 2024-09-19 PROCEDURE — A9270 NON-COVERED ITEM OR SERVICE: HCPCS

## 2024-09-19 PROCEDURE — 85610 PROTHROMBIN TIME: CPT

## 2024-09-19 PROCEDURE — 36415 COLL VENOUS BLD VENIPUNCTURE: CPT

## 2024-09-19 PROCEDURE — 700102 HCHG RX REV CODE 250 W/ 637 OVERRIDE(OP)

## 2024-09-19 PROCEDURE — 770000 HCHG ROOM/CARE - INTERMEDIATE ICU *

## 2024-09-19 PROCEDURE — 87077 CULTURE AEROBIC IDENTIFY: CPT

## 2024-09-19 PROCEDURE — 700102 HCHG RX REV CODE 250 W/ 637 OVERRIDE(OP): Mod: JZ

## 2024-09-19 PROCEDURE — 87070 CULTURE OTHR SPECIMN AEROBIC: CPT

## 2024-09-19 PROCEDURE — 93005 ELECTROCARDIOGRAM TRACING: CPT

## 2024-09-19 PROCEDURE — 83735 ASSAY OF MAGNESIUM: CPT

## 2024-09-19 PROCEDURE — 700111 HCHG RX REV CODE 636 W/ 250 OVERRIDE (IP): Performed by: RADIOLOGY

## 2024-09-19 PROCEDURE — 87102 FUNGUS ISOLATION CULTURE: CPT

## 2024-09-19 PROCEDURE — 700111 HCHG RX REV CODE 636 W/ 250 OVERRIDE (IP)

## 2024-09-19 PROCEDURE — 87147 CULTURE TYPE IMMUNOLOGIC: CPT

## 2024-09-19 PROCEDURE — 99024 POSTOP FOLLOW-UP VISIT: CPT | Performed by: NURSE PRACTITIONER

## 2024-09-19 PROCEDURE — 84100 ASSAY OF PHOSPHORUS: CPT

## 2024-09-19 PROCEDURE — A9270 NON-COVERED ITEM OR SERVICE: HCPCS | Mod: JZ

## 2024-09-19 PROCEDURE — 700105 HCHG RX REV CODE 258

## 2024-09-19 PROCEDURE — 700111 HCHG RX REV CODE 636 W/ 250 OVERRIDE (IP): Mod: JZ

## 2024-09-19 PROCEDURE — 80053 COMPREHEN METABOLIC PANEL: CPT

## 2024-09-19 PROCEDURE — 99221 1ST HOSP IP/OBS SF/LOW 40: CPT | Performed by: INTERNAL MEDICINE

## 2024-09-19 PROCEDURE — 99233 SBSQ HOSP IP/OBS HIGH 50: CPT | Performed by: HOSPITALIST

## 2024-09-19 PROCEDURE — C1729 CATH, DRAINAGE: HCPCS

## 2024-09-19 PROCEDURE — 0W9G30Z DRAINAGE OF PERITONEAL CAVITY WITH DRAINAGE DEVICE, PERCUTANEOUS APPROACH: ICD-10-PCS | Performed by: RADIOLOGY

## 2024-09-19 PROCEDURE — 85025 COMPLETE CBC W/AUTO DIFF WBC: CPT

## 2024-09-19 PROCEDURE — 87205 SMEAR GRAM STAIN: CPT

## 2024-09-19 RX ORDER — SODIUM CHLORIDE 9 MG/ML
500 INJECTION, SOLUTION INTRAVENOUS
Status: ACTIVE | OUTPATIENT
Start: 2024-09-19 | End: 2024-09-19

## 2024-09-19 RX ORDER — MIDAZOLAM HYDROCHLORIDE 1 MG/ML
.5-2 INJECTION INTRAMUSCULAR; INTRAVENOUS PRN
Status: ACTIVE | OUTPATIENT
Start: 2024-09-19 | End: 2024-09-19

## 2024-09-19 RX ORDER — MIDAZOLAM HYDROCHLORIDE 1 MG/ML
INJECTION INTRAMUSCULAR; INTRAVENOUS
Status: COMPLETED
Start: 2024-09-19 | End: 2024-09-19

## 2024-09-19 RX ORDER — ONDANSETRON 2 MG/ML
4 INJECTION INTRAMUSCULAR; INTRAVENOUS PRN
Status: ACTIVE | OUTPATIENT
Start: 2024-09-19 | End: 2024-09-19

## 2024-09-19 RX ADMIN — POTASSIUM CHLORIDE 20 MEQ: 1500 TABLET, EXTENDED RELEASE ORAL at 06:08

## 2024-09-19 RX ADMIN — PIPERACILLIN AND TAZOBACTAM 3.38 G: 3; .375 INJECTION, POWDER, FOR SOLUTION INTRAVENOUS at 18:34

## 2024-09-19 RX ADMIN — PIPERACILLIN AND TAZOBACTAM 3.38 G: 3; .375 INJECTION, POWDER, FOR SOLUTION INTRAVENOUS at 08:45

## 2024-09-19 RX ADMIN — FENTANYL CITRATE 50 MCG: 50 INJECTION, SOLUTION INTRAMUSCULAR; INTRAVENOUS at 10:07

## 2024-09-19 RX ADMIN — LIOTHYRONINE SODIUM 2.5 MCG: 5 TABLET ORAL at 08:36

## 2024-09-19 RX ADMIN — ACETAMINOPHEN 650 MG: 325 TABLET ORAL at 18:19

## 2024-09-19 RX ADMIN — ACETAMINOPHEN 650 MG: 325 TABLET ORAL at 11:55

## 2024-09-19 RX ADMIN — PIPERACILLIN AND TAZOBACTAM 3.38 G: 3; .375 INJECTION, POWDER, FOR SOLUTION INTRAVENOUS at 02:04

## 2024-09-19 RX ADMIN — AMLODIPINE BESYLATE 5 MG: 10 TABLET ORAL at 06:09

## 2024-09-19 RX ADMIN — MIDAZOLAM HYDROCHLORIDE 0.5 MG: 2 INJECTION, SOLUTION INTRAMUSCULAR; INTRAVENOUS at 10:20

## 2024-09-19 RX ADMIN — FENTANYL CITRATE 25 MCG: 50 INJECTION, SOLUTION INTRAMUSCULAR; INTRAVENOUS at 10:20

## 2024-09-19 RX ADMIN — MIDAZOLAM HYDROCHLORIDE 1 MG: 1 INJECTION, SOLUTION INTRAMUSCULAR; INTRAVENOUS at 10:07

## 2024-09-19 RX ADMIN — LEVOTHYROXINE SODIUM 100 MCG: 0.1 TABLET ORAL at 06:09

## 2024-09-19 RX ADMIN — MIDAZOLAM HYDROCHLORIDE 0.5 MG: 2 INJECTION, SOLUTION INTRAMUSCULAR; INTRAVENOUS at 10:13

## 2024-09-19 RX ADMIN — FENTANYL CITRATE 25 MCG: 50 INJECTION, SOLUTION INTRAMUSCULAR; INTRAVENOUS at 10:13

## 2024-09-19 RX ADMIN — MIDAZOLAM HYDROCHLORIDE 1 MG: 2 INJECTION, SOLUTION INTRAMUSCULAR; INTRAVENOUS at 10:07

## 2024-09-19 ASSESSMENT — ENCOUNTER SYMPTOMS
SHORTNESS OF BREATH: 0
COUGH: 0
PALPITATIONS: 0
FEVER: 0
NAUSEA: 0
VOMITING: 0
WEIGHT LOSS: 0
DIZZINESS: 0
CHILLS: 0
NERVOUS/ANXIOUS: 0
CONSTIPATION: 0
SPEECH CHANGE: 0
ABDOMINAL PAIN: 1
DIARRHEA: 0

## 2024-09-19 ASSESSMENT — PAIN DESCRIPTION - PAIN TYPE
TYPE: ACUTE PAIN
TYPE: ACUTE PAIN;SURGICAL PAIN

## 2024-09-19 ASSESSMENT — FIBROSIS 4 INDEX: FIB4 SCORE: 0.85

## 2024-09-19 NOTE — PROGRESS NOTES
Monitor Summary    Rhythm: Fluctuates between 2nd degree type 1 & 2  Rate: 68-90  Ectopy: occasional PVCs

## 2024-09-19 NOTE — PROGRESS NOTES
Called for patient regarding abnormal EKG with concern for 2AVB type 2. Patient is noted to be hemodynamically stable.  Review of current hospital EKGs demonstrates concern for 2AVB type 2 and no overt evidence of CHB.  There is also noted 2AVB type 1 as well.  At this time no emergent in person consult necessary and formal consult in the morning deemed appropriate. Patient transferring to [facility] for closer monitoring.  Will consider TVP if needed.  Patient to be made NPO at this time except for meds with sips of water given concern for possible device therapy. Case was discussed with primary medicine service and chart reviewed.

## 2024-09-19 NOTE — PROGRESS NOTES
Pt brought up to floor with rapid team on zoll, defib pads on pt, pt oriented x4, asymptomatic, pt HR fluctuating between 2nd degree HB type 2 and 3rd degree HB, BP stable with SBP in 120s, no complaints of SOB

## 2024-09-19 NOTE — PROGRESS NOTES
Informed by nursing staff that pt's EKG not consistent. Presented at bedside, patient hemodynamically stable and no sx. Reviewed EKG- EKG from yesterday consistent with third degree heart block. EKG at time seeing patient consistent with second degree type 2 heart block. Tele reviewed and appeared to have episode of third degree heart block around midnight. Critical Care, Dr. Herndon, was consulted and accepted pt to [facility] for closer monitoring. Cardiology, Dr. Tolbert, contacted and said that since pt asymptomatic no current cardiology needs to agreed with transfer to [facility]. Discussed with patient at bedside as well and all questions answered.

## 2024-09-19 NOTE — PROGRESS NOTES
CT Nursing Note      Pancreatic Abscess Drain Placement by MD Ruff assisted by RT Michell,Mid Upper Abdomen access site.    40 ccs of purulent aspirate collected by Dr. Ruff, specimen sent to lab for C&S with GS, fungal culture.  Delivered to lab by this RN.    Access site with 10F drain in place, secured with Percustay, covered with Medipore, C/D/I.    Report given to RN Jun; patient transported to [facility] via IR RN Morenita monitored then transferred care to report RN.        FOLUP 10F x 25cm APDL, REF# [id number], LOT# [id number], Exp. 4/24/2027

## 2024-09-19 NOTE — PROGRESS NOTES
Pt has heart rhythm changes noted on monitor. Going between 1st degree heart block, second degree type 2 and third degree. [facility] rapid nurse called to bedside for consult, EKG done, Resident bedside, cards consulted, order placed to transfer to IMCU until evaluation can be done in morning. VSS, asymptomatic

## 2024-09-19 NOTE — CONSULTS
"CARDIAC CONSULTATION    Requesting Provider: Markus Bhandari D.O.  Reason for consultation: heart block    Impressions:  #. Mobitz I with periods of 2:1 conduction  #. RP abscess  #. Recent VTE with thrombus in transit Rx with IR thrombectomy  #. Hx of Breast cancer      Recommendations:  No cardiac intervention recommended. Ok to DC telemetry per primary service discretion    Will sign off. Please call with questions. Thank you.     History: Paige Gudino is a 78 y.o. female with a past medical history of  VTE, Breast cancer, RP abscess post surgical  who I have been consulted regarding bradycardia. Telemetry has demonstrated periods of bradycardia, dropped QRS complexes, particularly in the overnight hours. Patient feeling well aside from distress over recurrent infection and need for abdominal drain    ROS:   10 point review systems is otherwise negative except as per the HPI    PE:  /56   Pulse 70   Temp 36.7 °C (98.1 °F) (Temporal)   Resp (!) 25   Ht 1.626 m (5' 4\")   Wt 69.7 kg (153 lb 10.6 oz)   LMP  (LMP Unknown)   SpO2 94%   BMI 26.38 kg/m²   GEN: NAD  RESP: CTAB  CVS:  RRR, no M/R/G  ABD: Soft, NT/ND  EXT: WWP, No edema    Studies interpreted by me: ECG: sinus, mobitz I, periods of 2:1 conduction, narrow QRS        Past Medical History:   Diagnosis Date    Anesthesia     nausea post op    Cancer (HCC)     1987 breast left    Cancer of overlapping sites of left female breast (HCC)     Cataract 2024    IOL bilat    High cholesterol     Hypothyroidism     PONV (postoperative nausea and vomiting) 1987    Just felt nausous after anesthesia    Thyroid nodule      Past Surgical History:   Procedure Laterality Date    IA ULTRASONIC GUIDANCE, INTRAOPERATIVE  8/5/2024    Procedure: ULTRASOUND GUIDANCE;  Surgeon: Sachin Espinoza M.D.;  Location: SURGERY [facility];  Service: General    PANCREATECTOMY N/A 8/5/2024    Procedure: OPEN DISTAL PANCREATECTOMY WITH SPLENECTOMY, NODE " DISSECTION;  Surgeon: Sachin Espinoza M.D.;  Location: SURGERY [facility];  Service: General    SPLENECTOMY N/A 8/5/2024    Procedure: SPLENECTOMY;  Surgeon: Sachin Espinoza M.D.;  Location: SURGERY [facility];  Service: General    CREATION, FLAP, OMENTUM N/A 8/5/2024    Procedure: CREATION, FLAP, OMENTUM;  Surgeon: Sachin Espinoza M.D.;  Location: SURGERY [facility];  Service: General    PB MASTECTOMY, PARTIAL Left 08/01/2022    Procedure: MELLO LOCALIZED LEFT PARTIAL MASTECTOMY;  Surgeon: Elena Arroyo M.D.;  Location: SURGERY [facility];  Service: General    OTHER ORTHOPEDIC SURGERY  2019    back surgery    OTHER  2001    replace left breast implant    OTHER  1988    Left breast implant/lift    OTHER  1987    left mastectomy    LAMINOTOMY      LUMPECTOMY      PRIMARY C SECTION       Allergies   Allergen Reactions    Synthroid [Fd&C Red #40 Al Paul-Levothyroxine] Hives and Unspecified     Hives, Brand specific. Some brands are ok and some are not    Levering Thyroid [Thyroid] Diarrhea     diarrhea       Current Facility-Administered Medications:     liothyronine (Cytomel) tablet 5 mcg, 5 mcg, Oral, Once per day on Monday Wednesday Friday, 5 mcg at 09/18/24 0611 **AND** liothyronine (Cytomel) tablet 2.5 mcg, 2.5 mcg, Oral, Once per day on Sunday Tuesday Thursday Saturday, Josr Tiwari DAMBERLY, 2.5 mcg at 09/19/24 0836    [DISCONTINUED] piperacillin-tazobactam (Zosyn) 4.5 g in  mL IVPB, 4.5 g, Intravenous, Once **AND** piperacillin-tazobactam (Zosyn) 3.375 g in  mL IVPB, 3.375 g, Intravenous, Q8HRS, Josr Tiwari D.O., Last Rate: 25 mL/hr at 09/19/24 0845, 3.375 g at 09/19/24 0845    potassium chloride SA (Kdur) tablet 20 mEq, 20 mEq, Oral, DAILY, Tracy Lara M.D., 20 mEq at 09/19/24 0608    acetaminophen (Tylenol) tablet 650 mg, 650 mg, Oral, Q6HRS PRN, Doe Hernandez D.O.    senna-docusate (Pericolace Or Senokot S) 8.6-50 MG per tablet 2 Tablet, 2 Tablet,  Oral, Q EVENING **AND** polyethylene glycol/lytes (Miralax) Packet 1 Packet, 1 Packet, Oral, QDAY PRN, Doe Hernandez D.O.    amLODIPine (Norvasc) tablet 5 mg, 5 mg, Oral, DAILY, Josr Tiwari D.O., 5 mg at 09/19/24 0609    [Held by provider] apixaban (Eliquis) tablet 5 mg, 5 mg, Oral, BID, Josr Tiwari D.O.    levothyroxine (Synthroid) tablet 100 mcg, 100 mcg, Oral, AM ES, Josr Tiwari D.O., 100 mcg at 09/19/24 0609  Medications Prior to Admission   Medication Sig Dispense Refill Last Dose    amLODIPine (NORVASC) 5 MG Tab Take 1 Tablet by mouth every day. 30 Tablet 1 9/17/2024 at AM    apixaban (ELIQUIS) 5mg Tab Take 1 Tablet by mouth 2 times a day for 90 days. Indications: DVT/ Tablet 0 9/17/2024 at PM    liothyronine (CYTOMEL) 5 MCG Tab Take 2.5-5 mcg by mouth every day. 5 mcg on Monday, Wednesday, Friday. 2.5 mcg on all other days   9/17/2024 at AM    levothyroxine (SYNTHROID) 100 MCG Tab Take 100 mcg by mouth every morning on an empty stomach.   9/17/2024 at AM    acetaminophen (TYLENOL) 500 MG Tab Take 500 mg by mouth 1 time a day as needed for Mild Pain.   PRN at PRN    ibandronate (BONIVA) 150 MG tablet Take 1 Tablet by mouth every 30 days. 3 Tablet 4 8/24/2024 at UNK      Social History     Socioeconomic History    Marital status:      Spouse name: Not on file    Number of children: Not on file    Years of education: Not on file    Highest education level: Associate degree: academic program   Occupational History     Comment: retired banker   Tobacco Use    Smoking status: Never     Passive exposure: Past    Smokeless tobacco: Never   Vaping Use    Vaping status: Never Used   Substance and Sexual Activity    Alcohol use: Never    Drug use: Never    Sexual activity: Not Currently     Partners: Male     Birth control/protection: Abstinence, Male Sterilization, Post-Menopausal     Comment:    Other Topics Concern    Not on file   Social History Narrative    Not on file      Social Determinants of Health     Financial Resource Strain: Low Risk  (7/11/2024)    Overall Financial Resource Strain (CARDIA)     Difficulty of Paying Living Expenses: Not hard at all   Food Insecurity: No Food Insecurity (9/18/2024)    Hunger Vital Sign     Worried About Running Out of Food in the Last Year: Never true     Ran Out of Food in the Last Year: Never true   Transportation Needs: No Transportation Needs (9/18/2024)    PRAPARE - Transportation     Lack of Transportation (Medical): No     Lack of Transportation (Non-Medical): No   Physical Activity: Insufficiently Active (7/11/2024)    Exercise Vital Sign     Days of Exercise per Week: 2 days     Minutes of Exercise per Session: 10 min   Stress: No Stress Concern Present (7/11/2024)    [employer] - Occupational Stress Questionnaire     Feeling of Stress : Not at all   Social Connections: Unknown (7/11/2024)    Social Connection and Isolation Panel [NHANES]     Frequency of Communication with Friends and Family: More than three times a week     Frequency of Social Gatherings with Friends and Family: Twice a week     Attends Sikh Services: Patient declined     Active Member of Clubs or Organizations: Yes     Attends Club or Organization Meetings: More than 4 times per year     Marital Status:    Intimate Partner Violence: Not At Risk (9/18/2024)    Humiliation, Afraid, Rape, and Kick questionnaire     Fear of Current or Ex-Partner: No     Emotionally Abused: No     Physically Abused: No     Sexually Abused: No   Housing Stability: Low Risk  (9/18/2024)    Housing Stability Vital Sign     Unable to Pay for Housing in the Last Year: No     Number of Times Moved in the Last Year: 0     Homeless in the Last Year: No     Family History   Problem Relation Age of Onset    Cancer Mother         Ovarian    Ovarian Cancer Mother     Dementia Father     Heart Disease Father     Hyperlipidemia Neg Hx            Studies  Lab Results   Component Value Date/Time    CHOLSTRLTOT 183 09/27/2023 08:32 AM     (H) 09/27/2023 08:32 AM    HDL 46 09/27/2023 08:32 AM    TRIGLYCERIDE 112 09/27/2023 08:32 AM       Lab Results   Component Value Date/Time    SODIUM 137 09/19/2024 12:33 AM    POTASSIUM 4.1 09/19/2024 12:33 AM    CHLORIDE 104 09/19/2024 12:33 AM    CO2 21 09/19/2024 12:33 AM    GLUCOSE 111 (H) 09/19/2024 12:33 AM    BUN 9 09/19/2024 12:33 AM    CREATININE 0.70 09/19/2024 12:33 AM      Lab Results   Component Value Date/Time    PROTHROMBTM 16.8 (H) 09/19/2024 12:33 AM    INR 1.36 (H) 09/19/2024 12:33 AM      Lab Results   Component Value Date/Time    WBC 17.3 (H) 09/19/2024 12:33 AM    RBC 4.14 (L) 09/19/2024 12:33 AM    HEMOGLOBIN 12.4 09/19/2024 12:33 AM    HEMATOCRIT 38.5 09/19/2024 12:33 AM    MCV 93.0 09/19/2024 12:33 AM    MCH 30.0 09/19/2024 12:33 AM    MCHC 32.2 09/19/2024 12:33 AM    MPV 9.3 09/19/2024 12:33 AM    NEUTSPOLYS 84.30 (H) 09/19/2024 12:33 AM    LYMPHOCYTES 5.70 (L) 09/19/2024 12:33 AM    MONOCYTES 8.30 09/19/2024 12:33 AM    EOSINOPHILS 0.60 09/19/2024 12:33 AM    BASOPHILS 0.20 09/19/2024 12:33 AM    ANISOCYTOSIS 1+ 08/15/2024 03:30 AM

## 2024-09-19 NOTE — PROGRESS NOTES
Date of Service  September 19, 2024     Chief Complaint  Sent by MD (Sent by surgeon for elevated WBC. Pt reports abdominal drain removed last week and is concerned for infection at the site. Denies pain. )     Hospital Course  HD #2     Interval Problem Update  Patient noted to have irregular cardiac rhythm  ECG suggestive of 2nd degree AV heart block, patient generally stable  Transferred to T6 with cardiac monitoring  Awaiting planned IR drain placement - tentatively today on 9/19/24  Improved leukocytosis WBC 17.3 from 19.4, afebrile, Zosyn    Problem List  Principal Problem:    Pancreatic abscess (POA: Yes)  Active Problems:    Acquired hypothyroidism (POA: Yes)    Advance care planning (POA: Yes)    Age-related osteoporosis without current pathological fracture (POA: Yes)    History of breast cancer in adulthood (POA: Yes)    Pulmonary embolism with acute cor pulmonale, unspecified chronicity, unspecified pulmonary embolism type (HCC) (POA: Yes)    Primary hypertension (POA: Unknown)    AV block, 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   Respiratory:  Negative for cough and shortness of breath.    Cardiovascular:  Negative for chest pain.   Gastrointestinal:  Positive for abdominal pain. Negative for constipation, diarrhea, nausea and vomiting.         Objective  Temp:  [36.6 °C (97.9 °F)-37.8 °C (100 °F)] 37.8 °C (100 °F)  Pulse:  [68-94] 70  Resp:  [16-28] 25  BP: (106-132)/(56-69) 113/56  SpO2:  [88 %-94 %] 94 %      Physical Exam  Vitals and nursing note reviewed.   Constitutional:       General: She is not in acute distress.     Appearance: Normal appearance.   HENT:      Head: Normocephalic and atraumatic.      Nose: Nose normal.      Mouth/Throat:      Pharynx: Oropharynx is clear.   Eyes:      Conjunctiva/sclera: Conjunctivae normal.   Cardiovascular:       Rate and Rhythm: Normal rate.   Pulmonary:      Effort: Pulmonary effort is normal. No respiratory distress.      Breath sounds: Normal breath sounds.   Abdominal:      General: Abdomen is flat. There is no distension.      Tenderness: There is no abdominal tenderness.   Skin:     General: Skin is warm and dry.   Neurological:      Mental Status: She is alert and oriented to person, place, and time.   Psychiatric:         Mood and Affect: Mood normal.         Behavior: Behavior normal.        Fluids    Intake/Output Summary (Last 24 hours) at 9/19/2024 0758  Last data filed at 9/19/2024 0000  Gross per 24 hour   Intake 1200 ml   Output 0 ml   Net 1200 ml       Labs  Lab Results   Component Value Date/Time    SODIUM 137 09/19/2024 12:33 AM    POTASSIUM 4.1 09/19/2024 12:33 AM    CHLORIDE 104 09/19/2024 12:33 AM    CO2 21 09/19/2024 12:33 AM    GLUCOSE 111 (H) 09/19/2024 12:33 AM    BUN 9 09/19/2024 12:33 AM    CREATININE 0.70 09/19/2024 12:33 AM         Lab Results   Component Value Date/Time    PROTHROMBTM 16.8 (H) 09/19/2024 12:33 AM    INR 1.36 (H) 09/19/2024 12:33 AM         Lab Results   Component Value Date/Time    WBC 17.3 (H) 09/19/2024 12:33 AM    RBC 4.14 (L) 09/19/2024 12:33 AM    HEMOGLOBIN 12.4 09/19/2024 12:33 AM    HEMATOCRIT 38.5 09/19/2024 12:33 AM    MCV 93.0 09/19/2024 12:33 AM    MCH 30.0 09/19/2024 12:33 AM    MCHC 32.2 09/19/2024 12:33 AM    MPV 9.3 09/19/2024 12:33 AM    NEUTSPOLYS 84.30 (H) 09/19/2024 12:33 AM    LYMPHOCYTES 5.70 (L) 09/19/2024 12:33 AM    MONOCYTES 8.30 09/19/2024 12:33 AM    EOSINOPHILS 0.60 09/19/2024 12:33 AM    BASOPHILS 0.20 09/19/2024 12:33 AM    ANISOCYTOSIS 1+ 08/15/2024 03:30 AM         Recent Labs     09/17/24  1448 09/17/24 2050 09/18/24  0600 09/19/24  0033   ASTSGOT 39 35 32 27   ALTSGPT 30 28 28 21   TBILIRUBIN 1.7* 1.4 1.7* 1.3   GLOBULIN 3.5 3.4 3.0 3.0   INR  --   --   --  1.36*      Imaging  CT A/P (9/17/24)  IMPRESSION:     1.  Post distal  pancreatectomy.     2.  Fluid collection with some peripheral enhancement is identified in the distal pancreatic region including the region of previous pancreatectomy. Developing pseudocyst is a possibility. No emphysema or hemorrhage is appreciated. Remaining pancreas   enhances normally.     3.  There is cholelithiasis.     4.  Left pleural effusion and consolidations in the left lung base.     5.  No change in hiatal hernia.     6.  Prominent endometrial cavity again noted.     Assessment/Plan  Patient is a 78F with recent pancreatectomy and splenectomy, subsequent retroperitoneal abscess with drain placement. Now admitted from ED with leukocytosis WBC 23.8 noted to have recurrence of fluid collection in the distal pancreatic region.      Katelynn-pancreatic fluid collection, s/p distal pancreatectomy / splenectomy  - Plan for IR drain - tentatively planned today 9/19/24  - Patient recently on Apixaban - now held  - NPO awaiting procedure  - Continue Zosyn      Patient seen with Dr Esquivel

## 2024-09-19 NOTE — CONSULTS
Brief intensivist note.     77yo female with hx of pancreatic serous cystadenoma s/p pancreatectomy, splenectomy who was admitted on 9/17 for suspected pancreatic abscess. Pt was admitted to the floor. HD stable.   ECG noted to be 3rd degree AV block.   This evening, I got called with ECG appears to be 2nd degree AV block type 2.   Pt remains hemodynamically stable.     Agree to transfer to [facility] for closer monitoring.   Primary team will call cardiology consult to assist.   D/w primary team. Dr. Piña.     Raheel Herndon D.O.

## 2024-09-19 NOTE — PROGRESS NOTES
Hospital Medicine Daily Progress Note    Date of Service  9/19/2024    Chief Complaint  Abdominal pain    Hospital Course  Paige Gudino is a 78 y.o. female with breast cancer, pulmonary embolism (noted on CT 8/14/24), bilateral DVT (8/15 US), prior partial tail pancreatectomy and spleenectomy, and recent retroperitoneal abscess treated with drain placement (drain removed 9/11/24 and prior cx 8/23 with Staph epi).  Due to outpatient leukocytosis her oncologist recommended evaluation in hospital. She was having epigastric pain and intermittant fever. She admitted 9/17/2024 with wbc:20.4.  A CT abd showed fluid collection in distal pancreatic region. There was suspect of abscess in pancreatic region.      Interval Problem Update  9/19/2024: Met with the patient and her .  Patient with abdominal drain that was placed today by IR.  She has some pain but ICE pack and medications helping.  Currently in NSR with some PVCs on telemetry. Discussed with them that there were no cardiology recommendations and okay to stop telemetry monitor if needed.  Discussed with them that the cultures will take some time to grow but she has been on antibiotics which may take care of current infection.  Brownish gray thick secretions in YEMI drain.    I have discussed this patient's plan of care and discharge plan at IDT rounds today with Case Management, Nursing, Nursing leadership, and other members of the IDT team.    Consultants/Specialty  cardiology and surgical oncology    Code Status  Full Code    Disposition  Medically Cleared  I have placed the appropriate orders for post-discharge needs.    Review of Systems  Review of Systems   Constitutional:  Positive for malaise/fatigue. Negative for fever.   Respiratory:  Negative for shortness of breath.    Cardiovascular:  Negative for palpitations.   Gastrointestinal:  Positive for abdominal pain. Negative for nausea.   Neurological:  Negative for dizziness and speech  change.   Psychiatric/Behavioral:  The patient is not nervous/anxious.         Physical Exam  Temp:  [36.7 °C (98.1 °F)-37.8 °C (100 °F)] 36.9 °C (98.5 °F)  Pulse:  [52-87] 72  Resp:  [18-58] 29  BP: (106-140)/(51-68) 140/60  SpO2:  [91 %-97 %] 96 %    Physical Exam  Vitals reviewed.   HENT:      Head: Normocephalic.      Nose: Nose normal.      Mouth/Throat:      Mouth: Mucous membranes are moist.   Eyes:      Extraocular Movements: Extraocular movements intact.      Conjunctiva/sclera: Conjunctivae normal.   Cardiovascular:      Rate and Rhythm: Normal rate and regular rhythm.      Pulses: Normal pulses.      Heart sounds: No murmur heard.  Pulmonary:      Effort: Pulmonary effort is normal. No respiratory distress.      Breath sounds: Normal breath sounds.   Abdominal:      General: Bowel sounds are normal. There is no distension.      Palpations: Abdomen is soft.      Comments: LUQ YEMI drain present   Musculoskeletal:      Cervical back: Normal range of motion.      Right lower leg: No edema.      Left lower leg: No edema.   Lymphadenopathy:      Cervical: No cervical adenopathy.   Skin:     General: Skin is warm.   Neurological:      General: No focal deficit present.      Mental Status: She is alert and oriented to person, place, and time.   Psychiatric:         Mood and Affect: Mood normal.         Fluids    Intake/Output Summary (Last 24 hours) at 9/19/2024 2000  Last data filed at 9/19/2024 1834  Gross per 24 hour   Intake 0 ml   Output 52 ml   Net -52 ml        Laboratory  Recent Labs     09/17/24 2050 09/18/24 0600 09/19/24  0033   WBC 20.4* 19.4* 17.3*   RBC 4.55 4.25 4.14*   HEMOGLOBIN 13.6 12.6 12.4   HEMATOCRIT 41.7 39.0 38.5   MCV 91.6 91.8 93.0   MCH 29.9 29.6 30.0   MCHC 32.6 32.3 32.2   RDW 47.4 47.5 48.1   PLATELETCT 574* 560* 535*   MPV 9.2 9.2 9.3     Recent Labs     09/17/24 2050 09/18/24 0600 09/19/24  0033   SODIUM 136 134* 137   POTASSIUM 3.5* 4.2 4.1   CHLORIDE 101 102 104   CO2 21 21  21   GLUCOSE 143* 122* 111*   BUN 13 11 9   CREATININE 0.69 0.72 0.70   CALCIUM 8.3* 7.9* 8.1*     Recent Labs     09/19/24  0033   INR 1.36*               Imaging  CT-IMAGE-GUIDED DRAIN PERITONEAL   Final Result      1.  CT GUIDED PERITONEAL LEFT UPPER QUADRANT ABDOMINAL ABSCESS DRAINAGE. PLACEMENT OF 10 Cypriot LOCKING LOOP CATHETER.   2.  THE CURRENT PLAN IS TO IRRIGATE ONCE DAILY WITH 5 ML STERILE SALINE, CHECK CULTURES, MONITOR DRAINAGE OUTPUT AND OBTAIN A FOLLOW-UP CT SCAN IN 5-7 DAYS IF CLINICALLY INDICATED.      CT-ABDOMEN-PELVIS WITH   Final Result      1.  Post distal pancreatectomy.      2.  Fluid collection with some peripheral enhancement is identified in the distal pancreatic region including the region of previous pancreatectomy. Developing pseudocyst is a possibility. No emphysema or hemorrhage is appreciated. Remaining pancreas    enhances normally.      3.  There is cholelithiasis.      4.  Left pleural effusion and consolidations in the left lung base.      5.  No change in hiatal hernia.      6.  Prominent endometrial cavity again noted.           Assessment/Plan  * Pancreatic abscess- (present on admission)  Assessment & Plan  8/22/24 she underwent CT guided left upper quadrant fluid collection catheter drainage. Drain in place from 8/22-9/11. At time of removal was draining only 1-2 cc/day.   Prior cx 8/23/24 positive for staph epi.   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  NSR as of 9/19 on my evaluation via telemetry  Monitor electrolytes  Titrate oxygen to keep SpO2 >89%  Restart apixaban 5mg BID asap for hx of PE    Primary hypertension  Assessment & Plan  Continue home amlodipine w/ parameters  Monitor vitals    Acute respiratory  failure with hypoxia (HCC)- (present on admission)  Assessment & Plan  Titrate supplemental oxygen to keep SpO2 >89%  RT per protocol    Pulmonary embolism with acute cor pulmonale, unspecified chronicity, unspecified pulmonary embolism type (HCC)- (present on admission)  Assessment & Plan  CT 8/14/24 with bilateral segmental and subsegmental pulmonary emboli with findings compatible with significant R heart strain.   S/p R atrial thrombectomy by Dr. Bowen   Holding home Apixaban pending possible procedure. Restart if okay w/ Surgery/IR 9/20    History of breast cancer in adulthood- (present on admission)  Assessment & Plan  noninvasive carcinoma of L breast 34 year ago (s/p total mastectomy) and invasive grade 2 mammary carcinoma 2022 also left breast  -outpt f/u     Age-related osteoporosis without current pathological fracture- (present on admission)  Assessment & Plan  Is on [facility] outpt- not due for 7 more days. Hold while in hospital    Advance care planning- (present on admission)  Assessment & Plan  FULL code    Acquired hypothyroidism- (present on admission)  Assessment & Plan  continue home levothyroxine and liothyronine   One month ago TSH:4.57         VTE prophylaxis:   SCDs/TEDs      I have performed a physical exam and reviewed and updated ROS and Plan today (9/19/2024). In review of yesterday's note (9/18/2024), there are no changes except as documented above.

## 2024-09-19 NOTE — OR SURGEON
Immediate Post- Operative Note        PostOp Diagnosis: LUQ ABSCESS COLLECTION AT SITE OF PREVIOUS CATHETER DRAINAGE      Procedure(s): CT GUIDED CATHETER DRAINAGE WITH PLACEMENT OF 10 Frisian LOCKING LOOP CATHETER LUQ QUADRANT    EVACUATION: 40 ML YELLOWISH-GREENISH PUS    FINDINGS: CATHETER IN GOOD POSITION      Estimated Blood Loss: <1 CC        Complications: NONE        9/19/2024     10:24 AM     Jesus Ruff M.D.

## 2024-09-19 NOTE — PROGRESS NOTES
.4 Eyes Skin Assessment Completed by NURIS Beltran and NURIS Rivas.    Head WDL  Ears Redness and Blanching  Nose WDL  Mouth WDL  Neck WDL  Breast/Chest WDL- left mastectomy scar   Shoulder Blades WDL  Spine WDL  (R) Arm/Elbow/Hand Redness and Blanching    (L) Arm/Elbow/Hand Redness and Blanching  Abdomen Scar  Groin WDL  Scrotum/Coccyx/Buttocks Redness and Blanching  (R) Leg Redness and Blanching- side of shin per pt its from crossing her legs all the time, she states its always there   (L) Leg WDL  (R) Heel/Foot/Toe Redness, Blanching, and Boggy  (L) Heel/Foot/Toe Redness, Blanching, and Boggy          Devices In Places ECG, Blood Pressure Cuff, and Pulse Ox      Interventions In Place NC W/Ear Foams, Heel Mepilex, TAP System, Pillows, Low Air Loss Mattress, Heels Loaded W/Pillows, and Pressure Redistribution Mattress    Possible Skin Injury No    Pictures Uploaded Into Epic Yes  Wound Consult Placed N/A  RN Wound Prevention Protocol Ordered Yes .

## 2024-09-19 NOTE — CARE PLAN
The patient is Stable - Low risk of patient condition declining or worsening    Shift Goals  Clinical Goals: hemodynamic stability, down to IR  Patient Goals: get panc drain  Family Goals: pt comfort    Progress made toward(s) clinical / shift goals:  IR pancreatic drain place, pt not reporting pain    Problem: Knowledge Deficit - Standard  Goal: Patient and family/care givers will demonstrate understanding of plan of care, disease process/condition, diagnostic tests and medications  Outcome: Progressing     Problem: Pain - Standard  Goal: Alleviation of pain or a reduction in pain to the patient’s comfort goal  Outcome: Progressing     Problem: Communication  Goal: The ability to communicate needs accurately and effectively will improve  Outcome: Progressing     Problem: Safety  Goal: Will remain free from injury  Outcome: Progressing  Goal: Will remain free from falls  Outcome: Progressing     Problem: Pain Management  Goal: Pain level will decrease to patient's comfort goal  Outcome: Progressing     Problem: Infection  Goal: Will remain free from infection  Outcome: Progressing     Problem: Venous Thromboembolism (VTW)/Deep Vein Thrombosis (DVT) Prevention:  Goal: Patient will participate in Venous Thrombosis (VTE)/Deep Vein Thrombosis (DVT)Prevention Measures  Outcome: Progressing     Problem: Psychosocial Needs:  Goal: Level of anxiety will decrease  Outcome: Progressing     Problem: Fluid Volume:  Goal: Will maintain balanced intake and output  Outcome: Progressing     Problem: Respiratory:  Goal: Respiratory status will improve  Outcome: Progressing     Problem: Bowel/Gastric:  Goal: Normal bowel function is maintained or improved  Outcome: Progressing  Goal: Will not experience complications related to bowel motility  Outcome: Progressing     Problem: Urinary Elimination:  Goal: Ability to reestablish a normal urinary elimination pattern will improve  Outcome: Progressing     Problem: Skin Integrity  Goal:  Risk for impaired skin integrity will decrease  Outcome: Progressing     Problem: Mobility  Goal: Risk for activity intolerance will decrease  Outcome: Progressing     Problem: Medication  Goal: Compliance with prescribed medication will improve  Outcome: Progressing     Problem: Knowledge Deficit  Goal: Knowledge of disease process/condition, treatment plan, diagnostic tests, and medications will improve  Outcome: Progressing  Goal: Knowledge of the prescribed therapeutic regimen will improve  Outcome: Progressing     Problem: Discharge Barriers/Planning  Goal: Patient's continuum of care needs will be met  Outcome: Progressing       Patient is not progressing towards the following goals:

## 2024-09-20 ENCOUNTER — APPOINTMENT (OUTPATIENT)
Dept: VASCULAR LAB | Facility: MEDICAL CENTER | Age: 79
End: 2024-09-20
Attending: FAMILY MEDICINE
Payer: COMMERCIAL

## 2024-09-20 PROBLEM — K65.9 ABDOMINAL INFECTION (HCC): Status: ACTIVE | Noted: 2024-09-20

## 2024-09-20 LAB
BACTERIA UR CULT: ABNORMAL
BACTERIA UR CULT: ABNORMAL
EKG IMPRESSION: NORMAL
EKG IMPRESSION: NORMAL
ERYTHROCYTE [DISTWIDTH] IN BLOOD BY AUTOMATED COUNT: 47.3 FL (ref 35.9–50)
HCT VFR BLD AUTO: 45.2 % (ref 37–47)
HGB BLD-MCNC: 14.6 G/DL (ref 12–16)
MCH RBC QN AUTO: 29.9 PG (ref 27–33)
MCHC RBC AUTO-ENTMCNC: 32.3 G/DL (ref 32.2–35.5)
MCV RBC AUTO: 92.6 FL (ref 81.4–97.8)
PLATELET # BLD AUTO: 612 K/UL (ref 164–446)
PMV BLD AUTO: 9.4 FL (ref 9–12.9)
RBC # BLD AUTO: 4.88 M/UL (ref 4.2–5.4)
SIGNIFICANT IND 70042: ABNORMAL
SITE SITE: ABNORMAL
SOURCE SOURCE: ABNORMAL
WBC # BLD AUTO: 11.7 K/UL (ref 4.8–10.8)

## 2024-09-20 PROCEDURE — 99232 SBSQ HOSP IP/OBS MODERATE 35: CPT | Performed by: HOSPITALIST

## 2024-09-20 PROCEDURE — 99232 SBSQ HOSP IP/OBS MODERATE 35: CPT | Performed by: SURGERY

## 2024-09-20 PROCEDURE — A9270 NON-COVERED ITEM OR SERVICE: HCPCS

## 2024-09-20 PROCEDURE — 700101 HCHG RX REV CODE 250: Performed by: NURSE PRACTITIONER

## 2024-09-20 PROCEDURE — 700105 HCHG RX REV CODE 258: Performed by: INTERNAL MEDICINE

## 2024-09-20 PROCEDURE — 770001 HCHG ROOM/CARE - MED/SURG/GYN PRIV*

## 2024-09-20 PROCEDURE — 700105 HCHG RX REV CODE 258

## 2024-09-20 PROCEDURE — A9270 NON-COVERED ITEM OR SERVICE: HCPCS | Mod: JZ

## 2024-09-20 PROCEDURE — 700102 HCHG RX REV CODE 250 W/ 637 OVERRIDE(OP)

## 2024-09-20 PROCEDURE — A9270 NON-COVERED ITEM OR SERVICE: HCPCS | Performed by: HOSPITALIST

## 2024-09-20 PROCEDURE — 700102 HCHG RX REV CODE 250 W/ 637 OVERRIDE(OP): Performed by: HOSPITALIST

## 2024-09-20 PROCEDURE — 99223 1ST HOSP IP/OBS HIGH 75: CPT | Performed by: INTERNAL MEDICINE

## 2024-09-20 PROCEDURE — 700111 HCHG RX REV CODE 636 W/ 250 OVERRIDE (IP): Mod: JZ | Performed by: INTERNAL MEDICINE

## 2024-09-20 PROCEDURE — 700111 HCHG RX REV CODE 636 W/ 250 OVERRIDE (IP): Mod: JZ

## 2024-09-20 PROCEDURE — 85027 COMPLETE CBC AUTOMATED: CPT

## 2024-09-20 PROCEDURE — 93010 ELECTROCARDIOGRAM REPORT: CPT | Performed by: INTERNAL MEDICINE

## 2024-09-20 PROCEDURE — 700102 HCHG RX REV CODE 250 W/ 637 OVERRIDE(OP): Mod: JZ

## 2024-09-20 RX ORDER — SODIUM CHLORIDE 0.9 % (FLUSH) 0.9 %
10 SYRINGE (ML) INJECTION 2 TIMES DAILY
Status: DISCONTINUED | OUTPATIENT
Start: 2024-09-20 | End: 2024-10-02 | Stop reason: HOSPADM

## 2024-09-20 RX ADMIN — LEVOTHYROXINE SODIUM 100 MCG: 0.1 TABLET ORAL at 05:42

## 2024-09-20 RX ADMIN — ACETAMINOPHEN 650 MG: 325 TABLET ORAL at 05:41

## 2024-09-20 RX ADMIN — AMPICILLIN AND SULBACTAM 3 G: 1; 2 INJECTION, POWDER, FOR SOLUTION INTRAMUSCULAR; INTRAVENOUS at 17:50

## 2024-09-20 RX ADMIN — ACETAMINOPHEN 650 MG: 325 TABLET ORAL at 18:06

## 2024-09-20 RX ADMIN — APIXABAN 5 MG: 5 TABLET, FILM COATED ORAL at 08:54

## 2024-09-20 RX ADMIN — LIOTHYRONINE SODIUM 5 MCG: 5 TABLET ORAL at 05:41

## 2024-09-20 RX ADMIN — SODIUM CHLORIDE, PRESERVATIVE FREE 10 ML: 5 INJECTION INTRAVENOUS at 17:54

## 2024-09-20 RX ADMIN — PIPERACILLIN AND TAZOBACTAM 3.38 G: 3; .375 INJECTION, POWDER, FOR SOLUTION INTRAVENOUS at 08:58

## 2024-09-20 RX ADMIN — ACETAMINOPHEN 650 MG: 325 TABLET ORAL at 00:18

## 2024-09-20 RX ADMIN — AMLODIPINE BESYLATE 5 MG: 10 TABLET ORAL at 05:43

## 2024-09-20 RX ADMIN — POTASSIUM CHLORIDE 20 MEQ: 1500 TABLET, EXTENDED RELEASE ORAL at 05:42

## 2024-09-20 RX ADMIN — APIXABAN 5 MG: 5 TABLET, FILM COATED ORAL at 17:53

## 2024-09-20 RX ADMIN — PIPERACILLIN AND TAZOBACTAM 3.38 G: 3; .375 INJECTION, POWDER, FOR SOLUTION INTRAVENOUS at 01:37

## 2024-09-20 ASSESSMENT — PAIN DESCRIPTION - PAIN TYPE
TYPE: ACUTE PAIN

## 2024-09-20 ASSESSMENT — ENCOUNTER SYMPTOMS
SHORTNESS OF BREATH: 0
NAUSEA: 0
ABDOMINAL PAIN: 0
FEVER: 0
SPEECH CHANGE: 0
PALPITATIONS: 0
ABDOMINAL PAIN: 1
WEAKNESS: 0
MYALGIAS: 1
DIARRHEA: 0
VOMITING: 0
HEADACHES: 0
CHILLS: 0
NERVOUS/ANXIOUS: 0

## 2024-09-20 ASSESSMENT — FIBROSIS 4 INDEX: FIB4 SCORE: 0.85

## 2024-09-20 NOTE — CONSULTS
"INFECTIOUS DISEASES INPATIENT CONSULT NOTE     Date of Service:9/20/24    Consult Requested By: Markus Bhandari D.O.    Reason for Consultation: abd abscess    History of Present Illness:   Paige Gudino is a 78 y.o. 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 Per patient, for the past 1-2 days she had a \"striking\" pain in her epigastric area that lasts a few seconds each time and occurs several times per day. She rated the pain as a 4/10 but said she had been through a lot and had high pain tolerance. She also noted intermittent feverish feeling. Denied chills, no chest pain, no shortness of breath etc (see full ROS below).      On presenting to the ED patient afebrile with HR 70s-110s, RR 16-22, BP 110s-140s/50s-70s, and O2 ranging from 87-93 on room air. Patient adamantly declined supplemental oxygen when seen and O2 was able to return to low 90%s with deep breaths. Labs notable for WBCs 20.4, , K of 3.5. CT Abd-Pelvis performed and notable for fluid collection in distal pancreatic region . Given her hx and leukocytosis this is suspected to be a pancreatic abscess. Code status with patient and her  at bedside and she would like to remain full code at this time. Patient then admitted for close monitoring and possible drain placement     Review Of Systems:  All other systems are reviewed and are negative     PMH:   Past Medical History:   Diagnosis Date    Anesthesia     nausea post op    Cancer (HCC)     1987 breast left    Cancer " of overlapping sites of left female breast (HCC)     Cataract 2024    IOL bilat    High cholesterol     Hypothyroidism     PONV (postoperative nausea and vomiting) 1987    Just felt nausous after anesthesia    Thyroid nodule          PSH:  Past Surgical History:   Procedure Laterality Date    ND ULTRASONIC GUIDANCE, INTRAOPERATIVE  8/5/2024    Procedure: ULTRASOUND GUIDANCE;  Surgeon: Sachin Espinoza M.D.;  Location: SURGERY [facility];  Service: General    PANCREATECTOMY N/A 8/5/2024    Procedure: OPEN DISTAL PANCREATECTOMY WITH SPLENECTOMY, NODE DISSECTION;  Surgeon: Sachin Espinoza M.D.;  Location: SURGERY [facility];  Service: General    SPLENECTOMY N/A 8/5/2024    Procedure: SPLENECTOMY;  Surgeon: Sachin Espinoza M.D.;  Location: SURGERY [facility];  Service: General    CREATION, FLAP, OMENTUM N/A 8/5/2024    Procedure: CREATION, FLAP, OMENTUM;  Surgeon: Sachin Espinoza M.D.;  Location: SURGERY [facility];  Service: General    PB MASTECTOMY, PARTIAL Left 08/01/2022    Procedure: MELLO LOCALIZED LEFT PARTIAL MASTECTOMY;  Surgeon: Elena Arroyo M.D.;  Location: SURGERY [facility];  Service: General    OTHER ORTHOPEDIC SURGERY  2019    back surgery    OTHER  2001    replace left breast implant    OTHER  1988    Left breast implant/lift    OTHER  1987    left mastectomy    LAMINOTOMY      LUMPECTOMY      PRIMARY C SECTION         FAMILY HX:  Family History   Problem Relation Age of Onset    Cancer Mother         Ovarian    Ovarian Cancer Mother     Dementia Father     Heart Disease Father     Hyperlipidemia Neg Hx        SOCIAL HX:  Social History     Socioeconomic History    Marital status:      Spouse name: Not on file    Number of children: Not on file    Years of education: Not on file    Highest education level: Associate degree: academic program   Occupational History     Comment: retired banker   Tobacco Use    Smoking status: Never     Passive  exposure: Past    Smokeless tobacco: Never   Vaping Use    Vaping status: Never Used   Substance and Sexual Activity    Alcohol use: Never    Drug use: Never    Sexual activity: Not Currently     Partners: Male     Birth control/protection: Abstinence, Male Sterilization, Post-Menopausal     Comment:    Other Topics Concern    Not on file   Social History Narrative    Not on file     Social Determinants of Health     Financial Resource Strain: Low Risk  (7/11/2024)    Overall Financial Resource Strain (CARDIA)     Difficulty of Paying Living Expenses: Not hard at all   Food Insecurity: No Food Insecurity (9/18/2024)    Hunger Vital Sign     Worried About Running Out of Food in the Last Year: Never true     Ran Out of Food in the Last Year: Never true   Transportation Needs: No Transportation Needs (9/18/2024)    PRAPARE - Transportation     Lack of Transportation (Medical): No     Lack of Transportation (Non-Medical): No   Physical Activity: Insufficiently Active (7/11/2024)    Exercise Vital Sign     Days of Exercise per Week: 2 days     Minutes of Exercise per Session: 10 min   Stress: No Stress Concern Present (7/11/2024)    [employer] - Occupational Stress Questionnaire     Feeling of Stress : Not at all   Social Connections: Unknown (7/11/2024)    Social Connection and Isolation Panel [NHANES]     Frequency of Communication with Friends and Family: More than three times a week     Frequency of Social Gatherings with Friends and Family: Twice a week     Attends Sikhism Services: Patient declined     Active Member of Clubs or Organizations: Yes     Attends Club or Organization Meetings: More than 4 times per year     Marital Status:    Intimate Partner Violence: Not At Risk (9/18/2024)    Humiliation, Afraid, Rape, and Kick questionnaire     Fear of Current or Ex-Partner: No     Emotionally Abused: No     Physically Abused: No     Sexually Abused: No   Housing  Stability: Low Risk  (9/18/2024)    Housing Stability Vital Sign     Unable to Pay for Housing in the Last Year: No     Number of Times Moved in the Last Year: 0     Homeless in the Last Year: No     Social History     Tobacco Use   Smoking Status Never    Passive exposure: Past   Smokeless Tobacco Never     Social History     Substance and Sexual Activity   Alcohol Use Never       Allergies/Intolerances:  Allergies   Allergen Reactions    Synthroid [Fd&C Red #40 Al Paul-Levothyroxine] Hives and Unspecified     Hives, Brand specific. Some brands are ok and some are not    [place] Thyroid [Thyroid] Diarrhea     diarrhea         Other Current Medications:    Current Facility-Administered Medications:     apixaban (Eliquis) tablet 5 mg, 5 mg, Oral, BID, Markus Bhandari D.O., 5 mg at 09/20/24 0854    liothyronine (Cytomel) tablet 5 mcg, 5 mcg, Oral, Once per day on Monday Wednesday Friday, 5 mcg at 09/20/24 0541 **AND** liothyronine (Cytomel) tablet 2.5 mcg, 2.5 mcg, Oral, Once per day on Sunday Tuesday Thursday Saturday, Josr Tiwari D.O., 2.5 mcg at 09/19/24 0836    [DISCONTINUED] piperacillin-tazobactam (Zosyn) 4.5 g in  mL IVPB, 4.5 g, Intravenous, Once **AND** piperacillin-tazobactam (Zosyn) 3.375 g in  mL IVPB, 3.375 g, Intravenous, Q8HRS, Josr Tiwari D.O., Last Rate: 25 mL/hr at 09/20/24 0858, 3.375 g at 09/20/24 0858    potassium chloride SA (Kdur) tablet 20 mEq, 20 mEq, Oral, DAILY, Tracy Lara M.D., 20 mEq at 09/20/24 0542    acetaminophen (Tylenol) tablet 650 mg, 650 mg, Oral, Q6HRS PRN, Doe Hernandez D.O., 650 mg at 09/20/24 0541    senna-docusate (Pericolace Or Senokot S) 8.6-50 MG per tablet 2 Tablet, 2 Tablet, Oral, Q EVENING **AND** polyethylene glycol/lytes (Miralax) Packet 1 Packet, 1 Packet, Oral, QDAY PRN, Doe Hernandez D.O.    amLODIPine (Norvasc) tablet 5 mg, 5 mg, Oral, DAILY, Josr Tiwari D.O., 5 mg at 09/20/24 9024    levothyroxine (Synthroid) tablet 100 mcg,  "100 mcg, Oral, AM ES, Josr Tiwari D.O., 100 mcg at 24 0542      Most Recent Vital Signs:  /60   Pulse 82   Temp 36.4 °C (97.6 °F)   Resp (!) 28   Ht 1.626 m (5' 4\")   Wt 72.5 kg (159 lb 13.3 oz)   LMP  (LMP Unknown)   SpO2 92%   BMI 27.44 kg/m²   Temp  Av.8 °C (98.3 °F)  Min: 36.2 °C (97.2 °F)  Max: 37.8 °C (100 °F)    Physical Exam:  General: well-appearing, well nourished no acute distress  HEENT: NCAT, PERRLA, sclera anicteric  Neck: supple, no lymphadenopathy  Chest: CTAB, unlabored.  Cardiac: RRR  Abdomen:  soft, tender, non-distended YEMI thick tan  Extremities: No cyanosis, clubbing  Skin: no rashes   Neuro: Alert and oriented times 3, Speech fluent CN intact SOLIS  Psych: Mood normal Affect normal    Pertinent Lab Results:  Recent Labs     24  0033   WBC 20.4* 19.4* 17.3*      Recent Labs     24  0033   HEMOGLOBIN 13.6 12.6 12.4   HEMATOCRIT 41.7 39.0 38.5   MCV 91.6 91.8 93.0   MCH 29.9 29.6 30.0   PLATELETCT 574* 560* 535*         Recent Labs     24  0033   SODIUM 136 134* 137   POTASSIUM 3.5* 4.2 4.1   CHLORIDE 101 102 104   CO2 21 21 21   CREATININE 0.69 0.72 0.70        Recent Labs     24  0033   ALBUMIN 3.4 3.0* 2.9*        Pertinent Micro:  Results       Procedure Component Value Units Date/Time    Urine Culture (New) [[id number]]  (Abnormal) Collected: 24 1700    Order Status: Completed Specimen: Urine Updated: 24 1138     Significant Indicator POS     Source UR     Site -     Culture Result -      Candida albicans  10-50,000 cfu/mL      Fungal Culture [[id number]] Collected: 24 1020    Order Status: Completed Specimen: Wound from Other Body Fluid Updated: 24 0708     Significant Indicator NEG     Source WND     Site pancreatic abscess     Culture Result Culture in progress.     Fungal Smear Results No fungal " "elements seen.    CULTURE WOUND W/ GRAM STAIN [[id number]]  (Abnormal) Collected: 09/19/24 1020    Order Status: Completed Specimen: Wound Updated: 09/20/24 0708     Significant Indicator POS     Source WND     Site pancreatic abscess     Culture Result -     Gram Stain Result Many WBCs.  Many Gram positive cocci.       Culture Result Staphylococcus aureus  Heavy growth  Methicillin sensitive by screening method      GRAM STAIN [[id number]]  (Abnormal) Collected: 09/19/24 1020    Order Status: Completed Specimen: Wound Updated: 09/19/24 1811     Significant Indicator .     Source WND     Site pancreatic abscess     Gram Stain Result Many WBCs.  Many Gram positive cocci.      Fungal Smear [[id number]] Collected: 09/19/24 1020    Order Status: Completed Specimen: Wound Updated: 09/19/24 1811     Significant Indicator NEG     Source WND     Site pancreatic abscess     Fungal Smear Results No fungal elements seen.    FLUID CULTURE W/GRAM STAIN [[id number]] Collected: 09/19/24 1020    Order Status: Canceled Specimen: Other Body Fluid     Urinalysis [[id number]]  (Abnormal) Collected: 09/18/24 1700    Order Status: Completed Specimen: Urine Updated: 09/18/24 1817     Color DK Yellow     Character Clear     Specific Gravity 1.033     Ph 5.0     Glucose Negative mg/dL      Ketones Trace mg/dL      Protein Negative mg/dL      Bilirubin Negative     Urobilinogen, Urine 1.0     Nitrite Negative     Leukocyte Esterase Small     Occult Blood Negative     Micro Urine Req Microscopic    BLOOD CULTURE [[id number]]     Order Status: Canceled Specimen: Blood from Peripheral     BLOOD CULTURE [[id number]]     Order Status: Canceled Specimen: Blood from Peripheral           No results found for: \"BLOODCULTU\", \"BLDCULT\", \"BCHOLD\"     Studies:    IMPRESSION:   Abd abscess  -s/p pancreatectomy, splenectomy, stomach and ceilac node dissection 8/5/24  -complicated by abscess. Culture 8/23 staph epi, possible contaminant  -recurrent abscess after " drain removal 9/11  -s/p IR drain 9/19  -current cultures MSSA  Recurrent breast cancer  -Remains upset about switch to Tamoxifen as it caused multiple PE  -Also had complication with another medication  -At this time no interest in additional chemo      PLAN:   S/p drain placement 9/19  Change to Unasyn  Repeat CT scan 5-7 days from date of drain placement  Management drain per IR  Wound care  Final antibiotic recommendations per culture results and clinical course    Plan of care discussed with IM Markus Bhandari D.O.. Will continue to follow    Charisse Fuentes M.D.

## 2024-09-20 NOTE — PROGRESS NOTES
"/56   Pulse 77   Temp 36 °C (96.8 °F)   Resp 16   Ht 1.626 m (5' 4\")   Wt 72.5 kg (159 lb 13.3 oz)   SpO2 89%       Patient is AAO*4, per patient,  no pain at this time.     @1313, report was given to Zina Garza RN from [facility].     @ 1418, patient was taken of the unit via transport with all belongings.   "

## 2024-09-20 NOTE — PROGRESS NOTES
Pt admitted to room T401 via transport in hospital bed from [facility] at 1430. Report received from NURIS Santiago.      Patient reports pain at 0 on a scale of 0-10. Educated patient regarding pharmacologic and non pharmacologic modalities for pain management. Oriented to room call light and smoking policy.  Reviewed plan of care (equipment, incentive spirometer, sequential compression devices, medications, activity, diet, fall precautions, skin care, and pain) with patient and family. Welcome packet given and reviewed with patient, all questions answered. Education provided on oral hygiene program.     AA&Ox4. Denies CP/SOB.  See 2 RN skin note  Tolerating regular diet. Denies N/V.  + void - BM. Last BM 9/19 per pt.  Pt ambulates standby assist.    All needs met at this time. Call light within reach. Pt calls appropriately. Bed low and locked, non skid socks in place. Hourly rounding in place.

## 2024-09-20 NOTE — PROGRESS NOTES
Surgical Progress Note    Author: Chaim Velasco M.D. Date & Time created: 2024   8:40 AM     Interval Events:  No events overnight.  The patient is feeling well this morning.  Drain with purulent output      Hemodynamics:  Temp (24hrs), Av.7 °C (98.1 °F), Min:36.4 °C (97.6 °F), Max:36.9 °C (98.5 °F)  Temperature: 36.4 °C (97.6 °F)  Pulse  Av.7  Min: 43  Max: 104   Blood Pressure : 136/61     Respiratory:    Respiration: 20, Pulse Oximetry: 93 %     Work Of Breathing / Effort: Within Normal Limits  RUL Breath Sounds: Clear, RML Breath Sounds: Clear, RLL Breath Sounds: Clear, KADEN Breath Sounds: Clear, LLL Breath Sounds: Clear  Neuro:  GCS       Fluids:    Intake/Output Summary (Last 24 hours) at 2024 0840  Last data filed at 2024 0600  Gross per 24 hour   Intake 60 ml   Output 82 ml   Net -22 ml     Weight: 72.5 kg (159 lb 13.3 oz)  Current Diet Order   Procedures    Diet Order Diet: Regular     Physical Exam  Constitutional:       General: She is not in acute distress.  Abdominal:      Palpations: Abdomen is soft.      Tenderness: There is no abdominal tenderness.   Neurological:      Mental Status: She is alert.       Labs:  Recent Results (from the past 24 hour(s))   Fungal Culture    Collection Time: 24 10:20 AM    Specimen: Other Body Fluid; Wound   Result Value Ref Range    Significant Indicator NEG     Source WND     Site pancreatic abscess     Culture Result Culture in progress.     Fungal Smear Results No fungal elements seen.    CULTURE WOUND W/ GRAM STAIN    Collection Time: 24 10:20 AM    Specimen: Wound   Result Value Ref Range    Significant Indicator POS (POS)     Source WND     Site pancreatic abscess     Culture Result - (A)     Gram Stain Result Many WBCs.  Many Gram positive cocci.       Culture Result (A)      Staphylococcus aureus  Heavy growth  Methicillin sensitive by screening method     GRAM STAIN    Collection Time: 24 10:20 AM    Specimen:  Wound   Result Value Ref Range    Significant Indicator . (POS)     Source WND     Site pancreatic abscess     Gram Stain Result Many WBCs.  Many Gram positive cocci.   (A)    Fungal Smear    Collection Time: 09/19/24 10:20 AM    Specimen: Wound   Result Value Ref Range    Significant Indicator NEG     Source WND     Site pancreatic abscess     Fungal Smear Results No fungal elements seen.      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  Okay for the patient to go back on anticoagulation  Adjust antibiotics per microbiology  Would repeat labs    Once the patient is ready for discharge we will see her back in the office to manage her drain.    Appreciate hospitalist management        Discussed patient condition with Hospitalist, Family, and Patient

## 2024-09-20 NOTE — PROGRESS NOTES
Hospital Medicine Daily Progress Note    Date of Service  9/20/2024    Chief Complaint  Abdominal pain    Hospital Course  Paige Gudino is a 78 y.o. female with breast cancer, pulmonary embolism (noted on CT 8/14/24), bilateral DVT (8/15 US), prior partial tail pancreatectomy and spleenectomy, and recent retroperitoneal abscess treated with drain placement (drain removed 9/11/24 and prior cx 8/23 with Staph epi).  Due to outpatient leukocytosis her oncologist recommended evaluation in hospital. She was having epigastric pain and intermittant fever. She admitted 9/17/2024 with wbc:20.4.  A CT abd showed fluid collection in distal pancreatic region. There was suspect of abscess in pancreatic region.      Interval Problem Update  9/20: Culture showing MSSA. Denies current abdominal pain.  YEMI drain fluid now lighter tan colored as compared to prior dark brown-gray yesterday.  I have consulted ID.      I have discussed this patient's plan of care and discharge plan at IDT rounds today with Case Management, Nursing, Nursing leadership, and other members of the IDT team.    Consultants/Specialty  cardiology and surgical oncology    Code Status  Full Code    Disposition  The patient is not medically cleared for discharge to home or a post-acute facility.      I have placed the appropriate orders for post-discharge needs.    Review of Systems  Review of Systems   Constitutional:  Positive for malaise/fatigue. Negative for chills and fever.   Respiratory:  Negative for shortness of breath.    Cardiovascular:  Negative for palpitations and leg swelling.   Gastrointestinal:  Negative for abdominal pain, diarrhea and nausea.   Neurological:  Negative for speech change.   Psychiatric/Behavioral:  The patient is not nervous/anxious.         Physical Exam  Temp:  [36 °C (96.8 °F)-37.9 °C (100.3 °F)] 37.1 °C (98.8 °F)  Pulse:  [43-90] 74  Resp:  [16-28] 17  BP: (106-136)/() 124/67  SpO2:  [89 %-99 %] 90  %    Physical Exam  Vitals reviewed.   HENT:      Head: Normocephalic.   Eyes:      Conjunctiva/sclera: Conjunctivae normal.   Cardiovascular:      Rate and Rhythm: Normal rate and regular rhythm.      Pulses: Normal pulses.      Heart sounds: No murmur heard.  Pulmonary:      Effort: Pulmonary effort is normal. No respiratory distress.      Breath sounds: Normal breath sounds.   Abdominal:      General: Bowel sounds are normal. There is no distension.      Palpations: Abdomen is soft.      Comments: LUQ YEMI drain present   Musculoskeletal:      Right lower leg: No edema.      Left lower leg: No edema.   Skin:     General: Skin is warm.   Neurological:      General: No focal deficit present.      Mental Status: She is alert and oriented to person, place, and time.   Psychiatric:         Mood and Affect: Mood normal.         Fluids    Intake/Output Summary (Last 24 hours) at 9/20/2024 2113  Last data filed at 9/20/2024 1905  Gross per 24 hour   Intake --   Output 55 ml   Net -55 ml        Laboratory  Recent Labs     09/18/24  0600 09/19/24  0033 09/20/24  1406   WBC 19.4* 17.3* 11.7*   RBC 4.25 4.14* 4.88   HEMOGLOBIN 12.6 12.4 14.6   HEMATOCRIT 39.0 38.5 45.2   MCV 91.8 93.0 92.6   MCH 29.6 30.0 29.9   MCHC 32.3 32.2 32.3   RDW 47.5 48.1 47.3   PLATELETCT 560* 535* 612*   MPV 9.2 9.3 9.4     Recent Labs     09/18/24  0600 09/19/24  0033   SODIUM 134* 137   POTASSIUM 4.2 4.1   CHLORIDE 102 104   CO2 21 21   GLUCOSE 122* 111*   BUN 11 9   CREATININE 0.72 0.70   CALCIUM 7.9* 8.1*     Recent Labs     09/19/24  0033   INR 1.36*               Imaging  CT-IMAGE-GUIDED DRAIN PERITONEAL   Final Result      1.  CT GUIDED PERITONEAL LEFT UPPER QUADRANT ABDOMINAL ABSCESS DRAINAGE. PLACEMENT OF 10 [place]ian LOCKING LOOP CATHETER.   2.  THE CURRENT PLAN IS TO IRRIGATE ONCE DAILY WITH 5 ML STERILE SALINE, CHECK CULTURES, MONITOR DRAINAGE OUTPUT AND OBTAIN A FOLLOW-UP CT SCAN IN 5-7 DAYS IF CLINICALLY INDICATED.      CT-ABDOMEN-PELVIS  WITH   Final Result      1.  Post distal pancreatectomy.      2.  Fluid collection with some peripheral enhancement is identified in the distal pancreatic region including the region of previous pancreatectomy. Developing pseudocyst is a possibility. No emphysema or hemorrhage is appreciated. Remaining pancreas    enhances normally.      3.  There is cholelithiasis.      4.  Left pleural effusion and consolidations in the left lung base.      5.  No change in hiatal hernia.      6.  Prominent endometrial cavity again noted.           Assessment/Plan  * Pancreatic abscess- (present on admission)  Assessment & Plan  8/22/24 she underwent CT guided left upper quadrant fluid collection catheter drainage. Drain in place from 8/22-9/11. At time of removal was draining only 1-2 cc/day.   Prior cx 8/23/24 positive for staph epi.   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  NSR as of 9/19 on my evaluation via telemetry  Monitor electrolytes  Titrate oxygen to keep SpO2 >89%  Restart apixaban 5mg BID asap for hx of PE    Primary hypertension  Assessment & Plan  Continue home amlodipine w/ parameters  Monitor vitals    Acute respiratory failure with hypoxia (HCC)- (present on admission)  Assessment & Plan  Titrate supplemental oxygen to keep SpO2 >89%  RT per protocol    Pulmonary embolism with acute cor pulmonale, unspecified chronicity, unspecified pulmonary embolism type (HCC)- (present on admission)  Assessment & Plan  CT 8/14/24 with bilateral segmental and subsegmental pulmonary emboli with findings compatible with significant R heart strain.   S/p R atrial thrombectomy by Dr. 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 Hold while in hospital    Advance care planning- (present on admission)  Assessment & Plan  FULL code    Acquired hypothyroidism- (present on admission)  Assessment & Plan  continue home levothyroxine and liothyronine   One month ago TSH:4.57         VTE prophylaxis:   SCDs/TEDs   therapeutic anticoagulation with eliquis 5 mg BID      I have performed a physical exam and reviewed and updated ROS and Plan today (9/20/2024). In review of yesterday's note (9/19/2024), there are no changes except as documented above.

## 2024-09-20 NOTE — CARE PLAN
The patient is Stable - Low risk of patient condition declining or worsening    Shift Goals  Clinical Goals: pain control; drain management  Patient Goals: rest  Family Goals: updates    Progress made toward(s) clinical / shift goals:    Problem: Pain - Standard  Goal: Alleviation of pain or a reduction in pain to the patient’s comfort goal  Outcome: Progressing     Problem: Safety  Goal: Will remain free from injury  Outcome: Progressing     Problem: Pain Management  Goal: Pain level will decrease to patient's comfort goal  Outcome: Progressing     Problem: Mobility  Goal: Risk for activity intolerance will decrease  Outcome: Progressing       Patient is not progressing towards the following goals:

## 2024-09-20 NOTE — PROGRESS NOTES
Radiology Progress Note   Author: SOUMYA Cabello Date & Time created: 9/20/2024  7:54 AM   Date of admission  9/17/2024  Note to reader: this note follows the APSO format rather than the historical SOAP format. Assessment and plan located at the top of the note for ease of use.    Chief Complaint  78 y.o. female admitted 9/17/2024 with leukocytosis, sharp epigastric pain  Chief Complaint   Patient presents with    Sent by MD     Sent by surgeon for elevated WBC. Pt reports abdominal drain removed last week and is concerned for infection at the site. Denies pain.          HPI  Rosangela Hodgestill is a 78-year-old female with past medical history of noninvasive carcinoma of left breast (35 years ago), s/p total mastectomy, invasive grade 2 mammary carcinoma (2022) pancreatic serous cystic adenoma (benign), s/p pancreatectomy,  splenectomy, retroperitoneal abscess with drain placement (placed 08/22 and removed 09/11), recent DVT and PE who presented to [facility] on [facility]/17/2024 due to leukocytosis on recent lab work with intermittent sharp epigastric pain.  CT A/P showed fluid collection in distal pancreatic region.  IR consulted for drain placement.  On 09/19/2024 Dr. Ruff (IR) placed a 10 Amharic left peritoneal upper quadrant abdominal abscess drain and removal of 40 ML of purulent fluid.    Interval History:   09/20/2024-10 Amharic left upper quadrant peritoneal drain to bulb suction with 82 mL purulent  output in the last 24 hours.  I flushed drain with 10 mL of sterile NSS  I reviewed today's labs: WBC 17.3; H&H 12.4/38.5, Cr 0.70; Coags are 1.36,  Micro- abscess drainage from 09/19 positive for staphylococcal aureus; urine from 9/18 positive for yeast. 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)      Plan IR  - Order placed to irrigate LUQ 10Fr  drain with 10 ml of sterile saline each shift  - Fluid cultures pending   - Recommend follow up CT scan in 5-7 days once drainage decreases to approximately 10-15 mL in 24 hours (proximately 09/25-27)  - Continue to monitor drains, VS, and labs     -Thank you for allowing Interventional Radiology team to participate in the patients care, if any additional care or requests are needed in the future please do not hesitate to call or place IR order           Review of Systems  Physical Exam   Review of Systems   Constitutional:  Positive for malaise/fatigue.   Respiratory:  Negative for shortness of breath.    Cardiovascular:  Negative for chest pain.   Gastrointestinal:  Positive for abdominal pain. Negative for nausea and vomiting.   Musculoskeletal:  Positive for myalgias.   Neurological:  Negative for weakness and headaches.      Vitals:    09/20/24 0600   BP: 136/61   Pulse: (!) 58   Resp: 20   Temp:    SpO2: 93%        Physical Exam  Vitals and nursing note reviewed.   HENT:      Head: Normocephalic and atraumatic.      Nose: Nose normal.      Mouth/Throat:      Mouth: Mucous membranes are dry.      Pharynx: Oropharynx is clear.   Eyes:      Pupils: Pupils are equal, round, and reactive to light.   Cardiovascular:      Rate and Rhythm: Normal rate and regular rhythm.   Pulmonary:      Effort: Pulmonary effort is normal. No respiratory distress.   Abdominal:      Palpations: Abdomen is soft.      Comments: LUQ drain in place - dressing CDI   Skin:     General: Skin is warm and dry.      Capillary Refill: Capillary refill takes less than 2 seconds.   Neurological:      Mental Status: She is alert and oriented to person, place, and  time.   Psychiatric:         Attention and Perception: Attention normal.         Mood and Affect: Mood normal.         Speech: Speech normal.             Labs    Recent Labs     09/17/24 2050 09/18/24 0600 09/19/24  0033   WBC 20.4* 19.4* 17.3*   RBC 4.55 4.25 4.14*   HEMOGLOBIN 13.6 12.6 12.4   HEMATOCRIT 41.7 39.0 38.5   MCV 91.6 91.8 93.0   MCH 29.9 29.6 30.0   MCHC 32.6 32.3 32.2   RDW 47.4 47.5 48.1   PLATELETCT 574* 560* 535*   MPV 9.2 9.2 9.3     Recent Labs     09/17/24 2050 09/18/24 0600 09/19/24  0033   SODIUM 136 134* 137   POTASSIUM 3.5* 4.2 4.1   CHLORIDE 101 102 104   CO2 21 21 21   GLUCOSE 143* 122* 111*   BUN 13 11 9   CREATININE 0.69 0.72 0.70   CALCIUM 8.3* 7.9* 8.1*     Recent Labs     09/17/24 2050 09/18/24 0600 09/19/24  0033   ALBUMIN 3.4 3.0* 2.9*   TBILIRUBIN 1.4 1.7* 1.3   ALKPHOSPHAT 92 81 87   TOTPROTEIN 6.8 6.0 5.9*   ALTSGPT 28 28 21   ASTSGOT 35 32 27   CREATININE 0.69 0.72 0.70     CT-IMAGE-GUIDED DRAIN PERITONEAL   Final Result      1.  CT GUIDED PERITONEAL LEFT UPPER QUADRANT ABDOMINAL ABSCESS DRAINAGE. PLACEMENT OF 10 Turkish LOCKING LOOP CATHETER.   2.  THE CURRENT PLAN IS TO IRRIGATE ONCE DAILY WITH 5 ML STERILE SALINE, CHECK CULTURES, MONITOR DRAINAGE OUTPUT AND OBTAIN A FOLLOW-UP CT SCAN IN 5-7 DAYS IF CLINICALLY INDICATED.      CT-ABDOMEN-PELVIS WITH   Final Result      1.  Post distal pancreatectomy.      2.  Fluid collection with some peripheral enhancement is identified in the distal pancreatic region including the region of previous pancreatectomy. Developing pseudocyst is a possibility. No emphysema or hemorrhage is appreciated. Remaining pancreas    enhances normally.      3.  There is cholelithiasis.      4.  Left pleural effusion and consolidations in the left lung base.      5.  No change in hiatal hernia.      6.  Prominent endometrial cavity again noted.        INR   Date Value Ref Range Status   09/19/2024 1.36 (H) 0.87 - 1.13 Final     Comment:     INR -  "Non-therapeutic Reference Range: 0.87-1.13  INR - Therapeutic Reference Range: 2.0-4.0       No results found for: \"POCINR\"     Intake/Output Summary (Last 24 hours) at 9/19/2024 1405  Last data filed at 9/19/2024 1026  Gross per 24 hour   Intake 0 ml   Output 40 ml   Net -40 ml      I have personally reviewed the above labs and imaging      I have performed a physical exam and reviewed and updated ROS and Plan today (9/20/2024).     45 minutes in directly providing and coordinating care and extensive data review.  No time overlap and excludes procedures.    "

## 2024-09-21 LAB
ANION GAP SERPL CALC-SCNC: 16 MMOL/L (ref 7–16)
BACTERIA WND AEROBE CULT: ABNORMAL
BUN SERPL-MCNC: 7 MG/DL (ref 8–22)
CALCIUM SERPL-MCNC: 9.1 MG/DL (ref 8.5–10.5)
CHLORIDE SERPL-SCNC: 101 MMOL/L (ref 96–112)
CO2 SERPL-SCNC: 18 MMOL/L (ref 20–33)
CREAT SERPL-MCNC: 0.53 MG/DL (ref 0.5–1.4)
ERYTHROCYTE [DISTWIDTH] IN BLOOD BY AUTOMATED COUNT: 47.4 FL (ref 35.9–50)
GFR SERPLBLD CREATININE-BSD FMLA CKD-EPI: 95 ML/MIN/1.73 M 2
GLUCOSE SERPL-MCNC: 117 MG/DL (ref 65–99)
GRAM STN SPEC: ABNORMAL
HCT VFR BLD AUTO: 46.4 % (ref 37–47)
HGB BLD-MCNC: 14.5 G/DL (ref 12–16)
MCH RBC QN AUTO: 28.8 PG (ref 27–33)
MCHC RBC AUTO-ENTMCNC: 31.3 G/DL (ref 32.2–35.5)
MCV RBC AUTO: 92.1 FL (ref 81.4–97.8)
PLATELET # BLD AUTO: 653 K/UL (ref 164–446)
PMV BLD AUTO: 9.3 FL (ref 9–12.9)
POTASSIUM SERPL-SCNC: 3.9 MMOL/L (ref 3.6–5.5)
RBC # BLD AUTO: 5.04 M/UL (ref 4.2–5.4)
SIGNIFICANT IND 70042: ABNORMAL
SITE SITE: ABNORMAL
SODIUM SERPL-SCNC: 135 MMOL/L (ref 135–145)
SOURCE SOURCE: ABNORMAL
WBC # BLD AUTO: 8.7 K/UL (ref 4.8–10.8)

## 2024-09-21 PROCEDURE — 99233 SBSQ HOSP IP/OBS HIGH 50: CPT | Performed by: STUDENT IN AN ORGANIZED HEALTH CARE EDUCATION/TRAINING PROGRAM

## 2024-09-21 PROCEDURE — 700102 HCHG RX REV CODE 250 W/ 637 OVERRIDE(OP): Mod: JZ

## 2024-09-21 PROCEDURE — 36415 COLL VENOUS BLD VENIPUNCTURE: CPT

## 2024-09-21 PROCEDURE — 700111 HCHG RX REV CODE 636 W/ 250 OVERRIDE (IP): Mod: JZ | Performed by: INTERNAL MEDICINE

## 2024-09-21 PROCEDURE — 700101 HCHG RX REV CODE 250: Performed by: NURSE PRACTITIONER

## 2024-09-21 PROCEDURE — A9270 NON-COVERED ITEM OR SERVICE: HCPCS | Mod: JZ

## 2024-09-21 PROCEDURE — 85027 COMPLETE CBC AUTOMATED: CPT

## 2024-09-21 PROCEDURE — A9270 NON-COVERED ITEM OR SERVICE: HCPCS

## 2024-09-21 PROCEDURE — 700105 HCHG RX REV CODE 258: Performed by: INTERNAL MEDICINE

## 2024-09-21 PROCEDURE — 80048 BASIC METABOLIC PNL TOTAL CA: CPT

## 2024-09-21 PROCEDURE — A9270 NON-COVERED ITEM OR SERVICE: HCPCS | Performed by: HOSPITALIST

## 2024-09-21 PROCEDURE — 700102 HCHG RX REV CODE 250 W/ 637 OVERRIDE(OP)

## 2024-09-21 PROCEDURE — 99233 SBSQ HOSP IP/OBS HIGH 50: CPT | Performed by: INTERNAL MEDICINE

## 2024-09-21 PROCEDURE — 700102 HCHG RX REV CODE 250 W/ 637 OVERRIDE(OP): Performed by: HOSPITALIST

## 2024-09-21 PROCEDURE — 770001 HCHG ROOM/CARE - MED/SURG/GYN PRIV*

## 2024-09-21 RX ADMIN — ACETAMINOPHEN 650 MG: 325 TABLET ORAL at 01:10

## 2024-09-21 RX ADMIN — APIXABAN 5 MG: 5 TABLET, FILM COATED ORAL at 06:34

## 2024-09-21 RX ADMIN — POTASSIUM CHLORIDE 20 MEQ: 1500 TABLET, EXTENDED RELEASE ORAL at 06:34

## 2024-09-21 RX ADMIN — LIOTHYRONINE SODIUM 2.5 MCG: 5 TABLET ORAL at 06:34

## 2024-09-21 RX ADMIN — AMPICILLIN AND SULBACTAM 3 G: 1; 2 INJECTION, POWDER, FOR SOLUTION INTRAMUSCULAR; INTRAVENOUS at 01:53

## 2024-09-21 RX ADMIN — AMPICILLIN AND SULBACTAM 3 G: 1; 2 INJECTION, POWDER, FOR SOLUTION INTRAMUSCULAR; INTRAVENOUS at 12:58

## 2024-09-21 RX ADMIN — APIXABAN 5 MG: 5 TABLET, FILM COATED ORAL at 18:11

## 2024-09-21 RX ADMIN — AMPICILLIN AND SULBACTAM 3 G: 1; 2 INJECTION, POWDER, FOR SOLUTION INTRAMUSCULAR; INTRAVENOUS at 06:42

## 2024-09-21 RX ADMIN — SODIUM CHLORIDE, PRESERVATIVE FREE 10 ML: 5 INJECTION INTRAVENOUS at 06:49

## 2024-09-21 RX ADMIN — AMPICILLIN AND SULBACTAM 3 G: 1; 2 INJECTION, POWDER, FOR SOLUTION INTRAMUSCULAR; INTRAVENOUS at 23:28

## 2024-09-21 RX ADMIN — SODIUM CHLORIDE, PRESERVATIVE FREE 10 ML: 5 INJECTION INTRAVENOUS at 18:10

## 2024-09-21 RX ADMIN — AMLODIPINE BESYLATE 5 MG: 10 TABLET ORAL at 06:35

## 2024-09-21 RX ADMIN — SENNOSIDES AND DOCUSATE SODIUM 2 TABLET: 50; 8.6 TABLET ORAL at 18:11

## 2024-09-21 RX ADMIN — LEVOTHYROXINE SODIUM 100 MCG: 0.1 TABLET ORAL at 06:38

## 2024-09-21 RX ADMIN — POLYETHYLENE GLYCOL 3350 1 PACKET: 17 POWDER, FOR SOLUTION ORAL at 06:35

## 2024-09-21 RX ADMIN — AMPICILLIN AND SULBACTAM 3 G: 1; 2 INJECTION, POWDER, FOR SOLUTION INTRAMUSCULAR; INTRAVENOUS at 18:13

## 2024-09-21 ASSESSMENT — ENCOUNTER SYMPTOMS
CHILLS: 0
VOMITING: 0
FEVER: 0
ABDOMINAL PAIN: 0
ABDOMINAL PAIN: 1
NAUSEA: 0
SHORTNESS OF BREATH: 0
HEADACHES: 0
WEAKNESS: 0

## 2024-09-21 ASSESSMENT — PAIN DESCRIPTION - PAIN TYPE
TYPE: ACUTE PAIN

## 2024-09-21 NOTE — PROGRESS NOTES
Hospital Medicine Daily Progress Note    Date of Service  9/21/2024    Chief Complaint  Paige Gudino is a 78 y.o. female admitted 9/17/2024 with abdominal pain    Hospital Course  edgar Gudino is a 78 y.o. female with breast cancer, pulmonary embolism (noted on CT 8/14/24), bilateral DVT (8/15 US), prior partial tail pancreatectomy and spleenectomy, and recent retroperitoneal abscess treated with drain placement (drain removed 9/11/24 and prior cx 8/23 with Staph epi).  Due to outpatient leukocytosis her oncologist recommended evaluation in hospital. She was having epigastric pain and intermittant fever. She admitted 9/17/2024 with wbc:20.4.  A CT abd showed fluid collection in distal pancreatic region. There was suspect of abscess in pancreatic region. S/p CT-guided catheter drainage of left upper quadrant abscess on 9/19.  ID recommending Unasyn and repeat CT scan after 7 days from date of drain placement.      Interval Problem Update  9/21/2024  Seen and examined at bedside  Vital remained stable  Labs reviewed normal white count, hemoglobin 14.5, chemistry sodium 135 potassium 3.5, fluid culture growing MSSA  CT abdomen from 9/17 reviewed noted with abscess around pancreatic region      Continue on Unasyn  Repeat BMP in a.m. to monitor electrolytes and toxicity and renal function  Repeat CBC in a.m. to monitor white count and hemoglobin  Need close monitoring of blood counts, electrolytes and renal function due to potential of organ dysfunction.    Requiring close monitoring for toxicity while on IV controlled substance\  Requiring IV antibiotics, need close monitoring for toxicity  Case discussed with infectious disease          Patient has a high medical complexity, complex decision making and is at high risk of complication, morbidity and mortality      I have discussed this patient's plan of care and discharge plan at IDT rounds today with Case Management, Nursing,  Nursing leadership, and other members of the IDT team.    Consultants/Specialty  IR,ID    Code Status  Full Code    Disposition  The patient is not medically cleared for discharge to home or a post-acute facility.  Anticipate discharge to: home with close outpatient follow-up    I have placed the appropriate orders for post-discharge needs.    Review of Systems  Review of Systems   Gastrointestinal:  Negative for abdominal pain, nausea and vomiting.        Physical Exam  Temp:  [36 °C (96.8 °F)-37.9 °C (100.3 °F)] 36.5 °C (97.7 °F)  Pulse:  [65-90] 78  Resp:  [16-17] 16  BP: (106-147)/() 147/77  SpO2:  [89 %-94 %] 93 %    Physical Exam  Constitutional:       Appearance: Normal appearance.   Cardiovascular:      Rate and Rhythm: Normal rate and regular rhythm.      Pulses: Normal pulses.      Heart sounds: Normal heart sounds.   Abdominal:      Comments: YEMI drain noted with cloudy whitish output   Musculoskeletal:      Right lower leg: No edema.      Left lower leg: No edema.   Neurological:      General: No focal deficit present.      Mental Status: She is alert and oriented to person, place, and time.         Fluids    Intake/Output Summary (Last 24 hours) at 9/21/2024 1039  Last data filed at 9/21/2024 0900  Gross per 24 hour   Intake 430 ml   Output 45 ml   Net 385 ml        Laboratory  Recent Labs     09/19/24  0033 09/20/24  1406 09/21/24  0146   WBC 17.3* 11.7* 8.7   RBC 4.14* 4.88 5.04   HEMOGLOBIN 12.4 14.6 14.5   HEMATOCRIT 38.5 45.2 46.4   MCV 93.0 92.6 92.1   MCH 30.0 29.9 28.8   MCHC 32.2 32.3 31.3*   RDW 48.1 47.3 47.4   PLATELETCT 535* 612* 653*   MPV 9.3 9.4 9.3     Recent Labs     09/19/24  0033 09/21/24  0146   SODIUM 137 135   POTASSIUM 4.1 3.9   CHLORIDE 104 101   CO2 21 18*   GLUCOSE 111* 117*   BUN 9 7*   CREATININE 0.70 0.53   CALCIUM 8.1* 9.1     Recent Labs     09/19/24  0033   INR 1.36*               Imaging  CT-IMAGE-GUIDED DRAIN PERITONEAL   Final Result      1.  CT GUIDED PERITONEAL  LEFT UPPER QUADRANT ABDOMINAL ABSCESS DRAINAGE. PLACEMENT OF 10 [place]ik LOCKING LOOP CATHETER.   2.  THE CURRENT PLAN IS TO IRRIGATE ONCE DAILY WITH 5 ML STERILE SALINE, CHECK CULTURES, MONITOR DRAINAGE OUTPUT AND OBTAIN A FOLLOW-UP CT SCAN IN 5-7 DAYS IF CLINICALLY INDICATED.      CT-ABDOMEN-PELVIS WITH   Final Result      1.  Post distal pancreatectomy.      2.  Fluid collection with some peripheral enhancement is identified in the distal pancreatic region including the region of previous pancreatectomy. Developing pseudocyst is a possibility. No emphysema or hemorrhage is appreciated. Remaining pancreas    enhances normally.      3.  There is cholelithiasis.      4.  Left pleural effusion and consolidations in the left lung base.      5.  No change in hiatal hernia.      6.  Prominent endometrial cavity again noted.           Assessment/Plan  * Pancreatic abscess- (present on admission)  Assessment & Plan  8/22/24 she underwent CT guided left upper quadrant fluid collection catheter drainage. Drain in place from 8/22-9/11. At time of removal was draining only 1-2 cc/day.   Prior cx 8/23/24 positive for staph epi.   9/19 had IR place LUQ fluid collection drain w/ purulent appearing drainage  Monitor cultures from 9/19 growing MSSA and Strep aglactiae  9/21/2024  S/p CT-guided catheter drainage of left upper quadrant abscess on 9/19.  Continue on Unasyn  Repeat BMP in a.m. to monitor electrolytes and toxicity and renal function  Repeat CBC in a.m. to monitor white count and hemoglobin  Need close monitoring of blood counts, electrolytes and renal function due to potential of organ dysfunction.    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  NSR as of 9/19 on my evaluation via telemetry  Monitor electrolytes  Titrate oxygen to keep SpO2 >89%  Restart apixaban 5mg BID asap for hx of PE    Primary hypertension  Assessment & Plan  Continue home amlodipine w/ parameters  Monitor vitals    Acute respiratory failure with hypoxia (HCC)- (present on admission)  Assessment & Plan  Titrate supplemental oxygen to keep SpO2 >89%  RT per protocol    Pulmonary embolism with acute cor pulmonale, unspecified chronicity, unspecified pulmonary embolism type (HCC)- (present on admission)  Assessment & Plan  CT 8/14/24 with bilateral segmental and subsegmental pulmonary emboli with findings compatible with significant R heart strain.   S/p R atrial thrombectomy by Dr. 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 Hold while in hospital    Advance care planning- (present on admission)  Assessment & Plan  FULL code    Acquired hypothyroidism- (present on admission)  Assessment & Plan  continue home levothyroxine and liothyronine   One month ago TSH:4.57         VTE prophylaxis: eliquis    I have performed a physical exam and reviewed and updated ROS and Plan today (9/21/2024). In review of yesterday's note (9/20/2024), there are no changes except as documented above.

## 2024-09-21 NOTE — PROGRESS NOTES
Radiology Progress Note   Author: SOUMYA Cabello Date & Time created: 9/21/2024  8:20 AM   Date of admission  9/17/2024  Note to reader: this note follows the APSO format rather than the historical SOAP format. Assessment and plan located at the top of the note for ease of use.    Chief Complaint  78 y.o. female admitted 9/17/2024 with leukocytosis, sharp epigastric pain  Chief Complaint   Patient presents with    Sent by MD     Sent by surgeon for elevated WBC. Pt reports abdominal drain removed last week and is concerned for infection at the site. Denies pain.          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  CT A/P showed fluid collection in distal pancreatic region.  IR consulted for drain placement.  On 09/19/2024 Dr. Ruff (IR) placed a 10 Greek left peritoneal upper quadrant abdominal abscess drain and removal of 40 ML of purulent fluid.    Interval History:   09/20/2024-10 Greek left upper quadrant peritoneal drain to bulb suction with 82 mL purulent  output in the last 24 hours.  I flushed drain with 10 mL of sterile NSS  I reviewed today's labs: WBC 17.3; H&H 12.4/38.5, Cr 0.70; Coags are 1.36,  Micro- abscess drainage from 09/19 positive for staphylococcal aureus; urine from 9/18 positive for yeast. I discussed drain care with pt  at bedside    09/21/2024-10 Greek left upper quadrant peritoneal drain to bulb suction with 25 mL purulent  output in the last 24 hours.  I flushed drain with 10 mL of sterile NSS  I reviewed today's labs: WBC 8.7; H&H 14.5/46.4, Cr 0.53;  "Coags are 1.36,  Micro- abscess drainage from 09/19 + for many GPC; urine from 9/18 + for yeast. I discussed drain care/with pt  at bedside, I reviewed drain flushing with patient and patient , all questions answered.     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)    Abdominal infection (HCC)      Plan IR  - Order placed to irrigate LUQ 10Fr  drain with 10 ml of sterile saline each shift  - Fluid cultures pending   - Recommend follow up CT scan in 5-7 days once drainage decreases to approximately 10-15 mL in 24 hours (proximately 09/25-27)  - Continue to monitor drains, VS, and labs   -Ultimate plan is for patient to discharge with drain in place and follow-up with surgical oncology for drain management/care  -Once patient ready to discharge please assure patient has drain supplies-sterile NSS flushes, alcohol swabs    -Thank you for allowing Interventional Radiology team to participate in the patients care, if any additional care or requests are needed in the future please do not hesitate to call or place IR order           Review of Systems  Physical Exam   Review of Systems   Constitutional:  Positive for malaise/fatigue.   Respiratory:  Negative for shortness of breath.    Cardiovascular:  Negative for chest pain.   Gastrointestinal:  Positive for abdominal pain. Negative for nausea and vomiting.        \"Drain area is uncomfortable\"   Neurological:  Negative for weakness and headaches.      Vitals:    09/21/24 0739   BP: (!) 147/77   Pulse: 78   Resp: 16   Temp: 36.5 °C (97.7 °F)   SpO2: 93%    "     Physical Exam  Vitals and nursing note reviewed.   HENT:      Head: Normocephalic and atraumatic.      Nose: Nose normal.      Mouth/Throat:      Mouth: Mucous membranes are dry.      Pharynx: Oropharynx is clear.   Eyes:      Pupils: Pupils are equal, round, and reactive to light.   Cardiovascular:      Rate and Rhythm: Normal rate and regular rhythm.   Pulmonary:      Comments: Without any respiratory distress  On room air  Chest:   Breasts:     Breasts are symmetrical.   Abdominal:      Palpations: Abdomen is soft.      Comments: LUQ drain in place - dressing CDI   Skin:     General: Skin is warm and dry.      Capillary Refill: Capillary refill takes less than 2 seconds.   Neurological:      Mental Status: She is alert and oriented to person, place, and time.      GCS: GCS eye subscore is 4. GCS verbal subscore is 5. GCS motor subscore is 6.   Psychiatric:         Attention and Perception: Attention normal.         Mood and Affect: Mood normal.         Speech: Speech normal.             Labs    Recent Labs     09/19/24  0033 09/20/24  1406 09/21/24  0146   WBC 17.3* 11.7* 8.7   RBC 4.14* 4.88 5.04   HEMOGLOBIN 12.4 14.6 14.5   HEMATOCRIT 38.5 45.2 46.4   MCV 93.0 92.6 92.1   MCH 30.0 29.9 28.8   MCHC 32.2 32.3 31.3*   RDW 48.1 47.3 47.4   PLATELETCT 535* 612* 653*   MPV 9.3 9.4 9.3     Recent Labs     09/19/24  0033 09/21/24  0146   SODIUM 137 135   POTASSIUM 4.1 3.9   CHLORIDE 104 101   CO2 21 18*   GLUCOSE 111* 117*   BUN 9 7*   CREATININE 0.70 0.53   CALCIUM 8.1* 9.1     Recent Labs     09/19/24  0033 09/21/24  0146   ALBUMIN 2.9*  --    TBILIRUBIN 1.3  --    ALKPHOSPHAT 87  --    TOTPROTEIN 5.9*  --    ALTSGPT 21  --    ASTSGOT 27  --    CREATININE 0.70 0.53     CT-IMAGE-GUIDED DRAIN PERITONEAL   Final Result      1.  CT GUIDED PERITONEAL LEFT UPPER QUADRANT ABDOMINAL ABSCESS DRAINAGE. PLACEMENT OF 10 Macedonian LOCKING LOOP CATHETER.   2.  THE CURRENT PLAN IS TO IRRIGATE ONCE DAILY WITH 5 ML STERILE SALINE,  "CHECK CULTURES, MONITOR DRAINAGE OUTPUT AND OBTAIN A FOLLOW-UP CT SCAN IN 5-7 DAYS IF CLINICALLY INDICATED.      CT-ABDOMEN-PELVIS WITH   Final Result      1.  Post distal pancreatectomy.      2.  Fluid collection with some peripheral enhancement is identified in the distal pancreatic region including the region of previous pancreatectomy. Developing pseudocyst is a possibility. No emphysema or hemorrhage is appreciated. Remaining pancreas    enhances normally.      3.  There is cholelithiasis.      4.  Left pleural effusion and consolidations in the left lung base.      5.  No change in hiatal hernia.      6.  Prominent endometrial cavity again noted.        INR   Date Value Ref Range Status   09/19/2024 1.36 (H) 0.87 - 1.13 Final     Comment:     INR - Non-therapeutic Reference Range: 0.87-1.13  INR - Therapeutic Reference Range: 2.0-4.0       No results found for: \"POCINR\"     Intake/Output Summary (Last 24 hours) at 9/19/2024 1405  Last data filed at 9/19/2024 1026  Gross per 24 hour   Intake 0 ml   Output 40 ml   Net -40 ml      I have personally reviewed the above labs and imaging      I have performed a physical exam and reviewed and updated ROS and Plan today (9/21/2024).     40 minutes in directly providing and coordinating care and extensive data review.  No time overlap and excludes procedures.    "

## 2024-09-21 NOTE — PROGRESS NOTES
4 Eyes Skin Assessment Completed by NURIS Izaguirre and NURIS Riojas.    Head WDL  Ears WDL  Nose WDL  Mouth WDL  Neck WDL  Breast/Chest WDL  Shoulder Blades WDL  Spine Redness, Blanching  (R) Arm/Elbow/Hand WDL  (L) Arm/Elbow/Hand WDL  Abdomen Scar - IR drain, DIP   Groin WDL  Scrotum/Coccyx/Buttocks Redness and Blanching  (R) Leg WDL  (L) Leg WDL  (R) Heel/Foot/Toe WDL  (L) Heel/Foot/Toe WDL      Devices In Places Blood Pressure Cuff and Pulse Ox; IR drain       Interventions In Place Sacral Mepilex, Pillows, and Heels Loaded W/Pillows    Possible Skin Injury No    Pictures Uploaded Into Epic N/A  Wound Consult Placed N/A  RN Wound Prevention Protocol Ordered No

## 2024-09-21 NOTE — CARE PLAN
The patient is Stable - Low risk of patient condition declining or worsening    Shift Goals  Clinical Goals: abx therapy, drain management  Patient Goals: ADLs, comfort  Family Goals: POC    Progress made toward(s) clinical / shift goals:     Problem: Infection  Goal: Will remain free from infection  Outcome: Progressing     Monitor lab results  Initiate abx therapy   Educate pt on hand hygiene  Perform hand hygiene

## 2024-09-21 NOTE — PROGRESS NOTES
"    DEPARTMENT OF SURGERY    BARIATRIC AND GASTROESOPHAGEAL SURGERY    PROGRESS NOTE      78 y.o. F with recent distal pancreatectomy and splenectomy, subsequent retroperitoneal abscess with drain placement. Now admitted from ED with leukocytosis WBC 23.8 and was noted to have recurrence of fluid collection in the distal pancreatic region. Underwent percutaneous drainage in IR on 9/19/2024.    Subjective:     Reports feeling reasonably well. Notes improvement in abdominal pain (now, mostly at the drain site). Had one episode of fever yesterday, but none since. No recent history of vomiting/hematemesis, odynophagia, CP/SOB, bloating/belching, worsening abdominal pain, dysuria/hematuria, BRBPR/melena, or constipation/diarrhea. No other symptoms or complaints at this time. Tolerating a regular diet, ambulating in the hallways w/o difficulties and voiding spontaneously.    Objective:    BP (!) 147/77   Pulse 78   Temp 36.5 °C (97.7 °F) (Temporal)   Resp 16   Ht 1.626 m (5' 4\")   Wt 72.5 kg (159 lb 13.3 oz)   SpO2 93%     I/O last 3 completed shifts:  In: 90 [P.O.:90]  Out: 55 [Drains:55]    Current Facility-Administered Medications   Medication Dose    magnesium hydroxide (Milk Of Magnesia) suspension 30 mL  30 mL    apixaban (Eliquis) tablet 5 mg  5 mg    ampicillin/sulbactam (Unasyn) 3 g in  mL IVPB  3 g    normal saline flush 0.9 % SOLN 10 mL  10 mL    liothyronine (Cytomel) tablet 5 mcg  5 mcg    And    liothyronine (Cytomel) tablet 2.5 mcg  2.5 mcg    potassium chloride SA (Kdur) tablet 20 mEq  20 mEq    acetaminophen (Tylenol) tablet 650 mg  650 mg    senna-docusate (Pericolace Or Senokot S) 8.6-50 MG per tablet 2 Tablet  2 Tablet    And    polyethylene glycol/lytes (Miralax) Packet 1 Packet  1 Packet    amLODIPine (Norvasc) tablet 5 mg  5 mg    levothyroxine (Synthroid) tablet 100 mcg  100 mcg     Physical Exam:     Gen - comfortable, NAD, A&O x 3  HEENT - anicteric, PERRLA  CV - regular rate and " rhythm  Abd - soft, + min TTP @ epigastrium and LUQ, no rebound/guarding, no distention, no palp masses or bulges  Drain - fxn well, + min purulent exudate in the bulb  Ext - no clubbing, cyanosis or edema bilaterally  Skin - no rashes/lesions, no open wounds, no jaundice    Labs:    Recent Labs     09/19/24  0033 09/20/24  1406 09/21/24  0146   WBC 17.3* 11.7* 8.7   RBC 4.14* 4.88 5.04   HEMOGLOBIN 12.4 14.6 14.5   HEMATOCRIT 38.5 45.2 46.4   MCV 93.0 92.6 92.1   MCH 30.0 29.9 28.8   MCHC 32.2 32.3 31.3*   RDW 48.1 47.3 47.4   PLATELETCT 535* 612* 653*   MPV 9.3 9.4 9.3     Recent Labs     09/19/24  0033 09/21/24  0146   SODIUM 137 135   POTASSIUM 4.1 3.9   CHLORIDE 104 101   CO2 21 18*   GLUCOSE 111* 117*   BUN 9 7*     Imaging:    CT-ABDOMEN-PELVIS WITH    Result Date: 9/17/2024 9/17/2024 9:29 PM HISTORY/REASON FOR EXAM:  Abdominal pelvic pain. TECHNIQUE/EXAM DESCRIPTION:   CT scan of the abdomen and pelvis with contrast. Contrast-enhanced helical scanning was obtained from the diaphragmatic domes through the pubic symphysis following the bolus administration of nonionic contrast without complication. 100 mL of Omnipaque 350 nonionic contrast was administered without complication. Low dose optimization technique was utilized for this CT exam including automated exposure control and adjustment of the mA and/or kV according to patient size. COMPARISON: 08/27/2024, 08/20/2024 FINDINGS: Lower Chest: There is left pleural effusion and consolidation in the left lower pulmonary lobe as well as lingular segment left upper lobe.. Hiatal hernia is identified. Liver: Normal. Spleen: Not identified. Pancreas: Post distal pancreatectomy and splenectomy. Fluid collection with irregular shape and some peripheral enhancement distal to the postsurgical site is identified which is multilobulated. Largest component measures approximately 5.7 x 3.0 cm. Fluid extends up to the left hemidiaphragm. Percutaneous drain no longer  identified.. Gallbladder: Cholelithiasis. Biliary: Nondilated. Adrenal glands: Normal. Kidneys: Unremarkable without hydronephrosis. Bowel: No obstruction or acute inflammation. Lymph nodes: No adenopathy. Vasculature: Unremarkable. Peritoneum: Unremarkable without ascites. Musculoskeletal: No acute or destructive process. Pelvis: No adenopathy or free fluid. Endometrial cavity appears prominent as on the prior CT.     1.  Post distal pancreatectomy. 2.  Fluid collection with some peripheral enhancement is identified in the distal pancreatic region including the region of previous pancreatectomy. Developing pseudocyst is a possibility. No emphysema or hemorrhage is appreciated. Remaining pancreas enhances normally. 3.  There is cholelithiasis. 4.  Left pleural effusion and consolidations in the left lung base. 5.  No change in hiatal hernia. 6.  Prominent endometrial cavity again noted.      Assessment/Plan:    78 y.o. F with recent distal pancreatectomy and splenectomy, subsequent retroperitoneal abscess with drain placement. Now admitted from ED with leukocytosis WBC 23.8 and was noted to have recurrence of fluid collection in the distal pancreatic region. Underwent percutaneous drainage in IR on 9/19/2024.    Recovering well, post percutaneous drainage on 9/19/2024. Was restarted on Eliquis PO and regular diet post procedure. Tolerating her diet well. Leukocytosis has resolved, however, cultures have not speciated yet. Would discharge home with this drain in place (please provide drain teachings prior to discharge) and on oral antibiotics (per primary service).    Patient should follow up in the Surgical Oncology clinic in 1 week, to discuss drain removal.    Thank you for giving us this opportunity to care for this patient.    Skyler Lowry MD MPH FACS [facility]  Bariatric and Gastroesophageal Surgery  Department of Surgery, [facility]  Clinical Assistant Professor of Surgery  [facility],  [place]

## 2024-09-21 NOTE — PROGRESS NOTES
Infectious Disease Progress Note    Author: Bao Rooney M.D. Date & Time of service: 2024  10:13 AM    Chief Complaint:  Follow-up for abdominal abscess    Interval History:   Tmax 100.3 yesterday evening, white count down to 8.7, tolerated switch to Unasyn, culture results as below, creatinine 0.53, normal transaminases    Labs Reviewed and Medications Reviewed.    Review of Systems:  Review of Systems   Constitutional:  Negative for chills and fever.   Skin:  Negative for itching and rash.     Hemodynamics:  Temp (24hrs), Av.8 °C (98.2 °F), Min:36 °C (96.8 °F), Max:37.9 °C (100.3 °F)  Temperature: 36.5 °C (97.7 °F)  Pulse  Av.9  Min: 43  Max: 129   Blood Pressure : (!) 147/77 (Vidya RN notified)       Physical Exam:  Physical Exam  Vitals and nursing note reviewed.   Constitutional:       General: She is not in acute distress.     Appearance: She is not ill-appearing.   Eyes:      Conjunctiva/sclera: Conjunctivae normal.   Cardiovascular:      Rate and Rhythm: Normal rate.   Pulmonary:      Effort: Pulmonary effort is normal. No respiratory distress.      Breath sounds: No stridor.   Abdominal:      General: There is no distension.      Comments: IR drain in place with moderate amount of purulent output in bulb   Musculoskeletal:         General: No swelling or tenderness.   Skin:     Findings: No erythema or rash.   Neurological:      General: No focal deficit present.      Mental Status: She is alert.   Psychiatric:         Mood and Affect: Mood normal.         Behavior: Behavior normal.         Meds:    Current Facility-Administered Medications:     magnesium hydroxide    apixaban    ampicillin-sulbactam (UNASYN) IV    normal saline flush    liothyronine **AND** liothyronine    potassium chloride SA    acetaminophen    senna-docusate **AND** polyethylene glycol/lytes    amLODIPine    levothyroxine    Labs:  Recent Labs     24  0033 24  1406 24  0146   WBC 17.3* 11.7*  8.7   RBC 4.14* 4.88 5.04   HEMOGLOBIN 12.4 14.6 14.5   HEMATOCRIT 38.5 45.2 46.4   MCV 93.0 92.6 92.1   MCH 30.0 29.9 28.8   RDW 48.1 47.3 47.4   PLATELETCT 535* 612* 653*   MPV 9.3 9.4 9.3   NEUTSPOLYS 84.30*  --   --    LYMPHOCYTES 5.70*  --   --    MONOCYTES 8.30  --   --    EOSINOPHILS 0.60  --   --    BASOPHILS 0.20  --   --      Recent Labs     09/19/24  0033 09/21/24  0146   SODIUM 137 135   POTASSIUM 4.1 3.9   CHLORIDE 104 101   CO2 21 18*   GLUCOSE 111* 117*   BUN 9 7*     Recent Labs     09/19/24  0033 09/21/24  0146   ALBUMIN 2.9*  --    TBILIRUBIN 1.3  --    ALKPHOSPHAT 87  --    TOTPROTEIN 5.9*  --    ALTSGPT 21  --    ASTSGOT 27  --    CREATININE 0.70 0.53       Imaging:  CT-IMAGE-GUIDED DRAIN PERITONEAL    Result Date: 9/19/2024 9/19/2024 9:41 AM HISTORY/REASON FOR EXAM:  Pancreatic abscess drain placement; Anterior LUQ. History of distal pancreas resection. Distal pancreatectomy. Previous catheter drainage for left upper quadrant collection 8/22/2024. Recurrent or residual collection seen on recent CT 9/17/2024 TECHNIQUE/EXAM DESCRIPTION: Left upper quadrant abdominal  abscess drainage with CT guidance. Low dose optimization technique was utilized for this CT exam including automated exposure control and adjustment of the mA and/or kV according to patient size. ALL ELEMENTS OF MAXIMAL STERILE BARRIER TECHNIQUE WERE FOLLOWED. SEDATION TIME: 30 minutes. Moderate sedation was administered with fentanyl and Versed with continuous patient monitoring by the interventional radiology nurse. PROCEDURE: Informed consent was obtained. Localizing CT images were obtained with the patient in supine position. The skin was prepped with ChloraPrep and draped in a sterile fashion. Moderate sedation was provided. Pulse oximetry and continuous cardiac monitoring by the nurse was performed throughout the exam. Intraservice time was 30 minutes. Following local anesthesia with 1% Lidocaine, a 17 G guiding needle was  placed and needle path confirmed with CT. An Amplatz guidewire was placed and following serial tract dilatation, a 10 Amharic pigtail locking catheter was placed. A specimen was collected and submitted for culture and sensitivity and Gram stain. Total of 40 mL yellowish-greenish pus was drained. The catheter was secured to the skin and connected to suction bulb drainage. Final CT images were obtained documenting catheter position. The patient tolerated the procedure well with no evidence of complication. COMPARISON: CT of the abdomen and pelvis 9/17/2024, CT-guided left upper quadrant catheter drainage 8/22/2024 FINDINGS: The final CT images show satisfactory catheter position within the target collection.     1.  CT GUIDED PERITONEAL LEFT UPPER QUADRANT ABDOMINAL ABSCESS DRAINAGE. PLACEMENT OF 10 Korean LOCKING LOOP CATHETER. 2.  THE CURRENT PLAN IS TO IRRIGATE ONCE DAILY WITH 5 ML STERILE SALINE, CHECK CULTURES, MONITOR DRAINAGE OUTPUT AND OBTAIN A FOLLOW-UP CT SCAN IN 5-7 DAYS IF CLINICALLY INDICATED.    CT-ABDOMEN-PELVIS WITH    Result Date: 9/17/2024 9/17/2024 9:29 PM HISTORY/REASON FOR EXAM:  Abdominal pelvic pain. TECHNIQUE/EXAM DESCRIPTION:   CT scan of the abdomen and pelvis with contrast. Contrast-enhanced helical scanning was obtained from the diaphragmatic domes through the pubic symphysis following the bolus administration of nonionic contrast without complication. 100 mL of Omnipaque 350 nonionic contrast was administered without complication. Low dose optimization technique was utilized for this CT exam including automated exposure control and adjustment of the mA and/or kV according to patient size. COMPARISON: 08/27/2024, 08/20/2024 FINDINGS: Lower Chest: There is left pleural effusion and consolidation in the left lower pulmonary lobe as well as lingular segment left upper lobe.. Hiatal hernia is identified. Liver: Normal. Spleen: Not identified. Pancreas: Post distal pancreatectomy and  splenectomy. Fluid collection with irregular shape and some peripheral enhancement distal to the postsurgical site is identified which is multilobulated. Largest component measures approximately 5.7 x 3.0 cm. Fluid extends up to the left hemidiaphragm. Percutaneous drain no longer identified.. Gallbladder: Cholelithiasis. Biliary: Nondilated. Adrenal glands: Normal. Kidneys: Unremarkable without hydronephrosis. Bowel: No obstruction or acute inflammation. Lymph nodes: No adenopathy. Vasculature: Unremarkable. Peritoneum: Unremarkable without ascites. Musculoskeletal: No acute or destructive process. Pelvis: No adenopathy or free fluid. Endometrial cavity appears prominent as on the prior CT.     1.  Post distal pancreatectomy. 2.  Fluid collection with some peripheral enhancement is identified in the distal pancreatic region including the region of previous pancreatectomy. Developing pseudocyst is a possibility. No emphysema or hemorrhage is appreciated. Remaining pancreas enhances normally. 3.  There is cholelithiasis. 4.  Left pleural effusion and consolidations in the left lung base. 5.  No change in hiatal hernia. 6.  Prominent endometrial cavity again noted.    CT-PANCREAS AND ABDOMEN WITH & W/O    Result Date: 8/27/2024 8/27/2024 6:38 PM HISTORY/REASON FOR EXAM:  s/p distal pancreatectomy and splenectomy with pathology of benign pancreatic serous cystadenoma in a background of chronic pancreatitis, hx LUQ fluid collection s/p aspiration. Evaluate for recurrent fluid collection.. TECHNIQUE/EXAM DESCRIPTION:  CT pancreas and abdomen without and with contrast. Initial precontrast thick-section images were obtained through the pancreas.  Following this, nonionic contrast was administered by IV, and thin-section helical scanning performed from the diaphragmatic domes through the iliac crests.  Pancreatic parenchymal phase and portal venous phase (dual-phase) images were obtained following contrast administration.  100 mL of Omnipaque 350 nonionic contrast was administered. Low dose optimization technique was utilized for this CT exam including automated exposure control and adjustment of the mA and/or kV according to patient size. COMPARISON: 8/22/2024 FINDINGS: Liver: Unremarkable Spleen: Absent Biliary system: Gallbladder shows multiple stones and minimal wall thickening. Common bile duct is not dilated. Pancreas: Prior distal pancreatectomy.  Minimal edema adjacent to surgical site.  No peripancreatic fluid collection demonstrated. Pancreatic duct: There is no pancreatic ductal dilatation. Arterial vasculature: The celiac axis has a normal branching pattern. The SMA is patent. Venous vasculature: Portable and superior mesenteric veins are patent.  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 Vascular encasement: None. Kidneys: Kidneys are unremarkable. Adrenals: Adrenals are unremarkable. Lymph nodes: There are no enlarged nodes. Bowel: No bowel obstruction.  No focal bowel wall thickening.  Sparse colonic diverticula. Lung bases: Small LEFT pleural effusion with associated atelectasis.  Minimal RIGHT pleural fluid. Bones: Degenerative change of lumbar spine.  Prior lumbar spine surgery. LEFT upper quadrant drain in place.  No residual fluid collection demonstrated.  Minimal peritoneal gas.  Postoperative change of the anterior abdominal wall.     1.  Prior distal pancreatectomy and splenectomy.  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 2.  LEFT upper quadrant drain in place with minimal adjacent peritoneal gas.  No significant residual or recurrent fluid collection demonstrated. 3.  Gallstones and minimal gallbladder wall thickening.  Cholecystitis is not excluded. 4.  Bilateral pleural fluid collections with associated atelectasis, worse on the LEFT.     CT-IMAGE-GUIDED DRAIN PERITONEAL    Result Date: 8/23/2024 8/22/2024 3:15 PM  HISTORY/REASON FOR EXAM:  LUQ fluid collection, hx distal pancreatectomy and splenectomy; Was on Eliquis, now on Heparin. TECHNIQUE/EXAM DESCRIPTION: Left upper quadrant fluid collection drainage with CT guidance. Low dose optimization technique was utilized for this CT exam including automated exposure control and adjustment of the mA and/or kV according to patient size. Moderate sedation was provided. Pulse oximetry and continuous cardiac monitoring by the nurse was performed throughout the exam. Intraservice time was 10 minutes. PROCEDURE: Informed consent was obtained. Localizing CT images were obtained with the patient in supine position. The procedure was performed using MAXIMAL STERILE BARRIER technique including sterile gown, mask, cap, and donning of sterile gloves following appropriate hand hygiene and/or sterile scrub. Patient skin site was prepped with 2% Chlorhexidine solution. Following local anesthesia with 1% Lidocaine, a 17 G guiding needle was placed and needle path confirmed with CT. An Amplatz guidewire was placed and following serial tract dilatation, a 18 Lao pigtail locking catheter was placed. A specimen was collected and submitted for culture and sensitivity and Gram stain. Total of 200 mL thin brown fluid was drained. The catheter was secured to the skin and connected to suction bulb drainage. Final CT images were obtained documenting catheter position. The patient tolerated the procedure well with no evidence of complication. COMPARISON: 8/20/2024 FINDINGS: The final CT images show satisfactory catheter position within the target collection.     1.  CT guided left upper quadrant fluid collection catheter drainage.      Micro:  Results       Procedure Component Value Units Date/Time    Fungal Culture [[id number]] Collected: 09/19/24 1020    Order Status: Completed Specimen: Wound from Other Body Fluid Updated: 09/21/24 0937     Significant Indicator NEG     Source WND     Site pancreatic  abscess     Culture Result Culture in progress.     Fungal Smear Results No fungal elements seen.    CULTURE WOUND W/ GRAM STAIN [[id number]]  (Abnormal)  (Susceptibility) Collected: 09/19/24 1020    Order Status: Completed Specimen: Wound Updated: 09/21/24 0937     Significant Indicator POS     Source WND     Site pancreatic abscess     Culture Result -     Gram Stain Result Many WBCs.  Many Gram positive cocci.       Culture Result Staphylococcus aureus  Heavy growth  Methicillin sensitive by screening method        Streptococcus agalactiae (Group B)  Moderate growth      Susceptibility       Staphylococcus aureus (1)       Antibiotic Interpretation Microscan   Method Status    Ampicillin/sulbactam Sensitive <=8/4 mcg/mL RALPH Final    Clindamycin Sensitive <=0.25 mcg/mL RALPH Final    Cefazolin Sensitive <=8 mcg/mL RALPH Final    Cefepime Sensitive <=4 mcg/mL RALPH Final    Daptomycin Sensitive <=0.5 mcg/mL RALPH Final    Erythromycin Sensitive <=0.25 mcg/mL RALPH Final    Oxacillin Sensitive 0.5 mcg/mL RALPH Final    Trimeth/Sulfa Sensitive <=0.5/9.5 mcg/mL RALPH Final    Tetracycline Sensitive <=4 mcg/mL RALPH Final    Vancomycin Sensitive 1 mcg/mL RALPH Final                       Urine Culture (New) [[id number]]  (Abnormal) Collected: 09/18/24 1700    Order Status: Completed Specimen: Urine Updated: 09/20/24 1138     Significant Indicator POS     Source UR     Site -     Culture Result -      Candida albicans  10-50,000 cfu/mL      GRAM STAIN [[id number]]  (Abnormal) Collected: 09/19/24 1020    Order Status: Completed Specimen: Wound Updated: 09/19/24 1811     Significant Indicator .     Source WND     Site pancreatic abscess     Gram Stain Result Many WBCs.  Many Gram positive cocci.      Fungal Smear [[id number]] Collected: 09/19/24 1020    Order Status: Completed Specimen: Wound Updated: 09/19/24 1811     Significant Indicator NEG     Source WND     Site pancreatic abscess     Fungal Smear Results No fungal elements seen.     FLUID CULTURE W/GRAM STAIN [[id number]] Collected: 09/19/24 1020    Order Status: Canceled Specimen: Other Body Fluid     Urinalysis [[id number]]  (Abnormal) Collected: 09/18/24 1700    Order Status: Completed Specimen: Urine Updated: 09/18/24 1817     Color DK Yellow     Character Clear     Specific Gravity 1.033     Ph 5.0     Glucose Negative mg/dL      Ketones Trace mg/dL      Protein Negative mg/dL      Bilirubin Negative     Urobilinogen, Urine 1.0     Nitrite Negative     Leukocyte Esterase Small     Occult Blood Negative     Micro Urine Req Microscopic    BLOOD CULTURE [[id number]]     Order Status: Canceled Specimen: Blood from Peripheral     BLOOD CULTURE [[id number]]     Order Status: Canceled Specimen: Blood from Peripheral             Assessment/Hospital course:  This is a 78-year-old female patient with history of left breast carcinoma 34 years ago status post total mastectomy, 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 Cultures at the time grew Staph epidermidis, susceptibilities not performed.  Sent to the ER by surgical oncologist due to leukocytosis on recent labs along with episodes of epigastric pain.  CT abdomen and pelvis noted fluid collection in the distal pancreatic region.  IR drain placed on 9/19, 40 mL of purulent fluid was noted.  Cultures are growing MSSA and group B Strep.      Pertinent Diagnoses:  Intra-abdominal abscess  Polymicrobial infection  Pancreatic cystadenoma status post recent pancreatectomy, splenectomy, stomach and celiac node dissection 8/5/2024  Recurrent breast cancer, on tamoxifen  History of PE    Plan:  -Continue IV Unasyn 3 g  every 6 hours  -IR on board.  Planning repeat CT scan 9/25, we will follow along    Disposition: Anticipate no ID specific disposition needs  Need for PICC line: Likely no    Discussed with Pharm.D.  ID will follow.  This illness poses a threat to life

## 2024-09-21 NOTE — CARE PLAN
The patient is Stable - Low risk of patient condition declining or worsening    Shift Goals  Clinical Goals: abx therapy, drain management  Patient Goals: ADLs, rest  Family Goals: POC    Progress made toward(s) clinical / shift goals: Patients pain managed with multimodals per MAR. Patient up to bathroom with no assistance. Patient is remaining free from infection with IV abx. Intermittently sleeping throughout shift.       Problem: Pain Management  Goal: Pain level will decrease to patient's comfort goal  Outcome: Progressing     Problem: Infection  Goal: Will remain free from infection  Outcome: Progressing       Patient is not progressing towards the following goals: NA

## 2024-09-21 NOTE — PROGRESS NOTES
Report received from RN, assumed care at 1845  Pt is A0X4, and responds appropriately   Pt declines any SOB, chest pain, new onset of numbness/ tingling  Pt rates pain at 0/10, on a scale of 1-10, pt medicated per MAR  Pt is voiding adequately and without hesitancy  Pt has + flatus, + bowel sounds, + BM on 9/19  Pt ambulates with a standby assist   Pt is tolerating a regular diet, pt denies any nausea/vomiting  Plan of care discussed, all questions answered. Explained importance of calling before getting OOB and pt verbalizes understanding. Explained importance of oral care. Call light is within reach, treaded slipper socks on, bed in lowest/ locked position, hourly rounding in place, all needs met at this time.

## 2024-09-22 PROCEDURE — 700105 HCHG RX REV CODE 258: Performed by: INTERNAL MEDICINE

## 2024-09-22 PROCEDURE — 700102 HCHG RX REV CODE 250 W/ 637 OVERRIDE(OP)

## 2024-09-22 PROCEDURE — 700102 HCHG RX REV CODE 250 W/ 637 OVERRIDE(OP): Mod: JZ

## 2024-09-22 PROCEDURE — A9270 NON-COVERED ITEM OR SERVICE: HCPCS

## 2024-09-22 PROCEDURE — A9270 NON-COVERED ITEM OR SERVICE: HCPCS | Performed by: HOSPITALIST

## 2024-09-22 PROCEDURE — 99233 SBSQ HOSP IP/OBS HIGH 50: CPT | Performed by: STUDENT IN AN ORGANIZED HEALTH CARE EDUCATION/TRAINING PROGRAM

## 2024-09-22 PROCEDURE — 700111 HCHG RX REV CODE 636 W/ 250 OVERRIDE (IP): Mod: JZ | Performed by: INTERNAL MEDICINE

## 2024-09-22 PROCEDURE — 99233 SBSQ HOSP IP/OBS HIGH 50: CPT | Performed by: INTERNAL MEDICINE

## 2024-09-22 PROCEDURE — 700101 HCHG RX REV CODE 250: Performed by: STUDENT IN AN ORGANIZED HEALTH CARE EDUCATION/TRAINING PROGRAM

## 2024-09-22 PROCEDURE — 770001 HCHG ROOM/CARE - MED/SURG/GYN PRIV*

## 2024-09-22 PROCEDURE — A9270 NON-COVERED ITEM OR SERVICE: HCPCS | Mod: JZ

## 2024-09-22 PROCEDURE — 700101 HCHG RX REV CODE 250: Performed by: NURSE PRACTITIONER

## 2024-09-22 PROCEDURE — 700102 HCHG RX REV CODE 250 W/ 637 OVERRIDE(OP): Performed by: HOSPITALIST

## 2024-09-22 RX ORDER — LIDOCAINE 4 G/G
1 PATCH TOPICAL EVERY 24 HOURS
Status: DISCONTINUED | OUTPATIENT
Start: 2024-09-22 | End: 2024-10-02 | Stop reason: HOSPADM

## 2024-09-22 RX ORDER — OXYCODONE HYDROCHLORIDE 5 MG/1
5 TABLET ORAL EVERY 4 HOURS PRN
Status: DISCONTINUED | OUTPATIENT
Start: 2024-09-22 | End: 2024-09-22

## 2024-09-22 RX ORDER — HYDROMORPHONE HYDROCHLORIDE 1 MG/ML
0.5 INJECTION, SOLUTION INTRAMUSCULAR; INTRAVENOUS; SUBCUTANEOUS EVERY 4 HOURS PRN
Status: DISCONTINUED | OUTPATIENT
Start: 2024-09-22 | End: 2024-09-22

## 2024-09-22 RX ADMIN — ACETAMINOPHEN 650 MG: 325 TABLET ORAL at 15:29

## 2024-09-22 RX ADMIN — AMPICILLIN AND SULBACTAM 3 G: 1; 2 INJECTION, POWDER, FOR SOLUTION INTRAMUSCULAR; INTRAVENOUS at 11:35

## 2024-09-22 RX ADMIN — SODIUM CHLORIDE, PRESERVATIVE FREE 10 ML: 5 INJECTION INTRAVENOUS at 18:18

## 2024-09-22 RX ADMIN — APIXABAN 5 MG: 5 TABLET, FILM COATED ORAL at 06:42

## 2024-09-22 RX ADMIN — APIXABAN 5 MG: 5 TABLET, FILM COATED ORAL at 18:18

## 2024-09-22 RX ADMIN — POTASSIUM CHLORIDE 20 MEQ: 1500 TABLET, EXTENDED RELEASE ORAL at 06:42

## 2024-09-22 RX ADMIN — AMPICILLIN AND SULBACTAM 3 G: 1; 2 INJECTION, POWDER, FOR SOLUTION INTRAMUSCULAR; INTRAVENOUS at 06:45

## 2024-09-22 RX ADMIN — LIOTHYRONINE SODIUM 2.5 MCG: 5 TABLET ORAL at 06:42

## 2024-09-22 RX ADMIN — LEVOTHYROXINE SODIUM 100 MCG: 0.1 TABLET ORAL at 06:42

## 2024-09-22 RX ADMIN — AMPICILLIN AND SULBACTAM 3 G: 1; 2 INJECTION, POWDER, FOR SOLUTION INTRAMUSCULAR; INTRAVENOUS at 18:19

## 2024-09-22 RX ADMIN — AMLODIPINE BESYLATE 5 MG: 10 TABLET ORAL at 06:42

## 2024-09-22 RX ADMIN — SODIUM CHLORIDE, PRESERVATIVE FREE 10 ML: 5 INJECTION INTRAVENOUS at 06:55

## 2024-09-22 RX ADMIN — LIDOCAINE 1 PATCH: 4 PATCH TOPICAL at 13:10

## 2024-09-22 ASSESSMENT — PAIN DESCRIPTION - PAIN TYPE
TYPE: ACUTE PAIN

## 2024-09-22 ASSESSMENT — ENCOUNTER SYMPTOMS
SHORTNESS OF BREATH: 0
NAUSEA: 0
VOMITING: 0
WEAKNESS: 0
CHILLS: 0
ABDOMINAL PAIN: 0
FEVER: 0
HEADACHES: 0

## 2024-09-22 NOTE — CARE PLAN
The patient is Stable - Low risk of patient condition declining or worsening    Shift Goals  Clinical Goals: have BM, antibiotics  Patient Goals: independence  Family Goals: POC    Progress made toward(s) clinical / shift goals:  patient ambulating and tolerating diet. ABX per MAR    Patient is not progressing towards the following goals:

## 2024-09-22 NOTE — PROGRESS NOTES
[facility] Medicine Daily Progress Note    Date of Service  9/22/2024    Chief Complaint  Paige Gudino is a 78 y.o. female admitted 9/17/2024 with abdominal pain    Hospital Course  edgar Gudino is a 78 y.o. female with breast cancer, pulmonary embolism (noted on CT 8/14/24), bilateral DVT (8/15 US), prior partial tail pancreatectomy and spleenectomy, and recent retroperitoneal abscess treated with drain placement (drain removed 9/11/24 and prior cx 8/23 with Staph epi).  Due to outpatient leukocytosis her oncologist recommended evaluation in hospital. She was having epigastric pain and intermittant fever. She admitted 9/17/2024 with wbc:20.4.  A CT abd showed fluid collection in distal pancreatic region. There was suspect of abscess in pancreatic region. S/p CT-guided catheter drainage of left upper quadrant abscess on 9/19.  ID recommending Unasyn and repeat CT scan after 7 days from date of drain placement.      Interval Problem Update    9/22/2024  Seen and examined at bedside  Denies pain around drain site  No nausea or vomiting, remained afebrile  Labs reviewed leukocytosis resolved, chemistry sodium 135 potassium 3.9      Continue on Unasyn  Repeat BMP in a.m. to monitor electrolytes and toxicity and renal function  Repeat CBC in a.m. to monitor white count and hemoglobin  Need close monitoring of blood counts, electrolytes and renal function due to potential of organ dysfunction.    Requiring close monitoring for toxicity while on IV controlled substance\  Requiring IV antibiotics, need close monitoring for toxicity  Case discussed with infectious disease    Case discussed with JAS James.    I had extensive discussion with patient's spouse  regarding the current medical status, treatment plan and prognosis.  He was briefed on the patient's condition including recent changes or developments .  Updated with recent test results vital signs and symptoms.        I have discussed  this patient's plan of care and discharge plan at IDT rounds today with Case Management, Nursing, Nursing leadership, and other members of the IDT team.    Consultants/Specialty  IR,ID    Code Status  Full Code    Disposition  The patient is not medically cleared for discharge to home or a post-acute facility.  Anticipate discharge to: home with close outpatient follow-up    I have placed the appropriate orders for post-discharge needs.    Review of Systems  Review of Systems   Gastrointestinal:  Negative for abdominal pain, nausea and vomiting.        Physical Exam  Temp:  [36.5 °C (97.7 °F)-36.6 °C (97.9 °F)] 36.5 °C (97.7 °F)  Pulse:  [66-87] 66  Resp:  [16-18] 16  BP: (121-154)/(72-83) 154/83  SpO2:  [88 %-90 %] 88 %    Physical Exam  Constitutional:       Appearance: Normal appearance.   Cardiovascular:      Rate and Rhythm: Normal rate and regular rhythm.      Pulses: Normal pulses.      Heart sounds: Normal heart sounds.   Abdominal:      Comments: YEMI drain noted with cloudy whitish output   Musculoskeletal:      Right lower leg: No edema.      Left lower leg: No edema.   Neurological:      General: No focal deficit present.      Mental Status: She is alert and oriented to person, place, and time.         Fluids    Intake/Output Summary (Last 24 hours) at 9/22/2024 1559  Last data filed at 9/22/2024 1100  Gross per 24 hour   Intake --   Output 20 ml   Net -20 ml        Laboratory  Recent Labs     09/20/24  1406 09/21/24  0146   WBC 11.7* 8.7   RBC 4.88 5.04   HEMOGLOBIN 14.6 14.5   HEMATOCRIT 45.2 46.4   MCV 92.6 92.1   MCH 29.9 28.8   MCHC 32.3 31.3*   RDW 47.3 47.4   PLATELETCT 612* 653*   MPV 9.4 9.3     Recent Labs     09/21/24  0146   SODIUM 135   POTASSIUM 3.9   CHLORIDE 101   CO2 18*   GLUCOSE 117*   BUN 7*   CREATININE 0.53   CALCIUM 9.1                     Imaging  CT-IMAGE-GUIDED DRAIN PERITONEAL   Final Result      1.  CT GUIDED PERITONEAL LEFT UPPER QUADRANT ABDOMINAL ABSCESS DRAINAGE. PLACEMENT  OF 10 Liberian LOCKING LOOP CATHETER.   2.  THE CURRENT PLAN IS TO IRRIGATE ONCE DAILY WITH 5 ML STERILE SALINE, CHECK CULTURES, MONITOR DRAINAGE OUTPUT AND OBTAIN A FOLLOW-UP CT SCAN IN 5-7 DAYS IF CLINICALLY INDICATED.      CT-ABDOMEN-PELVIS WITH   Final Result      1.  Post distal pancreatectomy.      2.  Fluid collection with some peripheral enhancement is identified in the distal pancreatic region including the region of previous pancreatectomy. Developing pseudocyst is a possibility. No emphysema or hemorrhage is appreciated. Remaining pancreas    enhances normally.      3.  There is cholelithiasis.      4.  Left pleural effusion and consolidations in the left lung base.      5.  No change in hiatal hernia.      6.  Prominent endometrial cavity again noted.           Assessment/Plan  * Pancreatic abscess- (present on admission)  Assessment & Plan  8/22/24 she underwent CT guided left upper quadrant fluid collection catheter drainage. Drain in place from 8/22-9/11. At time of removal was draining only 1-2 cc/day.   Prior cx 8/23/24 positive for staph epi.   9/19 had IR place LUQ fluid collection drain w/ purulent appearing drainage  Monitor cultures from 9/19 growing MSSA and Strep aglactiae  9/21/2024  S/p CT-guided catheter drainage of left upper quadrant abscess on 9/19.  Continue on Unasyn  Repeat BMP in a.m. to monitor electrolytes and toxicity and renal function  Repeat CBC in a.m. to monitor white count and hemoglobin  Need close monitoring of blood counts, electrolytes and renal function due to potential of organ dysfunction.    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  NSR as of 9/19 on my evaluation via  telemetry  Monitor electrolytes  Titrate oxygen to keep SpO2 >89%  Restart apixaban 5mg BID asap for hx of PE    Primary hypertension  Assessment & Plan  Continue home amlodipine w/ parameters  Monitor vitals    Acute respiratory failure with hypoxia (HCC)- (present on admission)  Assessment & Plan  Titrate supplemental oxygen to keep SpO2 >89%  RT per protocol    Pulmonary embolism with acute cor pulmonale, unspecified chronicity, unspecified pulmonary embolism type (HCC)- (present on admission)  Assessment & Plan  CT 8/14/24 with bilateral segmental and subsegmental pulmonary emboli with findings compatible with significant R heart strain.   S/p R atrial thrombectomy by Dr. 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 Hold while in hospital    Advance care planning- (present on admission)  Assessment & Plan  FULL code    Acquired hypothyroidism- (present on admission)  Assessment & Plan  continue home levothyroxine and liothyronine   One month ago TSH:4.57         VTE prophylaxis: eliquis    I have performed a physical exam and reviewed and updated ROS and Plan today (9/22/2024). In review of yesterday's note (9/21/2024), there are no changes except as documented above.

## 2024-09-22 NOTE — PROGRESS NOTES
Assumed care of patient at 0645  Bedside Report Received     Pt AO x 4  Vitals signs stable  Pt denies chest pain or SOB  O2 sat >90% on RA breathing unlabored    Pt denies Pain   Pt denies N/V/D  Pt is tolerating regular diet  + voiding  + flatus, last BM this morning on 9/22, Bowel sounds present and normal  IR drain to L abd, serous thick drainage  Pt ambulates self      Plan of care discussed with pt. Safety education done. Falls precautions in place. Call light and belongings within reach. All needs met at this time.

## 2024-09-22 NOTE — PROGRESS NOTES
Infectious Disease Progress Note    Author: Bao Rooney M.D. Date & Time of service: 2024  8:02 AM    Chief Complaint:  Follow-up for abdominal abscess    Interval History:   Tmax 100.3 yesterday evening, white count down to 8.7, tolerated switch to Unasyn, culture results as below, creatinine 0.53, normal transaminases   Tmax 99.1, cultures as below, no new labs this morning, repeat imaging.    Labs Reviewed and Medications Reviewed.    Review of Systems:  Review of Systems   Constitutional:  Negative for chills and fever.   Skin:  Negative for itching and rash.     Hemodynamics:  Temp (24hrs), Av.7 °C (98.1 °F), Min:36.5 °C (97.7 °F), Max:37.3 °C (99.1 °F)  Temperature: 36.5 °C (97.7 °F)  Pulse  Av  Min: 43  Max: 129   Blood Pressure : (!) 154/83 (Rn Erika notified )       Physical Exam:  Physical Exam  Vitals and nursing note reviewed.   Constitutional:       General: She is not in acute distress.     Appearance: She is not ill-appearing.   Eyes:      Conjunctiva/sclera: Conjunctivae normal.   Cardiovascular:      Rate and Rhythm: Normal rate.   Pulmonary:      Effort: Pulmonary effort is normal. No respiratory distress.      Breath sounds: No stridor.   Abdominal:      General: There is no distension.      Comments: IR drain in place with moderate amount of purulent output in bulb   Musculoskeletal:         General: No swelling or tenderness.   Skin:     Findings: No erythema or rash.   Neurological:      General: No focal deficit present.      Mental Status: She is alert.   Psychiatric:         Mood and Affect: Mood normal.         Behavior: Behavior normal.         Meds:    Current Facility-Administered Medications:     magnesium hydroxide    apixaban    ampicillin-sulbactam (UNASYN) IV    normal saline flush    liothyronine **AND** liothyronine    potassium chloride SA    acetaminophen    senna-docusate **AND** polyethylene glycol/lytes    amLODIPine     levothyroxine    Labs:  Recent Labs     09/20/24  1406 09/21/24  0146   WBC 11.7* 8.7   RBC 4.88 5.04   HEMOGLOBIN 14.6 14.5   HEMATOCRIT 45.2 46.4   MCV 92.6 92.1   MCH 29.9 28.8   RDW 47.3 47.4   PLATELETCT 612* 653*   MPV 9.4 9.3     Recent Labs     09/21/24  0146   SODIUM 135   POTASSIUM 3.9   CHLORIDE 101   CO2 18*   GLUCOSE 117*   BUN 7*     Recent Labs     09/21/24  0146   CREATININE 0.53       Imaging:  CT-IMAGE-GUIDED DRAIN PERITONEAL    Result Date: 9/19/2024 9/19/2024 9:41 AM HISTORY/REASON FOR EXAM:  Pancreatic abscess drain placement; Anterior LUQ. History of distal pancreas resection. Distal pancreatectomy. Previous catheter drainage for left upper quadrant collection 8/22/2024. Recurrent or residual collection seen on recent CT 9/17/2024 TECHNIQUE/EXAM DESCRIPTION: Left upper quadrant abdominal  abscess drainage with CT guidance. Low dose optimization technique was utilized for this CT exam including automated exposure control and adjustment of the mA and/or kV according to patient size. ALL ELEMENTS OF MAXIMAL STERILE BARRIER TECHNIQUE WERE FOLLOWED. SEDATION TIME: 30 minutes. Moderate sedation was administered with fentanyl and Versed with continuous patient monitoring by the interventional radiology nurse. PROCEDURE: Informed consent was obtained. Localizing CT images were obtained with the patient in supine position. The skin was prepped with ChloraPrep and draped in a sterile fashion. Moderate sedation was provided. Pulse oximetry and continuous cardiac monitoring by the nurse was performed throughout the exam. Intraservice time was 30 minutes. Following local anesthesia with 1% Lidocaine, a 17 G guiding needle was placed and needle path confirmed with CT. An Amplatz guidewire was placed and following serial tract dilatation, a 10 Azeri pigtail locking catheter was placed. A specimen was collected and submitted for culture and sensitivity and Gram stain. Total of 40 mL yellowish-greenish pus  was drained. The catheter was secured to the skin and connected to suction bulb drainage. Final CT images were obtained documenting catheter position. The patient tolerated the procedure well with no evidence of complication. COMPARISON: CT of the abdomen and pelvis 9/17/2024, CT-guided left upper quadrant catheter drainage 8/22/2024 FINDINGS: The final CT images show satisfactory catheter position within the target collection.     1.  CT GUIDED PERITONEAL LEFT UPPER QUADRANT ABDOMINAL ABSCESS DRAINAGE. PLACEMENT OF 10 [place] LOCKING LOOP CATHETER. 2.  THE CURRENT PLAN IS TO IRRIGATE ONCE DAILY WITH 5 ML STERILE SALINE, CHECK CULTURES, MONITOR DRAINAGE OUTPUT AND OBTAIN A FOLLOW-UP CT SCAN IN 5-7 DAYS IF CLINICALLY INDICATED.    CT-ABDOMEN-PELVIS WITH    Result Date: 9/17/2024 9/17/2024 9:29 PM HISTORY/REASON FOR EXAM:  Abdominal pelvic pain. TECHNIQUE/EXAM DESCRIPTION:   CT scan of the abdomen and pelvis with contrast. Contrast-enhanced helical scanning was obtained from the diaphragmatic domes through the pubic symphysis following the bolus administration of nonionic contrast without complication. 100 mL of Omnipaque 350 nonionic contrast was administered without complication. Low dose optimization technique was utilized for this CT exam including automated exposure control and adjustment of the mA and/or kV according to patient size. COMPARISON: 08/27/2024, 08/20/2024 FINDINGS: Lower Chest: There is left pleural effusion and consolidation in the left lower pulmonary lobe as well as lingular segment left upper lobe.. Hiatal hernia is identified. Liver: Normal. Spleen: Not identified. Pancreas: Post distal pancreatectomy and splenectomy. Fluid collection with irregular shape and some peripheral enhancement distal to the postsurgical site is identified which is multilobulated. Largest component measures approximately 5.7 x 3.0 cm. Fluid extends up to the left hemidiaphragm. Percutaneous drain no longer identified..  Gallbladder: Cholelithiasis. Biliary: Nondilated. Adrenal glands: Normal. Kidneys: Unremarkable without hydronephrosis. Bowel: No obstruction or acute inflammation. Lymph nodes: No adenopathy. Vasculature: Unremarkable. Peritoneum: Unremarkable without ascites. Musculoskeletal: No acute or destructive process. Pelvis: No adenopathy or free fluid. Endometrial cavity appears prominent as on the prior CT.     1.  Post distal pancreatectomy. 2.  Fluid collection with some peripheral enhancement is identified in the distal pancreatic region including the region of previous pancreatectomy. Developing pseudocyst is a possibility. No emphysema or hemorrhage is appreciated. Remaining pancreas enhances normally. 3.  There is cholelithiasis. 4.  Left pleural effusion and consolidations in the left lung base. 5.  No change in hiatal hernia. 6.  Prominent endometrial cavity again noted.    CT-PANCREAS AND ABDOMEN WITH & W/O    Result Date: 8/27/2024 8/27/2024 6:38 PM HISTORY/REASON FOR EXAM:  s/p distal pancreatectomy and splenectomy with pathology of benign pancreatic serous cystadenoma in a background of chronic pancreatitis, hx LUQ fluid collection s/p aspiration. Evaluate for recurrent fluid collection.. TECHNIQUE/EXAM DESCRIPTION:  CT pancreas and abdomen without and with contrast. Initial precontrast thick-section images were obtained through the pancreas.  Following this, nonionic contrast was administered by IV, and thin-section helical scanning performed from the diaphragmatic domes through the iliac crests.  Pancreatic parenchymal phase and portal venous phase (dual-phase) images were obtained following contrast administration. 100 mL of Omnipaque 350 nonionic contrast was administered. Low dose optimization technique was utilized for this CT exam including automated exposure control and adjustment of the mA and/or kV according to patient size. COMPARISON: 8/22/2024 FINDINGS: Liver: Unremarkable Spleen: Absent Biliary  system: Gallbladder shows multiple stones and minimal wall thickening. Common bile duct is not dilated. Pancreas: Prior distal pancreatectomy.  Minimal edema adjacent to surgical site.  No peripancreatic fluid collection demonstrated. Pancreatic duct: There is no pancreatic ductal dilatation. Arterial vasculature: The celiac axis has a normal branching pattern. The SMA is patent. Venous vasculature: Portable and superior mesenteric veins are patent.  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 Vascular encasement: None. Kidneys: Kidneys are unremarkable. Adrenals: Adrenals are unremarkable. Lymph nodes: There are no enlarged nodes. Bowel: No bowel obstruction.  No focal bowel wall thickening.  Sparse colonic diverticula. Lung bases: Small LEFT pleural effusion with associated atelectasis.  Minimal RIGHT pleural fluid. Bones: Degenerative change of lumbar spine.  Prior lumbar spine surgery. LEFT upper quadrant drain in place.  No residual fluid collection demonstrated.  Minimal peritoneal gas.  Postoperative change of the anterior abdominal wall.     1.  Prior distal pancreatectomy and splenectomy.  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 2.  LEFT upper quadrant drain in place with minimal adjacent peritoneal gas.  No significant residual or recurrent fluid collection demonstrated. 3.  Gallstones and minimal gallbladder wall thickening.  Cholecystitis is not excluded. 4.  Bilateral pleural fluid collections with associated atelectasis, worse on the LEFT.     CT-IMAGE-GUIDED DRAIN PERITONEAL    Result Date: 8/23/2024 8/22/2024 3:15 PM HISTORY/REASON FOR EXAM:  LUQ fluid collection, hx distal pancreatectomy and splenectomy; Was on Eliquis, now on Heparin. TECHNIQUE/EXAM DESCRIPTION: Left upper quadrant fluid collection drainage with CT guidance. Low dose optimization technique was utilized for this CT exam including automated  exposure control and adjustment of the mA and/or kV according to patient size. Moderate sedation was provided. Pulse oximetry and continuous cardiac monitoring by the nurse was performed throughout the exam. Intraservice time was 10 minutes. PROCEDURE: Informed consent was obtained. Localizing CT images were obtained with the patient in supine position. The procedure was performed using MAXIMAL STERILE BARRIER technique including sterile gown, mask, cap, and donning of sterile gloves following appropriate hand hygiene and/or sterile scrub. Patient skin site was prepped with 2% Chlorhexidine solution. Following local anesthesia with 1% Lidocaine, a 17 G guiding needle was placed and needle path confirmed with CT. An Amplatz guidewire was placed and following serial tract dilatation, a 18 Syrian pigtail locking catheter was placed. A specimen was collected and submitted for culture and sensitivity and Gram stain. Total of 200 mL thin brown fluid was drained. The catheter was secured to the skin and connected to suction bulb drainage. Final CT images were obtained documenting catheter position. The patient tolerated the procedure well with no evidence of complication. COMPARISON: 8/20/2024 FINDINGS: The final CT images show satisfactory catheter position within the target collection.     1.  CT guided left upper quadrant fluid collection catheter drainage.      Micro:  Results       Procedure Component Value Units Date/Time    Fungal Culture [[id number]] Collected: 09/19/24 1020    Order Status: Completed Specimen: Wound from Other Body Fluid Updated: 09/21/24 0937     Significant Indicator NEG     Source WND     Site pancreatic abscess     Culture Result Culture in progress.     Fungal Smear Results No fungal elements seen.    CULTURE WOUND W/ GRAM STAIN [[id number]]  (Abnormal)  (Susceptibility) Collected: 09/19/24 1020    Order Status: Completed Specimen: Wound Updated: 09/21/24 0937     Significant Indicator POS      Source WND     Site pancreatic abscess     Culture Result -     Gram Stain Result Many WBCs.  Many Gram positive cocci.       Culture Result Staphylococcus aureus  Heavy growth  Methicillin sensitive by screening method        Streptococcus agalactiae (Group B)  Moderate growth      Susceptibility       Staphylococcus aureus (1)       Antibiotic Interpretation Microscan   Method Status    Ampicillin/sulbactam Sensitive <=8/4 mcg/mL [facility]ALPH Final    Clindamycin Sensitive <=0.25 mcg/mL RALPH Final    Cefazolin Sensitive <=8 mcg/mL RALPH Final    Cefepime Sensitive <=4 mcg/mL RALPH Final    Daptomycin Sensitive <=0.5 mcg/mL [facility]ALPH Final    Erythromycin Sensitive <=0.25 mcg/mL RALPH Final    Oxacillin Sensitive 0.5 mcg/mL RALPH Final    Trimeth/Sulfa Sensitive <=0.5/9.5 mcg/mL RALPH Final    Tetracycline Sensitive <=4 mcg/mL RALPH Final    Vancomycin Sensitive 1 mcg/mL RALPH Final                       Urine Culture (New) [[id number]]  (Abnormal) Collected: 09/18/24 1700    Order Status: Completed Specimen: Urine Updated: 09/20/24 1138     Significant Indicator POS     Source UR     Site -     Culture Result -      Candida albicans  10-50,000 cfu/mL      GRAM STAIN [[id number]]  (Abnormal) Collected: 09/19/24 1020    Order Status: Completed Specimen: Wound Updated: 09/19/24 1811     Significant Indicator .     Source WND     Site pancreatic abscess     Gram Stain Result Many WBCs.  Many Gram positive cocci.      Fungal Smear [[id number]] Collected: 09/19/24 1020    Order Status: Completed Specimen: Wound Updated: 09/19/24 1811     Significant Indicator NEG     Source WND     Site pancreatic abscess     Fungal Smear Results No fungal elements seen.    FLUID CULTURE W/GRAM STAIN [[id number]] Collected: 09/19/24 1020    Order Status: Canceled Specimen: Other Body Fluid     Urinalysis [[id number]]  (Abnormal) Collected: 09/18/24 1700    Order Status: Completed Specimen: Urine Updated: 09/18/24 1817     Color DK Yellow     Character Clear     Specific  Gravity 1.033     Ph 5.0     Glucose Negative mg/dL      Ketones Trace mg/dL      Protein Negative mg/dL      Bilirubin Negative     Urobilinogen, Urine 1.0     Nitrite Negative     Leukocyte Esterase Small     Occult Blood Negative     Micro Urine Req Microscopic    BLOOD CULTURE [[id number]]     Order Status: Canceled Specimen: Blood from Peripheral     BLOOD CULTURE [[id number]]     Order Status: Canceled Specimen: Blood from Peripheral             Assessment/Hospital course:  This is a 78-year-old female patient with history of left breast carcinoma 34 years ago status post total mastectomy, 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 Cultures at the time grew Staph epidermidis, susceptibilities not performed.  Sent to the ER by surgical oncologist due to leukocytosis on recent labs along with episodes of epigastric pain.  CT abdomen and pelvis noted fluid collection in the distal pancreatic region.  IR drain placed on 9/19, 40 mL of purulent fluid was noted.  Cultures are growing MSSA and group B Strep.      Pertinent Diagnoses:  Intra-abdominal abscess  Polymicrobial infection  Pancreatic cystadenoma status post recent pancreatectomy, splenectomy, stomach and celiac node dissection 8/5/2024  Recurrent breast cancer, on tamoxifen  History of PE    Plan:  -Continue IV Unasyn 3 g every 6 hours  -IR on board.  Planning repeat CT scan 9/25 (or sooner if output slows), we will follow along    Disposition: Anticipate no ID specific disposition needs  Need for PICC line: Likely no    Discussed with Dr. Hunt.  ID will follow.  This illness poses a threat to life

## 2024-09-22 NOTE — CARE PLAN
The patient is Stable - Low risk of patient condition declining or worsening    Shift Goals  Clinical Goals: drain care. abx mgmt  Patient Goals: independence  Family Goals: POC    Progress made toward(s) clinical / shift goals:  drain flushed per order. Iv abx continued. Am labs    Patient is not progressing towards the following goals:    Problem: Knowledge Deficit - Standard  Goal: Patient and family/care givers will demonstrate understanding of plan of care, disease process/condition, diagnostic tests and medications  Outcome: Progressing     Problem: Infection  Goal: Will remain free from infection  Outcome: Progressing

## 2024-09-22 NOTE — CARE PLAN
The patient is Stable - Low risk of patient condition declining or worsening    Shift Goals  Clinical Goals: BM today and monitor drain output  Patient Goals: independence  Family Goals: POC    Progress made toward(s) clinical / shift goals:  Pt had BM this morning x2, solid and normal for her. Pt endorsed 3/10 pain, lidocaine patch ordered and applied with some relief, tylenol given with some relief as well. Pain assessed per protocol. Drain had minimal output today.

## 2024-09-23 ENCOUNTER — APPOINTMENT (OUTPATIENT)
Dept: RADIOLOGY | Facility: MEDICAL CENTER | Age: 79
End: 2024-09-23
Attending: NURSE PRACTITIONER
Payer: COMMERCIAL

## 2024-09-23 LAB
ANION GAP SERPL CALC-SCNC: 12 MMOL/L (ref 7–16)
BASOPHILS # BLD AUTO: 0.2 % (ref 0–1.8)
BASOPHILS # BLD: 0.03 K/UL (ref 0–0.12)
BUN SERPL-MCNC: 8 MG/DL (ref 8–22)
CALCIUM SERPL-MCNC: 8.2 MG/DL (ref 8.5–10.5)
CHLORIDE SERPL-SCNC: 104 MMOL/L (ref 96–112)
CO2 SERPL-SCNC: 20 MMOL/L (ref 20–33)
CREAT SERPL-MCNC: 0.67 MG/DL (ref 0.5–1.4)
EOSINOPHIL # BLD AUTO: 0.05 K/UL (ref 0–0.51)
EOSINOPHIL NFR BLD: 0.4 % (ref 0–6.9)
ERYTHROCYTE [DISTWIDTH] IN BLOOD BY AUTOMATED COUNT: 47.8 FL (ref 35.9–50)
FUNGUS SPEC CULT: NORMAL
FUNGUS SPEC FUNGUS STN: NORMAL
GFR SERPLBLD CREATININE-BSD FMLA CKD-EPI: 89 ML/MIN/1.73 M 2
GLUCOSE SERPL-MCNC: 103 MG/DL (ref 65–99)
HCT VFR BLD AUTO: 41.6 % (ref 37–47)
HGB BLD-MCNC: 13.2 G/DL (ref 12–16)
IMM GRANULOCYTES # BLD AUTO: 0.23 K/UL (ref 0–0.11)
IMM GRANULOCYTES NFR BLD AUTO: 1.6 % (ref 0–0.9)
LYMPHOCYTES # BLD AUTO: 0.79 K/UL (ref 1–4.8)
LYMPHOCYTES NFR BLD: 5.6 % (ref 22–41)
MCH RBC QN AUTO: 28.9 PG (ref 27–33)
MCHC RBC AUTO-ENTMCNC: 31.7 G/DL (ref 32.2–35.5)
MCV RBC AUTO: 91 FL (ref 81.4–97.8)
MONOCYTES # BLD AUTO: 1.43 K/UL (ref 0–0.85)
MONOCYTES NFR BLD AUTO: 10.1 % (ref 0–13.4)
NEUTROPHILS # BLD AUTO: 11.65 K/UL (ref 1.82–7.42)
NEUTROPHILS NFR BLD: 82.1 % (ref 44–72)
NRBC # BLD AUTO: 0 K/UL
NRBC BLD-RTO: 0 /100 WBC (ref 0–0.2)
PLATELET # BLD AUTO: 593 K/UL (ref 164–446)
PMV BLD AUTO: 8.9 FL (ref 9–12.9)
POTASSIUM SERPL-SCNC: 4.2 MMOL/L (ref 3.6–5.5)
RBC # BLD AUTO: 4.57 M/UL (ref 4.2–5.4)
SIGNIFICANT IND 70042: NORMAL
SITE SITE: NORMAL
SODIUM SERPL-SCNC: 136 MMOL/L (ref 135–145)
SOURCE SOURCE: NORMAL
WBC # BLD AUTO: 14.2 K/UL (ref 4.8–10.8)

## 2024-09-23 PROCEDURE — 85025 COMPLETE CBC W/AUTO DIFF WBC: CPT

## 2024-09-23 PROCEDURE — A9270 NON-COVERED ITEM OR SERVICE: HCPCS | Mod: JZ

## 2024-09-23 PROCEDURE — 700105 HCHG RX REV CODE 258: Performed by: INTERNAL MEDICINE

## 2024-09-23 PROCEDURE — 700102 HCHG RX REV CODE 250 W/ 637 OVERRIDE(OP)

## 2024-09-23 PROCEDURE — 700101 HCHG RX REV CODE 250: Performed by: STUDENT IN AN ORGANIZED HEALTH CARE EDUCATION/TRAINING PROGRAM

## 2024-09-23 PROCEDURE — A9270 NON-COVERED ITEM OR SERVICE: HCPCS | Performed by: STUDENT IN AN ORGANIZED HEALTH CARE EDUCATION/TRAINING PROGRAM

## 2024-09-23 PROCEDURE — 700102 HCHG RX REV CODE 250 W/ 637 OVERRIDE(OP): Performed by: STUDENT IN AN ORGANIZED HEALTH CARE EDUCATION/TRAINING PROGRAM

## 2024-09-23 PROCEDURE — 80048 BASIC METABOLIC PNL TOTAL CA: CPT

## 2024-09-23 PROCEDURE — A9270 NON-COVERED ITEM OR SERVICE: HCPCS | Performed by: HOSPITALIST

## 2024-09-23 PROCEDURE — 99233 SBSQ HOSP IP/OBS HIGH 50: CPT | Performed by: INTERNAL MEDICINE

## 2024-09-23 PROCEDURE — A9270 NON-COVERED ITEM OR SERVICE: HCPCS

## 2024-09-23 PROCEDURE — 700102 HCHG RX REV CODE 250 W/ 637 OVERRIDE(OP): Performed by: HOSPITALIST

## 2024-09-23 PROCEDURE — 99233 SBSQ HOSP IP/OBS HIGH 50: CPT | Performed by: STUDENT IN AN ORGANIZED HEALTH CARE EDUCATION/TRAINING PROGRAM

## 2024-09-23 PROCEDURE — 700102 HCHG RX REV CODE 250 W/ 637 OVERRIDE(OP): Mod: JZ

## 2024-09-23 PROCEDURE — 770001 HCHG ROOM/CARE - MED/SURG/GYN PRIV*

## 2024-09-23 PROCEDURE — 74177 CT ABD & PELVIS W/CONTRAST: CPT

## 2024-09-23 PROCEDURE — 700117 HCHG RX CONTRAST REV CODE 255: Performed by: NURSE PRACTITIONER

## 2024-09-23 PROCEDURE — 99232 SBSQ HOSP IP/OBS MODERATE 35: CPT | Performed by: SURGERY

## 2024-09-23 PROCEDURE — 700111 HCHG RX REV CODE 636 W/ 250 OVERRIDE (IP): Mod: JZ | Performed by: INTERNAL MEDICINE

## 2024-09-23 PROCEDURE — 36415 COLL VENOUS BLD VENIPUNCTURE: CPT

## 2024-09-23 PROCEDURE — 700101 HCHG RX REV CODE 250: Performed by: NURSE PRACTITIONER

## 2024-09-23 RX ORDER — CYCLOBENZAPRINE HCL 10 MG
10 TABLET ORAL 3 TIMES DAILY PRN
Status: DISCONTINUED | OUTPATIENT
Start: 2024-09-23 | End: 2024-10-02 | Stop reason: HOSPADM

## 2024-09-23 RX ADMIN — AMPICILLIN AND SULBACTAM 3 G: 1; 2 INJECTION, POWDER, FOR SOLUTION INTRAMUSCULAR; INTRAVENOUS at 06:33

## 2024-09-23 RX ADMIN — AMPICILLIN AND SULBACTAM 3 G: 1; 2 INJECTION, POWDER, FOR SOLUTION INTRAMUSCULAR; INTRAVENOUS at 00:04

## 2024-09-23 RX ADMIN — LIDOCAINE 1 PATCH: 4 PATCH TOPICAL at 12:41

## 2024-09-23 RX ADMIN — AMLODIPINE BESYLATE 5 MG: 10 TABLET ORAL at 06:26

## 2024-09-23 RX ADMIN — AMPICILLIN AND SULBACTAM 3 G: 1; 2 INJECTION, POWDER, FOR SOLUTION INTRAMUSCULAR; INTRAVENOUS at 12:40

## 2024-09-23 RX ADMIN — CYCLOBENZAPRINE 10 MG: 10 TABLET, FILM COATED ORAL at 13:09

## 2024-09-23 RX ADMIN — SENNOSIDES AND DOCUSATE SODIUM 2 TABLET: 50; 8.6 TABLET ORAL at 18:22

## 2024-09-23 RX ADMIN — APIXABAN 5 MG: 5 TABLET, FILM COATED ORAL at 18:22

## 2024-09-23 RX ADMIN — LEVOTHYROXINE SODIUM 100 MCG: 0.1 TABLET ORAL at 06:26

## 2024-09-23 RX ADMIN — SODIUM CHLORIDE, PRESERVATIVE FREE 10 ML: 5 INJECTION INTRAVENOUS at 06:28

## 2024-09-23 RX ADMIN — LIOTHYRONINE SODIUM 5 MCG: 5 TABLET ORAL at 06:27

## 2024-09-23 RX ADMIN — IOHEXOL 100 ML: 350 INJECTION, SOLUTION INTRAVENOUS at 12:56

## 2024-09-23 RX ADMIN — APIXABAN 5 MG: 5 TABLET, FILM COATED ORAL at 06:27

## 2024-09-23 RX ADMIN — AMPICILLIN AND SULBACTAM 3 G: 1; 2 INJECTION, POWDER, FOR SOLUTION INTRAMUSCULAR; INTRAVENOUS at 18:23

## 2024-09-23 RX ADMIN — POTASSIUM CHLORIDE 20 MEQ: 1500 TABLET, EXTENDED RELEASE ORAL at 06:26

## 2024-09-23 ASSESSMENT — PATIENT HEALTH QUESTIONNAIRE - PHQ9
2. FEELING DOWN, DEPRESSED, IRRITABLE, OR HOPELESS: NOT AT ALL
1. LITTLE INTEREST OR PLEASURE IN DOING THINGS: NOT AT ALL
SUM OF ALL RESPONSES TO PHQ9 QUESTIONS 1 AND 2: 0

## 2024-09-23 ASSESSMENT — ENCOUNTER SYMPTOMS
PALPITATIONS: 0
CHILLS: 0
SHORTNESS OF BREATH: 0
WEAKNESS: 0
NAUSEA: 0
ABDOMINAL PAIN: 0
FEVER: 0
DIARRHEA: 0
VOMITING: 0
ABDOMINAL PAIN: 1
CONSTIPATION: 0
FEVER: 1
COUGH: 0
WEIGHT LOSS: 0

## 2024-09-23 ASSESSMENT — PAIN DESCRIPTION - PAIN TYPE
TYPE: ACUTE PAIN

## 2024-09-23 NOTE — PROGRESS NOTES
Infectious Disease Progress Note    Author: Bao Rooney M.D. Date & Time of service: 2024  8:34 AM    Chief Complaint:  Follow-up for abdominal abscess    Interval History:   Tmax 100.3 yesterday evening, white count down to 8.7, tolerated switch to Unasyn, culture results as below, creatinine 0.53, normal transaminases   Tmax 99.1, cultures as below, no new labs this morning, repeat imaging.   patient remains afebrile, white count is 14.2 today and patient feeling somewhat ill compared to yesterday, tolerating Unasyn, creatinine 0.53, normal transaminases, cultures as below    Labs Reviewed and Medications Reviewed.    Review of Systems:  Review of Systems   Constitutional:  Negative for chills and fever.   Skin:  Negative for itching and rash.     Hemodynamics:  Temp (24hrs), Av.8 °C (98.3 °F), Min:36.7 °C (98.1 °F), Max:37 °C (98.6 °F)  Temperature: 36.8 °C (98.2 °F)  Pulse  Av.1  Min: 43  Max: 129   Blood Pressure : 132/70       Physical Exam:  Physical Exam  Vitals and nursing note reviewed.   Constitutional:       General: She is not in acute distress.     Appearance: She is not ill-appearing.   Eyes:      Conjunctiva/sclera: Conjunctivae normal.   Cardiovascular:      Rate and Rhythm: Normal rate.   Pulmonary:      Effort: Pulmonary effort is normal. No respiratory distress.      Breath sounds: No stridor.   Abdominal:      General: There is no distension.      Comments: IR drain in place with minimal amount of purulent output in bulb   Musculoskeletal:         General: No swelling or tenderness.   Skin:     Findings: No erythema or rash.   Neurological:      General: No focal deficit present.      Mental Status: She is alert.   Psychiatric:         Mood and Affect: Mood normal.         Behavior: Behavior normal.         Meds:    Current Facility-Administered Medications:     lidocaine    apixaban    ampicillin-sulbactam (UNASYN) IV    normal saline flush    liothyronine **AND**  liothyronine    potassium chloride SA    acetaminophen    senna-docusate **AND** polyethylene glycol/lytes    amLODIPine    levothyroxine    Labs:  Recent Labs     09/20/24  1406 09/21/24  0146 09/23/24  0534   WBC 11.7* 8.7 14.2*   RBC 4.88 5.04 4.57   HEMOGLOBIN 14.6 14.5 13.2   HEMATOCRIT 45.2 46.4 41.6   MCV 92.6 92.1 91.0   MCH 29.9 28.8 28.9   RDW 47.3 47.4 47.8   PLATELETCT 612* 653* 593*   MPV 9.4 9.3 8.9*   NEUTSPOLYS  --   --  82.10*   LYMPHOCYTES  --   --  5.60*   MONOCYTES  --   --  10.10   EOSINOPHILS  --   --  0.40   BASOPHILS  --   --  0.20     Recent Labs     09/21/24  0146   SODIUM 135   POTASSIUM 3.9   CHLORIDE 101   CO2 18*   GLUCOSE 117*   BUN 7*     Recent Labs     09/21/24  0146   CREATININE 0.53       Imaging:  CT-IMAGE-GUIDED DRAIN PERITONEAL    Result Date: 9/19/2024 9/19/2024 9:41 AM HISTORY/REASON FOR EXAM:  Pancreatic abscess drain placement; Anterior LUQ. History of distal pancreas resection. Distal pancreatectomy. Previous catheter drainage for left upper quadrant collection 8/22/2024. Recurrent or residual collection seen on recent CT 9/17/2024 TECHNIQUE/EXAM DESCRIPTION: Left upper quadrant abdominal  abscess drainage with CT guidance. Low dose optimization technique was utilized for this CT exam including automated exposure control and adjustment of the mA and/or kV according to patient size. ALL ELEMENTS OF MAXIMAL STERILE BARRIER TECHNIQUE WERE FOLLOWED. SEDATION TIME: 30 minutes. Moderate sedation was administered with fentanyl and Versed with continuous patient monitoring by the interventional radiology nurse. PROCEDURE: Informed consent was obtained. Localizing CT images were obtained with the patient in supine position. The skin was prepped with ChloraPrep and draped in a sterile fashion. Moderate sedation was provided. Pulse oximetry and continuous cardiac monitoring by the nurse was performed throughout the exam. Intraservice time was 30 minutes. Following local anesthesia  with 1% Lidocaine, a 17 G guiding needle was placed and needle path confirmed with CT. An Amplatz guidewire was placed and following serial tract dilatation, a 10 Syriac pigtail locking catheter was placed. A specimen was collected and submitted for culture and sensitivity and Gram stain. Total of 40 mL yellowish-greenish pus was drained. The catheter was secured to the skin and connected to suction bulb drainage. Final CT images were obtained documenting catheter position. The patient tolerated the procedure well with no evidence of complication. COMPARISON: CT of the abdomen and pelvis 9/17/2024, CT-guided left upper quadrant catheter drainage 8/22/2024 FINDINGS: The final CT images show satisfactory catheter position within the target collection.     1.  CT GUIDED PERITONEAL LEFT UPPER QUADRANT ABDOMINAL ABSCESS DRAINAGE. PLACEMENT OF 10 Lithuanian LOCKING LOOP CATHETER. 2.  THE CURRENT PLAN IS TO IRRIGATE ONCE DAILY WITH 5 ML STERILE SALINE, CHECK CULTURES, MONITOR DRAINAGE OUTPUT AND OBTAIN A FOLLOW-UP CT SCAN IN 5-7 DAYS IF CLINICALLY INDICATED.    CT-ABDOMEN-PELVIS WITH    Result Date: 9/17/2024 9/17/2024 9:29 PM HISTORY/REASON FOR EXAM:  Abdominal pelvic pain. TECHNIQUE/EXAM DESCRIPTION:   CT scan of the abdomen and pelvis with contrast. Contrast-enhanced helical scanning was obtained from the diaphragmatic domes through the pubic symphysis following the bolus administration of nonionic contrast without complication. 100 mL of Omnipaque 350 nonionic contrast was administered without complication. Low dose optimization technique was utilized for this CT exam including automated exposure control and adjustment of the mA and/or kV according to patient size. COMPARISON: 08/27/2024, 08/20/2024 FINDINGS: Lower Chest: There is left pleural effusion and consolidation in the left lower pulmonary lobe as well as lingular segment left upper lobe.. Hiatal hernia is identified. Liver: Normal. Spleen: Not identified.  Pancreas: Post distal pancreatectomy and splenectomy. Fluid collection with irregular shape and some peripheral enhancement distal to the postsurgical site is identified which is multilobulated. Largest component measures approximately 5.7 x 3.0 cm. Fluid extends up to the left hemidiaphragm. Percutaneous drain no longer identified.. Gallbladder: Cholelithiasis. Biliary: Nondilated. Adrenal glands: Normal. Kidneys: Unremarkable without hydronephrosis. Bowel: No obstruction or acute inflammation. Lymph nodes: No adenopathy. Vasculature: Unremarkable. Peritoneum: Unremarkable without ascites. Musculoskeletal: No acute or destructive process. Pelvis: No adenopathy or free fluid. Endometrial cavity appears prominent as on the prior CT.     1.  Post distal pancreatectomy. 2.  Fluid collection with some peripheral enhancement is identified in the distal pancreatic region including the region of previous pancreatectomy. Developing pseudocyst is a possibility. No emphysema or hemorrhage is appreciated. Remaining pancreas enhances normally. 3.  There is cholelithiasis. 4.  Left pleural effusion and consolidations in the left lung base. 5.  No change in hiatal hernia. 6.  Prominent endometrial cavity again noted.    CT-PANCREAS AND ABDOMEN WITH & W/O    Result Date: 8/27/2024 8/27/2024 6:38 PM HISTORY/REASON FOR EXAM:  s/p distal pancreatectomy and splenectomy with pathology of benign pancreatic serous cystadenoma in a background of chronic pancreatitis, hx LUQ fluid collection s/p aspiration. Evaluate for recurrent fluid collection.. TECHNIQUE/EXAM DESCRIPTION:  CT pancreas and abdomen without and with contrast. Initial precontrast thick-section images were obtained through the pancreas.  Following this, nonionic contrast was administered by IV, and thin-section helical scanning performed from the diaphragmatic domes through the iliac crests.  Pancreatic parenchymal phase and portal venous phase (dual-phase) images were  obtained following contrast administration. 100 mL of Omnipaque 350 nonionic contrast was administered. Low dose optimization technique was utilized for this CT exam including automated exposure control and adjustment of the mA and/or kV according to patient size. COMPARISON: 8/22/2024 FINDINGS: Liver: Unremarkable Spleen: Absent Biliary system: Gallbladder shows multiple stones and minimal wall thickening. Common bile duct is not dilated. Pancreas: Prior distal pancreatectomy.  Minimal edema adjacent to surgical site.  No peripancreatic fluid collection demonstrated. Pancreatic duct: There is no pancreatic ductal dilatation. Arterial vasculature: The celiac axis has a normal branching pattern. The SMA is patent. Venous vasculature: Portable and superior mesenteric veins are patent.  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 Vascular encasement: None. Kidneys: Kidneys are unremarkable. Adrenals: Adrenals are unremarkable. Lymph nodes: There are no enlarged nodes. Bowel: No bowel obstruction.  No focal bowel wall thickening.  Sparse colonic diverticula. Lung bases: Small LEFT pleural effusion with associated atelectasis.  Minimal RIGHT pleural fluid. Bones: Degenerative change of lumbar spine.  Prior lumbar spine surgery. LEFT upper quadrant drain in place.  No residual fluid collection demonstrated.  Minimal peritoneal gas.  Postoperative change of the anterior abdominal wall.     1.  Prior distal pancreatectomy and splenectomy.  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 2.  LEFT upper quadrant drain in place with minimal adjacent peritoneal gas.  No significant residual or recurrent fluid collection demonstrated. 3.  Gallstones and minimal gallbladder wall thickening.  Cholecystitis is not excluded. 4.  Bilateral pleural fluid collections with associated atelectasis, worse on the LEFT.     CT-IMAGE-GUIDED DRAIN  PERITONEAL    Result Date: 8/23/2024 8/22/2024 3:15 PM HISTORY/REASON FOR EXAM:  LUQ fluid collection, hx distal pancreatectomy and splenectomy; Was on Eliquis, now on Heparin. TECHNIQUE/EXAM DESCRIPTION: Left upper quadrant fluid collection drainage with CT guidance. Low dose optimization technique was utilized for this CT exam including automated exposure control and adjustment of the mA and/or kV according to patient size. Moderate sedation was provided. Pulse oximetry and continuous cardiac monitoring by the nurse was performed throughout the exam. Intraservice time was 10 minutes. PROCEDURE: Informed consent was obtained. Localizing CT images were obtained with the patient in supine position. The procedure was performed using MAXIMAL STERILE BARRIER technique including sterile gown, mask, cap, and donning of sterile gloves following appropriate hand hygiene and/or sterile scrub. Patient skin site was prepped with 2% Chlorhexidine solution. Following local anesthesia with 1% Lidocaine, a 17 G guiding needle was placed and needle path confirmed with CT. An Amplatz guidewire was placed and following serial tract dilatation, a 18 Togolese pigtail locking catheter was placed. A specimen was collected and submitted for culture and sensitivity and Gram stain. Total of 200 mL thin brown fluid was drained. The catheter was secured to the skin and connected to suction bulb drainage. Final CT images were obtained documenting catheter position. The patient tolerated the procedure well with no evidence of complication. COMPARISON: 8/20/2024 FINDINGS: The final CT images show satisfactory catheter position within the target collection.     1.  CT guided left upper quadrant fluid collection catheter drainage.      Micro:  Results       Procedure Component Value Units Date/Time    Fungal Culture [[id number]] Collected: 09/19/24 1020    Order Status: Completed Specimen: Wound from Other Body Fluid Updated: 09/21/24 0937      Significant Indicator NEG     Source WND     Site pancreatic abscess     Culture Result Culture in progress.     Fungal Smear Results No fungal elements seen.    CULTURE WOUND W/ GRAM STAIN [[id number]]  (Abnormal)  (Susceptibility) Collected: 09/19/24 1020    Order Status: Completed Specimen: Wound Updated: 09/21/24 0937     Significant Indicator POS     Source WND     Site pancreatic abscess     Culture Result -     Gram Stain Result Many WBCs.  Many Gram positive cocci.       Culture Result Staphylococcus aureus  Heavy growth  Methicillin sensitive by screening method        Streptococcus agalactiae (Group B)  Moderate growth      Susceptibility       Staphylococcus aureus (1)       Antibiotic Interpretation Microscan   Method Status    Ampicillin/sulbactam Sensitive <=8/4 mcg/mL RALPH Final    Clindamycin Sensitive <=0.25 mcg/mL RALPH Final    Cefazolin Sensitive <=8 mcg/mL RALPH Final    Cefepime Sensitive <=4 mcg/mL RALPH Final    Daptomycin Sensitive <=0.5 mcg/mL RALPH Final    Erythromycin Sensitive <=0.25 mcg/mL RALPH Final    Oxacillin Sensitive 0.5 mcg/mL RALPH Final    Trimeth/Sulfa Sensitive <=0.5/9.5 mcg/mL RALPH Final    Tetracycline Sensitive <=4 mcg/mL RALPH Final    Vancomycin Sensitive 1 mcg/mL RALPH Final                       Urine Culture (New) [[id number]]  (Abnormal) Collected: 09/18/24 1700    Order Status: Completed Specimen: Urine Updated: 09/20/24 1138     Significant Indicator POS     Source UR     Site -     Culture Result -      Candida albicans  10-50,000 cfu/mL      GRAM STAIN [[id number]]  (Abnormal) Collected: 09/19/24 1020    Order Status: Completed Specimen: Wound Updated: 09/19/24 1811     Significant Indicator .     Source WND     Site pancreatic abscess     Gram Stain Result Many WBCs.  Many Gram positive cocci.      Fungal Smear [[id number]] Collected: 09/19/24 1020    Order Status: Completed Specimen: Wound Updated: 09/19/24 1811     Significant Indicator NEG     Source WND     Site pancreatic  abscess     Fungal Smear Results No fungal elements seen.    FLUID CULTURE W/GRAM STAIN [[id number]] Collected: 09/19/24 1020    Order Status: Canceled Specimen: Other Body Fluid     Urinalysis [[id number]]  (Abnormal) Collected: 09/18/24 1700    Order Status: Completed Specimen: Urine Updated: 09/18/24 1817     Color DK Yellow     Character Clear     Specific Gravity 1.033     Ph 5.0     Glucose Negative mg/dL      Ketones Trace mg/dL      Protein Negative mg/dL      Bilirubin Negative     Urobilinogen, Urine 1.0     Nitrite Negative     Leukocyte Esterase Small     Occult Blood Negative     Micro Urine Req Microscopic    BLOOD CULTURE [[id number]]     Order Status: Canceled Specimen: Blood from Peripheral     BLOOD CULTURE [[id number]]     Order Status: Canceled Specimen: Blood from Peripheral             Assessment/Hospital course:  This is a 78-year-old female patient with history of left breast carcinoma 34 years ago status post total mastectomy, 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 Cultures at the time grew Staph epidermidis, susceptibilities not performed.  Sent to the ER by surgical oncologist due to leukocytosis on recent labs along with episodes of epigastric pain.  CT abdomen and pelvis noted fluid collection in the distal pancreatic region.  IR drain placed on 9/19, 40 mL of purulent fluid was noted.  Cultures are growing MSSA and group B Strep.      Pertinent Diagnoses:  Intra-abdominal abscess  Polymicrobial infection  Pancreatic cystadenoma status post recent pancreatectomy, splenectomy, stomach and celiac node dissection 8/5/2024  Recurrent breast cancer, on  tamoxifen  History of PE    Plan:  -Continue IV Unasyn 3 g every 6 hours  -IR on board.  Planning repeat CT scan today.  We will follow along    Disposition: Anticipate no ID specific disposition needs  Need for PICC line: Likely no    Discussed with IR.  ID will follow.  This illness poses a threat to life

## 2024-09-23 NOTE — PROGRESS NOTES
"     Date of Service  September 23, 2024     Chief Complaint  Sent by MD (Sent by surgeon for elevated WBC. Pt reports abdominal drain removed last week and is concerned for infection at the site. Denies pain. )     Surgery  IR drain on 9/19/24 by Dr Ruff    Hospital Course  POD#4     Interval Problem Update  No acute events overnight  IR drain with 25 mL output  Patient states she felt \"worse than yesterday\" (subjective fever)  Worsening leukocytosis, WBC 14.2 from 8.7  Patient states she has a \"muscle strain\" at left upper side    Problem List  Principal Problem:    Pancreatic abscess (POA: Yes)  Active Problems:    Acquired hypothyroidism (POA: Yes)    Advance care planning (POA: Yes)    Age-related osteoporosis without current pathological fracture (POA: Yes)    History of breast cancer in adulthood (POA: Yes)    Pulmonary embolism with acute cor pulmonale, unspecified chronicity, unspecified pulmonary embolism type (HCC) (POA: Yes)    Acute respiratory failure with hypoxia (HCC) (POA: Yes)    Primary hypertension (POA: Unknown)    AV block, 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 Negative for chills, malaise/fatigue and weight loss.   Respiratory:  Negative for cough and shortness of breath.    Cardiovascular:  Negative for chest pain.   Gastrointestinal:  Negative for abdominal pain, constipation, diarrhea, nausea and vomiting.   Musculoskeletal:         Left upper side pain         Objective  Temp:  [36.7 °C (98.1 °F)-37 °C (98.6 °F)] 36.8 °C (98.2 °F)  Pulse:  [78-96] 78  Resp:  [16] 16  BP: (128-142)/(70-76) 132/70  SpO2:  [88 %-98 %] 98 %      Physical Exam  Vitals and nursing note reviewed.   Constitutional:       General: She is not in acute distress.     Appearance: Normal appearance.   HENT:      Head: Normocephalic and atraumatic.      Nose: Nose normal. "      Mouth/Throat:      Pharynx: Oropharynx is clear.   Eyes:      Conjunctiva/sclera: Conjunctivae normal.   Cardiovascular:      Rate and Rhythm: Normal rate.   Pulmonary:      Effort: Pulmonary effort is normal. No respiratory distress.      Breath sounds: Normal breath sounds.   Abdominal:      General: Abdomen is flat. There is no distension.      Tenderness: There is no abdominal tenderness.      Comments: LUQ IR drain   Musculoskeletal:      Comments: Tenderness at left upper / rear side abd wall   Skin:     General: Skin is warm and dry.   Neurological:      Mental Status: She is alert and oriented to person, place, and time.   Psychiatric:         Mood and Affect: Mood normal.         Behavior: Behavior normal.        Fluids    Intake/Output Summary (Last 24 hours) at 9/23/2024 0903  Last data filed at 9/23/2024 0402  Gross per 24 hour   Intake 100 ml   Output 25 ml   Net 75 ml       Labs  Lab Results   Component Value Date/Time    SODIUM 136 09/23/2024 05:34 AM    POTASSIUM 4.2 09/23/2024 05:34 AM    CHLORIDE 104 09/23/2024 05:34 AM    CO2 20 09/23/2024 05:34 AM    GLUCOSE 103 (H) 09/23/2024 05:34 AM    BUN 8 09/23/2024 05:34 AM    CREATININE 0.67 09/23/2024 05:34 AM         Lab Results   Component Value Date/Time    PROTHROMBTM 16.8 (H) 09/19/2024 12:33 AM    INR 1.36 (H) 09/19/2024 12:33 AM         Lab Results   Component Value Date/Time    WBC 14.2 (H) 09/23/2024 05:34 AM    RBC 4.57 09/23/2024 05:34 AM    HEMOGLOBIN 13.2 09/23/2024 05:34 AM    HEMATOCRIT 41.6 09/23/2024 05:34 AM    MCV 91.0 09/23/2024 05:34 AM    MCH 28.9 09/23/2024 05:34 AM    MCHC 31.7 (L) 09/23/2024 05:34 AM    MPV 8.9 (L) 09/23/2024 05:34 AM    NEUTSPOLYS 82.10 (H) 09/23/2024 05:34 AM    LYMPHOCYTES 5.60 (L) 09/23/2024 05:34 AM    MONOCYTES 10.10 09/23/2024 05:34 AM    EOSINOPHILS 0.40 09/23/2024 05:34 AM    BASOPHILS 0.20 09/23/2024 05:34 AM    ANISOCYTOSIS 1+ 08/15/2024 03:30 AM         Recent Labs     09/17/24  1448  09/17/24 2050 09/18/24  0600 09/19/24  0033   ASTSGOT 39 35 32 27   ALTSGPT 30 28 28 21   TBILIRUBIN 1.7* 1.4 1.7* 1.3   GLOBULIN 3.5 3.4 3.0 3.0   INR  --   --   --  1.36*      Imaging  CT A/P (9/17/24)  IMPRESSION:     1.  Post distal pancreatectomy.     2.  Fluid collection with some peripheral enhancement is identified in the distal pancreatic region including the region of previous pancreatectomy. Developing pseudocyst is a possibility. No emphysema or hemorrhage is appreciated. Remaining pancreas   enhances normally.     3.  There is cholelithiasis.     4.  Left pleural effusion and consolidations in the left lung base.     5.  No change in hiatal hernia.     6.  Prominent endometrial cavity again noted.     Assessment/Plan  Patient is a 78F with recent pancreatectomy and splenectomy, subsequent retroperitoneal abscess with drain placement. Now admitted from ED with leukocytosis WBC 23.8 noted to have recurrence of fluid collection in the distal pancreatic region.      Katelynn-pancreatic fluid collection, s/p distal pancreatectomy / splenectomy  - Continue IR drain  - Continue Unasyn - appreciate ID input    2. Worsening leukocytosis, WBC 14.2, subjective fever  - CT A/P       Patient seen with Dr Esquivel and Dr Velasco

## 2024-09-23 NOTE — CARE PLAN
The patient is Stable - Low risk of patient condition declining or worsening    Shift Goals  Clinical Goals: abx mgmt. drain care  Patient Goals: independence  Family Goals: POC    Progress made toward(s) clinical / shift goals:       Problem: Knowledge Deficit - Standard  Goal: Patient and family/care givers will demonstrate understanding of plan of care, disease process/condition, diagnostic tests and medications  Outcome: Progressing     Problem: Pain - Standard  Goal: Alleviation of pain or a reduction in pain to the patient’s comfort goal  Outcome: Progressing     Problem: Communication  Goal: The ability to communicate needs accurately and effectively will improve  Outcome: Progressing     Problem: Safety  Goal: Will remain free from injury  Outcome: Progressing     Problem: Pain Management  Goal: Pain level will decrease to patient's comfort goal  Outcome: Progressing     Problem: Infection  Goal: Will remain free from infection  Outcome: Progressing

## 2024-09-23 NOTE — PROGRESS NOTES
Hospital Medicine Daily Progress Note    Date of Service  9/23/2024    Chief Complaint  Paige Gudino is a 78 y.o. female admitted 9/17/2024 with abdominal pain    Hospital Course  edgar Gudino is a 78 y.o. female with breast cancer, pulmonary embolism (noted on CT 8/14/24), bilateral DVT (8/15 US), prior partial tail pancreatectomy and spleenectomy, and recent retroperitoneal abscess treated with drain placement (drain removed 9/11/24 and prior cx 8/23 with Staph epi) refer to oncologist for leukocytosis. Subsequent CT abd showed  abscess in pancreatic region. S/p CT-guided catheter drainage of left upper quadrant abscess on 9/19.  Initiated on IV antibiotics.  Fluid culture growing Staph aureus, strep agalactiae.  ID and surgery oncology following.CT abdomen pelvis ordered for evaluation of worsening leukocytosis.  Interval Problem Update    9/23/2024  Seen and examined at bedside  Her vitals remained stable  Denies abdominal pain, nausea and vomiting  Labs reviewed worsening leukocytosis white count 14.2, chemistry sodium 133 potassium 4.2, renal function stable,Fluid culture growing Staph aureus, strep agalactiae.      Continue on Unasyn  CT abdomen pelvis ordered for evaluation of worsening leukocytosis  Repeat BMP in a.m. to monitor electrolytes and toxicity and renal function  Repeat CBC in a.m. to monitor white count and hemoglobin  On lidocaine patch for ongoing pain.  Monitor toxicity  Requiring IV antibiotics, need close monitoring for toxicity  Case discussed with infectious disease    Case discussed with surgery oncology SAGAR Sandoval        I have discussed this patient's plan of care and discharge plan at IDT rounds today with Case Management, Nursing, Nursing leadership, and other members of the IDT team.    Consultants/Specialty  IR,ID    Code Status  Full Code    Disposition  The patient is not medically cleared for discharge to home or a post-acute  facility.  Anticipate discharge to: home with close outpatient follow-up    I have placed the appropriate orders for post-discharge needs.    Review of Systems  Review of Systems   Gastrointestinal:  Negative for abdominal pain, nausea and vomiting.        Physical Exam  Temp:  [36.7 °C (98.1 °F)-37 °C (98.6 °F)] 36.8 °C (98.2 °F)  Pulse:  [78-96] 78  Resp:  [16] 16  BP: (128-142)/(70-76) 132/70  SpO2:  [88 %-98 %] 98 %    Physical Exam  Constitutional:       Appearance: Normal appearance.   Cardiovascular:      Rate and Rhythm: Normal rate and regular rhythm.      Pulses: Normal pulses.      Heart sounds: Normal heart sounds.   Abdominal:      Comments: YEMI drain noted with cloudy whitish output   Musculoskeletal:      Right lower leg: No edema.      Left lower leg: No edema.   Neurological:      General: No focal deficit present.      Mental Status: She is alert and oriented to person, place, and time.         Fluids    Intake/Output Summary (Last 24 hours) at 9/23/2024 1019  Last data filed at 9/23/2024 0710  Gross per 24 hour   Intake 100 ml   Output 25 ml   Net 75 ml        Laboratory  Recent Labs     09/20/24  1406 09/21/24  0146 09/23/24  0534   WBC 11.7* 8.7 14.2*   RBC 4.88 5.04 4.57   HEMOGLOBIN 14.6 14.5 13.2   HEMATOCRIT 45.2 46.4 41.6   MCV 92.6 92.1 91.0   MCH 29.9 28.8 28.9   MCHC 32.3 31.3* 31.7*   RDW 47.3 47.4 47.8   PLATELETCT 612* 653* 593*   MPV 9.4 9.3 8.9*     Recent Labs     09/21/24  0146 09/23/24  0534   SODIUM 135 136   POTASSIUM 3.9 4.2   CHLORIDE 101 104   CO2 18* 20   GLUCOSE 117* 103*   BUN 7* 8   CREATININE 0.53 0.67   CALCIUM 9.1 8.2*                     Imaging  CT-IMAGE-GUIDED DRAIN PERITONEAL   Final Result      1.  CT GUIDED PERITONEAL LEFT UPPER QUADRANT ABDOMINAL ABSCESS DRAINAGE. PLACEMENT OF 10 [place] LOCKING LOOP CATHETER.   2.  THE CURRENT PLAN IS TO IRRIGATE ONCE DAILY WITH 5 ML STERILE SALINE, CHECK CULTURES, MONITOR DRAINAGE OUTPUT AND OBTAIN A FOLLOW-UP CT SCAN IN 5-7  DAYS IF CLINICALLY INDICATED.      CT-ABDOMEN-PELVIS WITH   Final Result      1.  Post distal pancreatectomy.      2.  Fluid collection with some peripheral enhancement is identified in the distal pancreatic region including the region of previous pancreatectomy. Developing pseudocyst is a possibility. No emphysema or hemorrhage is appreciated. Remaining pancreas    enhances normally.      3.  There is cholelithiasis.      4.  Left pleural effusion and consolidations in the left lung base.      5.  No change in hiatal hernia.      6.  Prominent endometrial cavity again noted.      CT-ABDOMEN-PELVIS WITH    (Results Pending)        Assessment/Plan  * Pancreatic abscess- (present on admission)  Assessment & Plan    9/19 had IR place LUQ fluid collection drain w/ purulent appearing drainage  cultures from 9/19 growing MSSA and Strep aglactiae    Continue on Unasyn  CT abdomen pelvis ordered for evaluation of worsening leukocytosis  Repeat BMP in a.m. to monitor electrolytes and toxicity and renal function  Repeat CBC in a.m. 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  NSR as of 9/19 on my evaluation via telemetry  Monitor electrolytes  Titrate oxygen to keep SpO2 >89%  Restart apixaban 5mg BID asap for hx of PE    Primary hypertension  Assessment & Plan  Continue home amlodipine w/ parameters  Monitor vitals    Acute respiratory failure with hypoxia (HCC)- (present on admission)  Assessment & Plan  Titrate supplemental oxygen to keep SpO2 >89%  RT per protocol    Pulmonary embolism with acute cor pulmonale, unspecified chronicity, unspecified pulmonary embolism type (HCC)- (present on admission)  Assessment  & Plan  CT 8/14/24 with bilateral segmental and subsegmental pulmonary emboli with findings compatible with significant R heart strain.   S/p R atrial thrombectomy by Dr. 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 Hold while in hospital    Advance care planning- (present on admission)  Assessment & Plan  FULL code    Acquired hypothyroidism- (present on admission)  Assessment & Plan  continue home levothyroxine and liothyronine   One month ago TSH:4.57         VTE prophylaxis: eliquis    I have performed a physical exam and reviewed and updated ROS and Plan today (9/23/2024). In review of yesterday's note (9/22/2024), there are no changes except as documented above.

## 2024-09-23 NOTE — PROGRESS NOTES
Virtual Nurse rounding complete.  Rounding Needs: Patient resting comfortably with no needs at this time.    Patient declined to have camera on this morning; states that she has no needs at this time.

## 2024-09-23 NOTE — PROGRESS NOTES
Report received from RN, assumed care at 0700  Pt is A0X4, and responds appropriately   Pt declines any SOB, chest pain, new onset of numbness/ tingiling  Pt rates pain at 6/10, on a scale of 1-10, pt resting at this time  Pt is voiding adequatly and without hesitancy  Pt has + flatus, + bowel sounds, + BM on 9/22/2024  Pt ambulates with a steady gait up self  Pt is tolerating a regular diet, poor oral intake, pt denies any nausea/vomiting  Pt has LUQ IR drain in place, dressing is clean, dry and intact   Pt has old midline incision from previous surgery, healed, open to air       Plan of care discussed, all questions answered. Explained importance of calling before getting OOB and pt verbalizes understanding. Explained importance of oral care. Call light is within reach, treaded slipper socks on, bed in lowest/ locked position, hourly rounding in place, all needs met at this time

## 2024-09-23 NOTE — CARE PLAN
The patient is Stable - Low risk of patient condition declining or worsening    Shift Goals  Clinical Goals: abx mgmt. drain care  Patient Goals: independence  Family Goals: POC    Progress made toward(s) clinical / shift goals:  iv abx continued. IR drain flushed per order. Min output. Purulent output    Patient is not progressing towards the following goals:    Problem: Knowledge Deficit - Standard  Goal: Patient and family/care givers will demonstrate understanding of plan of care, disease process/condition, diagnostic tests and medications  Outcome: Progressing     Problem: Infection  Goal: Will remain free from infection  Outcome: Progressing

## 2024-09-23 NOTE — PROGRESS NOTES
Radiology Progress Note   Author: SOUMYA Avelar Date & Time created: 9/23/2024  12:23 PM   Date of admission  9/17/2024  Note to reader: this note follows the APSO format rather than the historical SOAP format. Assessment and plan located at the top of the note for ease of use.    Chief Complaint  78 y.o. female admitted 9/17/2024 with   Chief Complaint   Patient presents with    Sent by MD     Sent by surgeon for elevated WBC. Pt reports abdominal drain removed last week and is concerned for infection at the site. Denies pain.          HPI  78-year-old female with past medical history of noninvasive carcinoma of left breast (35 years ago), s/p total mastectomy, invasive grade 2 mammary carcinoma (2022) pancreatic serous cystic adenoma (benign), s/p pancreatectomy, splenectomy, retroperitoneal abscess with drain placement (placed 08/22 and removed 09/11), recent DVT and PE who presented to [facility] on 09/17/2024 due to leukocytosis on recent lab work with intermittent sharp epigastric pain. CT A/P showed fluid collection in distal pancreatic region. IR consulted and pt underwent a 10 [place] left peritoneal upper quadrant abdominal abscess drain placement with IR Dr Ruff on  09/19/2024. 40 ML of purulent fluid removed and sent to lab.      Interval History:   09/20/2024-10 [place] left upper quadrant peritoneal drain to bulb suction with 82 mL purulent  output in the last 24 hours.  I flushed drain with 10 mL of sterile NSS  I reviewed today's labs: WBC 17.3; H&H 12.4/38.5, Cr 0.70; Coags are 1.36,  Micro- abscess drainage from 09/19 positive for staphylococcal aureus; urine from 9/18 positive for yeast. I discussed drain care with pt  at bedside     09/21/2024-10 [place] left upper quadrant peritoneal drain to bulb suction with 25 mL purulent  output in the last 24 hours.  I flushed drain with 10 mL of sterile NSS  I reviewed today's labs: WBC 8.7; H&H 14.5/46.4, Cr 0.53; Coags are 1.36,  Micro- abscess  drainage from 09/19 + for many GPC; urine from 9/18 + for yeast. I discussed drain care/with pt  at bedside, I reviewed drain flushing with patient and patient , all questions answered.      09/22/2024- LUQ 10 [place] peritoneal drain to bulb suction with 35 mL purulent  output in the last 24 hours.  I flushed drain with 10 mL of sterile NSS  I reviewed most recent labs: WBC 8.7; H&H 14.5/46.4, Cr 0.53; Coags are 1.36,  Micro- abscess drainage from 09/19 + for MSSA, Streptococcus agalactiae group B; urine from 9/18 + for Candida albicans I discussed plan of care with infectious disease, and patient.  IDT notes reviewed.    09/23/24 - 10 Fr peritoneal drain to LUQ with 25 mL purulent output in the last 24 hours. Labs reviewed by me: WBC 14.2, creatinine 0.67. On ABX through ID. Drain flushed with 10 mL NS. IDT notes reviewed. Discussed with hospitalist; repeat CT pending for elevated white count.  at bedside and all questions answered.     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)    Abdominal infection (HCC)      Plan IR  - Continue to irrigate LUQ 10Fr  drain with 10 ml of sterile saline each shift  - Fluid cultures positive for MSSA and Streptococcus agalactiae group B  - Recommend follow up CT scan in 5-7 days once drainage decreases to approximately 10-15 mL in 24 hours (proximately 09/25-27)-  - Continue to monitor drains, VS, and labs   - Ultimate plan is for patient to discharge with drain in place and follow-up with surgical oncology for drain management/care  - Continue to monitor drain,  VS, and labs   -   -Thank you for allowing Interventional Radiology team to participate in the patients care, if any additional care or requests are needed in the future please do not hesitate to call or place IR order           Review of Systems  Physical Exam   Review of Systems   Constitutional:  Negative for chills and fever.   Respiratory:  Negative for shortness of breath.    Cardiovascular:  Negative for chest pain and palpitations.   Gastrointestinal:  Positive for abdominal pain. Negative for nausea and vomiting.   Neurological:  Negative for weakness.      Vitals:    09/23/24 0710   BP: 132/70   Pulse: 78   Resp: 16   Temp: 36.8 °C (98.2 °F)   SpO2: 98%        Physical Exam  Constitutional:       Appearance: Normal appearance.   Cardiovascular:      Rate and Rhythm: Normal rate.   Pulmonary:      Effort: Pulmonary effort is normal. No respiratory distress.   Abdominal:      Palpations: Abdomen is soft.      Comments: IR drain to LUQ   Skin:     General: Skin is warm and dry.   Neurological:      General: No focal deficit present.      Mental Status: She is alert and oriented to person, place, and time.   Psychiatric:         Mood and Affect: Mood normal.         Behavior: Behavior normal.             Labs    Recent Labs     09/20/24  1406 09/21/24  0146 09/23/24  0534   WBC 11.7* 8.7 14.2*   RBC 4.88 5.04 4.57   HEMOGLOBIN 14.6 14.5 13.2   HEMATOCRIT 45.2 46.4 41.6   MCV 92.6 92.1 91.0   MCH 29.9 28.8 28.9   MCHC 32.3 31.3* 31.7*   RDW 47.3 47.4 47.8   PLATELETCT 612* 653* 593*   MPV 9.4 9.3 8.9*     Recent Labs     09/21/24  0146 09/23/24  0534   SODIUM 135 136   POTASSIUM 3.9 4.2   CHLORIDE 101 104   CO2 18* 20   GLUCOSE 117* 103*   BUN 7* 8   CREATININE 0.53 0.67   CALCIUM 9.1 8.2*     Recent Labs     09/21/24  0146 09/23/24  0534   CREATININE 0.53 0.67     CT-IMAGE-GUIDED DRAIN PERITONEAL   Final Result      1.  CT GUIDED PERITONEAL LEFT UPPER QUADRANT ABDOMINAL ABSCESS DRAINAGE. PLACEMENT OF 10 [place]  "LOCKING LOOP CATHETER.   2.  THE CURRENT PLAN IS TO IRRIGATE ONCE DAILY WITH 5 ML STERILE SALINE, CHECK CULTURES, MONITOR DRAINAGE OUTPUT AND OBTAIN A FOLLOW-UP CT SCAN IN 5-7 DAYS IF CLINICALLY INDICATED.      CT-ABDOMEN-PELVIS WITH   Final Result      1.  Post distal pancreatectomy.      2.  Fluid collection with some peripheral enhancement is identified in the distal pancreatic region including the region of previous pancreatectomy. Developing pseudocyst is a possibility. No emphysema or hemorrhage is appreciated. Remaining pancreas    enhances normally.      3.  There is cholelithiasis.      4.  Left pleural effusion and consolidations in the left lung base.      5.  No change in hiatal hernia.      6.  Prominent endometrial cavity again noted.      CT-ABDOMEN-PELVIS WITH    (Results Pending)     INR   Date Value Ref Range Status   09/19/2024 1.36 (H) 0.87 - 1.13 Final     Comment:     INR - Non-therapeutic Reference Range: 0.87-1.13  INR - Therapeutic Reference Range: 2.0-4.0       No results found for: \"POCINR\"     Intake/Output Summary (Last 24 hours) at 9/23/2024 1223  Last data filed at 9/23/2024 0710  Gross per 24 hour   Intake 100 ml   Output 25 ml   Net 75 ml      I have personally reviewed the above labs and imaging      I have performed a physical exam and reviewed and updated ROS and Plan today (9/23/2024).     36 minutes in directly providing and coordinating care and extensive data review.  No time overlap and excludes procedures.  "

## 2024-09-23 NOTE — PROGRESS NOTES
Assumed care at 1845. Pt resting in bed.   A&ox 4  Ambulating independently  O2 on RA  Tolerating diet  Last BM 9/22  Pain controlled  LUQ IR drain. Flushed per order.   Voiding adequately  Call light within reach. Hourly rounding in place

## 2024-09-23 NOTE — HOSPITAL COURSE
Paige Gudino is a 78 y.o. female with breast cancer, pulmonary embolism (noted on CT 8/14/24), bilateral DVT (8/15 US), prior partial tail pancreatectomy and spleenectomy, and recent retroperitoneal abscess treated with drain placement (drain removed 9/11/24 and prior cx 8/23 with Staph epi) refer to oncologist for leukocytosis. Subsequent CT abd showed  abscess in pancreatic region. S/p CT-guided catheter drainage of left upper quadrant abscess on 9/19.  Initiated on IV antibiotics.  Fluid culture growing Staph aureus, strep agalactiae.  ID and surgery oncology following.CT abdomen pelvis ordered for evaluation of worsening leukocytosis.

## 2024-09-24 ENCOUNTER — APPOINTMENT (OUTPATIENT)
Dept: RADIOLOGY | Facility: MEDICAL CENTER | Age: 79
End: 2024-09-24
Attending: NURSE PRACTITIONER
Payer: COMMERCIAL

## 2024-09-24 ENCOUNTER — APPOINTMENT (OUTPATIENT)
Dept: RADIOLOGY | Facility: MEDICAL CENTER | Age: 79
DRG: 438 | End: 2024-09-24
Attending: NURSE PRACTITIONER
Payer: COMMERCIAL

## 2024-09-24 PROBLEM — I82.403 DEEP VEIN THROMBOSIS (DVT) OF BOTH LOWER EXTREMITIES (HCC): Status: ACTIVE | Noted: 2024-08-15

## 2024-09-24 PROBLEM — K65.1 INTRA-ABDOMINAL ABSCESS (HCC): Status: ACTIVE | Noted: 2024-09-17

## 2024-09-24 PROBLEM — D75.839 THROMBOCYTOSIS: Status: ACTIVE | Noted: 2024-09-24

## 2024-09-24 PROBLEM — J90 PLEURAL EFFUSION: Status: ACTIVE | Noted: 2024-09-24

## 2024-09-24 PROBLEM — I44.1 AV BLOCK, MOBITZ 1: Status: ACTIVE | Noted: 2024-09-19

## 2024-09-24 LAB
AMYLASE FLD-CCNC: 28 U/L
ANION GAP SERPL CALC-SCNC: 11 MMOL/L (ref 7–16)
APPEARANCE FLD: NORMAL
BASOPHILS # BLD AUTO: 0.2 % (ref 0–1.8)
BASOPHILS # BLD: 0.03 K/UL (ref 0–0.12)
BODY FLD TYPE: NORMAL
BUN SERPL-MCNC: 8 MG/DL (ref 8–22)
CALCIUM SERPL-MCNC: 8.7 MG/DL (ref 8.5–10.5)
CELLS FLD: 2
CHLORIDE SERPL-SCNC: 103 MMOL/L (ref 96–112)
CO2 SERPL-SCNC: 24 MMOL/L (ref 20–33)
COLOR FLD: YELLOW
CREAT SERPL-MCNC: 0.65 MG/DL (ref 0.5–1.4)
EOSINOPHIL # BLD AUTO: 0.05 K/UL (ref 0–0.51)
EOSINOPHIL NFR BLD: 0.4 % (ref 0–6.9)
ERYTHROCYTE [DISTWIDTH] IN BLOOD BY AUTOMATED COUNT: 47.8 FL (ref 35.9–50)
FUNGUS SPEC CULT: NORMAL
FUNGUS SPEC FUNGUS STN: NORMAL
FUNGUS SPEC FUNGUS STN: NORMAL
GFR SERPLBLD CREATININE-BSD FMLA CKD-EPI: 90 ML/MIN/1.73 M 2
GLUCOSE FLD-MCNC: 114 MG/DL
GLUCOSE SERPL-MCNC: 106 MG/DL (ref 65–99)
GRAM STN SPEC: NORMAL
HCT VFR BLD AUTO: 41 % (ref 37–47)
HGB BLD-MCNC: 13.3 G/DL (ref 12–16)
IMM GRANULOCYTES # BLD AUTO: 0.27 K/UL (ref 0–0.11)
IMM GRANULOCYTES NFR BLD AUTO: 1.9 % (ref 0–0.9)
LDH FLD L TO P-CCNC: 173 U/L
LYMPHOCYTES # BLD AUTO: 1.27 K/UL (ref 1–4.8)
LYMPHOCYTES NFR BLD: 9.2 % (ref 22–41)
LYMPHOCYTES NFR FLD: 23 %
MCH RBC QN AUTO: 29.4 PG (ref 27–33)
MCHC RBC AUTO-ENTMCNC: 32.4 G/DL (ref 32.2–35.5)
MCV RBC AUTO: 90.7 FL (ref 81.4–97.8)
MONOCYTES # BLD AUTO: 1.47 K/UL (ref 0–0.85)
MONOCYTES NFR BLD AUTO: 10.6 % (ref 0–13.4)
MONOS+MACROS NFR FLD MANUAL: 9 %
NEUTROPHILS # BLD AUTO: 10.76 K/UL (ref 1.82–7.42)
NEUTROPHILS NFR BLD: 77.7 % (ref 44–72)
NEUTROPHILS NFR FLD: 66 %
NRBC # BLD AUTO: 0 K/UL
NRBC BLD-RTO: 0 /100 WBC (ref 0–0.2)
NUC CELL # FLD: 3523 CELLS/UL
PH FLD: 8 [PH]
PLATELET # BLD AUTO: 582 K/UL (ref 164–446)
PMV BLD AUTO: 9.1 FL (ref 9–12.9)
POTASSIUM SERPL-SCNC: 4.2 MMOL/L (ref 3.6–5.5)
PROT FLD-MCNC: 4.2 G/DL
RBC # BLD AUTO: 4.52 M/UL (ref 4.2–5.4)
RBC # FLD: 4000 CELLS/UL
SIGNIFICANT IND 70042: NORMAL
SITE SITE: NORMAL
SODIUM SERPL-SCNC: 138 MMOL/L (ref 135–145)
SOURCE SOURCE: NORMAL
WBC # BLD AUTO: 13.9 K/UL (ref 4.8–10.8)

## 2024-09-24 PROCEDURE — 700102 HCHG RX REV CODE 250 W/ 637 OVERRIDE(OP): Performed by: HOSPITALIST

## 2024-09-24 PROCEDURE — 99233 SBSQ HOSP IP/OBS HIGH 50: CPT | Performed by: INTERNAL MEDICINE

## 2024-09-24 PROCEDURE — 87206 SMEAR FLUORESCENT/ACID STAI: CPT

## 2024-09-24 PROCEDURE — 83615 LACTATE (LD) (LDH) ENZYME: CPT

## 2024-09-24 PROCEDURE — 700105 HCHG RX REV CODE 258: Performed by: INTERNAL MEDICINE

## 2024-09-24 PROCEDURE — 88305 TISSUE EXAM BY PATHOLOGIST: CPT

## 2024-09-24 PROCEDURE — 82945 GLUCOSE OTHER FLUID: CPT

## 2024-09-24 PROCEDURE — 87102 FUNGUS ISOLATION CULTURE: CPT

## 2024-09-24 PROCEDURE — 700102 HCHG RX REV CODE 250 W/ 637 OVERRIDE(OP)

## 2024-09-24 PROCEDURE — 80048 BASIC METABOLIC PNL TOTAL CA: CPT

## 2024-09-24 PROCEDURE — A9270 NON-COVERED ITEM OR SERVICE: HCPCS | Mod: JZ

## 2024-09-24 PROCEDURE — C1729 CATH, DRAINAGE: HCPCS

## 2024-09-24 PROCEDURE — A9270 NON-COVERED ITEM OR SERVICE: HCPCS

## 2024-09-24 PROCEDURE — 87205 SMEAR GRAM STAIN: CPT

## 2024-09-24 PROCEDURE — A9270 NON-COVERED ITEM OR SERVICE: HCPCS | Performed by: HOSPITALIST

## 2024-09-24 PROCEDURE — 99232 SBSQ HOSP IP/OBS MODERATE 35: CPT | Performed by: FAMILY MEDICINE

## 2024-09-24 PROCEDURE — 88112 CYTOPATH CELL ENHANCE TECH: CPT

## 2024-09-24 PROCEDURE — 700102 HCHG RX REV CODE 250 W/ 637 OVERRIDE(OP): Mod: JZ

## 2024-09-24 PROCEDURE — 87015 SPECIMEN INFECT AGNT CONCNTJ: CPT | Mod: 91

## 2024-09-24 PROCEDURE — 85025 COMPLETE CBC W/AUTO DIFF WBC: CPT

## 2024-09-24 PROCEDURE — 700117 HCHG RX CONTRAST REV CODE 255: Performed by: NURSE PRACTITIONER

## 2024-09-24 PROCEDURE — 770001 HCHG ROOM/CARE - MED/SURG/GYN PRIV*

## 2024-09-24 PROCEDURE — 49424 ASSESS CYST CONTRAST INJECT: CPT

## 2024-09-24 PROCEDURE — 83986 ASSAY PH BODY FLUID NOS: CPT

## 2024-09-24 PROCEDURE — 99232 SBSQ HOSP IP/OBS MODERATE 35: CPT | Performed by: SURGERY

## 2024-09-24 PROCEDURE — 89051 BODY FLUID CELL COUNT: CPT

## 2024-09-24 PROCEDURE — 87116 MYCOBACTERIA CULTURE: CPT

## 2024-09-24 PROCEDURE — 700101 HCHG RX REV CODE 250: Performed by: NURSE PRACTITIONER

## 2024-09-24 PROCEDURE — 0W9B3ZZ DRAINAGE OF LEFT PLEURAL CAVITY, PERCUTANEOUS APPROACH: ICD-10-PCS

## 2024-09-24 PROCEDURE — 82150 ASSAY OF AMYLASE: CPT

## 2024-09-24 PROCEDURE — 87070 CULTURE OTHR SPECIMN AEROBIC: CPT

## 2024-09-24 PROCEDURE — 84157 ASSAY OF PROTEIN OTHER: CPT

## 2024-09-24 PROCEDURE — BF181ZZ FLUOROSCOPY OF PANCREATIC DUCTS USING LOW OSMOLAR CONTRAST: ICD-10-PCS | Performed by: STUDENT IN AN ORGANIZED HEALTH CARE EDUCATION/TRAINING PROGRAM

## 2024-09-24 PROCEDURE — 71045 X-RAY EXAM CHEST 1 VIEW: CPT

## 2024-09-24 PROCEDURE — 700111 HCHG RX REV CODE 636 W/ 250 OVERRIDE (IP): Mod: JZ | Performed by: INTERNAL MEDICINE

## 2024-09-24 PROCEDURE — 36415 COLL VENOUS BLD VENIPUNCTURE: CPT

## 2024-09-24 RX ORDER — MIDAZOLAM HYDROCHLORIDE 1 MG/ML
.5-2 INJECTION INTRAMUSCULAR; INTRAVENOUS PRN
Status: DISCONTINUED | OUTPATIENT
Start: 2024-09-24 | End: 2024-09-24 | Stop reason: HOSPADM

## 2024-09-24 RX ORDER — SODIUM CHLORIDE 9 MG/ML
500 INJECTION, SOLUTION INTRAVENOUS
Status: DISCONTINUED | OUTPATIENT
Start: 2024-09-24 | End: 2024-09-24 | Stop reason: HOSPADM

## 2024-09-24 RX ORDER — ONDANSETRON 2 MG/ML
4 INJECTION INTRAMUSCULAR; INTRAVENOUS PRN
Status: DISCONTINUED | OUTPATIENT
Start: 2024-09-24 | End: 2024-09-24 | Stop reason: HOSPADM

## 2024-09-24 RX ADMIN — LEVOTHYROXINE SODIUM 100 MCG: 0.1 TABLET ORAL at 06:18

## 2024-09-24 RX ADMIN — ACETAMINOPHEN 650 MG: 325 TABLET ORAL at 16:54

## 2024-09-24 RX ADMIN — APIXABAN 5 MG: 5 TABLET, FILM COATED ORAL at 16:54

## 2024-09-24 RX ADMIN — SODIUM CHLORIDE, PRESERVATIVE FREE 10 ML: 5 INJECTION INTRAVENOUS at 18:00

## 2024-09-24 RX ADMIN — SODIUM CHLORIDE, PRESERVATIVE FREE 10 ML: 5 INJECTION INTRAVENOUS at 06:18

## 2024-09-24 RX ADMIN — SENNOSIDES AND DOCUSATE SODIUM 2 TABLET: 50; 8.6 TABLET ORAL at 16:55

## 2024-09-24 RX ADMIN — ACETAMINOPHEN 650 MG: 325 TABLET ORAL at 04:05

## 2024-09-24 RX ADMIN — POTASSIUM CHLORIDE 20 MEQ: 1500 TABLET, EXTENDED RELEASE ORAL at 06:18

## 2024-09-24 RX ADMIN — IOHEXOL 10 ML: 300 INJECTION, SOLUTION INTRAVENOUS at 10:45

## 2024-09-24 RX ADMIN — AMPICILLIN AND SULBACTAM 3 G: 1; 2 INJECTION, POWDER, FOR SOLUTION INTRAMUSCULAR; INTRAVENOUS at 20:37

## 2024-09-24 RX ADMIN — APIXABAN 5 MG: 5 TABLET, FILM COATED ORAL at 06:17

## 2024-09-24 RX ADMIN — AMLODIPINE BESYLATE 5 MG: 10 TABLET ORAL at 06:17

## 2024-09-24 RX ADMIN — AMPICILLIN AND SULBACTAM 3 G: 1; 2 INJECTION, POWDER, FOR SOLUTION INTRAMUSCULAR; INTRAVENOUS at 06:17

## 2024-09-24 RX ADMIN — AMPICILLIN AND SULBACTAM 3 G: 1; 2 INJECTION, POWDER, FOR SOLUTION INTRAMUSCULAR; INTRAVENOUS at 00:11

## 2024-09-24 RX ADMIN — AMPICILLIN AND SULBACTAM 3 G: 1; 2 INJECTION, POWDER, FOR SOLUTION INTRAMUSCULAR; INTRAVENOUS at 14:27

## 2024-09-24 RX ADMIN — LIOTHYRONINE SODIUM 2.5 MCG: 5 TABLET ORAL at 06:19

## 2024-09-24 ASSESSMENT — PAIN DESCRIPTION - PAIN TYPE
TYPE: ACUTE PAIN

## 2024-09-24 ASSESSMENT — ENCOUNTER SYMPTOMS
PALPITATIONS: 0
HEADACHES: 0
VOMITING: 0
NAUSEA: 0
SENSORY CHANGE: 0
SPEECH CHANGE: 0
ABDOMINAL PAIN: 0
FEVER: 0
DIZZINESS: 0
SHORTNESS OF BREATH: 0
ABDOMINAL PAIN: 1
COUGH: 0
CHILLS: 0
WHEEZING: 0
FOCAL WEAKNESS: 0
WEIGHT LOSS: 0
FLANK PAIN: 0
DIARRHEA: 0
BLURRED VISION: 0
WEAKNESS: 1
NERVOUS/ANXIOUS: 1
NECK PAIN: 0
CONSTIPATION: 0
HEARTBURN: 0
SORE THROAT: 0
MYALGIAS: 0
BACK PAIN: 0
DIAPHORESIS: 0

## 2024-09-24 ASSESSMENT — COGNITIVE AND FUNCTIONAL STATUS - GENERAL
MOBILITY SCORE: 24
SUGGESTED CMS G CODE MODIFIER MOBILITY: CH
SUGGESTED CMS G CODE MODIFIER DAILY ACTIVITY: CH
DAILY ACTIVITIY SCORE: 24

## 2024-09-24 NOTE — PROGRESS NOTES
Brief IR Note:     Pt off floor for IR abscessogram and possible drain exchange. Will round on patient tomorrow.

## 2024-09-24 NOTE — CARE PLAN
The patient is Stable - Low risk of patient condition declining or worsening    Shift Goals  Clinical Goals: abx mgmt. drain care  Patient Goals: independence  Family Goals: POC    Progress made toward(s) clinical / shift goals:  iv abx continued. Drain flushed with 10ml. Purulent output. Monitor labs    Patient is not progressing towards the following goals:    Problem: Pain - Standard  Goal: Alleviation of pain or a reduction in pain to the patient’s comfort goal  9/24/2024 0109 by Erika Pemberton R.N.  Outcome: Progressing  9/24/2024 0103 by Erika Pemberton R.N.       Problem: Mobility  Goal: Risk for activity intolerance will decrease  9/24/2024 0109 by Erika Pemberton R.N.  Outcome: Progressing  9/24/2024 0103 by Erika Pemberton R.N.

## 2024-09-24 NOTE — PROGRESS NOTES
Infectious Disease Progress Note    Author: Bao Rooney M.D. Date & Time of service: 2024  8:23 AM    Chief Complaint:  Follow-up for abdominal abscess    Interval History:   Tmax 100.3 yesterday evening, white count down to 8.7, tolerated switch to Unasyn, culture results as below, creatinine 0.53, normal transaminases   Tmax 99.1, cultures as below, no new labs this morning, repeat imaging.   patient remains afebrile, white count is 14.2 today and patient feeling somewhat ill compared to yesterday, tolerating Unasyn, creatinine 0.53, normal transaminases, cultures as below   patient remains afebrile, white count 17.9, CT scan with improvement in size of the abscess, measuring 5.2 x 2.4 cm, but the tube is not in the bulk of the fluid component, current plan is for upsize.  Also noted worsening left-sided pleural effusion, plan is for thoracentesis    Labs Reviewed and Medications Reviewed.    Review of Systems:  Review of Systems   Constitutional:  Negative for chills and fever.   Skin:  Negative for itching and rash.     Hemodynamics:  Temp (24hrs), Av.4 °C (97.6 °F), Min:36.2 °C (97.2 °F), Max:36.6 °C (97.9 °F)  Temperature: 36.6 °C (97.9 °F)  Pulse  Av.3  Min: 43  Max: 129   Blood Pressure : 124/67       Physical Exam:  Physical Exam  Vitals and nursing note reviewed.   Constitutional:       General: She is not in acute distress.     Appearance: She is not ill-appearing.   Eyes:      Conjunctiva/sclera: Conjunctivae normal.   Cardiovascular:      Rate and Rhythm: Normal rate.   Pulmonary:      Effort: Pulmonary effort is normal. No respiratory distress.      Breath sounds: No stridor.   Abdominal:      General: There is no distension.      Comments: IR drain in place with moderate amount of purulent output in bulb   Musculoskeletal:         General: No swelling or tenderness.   Skin:     Findings: No erythema or rash.   Neurological:      General: No focal deficit present.       Mental Status: She is alert.   Psychiatric:         Mood and Affect: Mood normal.         Behavior: Behavior normal.         Meds:    Current Facility-Administered Medications:     cyclobenzaprine    lidocaine    apixaban    ampicillin-sulbactam (UNASYN) IV    normal saline flush    liothyronine **AND** liothyronine    potassium chloride SA    acetaminophen    senna-docusate **AND** polyethylene glycol/lytes    amLODIPine    levothyroxine    Labs:  Recent Labs     09/23/24  0534 09/24/24  0004   WBC 14.2* 13.9*   RBC 4.57 4.52   HEMOGLOBIN 13.2 13.3   HEMATOCRIT 41.6 41.0   MCV 91.0 90.7   MCH 28.9 29.4   RDW 47.8 47.8   PLATELETCT 593* 582*   MPV 8.9* 9.1   NEUTSPOLYS 82.10* 77.70*   LYMPHOCYTES 5.60* 9.20*   MONOCYTES 10.10 10.60   EOSINOPHILS 0.40 0.40   BASOPHILS 0.20 0.20     Recent Labs     09/23/24  0534 09/24/24  0004   SODIUM 136 138   POTASSIUM 4.2 4.2   CHLORIDE 104 103   CO2 20 24   GLUCOSE 103* 106*   BUN 8 8     Recent Labs     09/23/24  0534 09/24/24  0004   CREATININE 0.67 0.65       Imaging:  CT-IMAGE-GUIDED DRAIN PERITONEAL    Result Date: 9/19/2024 9/19/2024 9:41 AM HISTORY/REASON FOR EXAM:  Pancreatic abscess drain placement; Anterior LUQ. History of distal pancreas resection. Distal pancreatectomy. Previous catheter drainage for left upper quadrant collection 8/22/2024. Recurrent or residual collection seen on recent CT 9/17/2024 TECHNIQUE/EXAM DESCRIPTION: Left upper quadrant abdominal  abscess drainage with CT guidance. Low dose optimization technique was utilized for this CT exam including automated exposure control and adjustment of the mA and/or kV according to patient size. ALL ELEMENTS OF MAXIMAL STERILE BARRIER TECHNIQUE WERE FOLLOWED. SEDATION TIME: 30 minutes. Moderate sedation was administered with fentanyl and Versed with continuous patient monitoring by the interventional radiology nurse. PROCEDURE: Informed consent was obtained. Localizing CT images were obtained with the patient  in supine position. The skin was prepped with ChloraPrep and draped in a sterile fashion. Moderate sedation was provided. Pulse oximetry and continuous cardiac monitoring by the nurse was performed throughout the exam. Intraservice time was 30 minutes. Following local anesthesia with 1% Lidocaine, a 17 G guiding needle was placed and needle path confirmed with CT. An Amplatz guidewire was placed and following serial tract dilatation, a 10 Spanish pigtail locking catheter was placed. A specimen was collected and submitted for culture and sensitivity and Gram stain. Total of 40 mL yellowish-greenish pus was drained. The catheter was secured to the skin and connected to suction bulb drainage. Final CT images were obtained documenting catheter position. The patient tolerated the procedure well with no evidence of complication. COMPARISON: CT of the abdomen and pelvis 9/17/2024, CT-guided left upper quadrant catheter drainage 8/22/2024 FINDINGS: The final CT images show satisfactory catheter position within the target collection.     1.  CT GUIDED PERITONEAL LEFT UPPER QUADRANT ABDOMINAL ABSCESS DRAINAGE. PLACEMENT OF 10 Vietnamese LOCKING LOOP CATHETER. 2.  THE CURRENT PLAN IS TO IRRIGATE ONCE DAILY WITH 5 ML STERILE SALINE, CHECK CULTURES, MONITOR DRAINAGE OUTPUT AND OBTAIN A FOLLOW-UP CT SCAN IN 5-7 DAYS IF CLINICALLY INDICATED.    CT-ABDOMEN-PELVIS WITH    Result Date: 9/17/2024 9/17/2024 9:29 PM HISTORY/REASON FOR EXAM:  Abdominal pelvic pain. TECHNIQUE/EXAM DESCRIPTION:   CT scan of the abdomen and pelvis with contrast. Contrast-enhanced helical scanning was obtained from the diaphragmatic domes through the pubic symphysis following the bolus administration of nonionic contrast without complication. 100 mL of Omnipaque 350 nonionic contrast was administered without complication. Low dose optimization technique was utilized for this CT exam including automated exposure control and adjustment of the mA and/or kV  according to patient size. COMPARISON: 08/27/2024, 08/20/2024 FINDINGS: Lower Chest: There is left pleural effusion and consolidation in the left lower pulmonary lobe as well as lingular segment left upper lobe.. Hiatal hernia is identified. Liver: Normal. Spleen: Not identified. Pancreas: Post distal pancreatectomy and splenectomy. Fluid collection with irregular shape and some peripheral enhancement distal to the postsurgical site is identified which is multilobulated. Largest component measures approximately 5.7 x 3.0 cm. Fluid extends up to the left hemidiaphragm. Percutaneous drain no longer identified.. Gallbladder: Cholelithiasis. Biliary: Nondilated. Adrenal glands: Normal. Kidneys: Unremarkable without hydronephrosis. Bowel: No obstruction or acute inflammation. Lymph nodes: No adenopathy. Vasculature: Unremarkable. Peritoneum: Unremarkable without ascites. Musculoskeletal: No acute or destructive process. Pelvis: No adenopathy or free fluid. Endometrial cavity appears prominent as on the prior CT.     1.  Post distal pancreatectomy. 2.  Fluid collection with some peripheral enhancement is identified in the distal pancreatic region including the region of previous pancreatectomy. Developing pseudocyst is a possibility. No emphysema or hemorrhage is appreciated. Remaining pancreas enhances normally. 3.  There is cholelithiasis. 4.  Left pleural effusion and consolidations in the left lung base. 5.  No change in hiatal hernia. 6.  Prominent endometrial cavity again noted.    CT-PANCREAS AND ABDOMEN WITH & W/O    Result Date: 8/27/2024 8/27/2024 6:38 PM HISTORY/REASON FOR EXAM:  s/p distal pancreatectomy and splenectomy with pathology of benign pancreatic serous cystadenoma in a background of chronic pancreatitis, hx LUQ fluid collection s/p aspiration. Evaluate for recurrent fluid collection.. TECHNIQUE/EXAM DESCRIPTION:  CT pancreas and abdomen without and with contrast. Initial precontrast thick-section  images were obtained through the pancreas.  Following this, nonionic contrast was administered by IV, and thin-section helical scanning performed from the diaphragmatic domes through the iliac crests.  Pancreatic parenchymal phase and portal venous phase (dual-phase) images were obtained following contrast administration. 100 mL of Omnipaque 350 nonionic contrast was administered. Low dose optimization technique was utilized for this CT exam including automated exposure control and adjustment of the mA and/or kV according to patient size. COMPARISON: 8/22/2024 FINDINGS: Liver: Unremarkable Spleen: Absent Biliary system: Gallbladder shows multiple stones and minimal wall thickening. Common bile duct is not dilated. Pancreas: Prior distal pancreatectomy.  Minimal edema adjacent to surgical site.  No peripancreatic fluid collection demonstrated. Pancreatic duct: There is no pancreatic ductal dilatation. Arterial vasculature: The celiac axis has a normal branching pattern. The SMA is patent. Venous vasculature: Portable and superior mesenteric veins are patent.  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 Vascular encasement: None. Kidneys: Kidneys are unremarkable. Adrenals: Adrenals are unremarkable. Lymph nodes: There are no enlarged nodes. Bowel: No bowel obstruction.  No focal bowel wall thickening.  Sparse colonic diverticula. Lung bases: Small LEFT pleural effusion with associated atelectasis.  Minimal RIGHT pleural fluid. Bones: Degenerative change of lumbar spine.  Prior lumbar spine surgery. LEFT upper quadrant drain in place.  No residual fluid collection demonstrated.  Minimal peritoneal gas.  Postoperative change of the anterior abdominal wall.     1.  Prior distal pancreatectomy and splenectomy.  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 2.  LEFT upper quadrant drain in place with minimal adjacent peritoneal gas.  No  significant residual or recurrent fluid collection demonstrated. 3.  Gallstones and minimal gallbladder wall thickening.  Cholecystitis is not excluded. 4.  Bilateral pleural fluid collections with associated atelectasis, worse on the LEFT.     CT-IMAGE-GUIDED DRAIN PERITONEAL    Result Date: 8/23/2024 8/22/2024 3:15 PM HISTORY/REASON FOR EXAM:  LUQ fluid collection, hx distal pancreatectomy and splenectomy; Was on Eliquis, now on Heparin. TECHNIQUE/EXAM DESCRIPTION: Left upper quadrant fluid collection drainage with CT guidance. Low dose optimization technique was utilized for this CT exam including automated exposure control and adjustment of the mA and/or kV according to patient size. Moderate sedation was provided. Pulse oximetry and continuous cardiac monitoring by the nurse was performed throughout the exam. Intraservice time was 10 minutes. PROCEDURE: Informed consent was obtained. Localizing CT images were obtained with the patient in supine position. The procedure was performed using MAXIMAL STERILE BARRIER technique including sterile gown, mask, cap, and donning of sterile gloves following appropriate hand hygiene and/or sterile scrub. Patient skin site was prepped with 2% Chlorhexidine solution. Following local anesthesia with 1% Lidocaine, a 17 G guiding needle was placed and needle path confirmed with CT. An Amplatz guidewire was placed and following serial tract dilatation, a 18 Israeli pigtail locking catheter was placed. A specimen was collected and submitted for culture and sensitivity and Gram stain. Total of 200 mL thin brown fluid was drained. The catheter was secured to the skin and connected to suction bulb drainage. Final CT images were obtained documenting catheter position. The patient tolerated the procedure well with no evidence of complication. COMPARISON: 8/20/2024 FINDINGS: The final CT images show satisfactory catheter position within the target collection.     1.  CT guided left upper  quadrant fluid collection catheter drainage.      Micro:  Results       Procedure Component Value Units Date/Time    FLUID CULTURE W/GRAM STAIN [[id number]]     Order Status: No result Specimen: Body Fluid from Thoracentesis Fluid     AFB CULTURE [[id number]]     Order Status: No result Specimen: Body Fluid from Thoracentesis Fluid     FUNGAL CULTURE [[id number]]     Order Status: No result Specimen: Body Fluid from Thoracentesis Fluid     Fungal Culture [[id number]] Collected: 09/19/24 1020    Order Status: Completed Specimen: Wound from Other Body Fluid Updated: 09/23/24 1205     Significant Indicator NEG     Source WND     Site pancreatic abscess     Culture Result No fungal growth to date.     Fungal Smear Results No fungal elements seen.    CULTURE WOUND W/ GRAM STAIN [[id number]]  (Abnormal)  (Susceptibility) Collected: 09/19/24 1020    Order Status: Completed Specimen: Wound Updated: 09/23/24 1205     Significant Indicator POS     Source WND     Site pancreatic abscess     Culture Result -     Gram Stain Result Many WBCs.  Many Gram positive cocci.       Culture Result Staphylococcus aureus  Heavy growth  Methicillin sensitive by screening method        Streptococcus agalactiae (Group B)  Moderate growth      Susceptibility       Staphylococcus aureus (1)       Antibiotic Interpretation Microscan   Method Status    Ampicillin/sulbactam Sensitive <=8/4 mcg/mL RALPH Final    Clindamycin Sensitive <=0.25 mcg/mL RALPH Final    Cefazolin Sensitive <=8 mcg/mL RALPH Final    Cefepime Sensitive <=4 mcg/mL RALPH Final    Daptomycin Sensitive <=0.5 mcg/mL RALPH Final    Erythromycin Sensitive <=0.25 mcg/mL RALPH Final    Oxacillin Sensitive 0.5 mcg/mL RALPH Final    Trimeth/Sulfa Sensitive <=0.5/9.5 mcg/mL RALPH Final    Tetracycline Sensitive <=4 mcg/mL RALPH Final    Vancomycin Sensitive 1 mcg/mL RALPH Final                       Urine Culture (New) [[id number]]  (Abnormal) Collected: 09/18/24 1700    Order Status: Completed Specimen: Urine  Updated: 09/20/24 1138     Significant Indicator POS     Source UR     Site -     Culture Result -      Candida albicans  10-50,000 cfu/mL      GRAM STAIN [[id number]]  (Abnormal) Collected: 09/19/24 1020    Order Status: Completed Specimen: Wound Updated: 09/19/24 1811     Significant Indicator .     Source WND     Site pancreatic abscess     Gram Stain Result Many WBCs.  Many Gram positive cocci.      Fungal Smear [[id number]] Collected: 09/19/24 1020    Order Status: Completed Specimen: Wound Updated: 09/19/24 1811     Significant Indicator NEG     Source WND     Site pancreatic abscess     Fungal Smear Results No fungal elements seen.    FLUID CULTURE W/GRAM STAIN [[id number]] Collected: 09/19/24 1020    Order Status: Canceled Specimen: Other Body Fluid     Urinalysis [[id number]]  (Abnormal) Collected: 09/18/24 1700    Order Status: Completed Specimen: Urine Updated: 09/18/24 1817     Color DK Yellow     Character Clear     Specific Gravity 1.033     Ph 5.0     Glucose Negative mg/dL      Ketones Trace mg/dL      Protein Negative mg/dL      Bilirubin Negative     Urobilinogen, Urine 1.0     Nitrite Negative     Leukocyte Esterase Small     Occult Blood Negative     Micro Urine Req Microscopic    BLOOD CULTURE [[id number]]     Order Status: Canceled Specimen: Blood from Peripheral     BLOOD CULTURE [[id number]]     Order Status: Canceled Specimen: Blood from Peripheral             Assessment/Hospital course:  This is a 78-year-old female patient with history of left breast carcinoma 34 years ago status post total mastectomy, 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 Cultures at  the time grew Staph epidermidis, susceptibilities not performed.  Sent to the ER by surgical oncologist due to leukocytosis on recent labs along with episodes of epigastric pain.  CT abdomen and pelvis noted fluid collection in the distal pancreatic region.  IR drain placed on 9/19, 40 mL of purulent fluid was noted.  Cultures are growing MSSA and group B Strep.      Pertinent Diagnoses:  Intra-abdominal abscess  Polymicrobial infection  Left pleural effusion  Pancreatic cystadenoma status post recent pancreatectomy, splenectomy, stomach and celiac node dissection 8/5/2024  Recurrent breast cancer, on tamoxifen  History of PE    Plan:  -Continue IV Unasyn 3 g every 6 hours  -IR on board.  Repeat CT scan 9/23 due to new leukocytosis, notes slight improvement in size of the abscess, current plan is for upsize of drain.  Also noted worsening left-sided pleural effusion, plan is for thoracentesis.  We will follow along    Disposition: Anticipate no ID specific disposition needs.  On discharge, can switch to p.o. Augmentin 875 mg twice daily for likely an additional 2 to 4 weeks  Need for PICC line: Likely no    Discussed with IR.  ID will follow.  This illness poses a threat to life

## 2024-09-24 NOTE — ASSESSMENT & PLAN NOTE
Ultrasound guided left sided diagnostic and therapeutic thoracentesis, 1,150 mL of fluid withdrawn  9/24/2024  Ultrasound guided left sided therapeutic thoracentesis, 600 mL of fluid withdrawn  9/27/2024  Thoracic surgery consulted

## 2024-09-24 NOTE — PROGRESS NOTES
Date of Service  September 24, 2024     Chief Complaint  Sent by MD (Sent by surgeon for elevated WBC. Pt reports abdominal drain removed last week and is concerned for infection at the site. Denies pain. )     Surgery  IR drain on 9/19/24 by Dr Ruff    Hospital Course  POD#5     Interval Problem Update  No acute events overnight  IR drain with minimal 5 mL output, flushed  Patient states she feels similar as prior day  Worsening leukocytosis, WBC 13.9 from 14.2    CT A/P on 9/23/24 noted Left pleural effusion, and residual fluid collection near prior surgical site    Problem List  Principal Problem:    Pancreatic abscess (POA: Yes)  Active Problems:    Acquired hypothyroidism (POA: Yes)    Advance care planning (POA: Yes)    Age-related osteoporosis without current pathological fracture (POA: Yes)    History of breast cancer in adulthood (POA: Yes)    Pulmonary embolism with acute cor pulmonale, unspecified chronicity, unspecified pulmonary embolism type (HCC) (POA: Yes)    Acute respiratory failure with hypoxia (HCC) (POA: Yes)    Primary hypertension (POA: Unknown)    AV block, 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   Respiratory:  Negative for cough and shortness of breath.    Cardiovascular:  Negative for chest pain.   Gastrointestinal:  Negative for abdominal pain, constipation, diarrhea, nausea and vomiting.   Musculoskeletal:         Left upper side pain       Objective  Temp:  [36.2 °C (97.2 °F)-36.6 °C (97.9 °F)] 36.6 °C (97.9 °F)  Pulse:  [79-88] 79  Resp:  [16-18] 18  BP: (122-134)/(64-74) 124/67  SpO2:  [90 %-94 %] 90 %      Physical Exam  Vitals and nursing note reviewed.   Constitutional:       General: She is not in acute distress.     Appearance: Normal appearance.   HENT:      Head: Normocephalic and atraumatic.       Nose: Nose normal.      Mouth/Throat:      Pharynx: Oropharynx is clear.   Eyes:      Conjunctiva/sclera: Conjunctivae normal.   Cardiovascular:      Rate and Rhythm: Normal rate.   Pulmonary:      Effort: Pulmonary effort is normal. No respiratory distress.      Breath sounds: Normal breath sounds.   Abdominal:      General: Abdomen is flat. There is no distension.      Tenderness: There is no abdominal tenderness.      Comments: LUQ IR drain   Musculoskeletal:      Comments: Tenderness at left upper / rear side abd wall   Skin:     General: Skin is warm and dry.   Neurological:      Mental Status: She is alert and oriented to person, place, and time.   Psychiatric:         Mood and Affect: Mood normal.         Behavior: Behavior normal.        Fluids    Intake/Output Summary (Last 24 hours) at 9/24/2024 0792  Last data filed at 9/24/2024 0349  Gross per 24 hour   Intake 440 ml   Output 5 ml   Net 435 ml       Labs  Lab Results   Component Value Date/Time    SODIUM 138 09/24/2024 12:04 AM    POTASSIUM 4.2 09/24/2024 12:04 AM    CHLORIDE 103 09/24/2024 12:04 AM    CO2 24 09/24/2024 12:04 AM    GLUCOSE 106 (H) 09/24/2024 12:04 AM    BUN 8 09/24/2024 12:04 AM    CREATININE 0.65 09/24/2024 12:04 AM         Lab Results   Component Value Date/Time    PROTHROMBTM 16.8 (H) 09/19/2024 12:33 AM    INR 1.36 (H) 09/19/2024 12:33 AM         Lab Results   Component Value Date/Time    WBC 13.9 (H) 09/24/2024 12:04 AM    RBC 4.52 09/24/2024 12:04 AM    HEMOGLOBIN 13.3 09/24/2024 12:04 AM    HEMATOCRIT 41.0 09/24/2024 12:04 AM    MCV 90.7 09/24/2024 12:04 AM    MCH 29.4 09/24/2024 12:04 AM    MCHC 32.4 09/24/2024 12:04 AM    MPV 9.1 09/24/2024 12:04 AM    NEUTSPOLYS 77.70 (H) 09/24/2024 12:04 AM    LYMPHOCYTES 9.20 (L) 09/24/2024 12:04 AM    MONOCYTES 10.60 09/24/2024 12:04 AM    EOSINOPHILS 0.40 09/24/2024 12:04 AM    BASOPHILS 0.20 09/24/2024 12:04 AM    ANISOCYTOSIS 1+ 08/15/2024 03:30 AM         Recent Labs      09/17/24  1448 09/17/24 2050 09/18/24  0600 09/19/24  0033   ASTSGOT 39 35 32 27   ALTSGPT 30 28 28 21   TBILIRUBIN 1.7* 1.4 1.7* 1.3   GLOBULIN 3.5 3.4 3.0 3.0   INR  --   --   --  1.36*      Imaging  CT A/P (9/23/24)  IMPRESSION:  1.  Decreased size of the fluid collection in the distal pancreatectomy-lumpectomy bed following tube placement  2.  Increased LEFT pleural effusion with overlying atelectasis  3.  Hiatal hernia  4.  Persistently thickened endometrium for age. Underlying mass is possible. Ultrasound could better assess.    CT A/P (9/17/24)  IMPRESSION:  1.  Post distal pancreatectomy.     2.  Fluid collection with some peripheral enhancement is identified in the distal pancreatic region including the region of previous pancreatectomy. Developing pseudocyst is a possibility. No emphysema or hemorrhage is appreciated. Remaining pancreas   enhances normally.     3.  There is cholelithiasis.     4.  Left pleural effusion and consolidations in the left lung base.     5.  No change in hiatal hernia.     6.  Prominent endometrial cavity again noted.     Assessment/Plan  Patient is a 78F with recent pancreatectomy and splenectomy, subsequent retroperitoneal abscess with drain placement. Now admitted from ED with leukocytosis WBC 23.8 noted to have recurrence of fluid collection in the distal pancreatic region. Repeat CT noted left pleural effusion     Katelynn-pancreatic fluid collection, s/p distal pancreatectomy / splenectomy  - Continue IR drain  - Consider IR drain up sizing  - Continue Unasyn - appreciate ID input    2. Leukocytosis, WBC 13.9, subjective fever  - Continue Unasyn      3. Pleural effusion  - US thoracentesis    Dr Velasco updated

## 2024-09-24 NOTE — CARE PLAN
The patient is Stable - Low risk of patient condition declining or worsening    Shift Goals  Clinical Goals: abx/ drain mgt  Patient Goals: discharge  Family Goals: POC    Progress made toward(s) clinical / shift goals:        Problem: Knowledge Deficit - Standard  Goal: Patient and family/care givers will demonstrate understanding of plan of care, disease process/condition, diagnostic tests and medications  Description: Target End Date:  1-3 days or as soon as patient condition allows    Document in Patient Education    1.  Patient and family/caregiver oriented to unit, equipment, visitation policy and means for communicating concern  2.  Complete/review Learning Assessment  3.  Assess knowledge level of disease process/condition, treatment plan, diagnostic tests and medications  4.  Explain disease process/condition, treatment plan, diagnostic tests and medications  Outcome: Progressing     Problem: Pain - Standard  Goal: Alleviation of pain or a reduction in pain to the patient’s comfort goal  Description: Target End Date:  Prior to discharge or change in level of care    Document on Vitals flowsheet    1.  Document pain using the appropriate pain scale per order or unit policy  2.  Educate and implement non-pharmacologic comfort measures (i.e. relaxation, distraction, massage, cold/heat therapy, etc.)  3.  Pain management medications as ordered  4.  Reassess pain after pain med administration per policy  5.  If opiods administered assess patient's response to pain medication is appropriate per POSS sedation scale  6.  Follow pain management plan developed in collaboration with patient and interdisciplinary team (including palliative care or pain specialists if applicable)  Outcome: Progressing     Problem: Communication  Goal: The ability to communicate needs accurately and effectively will improve  Outcome: Progressing     Problem: Safety  Goal: Will remain free from injury  Outcome: Progressing  Goal: Will  remain free from falls  Outcome: Progressing

## 2024-09-24 NOTE — PROGRESS NOTES
Hospital Medicine Daily Progress Note    Date of Service  9/24/2024    Chief Complaint  Paige Gudino is a 78 y.o. female admitted 9/17/2024 with abdominal pain    Hospital Course  Admitted with abdominal pain, CT scan of the abdomen and pelvis showed an abdominal fluid collection.  Patient with history of pancreatic cystadenoma, underwent pancreatectomy, splenectomy, stomach and celiac node dissection on 8/5/2024.  Postoperatively, she had complications of retroperitoneal abscess, requiring CT-guided drainage, bilateral pulm embolism with right heart strain, bilateral lower extremity DVTs, right atrial thrombosis that required thrombectomy.  Patient was started on empiric coverage with IV Unasyn.  Surgery oncology was consulted on the case.  Interventional radiology was consulted.  Patient underwent CT guided peritoneal left upper quadrant abdominal abscess drainage, placement of 10 Libyan locking loop catheter on 9/19/2024.  Drain cultures showed MSSA and group B streptococcus.  Infectious disease was consulted on the case.    Interval Problem Update  Abdominal abscess -discussed case with surgery oncology  Hypertension - sbp 122-150  PE -denies chest pain or shortness of breath    I have discussed this patient's plan of care and discharge plan at IDT rounds today with Case Management, Nursing, Nursing leadership, and other members of the IDT team.    Consultants/Specialty  infectious disease and surgery oncology, interventional radiology    Code Status  Full Code    Disposition  The patient is not medically cleared for discharge to home or a post-acute facility.  Anticipate discharge to: home with close outpatient follow-up    I have placed the appropriate orders for post-discharge needs.    Review of Systems  Review of Systems   Constitutional:  Negative for chills, diaphoresis, fever and malaise/fatigue.   HENT:  Negative for congestion, hearing loss and sore throat.    Eyes:  Negative for blurred  vision.   Respiratory:  Negative for cough, shortness of breath and wheezing.    Cardiovascular:  Negative for chest pain, palpitations and leg swelling.   Gastrointestinal:  Positive for abdominal pain. Negative for diarrhea, heartburn, nausea and vomiting.   Genitourinary:  Negative for dysuria, flank pain and hematuria.   Musculoskeletal:  Negative for back pain, joint pain, myalgias and neck pain.   Skin:  Negative for rash.   Neurological:  Positive for weakness. Negative for dizziness, sensory change, speech change, focal weakness and headaches.   Psychiatric/Behavioral:  The patient is nervous/anxious.         Physical Exam  Temp:  [36.2 °C (97.2 °F)-36.6 °C (97.9 °F)] 36.5 °C (97.7 °F)  Pulse:  [] 85  Resp:  [16-31] 31  BP: (122-150)/(62-74) 139/68  SpO2:  [86 %-95 %] 90 %    Physical Exam  Vitals and nursing note reviewed.   HENT:      Head: Normocephalic and atraumatic.      Nose: No congestion.      Mouth/Throat:      Mouth: Mucous membranes are moist.   Eyes:      Extraocular Movements: Extraocular movements intact.      Conjunctiva/sclera: Conjunctivae normal.   Cardiovascular:      Rate and Rhythm: Normal rate and regular rhythm.   Pulmonary:      Effort: Pulmonary effort is normal.      Breath sounds: Decreased air movement present. Examination of the left-lower field reveals decreased breath sounds. Decreased breath sounds present.   Abdominal:      General: There is distension.      Tenderness: There is abdominal tenderness. There is no guarding or rebound.      Comments: Left upper quadrant drain   Musculoskeletal:      Cervical back: No tenderness.      Right lower leg: No edema.      Left lower leg: No edema.   Skin:     General: Skin is warm and dry.   Neurological:      General: No focal deficit present.      Mental Status: She is alert and oriented to person, place, and time.      Cranial Nerves: No cranial nerve deficit.   Psychiatric:         Mood and Affect: Mood is anxious. Affect is  angry.         Fluids    Intake/Output Summary (Last 24 hours) at 9/24/2024 1215  Last data filed at 9/24/2024 0849  Gross per 24 hour   Intake 240 ml   Output 5 ml   Net 235 ml        Laboratory  Recent Labs     09/23/24  0534 09/24/24  0004   WBC 14.2* 13.9*   RBC 4.57 4.52   HEMOGLOBIN 13.2 13.3   HEMATOCRIT 41.6 41.0   MCV 91.0 90.7   MCH 28.9 29.4   MCHC 31.7* 32.4   RDW 47.8 47.8   PLATELETCT 593* 582*   MPV 8.9* 9.1     Recent Labs     09/23/24  0534 09/24/24  0004   SODIUM 136 138   POTASSIUM 4.2 4.2   CHLORIDE 104 103   CO2 20 24   GLUCOSE 103* 106*   BUN 8 8   CREATININE 0.67 0.65   CALCIUM 8.2* 8.7                   Imaging  CT-ABDOMEN-PELVIS WITH   Final Result      1.  Decreased size of the fluid collection in the distal pancreatectomy-lumpectomy bed following tube placement   2.  Increased LEFT pleural effusion with overlying atelectasis   3.  Hiatal hernia   4.  Persistently thickened endometrium for age. Underlying mass is possible. Ultrasound could better assess.      CT-IMAGE-GUIDED DRAIN PERITONEAL   Final Result      1.  CT GUIDED PERITONEAL LEFT UPPER QUADRANT ABDOMINAL ABSCESS DRAINAGE. PLACEMENT OF 10 Zimbabwean LOCKING LOOP CATHETER.   2.  THE CURRENT PLAN IS TO IRRIGATE ONCE DAILY WITH 5 ML STERILE SALINE, CHECK CULTURES, MONITOR DRAINAGE OUTPUT AND OBTAIN A FOLLOW-UP CT SCAN IN 5-7 DAYS IF CLINICALLY INDICATED.      CT-ABDOMEN-PELVIS WITH   Final Result      1.  Post distal pancreatectomy.      2.  Fluid collection with some peripheral enhancement is identified in the distal pancreatic region including the region of previous pancreatectomy. Developing pseudocyst is a possibility. No emphysema or hemorrhage is appreciated. Remaining pancreas    enhances normally.      3.  There is cholelithiasis.      4.  Left pleural effusion and consolidations in the left lung base.      5.  No change in hiatal hernia.      6.  Prominent endometrial cavity again noted.      US-THORACENTESIS PUNCTURE LEFT     (Results Pending)   IR-DRAINAGE-CATH EXCHANGE    (Results Pending)   DX-CHEST-PORTABLE (1 VIEW)    (Results Pending)        Assessment/Plan  * Intra-abdominal abscess (HCC)- (present on admission)  Assessment & Plan  CT guided peritoneal left upper quadrant abdominal abscess drainage, placement of 10 Trinidadian locking loop catheter  9/19/2024  IV Unasyn  Pain control      Pleural effusion- (present on admission)  Assessment & Plan  Monitor    Thrombocytosis- (present on admission)  Assessment & Plan  Follow cbc    AV block, Mobitz 1- (present on admission)  Assessment & Plan  monitor    Primary hypertension- (present on admission)  Assessment & Plan  Amlodipine    Atrial thrombus- (present on admission)  Assessment & Plan  History of thrombectomy  Eliquis    Deep vein thrombosis (DVT) of both lower extremities (HCC)- (present on admission)  Assessment & Plan  Eliquis    Acute respiratory failure with hypoxia (HCC)- (present on admission)  Assessment & Plan  RT protocol    Pulmonary embolism with acute cor pulmonale, unspecified chronicity, unspecified pulmonary embolism type (HCC)- (present on admission)  Assessment & Plan  Eliquis    History of breast cancer- (present on admission)  Assessment & Plan  Follow up with Oncology    Acquired hypothyroidism- (present on admission)  Assessment & Plan  Levothyroxine and liothyronine          VTE prophylaxis:    therapeutic anticoagulation with eliquis 5 mg BID      I have performed a physical exam and reviewed and updated ROS and Plan today (9/24/2024). In review of yesterday's note (9/23/2024), there are no changes except as documented above.

## 2024-09-24 NOTE — OR SURGEON
Immediate Post- Operative Note        Findings: Small persistent LUQ fluid collection      Procedure(s): Abscessogram      Estimated Blood Loss: Less than 5 ml        Complications: None            9/24/2024     10:10 AM     Glenn Bowen M.D.

## 2024-09-24 NOTE — PROGRESS NOTES
Assumed care of patient. Report received. Pt has no needs at this time. Call light within reach. Encouraged IS use.

## 2024-09-24 NOTE — PROGRESS NOTES
Assumed care at 1845. Pt resting in bed.   A&ox 4  Ambulating to BR.   Voiding adequately  O2 on RA  Tolerating diet  No bm overnight  C/o pain left back.   Iv abx continued   Call light within reach. Hourly rounding in place

## 2024-09-24 NOTE — PROGRESS NOTES
Bedside report received, assessment completed    A&O x  4, pt calls appropriately  Mobility: Up self, Assistive Devices: none  6 Clicks:  Mobility/ PT not indicated   ADL/ OT not indicated    Weight Bearing Restrictions: none  Fall Risk Assessment: 5, no risk   Fall Precautions Include: + Personal Items at bedside, + Bed in low position, + Door Notifications in place   Pain Assessment / Reassessment completed, medication provided per MAR  Diet: Regular   - Tolerating  LDA:   IV Access: 20g RFA, CDI/ flushed/ SL/ Infusing per MAR  YEMI/IR: midline     GI/: restroom void, + flatus,  9/22 PTA BM    - Bowel protocols in place: yes  DVT Prophylaxis: Eliquis , SCD refused    Imer Score: 18, Interventions per flow sheet   Skin Assessment: WDL  Procedures:    - Prior pancreatectomy/ splenectomy   D/C Plan:    - Abx per ID   - Drain Mgt   - Pulmonary hygiene    - Home, no needs when medically cleared   Reviewed plan of care with patient, bed in lowest position and locked, pt resting comfortably now, call light within reach, all needs met at this time. Interventions will be executed per plan of care

## 2024-09-24 NOTE — PROGRESS NOTES
Pt presents to IR 3. Patient was consented by MD at bedside. Pt transferred to IR table in supine position. Patient underwent an abscessogram by Dr. Bowen. Pt placed on monitor, prepped and draped in a sterile fashion. Vitals were taken every 5 minutes and remained stable during procedure (see doc flow sheet for results). CO2 waveform capnography was monitored and remained WNL throughout procedure. Report called to room RN. Pt transported by bed with RN to patient's room. No sedation was given for this procedure.

## 2024-09-25 PROBLEM — E83.42 HYPOMAGNESEMIA: Status: ACTIVE | Noted: 2024-09-25

## 2024-09-25 LAB
ANION GAP SERPL CALC-SCNC: 10 MMOL/L (ref 7–16)
BUN SERPL-MCNC: 10 MG/DL (ref 8–22)
CALCIUM SERPL-MCNC: 8.3 MG/DL (ref 8.5–10.5)
CHLORIDE SERPL-SCNC: 102 MMOL/L (ref 96–112)
CO2 SERPL-SCNC: 24 MMOL/L (ref 20–33)
CREAT SERPL-MCNC: 0.74 MG/DL (ref 0.5–1.4)
CYTOLOGY REG CYTOL: NORMAL
ERYTHROCYTE [DISTWIDTH] IN BLOOD BY AUTOMATED COUNT: 48.2 FL (ref 35.9–50)
GFR SERPLBLD CREATININE-BSD FMLA CKD-EPI: 83 ML/MIN/1.73 M 2
GLUCOSE SERPL-MCNC: 155 MG/DL (ref 65–99)
HCT VFR BLD AUTO: 42.8 % (ref 37–47)
HGB BLD-MCNC: 13.8 G/DL (ref 12–16)
LDH SERPL L TO P-CCNC: 214 U/L (ref 107–266)
MAGNESIUM SERPL-MCNC: 2 MG/DL (ref 1.5–2.5)
MCH RBC QN AUTO: 29.8 PG (ref 27–33)
MCHC RBC AUTO-ENTMCNC: 32.2 G/DL (ref 32.2–35.5)
MCV RBC AUTO: 92.4 FL (ref 81.4–97.8)
MYCOBACTERIUM SPEC CULT: NORMAL
PLATELET # BLD AUTO: 580 K/UL (ref 164–446)
PMV BLD AUTO: 9 FL (ref 9–12.9)
POTASSIUM SERPL-SCNC: 4.3 MMOL/L (ref 3.6–5.5)
RBC # BLD AUTO: 4.63 M/UL (ref 4.2–5.4)
RHODAMINE-AURAMINE STN SPEC: NORMAL
RHODAMINE-AURAMINE STN SPEC: NORMAL
SIGNIFICANT IND 70042: NORMAL
SIGNIFICANT IND 70042: NORMAL
SITE SITE: NORMAL
SITE SITE: NORMAL
SODIUM SERPL-SCNC: 136 MMOL/L (ref 135–145)
SOURCE SOURCE: NORMAL
SOURCE SOURCE: NORMAL
WBC # BLD AUTO: 16 K/UL (ref 4.8–10.8)

## 2024-09-25 PROCEDURE — 83735 ASSAY OF MAGNESIUM: CPT

## 2024-09-25 PROCEDURE — 700101 HCHG RX REV CODE 250: Performed by: NURSE PRACTITIONER

## 2024-09-25 PROCEDURE — 99024 POSTOP FOLLOW-UP VISIT: CPT | Performed by: SURGERY

## 2024-09-25 PROCEDURE — 700102 HCHG RX REV CODE 250 W/ 637 OVERRIDE(OP)

## 2024-09-25 PROCEDURE — 700105 HCHG RX REV CODE 258: Performed by: INTERNAL MEDICINE

## 2024-09-25 PROCEDURE — 36415 COLL VENOUS BLD VENIPUNCTURE: CPT

## 2024-09-25 PROCEDURE — A9270 NON-COVERED ITEM OR SERVICE: HCPCS | Performed by: HOSPITALIST

## 2024-09-25 PROCEDURE — 99232 SBSQ HOSP IP/OBS MODERATE 35: CPT | Performed by: FAMILY MEDICINE

## 2024-09-25 PROCEDURE — A9270 NON-COVERED ITEM OR SERVICE: HCPCS

## 2024-09-25 PROCEDURE — 700111 HCHG RX REV CODE 636 W/ 250 OVERRIDE (IP): Mod: JZ | Performed by: INTERNAL MEDICINE

## 2024-09-25 PROCEDURE — 85027 COMPLETE CBC AUTOMATED: CPT

## 2024-09-25 PROCEDURE — 83615 LACTATE (LD) (LDH) ENZYME: CPT

## 2024-09-25 PROCEDURE — 700102 HCHG RX REV CODE 250 W/ 637 OVERRIDE(OP): Mod: JZ

## 2024-09-25 PROCEDURE — 700102 HCHG RX REV CODE 250 W/ 637 OVERRIDE(OP): Performed by: HOSPITALIST

## 2024-09-25 PROCEDURE — A9270 NON-COVERED ITEM OR SERVICE: HCPCS | Mod: JZ

## 2024-09-25 PROCEDURE — 770001 HCHG ROOM/CARE - MED/SURG/GYN PRIV*

## 2024-09-25 PROCEDURE — 80048 BASIC METABOLIC PNL TOTAL CA: CPT

## 2024-09-25 RX ADMIN — APIXABAN 5 MG: 5 TABLET, FILM COATED ORAL at 18:20

## 2024-09-25 RX ADMIN — ACETAMINOPHEN 650 MG: 325 TABLET ORAL at 06:01

## 2024-09-25 RX ADMIN — ACETAMINOPHEN 650 MG: 325 TABLET ORAL at 12:02

## 2024-09-25 RX ADMIN — AMPICILLIN AND SULBACTAM 3 G: 1; 2 INJECTION, POWDER, FOR SOLUTION INTRAMUSCULAR; INTRAVENOUS at 19:48

## 2024-09-25 RX ADMIN — SODIUM CHLORIDE, PRESERVATIVE FREE 10 ML: 5 INJECTION INTRAVENOUS at 06:00

## 2024-09-25 RX ADMIN — AMLODIPINE BESYLATE 5 MG: 10 TABLET ORAL at 06:01

## 2024-09-25 RX ADMIN — AMPICILLIN AND SULBACTAM 3 G: 1; 2 INJECTION, POWDER, FOR SOLUTION INTRAMUSCULAR; INTRAVENOUS at 14:24

## 2024-09-25 RX ADMIN — POTASSIUM CHLORIDE 20 MEQ: 1500 TABLET, EXTENDED RELEASE ORAL at 06:00

## 2024-09-25 RX ADMIN — SODIUM CHLORIDE, PRESERVATIVE FREE 10 ML: 5 INJECTION INTRAVENOUS at 18:20

## 2024-09-25 RX ADMIN — ACETAMINOPHEN 650 MG: 325 TABLET ORAL at 19:55

## 2024-09-25 RX ADMIN — LEVOTHYROXINE SODIUM 100 MCG: 0.1 TABLET ORAL at 06:01

## 2024-09-25 RX ADMIN — AMPICILLIN AND SULBACTAM 3 G: 1; 2 INJECTION, POWDER, FOR SOLUTION INTRAMUSCULAR; INTRAVENOUS at 02:16

## 2024-09-25 RX ADMIN — LIOTHYRONINE SODIUM 5 MCG: 5 TABLET ORAL at 06:00

## 2024-09-25 RX ADMIN — AMPICILLIN AND SULBACTAM 3 G: 1; 2 INJECTION, POWDER, FOR SOLUTION INTRAMUSCULAR; INTRAVENOUS at 08:13

## 2024-09-25 RX ADMIN — APIXABAN 5 MG: 5 TABLET, FILM COATED ORAL at 06:01

## 2024-09-25 ASSESSMENT — ENCOUNTER SYMPTOMS
SENSORY CHANGE: 0
HEARTBURN: 0
ABDOMINAL PAIN: 0
WHEEZING: 0
MYALGIAS: 0
WEAKNESS: 1
BLURRED VISION: 0
SPEECH CHANGE: 0
NECK PAIN: 0
SHORTNESS OF BREATH: 0
SORE THROAT: 0
DIAPHORESIS: 0
BACK PAIN: 0
DIZZINESS: 0
NAUSEA: 0
VOMITING: 0
FOCAL WEAKNESS: 0
HEADACHES: 0
FLANK PAIN: 0
DIARRHEA: 0
ABDOMINAL PAIN: 1
WEIGHT LOSS: 0
WEAKNESS: 0
PALPITATIONS: 0
CHILLS: 0
COUGH: 0
FEVER: 0
CONSTIPATION: 0
NERVOUS/ANXIOUS: 0

## 2024-09-25 ASSESSMENT — PAIN DESCRIPTION - PAIN TYPE
TYPE: ACUTE PAIN

## 2024-09-25 NOTE — PROGRESS NOTES
Hospital Medicine Daily Progress Note    Date of Service  9/25/2024    Chief Complaint  Paige Gudino is a 78 y.o. female admitted 9/17/2024 with abdominal pain    Hospital Course  Admitted with abdominal pain, CT scan of the abdomen and pelvis showed an abdominal fluid collection.  Patient with history of pancreatic cystadenoma, underwent pancreatectomy, splenectomy, stomach and celiac node dissection on 8/5/2024.  Postoperatively, she had complications of retroperitoneal abscess, requiring CT-guided drainage, bilateral pulmonary embolism with right heart strain, bilateral lower extremity DVTs, right atrial thrombus that required thrombectomy.  Patient was started on empiric coverage with IV Unasyn.  Surgery Oncology was consulted on the case.  Interventional radiology was consulted.  Patient underwent CT guided peritoneal left upper quadrant abdominal abscess drainage, placement of 10 Yakut locking loop catheter on 9/19/2024.  Drain cultures showed MSSA and group B streptococcus.  Infectious disease was consulted on the case.    Interval Problem Update  Abdominal abscess -abscessogram done yesterday  Hypertension - sbp 122-131  PE -denies chest pain or shortness of breath  Pleural effusion -thoracentesis done yesterday    Labs pending    I have discussed this patient's plan of care and discharge plan at IDT rounds today with Case Management, Nursing, Nursing leadership, and other members of the IDT team.    Consultants/Specialty  infectious disease and surgery oncology, interventional radiology    Code Status  Full Code    Disposition  The patient is not medically cleared for discharge to home or a post-acute facility.  Anticipate discharge to: home with organized home healthcare and close outpatient follow-up    I have placed the appropriate orders for post-discharge needs.    Review of Systems  Review of Systems   Constitutional:  Negative for chills, diaphoresis, fever and malaise/fatigue.   HENT:   Negative for congestion, hearing loss and sore throat.    Eyes:  Negative for blurred vision.   Respiratory:  Negative for cough, shortness of breath and wheezing.    Cardiovascular:  Negative for chest pain, palpitations and leg swelling.   Gastrointestinal:  Positive for abdominal pain. Negative for diarrhea, heartburn, nausea and vomiting.   Genitourinary:  Negative for dysuria, flank pain and hematuria.   Musculoskeletal:  Negative for back pain, joint pain, myalgias and neck pain.   Skin:  Negative for rash.   Neurological:  Positive for weakness. Negative for dizziness, sensory change, speech change, focal weakness and headaches.   Psychiatric/Behavioral:  The patient is not nervous/anxious.         Physical Exam  Temp:  [36.2 °C (97.2 °F)-36.7 °C (98.1 °F)] 36.6 °C (97.9 °F)  Pulse:  [73-88] 76  Resp:  [16-20] 16  BP: (122-131)/(67-73) 127/70  SpO2:  [90 %-95 %] 91 %    Physical Exam  Vitals and nursing note reviewed.   HENT:      Head: Normocephalic and atraumatic.      Nose: No congestion.      Mouth/Throat:      Mouth: Mucous membranes are moist.   Eyes:      Extraocular Movements: Extraocular movements intact.      Conjunctiva/sclera: Conjunctivae normal.   Cardiovascular:      Rate and Rhythm: Normal rate and regular rhythm.   Pulmonary:      Effort: Pulmonary effort is normal.      Breath sounds: Decreased air movement present. Examination of the left-lower field reveals decreased breath sounds. Decreased breath sounds present.   Abdominal:      General: There is distension.      Tenderness: There is abdominal tenderness. There is no guarding or rebound.      Comments: Left upper quadrant drain   Musculoskeletal:      Cervical back: No tenderness.      Right lower leg: No edema.      Left lower leg: No edema.   Skin:     General: Skin is warm and dry.   Neurological:      General: No focal deficit present.      Mental Status: She is alert and oriented to person, place, and time.      Cranial Nerves: No  cranial nerve deficit.         Fluids    Intake/Output Summary (Last 24 hours) at 9/25/2024 1238  Last data filed at 9/25/2024 0713  Gross per 24 hour   Intake --   Output 10 ml   Net -10 ml        Laboratory  Recent Labs     09/23/24  0534 09/24/24  0004 09/25/24  1029   WBC 14.2* 13.9* 16.0*   RBC 4.57 4.52 4.63   HEMOGLOBIN 13.2 13.3 13.8   HEMATOCRIT 41.6 41.0 42.8   MCV 91.0 90.7 92.4   MCH 28.9 29.4 29.8   MCHC 31.7* 32.4 32.2   RDW 47.8 47.8 48.2   PLATELETCT 593* 582* 580*   MPV 8.9* 9.1 9.0     Recent Labs     09/23/24  0534 09/24/24  0004   SODIUM 136 138   POTASSIUM 4.2 4.2   CHLORIDE 104 103   CO2 20 24   GLUCOSE 103* 106*   BUN 8 8   CREATININE 0.67 0.65   CALCIUM 8.2* 8.7                   Imaging  US-THORACENTESIS PUNCTURE LEFT   Final Result      1. Ultrasound guided left sided diagnostic and therapeutic thoracentesis.      2. 1,150 mL of fluid withdrawn.      DX-CHEST-PORTABLE (1 VIEW)   Final Result      Moderate left pleural effusion and associated atelectasis versus consolidation. It is similar in size to 8/15/2024 radiograph.         IR-DRAINAGE-CATH EXCHANGE   Final Result      Small residual fluid collection with drainage catheter at the peripheral aspect.      CT-ABDOMEN-PELVIS WITH   Final Result      1.  Decreased size of the fluid collection in the distal pancreatectomy-lumpectomy bed following tube placement   2.  Increased LEFT pleural effusion with overlying atelectasis   3.  Hiatal hernia   4.  Persistently thickened endometrium for age. Underlying mass is possible. Ultrasound could better assess.      CT-IMAGE-GUIDED DRAIN PERITONEAL   Final Result      1.  CT GUIDED PERITONEAL LEFT UPPER QUADRANT ABDOMINAL ABSCESS DRAINAGE. PLACEMENT OF 10 Estonian LOCKING LOOP CATHETER.   2.  THE CURRENT PLAN IS TO IRRIGATE ONCE DAILY WITH 5 ML STERILE SALINE, CHECK CULTURES, MONITOR DRAINAGE OUTPUT AND OBTAIN A FOLLOW-UP CT SCAN IN 5-7 DAYS IF CLINICALLY INDICATED.      CT-ABDOMEN-PELVIS WITH   Final  Result      1.  Post distal pancreatectomy.      2.  Fluid collection with some peripheral enhancement is identified in the distal pancreatic region including the region of previous pancreatectomy. Developing pseudocyst is a possibility. No emphysema or hemorrhage is appreciated. Remaining pancreas    enhances normally.      3.  There is cholelithiasis.      4.  Left pleural effusion and consolidations in the left lung base.      5.  No change in hiatal hernia.      6.  Prominent endometrial cavity again noted.           Assessment/Plan  * Intra-abdominal abscess (HCC)- (present on admission)  Assessment & Plan  CT guided peritoneal left upper quadrant abdominal abscess drainage, placement of 10 Greek locking loop catheter  9/19/2024  IR abscessogram - small residual fluid collection with drainage catheter at the peripheral aspect  9/25/2024  IV Unasyn  Pain control      Pleural effusion- (present on admission)  Assessment & Plan  Ultrasound guided left sided diagnostic and therapeutic thoracentesis, 1,150 mL of fluid withdrawn  9/24/2024  Follow cultures    Thrombocytosis- (present on admission)  Assessment & Plan  Follow cbc    AV block, Mobitz 1- (present on admission)  Assessment & Plan  monitor    Primary hypertension- (present on admission)  Assessment & Plan  Amlodipine    Atrial thrombus- (present on admission)  Assessment & Plan  History of thrombectomy  Eliquis    Deep vein thrombosis (DVT) of both lower extremities (HCC)- (present on admission)  Assessment & Plan  Eliquis    Acute respiratory failure with hypoxia (HCC)- (present on admission)  Assessment & Plan  RT protocol    Pulmonary embolism with acute cor pulmonale, unspecified chronicity, unspecified pulmonary embolism type (HCC)- (present on admission)  Assessment & Plan  Eliquis    History of breast cancer- (present on admission)  Assessment & Plan  Follow up with Oncology    Acquired hypothyroidism- (present on admission)  Assessment &  Plan  Levothyroxine and liothyronine          VTE prophylaxis:    therapeutic anticoagulation with eliquis 5 mg BID      I have performed a physical exam and reviewed and updated ROS and Plan today (9/25/2024). In review of yesterday's note (9/24/2024), there are no changes except as documented above.

## 2024-09-25 NOTE — DISCHARGE PLANNING
"Case Management Discharge Planning    Admission Date: 9/17/2024  GMLOS: 2.4  ALOS: 8    6-Clicks ADL Score: 24  6-Clicks Mobility Score: 24      Anticipated Discharge Dispo: Discharge Disposition: Discharged to home/self care (01)  [facility]  [place] [address]    DME Needed: No    Action(s) Taken: Updated Provider/Nurse on Discharge Plan     \"78 y.o. female admitted 9/17/2024 with abdominal pain\"     Pt is on  IV Unasyn with end date 10.4 per MAR  Pt  is pending I D clearance    Per I D notes ,  On discharge, Pt can switch to p.o. Augmentin 875 mg twice daily for likely an additional 2 to 4 weeks.     Pt has  [employer] Medicare HMO.   Pt is from [place].  HH choices are  [facility]  , [facility] and [facility] as these are the possible contracted  HH with Pt s insurance,  Choice form faxed to [facility].     Pt was declined with [facility] , now pending with [facility].     Pt has been accepted with [facility]         Escalations Completed: None    Medically Clear: No    Next Steps:   CM to continue to assist Pt with discharge as needed    Barriers to Discharge:   Medical clearance      Is the patient up for discharge tomorrow: No        "

## 2024-09-25 NOTE — FACE TO FACE
Face to Face Supporting Documentation - Home Health    The encounter with this patient was in whole or in part the primary reason for home health admission.    Date of encounter:   Patient:                    MRN:                       YOB: 2023  Paige Gudino  [id number]  1945     Home health to see patient for:  Skilled Nursing care for assessment, interventions & education, Physical Therapy evaluation and treatment, and Occupational therapy evaluation and treatment    Skilled need for:  Surgical Aftercare abdominal abscess    Skilled nursing interventions to include:  Line/Drain/Airway education and care    Homebound status evidenced by:  Needs the assistance of another person in order to leave the home. Leaving home requires a considerable and taxing effort. There is a normal inability to leave the home.    Community Physician to provide follow up care: Roula Ga P.A.-C.     Optional Interventions? No      I certify the face to face encounter for this home health care referral meets the CMS requirements and the encounter/clinical assessment with the patient was, in whole, or in part, for the medical condition(s) listed above, which is the primary reason for home health care. Based on my clinical findings: the service(s) are medically necessary, support the need for home health care, and the homebound criteria are met.  I certify that this patient has had a face to face encounter by myself.  Benito Chavez M.D. - NPI: [id number]

## 2024-09-25 NOTE — DISCHARGE PLANNING
@5138  Received Choice form at 1406  Agency/Facility Name: [facility]  Referral sent per Choice form @ 1405   Scanned HH choice form in media.    [facility]  declined the HH order, Submitted HH referral to [facility].    @5432  Received call from Gabby at [facility] advised they accepted the pt.

## 2024-09-25 NOTE — PROGRESS NOTES
Assumed care of patient at 0645  Bedside Report Received     Pt AO x 4  Vitals signs stable  Pt denies chest pain or SOB  O2 sat >90% on .5L while sleeping breathing unlabored   Encouraged pulm hygiene and IS use.    Pt endorses 4/10 pain, declines PRN intervention at this time   MLI healing, LUQ drain with percustay, cdi. Yellow/cream drainage.   Pt denies N/V/D  Pt is tolerating regular diet  + voiding  + flatus, last BM 9/25, Bowel sounds present   Pt ambulates self       Plan of care discussed with pt. Safety education done. Falls precautions in place. Call light and belongings within reach. All needs met at this time.

## 2024-09-25 NOTE — PROGRESS NOTES
Date of Service  September 25, 2024     Chief Complaint  Sent by MD (Sent by surgeon for elevated WBC. Pt reports abdominal drain removed last week and is concerned for infection at the site. Denies pain. )     Surgery  IR drain on 9/19/24 by Dr Ruff    Hospital Course  POD# 6     Interval Problem Update  No acute events overnight  Had thoracentesis yesterday, breathing much better  Minimal output from the YEMI drain  Persistent leukocytosis      Problem List  Principal Problem:    Intra-abdominal abscess (HCC) (POA: Yes)  Active Problems:    Acquired hypothyroidism (POA: Yes)    History of breast cancer (POA: Yes)    Pulmonary embolism with acute cor pulmonale, unspecified chronicity, unspecified pulmonary embolism type (HCC) (POA: Yes)    Acute respiratory failure with hypoxia (HCC) (POA: Yes)    Deep vein thrombosis (DVT) of both lower extremities (HCC) (POA: Yes)    Atrial thrombus (POA: Yes)    Primary hypertension (POA: Yes)    AV block, Mobitz 1 (POA: Yes)    Abdominal infection (HCC) (POA: Yes)    Thrombocytosis (POA: Yes)    Pleural effusion (POA: Yes)    Hypomagnesemia (POA: Unknown)  Resolved Problems:    Sepsis (HCC) (POA: Yes)     Subjective  Review of Systems   Constitutional:  Negative for chills, malaise/fatigue and weight loss.   Respiratory:  Negative for cough and shortness of breath.    Cardiovascular:  Negative for chest pain.   Gastrointestinal:  Negative for abdominal pain, constipation, diarrhea, nausea and vomiting.   Musculoskeletal:         Left upper side pain       Objective  Temp:  [36.2 °C (97.2 °F)-36.7 °C (98.1 °F)] 36.6 °C (97.9 °F)  Pulse:  [73-88] 76  Resp:  [16-20] 16  BP: (122-131)/(67-73) 127/70  SpO2:  [90 %-95 %] 91 %      Physical Exam  Vitals and nursing note reviewed.   Constitutional:       General: She is not in acute distress.     Appearance: Normal appearance.   HENT:      Head: Normocephalic and atraumatic.      Nose: Nose normal.      Mouth/Throat:      Pharynx:  Oropharynx is clear.   Eyes:      Conjunctiva/sclera: Conjunctivae normal.   Cardiovascular:      Rate and Rhythm: Normal rate.   Pulmonary:      Effort: Pulmonary effort is normal. No respiratory distress.      Breath sounds: Normal breath sounds.   Abdominal:      General: Abdomen is flat. There is no distension.      Tenderness: There is no abdominal tenderness.      Comments: LUQ IR drain   Musculoskeletal:      Comments: Tenderness at left upper / rear side abd wall   Skin:     General: Skin is warm and dry.   Neurological:      Mental Status: She is alert and oriented to person, place, and time.   Psychiatric:         Mood and Affect: Mood normal.         Behavior: Behavior normal.        Fluids    Intake/Output Summary (Last 24 hours) at 9/25/2024 1233  Last data filed at 9/25/2024 0713  Gross per 24 hour   Intake --   Output 10 ml   Net -10 ml       Labs  Lab Results   Component Value Date/Time    SODIUM 138 09/24/2024 12:04 AM    POTASSIUM 4.2 09/24/2024 12:04 AM    CHLORIDE 103 09/24/2024 12:04 AM    CO2 24 09/24/2024 12:04 AM    GLUCOSE 106 (H) 09/24/2024 12:04 AM    BUN 8 09/24/2024 12:04 AM    CREATININE 0.65 09/24/2024 12:04 AM         Lab Results   Component Value Date/Time    PROTHROMBTM 16.8 (H) 09/19/2024 12:33 AM    INR 1.36 (H) 09/19/2024 12:33 AM         Lab Results   Component Value Date/Time    WBC 16.0 (H) 09/25/2024 10:29 AM    RBC 4.63 09/25/2024 10:29 AM    HEMOGLOBIN 13.8 09/25/2024 10:29 AM    HEMATOCRIT 42.8 09/25/2024 10:29 AM    MCV 92.4 09/25/2024 10:29 AM    MCH 29.8 09/25/2024 10:29 AM    MCHC 32.2 09/25/2024 10:29 AM    MPV 9.0 09/25/2024 10:29 AM    NEUTSPOLYS 77.70 (H) 09/24/2024 12:04 AM    LYMPHOCYTES 9.20 (L) 09/24/2024 12:04 AM    MONOCYTES 10.60 09/24/2024 12:04 AM    EOSINOPHILS 0.40 09/24/2024 12:04 AM    BASOPHILS 0.20 09/24/2024 12:04 AM    ANISOCYTOSIS 1+ 08/15/2024 03:30 AM         Recent Labs     09/19/24  0033   ASTSGOT 27   ALTSGPT 21   TBILIRUBIN 1.3   GLOBULIN  3.0   INR 1.36*      Imaging  CT A/P (9/23/24)  IMPRESSION:  1.  Decreased size of the fluid collection in the distal pancreatectomy-lumpectomy bed following tube placement  2.  Increased LEFT pleural effusion with overlying atelectasis  3.  Hiatal hernia  4.  Persistently thickened endometrium for age. Underlying mass is possible. Ultrasound could better assess.    CT A/P (9/17/24)  IMPRESSION:  1.  Post distal pancreatectomy.     2.  Fluid collection with some peripheral enhancement is identified in the distal pancreatic region including the region of previous pancreatectomy. Developing pseudocyst is a possibility. No emphysema or hemorrhage is appreciated. Remaining pancreas   enhances normally.     3.  There is cholelithiasis.     4.  Left pleural effusion and consolidations in the left lung base.     5.  No change in hiatal hernia.     6.  Prominent endometrial cavity again noted.     Assessment/Plan  Patient is a 78F with recent pancreatectomy and splenectomy, subsequent retroperitoneal abscess with drain placement. Now admitted from ED with leukocytosis WBC 23.8 noted to have recurrence of fluid collection in the distal pancreatic region. Repeat CT noted left pleural effusion     Katelynn-pancreatic fluid collection, s/p distal pancreatectomy / splenectomy  - Continue IR drain  -Continue antibiotics  -Diet as tolerated    2. Leukocytosis, WBC 16K  -Antibiotics per ID      3. Pleural effusion  - US thoracentesis    Willie Esquivel M.D.

## 2024-09-25 NOTE — CARE PLAN
The patient is Stable - Low risk of patient condition declining or worsening    Shift Goals  Clinical Goals: Drain management  Patient Goals: Pain Control; Wean O2; Rest  Family Goals: POC    Progress made toward(s) clinical / shift goals:  Patient weaned to .5L, pt needs some encouragement with IS. Pt did ambulate around unit this morning x2 with . Pt endorses pain control with tylenol PRN. Pain assessed per protocol.

## 2024-09-25 NOTE — PROGRESS NOTES
Radiology Progress Note   Author: SOUMYA Avelar Date & Time created: 9/25/2024  10:58 AM   Date of admission  9/17/2024  Note to reader: this note follows the APSO format rather than the historical SOAP format. Assessment and plan located at the top of the note for ease of use.    Chief Complaint  78 y.o. female admitted 9/17/2024 with   Chief Complaint   Patient presents with    Sent by MD     Sent by surgeon for elevated WBC. Pt reports abdominal drain removed last week and is concerned for infection at the site. Denies pain.          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 CT A/P showed fluid collection in distal pancreatic region. IR consulted and pt underwent a 10 Niuean left peritoneal upper quadrant abdominal abscess drain placement with IR Dr Ruff on  09/19/2024. 40 ML of purulent fluid removed and sent to lab.      Interval History:   09/20/2024-10 Niuean left upper quadrant peritoneal drain to bulb suction with 82 mL purulent  output in the last 24 hours.  I flushed drain with 10 mL of sterile NSS  I reviewed today's labs: WBC 17.3; H&H 12.4/38.5, Cr 0.70; Coags are 1.36,  Micro- abscess drainage from 09/19 positive for staphylococcal aureus; urine from 9/18 positive for yeast. I discussed drain care with pt  at bedside     09/21/2024-10 Niuean left upper quadrant peritoneal drain to bulb suction with 25 mL purulent  output in the last 24 hours.  I flushed drain with 10 mL of sterile NSS  I reviewed today's labs: WBC 8.7; H&H 14.5/46.4, Cr 0.53; Coags are 1.36,  Micro- abscess  drainage from 09/19 + for many GPC; urine from 9/18 + for yeast. I discussed drain care/with pt  at bedside, I reviewed drain flushing with patient and patient , all questions answered.      09/22/2024- LUQ 10 Telugu peritoneal drain to bulb suction with 35 mL purulent  output in the last 24 hours.  I flushed drain with 10 mL of sterile NSS  I reviewed most recent labs: WBC 8.7; H&H 14.5/46.4, Cr 0.53; Coags are 1.36,  Micro- abscess drainage from 09/19 + for MSSA, Streptococcus agalactiae group B; urine from 9/18 + for Candida albicans I discussed plan of care with infectious disease, and patient.  IDT notes reviewed.    09/23/24 - 10 Fr peritoneal drain to LUQ with 25 mL purulent output in the last 24 hours. Labs reviewed by me: WBC 14.2, creatinine 0.67. On ABX through ID. Drain flushed with 10 mL NS. IDT notes reviewed. Discussed with hospitalist; repeat CT pending for elevated white count.  at bedside and all questions answered.     09/24/24 - Repeat CT shows decrease in size of fluid collection. Abscessogram ordered which shows small fluid collection with drain in appropriate position.     09/25/24 - 10 Fr peritoneal drain to LUQ with 10 mL seropurulent output in the last 24 hours. Labs reviewed by me: WBC 16.0. On ABX through ID. Drain flushed with 10 mL NS. IDT notes reviewed.  Abscessogram results reviewed with patient.  Patient voiced frustration with fluctuating white blood cell count.    Assessment/Plan     Principal Problem:    Intra-abdominal abscess (HCC)  Active Problems:    Acquired hypothyroidism    History of breast cancer    Pulmonary embolism with acute cor pulmonale, unspecified chronicity, unspecified pulmonary embolism type (HCC)    Acute respiratory failure with hypoxia (HCC)    Deep vein thrombosis (DVT) of both lower extremities (HCC)    Atrial thrombus    Primary hypertension    AV block, Mobitz 1    Abdominal infection (HCC)    Thrombocytosis    Pleural effusion     Hypomagnesemia      Plan IR  - Continue to irrigate LUQ 10Fr drain with 10 ml of sterile saline each shift  - Fluid cultures positive for MSSA and Streptococcus agalactiae group B  - Continue to monitor drains, VS, and labs   - Ultimate plan is for patient to discharge with drain in place and follow-up with surgical oncology for drain management/care  - Continue to monitor drain, VS, and labs   -   -Thank you for allowing Interventional Radiology team to participate in the patients care, if any additional care or requests are needed in the future please do not hesitate to call or place IR order           Review of Systems  Physical Exam   Review of Systems   Constitutional:  Negative for chills and fever.   Respiratory:  Negative for shortness of breath.    Cardiovascular:  Negative for chest pain and palpitations.   Gastrointestinal:  Positive for abdominal pain. Negative for nausea and vomiting.   Neurological:  Negative for weakness.      Vitals:    09/25/24 0713   BP: 127/70   Pulse: 76   Resp: 16   Temp: 36.6 °C (97.9 °F)   SpO2: 91%        Physical Exam  Constitutional:       Appearance: Normal appearance.   Cardiovascular:      Rate and Rhythm: Normal rate.   Pulmonary:      Effort: Pulmonary effort is normal. No respiratory distress.   Abdominal:      Palpations: Abdomen is soft.      Comments: IR drain to LUQ   Skin:     General: Skin is warm and dry.   Neurological:      General: No focal deficit present.      Mental Status: She is alert and oriented to person, place, and time.   Psychiatric:         Mood and Affect: Mood normal.         Behavior: Behavior normal.             Labs    Recent Labs     09/23/24  0534 09/24/24  0004 09/25/24  1029   WBC 14.2* 13.9* 16.0*   RBC 4.57 4.52 4.63   HEMOGLOBIN 13.2 13.3 13.8   HEMATOCRIT 41.6 41.0 42.8   MCV 91.0 90.7 92.4   MCH 28.9 29.4 29.8   MCHC 31.7* 32.4 32.2   RDW 47.8 47.8 48.2   PLATELETCT 593* 582* 580*   MPV 8.9* 9.1 9.0     Recent Labs      09/23/24  0534 09/24/24  0004   SODIUM 136 138   POTASSIUM 4.2 4.2   CHLORIDE 104 103   CO2 20 24   GLUCOSE 103* 106*   BUN 8 8   CREATININE 0.67 0.65   CALCIUM 8.2* 8.7     Recent Labs     09/23/24  0534 09/24/24  0004   CREATININE 0.67 0.65     US-THORACENTESIS PUNCTURE LEFT   Final Result      1. Ultrasound guided left sided diagnostic and therapeutic thoracentesis.      2. 1,150 mL of fluid withdrawn.      DX-CHEST-PORTABLE (1 VIEW)   Final Result      Moderate left pleural effusion and associated atelectasis versus consolidation. It is similar in size to 8/15/2024 radiograph.         CT-ABDOMEN-PELVIS WITH   Final Result      1.  Decreased size of the fluid collection in the distal pancreatectomy-lumpectomy bed following tube placement   2.  Increased LEFT pleural effusion with overlying atelectasis   3.  Hiatal hernia   4.  Persistently thickened endometrium for age. Underlying mass is possible. Ultrasound could better assess.      CT-IMAGE-GUIDED DRAIN PERITONEAL   Final Result      1.  CT GUIDED PERITONEAL LEFT UPPER QUADRANT ABDOMINAL ABSCESS DRAINAGE. PLACEMENT OF 10 Danish LOCKING LOOP CATHETER.   2.  THE CURRENT PLAN IS TO IRRIGATE ONCE DAILY WITH 5 ML STERILE SALINE, CHECK CULTURES, MONITOR DRAINAGE OUTPUT AND OBTAIN A FOLLOW-UP CT SCAN IN 5-7 DAYS IF CLINICALLY INDICATED.      CT-ABDOMEN-PELVIS WITH   Final Result      1.  Post distal pancreatectomy.      2.  Fluid collection with some peripheral enhancement is identified in the distal pancreatic region including the region of previous pancreatectomy. Developing pseudocyst is a possibility. No emphysema or hemorrhage is appreciated. Remaining pancreas    enhances normally.      3.  There is cholelithiasis.      4.  Left pleural effusion and consolidations in the left lung base.      5.  No change in hiatal hernia.      6.  Prominent endometrial cavity again noted.      IR-DRAINAGE-CATH EXCHANGE    (Results Pending)     INR   Date Value Ref Range Status  "  09/19/2024 1.36 (H) 0.87 - 1.13 Final     Comment:     INR - Non-therapeutic Reference Range: 0.87-1.13  INR - Therapeutic Reference Range: 2.0-4.0       No results found for: \"POCINR\"     Intake/Output Summary (Last 24 hours) at 9/23/2024 1223  Last data filed at 9/23/2024 0710  Gross per 24 hour   Intake 100 ml   Output 25 ml   Net 75 ml      I have personally reviewed the above labs and imaging      I have performed a physical exam and reviewed and updated ROS and Plan today (9/25/2024).     36 minutes in directly providing and coordinating care and extensive data review.  No time overlap and excludes procedures.  "

## 2024-09-25 NOTE — CARE PLAN
The patient is Stable - Low risk of patient condition declining or worsening    Shift Goals  Clinical Goals: Drain Management; IV Abx; Wean O2  Patient Goals: Pain Control; Wean O2; Rest  Family Goals: POC    Progress made toward(s) clinical / shift goals:  Patient medicated per MAR. Non-pharmacologic comfort measures implemented. Safety discussed. Education provided. Ambulation and repositioning encouraged.     Problem: Knowledge Deficit - Standard  Goal: Patient and family/care givers will demonstrate understanding of plan of care, disease process/condition, diagnostic tests and medications  Description: Target End Date:  1-3 days or as soon as patient condition allows    Document in Patient Education    1.  Patient and family/caregiver oriented to unit, equipment, visitation policy and means for communicating concern  2.  Complete/review Learning Assessment  3.  Assess knowledge level of disease process/condition, treatment plan, diagnostic tests and medications  4.  Explain disease process/condition, treatment plan, diagnostic tests and medications  Outcome: Progressing     Problem: Pain - Standard  Goal: Alleviation of pain or a reduction in pain to the patient’s comfort goal  Description: Target End Date:  Prior to discharge or change in level of care    Document on Vitals flowsheet    1.  Document pain using the appropriate pain scale per order or unit policy  2.  Educate and implement non-pharmacologic comfort measures (i.e. relaxation, distraction, massage, cold/heat therapy, etc.)  3.  Pain management medications as ordered  4.  Reassess pain after pain med administration per policy  5.  If opiods administered assess patient's response to pain medication is appropriate per POSS sedation scale  6.  Follow pain management plan developed in collaboration with patient and interdisciplinary team (including palliative care or pain specialists if applicable)  Outcome: Progressing     Problem: Communication  Goal:  The ability to communicate needs accurately and effectively will improve  Outcome: Progressing

## 2024-09-26 LAB
ERYTHROCYTE [DISTWIDTH] IN BLOOD BY AUTOMATED COUNT: 48.4 FL (ref 35.9–50)
HCT VFR BLD AUTO: 41.3 % (ref 37–47)
HGB BLD-MCNC: 13.4 G/DL (ref 12–16)
MCH RBC QN AUTO: 29.7 PG (ref 27–33)
MCHC RBC AUTO-ENTMCNC: 32.4 G/DL (ref 32.2–35.5)
MCV RBC AUTO: 91.6 FL (ref 81.4–97.8)
PLATELET # BLD AUTO: 595 K/UL (ref 164–446)
PMV BLD AUTO: 8.9 FL (ref 9–12.9)
RBC # BLD AUTO: 4.51 M/UL (ref 4.2–5.4)
WBC # BLD AUTO: 14.7 K/UL (ref 4.8–10.8)

## 2024-09-26 PROCEDURE — 700102 HCHG RX REV CODE 250 W/ 637 OVERRIDE(OP)

## 2024-09-26 PROCEDURE — 36415 COLL VENOUS BLD VENIPUNCTURE: CPT

## 2024-09-26 PROCEDURE — 700102 HCHG RX REV CODE 250 W/ 637 OVERRIDE(OP): Mod: JZ

## 2024-09-26 PROCEDURE — A9270 NON-COVERED ITEM OR SERVICE: HCPCS | Performed by: HOSPITALIST

## 2024-09-26 PROCEDURE — A9270 NON-COVERED ITEM OR SERVICE: HCPCS

## 2024-09-26 PROCEDURE — 700101 HCHG RX REV CODE 250: Performed by: NURSE PRACTITIONER

## 2024-09-26 PROCEDURE — 99233 SBSQ HOSP IP/OBS HIGH 50: CPT | Performed by: INTERNAL MEDICINE

## 2024-09-26 PROCEDURE — 85027 COMPLETE CBC AUTOMATED: CPT

## 2024-09-26 PROCEDURE — 700102 HCHG RX REV CODE 250 W/ 637 OVERRIDE(OP): Performed by: HOSPITALIST

## 2024-09-26 PROCEDURE — 700105 HCHG RX REV CODE 258: Performed by: INTERNAL MEDICINE

## 2024-09-26 PROCEDURE — A9270 NON-COVERED ITEM OR SERVICE: HCPCS | Mod: JZ

## 2024-09-26 PROCEDURE — 99232 SBSQ HOSP IP/OBS MODERATE 35: CPT | Performed by: FAMILY MEDICINE

## 2024-09-26 PROCEDURE — 700111 HCHG RX REV CODE 636 W/ 250 OVERRIDE (IP): Mod: JZ | Performed by: INTERNAL MEDICINE

## 2024-09-26 PROCEDURE — 99024 POSTOP FOLLOW-UP VISIT: CPT | Performed by: SURGERY

## 2024-09-26 PROCEDURE — 770001 HCHG ROOM/CARE - MED/SURG/GYN PRIV*

## 2024-09-26 RX ADMIN — LIOTHYRONINE SODIUM 2.5 MCG: 5 TABLET ORAL at 05:52

## 2024-09-26 RX ADMIN — AMPICILLIN AND SULBACTAM 3 G: 1; 2 INJECTION, POWDER, FOR SOLUTION INTRAMUSCULAR; INTRAVENOUS at 13:59

## 2024-09-26 RX ADMIN — SODIUM CHLORIDE, PRESERVATIVE FREE 10 ML: 5 INJECTION INTRAVENOUS at 17:21

## 2024-09-26 RX ADMIN — SODIUM CHLORIDE, PRESERVATIVE FREE 10 ML: 5 INJECTION INTRAVENOUS at 05:51

## 2024-09-26 RX ADMIN — LEVOTHYROXINE SODIUM 100 MCG: 0.1 TABLET ORAL at 05:51

## 2024-09-26 RX ADMIN — AMPICILLIN AND SULBACTAM 3 G: 1; 2 INJECTION, POWDER, FOR SOLUTION INTRAMUSCULAR; INTRAVENOUS at 09:05

## 2024-09-26 RX ADMIN — AMPICILLIN AND SULBACTAM 3 G: 1; 2 INJECTION, POWDER, FOR SOLUTION INTRAMUSCULAR; INTRAVENOUS at 02:50

## 2024-09-26 RX ADMIN — ACETAMINOPHEN 650 MG: 325 TABLET ORAL at 19:44

## 2024-09-26 RX ADMIN — AMLODIPINE BESYLATE 5 MG: 10 TABLET ORAL at 05:51

## 2024-09-26 RX ADMIN — SENNOSIDES AND DOCUSATE SODIUM 2 TABLET: 50; 8.6 TABLET ORAL at 17:18

## 2024-09-26 RX ADMIN — APIXABAN 5 MG: 5 TABLET, FILM COATED ORAL at 05:51

## 2024-09-26 RX ADMIN — AMPICILLIN AND SULBACTAM 3 G: 1; 2 INJECTION, POWDER, FOR SOLUTION INTRAMUSCULAR; INTRAVENOUS at 19:42

## 2024-09-26 RX ADMIN — POTASSIUM CHLORIDE 20 MEQ: 1500 TABLET, EXTENDED RELEASE ORAL at 05:51

## 2024-09-26 RX ADMIN — APIXABAN 5 MG: 5 TABLET, FILM COATED ORAL at 17:18

## 2024-09-26 ASSESSMENT — ENCOUNTER SYMPTOMS
PALPITATIONS: 0
ABDOMINAL PAIN: 1
WEAKNESS: 1
WHEEZING: 0
SORE THROAT: 0
WEAKNESS: 0
DIAPHORESIS: 0
DIARRHEA: 0
MYALGIAS: 0
NECK PAIN: 0
FLANK PAIN: 0
FEVER: 0
CONSTIPATION: 0
VOMITING: 0
BLURRED VISION: 0
CHILLS: 0
ABDOMINAL PAIN: 0
HEADACHES: 0
BACK PAIN: 0
WEIGHT LOSS: 0
DIZZINESS: 0
FOCAL WEAKNESS: 0
SENSORY CHANGE: 0
SPEECH CHANGE: 0
COUGH: 0
NERVOUS/ANXIOUS: 1
NAUSEA: 0
SHORTNESS OF BREATH: 0
HEARTBURN: 0

## 2024-09-26 ASSESSMENT — PAIN DESCRIPTION - PAIN TYPE
TYPE: ACUTE PAIN

## 2024-09-26 NOTE — PROGRESS NOTES
Date of Service  September 26, 2024     Chief Complaint  Sent by MD (Sent by surgeon for elevated WBC. Pt reports abdominal drain removed last week and is concerned for infection at the site. Denies pain. )     Surgery  IR drain on 9/19/24 by Dr Ruff  US Thoracentesis on 9/24/24 by Jolie RITTER    Hospital Course  POD# 7     Interval Problem Update  No acute events overnight  Minimal output from the YEMI drain 10 mL  Leukocytosis slightly improved WBC 14.7 from 16.0, afebrile, Unasyn  Tolerating diet, denies N/V    Problem List  Principal Problem:    Intra-abdominal abscess (HCC) (POA: Yes)  Active Problems:    Acquired hypothyroidism (POA: Yes)    History of breast cancer (POA: Yes)    Pulmonary embolism with acute cor pulmonale, unspecified chronicity, unspecified pulmonary embolism type (HCC) (POA: Yes)    Acute respiratory failure with hypoxia (HCC) (POA: Yes)    Deep vein thrombosis (DVT) of both lower extremities (HCC) (POA: Yes)    Atrial thrombus (POA: Yes)    Primary hypertension (POA: Yes)    AV block, Mobitz 1 (POA: Yes)    Abdominal infection (HCC) (POA: Yes)    Thrombocytosis (POA: Yes)    Pleural effusion (POA: Yes)    Hypomagnesemia (POA: Yes)  Resolved Problems:    Sepsis (HCC) (POA: Yes)     Subjective  Review of Systems   Constitutional:  Negative for chills, malaise/fatigue and weight loss.   Respiratory:  Negative for cough and shortness of breath.    Cardiovascular:  Negative for chest pain.   Gastrointestinal:  Negative for abdominal pain, constipation, diarrhea, nausea and vomiting.       Objective  Temp:  [36.5 °C (97.7 °F)-37.1 °C (98.8 °F)] 36.9 °C (98.4 °F)  Pulse:  [75-86] 82  Resp:  [16] 16  BP: (115-126)/(62-75) 124/73  SpO2:  [90 %-93 %] 90 %      Physical Exam  Vitals and nursing note reviewed.   Constitutional:       General: She is not in acute distress.     Appearance: Normal appearance.   HENT:      Head: Normocephalic and atraumatic.      Nose: Nose normal.      Mouth/Throat:       Pharynx: Oropharynx is clear.   Eyes:      Conjunctiva/sclera: Conjunctivae normal.   Cardiovascular:      Rate and Rhythm: Normal rate.   Pulmonary:      Effort: Pulmonary effort is normal. No respiratory distress.      Breath sounds: Normal breath sounds.   Abdominal:      General: Abdomen is flat. There is no distension.      Tenderness: There is no abdominal tenderness.      Comments: LUQ IR drain   Skin:     General: Skin is warm and dry.   Neurological:      Mental Status: She is alert and oriented to person, place, and time.   Psychiatric:         Mood and Affect: Mood normal.         Behavior: Behavior normal.        Fluids    Intake/Output Summary (Last 24 hours) at 9/26/2024 0852  Last data filed at 9/26/2024 0701  Gross per 24 hour   Intake 200 ml   Output 20 ml   Net 180 ml       Labs  Lab Results   Component Value Date/Time    SODIUM 136 09/25/2024 10:29 AM    POTASSIUM 4.3 09/25/2024 10:29 AM    CHLORIDE 102 09/25/2024 10:29 AM    CO2 24 09/25/2024 10:29 AM    GLUCOSE 155 (H) 09/25/2024 10:29 AM    BUN 10 09/25/2024 10:29 AM    CREATININE 0.74 09/25/2024 10:29 AM         Lab Results   Component Value Date/Time    PROTHROMBTM 16.8 (H) 09/19/2024 12:33 AM    INR 1.36 (H) 09/19/2024 12:33 AM         Lab Results   Component Value Date/Time    WBC 14.7 (H) 09/26/2024 06:53 AM    RBC 4.51 09/26/2024 06:53 AM    HEMOGLOBIN 13.4 09/26/2024 06:53 AM    HEMATOCRIT 41.3 09/26/2024 06:53 AM    MCV 91.6 09/26/2024 06:53 AM    MCH 29.7 09/26/2024 06:53 AM    MCHC 32.4 09/26/2024 06:53 AM    MPV 8.9 (L) 09/26/2024 06:53 AM    NEUTSPOLYS 77.70 (H) 09/24/2024 12:04 AM    LYMPHOCYTES 9.20 (L) 09/24/2024 12:04 AM    MONOCYTES 10.60 09/24/2024 12:04 AM    EOSINOPHILS 0.40 09/24/2024 12:04 AM    BASOPHILS 0.20 09/24/2024 12:04 AM    ANISOCYTOSIS 1+ 08/15/2024 03:30 AM              Imaging  CT A/P (9/23/24)  IMPRESSION:  1.  Decreased size of the fluid collection in the distal pancreatectomy-lumpectomy bed following tube  placement  2.  Increased LEFT pleural effusion with overlying atelectasis  3.  Hiatal hernia  4.  Persistently thickened endometrium for age. Underlying mass is possible. Ultrasound could better assess.    CT A/P (9/17/24)  IMPRESSION:  1.  Post distal pancreatectomy.     2.  Fluid collection with some peripheral enhancement is identified in the distal pancreatic region including the region of previous pancreatectomy. Developing pseudocyst is a possibility. No emphysema or hemorrhage is appreciated. Remaining pancreas   enhances normally.     3.  There is cholelithiasis.     4.  Left pleural effusion and consolidations in the left lung base.     5.  No change in hiatal hernia.     6.  Prominent endometrial cavity again noted.     Assessment/Plan  Patient is a 78F with recent pancreatectomy and splenectomy, subsequent retroperitoneal abscess with drain placement. Now admitted from ED with leukocytosis WBC 23.8 noted to have recurrence of fluid collection in the distal pancreatic region. Repeat CT noted left pleural effusion     Katelynn-pancreatic fluid collection, s/p distal pancreatectomy / splenectomy  - Continue IR drain  - Continue antibiotics  - Diet as tolerated  - Flush of IR drain per protocol, IR following    2. Leukocytosis, WBC 14.7K  - Antibiotics per ID  - Daily CBC      3. Pleural effusion  - US thoracentesis completed on 9/24/24    SOUMYA La

## 2024-09-26 NOTE — CARE PLAN
The patient is Stable - Low risk of patient condition declining or worsening    Shift Goals  Clinical Goals: pain control <4, up to chair for meals, ambulate hallway 100ft x2  Patient Goals: feel better, bed bath  Family Goals: POC    Progress made toward(s) clinical / shift goals:  pain controlled with ice pack, patient refuses up to chair-states it isn't comfortable, patient ambulating to bathroom    Patient is not progressing towards the following goals:      Problem: Knowledge Deficit - Standard  Goal: Patient and family/care givers will demonstrate understanding of plan of care, disease process/condition, diagnostic tests and medications  Outcome: Progressing     Problem: Pain - Standard  Goal: Alleviation of pain or a reduction in pain to the patient’s comfort goal  Outcome: Progressing

## 2024-09-26 NOTE — PROGRESS NOTES
[facility] Medicine Daily Progress Note    Date of Service  9/26/2024    Chief Complaint  Paige Gudino is a 78 y.o. female admitted 9/17/2024 with abdominal pain    Hospital Course  Admitted with abdominal pain, CT scan of the abdomen and pelvis showed an abdominal fluid collection.  Patient with history of pancreatic cystadenoma, underwent pancreatectomy, splenectomy, stomach and celiac node dissection on 8/5/2024.  Postoperatively, she had complications of retroperitoneal abscess, requiring CT-guided drainage, bilateral pulmonary embolism with right heart strain, bilateral lower extremity DVTs, right atrial thrombus that required thrombectomy.  Patient was started on empiric coverage with IV Unasyn.  Surgery Oncology was consulted on the case.  Interventional radiology was consulted.  Patient underwent CT guided peritoneal left upper quadrant abdominal abscess drainage, placement of 10 Persian locking loop catheter on 9/19/2024.  Drain cultures showed MSSA and group B streptococcus.  Infectious disease was consulted on the case.    Interval Problem Update  Abdominal abscess - pain controlled  Hypertension - sbp 115-126  PE - denies chest pain or shortness of breath  Pleural effusion -culture appears to be negative    I have discussed this patient's plan of care and discharge plan at IDT rounds today with Case Management, Nursing, Nursing leadership, and other members of the IDT team.    Consultants/Specialty  infectious disease and surgery oncology, interventional radiology    Code Status  Full Code    Disposition  The patient is not medically cleared for discharge to home or a post-acute facility.  Anticipate discharge to: home with organized home healthcare and close outpatient follow-up    I have placed the appropriate orders for post-discharge needs.    Review of Systems  Review of Systems   Constitutional:  Negative for chills, diaphoresis, fever and malaise/fatigue.   HENT:  Negative for  congestion, hearing loss and sore throat.    Eyes:  Negative for blurred vision.   Respiratory:  Negative for cough, shortness of breath and wheezing.    Cardiovascular:  Negative for chest pain, palpitations and leg swelling.   Gastrointestinal:  Positive for abdominal pain. Negative for diarrhea, heartburn, nausea and vomiting.   Genitourinary:  Negative for dysuria, flank pain and hematuria.   Musculoskeletal:  Negative for back pain, joint pain, myalgias and neck pain.   Skin:  Negative for rash.   Neurological:  Positive for weakness. Negative for dizziness, sensory change, speech change, focal weakness and headaches.   Psychiatric/Behavioral:  The patient is nervous/anxious.         Physical Exam  Temp:  [36.5 °C (97.7 °F)-37.1 °C (98.8 °F)] 36.9 °C (98.4 °F)  Pulse:  [75-86] 82  Resp:  [16] 16  BP: (115-126)/(62-75) 124/73  SpO2:  [90 %-93 %] 92 %    Physical Exam  Vitals and nursing note reviewed.   HENT:      Head: Normocephalic and atraumatic.      Nose: No congestion.      Mouth/Throat:      Mouth: Mucous membranes are moist.   Eyes:      Extraocular Movements: Extraocular movements intact.      Conjunctiva/sclera: Conjunctivae normal.   Cardiovascular:      Rate and Rhythm: Normal rate and regular rhythm.   Pulmonary:      Effort: Pulmonary effort is normal.      Breath sounds: Decreased air movement present. Examination of the left-lower field reveals decreased breath sounds. Decreased breath sounds present.   Abdominal:      General: There is distension.      Tenderness: There is abdominal tenderness. There is no guarding or rebound.      Comments: Left upper quadrant drain   Musculoskeletal:      Cervical back: No tenderness.      Right lower leg: No edema.      Left lower leg: No edema.   Skin:     General: Skin is warm and dry.   Neurological:      General: No focal deficit present.      Mental Status: She is alert and oriented to person, place, and time.      Cranial Nerves: No cranial nerve  deficit.   Psychiatric:         Mood and Affect: Mood is anxious.         Fluids    Intake/Output Summary (Last 24 hours) at 9/26/2024 1116  Last data filed at 9/26/2024 1113  Gross per 24 hour   Intake 300 ml   Output 20 ml   Net 280 ml        Laboratory  Recent Labs     09/24/24  0004 09/25/24  1029 09/26/24  0653   WBC 13.9* 16.0* 14.7*   RBC 4.52 4.63 4.51   HEMOGLOBIN 13.3 13.8 13.4   HEMATOCRIT 41.0 42.8 41.3   MCV 90.7 92.4 91.6   MCH 29.4 29.8 29.7   MCHC 32.4 32.2 32.4   RDW 47.8 48.2 48.4   PLATELETCT 582* 580* 595*   MPV 9.1 9.0 8.9*     Recent Labs     09/24/24  0004 09/25/24  1029   SODIUM 138 136   POTASSIUM 4.2 4.3   CHLORIDE 103 102   CO2 24 24   GLUCOSE 106* 155*   BUN 8 10   CREATININE 0.65 0.74   CALCIUM 8.7 8.3*                   Imaging  US-THORACENTESIS PUNCTURE LEFT   Final Result      1. Ultrasound guided left sided diagnostic and therapeutic thoracentesis.      2. 1,150 mL of fluid withdrawn.      DX-CHEST-PORTABLE (1 VIEW)   Final Result      Moderate left pleural effusion and associated atelectasis versus consolidation. It is similar in size to 8/15/2024 radiograph.         IR-DRAINAGE-CATH EXCHANGE   Final Result      Small residual fluid collection with drainage catheter at the peripheral aspect.      CT-ABDOMEN-PELVIS WITH   Final Result      1.  Decreased size of the fluid collection in the distal pancreatectomy-lumpectomy bed following tube placement   2.  Increased LEFT pleural effusion with overlying atelectasis   3.  Hiatal hernia   4.  Persistently thickened endometrium for age. Underlying mass is possible. Ultrasound could better assess.      CT-IMAGE-GUIDED DRAIN PERITONEAL   Final Result      1.  CT GUIDED PERITONEAL LEFT UPPER QUADRANT ABDOMINAL ABSCESS DRAINAGE. PLACEMENT OF 10 Thai LOCKING LOOP CATHETER.   2.  THE CURRENT PLAN IS TO IRRIGATE ONCE DAILY WITH 5 ML STERILE SALINE, CHECK CULTURES, MONITOR DRAINAGE OUTPUT AND OBTAIN A FOLLOW-UP CT SCAN IN 5-7 DAYS IF CLINICALLY  INDICATED.      CT-ABDOMEN-PELVIS WITH   Final Result      1.  Post distal pancreatectomy.      2.  Fluid collection with some peripheral enhancement is identified in the distal pancreatic region including the region of previous pancreatectomy. Developing pseudocyst is a possibility. No emphysema or hemorrhage is appreciated. Remaining pancreas    enhances normally.      3.  There is cholelithiasis.      4.  Left pleural effusion and consolidations in the left lung base.      5.  No change in hiatal hernia.      6.  Prominent endometrial cavity again noted.           Assessment/Plan  * Intra-abdominal abscess (HCC)- (present on admission)  Assessment & Plan  CT guided peritoneal left upper quadrant abdominal abscess drainage, placement of 10 Togolese locking loop catheter  9/19/2024  IR abscessogram - small residual fluid collection with drainage catheter at the peripheral aspect  9/25/2024  IV Unasyn  Pain control  Repeat CT on 8/29?    Hypomagnesemia- (present on admission)  Assessment & Plan  Follow level    Pleural effusion- (present on admission)  Assessment & Plan  Ultrasound guided left sided diagnostic and therapeutic thoracentesis, 1,150 mL of fluid withdrawn  9/24/2024  Follow cultures    Thrombocytosis- (present on admission)  Assessment & Plan  Follow cbc    AV block, Mobitz 1- (present on admission)  Assessment & Plan  monitor    Primary hypertension- (present on admission)  Assessment & Plan  Amlodipine    Atrial thrombus- (present on admission)  Assessment & Plan  History of thrombectomy  Eliquis    Deep vein thrombosis (DVT) of both lower extremities (HCC)- (present on admission)  Assessment & Plan  Eliquis    Acute respiratory failure with hypoxia (HCC)- (present on admission)  Assessment & Plan  RT protocol    Pulmonary embolism with acute cor pulmonale, unspecified chronicity, unspecified pulmonary embolism type (HCC)- (present on admission)  Assessment & Plan  Eliquis    History of breast cancer-  (present on admission)  Assessment & Plan  Follow up with Oncology    Acquired hypothyroidism- (present on admission)  Assessment & Plan  Levothyroxine and Liothyronine          VTE prophylaxis:    therapeutic anticoagulation with eliquis 5 mg BID      I have performed a physical exam and reviewed and updated ROS and Plan today (9/26/2024). In review of yesterday's note (9/25/2024), there are no changes except as documented above.

## 2024-09-26 NOTE — CARE PLAN
The patient is Stable - Low risk of patient condition declining or worsening    Shift Goals  Clinical Goals: pain control, drain management  Patient Goals: comfort  Family Goals: POC    Progress made toward(s) clinical / shift goals:    Problem: Knowledge Deficit - Standard  Goal: Patient and family/care givers will demonstrate understanding of plan of care, disease process/condition, diagnostic tests and medications  Outcome: Progressing   Patient updated on plan of care throughout shift and verbalizes understanding.   Problem: Pain - Standard  Goal: Alleviation of pain or a reduction in pain to the patient’s comfort goal  Outcome: Progressing   Patient's pain is being managed with cold packs and prn tylenol.

## 2024-09-26 NOTE — PROGRESS NOTES
Radiology Progress Note   Author: SOUMYA Avelar Date & Time created: 9/26/2024  2:27 PM   Date of admission  9/17/2024  Note to reader: this note follows the APSO format rather than the historical SOAP format. Assessment and plan located at the top of the note for ease of use.    Chief Complaint  78 y.o. female admitted 9/17/2024 with   Chief Complaint   Patient presents with    Sent by MD     Sent by surgeon for elevated WBC. Pt reports abdominal drain removed last week and is concerned for infection at the site. Denies pain.          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 CT A/P showed fluid collection in distal pancreatic region. IR consulted and pt underwent a 10 Amharic left peritoneal upper quadrant abdominal abscess drain placement with IR Dr Ruff on  09/19/2024. 40 ML of purulent fluid removed and sent to lab.      Interval History:   09/20/2024-10 Amharic left upper quadrant peritoneal drain to bulb suction with 82 mL purulent  output in the last 24 hours.  I flushed drain with 10 mL of sterile NSS  I reviewed today's labs: WBC 17.3; H&H 12.4/38.5, Cr 0.70; Coags are 1.36,  Micro- abscess drainage from 09/19 positive for staphylococcal aureus; urine from 9/18 positive for yeast. I discussed drain care with pt  at bedside     09/21/2024-10 Amharic left upper quadrant peritoneal drain to bulb suction with 25 mL purulent  output in the last 24 hours.  I flushed drain with 10 mL of sterile NSS  I reviewed today's labs: WBC 8.7; H&H 14.5/46.4, Cr 0.53; Coags are 1.36,  Micro- abscess  drainage from 09/19 + for many GPC; urine from 9/18 + for yeast. I discussed drain care/with pt  at bedside, I reviewed drain flushing with patient and patient , all questions answered.      09/22/2024- LUQ 10 Yoruba peritoneal drain to bulb suction with 35 mL purulent  output in the last 24 hours.  I flushed drain with 10 mL of sterile NSS  I reviewed most recent labs: WBC 8.7; H&H 14.5/46.4, Cr 0.53; Coags are 1.36,  Micro- abscess drainage from 09/19 + for MSSA, Streptococcus agalactiae group B; urine from 9/18 + for Candida albicans I discussed plan of care with infectious disease, and patient.  IDT notes reviewed.    09/23/24 - 10 Fr peritoneal drain to LUQ with 25 mL purulent output in the last 24 hours. Labs reviewed by me: WBC 14.2, creatinine 0.67. On ABX through ID. Drain flushed with 10 mL NS. IDT notes reviewed. Discussed with hospitalist; repeat CT pending for elevated white count.  at bedside and all questions answered.     09/24/24 - Repeat CT shows decrease in size of fluid collection. Abscessogram ordered which shows small fluid collection with drain in appropriate position.     09/25/24 - 10 Fr peritoneal drain to LUQ with 10 mL seropurulent output in the last 24 hours. Labs reviewed by me: WBC 16.0. On ABX through ID. Drain flushed with 10 mL NS. IDT notes reviewed.  Abscessogram results reviewed with patient.  Patient voiced frustration with fluctuating white blood cell count.    09/26/2024 - 10 Fr peritoneal drain to LUQ with 10 mL seropurulent output in the last 24 hours. Labs reviewed by me: WBC 14.7. On ABX through ID. Drain flushed with 10 mL NS. IDT notes reviewed.  Joint visit made with Dr. Rooney Infectious disease.  Patient discussed with hospitalist and HPB team.     Assessment/Plan     Principal Problem:    Intra-abdominal abscess (HCC)  Active Problems:    Acquired hypothyroidism    History of breast cancer    Pulmonary embolism with acute cor pulmonale, unspecified  chronicity, unspecified pulmonary embolism type (HCC)    Acute respiratory failure with hypoxia (HCC)    Deep vein thrombosis (DVT) of both lower extremities (HCC)    Atrial thrombus    Primary hypertension    AV block, Mobitz 1    Abdominal infection (HCC)    Thrombocytosis    Pleural effusion    Hypomagnesemia      Plan IR  - Continue to irrigate LUQ 10Fr drain with 10 ml of sterile saline each shift; do not aspirate back per hepatobiliary request  - Fluid cultures positive for MSSA and Streptococcus agalactiae group B  - Continue to monitor drains, VS, and labs   - Ultimate plan is for patient to discharge with drain in place and follow-up with surgical oncology for drain management/care  - Continue to monitor drain, VS, and labs   -   -Thank you for allowing Interventional Radiology team to participate in the patients care, if any additional care or requests are needed in the future please do not hesitate to call or place IR order           Review of Systems  Physical Exam   Review of Systems   Constitutional:  Negative for chills and fever.   Respiratory:  Negative for shortness of breath.    Cardiovascular:  Negative for chest pain and palpitations.   Gastrointestinal:  Negative for abdominal pain, nausea and vomiting.   Neurological:  Negative for weakness.      Vitals:    09/26/24 0910   BP:    Pulse:    Resp:    Temp:    SpO2: 92%        Physical Exam  Constitutional:       Appearance: Normal appearance.   Cardiovascular:      Rate and Rhythm: Normal rate.   Pulmonary:      Effort: Pulmonary effort is normal. No respiratory distress.   Abdominal:      Palpations: Abdomen is soft.      Comments: IR drain to LUQ   Skin:     General: Skin is warm and dry.   Neurological:      General: No focal deficit present.      Mental Status: She is alert and oriented to person, place, and time.   Psychiatric:         Mood and Affect: Mood normal.         Behavior: Behavior normal.             Labs    Recent Labs      09/24/24  0004 09/25/24  1029 09/26/24  0653   WBC 13.9* 16.0* 14.7*   RBC 4.52 4.63 4.51   HEMOGLOBIN 13.3 13.8 13.4   HEMATOCRIT 41.0 42.8 41.3   MCV 90.7 92.4 91.6   MCH 29.4 29.8 29.7   MCHC 32.4 32.2 32.4   RDW 47.8 48.2 48.4   PLATELETCT 582* 580* 595*   MPV 9.1 9.0 8.9*     Recent Labs     09/24/24  0004 09/25/24  1029   SODIUM 138 136   POTASSIUM 4.2 4.3   CHLORIDE 103 102   CO2 24 24   GLUCOSE 106* 155*   BUN 8 10   CREATININE 0.65 0.74   CALCIUM 8.7 8.3*     Recent Labs     09/24/24  0004 09/25/24  1029   CREATININE 0.65 0.74     US-THORACENTESIS PUNCTURE LEFT   Final Result      1. Ultrasound guided left sided diagnostic and therapeutic thoracentesis.      2. 1,150 mL of fluid withdrawn.      DX-CHEST-PORTABLE (1 VIEW)   Final Result      Moderate left pleural effusion and associated atelectasis versus consolidation. It is similar in size to 8/15/2024 radiograph.         IR-DRAINAGE-CATH EXCHANGE   Final Result      Small residual fluid collection with drainage catheter at the peripheral aspect.      CT-ABDOMEN-PELVIS WITH   Final Result      1.  Decreased size of the fluid collection in the distal pancreatectomy-lumpectomy bed following tube placement   2.  Increased LEFT pleural effusion with overlying atelectasis   3.  Hiatal hernia   4.  Persistently thickened endometrium for age. Underlying mass is possible. Ultrasound could better assess.      CT-IMAGE-GUIDED DRAIN PERITONEAL   Final Result      1.  CT GUIDED PERITONEAL LEFT UPPER QUADRANT ABDOMINAL ABSCESS DRAINAGE. PLACEMENT OF 10 Greenlandic LOCKING LOOP CATHETER.   2.  THE CURRENT PLAN IS TO IRRIGATE ONCE DAILY WITH 5 ML STERILE SALINE, CHECK CULTURES, MONITOR DRAINAGE OUTPUT AND OBTAIN A FOLLOW-UP CT SCAN IN 5-7 DAYS IF CLINICALLY INDICATED.      CT-ABDOMEN-PELVIS WITH   Final Result      1.  Post distal pancreatectomy.      2.  Fluid collection with some peripheral enhancement is identified in the distal pancreatic region including the region of  "previous pancreatectomy. Developing pseudocyst is a possibility. No emphysema or hemorrhage is appreciated. Remaining pancreas    enhances normally.      3.  There is cholelithiasis.      4.  Left pleural effusion and consolidations in the left lung base.      5.  No change in hiatal hernia.      6.  Prominent endometrial cavity again noted.        INR   Date Value Ref Range Status   09/19/2024 1.36 (H) 0.87 - 1.13 Final     Comment:     INR - Non-therapeutic Reference Range: 0.87-1.13  INR - Therapeutic Reference Range: 2.0-4.0       No results found for: \"POCINR\"     Intake/Output Summary (Last 24 hours) at 9/23/2024 1223  Last data filed at 9/23/2024 0710  Gross per 24 hour   Intake 100 ml   Output 25 ml   Net 75 ml      I have personally reviewed the above labs and imaging      I have performed a physical exam and reviewed and updated ROS and Plan today (9/26/2024).     36 minutes in directly providing and coordinating care and extensive data review.  No time overlap and excludes procedures.  "

## 2024-09-26 NOTE — PROGRESS NOTES
Infectious Disease Progress Note    Author: Bao Rooney M.D. Date & Time of service: 2024  9:12 AM    Chief Complaint:  Follow-up for abdominal abscess    Interval History:   Tmax 100.3 yesterday evening, white count down to 8.7, tolerated switch to Unasyn, culture results as below, creatinine 0.53, normal transaminases   Tmax 99.1, cultures as below, no new labs this morning, repeat imaging.   patient remains afebrile, white count is 14.2 today and patient feeling somewhat ill compared to yesterday, tolerating Unasyn, creatinine 0.53, normal transaminases, cultures as below   patient remains afebrile, white count 17.9, CT scan with improvement in size of the abscess, measuring 5.2 x 2.4 cm, but the tube is not in the bulk of the fluid component, current plan is for upsize.  Also noted worsening left-sided pleural effusion, plan is for thoracentesis   patient remains afebrile, white count fluctuating but down some to 14.7 today, underwent thoracentesis on  with 3500 white cells, 66% polys, negative Gram stain and cultures thus far    Labs Reviewed and Medications Reviewed.    Review of Systems:  Review of Systems   Constitutional:  Negative for chills and fever.   Skin:  Negative for itching and rash.     Hemodynamics:  Temp (24hrs), Av.8 °C (98.2 °F), Min:36.5 °C (97.7 °F), Max:37.1 °C (98.8 °F)  Temperature: 36.9 °C (98.4 °F)  Pulse  Av.1  Min: 43  Max: 129   Blood Pressure : 124/73       Physical Exam:  Physical Exam  Vitals and nursing note reviewed.   Constitutional:       General: She is not in acute distress.     Appearance: She is not ill-appearing.   Eyes:      Conjunctiva/sclera: Conjunctivae normal.   Cardiovascular:      Rate and Rhythm: Normal rate.   Pulmonary:      Effort: Pulmonary effort is normal. No respiratory distress.      Breath sounds: No stridor.   Abdominal:      General: There is no distension.      Comments: IR drain in place with moderate amount  of purulent output in bulb   Musculoskeletal:         General: No swelling or tenderness.   Skin:     Findings: No erythema or rash.   Neurological:      General: No focal deficit present.      Mental Status: She is alert.   Psychiatric:         Mood and Affect: Mood normal.         Behavior: Behavior normal.         Meds:    Current Facility-Administered Medications:     cyclobenzaprine    lidocaine    apixaban    ampicillin-sulbactam (UNASYN) IV    normal saline flush    liothyronine **AND** liothyronine    potassium chloride SA    acetaminophen    senna-docusate **AND** polyethylene glycol/lytes    amLODIPine    levothyroxine    Labs:  Recent Labs     09/24/24  0004 09/25/24  1029 09/26/24  0653   WBC 13.9* 16.0* 14.7*   RBC 4.52 4.63 4.51   HEMOGLOBIN 13.3 13.8 13.4   HEMATOCRIT 41.0 42.8 41.3   MCV 90.7 92.4 91.6   MCH 29.4 29.8 29.7   RDW 47.8 48.2 48.4   PLATELETCT 582* 580* 595*   MPV 9.1 9.0 8.9*   NEUTSPOLYS 77.70*  --   --    LYMPHOCYTES 9.20*  --   --    MONOCYTES 10.60  --   --    EOSINOPHILS 0.40  --   --    BASOPHILS 0.20  --   --      Recent Labs     09/24/24  0004 09/25/24  1029   SODIUM 138 136   POTASSIUM 4.2 4.3   CHLORIDE 103 102   CO2 24 24   GLUCOSE 106* 155*   BUN 8 10     Recent Labs     09/24/24  0004 09/25/24  1029   CREATININE 0.65 0.74       Imaging:  CT-IMAGE-GUIDED DRAIN PERITONEAL    Result Date: 9/19/2024 9/19/2024 9:41 AM HISTORY/REASON FOR EXAM:  Pancreatic abscess drain placement; Anterior LUQ. History of distal pancreas resection. Distal pancreatectomy. Previous catheter drainage for left upper quadrant collection 8/22/2024. Recurrent or residual collection seen on recent CT 9/17/2024 TECHNIQUE/EXAM DESCRIPTION: Left upper quadrant abdominal  abscess drainage with CT guidance. Low dose optimization technique was utilized for this CT exam including automated exposure control and adjustment of the mA and/or kV according to patient size. ALL ELEMENTS OF MAXIMAL STERILE BARRIER  TECHNIQUE WERE FOLLOWED. SEDATION TIME: 30 minutes. Moderate sedation was administered with fentanyl and Versed with continuous patient monitoring by the interventional radiology nurse. PROCEDURE: Informed consent was obtained. Localizing CT images were obtained with the patient in supine position. The skin was prepped with ChloraPrep and draped in a sterile fashion. Moderate sedation was provided. Pulse oximetry and continuous cardiac monitoring by the nurse was performed throughout the exam. Intraservice time was 30 minutes. Following local anesthesia with 1% Lidocaine, a 17 G guiding needle was placed and needle path confirmed with CT. An Amplatz guidewire was placed and following serial tract dilatation, a 10 Citizen of [place] pigtail locking catheter was placed. A specimen was collected and submitted for culture and sensitivity and Gram stain. Total of 40 mL yellowish-greenish pus was drained. The catheter was secured to the skin and connected to suction bulb drainage. Final CT images were obtained documenting catheter position. The patient tolerated the procedure well with no evidence of complication. COMPARISON: CT of the abdomen and pelvis 9/17/2024, CT-guided left upper quadrant catheter drainage 8/22/2024 FINDINGS: The final CT images show satisfactory catheter position within the target collection.     1.  CT GUIDED PERITONEAL LEFT UPPER QUADRANT ABDOMINAL ABSCESS DRAINAGE. PLACEMENT OF 10 [place] LOCKING LOOP CATHETER. 2.  THE CURRENT PLAN IS TO IRRIGATE ONCE DAILY WITH 5 ML STERILE SALINE, CHECK CULTURES, MONITOR DRAINAGE OUTPUT AND OBTAIN A FOLLOW-UP CT SCAN IN 5-7 DAYS IF CLINICALLY INDICATED.    CT-ABDOMEN-PELVIS WITH    Result Date: 9/17/2024 9/17/2024 9:29 PM HISTORY/REASON FOR EXAM:  Abdominal pelvic pain. TECHNIQUE/EXAM DESCRIPTION:   CT scan of the abdomen and pelvis with contrast. Contrast-enhanced helical scanning was obtained from the diaphragmatic domes through the pubic symphysis following the bolus  administration of nonionic contrast without complication. 100 mL of Omnipaque 350 nonionic contrast was administered without complication. Low dose optimization technique was utilized for this CT exam including automated exposure control and adjustment of the mA and/or kV according to patient size. COMPARISON: 08/27/2024, 08/20/2024 FINDINGS: Lower Chest: There is left pleural effusion and consolidation in the left lower pulmonary lobe as well as lingular segment left upper lobe.. Hiatal hernia is identified. Liver: Normal. Spleen: Not identified. Pancreas: Post distal pancreatectomy and splenectomy. Fluid collection with irregular shape and some peripheral enhancement distal to the postsurgical site is identified which is multilobulated. Largest component measures approximately 5.7 x 3.0 cm. Fluid extends up to the left hemidiaphragm. Percutaneous drain no longer identified.. Gallbladder: Cholelithiasis. Biliary: Nondilated. Adrenal glands: Normal. Kidneys: Unremarkable without hydronephrosis. Bowel: No obstruction or acute inflammation. Lymph nodes: No adenopathy. Vasculature: Unremarkable. Peritoneum: Unremarkable without ascites. Musculoskeletal: No acute or destructive process. Pelvis: No adenopathy or free fluid. Endometrial cavity appears prominent as on the prior CT.     1.  Post distal pancreatectomy. 2.  Fluid collection with some peripheral enhancement is identified in the distal pancreatic region including the region of previous pancreatectomy. Developing pseudocyst is a possibility. No emphysema or hemorrhage is appreciated. Remaining pancreas enhances normally. 3.  There is cholelithiasis. 4.  Left pleural effusion and consolidations in the left lung base. 5.  No change in hiatal hernia. 6.  Prominent endometrial cavity again noted.    CT-PANCREAS AND ABDOMEN WITH & W/O    Result Date: 8/27/2024 8/27/2024 6:38 PM HISTORY/REASON FOR EXAM:  s/p distal pancreatectomy and splenectomy with pathology of  benign pancreatic serous cystadenoma in a background of chronic pancreatitis, hx LUQ fluid collection s/p aspiration. Evaluate for recurrent fluid collection.. TECHNIQUE/EXAM DESCRIPTION:  CT pancreas and abdomen without and with contrast. Initial precontrast thick-section images were obtained through the pancreas.  Following this, nonionic contrast was administered by IV, and thin-section helical scanning performed from the diaphragmatic domes through the iliac crests.  Pancreatic parenchymal phase and portal venous phase (dual-phase) images were obtained following contrast administration. 100 mL of Omnipaque 350 nonionic contrast was administered. Low dose optimization technique was utilized for this CT exam including automated exposure control and adjustment of the mA and/or kV according to patient size. COMPARISON: 8/22/2024 FINDINGS: Liver: Unremarkable Spleen: Absent Biliary system: Gallbladder shows multiple stones and minimal wall thickening. Common bile duct is not dilated. Pancreas: Prior distal pancreatectomy.  Minimal edema adjacent to surgical site.  No peripancreatic fluid collection demonstrated. Pancreatic duct: There is no pancreatic ductal dilatation. Arterial vasculature: The celiac axis has a normal branching pattern. The SMA is patent. Venous vasculature: Portable and superior mesenteric veins are patent.  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 Vascular encasement: None. Kidneys: Kidneys are unremarkable. Adrenals: Adrenals are unremarkable. Lymph nodes: There are no enlarged nodes. Bowel: No bowel obstruction.  No focal bowel wall thickening.  Sparse colonic diverticula. Lung bases: Small LEFT pleural effusion with associated atelectasis.  Minimal RIGHT pleural fluid. Bones: Degenerative change of lumbar spine.  Prior lumbar spine surgery. LEFT upper quadrant drain in place.  No residual fluid collection demonstrated.  Minimal peritoneal gas.   Postoperative change of the anterior abdominal wall.     1.  Prior distal pancreatectomy and splenectomy.  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 2.  LEFT upper quadrant drain in place with minimal adjacent peritoneal gas.  No significant residual or recurrent fluid collection demonstrated. 3.  Gallstones and minimal gallbladder wall thickening.  Cholecystitis is not excluded. 4.  Bilateral pleural fluid collections with associated atelectasis, worse on the LEFT.     CT-IMAGE-GUIDED DRAIN PERITONEAL    Result Date: 8/23/2024 8/22/2024 3:15 PM HISTORY/REASON FOR EXAM:  LUQ fluid collection, hx distal pancreatectomy and splenectomy; Was on Eliquis, now on Heparin. TECHNIQUE/EXAM DESCRIPTION: Left upper quadrant fluid collection drainage with CT guidance. Low dose optimization technique was utilized for this CT exam including automated exposure control and adjustment of the mA and/or kV according to patient size. Moderate sedation was provided. Pulse oximetry and continuous cardiac monitoring by the nurse was performed throughout the exam. Intraservice time was 10 minutes. PROCEDURE: Informed consent was obtained. Localizing CT images were obtained with the patient in supine position. The procedure was performed using MAXIMAL STERILE BARRIER technique including sterile gown, mask, cap, and donning of sterile gloves following appropriate hand hygiene and/or sterile scrub. Patient skin site was prepped with 2% Chlorhexidine solution. Following local anesthesia with 1% Lidocaine, a 17 G guiding needle was placed and needle path confirmed with CT. An Amplatz guidewire was placed and following serial tract dilatation, a 18 [place] pigtail locking catheter was placed. A specimen was collected and submitted for culture and sensitivity and Gram stain. Total of 200 mL thin brown fluid was drained. The catheter was secured to the skin and connected to suction bulb drainage. Final CT  images were obtained documenting catheter position. The patient tolerated the procedure well with no evidence of complication. COMPARISON: 8/20/2024 FINDINGS: The final CT images show satisfactory catheter position within the target collection.     1.  CT guided left upper quadrant fluid collection catheter drainage.      Micro:  Results       Procedure Component Value Units Date/Time    GRAM STAIN [[id number]] Collected: 09/24/24 1213    Order Status: Completed Specimen: Body Fluid Updated: 09/25/24 1650     Significant Indicator .     Source BF     Site Thoracentesis Fluid     Gram Stain Result Few WBCs.  No organisms seen.      Fungal Smear [[id number]] Collected: 09/24/24 1213    Order Status: Completed Specimen: Body Fluid Updated: 09/25/24 1650     Significant Indicator NEG     Source BF     Site Thoracentesis Fluid     Fungal Smear Results No fungal elements seen.    Acid Fast Stain [[id number]] Collected: 09/24/24 1213    Order Status: Completed Specimen: Body Fluid Updated: 09/25/24 1650     Significant Indicator NEG     Source BF     Site Thoracentesis Fluid     AFB Smear Results No acid fast bacilli seen.    AFB Culture [[id number]] Collected: 09/24/24 1213    Order Status: Completed Specimen: Body Fluid Updated: 09/25/24 1649     Significant Indicator NEG     Source BF     Site Thoracentesis Fluid     Culture Result Culture in progress.     AFB Smear Results No acid fast bacilli seen.    FLUID CULTURE W/GRAM STAIN [[id number]] Collected: 09/24/24 1213    Order Status: Completed Specimen: Body Fluid Updated: 09/25/24 1649     Significant Indicator NEG     Source BF     Site Thoracentesis Fluid     Culture Result No growth at 24 hours.     Gram Stain Result Few WBCs.  No organisms seen.      Fungal Culture [[id number]] Collected: 09/24/24 1213    Order Status: Completed Specimen: Body Fluid Updated: 09/25/24 1649     Significant Indicator NEG     Source BF     Site Thoracentesis Fluid     Culture Result Culture  in progress.     Fungal Smear Results No fungal elements seen.    FLUID CULTURE W/GRAM STAIN [[id number]]     Order Status: No result Specimen: Body Fluid from Thoracentesis Fluid     AFB CULTURE [[id number]]     Order Status: No result Specimen: Body Fluid from Thoracentesis Fluid     FUNGAL CULTURE [[id number]]     Order Status: No result Specimen: Body Fluid from Thoracentesis Fluid     Fungal Culture [[id number]] Collected: 09/19/24 1020    Order Status: Completed Specimen: Wound from Other Body Fluid Updated: 09/23/24 1205     Significant Indicator NEG     Source WND     Site pancreatic abscess     Culture Result No fungal growth to date.     Fungal Smear Results No fungal elements seen.    CULTURE WOUND W/ GRAM STAIN [[id number]]  (Abnormal)  (Susceptibility) Collected: 09/19/24 1020    Order Status: Completed Specimen: Wound Updated: 09/23/24 1205     Significant Indicator POS     Source WND     Site pancreatic abscess     Culture Result -     Gram Stain Result Many WBCs.  Many Gram positive cocci.       Culture Result Staphylococcus aureus  Heavy growth  Methicillin sensitive by screening method        Streptococcus agalactiae (Group B)  Moderate growth      Susceptibility       Staphylococcus aureus (1)       Antibiotic Interpretation Microscan   Method Status    Ampicillin/sulbactam Sensitive <=8/4 mcg/mL RALPH Final    Clindamycin Sensitive <=0.25 mcg/mL RALPH Final    Cefazolin Sensitive <=8 mcg/mL RALPH Final    Cefepime Sensitive <=4 mcg/mL RALPH Final    Daptomycin Sensitive <=0.5 mcg/mL RALPH Final    Erythromycin Sensitive <=0.25 mcg/mL RALPH Final    Oxacillin Sensitive 0.5 mcg/mL RALPH Final    Trimeth/Sulfa Sensitive <=0.5/9.5 mcg/mL RALPH Final    Tetracycline Sensitive <=4 mcg/mL RALPH Final    Vancomycin Sensitive 1 mcg/mL RALPH Final                       Urine Culture (New) [[id number]]  (Abnormal) Collected: 09/18/24 1700    Order Status: Completed Specimen: Urine Updated: 09/20/24 1138     Significant Indicator POS      Source UR     Site -     Culture Result -      Candida albicans  10-50,000 cfu/mL      GRAM STAIN [[id number]]  (Abnormal) Collected: 09/19/24 1020    Order Status: Completed Specimen: Wound Updated: 09/19/24 1811     Significant Indicator .     Source WND     Site pancreatic abscess     Gram Stain Result Many WBCs.  Many Gram positive cocci.      Fungal Smear [[id number]] Collected: 09/19/24 1020    Order Status: Completed Specimen: Wound Updated: 09/19/24 1811     Significant Indicator NEG     Source WND     Site pancreatic abscess     Fungal Smear Results No fungal elements seen.    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 Cultures at the time grew Staph epidermidis, susceptibilities not performed.  Sent to the ER by surgical oncologist due to leukocytosis on recent labs along with episodes of epigastric pain.  CT abdomen and pelvis noted fluid collection in the distal pancreatic region.  IR drain placed on 9/19, 40 mL of purulent fluid was noted.  Cultures are growing MSSA and group B Strep.      Pertinent Diagnoses:  Intra-abdominal abscess  Polymicrobial infection  Left pleural effusion  Pancreatic cystadenoma status post recent pancreatectomy, splenectomy, stomach and celiac node dissection 8/5/2024  Recurrent breast cancer, on tamoxifen  History of PE    Plan:  -Continue IV Unasyn 3 g every 6  hours  -IR on board.  Repeat CT scan 9/23 due to new leukocytosis, notes slight improvement in size of the abscess, underwent abscessogram, drain position appropriate, no obvious fistulous communication noted.   -Also noted worsening left-sided pleural effusion, underwent thoracentesis 9/26, 1500 white cells, 66% neutrophils, Gram stain and cultures negative thus far.  We will follow along  -Trend white count for the next 24 to 48 hours.  She is on appropriate antibiotics at this time, continued purulent output and failure should raise concern for ongoing transit of enteric contents from the bowel    Disposition: Anticipate no ID specific disposition needs.  On discharge, can switch to p.o. Augmentin 875 mg twice daily for likely an additional 2 to 4 weeks, likely with drain remaining in place and continued outpatient follow-up for drain management  Need for PICC line: Likely no    Discussed with IR and Anderson KEITH and Romy KEITH.  ID will follow.  This illness poses a threat to life

## 2024-09-26 NOTE — PROGRESS NOTES
Assumed care of patient at 0700. Patient is alert and oriented, respirations are unlabored and regular on room air. Patient sitting up in bed at this time. Patient states tenderness to left flank from injury getting in and out of bed a few days ago. Declines pain medication, ice pack placed. Lung sounds clear throughout, diminished in bases. Weaned off oxygen, encouraged use of IS. Abdomen soft, tender, rounded, +bowel sounds, BM 9/25, LUQ drain with tan output. Voiding without difficulty. Patient frustrated with care. States she feels tired all the time and wants to feel better. Bed in lowest position, call light within reach, patient states no needs at this time.

## 2024-09-27 ENCOUNTER — APPOINTMENT (OUTPATIENT)
Dept: RADIOLOGY | Facility: MEDICAL CENTER | Age: 79
DRG: 438 | End: 2024-09-27
Attending: NURSE PRACTITIONER
Payer: COMMERCIAL

## 2024-09-27 ENCOUNTER — APPOINTMENT (OUTPATIENT)
Dept: RADIOLOGY | Facility: MEDICAL CENTER | Age: 79
DRG: 438 | End: 2024-09-27
Attending: SURGERY
Payer: COMMERCIAL

## 2024-09-27 LAB
ANION GAP SERPL CALC-SCNC: 11 MMOL/L (ref 7–16)
BACTERIA FLD AEROBE CULT: NORMAL
BUN SERPL-MCNC: 10 MG/DL (ref 8–22)
CALCIUM SERPL-MCNC: 8.7 MG/DL (ref 8.5–10.5)
CHLORIDE SERPL-SCNC: 102 MMOL/L (ref 96–112)
CO2 SERPL-SCNC: 21 MMOL/L (ref 20–33)
CREAT SERPL-MCNC: 0.66 MG/DL (ref 0.5–1.4)
ERYTHROCYTE [DISTWIDTH] IN BLOOD BY AUTOMATED COUNT: 48.4 FL (ref 35.9–50)
GFR SERPLBLD CREATININE-BSD FMLA CKD-EPI: 90 ML/MIN/1.73 M 2
GLUCOSE SERPL-MCNC: 109 MG/DL (ref 65–99)
GRAM STN SPEC: NORMAL
HCT VFR BLD AUTO: 41.7 % (ref 37–47)
HGB BLD-MCNC: 13.3 G/DL (ref 12–16)
MAGNESIUM SERPL-MCNC: 2 MG/DL (ref 1.5–2.5)
MCH RBC QN AUTO: 29.7 PG (ref 27–33)
MCHC RBC AUTO-ENTMCNC: 31.9 G/DL (ref 32.2–35.5)
MCV RBC AUTO: 93.1 FL (ref 81.4–97.8)
PLATELET # BLD AUTO: 619 K/UL (ref 164–446)
PMV BLD AUTO: 8.9 FL (ref 9–12.9)
POTASSIUM SERPL-SCNC: 4.4 MMOL/L (ref 3.6–5.5)
RBC # BLD AUTO: 4.48 M/UL (ref 4.2–5.4)
SIGNIFICANT IND 70042: NORMAL
SITE SITE: NORMAL
SODIUM SERPL-SCNC: 134 MMOL/L (ref 135–145)
SOURCE SOURCE: NORMAL
WBC # BLD AUTO: 15.1 K/UL (ref 4.8–10.8)

## 2024-09-27 PROCEDURE — 99232 SBSQ HOSP IP/OBS MODERATE 35: CPT | Performed by: FAMILY MEDICINE

## 2024-09-27 PROCEDURE — 82150 ASSAY OF AMYLASE: CPT

## 2024-09-27 PROCEDURE — 71045 X-RAY EXAM CHEST 1 VIEW: CPT

## 2024-09-27 PROCEDURE — 700102 HCHG RX REV CODE 250 W/ 637 OVERRIDE(OP)

## 2024-09-27 PROCEDURE — 36415 COLL VENOUS BLD VENIPUNCTURE: CPT

## 2024-09-27 PROCEDURE — 700105 HCHG RX REV CODE 258: Performed by: INTERNAL MEDICINE

## 2024-09-27 PROCEDURE — A9270 NON-COVERED ITEM OR SERVICE: HCPCS | Performed by: HOSPITALIST

## 2024-09-27 PROCEDURE — 700102 HCHG RX REV CODE 250 W/ 637 OVERRIDE(OP): Performed by: HOSPITALIST

## 2024-09-27 PROCEDURE — A9270 NON-COVERED ITEM OR SERVICE: HCPCS | Mod: JZ

## 2024-09-27 PROCEDURE — 700102 HCHG RX REV CODE 250 W/ 637 OVERRIDE(OP): Mod: JZ

## 2024-09-27 PROCEDURE — 0W9B3ZZ DRAINAGE OF LEFT PLEURAL CAVITY, PERCUTANEOUS APPROACH: ICD-10-PCS

## 2024-09-27 PROCEDURE — 32555 ASPIRATE PLEURA W/ IMAGING: CPT | Mod: LT

## 2024-09-27 PROCEDURE — 700101 HCHG RX REV CODE 250: Performed by: NURSE PRACTITIONER

## 2024-09-27 PROCEDURE — 85027 COMPLETE CBC AUTOMATED: CPT

## 2024-09-27 PROCEDURE — 80048 BASIC METABOLIC PNL TOTAL CA: CPT

## 2024-09-27 PROCEDURE — 700111 HCHG RX REV CODE 636 W/ 250 OVERRIDE (IP): Mod: JZ | Performed by: INTERNAL MEDICINE

## 2024-09-27 PROCEDURE — 83735 ASSAY OF MAGNESIUM: CPT

## 2024-09-27 PROCEDURE — 770001 HCHG ROOM/CARE - MED/SURG/GYN PRIV*

## 2024-09-27 PROCEDURE — 71046 X-RAY EXAM CHEST 2 VIEWS: CPT

## 2024-09-27 PROCEDURE — 99024 POSTOP FOLLOW-UP VISIT: CPT | Performed by: SURGERY

## 2024-09-27 PROCEDURE — A9270 NON-COVERED ITEM OR SERVICE: HCPCS

## 2024-09-27 RX ADMIN — SODIUM CHLORIDE, PRESERVATIVE FREE 10 ML: 5 INJECTION INTRAVENOUS at 04:24

## 2024-09-27 RX ADMIN — AMLODIPINE BESYLATE 5 MG: 10 TABLET ORAL at 04:21

## 2024-09-27 RX ADMIN — AMPICILLIN AND SULBACTAM 3 G: 1; 2 INJECTION, POWDER, FOR SOLUTION INTRAMUSCULAR; INTRAVENOUS at 21:42

## 2024-09-27 RX ADMIN — SODIUM CHLORIDE, PRESERVATIVE FREE 10 ML: 5 INJECTION INTRAVENOUS at 17:23

## 2024-09-27 RX ADMIN — AMPICILLIN AND SULBACTAM 3 G: 1; 2 INJECTION, POWDER, FOR SOLUTION INTRAMUSCULAR; INTRAVENOUS at 01:41

## 2024-09-27 RX ADMIN — APIXABAN 5 MG: 5 TABLET, FILM COATED ORAL at 17:23

## 2024-09-27 RX ADMIN — LIOTHYRONINE SODIUM 5 MCG: 5 TABLET ORAL at 04:21

## 2024-09-27 RX ADMIN — POTASSIUM CHLORIDE 20 MEQ: 1500 TABLET, EXTENDED RELEASE ORAL at 04:22

## 2024-09-27 RX ADMIN — APIXABAN 5 MG: 5 TABLET, FILM COATED ORAL at 04:21

## 2024-09-27 RX ADMIN — AMPICILLIN AND SULBACTAM 3 G: 1; 2 INJECTION, POWDER, FOR SOLUTION INTRAMUSCULAR; INTRAVENOUS at 14:36

## 2024-09-27 RX ADMIN — LEVOTHYROXINE SODIUM 100 MCG: 0.1 TABLET ORAL at 04:21

## 2024-09-27 RX ADMIN — AMPICILLIN AND SULBACTAM 3 G: 1; 2 INJECTION, POWDER, FOR SOLUTION INTRAMUSCULAR; INTRAVENOUS at 08:13

## 2024-09-27 ASSESSMENT — ENCOUNTER SYMPTOMS
WEAKNESS: 1
SHORTNESS OF BREATH: 0
NECK PAIN: 0
SORE THROAT: 0
ABDOMINAL PAIN: 0
FLANK PAIN: 0
ABDOMINAL PAIN: 1
PALPITATIONS: 0
WEAKNESS: 0
CHILLS: 0
DIARRHEA: 0
BACK PAIN: 0
NAUSEA: 0
COUGH: 0
HEARTBURN: 0
DIAPHORESIS: 0
VOMITING: 0
BLURRED VISION: 0
FEVER: 0
HEADACHES: 0
NERVOUS/ANXIOUS: 1
SPEECH CHANGE: 0
WHEEZING: 0
SENSORY CHANGE: 0
MYALGIAS: 0
CONSTIPATION: 0
WEIGHT LOSS: 0
FOCAL WEAKNESS: 0
DIZZINESS: 0

## 2024-09-27 ASSESSMENT — PAIN DESCRIPTION - PAIN TYPE
TYPE: ACUTE PAIN

## 2024-09-27 NOTE — CARE PLAN
The patient is Stable - Low risk of patient condition declining or worsening    Shift Goals  Clinical Goals: Pain control, IV abx, monitor drain  Patient Goals: Pain control, rest  Family Goals: Rest, comfort    Progress made toward(s) clinical / shift goals:  Patient educated on the pain scale and to notify RN of pain. Patient verbalizes pain control using PRN Tylenol and cold packs. Patient encouraged to notify RN if pain remains uncontrolled. Patient medicated per MAR.     Patient is not progressing towards the following goals:

## 2024-09-27 NOTE — PROGRESS NOTES
Hospital Medicine Daily Progress Note    Date of Service  9/27/2024    Chief Complaint  Paige Gudino is a 78 y.o. female admitted 9/17/2024 with abdominal pain    Hospital Course  Admitted with abdominal pain, CT scan of the abdomen and pelvis showed an abdominal fluid collection.  Patient with history of pancreatic cystadenoma, underwent pancreatectomy, splenectomy, stomach and celiac node dissection on 8/5/2024.  Postoperatively, she had complications of retroperitoneal abscess, requiring CT-guided drainage, bilateral pulmonary embolism with right heart strain, bilateral lower extremity DVTs, right atrial thrombus that required thrombectomy.  Patient was started on empiric coverage with IV Unasyn.  Surgery Oncology was consulted on the case.  Interventional radiology was consulted.  Patient underwent CT guided peritoneal left upper quadrant abdominal abscess drainage, placement of 10 Georgian locking loop catheter on 9/19/2024.  Drain cultures showed MSSA and group B streptococcus.  Infectious disease was consulted on the case.    Interval Problem Update  Abdominal abscess - pain controlled  Hypertension - sbp 122-124  PE - denies chest pain or shortness of breath  Pleural effusion -culture appears to be negative, chest x-ray reviewed, discussed case with surgery, will need repeat thoracentesis    Updates given and plan of care discussed with patient's spouse and daughter who were at bedside.    I have discussed this patient's plan of care and discharge plan at IDT rounds today with Case Management, Nursing, Nursing leadership, and other members of the IDT team.    Consultants/Specialty  infectious disease and surgery oncology, interventional radiology    Code Status  Full Code    Disposition  The patient is not medically cleared for discharge to home or a post-acute facility.  Anticipate discharge to: home with organized home healthcare and close outpatient follow-up    I have placed the appropriate  orders for post-discharge needs.    Review of Systems  Review of Systems   Constitutional:  Negative for chills, diaphoresis, fever and malaise/fatigue.   HENT:  Negative for congestion, hearing loss and sore throat.    Eyes:  Negative for blurred vision.   Respiratory:  Negative for cough, shortness of breath and wheezing.    Cardiovascular:  Negative for chest pain, palpitations and leg swelling.   Gastrointestinal:  Positive for abdominal pain. Negative for diarrhea, heartburn, nausea and vomiting.   Genitourinary:  Negative for dysuria, flank pain and hematuria.   Musculoskeletal:  Negative for back pain, joint pain, myalgias and neck pain.   Skin:  Negative for rash.   Neurological:  Positive for weakness. Negative for dizziness, sensory change, speech change, focal weakness and headaches.   Psychiatric/Behavioral:  The patient is nervous/anxious.         Physical Exam  Temp:  [36.5 °C (97.7 °F)-37 °C (98.6 °F)] 36.7 °C (98.1 °F)  Pulse:  [77-96] 96  Resp:  [17-24] 24  BP: (122-124)/(64-71) 123/64  SpO2:  [90 %-92 %] 90 %    Physical Exam  Vitals and nursing note reviewed.   HENT:      Head: Normocephalic and atraumatic.      Nose: No congestion.      Mouth/Throat:      Mouth: Mucous membranes are moist.   Eyes:      Extraocular Movements: Extraocular movements intact.      Conjunctiva/sclera: Conjunctivae normal.   Cardiovascular:      Rate and Rhythm: Normal rate and regular rhythm.   Pulmonary:      Effort: Pulmonary effort is normal.      Breath sounds: Decreased air movement present. Examination of the left-lower field reveals decreased breath sounds. Decreased breath sounds present.   Abdominal:      General: There is distension.      Tenderness: There is abdominal tenderness. There is no guarding or rebound.      Comments: Left upper quadrant drain   Musculoskeletal:      Cervical back: No tenderness.      Right lower leg: No edema.      Left lower leg: No edema.   Skin:     General: Skin is warm and  dry.   Neurological:      General: No focal deficit present.      Mental Status: She is alert and oriented to person, place, and time.      Cranial Nerves: No cranial nerve deficit.   Psychiatric:         Mood and Affect: Mood is anxious.         Fluids    Intake/Output Summary (Last 24 hours) at 9/27/2024 1251  Last data filed at 9/27/2024 1244  Gross per 24 hour   Intake 100 ml   Output 38 ml   Net 62 ml        Laboratory  Recent Labs     09/25/24  1029 09/26/24  0653 09/27/24  0028   WBC 16.0* 14.7* 15.1*   RBC 4.63 4.51 4.48   HEMOGLOBIN 13.8 13.4 13.3   HEMATOCRIT 42.8 41.3 41.7   MCV 92.4 91.6 93.1   MCH 29.8 29.7 29.7   MCHC 32.2 32.4 31.9*   RDW 48.2 48.4 48.4   PLATELETCT 580* 595* 619*   MPV 9.0 8.9* 8.9*     Recent Labs     09/25/24  1029 09/27/24  0028   SODIUM 136 134*   POTASSIUM 4.3 4.4   CHLORIDE 102 102   CO2 24 21   GLUCOSE 155* 109*   BUN 10 10   CREATININE 0.74 0.66   CALCIUM 8.3* 8.7                   Imaging  US-THORACENTESIS PUNCTURE LEFT   Final Result      1. Ultrasound guided left sided diagnostic and therapeutic thoracentesis.      2. 1,150 mL of fluid withdrawn.      DX-CHEST-PORTABLE (1 VIEW)   Final Result      Moderate left pleural effusion and associated atelectasis versus consolidation. It is similar in size to 8/15/2024 radiograph.         IR-DRAINAGE-CATH EXCHANGE   Final Result      Small residual fluid collection with drainage catheter at the peripheral aspect.      CT-ABDOMEN-PELVIS WITH   Final Result      1.  Decreased size of the fluid collection in the distal pancreatectomy-lumpectomy bed following tube placement   2.  Increased LEFT pleural effusion with overlying atelectasis   3.  Hiatal hernia   4.  Persistently thickened endometrium for age. Underlying mass is possible. Ultrasound could better assess.      CT-IMAGE-GUIDED DRAIN PERITONEAL   Final Result      1.  CT GUIDED PERITONEAL LEFT UPPER QUADRANT ABDOMINAL ABSCESS DRAINAGE. PLACEMENT OF 10 [place] LOCKING LOOP  CATHETER.   2.  THE CURRENT PLAN IS TO IRRIGATE ONCE DAILY WITH 5 ML STERILE SALINE, CHECK CULTURES, MONITOR DRAINAGE OUTPUT AND OBTAIN A FOLLOW-UP CT SCAN IN 5-7 DAYS IF CLINICALLY INDICATED.      CT-ABDOMEN-PELVIS WITH   Final Result      1.  Post distal pancreatectomy.      2.  Fluid collection with some peripheral enhancement is identified in the distal pancreatic region including the region of previous pancreatectomy. Developing pseudocyst is a possibility. No emphysema or hemorrhage is appreciated. Remaining pancreas    enhances normally.      3.  There is cholelithiasis.      4.  Left pleural effusion and consolidations in the left lung base.      5.  No change in hiatal hernia.      6.  Prominent endometrial cavity again noted.      DX-CHEST-2 VIEWS    (Results Pending)   US-THORACENTESIS PUNCTURE LEFT    (Results Pending)        Assessment/Plan  * Intra-abdominal abscess (HCC)- (present on admission)  Assessment & Plan  CT guided peritoneal left upper quadrant abdominal abscess drainage, placement of 10 [place] locking loop catheter  9/19/2024  IR abscessogram - small residual fluid collection with drainage catheter at the peripheral aspect  9/25/2024  IV Unasyn  Pain control    Hypomagnesemia- (present on admission)  Assessment & Plan  Follow level    Pleural effusion- (present on admission)  Assessment & Plan  Ultrasound guided left sided diagnostic and therapeutic thoracentesis, 1,150 mL of fluid withdrawn  9/24/2024  For repeat US thoracentesis  Follow cultures    Thrombocytosis- (present on admission)  Assessment & Plan  Follow cbc    AV block, Mobitz 1- (present on admission)  Assessment & Plan  monitor    Primary hypertension- (present on admission)  Assessment & Plan  Amlodipine    Atrial thrombus- (present on admission)  Assessment & Plan  History of thrombectomy  Eliquis    Deep vein thrombosis (DVT) of both lower extremities (HCC)- (present on admission)  Assessment & Plan  Eliquis    Acute  respiratory failure with hypoxia (HCC)- (present on admission)  Assessment & Plan  RT protocol    Pulmonary embolism with acute cor pulmonale, unspecified chronicity, unspecified pulmonary embolism type (HCC)- (present on admission)  Assessment & Plan  Eliquis    History of breast cancer- (present on admission)  Assessment & Plan  Follow up with Oncology    Acquired hypothyroidism- (present on admission)  Assessment & Plan  Levothyroxine and Liothyronine          VTE prophylaxis:    therapeutic anticoagulation with eliquis 5 mg BID      I have performed a physical exam and reviewed and updated ROS and Plan today (9/27/2024). In review of yesterday's note (9/26/2024), there are no changes except as documented above.

## 2024-09-27 NOTE — CARE PLAN
"The patient is Stable - Low risk of patient condition declining or worsening    Shift Goals  Clinical Goals: continue antibiotic regimen, pain management, thoracentesis  Patient Goals: \"feel better\"  Family Goals: updates on POC    Progress made toward(s) clinical / shift goals:  Patient continues to tolerate current antibiotic regimen. Patient is ambulatory. Vital signs have remained stable. Pain medications are available as needed for patient comfort.     Patient is not progressing towards the following goals: Pending thoracentesis.      "

## 2024-09-27 NOTE — PROGRESS NOTES
Radiology Progress Note   Author: SOUMYA Avelar Date & Time created: 9/27/2024  9:54 AM   Date of admission  9/17/2024  Note to reader: this note follows the APSO format rather than the historical SOAP format. Assessment and plan located at the top of the note for ease of use.    Chief Complaint  78 y.o. female admitted 9/17/2024 with   Chief Complaint   Patient presents with    Sent by MD     Sent by surgeon for elevated WBC. Pt reports abdominal drain removed last week and is concerned for infection at the site. Denies pain.          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 CT A/P showed fluid collection in distal pancreatic region. IR consulted and pt underwent a 10 Wolof left peritoneal upper quadrant abdominal abscess drain placement with IR Dr Ruff on  09/19/2024. 40 ML of purulent fluid removed and sent to lab.      Interval History:   09/20/2024-10 Wolof left upper quadrant peritoneal drain to bulb suction with 82 mL purulent  output in the last 24 hours.  I flushed drain with 10 mL of sterile NSS  I reviewed today's labs: WBC 17.3; H&H 12.4/38.5, Cr 0.70; Coags are 1.36,  Micro- abscess drainage from 09/19 positive for staphylococcal aureus; urine from 9/18 positive for yeast. I discussed drain care with pt  at bedside     09/21/2024-10 Wolof left upper quadrant peritoneal drain to bulb suction with 25 mL purulent  output in the last 24 hours.  I flushed drain with 10 mL of sterile NSS  I reviewed today's labs: WBC 8.7; H&H 14.5/46.4, Cr 0.53; Coags are 1.36,  Micro- abscess  drainage from 09/19 + for many GPC; urine from 9/18 + for yeast. I discussed drain care/with pt  at bedside, I reviewed drain flushing with patient and patient , all questions answered.      09/22/2024- LUQ 10 Slovenian peritoneal drain to bulb suction with 35 mL purulent  output in the last 24 hours.  I flushed drain with 10 mL of sterile NSS  I reviewed most recent labs: WBC 8.7; H&H 14.5/46.4, Cr 0.53; Coags are 1.36,  Micro- abscess drainage from 09/19 + for MSSA, Streptococcus agalactiae group B; urine from 9/18 + for Candida albicans I discussed plan of care with infectious disease, and patient.  IDT notes reviewed.    09/23/24 - 10 Fr peritoneal drain to LUQ with 25 mL purulent output in the last 24 hours. Labs reviewed by me: WBC 14.2, creatinine 0.67. On ABX through ID. Drain flushed with 10 mL NS. IDT notes reviewed. Discussed with hospitalist; repeat CT pending for elevated white count.  at bedside and all questions answered.     09/24/24 - Repeat CT shows decrease in size of fluid collection. Abscessogram ordered which shows small fluid collection with drain in appropriate position.     09/25/24 - 10 Fr peritoneal drain to LUQ with 10 mL seropurulent output in the last 24 hours. Labs reviewed by me: WBC 16.0. On ABX through ID. Drain flushed with 10 mL NS. IDT notes reviewed.  Abscessogram results reviewed with patient.  Patient voiced frustration with fluctuating white blood cell count.    09/26/2024 - 10 Fr peritoneal drain to LUQ with 10 mL seropurulent output in the last 24 hours. Labs reviewed by me: WBC 14.7. On ABX through ID. Drain flushed with 10 mL NS. IDT notes reviewed.  Joint visit made with Dr. Rooney Infectious disease.  Patient discussed with hospitalist and HPB team.     09/27/24 - 10 Fr peritoneal drain to LUQ with 50 mL seropurulent output in the last 24 hours. Labs reviewed by me: WBC 15.1. On ABX through ID. Drain flushed with 10 mL NS. IDT notes reviewed.  HPB team  repeating drain fluid amylase. CXR reviewed by me shows left pleural effusion. Patient discussed with hospitalist. Recent images reviewed with pt and daughter at bedside.     Assessment/Plan     Principal Problem:    Intra-abdominal abscess (HCC)  Active Problems:    Acquired hypothyroidism    History of breast cancer    Pulmonary embolism with acute cor pulmonale, unspecified chronicity, unspecified pulmonary embolism type (HCC)    Acute respiratory failure with hypoxia (HCC)    Deep vein thrombosis (DVT) of both lower extremities (HCC)    Atrial thrombus    Primary hypertension    AV block, Mobitz 1    Abdominal infection (HCC)    Thrombocytosis    Pleural effusion    Hypomagnesemia      Plan IR  - Continue to irrigate LUQ 10Fr drain with 10 ml of sterile saline each shift; do not aspirate back per hepatobiliary request  - Fluid cultures positive for MSSA and Streptococcus agalactiae group B  - pleural fluid from 09/24 with NGTD  - Anticipate repeat thoracentesis given recurrent ascites.   - Ultimate plan is for patient to discharge with drain in place and follow-up with surgical oncology for drain management/care  - Continue to monitor drain, VS, and labs   -   -Thank you for allowing Interventional Radiology team to participate in the patients care, if any additional care or requests are needed in the future please do not hesitate to call or place IR order           Review of Systems  Physical Exam   Review of Systems   Constitutional:  Positive for malaise/fatigue. Negative for chills and fever.   Respiratory:  Negative for shortness of breath.    Cardiovascular:  Negative for chest pain and palpitations.   Gastrointestinal:  Negative for abdominal pain, nausea and vomiting.   Neurological:  Negative for weakness.      Vitals:    09/27/24 0711   BP: 123/64   Pulse: 96   Resp: (!) 24   Temp: 36.7 °C (98.1 °F)   SpO2: 90%        Physical Exam  Constitutional:       Appearance: Normal appearance.   Cardiovascular:       Rate and Rhythm: Normal rate.   Pulmonary:      Effort: Pulmonary effort is normal. No respiratory distress.   Abdominal:      Palpations: Abdomen is soft.      Comments: IR drain to LUQ   Skin:     General: Skin is warm and dry.   Neurological:      General: No focal deficit present.      Mental Status: She is alert and oriented to person, place, and time.   Psychiatric:         Mood and Affect: Mood normal.         Behavior: Behavior normal.             Labs    Recent Labs     09/25/24  1029 09/26/24  0653 09/27/24  0028   WBC 16.0* 14.7* 15.1*   RBC 4.63 4.51 4.48   HEMOGLOBIN 13.8 13.4 13.3   HEMATOCRIT 42.8 41.3 41.7   MCV 92.4 91.6 93.1   MCH 29.8 29.7 29.7   MCHC 32.2 32.4 31.9*   RDW 48.2 48.4 48.4   PLATELETCT 580* 595* 619*   MPV 9.0 8.9* 8.9*     Recent Labs     09/25/24  1029 09/27/24  0028   SODIUM 136 134*   POTASSIUM 4.3 4.4   CHLORIDE 102 102   CO2 24 21   GLUCOSE 155* 109*   BUN 10 10   CREATININE 0.74 0.66   CALCIUM 8.3* 8.7     Recent Labs     09/25/24  1029 09/27/24  0028   CREATININE 0.74 0.66     US-THORACENTESIS PUNCTURE LEFT   Final Result      1. Ultrasound guided left sided diagnostic and therapeutic thoracentesis.      2. 1,150 mL of fluid withdrawn.      DX-CHEST-PORTABLE (1 VIEW)   Final Result      Moderate left pleural effusion and associated atelectasis versus consolidation. It is similar in size to 8/15/2024 radiograph.         IR-DRAINAGE-CATH EXCHANGE   Final Result      Small residual fluid collection with drainage catheter at the peripheral aspect.      CT-ABDOMEN-PELVIS WITH   Final Result      1.  Decreased size of the fluid collection in the distal pancreatectomy-lumpectomy bed following tube placement   2.  Increased LEFT pleural effusion with overlying atelectasis   3.  Hiatal hernia   4.  Persistently thickened endometrium for age. Underlying mass is possible. Ultrasound could better assess.      CT-IMAGE-GUIDED DRAIN PERITONEAL   Final Result      1.  CT GUIDED  "PERITONEAL LEFT UPPER QUADRANT ABDOMINAL ABSCESS DRAINAGE. PLACEMENT OF 10 [place] LOCKING LOOP CATHETER.   2.  THE CURRENT PLAN IS TO IRRIGATE ONCE DAILY WITH 5 ML STERILE SALINE, CHECK CULTURES, MONITOR DRAINAGE OUTPUT AND OBTAIN A FOLLOW-UP CT SCAN IN 5-7 DAYS IF CLINICALLY INDICATED.      CT-ABDOMEN-PELVIS WITH   Final Result      1.  Post distal pancreatectomy.      2.  Fluid collection with some peripheral enhancement is identified in the distal pancreatic region including the region of previous pancreatectomy. Developing pseudocyst is a possibility. No emphysema or hemorrhage is appreciated. Remaining pancreas    enhances normally.      3.  There is cholelithiasis.      4.  Left pleural effusion and consolidations in the left lung base.      5.  No change in hiatal hernia.      6.  Prominent endometrial cavity again noted.      DX-CHEST-2 VIEWS    (Results Pending)     INR   Date Value Ref Range Status   09/19/2024 1.36 (H) 0.87 - 1.13 Final     Comment:     INR - Non-therapeutic Reference Range: 0.87-1.13  INR - Therapeutic Reference Range: 2.0-4.0       No results found for: \"POCINR\"     Intake/Output Summary (Last 24 hours) at 9/23/2024 1223  Last data filed at 9/23/2024 0710  Gross per 24 hour   Intake 100 ml   Output 25 ml   Net 75 ml      I have personally reviewed the above labs and imaging      I have performed a physical exam and reviewed and updated ROS and Plan today (9/27/2024).     43 minutes in directly providing and coordinating care and extensive data review.  No time overlap and excludes procedures.  "

## 2024-09-27 NOTE — PROGRESS NOTES
Date of Service  September 27, 2024     Chief Complaint  Sent by MD (Sent by surgeon for elevated WBC. Pt reports abdominal drain removed last week and is concerned for infection at the site. Denies pain. )     Surgery  IR drain on 9/19/24 by Dr Ruff  US Thoracentesis on 9/24/24 by Jolie RITTER    Hospital Course  POD# 8     Interval Problem Update  No acute events overnight  Minimal output from the YEMI drain 50 mL, pancreatic appearing  Leukocytosis same WBC 15.1 from 14.7, afebrile, Unasyn  Tolerating diet, denies N/V    Problem List  Principal Problem:    Intra-abdominal abscess (HCC) (POA: Yes)  Active Problems:    Acquired hypothyroidism (POA: Yes)    History of breast cancer (POA: Yes)    Pulmonary embolism with acute cor pulmonale, unspecified chronicity, unspecified pulmonary embolism type (HCC) (POA: Yes)    Acute respiratory failure with hypoxia (HCC) (POA: Yes)    Deep vein thrombosis (DVT) of both lower extremities (HCC) (POA: Yes)    Atrial thrombus (POA: Yes)    Primary hypertension (POA: Yes)    AV block, Mobitz 1 (POA: Yes)    Abdominal infection (HCC) (POA: Yes)    Thrombocytosis (POA: Yes)    Pleural effusion (POA: Yes)    Hypomagnesemia (POA: Yes)  Resolved Problems:    Sepsis (HCC) (POA: Yes)     Subjective  Review of Systems   Constitutional:  Negative for chills, malaise/fatigue and weight loss.   Respiratory:  Negative for cough and shortness of breath.    Cardiovascular:  Negative for chest pain.   Gastrointestinal:  Negative for abdominal pain, constipation, diarrhea, nausea and vomiting.       Objective  Temp:  [36.5 °C (97.7 °F)-37 °C (98.6 °F)] 36.7 °C (98.1 °F)  Pulse:  [77-96] 96  Resp:  [17-24] 24  BP: (122-124)/(64-71) 123/64  SpO2:  [90 %-92 %] 90 %      Physical Exam  Vitals and nursing note reviewed.   Constitutional:       General: She is not in acute distress.     Appearance: Normal appearance.   HENT:      Head: Normocephalic and atraumatic.      Nose: Nose normal.       Mouth/Throat:      Pharynx: Oropharynx is clear.   Eyes:      Conjunctiva/sclera: Conjunctivae normal.   Cardiovascular:      Rate and Rhythm: Normal rate.   Pulmonary:      Effort: Pulmonary effort is normal. No respiratory distress.      Breath sounds: Normal breath sounds.   Abdominal:      General: Abdomen is flat. There is no distension.      Tenderness: There is no abdominal tenderness.      Comments: LUQ IR drain   Skin:     General: Skin is warm and dry.   Neurological:      Mental Status: She is alert and oriented to person, place, and time.   Psychiatric:         Mood and Affect: Mood normal.         Behavior: Behavior normal.        Fluids    Intake/Output Summary (Last 24 hours) at 9/27/2024 0801  Last data filed at 9/27/2024 0716  Gross per 24 hour   Intake 200 ml   Output 30 ml   Net 170 ml       Labs  Lab Results   Component Value Date/Time    SODIUM 134 (L) 09/27/2024 12:28 AM    POTASSIUM 4.4 09/27/2024 12:28 AM    CHLORIDE 102 09/27/2024 12:28 AM    CO2 21 09/27/2024 12:28 AM    GLUCOSE 109 (H) 09/27/2024 12:28 AM    BUN 10 09/27/2024 12:28 AM    CREATININE 0.66 09/27/2024 12:28 AM         Lab Results   Component Value Date/Time    PROTHROMBTM 16.8 (H) 09/19/2024 12:33 AM    INR 1.36 (H) 09/19/2024 12:33 AM         Lab Results   Component Value Date/Time    WBC 15.1 (H) 09/27/2024 12:28 AM    RBC 4.48 09/27/2024 12:28 AM    HEMOGLOBIN 13.3 09/27/2024 12:28 AM    HEMATOCRIT 41.7 09/27/2024 12:28 AM    MCV 93.1 09/27/2024 12:28 AM    MCH 29.7 09/27/2024 12:28 AM    MCHC 31.9 (L) 09/27/2024 12:28 AM    MPV 8.9 (L) 09/27/2024 12:28 AM    NEUTSPOLYS 77.70 (H) 09/24/2024 12:04 AM    LYMPHOCYTES 9.20 (L) 09/24/2024 12:04 AM    MONOCYTES 10.60 09/24/2024 12:04 AM    EOSINOPHILS 0.40 09/24/2024 12:04 AM    BASOPHILS 0.20 09/24/2024 12:04 AM    ANISOCYTOSIS 1+ 08/15/2024 03:30 AM           Imaging  CT A/P (9/23/24)  IMPRESSION:  1.  Decreased size of the fluid collection in the distal  pancreatectomy-lumpectomy bed following tube placement  2.  Increased LEFT pleural effusion with overlying atelectasis  3.  Hiatal hernia  4.  Persistently thickened endometrium for age. Underlying mass is possible. Ultrasound could better assess.    CT A/P (9/17/24)  IMPRESSION:  1.  Post distal pancreatectomy.     2.  Fluid collection with some peripheral enhancement is identified in the distal pancreatic region including the region of previous pancreatectomy. Developing pseudocyst is a possibility. No emphysema or hemorrhage is appreciated. Remaining pancreas   enhances normally.     3.  There is cholelithiasis.     4.  Left pleural effusion and consolidations in the left lung base.     5.  No change in hiatal hernia.     6.  Prominent endometrial cavity again noted.     Assessment/Plan  Patient is a 78F with recent pancreatectomy and splenectomy, subsequent retroperitoneal abscess with drain placement. Now admitted from ED with leukocytosis WBC 23.8 noted to have recurrence of fluid collection in the distal pancreatic region. Repeat CT noted left pleural effusion     Katelynn-pancreatic fluid collection, s/p distal pancreatectomy / splenectomy  - Continue IR drain  - Continue antibiotics  - Diet as tolerated  - Flush of IR drain per protocol, IR following    2. Leukocytosis, WBC 15.1  - Antibiotics per ID  - Daily CBC      3. Pleural effusion, s/p US thoracentesis completed on 9/24/24  - CXR - 2 view today    LATONIA La.

## 2024-09-28 LAB
AMYLASE FLD-CCNC: 24 U/L
ANION GAP SERPL CALC-SCNC: 10 MMOL/L (ref 7–16)
BODY FLD TYPE: NORMAL
BUN SERPL-MCNC: 10 MG/DL (ref 8–22)
CALCIUM SERPL-MCNC: 7.9 MG/DL (ref 8.5–10.5)
CHLORIDE SERPL-SCNC: 102 MMOL/L (ref 96–112)
CO2 SERPL-SCNC: 23 MMOL/L (ref 20–33)
CREAT SERPL-MCNC: 0.54 MG/DL (ref 0.5–1.4)
ERYTHROCYTE [DISTWIDTH] IN BLOOD BY AUTOMATED COUNT: 46.5 FL (ref 35.9–50)
GFR SERPLBLD CREATININE-BSD FMLA CKD-EPI: 94 ML/MIN/1.73 M 2
GLUCOSE SERPL-MCNC: 105 MG/DL (ref 65–99)
HCT VFR BLD AUTO: 39.7 % (ref 37–47)
HGB BLD-MCNC: 12.9 G/DL (ref 12–16)
MAGNESIUM SERPL-MCNC: 2.1 MG/DL (ref 1.5–2.5)
MCH RBC QN AUTO: 29.1 PG (ref 27–33)
MCHC RBC AUTO-ENTMCNC: 32.5 G/DL (ref 32.2–35.5)
MCV RBC AUTO: 89.6 FL (ref 81.4–97.8)
PLATELET # BLD AUTO: 622 K/UL (ref 164–446)
PMV BLD AUTO: 8.9 FL (ref 9–12.9)
POTASSIUM SERPL-SCNC: 4 MMOL/L (ref 3.6–5.5)
RBC # BLD AUTO: 4.43 M/UL (ref 4.2–5.4)
SODIUM SERPL-SCNC: 135 MMOL/L (ref 135–145)
WBC # BLD AUTO: 13.7 K/UL (ref 4.8–10.8)

## 2024-09-28 PROCEDURE — 770001 HCHG ROOM/CARE - MED/SURG/GYN PRIV*

## 2024-09-28 PROCEDURE — 99233 SBSQ HOSP IP/OBS HIGH 50: CPT | Performed by: INTERNAL MEDICINE

## 2024-09-28 PROCEDURE — 85027 COMPLETE CBC AUTOMATED: CPT

## 2024-09-28 PROCEDURE — 36415 COLL VENOUS BLD VENIPUNCTURE: CPT

## 2024-09-28 PROCEDURE — A9270 NON-COVERED ITEM OR SERVICE: HCPCS | Mod: JZ

## 2024-09-28 PROCEDURE — 700102 HCHG RX REV CODE 250 W/ 637 OVERRIDE(OP): Performed by: HOSPITALIST

## 2024-09-28 PROCEDURE — 700102 HCHG RX REV CODE 250 W/ 637 OVERRIDE(OP)

## 2024-09-28 PROCEDURE — 700105 HCHG RX REV CODE 258: Performed by: INTERNAL MEDICINE

## 2024-09-28 PROCEDURE — 99232 SBSQ HOSP IP/OBS MODERATE 35: CPT | Performed by: FAMILY MEDICINE

## 2024-09-28 PROCEDURE — 700111 HCHG RX REV CODE 636 W/ 250 OVERRIDE (IP): Mod: JZ | Performed by: INTERNAL MEDICINE

## 2024-09-28 PROCEDURE — A9270 NON-COVERED ITEM OR SERVICE: HCPCS

## 2024-09-28 PROCEDURE — 700101 HCHG RX REV CODE 250: Performed by: NURSE PRACTITIONER

## 2024-09-28 PROCEDURE — 700102 HCHG RX REV CODE 250 W/ 637 OVERRIDE(OP): Mod: JZ

## 2024-09-28 PROCEDURE — 83735 ASSAY OF MAGNESIUM: CPT

## 2024-09-28 PROCEDURE — 80048 BASIC METABOLIC PNL TOTAL CA: CPT

## 2024-09-28 PROCEDURE — A9270 NON-COVERED ITEM OR SERVICE: HCPCS | Performed by: HOSPITALIST

## 2024-09-28 RX ADMIN — AMPICILLIN AND SULBACTAM 3 G: 1; 2 INJECTION, POWDER, FOR SOLUTION INTRAMUSCULAR; INTRAVENOUS at 14:01

## 2024-09-28 RX ADMIN — LEVOTHYROXINE SODIUM 100 MCG: 0.1 TABLET ORAL at 05:15

## 2024-09-28 RX ADMIN — SODIUM CHLORIDE, PRESERVATIVE FREE 10 ML: 5 INJECTION INTRAVENOUS at 18:11

## 2024-09-28 RX ADMIN — LIOTHYRONINE SODIUM 2.5 MCG: 5 TABLET ORAL at 05:14

## 2024-09-28 RX ADMIN — AMLODIPINE BESYLATE 5 MG: 10 TABLET ORAL at 05:15

## 2024-09-28 RX ADMIN — AMPICILLIN AND SULBACTAM 3 G: 1; 2 INJECTION, POWDER, FOR SOLUTION INTRAMUSCULAR; INTRAVENOUS at 09:02

## 2024-09-28 RX ADMIN — AMPICILLIN AND SULBACTAM 3 G: 1; 2 INJECTION, POWDER, FOR SOLUTION INTRAMUSCULAR; INTRAVENOUS at 02:13

## 2024-09-28 RX ADMIN — AMPICILLIN AND SULBACTAM 3 G: 1; 2 INJECTION, POWDER, FOR SOLUTION INTRAMUSCULAR; INTRAVENOUS at 20:04

## 2024-09-28 RX ADMIN — APIXABAN 5 MG: 5 TABLET, FILM COATED ORAL at 05:15

## 2024-09-28 RX ADMIN — APIXABAN 5 MG: 5 TABLET, FILM COATED ORAL at 18:11

## 2024-09-28 RX ADMIN — SODIUM CHLORIDE, PRESERVATIVE FREE 10 ML: 5 INJECTION INTRAVENOUS at 05:15

## 2024-09-28 ASSESSMENT — ENCOUNTER SYMPTOMS
COUGH: 0
WEAKNESS: 0
FOCAL WEAKNESS: 0
SORE THROAT: 0
SPEECH CHANGE: 0
CHILLS: 0
PALPITATIONS: 0
VOMITING: 0
DIZZINESS: 0
NERVOUS/ANXIOUS: 1
HEARTBURN: 0
DIAPHORESIS: 0
WHEEZING: 0
SHORTNESS OF BREATH: 0
MYALGIAS: 0
ABDOMINAL PAIN: 1
NECK PAIN: 0
ABDOMINAL PAIN: 0
WEAKNESS: 1
NAUSEA: 0
DIARRHEA: 0
FEVER: 0
BLURRED VISION: 0
BACK PAIN: 0
HEADACHES: 0
SENSORY CHANGE: 0
FLANK PAIN: 0

## 2024-09-28 ASSESSMENT — PAIN DESCRIPTION - PAIN TYPE
TYPE: ACUTE PAIN

## 2024-09-28 NOTE — PROGRESS NOTES
Bloodless program consent has been signed by patient. Bloodless wrist band applied to left wrist. Bloodless has been listed in patient's allergies. Dr. Chavez is aware. Advance directive is not currently available; per patient her copy is at home and no one can bring it to her at this time.

## 2024-09-28 NOTE — PROGRESS NOTES
Infectious Disease Progress Note    Author: Bao Rooney M.D. Date & Time of service: 2024  10:16 AM    Chief Complaint:  Follow-up for abdominal abscess    Interval History:   Tmax 100.3 yesterday evening, white count down to 8.7, tolerated switch to Unasyn, culture results as below, creatinine 0.53, normal transaminases   Tmax 99.1, cultures as below, no new labs this morning, repeat imaging.   patient remains afebrile, white count is 14.2 today and patient feeling somewhat ill compared to yesterday, tolerating Unasyn, creatinine 0.53, normal transaminases, cultures as below   patient remains afebrile, white count 17.9, CT scan with improvement in size of the abscess, measuring 5.2 x 2.4 cm, but the tube is not in the bulk of the fluid component, current plan is for upsize.  Also noted worsening left-sided pleural effusion, plan is for thoracentesis   patient remains afebrile, white count fluctuating but down some to 14.7 today, underwent thoracentesis on  with 3500 white cells, 66% polys, negative Gram stain and cultures thus far   patient remains afebrile, white count is down to 13.7 today, cultures as below, creatinine 0.54, magnesium 2.1, Fluid amylase was sent, presumably from the IR drain, measured at 24, feeling better overall today    Labs Reviewed and Medications Reviewed.    Review of Systems:  Review of Systems   Constitutional:  Negative for chills and fever.   Skin:  Negative for itching and rash.     Hemodynamics:  Temp (24hrs), Av.7 °C (98.1 °F), Min:36.5 °C (97.7 °F), Max:37.1 °C (98.8 °F)  Temperature: 36.7 °C (98.1 °F)  Pulse  Av.6  Min: 43  Max: 129   Blood Pressure : 115/66       Physical Exam:  Physical Exam  Vitals and nursing note reviewed.   Constitutional:       General: She is not in acute distress.     Appearance: She is not ill-appearing.   Eyes:      Conjunctiva/sclera: Conjunctivae normal.   Cardiovascular:      Rate and Rhythm: Normal  rate.   Pulmonary:      Effort: Pulmonary effort is normal. No respiratory distress.      Breath sounds: No stridor.   Abdominal:      General: There is no distension.      Comments: IR drain in place with moderate amount of purulent output in bulb   Musculoskeletal:         General: No swelling or tenderness.   Skin:     Findings: No erythema or rash.   Neurological:      General: No focal deficit present.      Mental Status: She is alert.   Psychiatric:         Mood and Affect: Mood normal.         Behavior: Behavior normal.         Meds:    Current Facility-Administered Medications:     cyclobenzaprine    lidocaine    apixaban    ampicillin-sulbactam (UNASYN) IV    normal saline flush    liothyronine **AND** liothyronine    potassium chloride SA    acetaminophen    senna-docusate **AND** polyethylene glycol/lytes    amLODIPine    levothyroxine    Labs:  Recent Labs     09/26/24  0653 09/27/24  0028 09/28/24  0641   WBC 14.7* 15.1* 13.7*   RBC 4.51 4.48 4.43   HEMOGLOBIN 13.4 13.3 12.9   HEMATOCRIT 41.3 41.7 39.7   MCV 91.6 93.1 89.6   MCH 29.7 29.7 29.1   RDW 48.4 48.4 46.5   PLATELETCT 595* 619* 622*   MPV 8.9* 8.9* 8.9*     Recent Labs     09/25/24  1029 09/27/24  0028 09/28/24  0641   SODIUM 136 134* 135   POTASSIUM 4.3 4.4 4.0   CHLORIDE 102 102 102   CO2 24 21 23   GLUCOSE 155* 109* 105*   BUN 10 10 10     Recent Labs     09/25/24  1029 09/27/24  0028 09/28/24  0641   CREATININE 0.74 0.66 0.54       Imaging:  CT-IMAGE-GUIDED DRAIN PERITONEAL    Result Date: 9/19/2024 9/19/2024 9:41 AM HISTORY/REASON FOR EXAM:  Pancreatic abscess drain placement; Anterior LUQ. History of distal pancreas resection. Distal pancreatectomy. Previous catheter drainage for left upper quadrant collection 8/22/2024. Recurrent or residual collection seen on recent CT 9/17/2024 TECHNIQUE/EXAM DESCRIPTION: Left upper quadrant abdominal  abscess drainage with CT guidance. Low dose optimization technique was utilized for this CT exam  including automated exposure control and adjustment of the mA and/or kV according to patient size. ALL ELEMENTS OF MAXIMAL STERILE BARRIER TECHNIQUE WERE FOLLOWED. SEDATION TIME: 30 minutes. Moderate sedation was administered with fentanyl and Versed with continuous patient monitoring by the interventional radiology nurse. PROCEDURE: Informed consent was obtained. Localizing CT images were obtained with the patient in supine position. The skin was prepped with ChloraPrep and draped in a sterile fashion. Moderate sedation was provided. Pulse oximetry and continuous cardiac monitoring by the nurse was performed throughout the exam. Intraservice time was 30 minutes. Following local anesthesia with 1% Lidocaine, a 17 G guiding needle was placed and needle path confirmed with CT. An Amplatz guidewire was placed and following serial tract dilatation, a 10 [place] pigtail locking catheter was placed. A specimen was collected and submitted for culture and sensitivity and Gram stain. Total of 40 mL yellowish-greenish pus was drained. The catheter was secured to the skin and connected to suction bulb drainage. Final CT images were obtained documenting catheter position. The patient tolerated the procedure well with no evidence of complication. COMPARISON: CT of the abdomen and pelvis 9/17/2024, CT-guided left upper quadrant catheter drainage 8/22/2024 FINDINGS: The final CT images show satisfactory catheter position within the target collection.     1.  CT GUIDED PERITONEAL LEFT UPPER QUADRANT ABDOMINAL ABSCESS DRAINAGE. PLACEMENT OF 10 [place]ian LOCKING LOOP CATHETER. 2.  THE CURRENT PLAN IS TO IRRIGATE ONCE DAILY WITH 5 ML STERILE SALINE, CHECK CULTURES, MONITOR DRAINAGE OUTPUT AND OBTAIN A FOLLOW-UP CT SCAN IN 5-7 DAYS IF CLINICALLY INDICATED.    CT-ABDOMEN-PELVIS WITH    Result Date: 9/17/2024 9/17/2024 9:29 PM HISTORY/REASON FOR EXAM:  Abdominal pelvic pain. TECHNIQUE/EXAM DESCRIPTION:   CT scan of the abdomen and pelvis  with contrast. Contrast-enhanced helical scanning was obtained from the diaphragmatic domes through the pubic symphysis following the bolus administration of nonionic contrast without complication. 100 mL of Omnipaque 350 nonionic contrast was administered without complication. Low dose optimization technique was utilized for this CT exam including automated exposure control and adjustment of the mA and/or kV according to patient size. COMPARISON: 08/27/2024, 08/20/2024 FINDINGS: Lower Chest: There is left pleural effusion and consolidation in the left lower pulmonary lobe as well as lingular segment left upper lobe.. Hiatal hernia is identified. Liver: Normal. Spleen: Not identified. Pancreas: Post distal pancreatectomy and splenectomy. Fluid collection with irregular shape and some peripheral enhancement distal to the postsurgical site is identified which is multilobulated. Largest component measures approximately 5.7 x 3.0 cm. Fluid extends up to the left hemidiaphragm. Percutaneous drain no longer identified.. Gallbladder: Cholelithiasis. Biliary: Nondilated. Adrenal glands: Normal. Kidneys: Unremarkable without hydronephrosis. Bowel: No obstruction or acute inflammation. Lymph nodes: No adenopathy. Vasculature: Unremarkable. Peritoneum: Unremarkable without ascites. Musculoskeletal: No acute or destructive process. Pelvis: No adenopathy or free fluid. Endometrial cavity appears prominent as on the prior CT.     1.  Post distal pancreatectomy. 2.  Fluid collection with some peripheral enhancement is identified in the distal pancreatic region including the region of previous pancreatectomy. Developing pseudocyst is a possibility. No emphysema or hemorrhage is appreciated. Remaining pancreas enhances normally. 3.  There is cholelithiasis. 4.  Left pleural effusion and consolidations in the left lung base. 5.  No change in hiatal hernia. 6.  Prominent endometrial cavity again noted.    CT-PANCREAS AND ABDOMEN WITH  & W/O    Result Date: 8/27/2024 8/27/2024 6:38 PM HISTORY/REASON FOR EXAM:  s/p distal pancreatectomy and splenectomy with pathology of benign pancreatic serous cystadenoma in a background of chronic pancreatitis, hx LUQ fluid collection s/p aspiration. Evaluate for recurrent fluid collection.. TECHNIQUE/EXAM DESCRIPTION:  CT pancreas and abdomen without and with contrast. Initial precontrast thick-section images were obtained through the pancreas.  Following this, nonionic contrast was administered by IV, and thin-section helical scanning performed from the diaphragmatic domes through the iliac crests.  Pancreatic parenchymal phase and portal venous phase (dual-phase) images were obtained following contrast administration. 100 mL of Omnipaque 350 nonionic contrast was administered. Low dose optimization technique was utilized for this CT exam including automated exposure control and adjustment of the mA and/or kV according to patient size. COMPARISON: 8/22/2024 FINDINGS: Liver: Unremarkable Spleen: Absent Biliary system: Gallbladder shows multiple stones and minimal wall thickening. Common bile duct is not dilated. Pancreas: Prior distal pancreatectomy.  Minimal edema adjacent to surgical site.  No peripancreatic fluid collection demonstrated. Pancreatic duct: There is no pancreatic ductal dilatation. Arterial vasculature: The celiac axis has a normal branching pattern. The SMA is patent. Venous vasculature: Portable and superior mesenteric veins are patent.  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 Vascular encasement: None. Kidneys: Kidneys are unremarkable. Adrenals: Adrenals are unremarkable. Lymph nodes: There are no enlarged nodes. Bowel: No bowel obstruction.  No focal bowel wall thickening.  Sparse colonic diverticula. Lung bases: Small LEFT pleural effusion with associated atelectasis.  Minimal RIGHT pleural fluid. Bones: Degenerative change of lumbar spine.   Prior lumbar spine surgery. LEFT upper quadrant drain in place.  No residual fluid collection demonstrated.  Minimal peritoneal gas.  Postoperative change of the anterior abdominal wall.     1.  Prior distal pancreatectomy and splenectomy.  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 2.  LEFT upper quadrant drain in place with minimal adjacent peritoneal gas.  No significant residual or recurrent fluid collection demonstrated. 3.  Gallstones and minimal gallbladder wall thickening.  Cholecystitis is not excluded. 4.  Bilateral pleural fluid collections with associated atelectasis, worse on the LEFT.     CT-IMAGE-GUIDED DRAIN PERITONEAL    Result Date: 8/23/2024 8/22/2024 3:15 PM HISTORY/REASON FOR EXAM:  LUQ fluid collection, hx distal pancreatectomy and splenectomy; Was on Eliquis, now on Heparin. TECHNIQUE/EXAM DESCRIPTION: Left upper quadrant fluid collection drainage with CT guidance. Low dose optimization technique was utilized for this CT exam including automated exposure control and adjustment of the mA and/or kV according to patient size. Moderate sedation was provided. Pulse oximetry and continuous cardiac monitoring by the nurse was performed throughout the exam. Intraservice time was 10 minutes. PROCEDURE: Informed consent was obtained. Localizing CT images were obtained with the patient in supine position. The procedure was performed using MAXIMAL STERILE BARRIER technique including sterile gown, mask, cap, and donning of sterile gloves following appropriate hand hygiene and/or sterile scrub. Patient skin site was prepped with 2% Chlorhexidine solution. Following local anesthesia with 1% Lidocaine, a 17 G guiding needle was placed and needle path confirmed with CT. An Amplatz guidewire was placed and following serial tract dilatation, a 18 Afghan pigtail locking catheter was placed. A specimen was collected and submitted for culture and sensitivity and Gram  stain. Total of 200 mL thin brown fluid was drained. The catheter was secured to the skin and connected to suction bulb drainage. Final CT images were obtained documenting catheter position. The patient tolerated the procedure well with no evidence of complication. COMPARISON: 8/20/2024 FINDINGS: The final CT images show satisfactory catheter position within the target collection.     1.  CT guided left upper quadrant fluid collection catheter drainage.      Micro:  Results       Procedure Component Value Units Date/Time    Fungal Culture [[id number]] Collected: 09/24/24 1213    Order Status: Completed Specimen: Body Fluid Updated: 09/27/24 0919     Significant Indicator NEG     Source BF     Site Thoracentesis Fluid     Culture Result Culture in progress.     Fungal Smear Results No fungal elements seen.    AFB Culture [[id number]] Collected: 09/24/24 1213    Order Status: Completed Specimen: Body Fluid Updated: 09/27/24 0919     Significant Indicator NEG     Source BF     Site Thoracentesis Fluid     Culture Result Culture in progress.     AFB Smear Results No acid fast bacilli seen.    FLUID CULTURE W/GRAM STAIN [[id number]] Collected: 09/24/24 1213    Order Status: Completed Specimen: Body Fluid Updated: 09/27/24 0919     Significant Indicator NEG     Source BF     Site Thoracentesis Fluid     Culture Result No growth at 72 hours.     Gram Stain Result Few WBCs.  No organisms seen.      GRAM STAIN [[id number]] Collected: 09/24/24 1213    Order Status: Completed Specimen: Body Fluid Updated: 09/25/24 1650     Significant Indicator .     Source BF     Site Thoracentesis Fluid     Gram Stain Result Few WBCs.  No organisms seen.      Fungal Smear [[id number]] Collected: 09/24/24 1213    Order Status: Completed Specimen: Body Fluid Updated: 09/25/24 1650     Significant Indicator NEG     Source BF     Site Thoracentesis Fluid     Fungal Smear Results No fungal elements seen.    Acid Fast Stain [[id number]] Collected:  09/24/24 1213    Order Status: Completed Specimen: Body Fluid Updated: 09/25/24 1650     Significant Indicator NEG     Source BF     Site Thoracentesis Fluid     AFB Smear Results No acid fast bacilli seen.    FLUID CULTURE W/GRAM STAIN [[id number]]     Order Status: No result Specimen: Body Fluid from Thoracentesis Fluid     AFB CULTURE [[id number]]     Order Status: No result Specimen: Body Fluid from Thoracentesis Fluid     FUNGAL CULTURE [[id number]]     Order Status: No result Specimen: Body Fluid from Thoracentesis Fluid     Fungal Culture [[id number]] Collected: 09/19/24 1020    Order Status: Completed Specimen: Wound from Other Body Fluid Updated: 09/23/24 1205     Significant Indicator NEG     Source WND     Site pancreatic abscess     Culture Result No fungal growth to date.     Fungal Smear Results No fungal elements seen.    CULTURE WOUND W/ GRAM STAIN [[id number]]  (Abnormal)  (Susceptibility) Collected: 09/19/24 1020    Order Status: Completed Specimen: Wound Updated: 09/23/24 1205     Significant Indicator POS     Source WND     Site pancreatic abscess     Culture Result -     Gram Stain Result Many WBCs.  Many Gram positive cocci.       Culture Result Staphylococcus aureus  Heavy growth  Methicillin sensitive by screening method        Streptococcus agalactiae (Group B)  Moderate growth      Susceptibility       Staphylococcus aureus (1)       Antibiotic Interpretation Microscan   Method Status    Ampicillin/sulbactam Sensitive <=8/4 mcg/mL RALPH Final    Clindamycin Sensitive <=0.25 mcg/mL RALPH Final    Cefazolin Sensitive <=8 mcg/mL RALPH Final    Cefepime Sensitive <=4 mcg/mL RALPH Final    Daptomycin Sensitive <=0.5 mcg/mL RALPH Final    Erythromycin Sensitive <=0.25 mcg/mL RALPH Final    Oxacillin Sensitive 0.5 mcg/mL RALPH Final    Trimeth/Sulfa Sensitive <=0.5/9.5 mcg/mL RALPH Final    Tetracycline Sensitive <=4 mcg/mL RALPH Final    Vancomycin Sensitive 1 mcg/mL RALPH Final                                Assessment/Hospital course:  This is a 78-year-old female patient with history of left breast carcinoma 34 years ago status post total mastectomy, 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 Cultures at the time grew Staph epidermidis, susceptibilities not performed.  Sent to the ER by surgical oncologist due to leukocytosis on recent labs along with episodes of epigastric pain.  CT abdomen and pelvis noted fluid collection in the distal pancreatic region.  IR drain placed on 9/19, 40 mL of purulent fluid was noted.  Cultures are growing MSSA and group B Strep.      Pertinent Diagnoses:  Intra-abdominal abscess  Polymicrobial infection  Left pleural effusion  Pancreatic cystadenoma status post recent pancreatectomy, splenectomy, stomach and celiac node dissection 8/5/2024  Recurrent breast cancer, on tamoxifen  History of PE    Plan:  -Continue IV Unasyn 3 g every 6 hours  -IR on board.  Repeat CT scan 9/23 due to new leukocytosis, notes slight improvement in size of the abscess, underwent abscessogram, drain position appropriate, no obvious fistulous communication noted  -Also noted worsening left-sided pleural effusion, underwent thoracentesis 9/24, 3500 white cells, 66% neutrophils, Gram stain and cultures negative thus far.  We will follow along  -Trend white count for the next 24 to 48 hours.  She is on appropriate antibiotics at this time, continued purulent output and failure should raise concern for ongoing transit of enteric/pancreaticobiliary contents  -Anticipate repeat imaging in the coming days depending on IR drain output, improved today    Disposition:  Anticipate no ID specific disposition needs.  On discharge, can switch to p.o. Augmentin 875 mg twice daily for likely an additional 2 to 4 weeks, likely with drain remaining in place and continued outpatient follow-up for drain management  Need for PICC line: Likely no    Discussed with IR and Dr. Chavez.  ID will follow.  This illness poses a threat to life

## 2024-09-28 NOTE — CARE PLAN
The patient is Stable - Low risk of patient condition declining or worsening    Shift Goals  Clinical Goals: continue antibiotic regimen, drain management, patient comfort  Patient Goals: rest  Family Goals: updates on POC    Progress made toward(s) clinical / shift goals:  Patient continues to tolerate current antibiotic regimen. IR drain remains compressed to suction. Patient ambulates comfortably without requiring assistance.     Patient is not progressing towards the following goals: Pending DC clearance. Pending repeat CT.

## 2024-09-28 NOTE — PROGRESS NOTES
Date of Service  September 28, 2024     Chief Complaint  Sent by MD (Sent by surgeon for elevated WBC. Pt reports abdominal drain removed last week and is concerned for infection at the site. Denies pain. )       Interval Problem Update  Repeat thoracentesis yesterday for 600 mL fluid, cultures pending.   Feels subjectively about the same,  thinks she is breathing and talking better.  Drain 23    Problem List  Principal Problem:    Intra-abdominal abscess (HCC) (POA: Yes)  Active Problems:    History of breast cancer (POA: Yes)    Acquired hypothyroidism (POA: Yes)    Pulmonary embolism with acute cor pulmonale, unspecified chronicity, unspecified pulmonary embolism type (HCC) (POA: Yes)    Acute respiratory failure with hypoxia (HCC) (POA: Yes)    Deep vein thrombosis (DVT) of both lower extremities (HCC) (POA: Yes)    Atrial thrombus (POA: Yes)    Primary hypertension (POA: Yes)    AV block, Mobitz 1 (POA: Yes)    Abdominal infection (HCC) (POA: Yes)    Thrombocytosis (POA: Yes)    Pleural effusion (POA: Yes)    Hypomagnesemia (POA: Yes)  Resolved Problems:    Sepsis (HCC) (POA: Yes)     Objective  Temp:  [36.5 °C (97.7 °F)-37.1 °C (98.8 °F)] 36.7 °C (98.1 °F)  Pulse:  [] 75  Resp:  [20-24] 20  BP: (114-124)/(61-86) 115/66  SpO2:  [92 %-94 %] 92 %      NAD  Breathing comfortably on O2  Abdomen soft  YEMI whitish, scant    Fluids    Intake/Output Summary (Last 24 hours) at 9/28/2024 0740  Last data filed at 9/28/2024 0700  Gross per 24 hour   Intake 120 ml   Output 31 ml   Net 89 ml         Labs  Lab Results   Component Value Date/Time    SODIUM 135 09/28/2024 06:41 AM    POTASSIUM 4.0 09/28/2024 06:41 AM    CHLORIDE 102 09/28/2024 06:41 AM    CO2 23 09/28/2024 06:41 AM    GLUCOSE 105 (H) 09/28/2024 06:41 AM    BUN 10 09/28/2024 06:41 AM    CREATININE 0.54 09/28/2024 06:41 AM         Lab Results   Component Value Date/Time    PROTHROMBTM 16.8 (H) 09/19/2024 12:33 AM    INR 1.36 (H) 09/19/2024  12:33 AM         Lab Results   Component Value Date/Time    WBC 13.7 (H) 09/28/2024 06:41 AM    RBC 4.43 09/28/2024 06:41 AM    HEMOGLOBIN 12.9 09/28/2024 06:41 AM    HEMATOCRIT 39.7 09/28/2024 06:41 AM    MCV 89.6 09/28/2024 06:41 AM    MCH 29.1 09/28/2024 06:41 AM    MCHC 32.5 09/28/2024 06:41 AM    MPV 8.9 (L) 09/28/2024 06:41 AM    NEUTSPOLYS 77.70 (H) 09/24/2024 12:04 AM    LYMPHOCYTES 9.20 (L) 09/24/2024 12:04 AM    MONOCYTES 10.60 09/24/2024 12:04 AM    EOSINOPHILS 0.40 09/24/2024 12:04 AM    BASOPHILS 0.20 09/24/2024 12:04 AM    ANISOCYTOSIS 1+ 08/15/2024 03:30 AM        Assessment/Plan    Patient is a 78F with recent pancreatectomy and splenectomy, subsequent retroperitoneal abscess with drain placement. Now admitted from ED with leukocytosis WBC 23.8 noted to have recurrence of fluid collection in the distal pancreatic region.      Katelynn-pancreatic fluid collection, s/p distal pancreatectomy / splenectomy  - Continue IR drain  - Continue antibiotics  - Diet as tolerated  - Flush of IR drain per protocol, IR following     2. Leukocytosis, WBC down to 13  - Antibiotics per ID  - Daily CBC      3. Pleural effusion, s/p US thoracentesis completed on 9/24/24, repeat on 9/27  - follow fluid cultures  - CXR tomorrow to follow

## 2024-09-28 NOTE — CARE PLAN
The patient is Stable - Low risk of patient condition declining or worsening    Shift Goals  Clinical Goals: Abx, drian management, and rest  Patient Goals: rest  Family Goals: updates on POC    Progress made toward(s) clinical / shift goals:  Abx administered as ordered. Intermittently monitoring drain and flushing as ordered. Pt slept intermittently throughout shift.       Problem: Knowledge Deficit  Goal: Knowledge of disease process/condition, treatment plan, diagnostic tests, and medications will improve  Outcome: Progressing     Problem: Hemodynamics  Goal: Patient's hemodynamics, fluid balance and neurologic status will be stable or improve  Outcome: Progressing

## 2024-09-28 NOTE — PROGRESS NOTES
Radiology Progress Note   Author: SOUMYA Avelar Date & Time created: 9/28/2024  12:18 PM   Date of admission  9/17/2024  Note to reader: this note follows the APSO format rather than the historical SOAP format. Assessment and plan located at the top of the note for ease of use.    Chief Complaint  78 y.o. female admitted 9/17/2024 with   Chief Complaint   Patient presents with    Sent by MD     Sent by surgeon for elevated WBC. Pt reports abdominal drain removed last week and is concerned for infection at the site. Denies pain.          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 CT A/P showed fluid collection in distal pancreatic region. IR consulted and pt underwent a 10 [place] citizen left peritoneal upper quadrant abdominal abscess drain placement with IR Dr Ruff on  09/19/2024. 40 ML of purulent fluid removed and sent to lab.      Interval History:   09/20/2024-10 [place] citizen left upper quadrant peritoneal drain to bulb suction with 82 mL purulent  output in the last 24 hours.  I flushed drain with 10 mL of sterile NSS  I reviewed today's labs: WBC 17.3; H&H 12.4/38.5, Cr 0.70; Coags are 1.36,  Micro- abscess drainage from 09/19 positive for staphylococcal aureus; urine from 9/18 positive for yeast. I discussed drain care with pt  at bedside     09/21/2024-10 [place] citizen left upper quadrant peritoneal drain to bulb suction with 25 mL purulent  output in the last 24 hours.  I flushed drain with 10 mL of sterile NSS  I reviewed today's labs: WBC 8.7; H&H 14.5/46.4, Cr 0.53; Coags are 1.36,  Micro- abscess  drainage from 09/19 + for many GPC; urine from 9/18 + for yeast. I discussed drain care/with pt  at bedside, I reviewed drain flushing with patient and patient , all questions answered.      09/22/2024- LUQ 10 Welsh peritoneal drain to bulb suction with 35 mL purulent  output in the last 24 hours.  I flushed drain with 10 mL of sterile NSS  I reviewed most recent labs: WBC 8.7; H&H 14.5/46.4, Cr 0.53; Coags are 1.36,  Micro- abscess drainage from 09/19 + for MSSA, Streptococcus agalactiae group B; urine from 9/18 + for Candida albicans I discussed plan of care with infectious disease, and patient.  IDT notes reviewed.    09/23/24 - 10 Fr peritoneal drain to LUQ with 25 mL purulent output in the last 24 hours. Labs reviewed by me: WBC 14.2, creatinine 0.67. On ABX through ID. Drain flushed with 10 mL NS. IDT notes reviewed. Discussed with hospitalist; repeat CT pending for elevated white count.  at bedside and all questions answered.     09/24/24 - Repeat CT shows decrease in size of fluid collection. Abscessogram ordered which shows small fluid collection with drain in appropriate position.     09/25/24 - 10 Fr peritoneal drain to LUQ with 10 mL seropurulent output in the last 24 hours. Labs reviewed by me: WBC 16.0. On ABX through ID. Drain flushed with 10 mL NS. IDT notes reviewed.  Abscessogram results reviewed with patient.  Patient voiced frustration with fluctuating white blood cell count.    09/26/2024 - 10 Fr peritoneal drain to LUQ with 10 mL seropurulent output in the last 24 hours. Labs reviewed by me: WBC 14.7. On ABX through ID. Drain flushed with 10 mL NS. IDT notes reviewed.  Joint visit made with Dr. Rooney Infectious disease.  Patient discussed with hospitalist and HPB team.     09/27/24 - 10 Fr peritoneal drain to LUQ with 50 mL seropurulent output in the last 24 hours. Labs reviewed by me: WBC 15.1. On ABX through ID. Drain flushed with 10 mL NS. IDT notes reviewed.  HPB team  repeating drain fluid amylase. CXR reviewed by me shows left pleural effusion. Patient discussed with hospitalist. Recent images reviewed with pt and daughter at bedside.     09/28/24 - 10 Fr peritoneal drain to LUQ with 23 mL seropurulent output in the last 24 hours. Labs reviewed by me: WBC 13.7, fluid amylase 24. On ABX through ID. Drain flushed with 10 mL NS. IDT notes reviewed.  CXR post thoracentesis yesterday shows decrease in size of left pleural effusion. Patient discussed with hospitalist. Family at bedside.  All questions answered.    Assessment/Plan     Principal Problem:    Intra-abdominal abscess (HCC)  Active Problems:    Acquired hypothyroidism    History of breast cancer    Pulmonary embolism with acute cor pulmonale, unspecified chronicity, unspecified pulmonary embolism type (HCC)    Acute respiratory failure with hypoxia (HCC)    Deep vein thrombosis (DVT) of both lower extremities (HCC)    Atrial thrombus    Primary hypertension    AV block, Mobitz 1    Abdominal infection (HCC)    Thrombocytosis    Pleural effusion    Hypomagnesemia      Plan IR  - Continue to irrigate LUQ 10 Fr drain with 10 ml of sterile saline each shift; do not aspirate back per hepatobiliary request  - Fluid cultures positive for MSSA and Streptococcus agalactiae group B  - pleural fluid from 09/24 with NGTD  - Ultimate plan is for patient to discharge with drain in place and follow-up with surgical oncology for drain management/care  - Continue to monitor drain, VS, and labs   -   -Thank you for allowing Interventional Radiology team to participate in the patients care, if any additional care or requests are needed in the future please do not hesitate to call or place IR order           Review of Systems  Physical Exam   Review of Systems   Constitutional:  Positive for malaise/fatigue. Negative for chills and fever.   Respiratory:  Negative for shortness of breath.    Cardiovascular:  Negative for chest pain and  palpitations.   Gastrointestinal:  Negative for abdominal pain, nausea and vomiting.   Neurological:  Negative for weakness.      Vitals:    09/28/24 0658   BP: 115/66   Pulse: 75   Resp: 20   Temp: 36.7 °C (98.1 °F)   SpO2: 92%        Physical Exam  Constitutional:       Appearance: Normal appearance.   Cardiovascular:      Rate and Rhythm: Normal rate.   Pulmonary:      Effort: Pulmonary effort is normal. No respiratory distress.   Abdominal:      General: There is no distension.      Comments: IR drain to LUQ   Skin:     General: Skin is warm and dry.   Neurological:      General: No focal deficit present.      Mental Status: She is alert and oriented to person, place, and time.   Psychiatric:         Mood and Affect: Mood normal.         Behavior: Behavior normal.             Labs    Recent Labs     09/26/24  0653 09/27/24  0028 09/28/24  0641   WBC 14.7* 15.1* 13.7*   RBC 4.51 4.48 4.43   HEMOGLOBIN 13.4 13.3 12.9   HEMATOCRIT 41.3 41.7 39.7   MCV 91.6 93.1 89.6   MCH 29.7 29.7 29.1   MCHC 32.4 31.9* 32.5   RDW 48.4 48.4 46.5   PLATELETCT 595* 619* 622*   MPV 8.9* 8.9* 8.9*     Recent Labs     09/27/24  0028 09/28/24  0641   SODIUM 134* 135   POTASSIUM 4.4 4.0   CHLORIDE 102 102   CO2 21 23   GLUCOSE 109* 105*   BUN 10 10   CREATININE 0.66 0.54   CALCIUM 8.7 7.9*     Recent Labs     09/27/24  0028 09/28/24  0641   CREATININE 0.66 0.54     US-THORACENTESIS PUNCTURE LEFT   Final Result      1. Ultrasound guided left sided therapeutic thoracentesis.      2. 600 mL of fluid withdrawn.      DX-CHEST-PORTABLE (1 VIEW)   Final Result      Decreased size of left pleural effusion. No pneumothorax.      DX-CHEST-2 VIEWS   Final Result      1. Interval increase in size of the left pleural effusion, obscuring the left lung base.   2. The remainder of the lungs is clear.   3. Obscuration of the left cardiomediastinal border.   4. Other stable chronic degenerative changes.      US-THORACENTESIS PUNCTURE LEFT   Final Result  "     1. Ultrasound guided left sided diagnostic and therapeutic thoracentesis.      2. 1,150 mL of fluid withdrawn.      DX-CHEST-PORTABLE (1 VIEW)   Final Result      Moderate left pleural effusion and associated atelectasis versus consolidation. It is similar in size to 8/15/2024 radiograph.         IR-DRAINAGE-CATH EXCHANGE   Final Result      Small residual fluid collection with drainage catheter at the peripheral aspect.      CT-ABDOMEN-PELVIS WITH   Final Result      1.  Decreased size of the fluid collection in the distal pancreatectomy-lumpectomy bed following tube placement   2.  Increased LEFT pleural effusion with overlying atelectasis   3.  Hiatal hernia   4.  Persistently thickened endometrium for age. Underlying mass is possible. Ultrasound could better assess.      CT-IMAGE-GUIDED DRAIN PERITONEAL   Final Result      1.  CT GUIDED PERITONEAL LEFT UPPER QUADRANT ABDOMINAL ABSCESS DRAINAGE. PLACEMENT OF 10 [place] LOCKING LOOP CATHETER.   2.  THE CURRENT PLAN IS TO IRRIGATE ONCE DAILY WITH 5 ML STERILE SALINE, CHECK CULTURES, MONITOR DRAINAGE OUTPUT AND OBTAIN A FOLLOW-UP CT SCAN IN 5-7 DAYS IF CLINICALLY INDICATED.      CT-ABDOMEN-PELVIS WITH   Final Result      1.  Post distal pancreatectomy.      2.  Fluid collection with some peripheral enhancement is identified in the distal pancreatic region including the region of previous pancreatectomy. Developing pseudocyst is a possibility. No emphysema or hemorrhage is appreciated. Remaining pancreas    enhances normally.      3.  There is cholelithiasis.      4.  Left pleural effusion and consolidations in the left lung base.      5.  No change in hiatal hernia.      6.  Prominent endometrial cavity again noted.        INR   Date Value Ref Range Status   09/19/2024 1.36 (H) 0.87 - 1.13 Final     Comment:     INR - Non-therapeutic Reference Range: 0.87-1.13  INR - Therapeutic Reference Range: 2.0-4.0       No results found for: \"POCINR\"     Intake/Output " Summary (Last 24 hours) at 9/23/2024 1223  Last data filed at 9/23/2024 0710  Gross per 24 hour   Intake 100 ml   Output 25 ml   Net 75 ml      I have personally reviewed the above labs and imaging      I have performed a physical exam and reviewed and updated ROS and Plan today (9/28/2024).     34 minutes in directly providing and coordinating care and extensive data review.  No time overlap and excludes procedures.

## 2024-09-28 NOTE — PROGRESS NOTES
[facility] Medicine Daily Progress Note    Date of Service  9/28/2024    Chief Complaint  Paige Gudino is a 78 y.o. female admitted 9/17/2024 with abdominal pain    Hospital Course  Admitted with abdominal pain, CT scan of the abdomen and pelvis showed an abdominal fluid collection.  Patient with history of pancreatic cystadenoma, underwent pancreatectomy, splenectomy, stomach and celiac node dissection on 8/5/2024.  Postoperatively, she had complications of retroperitoneal abscess, requiring CT-guided drainage, bilateral pulmonary embolism with right heart strain, bilateral lower extremity DVTs, right atrial thrombus that required thrombectomy.  Patient was started on empiric coverage with IV Unasyn.  Surgery Oncology was consulted on the case.  Interventional radiology was consulted.  Patient underwent CT guided peritoneal left upper quadrant abdominal abscess drainage, placement of 10 Turk[place] locking loop catheter on 9/19/2024.  Drain cultures showed MSSA and group B streptococcus.  Infectious disease was consulted on the case.    Interval Problem Update  Abdominal abscess - pain controlled  Hypertension - sbp 114-124  PE - denies chest pain or shortness of breath  Pleural effusion - repeat thoracentesis done yesterday    I have discussed this patient's plan of care and discharge plan at IDT rounds today with Case Management, Nursing, Nursing leadership, and other members of the IDT team.    Consultants/Specialty  infectious disease and surgery oncology, interventional radiology    Code Status  Full Code    Disposition  The patient is not medically cleared for discharge to home or a post-acute facility.  Anticipate discharge to: home with organized home healthcare and close outpatient follow-up    I have placed the appropriate orders for post-discharge needs.    Review of Systems  Review of Systems   Constitutional:  Negative for chills, diaphoresis, fever and malaise/fatigue.   HENT:  Negative for  congestion, hearing loss and sore throat.    Eyes:  Negative for blurred vision.   Respiratory:  Negative for cough, shortness of breath and wheezing.    Cardiovascular:  Negative for chest pain, palpitations and leg swelling.   Gastrointestinal:  Positive for abdominal pain. Negative for diarrhea, heartburn, nausea and vomiting.   Genitourinary:  Negative for dysuria, flank pain and hematuria.   Musculoskeletal:  Negative for back pain, joint pain, myalgias and neck pain.   Skin:  Negative for rash.   Neurological:  Positive for weakness. Negative for dizziness, sensory change, speech change, focal weakness and headaches.   Psychiatric/Behavioral:  The patient is nervous/anxious.         Physical Exam  Temp:  [36.5 °C (97.7 °F)-37.1 °C (98.8 °F)] 36.7 °C (98.1 °F)  Pulse:  [] 75  Resp:  [20-24] 20  BP: (114-124)/(61-86) 115/66  SpO2:  [92 %-94 %] 92 %    Physical Exam  Vitals and nursing note reviewed.   HENT:      Head: Normocephalic and atraumatic.      Nose: No congestion.      Mouth/Throat:      Mouth: Mucous membranes are moist.   Eyes:      Extraocular Movements: Extraocular movements intact.      Conjunctiva/sclera: Conjunctivae normal.   Cardiovascular:      Rate and Rhythm: Normal rate and regular rhythm.   Pulmonary:      Effort: Pulmonary effort is normal.      Breath sounds: Decreased air movement present. Examination of the left-lower field reveals decreased breath sounds. Decreased breath sounds present.   Abdominal:      General: There is distension.      Tenderness: There is abdominal tenderness. There is no guarding or rebound.      Comments: Left upper quadrant drain   Musculoskeletal:      Cervical back: No tenderness.      Right lower leg: No edema.      Left lower leg: No edema.   Skin:     General: Skin is warm and dry.   Neurological:      General: No focal deficit present.      Mental Status: She is alert and oriented to person, place, and time.      Cranial Nerves: No cranial nerve  deficit.   Psychiatric:         Mood and Affect: Mood is anxious.         Fluids    Intake/Output Summary (Last 24 hours) at 9/28/2024 1149  Last data filed at 9/28/2024 0700  Gross per 24 hour   Intake 120 ml   Output 31 ml   Net 89 ml        Laboratory  Recent Labs     09/26/24  0653 09/27/24  0028 09/28/24  0641   WBC 14.7* 15.1* 13.7*   RBC 4.51 4.48 4.43   HEMOGLOBIN 13.4 13.3 12.9   HEMATOCRIT 41.3 41.7 39.7   MCV 91.6 93.1 89.6   MCH 29.7 29.7 29.1   MCHC 32.4 31.9* 32.5   RDW 48.4 48.4 46.5   PLATELETCT 595* 619* 622*   MPV 8.9* 8.9* 8.9*     Recent Labs     09/27/24  0028 09/28/24  0641   SODIUM 134* 135   POTASSIUM 4.4 4.0   CHLORIDE 102 102   CO2 21 23   GLUCOSE 109* 105*   BUN 10 10   CREATININE 0.66 0.54   CALCIUM 8.7 7.9*                   Imaging  US-THORACENTESIS PUNCTURE LEFT   Final Result      1. Ultrasound guided left sided therapeutic thoracentesis.      2. 600 mL of fluid withdrawn.      DX-CHEST-PORTABLE (1 VIEW)   Final Result      Decreased size of left pleural effusion. No pneumothorax.      DX-CHEST-2 VIEWS   Final Result      1. Interval increase in size of the left pleural effusion, obscuring the left lung base.   2. The remainder of the lungs is clear.   3. Obscuration of the left cardiomediastinal border.   4. Other stable chronic degenerative changes.      US-THORACENTESIS PUNCTURE LEFT   Final Result      1. Ultrasound guided left sided diagnostic and therapeutic thoracentesis.      2. 1,150 mL of fluid withdrawn.      DX-CHEST-PORTABLE (1 VIEW)   Final Result      Moderate left pleural effusion and associated atelectasis versus consolidation. It is similar in size to 8/15/2024 radiograph.         IR-DRAINAGE-CATH EXCHANGE   Final Result      Small residual fluid collection with drainage catheter at the peripheral aspect.      CT-ABDOMEN-PELVIS WITH   Final Result      1.  Decreased size of the fluid collection in the distal pancreatectomy-lumpectomy bed following tube placement   2.   Increased LEFT pleural effusion with overlying atelectasis   3.  Hiatal hernia   4.  Persistently thickened endometrium for age. Underlying mass is possible. Ultrasound could better assess.      CT-IMAGE-GUIDED DRAIN PERITONEAL   Final Result      1.  CT GUIDED PERITONEAL LEFT UPPER QUADRANT ABDOMINAL ABSCESS DRAINAGE. PLACEMENT OF 10 [place] LOCKING LOOP CATHETER.   2.  THE CURRENT PLAN IS TO IRRIGATE ONCE DAILY WITH 5 ML STERILE SALINE, CHECK CULTURES, MONITOR DRAINAGE OUTPUT AND OBTAIN A FOLLOW-UP CT SCAN IN 5-7 DAYS IF CLINICALLY INDICATED.      CT-ABDOMEN-PELVIS WITH   Final Result      1.  Post distal pancreatectomy.      2.  Fluid collection with some peripheral enhancement is identified in the distal pancreatic region including the region of previous pancreatectomy. Developing pseudocyst is a possibility. No emphysema or hemorrhage is appreciated. Remaining pancreas    enhances normally.      3.  There is cholelithiasis.      4.  Left pleural effusion and consolidations in the left lung base.      5.  No change in hiatal hernia.      6.  Prominent endometrial cavity again noted.           Assessment/Plan  * Intra-abdominal abscess (HCC)- (present on admission)  Assessment & Plan  CT guided peritoneal left upper quadrant abdominal abscess drainage, placement of 10 [place] locking loop catheter  9/19/2024  IR abscessogram - small residual fluid collection with drainage catheter at the peripheral aspect  9/25/2024  IV Unasyn  Pain control    Hypomagnesemia- (present on admission)  Assessment & Plan  Follow level    Pleural effusion- (present on admission)  Assessment & Plan  Ultrasound guided left sided diagnostic and therapeutic thoracentesis, 1,150 mL of fluid withdrawn  9/24/2024  Ultrasound guided left sided therapeutic thoracentesis, 600 mL of fluid withdrawn  9/27/2024  Follow cultures    Thrombocytosis- (present on admission)  Assessment & Plan  Follow cbc    AV block, Mobitz 1- (present on  admission)  Assessment & Plan  monitor    Primary hypertension- (present on admission)  Assessment & Plan  Amlodipine    Atrial thrombus- (present on admission)  Assessment & Plan  History of thrombectomy  Eliquis    Deep vein thrombosis (DVT) of both lower extremities (HCC)- (present on admission)  Assessment & Plan  Eliquis    Acute respiratory failure with hypoxia (HCC)- (present on admission)  Assessment & Plan  RT protocol    Pulmonary embolism with acute cor pulmonale, unspecified chronicity, unspecified pulmonary embolism type (HCC)- (present on admission)  Assessment & Plan  Eliquis    History of breast cancer- (present on admission)  Assessment & Plan  Follow up with Oncology    Acquired hypothyroidism- (present on admission)  Assessment & Plan  Levothyroxine and Liothyronine          VTE prophylaxis:    therapeutic anticoagulation with eliquis 5 mg BID      I have performed a physical exam and reviewed and updated ROS and Plan today (9/28/2024). In review of yesterday's note (9/27/2024), there are no changes except as documented above.

## 2024-09-28 NOTE — PROGRESS NOTES
Bedside report received, assessment completed    A&O x  4, pt calls appropriately  Mobility: Up with self, Assistive Devices: none  Fall Risk Assessment: No risk, door notifications in use  Pain Assessment / Reassessment completed, medication provided per MAR  Diet: Regular  LDA:   IV Access: 22 R FA, CDI/ flushed/ SL  YEMI: IR LUQ     GI/: + void, + flatus, 9/27 BM  DVT Prophylaxis: Apixaban, SCD refused    Reviewed plan of care with patient, bed in lowest position and locked, pt resting comfortably now, call light within reach, all needs met at this time. Interventions will be executed per plan of care

## 2024-09-29 ENCOUNTER — APPOINTMENT (OUTPATIENT)
Dept: RADIOLOGY | Facility: MEDICAL CENTER | Age: 79
DRG: 438 | End: 2024-09-29
Attending: NURSE PRACTITIONER
Payer: COMMERCIAL

## 2024-09-29 ENCOUNTER — APPOINTMENT (OUTPATIENT)
Dept: RADIOLOGY | Facility: MEDICAL CENTER | Age: 79
DRG: 438 | End: 2024-09-29
Attending: SURGERY
Payer: COMMERCIAL

## 2024-09-29 PROBLEM — E87.6 HYPOKALEMIA: Status: ACTIVE | Noted: 2024-09-29

## 2024-09-29 LAB
ANION GAP SERPL CALC-SCNC: 12 MMOL/L (ref 7–16)
BUN SERPL-MCNC: 10 MG/DL (ref 8–22)
CALCIUM SERPL-MCNC: 8.3 MG/DL (ref 8.5–10.5)
CHLORIDE SERPL-SCNC: 99 MMOL/L (ref 96–112)
CO2 SERPL-SCNC: 24 MMOL/L (ref 20–33)
CREAT SERPL-MCNC: 0.63 MG/DL (ref 0.5–1.4)
ERYTHROCYTE [DISTWIDTH] IN BLOOD BY AUTOMATED COUNT: 47.4 FL (ref 35.9–50)
GFR SERPLBLD CREATININE-BSD FMLA CKD-EPI: 91 ML/MIN/1.73 M 2
GLUCOSE SERPL-MCNC: 108 MG/DL (ref 65–99)
HCT VFR BLD AUTO: 38 % (ref 37–47)
HGB BLD-MCNC: 12.2 G/DL (ref 12–16)
MAGNESIUM SERPL-MCNC: 2 MG/DL (ref 1.5–2.5)
MCH RBC QN AUTO: 29.2 PG (ref 27–33)
MCHC RBC AUTO-ENTMCNC: 32.1 G/DL (ref 32.2–35.5)
MCV RBC AUTO: 90.9 FL (ref 81.4–97.8)
PLATELET # BLD AUTO: 643 K/UL (ref 164–446)
PMV BLD AUTO: 8.7 FL (ref 9–12.9)
POTASSIUM SERPL-SCNC: 4 MMOL/L (ref 3.6–5.5)
RBC # BLD AUTO: 4.18 M/UL (ref 4.2–5.4)
SODIUM SERPL-SCNC: 135 MMOL/L (ref 135–145)
WBC # BLD AUTO: 13.3 K/UL (ref 4.8–10.8)

## 2024-09-29 PROCEDURE — 700102 HCHG RX REV CODE 250 W/ 637 OVERRIDE(OP)

## 2024-09-29 PROCEDURE — 700111 HCHG RX REV CODE 636 W/ 250 OVERRIDE (IP): Mod: JZ | Performed by: INTERNAL MEDICINE

## 2024-09-29 PROCEDURE — 700102 HCHG RX REV CODE 250 W/ 637 OVERRIDE(OP): Performed by: FAMILY MEDICINE

## 2024-09-29 PROCEDURE — 99232 SBSQ HOSP IP/OBS MODERATE 35: CPT | Performed by: FAMILY MEDICINE

## 2024-09-29 PROCEDURE — 700102 HCHG RX REV CODE 250 W/ 637 OVERRIDE(OP): Performed by: HOSPITALIST

## 2024-09-29 PROCEDURE — 74160 CT ABDOMEN W/CONTRAST: CPT

## 2024-09-29 PROCEDURE — 36415 COLL VENOUS BLD VENIPUNCTURE: CPT

## 2024-09-29 PROCEDURE — 83735 ASSAY OF MAGNESIUM: CPT

## 2024-09-29 PROCEDURE — A9270 NON-COVERED ITEM OR SERVICE: HCPCS | Performed by: HOSPITALIST

## 2024-09-29 PROCEDURE — 700101 HCHG RX REV CODE 250: Performed by: NURSE PRACTITIONER

## 2024-09-29 PROCEDURE — 71045 X-RAY EXAM CHEST 1 VIEW: CPT

## 2024-09-29 PROCEDURE — 700105 HCHG RX REV CODE 258: Performed by: INTERNAL MEDICINE

## 2024-09-29 PROCEDURE — A9270 NON-COVERED ITEM OR SERVICE: HCPCS

## 2024-09-29 PROCEDURE — 700117 HCHG RX CONTRAST REV CODE 255: Performed by: NURSE PRACTITIONER

## 2024-09-29 PROCEDURE — 85027 COMPLETE CBC AUTOMATED: CPT

## 2024-09-29 PROCEDURE — 80048 BASIC METABOLIC PNL TOTAL CA: CPT

## 2024-09-29 PROCEDURE — 99024 POSTOP FOLLOW-UP VISIT: CPT | Performed by: SURGERY

## 2024-09-29 PROCEDURE — A9270 NON-COVERED ITEM OR SERVICE: HCPCS | Performed by: FAMILY MEDICINE

## 2024-09-29 PROCEDURE — 770001 HCHG ROOM/CARE - MED/SURG/GYN PRIV*

## 2024-09-29 RX ORDER — SIMETHICONE 125 MG
125 TABLET,CHEWABLE ORAL 3 TIMES DAILY PRN
Status: DISCONTINUED | OUTPATIENT
Start: 2024-09-29 | End: 2024-10-02 | Stop reason: HOSPADM

## 2024-09-29 RX ADMIN — LEVOTHYROXINE SODIUM 100 MCG: 0.1 TABLET ORAL at 05:38

## 2024-09-29 RX ADMIN — AMPICILLIN AND SULBACTAM 3 G: 1; 2 INJECTION, POWDER, FOR SOLUTION INTRAMUSCULAR; INTRAVENOUS at 20:11

## 2024-09-29 RX ADMIN — IOHEXOL 100 ML: 350 INJECTION, SOLUTION INTRAVENOUS at 22:45

## 2024-09-29 RX ADMIN — LIOTHYRONINE SODIUM 2.5 MCG: 5 TABLET ORAL at 05:39

## 2024-09-29 RX ADMIN — APIXABAN 5 MG: 5 TABLET, FILM COATED ORAL at 16:43

## 2024-09-29 RX ADMIN — APIXABAN 5 MG: 5 TABLET, FILM COATED ORAL at 05:38

## 2024-09-29 RX ADMIN — SODIUM CHLORIDE, PRESERVATIVE FREE 10 ML: 5 INJECTION INTRAVENOUS at 05:40

## 2024-09-29 RX ADMIN — AMPICILLIN AND SULBACTAM 3 G: 1; 2 INJECTION, POWDER, FOR SOLUTION INTRAMUSCULAR; INTRAVENOUS at 08:12

## 2024-09-29 RX ADMIN — SIMETHICONE 125 MG: 125 TABLET, CHEWABLE ORAL at 14:49

## 2024-09-29 RX ADMIN — SODIUM CHLORIDE, PRESERVATIVE FREE 10 ML: 5 INJECTION INTRAVENOUS at 16:44

## 2024-09-29 RX ADMIN — AMPICILLIN AND SULBACTAM 3 G: 1; 2 INJECTION, POWDER, FOR SOLUTION INTRAMUSCULAR; INTRAVENOUS at 02:49

## 2024-09-29 RX ADMIN — AMLODIPINE BESYLATE 5 MG: 10 TABLET ORAL at 05:38

## 2024-09-29 RX ADMIN — AMPICILLIN AND SULBACTAM 3 G: 1; 2 INJECTION, POWDER, FOR SOLUTION INTRAMUSCULAR; INTRAVENOUS at 13:29

## 2024-09-29 ASSESSMENT — ENCOUNTER SYMPTOMS
WHEEZING: 0
BACK PAIN: 0
SORE THROAT: 0
DIARRHEA: 0
PALPITATIONS: 0
HEARTBURN: 0
CHILLS: 0
SPEECH CHANGE: 0
DIAPHORESIS: 0
ABDOMINAL PAIN: 0
BLURRED VISION: 0
NAUSEA: 0
FEVER: 0
VOMITING: 0
ABDOMINAL PAIN: 1
FLANK PAIN: 0
FOCAL WEAKNESS: 0
WEAKNESS: 0
HEADACHES: 0
SHORTNESS OF BREATH: 0
COUGH: 0
SENSORY CHANGE: 0
NERVOUS/ANXIOUS: 1
MYALGIAS: 0
DIZZINESS: 0
WEAKNESS: 1
NECK PAIN: 0

## 2024-09-29 ASSESSMENT — PAIN DESCRIPTION - PAIN TYPE
TYPE: ACUTE PAIN

## 2024-09-29 NOTE — CARE PLAN
The patient is Stable - Low risk of patient condition declining or worsening    Shift Goals  Clinical Goals: Abx, drain management, and rest  Patient Goals: rest  Family Goals: updates on POC    Progress made toward(s) clinical / shift goals:  Abx administered as ordered. Pt complains of no pain. Intermittently monitoring IR YEMI and flushing per MAR. Pt slept intermittently throughout shift.      Problem: Infection  Goal: Will remain free from infection  Outcome: Progressing     Problem: Hemodynamics  Goal: Patient's hemodynamics, fluid balance and neurologic status will be stable or improve  Outcome: Progressing

## 2024-09-29 NOTE — PROGRESS NOTES
"Surgical Progress Note:    Patient is a 78F with recent pancreatectomy and splenectomy, subsequent retroperitoneal abscess with drain placement.     No acute O/N events. WBS stable 13.     PE:  /66   Pulse 73   Temp 36.5 °C (97.7 °F) (Temporal)   Resp 18   Ht 1.626 m (5' 4\")   Wt 72.5 kg (159 lb 13.3 oz)   LMP  (LMP Unknown)   SpO2 93%   Breastfeeding No   BMI 27.44 kg/m²     I/O:   Intake/Output Summary (Last 24 hours) at 9/29/2024 0753  Last data filed at 9/29/2024 0330  Gross per 24 hour   Intake 120 ml   Output 20 ml   Net 100 ml     YEMI 28    NAD  Abdomen soft  YEMI whitish, scant    Labs:  Recent Labs     09/27/24  0028 09/28/24  0641 09/29/24  0123   WBC 15.1* 13.7* 13.3*   RBC 4.48 4.43 4.18*   HEMOGLOBIN 13.3 12.9 12.2   HEMATOCRIT 41.7 39.7 38.0   MCV 93.1 89.6 90.9   MCH 29.7 29.1 29.2   RDW 48.4 46.5 47.4   PLATELETCT 619* 622* 643*   MPV 8.9* 8.9* 8.7*     Recent Labs     09/27/24  0028 09/28/24  0641 09/29/24  0123   SODIUM 134* 135 135   POTASSIUM 4.4 4.0 4.0   CHLORIDE 102 102 99   CO2 21 23 24   GLUCOSE 109* 105* 108*   BUN 10 10 10     9/29/2024 6:05 AM     HISTORY/REASON FOR EXAM:  s/p thoracentesis.        TECHNIQUE/EXAM DESCRIPTION AND NUMBER OF VIEWS:  Single AP view of the chest.     COMPARISON: 9/27/2024     FINDINGS:        The cardiac silhouette is normal in size.     There is increased moderate left-sided pleural effusion with associated compressive atelectasis and/or consolidation. No significant bony abnormality is present.     IMPRESSION:     Increased moderate left-sided pleural effusion with associated compressive atelectasis and/or consolidation.    A/P:     Katelynn-pancreatic fluid collection, s/p distal pancreatectomy / splenectomy  - Continue IR drain  - Continue antibiotics  - Diet as tolerated  - Flush of IR drain per protocol, IR following  - consider repeat CT tomorrow     2. Leukocytosis, WBC down to 13  - Antibiotics per ID  - Daily CBC  - consider repeat CT " tomorrow      3. Pleural effusion, s/p US thoracentesis completed on 9/24/24, repeat on 9/27  - follow fluid cultures  - XR today increased consolidation - clinically improved, will follow    Jovanna Henley M.D.

## 2024-09-29 NOTE — PROGRESS NOTES
Radiology Progress Note   Author: SOUMYA Avelar Date & Time created: 9/29/2024  10:05 AM   Date of admission  9/17/2024  Note to reader: this note follows the APSO format rather than the historical SOAP format. Assessment and plan located at the top of the note for ease of use.    Chief Complaint  78 y.o. female admitted 9/17/2024 with   Chief Complaint   Patient presents with    Sent by MD     Sent by surgeon for elevated WBC. Pt reports abdominal drain removed last week and is concerned for infection at the site. Denies pain.          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 CT A/P showed fluid collection in distal pancreatic region. IR consulted and pt underwent a 10 Tongan left peritoneal upper quadrant abdominal abscess drain placement with IR Dr Ruff on  09/19/2024. 40 ML of purulent fluid removed and sent to lab.      Interval History:   09/20/2024-10 Tongan left upper quadrant peritoneal drain to bulb suction with 82 mL purulent  output in the last 24 hours.  I flushed drain with 10 mL of sterile NSS  I reviewed today's labs: WBC 17.3; H&H 12.4/38.5, Cr 0.70; Coags are 1.36,  Micro- abscess drainage from 09/19 positive for staphylococcal aureus; urine from 9/18 positive for yeast. I discussed drain care with pt  at bedside     09/21/2024-10 Tongan left upper quadrant peritoneal drain to bulb suction with 25 mL purulent  output in the last 24 hours.  I flushed drain with 10 mL of sterile NSS  I reviewed today's labs: WBC 8.7; H&H 14.5/46.4, Cr 0.53; Coags are 1.36,  Micro- abscess  drainage from 09/19 + for many GPC; urine from 9/18 + for yeast. I discussed drain care/with pt  at bedside, I reviewed drain flushing with patient and patient , all questions answered.      09/22/2024- LUQ 10 Kyrgyz peritoneal drain to bulb suction with 35 mL purulent  output in the last 24 hours.  I flushed drain with 10 mL of sterile NSS  I reviewed most recent labs: WBC 8.7; H&H 14.5/46.4, Cr 0.53; Coags are 1.36,  Micro- abscess drainage from 09/19 + for MSSA, Streptococcus agalactiae group B; urine from 9/18 + for Candida albicans I discussed plan of care with infectious disease, and patient.  IDT notes reviewed.    09/23/24 - 10 Fr peritoneal drain to LUQ with 25 mL purulent output in the last 24 hours. Labs reviewed by me: WBC 14.2, creatinine 0.67. On ABX through ID. Drain flushed with 10 mL NS. IDT notes reviewed. Discussed with hospitalist; repeat CT pending for elevated white count.  at bedside and all questions answered.     09/24/24 - Repeat CT shows decrease in size of fluid collection. Abscessogram ordered which shows small fluid collection with drain in appropriate position.     09/25/24 - 10 Fr peritoneal drain to LUQ with 10 mL seropurulent output in the last 24 hours. Labs reviewed by me: WBC 16.0. On ABX through ID. Drain flushed with 10 mL NS. IDT notes reviewed.  Abscessogram results reviewed with patient.  Patient voiced frustration with fluctuating white blood cell count.    09/26/2024 - 10 Fr peritoneal drain to LUQ with 10 mL seropurulent output in the last 24 hours. Labs reviewed by me: WBC 14.7. On ABX through ID. Drain flushed with 10 mL NS. IDT notes reviewed.  Joint visit made with Dr. Rooney Infectious disease.  Patient discussed with hospitalist and HPB team.     09/27/24 - 10 Fr peritoneal drain to LUQ with 50 mL seropurulent output in the last 24 hours. Labs reviewed by me: WBC 15.1. On ABX through ID. Drain flushed with 10 mL NS. IDT notes reviewed.  HPB team  repeating drain fluid amylase. CXR reviewed by me shows left pleural effusion. Patient discussed with hospitalist. Recent images reviewed with pt and daughter at bedside.     09/28/24 - 10 Fr peritoneal drain to LUQ with 23 mL seropurulent output in the last 24 hours. Labs reviewed by me: WBC 13.7, fluid amylase 24. On ABX through ID. Drain flushed with 10 mL NS. IDT notes reviewed.  CXR post thoracentesis yesterday shows decrease in size of left pleural effusion. Patient discussed with hospitalist. Family at bedside.  All questions answered.    09/29/24 - 10 Fr peritoneal drain to LUQ with 28 mL seropurulent output in the last 24 hours. Labs reviewed by me: WBC 13.3. On ABX through ID. Drain flushed with 10 mL NS. IDT notes reviewed. CXR shows slight increase in Left pleural effusion. Family at bedside.  All questions answered.    Assessment/Plan     Principal Problem:    Intra-abdominal abscess (HCC)  Active Problems:    Acquired hypothyroidism    History of breast cancer    Pulmonary embolism with acute cor pulmonale, unspecified chronicity, unspecified pulmonary embolism type (HCC)    Acute respiratory failure with hypoxia (HCC)    Deep vein thrombosis (DVT) of both lower extremities (HCC)    Atrial thrombus    Primary hypertension    AV block, Mobitz 1    Abdominal infection (HCC)    Thrombocytosis    Pleural effusion    Hypomagnesemia      Plan IR  - Continue to irrigate LUQ 10 Fr drain with 10 ml of sterile saline each shift; do not aspirate back per hepatobiliary request  - Fluid cultures positive for MSSA and Streptococcus agalactiae group B  - pleural fluid from 09/24 with NGTD  - CT abdomen ordered for tomorrow to re-evaluate abscess  - Ultimate plan is for patient to discharge with drain in place on oral abx and follow-up with surgical oncology for drain management/care and ID.   - Continue to monitor drain, VS, and labs   -   -Thank you for allowing Interventional Radiology team to participate in the  patients care, if any additional care or requests are needed in the future please do not hesitate to call or place IR order           Review of Systems  Physical Exam   Review of Systems   Constitutional:  Positive for malaise/fatigue. Negative for chills and fever.   Respiratory:  Negative for shortness of breath.    Cardiovascular:  Negative for chest pain and palpitations.   Gastrointestinal:  Negative for abdominal pain, nausea and vomiting.   Neurological:  Negative for weakness.      Vitals:    09/29/24 0700   BP: 132/76   Pulse: 75   Resp: 17   Temp: 36.4 °C (97.5 °F)   SpO2: 94%        Physical Exam  Constitutional:       Appearance: Normal appearance.   Cardiovascular:      Rate and Rhythm: Normal rate.   Pulmonary:      Effort: Pulmonary effort is normal. No respiratory distress.   Abdominal:      General: There is no distension.      Comments: IR drain to LUQ   Skin:     General: Skin is warm and dry.   Neurological:      General: No focal deficit present.      Mental Status: She is alert and oriented to person, place, and time.   Psychiatric:         Mood and Affect: Mood normal.         Behavior: Behavior normal.           Labs    Recent Labs     09/27/24 0028 09/28/24  0641 09/29/24  0123   WBC 15.1* 13.7* 13.3*   RBC 4.48 4.43 4.18*   HEMOGLOBIN 13.3 12.9 12.2   HEMATOCRIT 41.7 39.7 38.0   MCV 93.1 89.6 90.9   MCH 29.7 29.1 29.2   MCHC 31.9* 32.5 32.1*   RDW 48.4 46.5 47.4   PLATELETCT 619* 622* 643*   MPV 8.9* 8.9* 8.7*     Recent Labs     09/27/24 0028 09/28/24  0641 09/29/24  0123   SODIUM 134* 135 135   POTASSIUM 4.4 4.0 4.0   CHLORIDE 102 102 99   CO2 21 23 24   GLUCOSE 109* 105* 108*   BUN 10 10 10   CREATININE 0.66 0.54 0.63   CALCIUM 8.7 7.9* 8.3*     Recent Labs     09/27/24 0028 09/28/24  0641 09/29/24  0123   CREATININE 0.66 0.54 0.63     DX-CHEST-LIMITED (1 VIEW)   Final Result      Increased moderate left-sided pleural effusion with associated compressive atelectasis and/or  consolidation.      US-THORACENTESIS PUNCTURE LEFT   Final Result      1. Ultrasound guided left sided therapeutic thoracentesis.      2. 600 mL of fluid withdrawn.      DX-CHEST-PORTABLE (1 VIEW)   Final Result      Decreased size of left pleural effusion. No pneumothorax.      DX-CHEST-2 VIEWS   Final Result      1. Interval increase in size of the left pleural effusion, obscuring the left lung base.   2. The remainder of the lungs is clear.   3. Obscuration of the left cardiomediastinal border.   4. Other stable chronic degenerative changes.      US-THORACENTESIS PUNCTURE LEFT   Final Result      1. Ultrasound guided left sided diagnostic and therapeutic thoracentesis.      2. 1,150 mL of fluid withdrawn.      DX-CHEST-PORTABLE (1 VIEW)   Final Result      Moderate left pleural effusion and associated atelectasis versus consolidation. It is similar in size to 8/15/2024 radiograph.         IR-DRAINAGE-CATH EXCHANGE   Final Result      Small residual fluid collection with drainage catheter at the peripheral aspect.      CT-ABDOMEN-PELVIS WITH   Final Result      1.  Decreased size of the fluid collection in the distal pancreatectomy-lumpectomy bed following tube placement   2.  Increased LEFT pleural effusion with overlying atelectasis   3.  Hiatal hernia   4.  Persistently thickened endometrium for age. Underlying mass is possible. Ultrasound could better assess.      CT-IMAGE-GUIDED DRAIN PERITONEAL   Final Result      1.  CT GUIDED PERITONEAL LEFT UPPER QUADRANT ABDOMINAL ABSCESS DRAINAGE. PLACEMENT OF 10 [place]an LOCKING LOOP CATHETER.   2.  THE CURRENT PLAN IS TO IRRIGATE ONCE DAILY WITH 5 ML STERILE SALINE, CHECK CULTURES, MONITOR DRAINAGE OUTPUT AND OBTAIN A FOLLOW-UP CT SCAN IN 5-7 DAYS IF CLINICALLY INDICATED.      CT-ABDOMEN-PELVIS WITH   Final Result      1.  Post distal pancreatectomy.      2.  Fluid collection with some peripheral enhancement is identified in the distal pancreatic region including the  "region of previous pancreatectomy. Developing pseudocyst is a possibility. No emphysema or hemorrhage is appreciated. Remaining pancreas    enhances normally.      3.  There is cholelithiasis.      4.  Left pleural effusion and consolidations in the left lung base.      5.  No change in hiatal hernia.      6.  Prominent endometrial cavity again noted.        INR   Date Value Ref Range Status   09/19/2024 1.36 (H) 0.87 - 1.13 Final     Comment:     INR - Non-therapeutic Reference Range: 0.87-1.13  INR - Therapeutic Reference Range: 2.0-4.0       No results found for: \"POCINR\"     Intake/Output Summary (Last 24 hours) at 9/23/2024 1223  Last data filed at 9/23/2024 0710  Gross per 24 hour   Intake 100 ml   Output 25 ml   Net 75 ml      I have personally reviewed the above labs and imaging      I have performed a physical exam and reviewed and updated ROS and Plan today (9/29/2024).     34 minutes in directly providing and coordinating care and extensive data review.  No time overlap and excludes procedures.  "

## 2024-09-29 NOTE — PROGRESS NOTES
Lure lock on patient's IR drain cracked when this RN was in the process of flushing the drain. Reached out to IR nurse Lisette Dave who said she would contact someone for a replacement to be sent up.

## 2024-09-29 NOTE — PROGRESS NOTES
[facility] Medicine Daily Progress Note    Date of Service  9/29/2024    Chief Complaint  Paige Gudino is a 78 y.o. female admitted 9/17/2024 with abdominal pain    Hospital Course  Admitted with abdominal pain, CT scan of the abdomen and pelvis showed an abdominal fluid collection.  Patient with history of pancreatic cystadenoma, underwent pancreatectomy, splenectomy, stomach and celiac node dissection on 8/5/2024.  Postoperatively, she had complications of retroperitoneal abscess, requiring CT-guided drainage, bilateral pulmonary embolism with right heart strain, bilateral lower extremity DVTs, right atrial thrombus that required thrombectomy.  Patient was started on empiric coverage with IV Unasyn.  Surgery Oncology was consulted on the case.  Interventional radiology was consulted.  Patient underwent CT guided peritoneal left upper quadrant abdominal abscess drainage, placement of 10 Croatian locking loop catheter on 9/19/2024.  Drain cultures showed MSSA and group B streptococcus.  Infectious disease was consulted on the case.  Patient had persistent leukocytosis.  Repeat CT scan showed decrease size of the fluid collection, and left pleural effusion.  Patient underwent IR abscessogram - small residual fluid collection with drainage catheter at the peripheral aspect and Ultrasound guided left sided diagnostic and therapeutic thoracentesis, 1,150 mL of fluid withdrawn on 9/25/2024.  Repeat chest x-ray showed recurrence of the pleural effusion, patient underwent Ultrasound guided left sided therapeutic thoracentesis, 600 mL of fluid withdrawn on 9/27/2024.  Pleural fluid cultures were negative.    Interval Problem Update  Abdominal abscess - pain controlled, discussed case with interventional radiology  Hypertension - sbp 116-132  Pleural effusion - chest xray reviewed    I have discussed this patient's plan of care and discharge plan at IDT rounds today with Case Management, Nursing, Nursing leadership,  and other members of the IDT team.    Consultants/Specialty  infectious disease and surgery oncology, interventional radiology    Code Status  Full Code    Disposition  The patient is not medically cleared for discharge to home or a post-acute facility.  Anticipate discharge to: home with organized home healthcare and close outpatient follow-up    I have placed the appropriate orders for post-discharge needs.    Review of Systems  Review of Systems   Constitutional:  Negative for chills, diaphoresis, fever and malaise/fatigue.   HENT:  Negative for congestion, hearing loss and sore throat.    Eyes:  Negative for blurred vision.   Respiratory:  Negative for cough, shortness of breath and wheezing.    Cardiovascular:  Negative for chest pain, palpitations and leg swelling.   Gastrointestinal:  Positive for abdominal pain. Negative for diarrhea, heartburn, nausea and vomiting.   Genitourinary:  Negative for dysuria, flank pain and hematuria.   Musculoskeletal:  Negative for back pain, joint pain, myalgias and neck pain.   Skin:  Negative for rash.   Neurological:  Positive for weakness. Negative for dizziness, sensory change, speech change, focal weakness and headaches.   Psychiatric/Behavioral:  The patient is nervous/anxious.         Physical Exam  Temp:  [36.4 °C (97.5 °F)-37 °C (98.6 °F)] 36.4 °C (97.5 °F)  Pulse:  [73-82] 75  Resp:  [17-20] 17  BP: (116-132)/(64-76) 132/76  SpO2:  [92 %-94 %] 94 %    Physical Exam  Vitals and nursing note reviewed.   HENT:      Head: Normocephalic and atraumatic.      Nose: No congestion.      Mouth/Throat:      Mouth: Mucous membranes are moist.   Eyes:      Extraocular Movements: Extraocular movements intact.      Conjunctiva/sclera: Conjunctivae normal.   Cardiovascular:      Rate and Rhythm: Normal rate and regular rhythm.   Pulmonary:      Effort: Pulmonary effort is normal.      Breath sounds: Decreased air movement present. Examination of the left-lower field reveals  decreased breath sounds. Decreased breath sounds present.   Abdominal:      General: There is distension.      Tenderness: There is abdominal tenderness. There is no guarding or rebound.      Comments: Left upper quadrant drain   Musculoskeletal:      Cervical back: No tenderness.      Right lower leg: No edema.      Left lower leg: No edema.   Skin:     General: Skin is warm and dry.   Neurological:      General: No focal deficit present.      Mental Status: She is alert and oriented to person, place, and time.      Cranial Nerves: No cranial nerve deficit.   Psychiatric:         Mood and Affect: Mood is anxious.         Fluids    Intake/Output Summary (Last 24 hours) at 9/29/2024 1036  Last data filed at 9/29/2024 0700  Gross per 24 hour   Intake 120 ml   Output 20 ml   Net 100 ml        Laboratory  Recent Labs     09/27/24  0028 09/28/24  0641 09/29/24  0123   WBC 15.1* 13.7* 13.3*   RBC 4.48 4.43 4.18*   HEMOGLOBIN 13.3 12.9 12.2   HEMATOCRIT 41.7 39.7 38.0   MCV 93.1 89.6 90.9   MCH 29.7 29.1 29.2   MCHC 31.9* 32.5 32.1*   RDW 48.4 46.5 47.4   PLATELETCT 619* 622* 643*   MPV 8.9* 8.9* 8.7*     Recent Labs     09/27/24  0028 09/28/24  0641 09/29/24  0123   SODIUM 134* 135 135   POTASSIUM 4.4 4.0 4.0   CHLORIDE 102 102 99   CO2 21 23 24   GLUCOSE 109* 105* 108*   BUN 10 10 10   CREATININE 0.66 0.54 0.63   CALCIUM 8.7 7.9* 8.3*                   Imaging  DX-CHEST-LIMITED (1 VIEW)   Final Result      Increased moderate left-sided pleural effusion with associated compressive atelectasis and/or consolidation.      US-THORACENTESIS PUNCTURE LEFT   Final Result      1. Ultrasound guided left sided therapeutic thoracentesis.      2. 600 mL of fluid withdrawn.      DX-CHEST-PORTABLE (1 VIEW)   Final Result      Decreased size of left pleural effusion. No pneumothorax.      DX-CHEST-2 VIEWS   Final Result      1. Interval increase in size of the left pleural effusion, obscuring the left lung base.   2. The remainder of  the lungs is clear.   3. Obscuration of the left cardiomediastinal border.   4. Other stable chronic degenerative changes.      US-THORACENTESIS PUNCTURE LEFT   Final Result      1. Ultrasound guided left sided diagnostic and therapeutic thoracentesis.      2. 1,150 mL of fluid withdrawn.      DX-CHEST-PORTABLE (1 VIEW)   Final Result      Moderate left pleural effusion and associated atelectasis versus consolidation. It is similar in size to 8/15/2024 radiograph.         IR-DRAINAGE-CATH EXCHANGE   Final Result      Small residual fluid collection with drainage catheter at the peripheral aspect.      CT-ABDOMEN-PELVIS WITH   Final Result      1.  Decreased size of the fluid collection in the distal pancreatectomy-lumpectomy bed following tube placement   2.  Increased LEFT pleural effusion with overlying atelectasis   3.  Hiatal hernia   4.  Persistently thickened endometrium for age. Underlying mass is possible. Ultrasound could better assess.      CT-IMAGE-GUIDED DRAIN PERITONEAL   Final Result      1.  CT GUIDED PERITONEAL LEFT UPPER QUADRANT ABDOMINAL ABSCESS DRAINAGE. PLACEMENT OF 10 [place]ian LOCKING LOOP CATHETER.   2.  THE CURRENT PLAN IS TO IRRIGATE ONCE DAILY WITH 5 ML STERILE SALINE, CHECK CULTURES, MONITOR DRAINAGE OUTPUT AND OBTAIN A FOLLOW-UP CT SCAN IN 5-7 DAYS IF CLINICALLY INDICATED.      CT-ABDOMEN-PELVIS WITH   Final Result      1.  Post distal pancreatectomy.      2.  Fluid collection with some peripheral enhancement is identified in the distal pancreatic region including the region of previous pancreatectomy. Developing pseudocyst is a possibility. No emphysema or hemorrhage is appreciated. Remaining pancreas    enhances normally.      3.  There is cholelithiasis.      4.  Left pleural effusion and consolidations in the left lung base.      5.  No change in hiatal hernia.      6.  Prominent endometrial cavity again noted.           Assessment/Plan  * Intra-abdominal abscess (HCC)- (present on  admission)  Assessment & Plan  CT guided peritoneal left upper quadrant abdominal abscess drainage, placement of 10 [place]ian locking loop catheter  9/19/2024  IR abscessogram - small residual fluid collection with drainage catheter at the peripheral aspect  9/24/2024  IV Unasyn  Pain control    Hypokalemia- (present on admission)  Assessment & Plan  Stop Kdur  Follow bmp    Hypomagnesemia- (present on admission)  Assessment & Plan  Follow level    Pleural effusion- (present on admission)  Assessment & Plan  Ultrasound guided left sided diagnostic and therapeutic thoracentesis, 1,150 mL of fluid withdrawn  9/24/2024  Ultrasound guided left sided therapeutic thoracentesis, 600 mL of fluid withdrawn  9/27/2024  Check probnp    Thrombocytosis- (present on admission)  Assessment & Plan  Follow cbc    AV block, Mobitz 1- (present on admission)  Assessment & Plan  monitor    Primary hypertension- (present on admission)  Assessment & Plan  Amlodipine    Atrial thrombus- (present on admission)  Assessment & Plan  History of thrombectomy  Eliquis    Deep vein thrombosis (DVT) of both lower extremities (HCC)- (present on admission)  Assessment & Plan  Eliquis    Acute respiratory failure with hypoxia (HCC)- (present on admission)  Assessment & Plan  RT protocol    Pulmonary embolism with acute cor pulmonale, unspecified chronicity, unspecified pulmonary embolism type (HCC)- (present on admission)  Assessment & Plan  Eliquis    History of breast cancer- (present on admission)  Assessment & Plan  Follow up with Oncology    Acquired hypothyroidism- (present on admission)  Assessment & Plan  Levothyroxine and Liothyronine          VTE prophylaxis:    therapeutic anticoagulation with eliquis 5 mg BID      I have performed a physical exam and reviewed and updated ROS and Plan today (9/29/2024). In review of yesterday's note (9/28/2024), there are no changes except as documented above.

## 2024-09-29 NOTE — CARE PLAN
The patient is Stable - Low risk of patient condition declining or worsening    Shift Goals  Clinical Goals: continue antibiotic regimen, drain management, patient comfort  Patient Goals: rest  Family Goals: updates on POC    Progress made toward(s) clinical / shift goals:  Patient remains ambulatory and stable on room air. Patient consistently denies acute pain issues. IR drain remains compressed to bulb suction.     Patient is not progressing towards the following goals: Pending repeat CT. Pending DC clearance.

## 2024-09-30 ENCOUNTER — APPOINTMENT (OUTPATIENT)
Dept: RADIOLOGY | Facility: MEDICAL CENTER | Age: 79
DRG: 438 | End: 2024-09-30
Attending: NURSE PRACTITIONER
Payer: COMMERCIAL

## 2024-09-30 VITALS
HEIGHT: 64 IN | OXYGEN SATURATION: 91 % | DIASTOLIC BLOOD PRESSURE: 65 MMHG | WEIGHT: 159.83 LBS | BODY MASS INDEX: 27.29 KG/M2 | TEMPERATURE: 99 F | RESPIRATION RATE: 16 BRPM | SYSTOLIC BLOOD PRESSURE: 119 MMHG | HEART RATE: 85 BPM

## 2024-09-30 LAB
AMYLASE FLD-CCNC: 6 U/L
ANION GAP SERPL CALC-SCNC: 10 MMOL/L (ref 7–16)
BODY FLD TYPE: NORMAL
BUN SERPL-MCNC: 8 MG/DL (ref 8–22)
CALCIUM SERPL-MCNC: 8.3 MG/DL (ref 8.5–10.5)
CHLORIDE SERPL-SCNC: 102 MMOL/L (ref 96–112)
CO2 SERPL-SCNC: 24 MMOL/L (ref 20–33)
CREAT SERPL-MCNC: 0.62 MG/DL (ref 0.5–1.4)
ERYTHROCYTE [DISTWIDTH] IN BLOOD BY AUTOMATED COUNT: 46.6 FL (ref 35.9–50)
GFR SERPLBLD CREATININE-BSD FMLA CKD-EPI: 91 ML/MIN/1.73 M 2
GLUCOSE SERPL-MCNC: 94 MG/DL (ref 65–99)
HCT VFR BLD AUTO: 38.8 % (ref 37–47)
HGB BLD-MCNC: 12.6 G/DL (ref 12–16)
MAGNESIUM SERPL-MCNC: 2 MG/DL (ref 1.5–2.5)
MCH RBC QN AUTO: 29.2 PG (ref 27–33)
MCHC RBC AUTO-ENTMCNC: 32.5 G/DL (ref 32.2–35.5)
MCV RBC AUTO: 90 FL (ref 81.4–97.8)
NT-PROBNP SERPL IA-MCNC: 148 PG/ML (ref 0–125)
PLATELET # BLD AUTO: 726 K/UL (ref 164–446)
PMV BLD AUTO: 8.9 FL (ref 9–12.9)
POTASSIUM SERPL-SCNC: 3.8 MMOL/L (ref 3.6–5.5)
RBC # BLD AUTO: 4.31 M/UL (ref 4.2–5.4)
SODIUM SERPL-SCNC: 136 MMOL/L (ref 135–145)
WBC # BLD AUTO: 9.4 K/UL (ref 4.8–10.8)

## 2024-09-30 PROCEDURE — 83880 ASSAY OF NATRIURETIC PEPTIDE: CPT

## 2024-09-30 PROCEDURE — 700102 HCHG RX REV CODE 250 W/ 637 OVERRIDE(OP)

## 2024-09-30 PROCEDURE — 700105 HCHG RX REV CODE 258: Performed by: INTERNAL MEDICINE

## 2024-09-30 PROCEDURE — 700111 HCHG RX REV CODE 636 W/ 250 OVERRIDE (IP): Mod: JZ | Performed by: INTERNAL MEDICINE

## 2024-09-30 PROCEDURE — A9270 NON-COVERED ITEM OR SERVICE: HCPCS | Performed by: HOSPITALIST

## 2024-09-30 PROCEDURE — 36415 COLL VENOUS BLD VENIPUNCTURE: CPT

## 2024-09-30 PROCEDURE — 80048 BASIC METABOLIC PNL TOTAL CA: CPT

## 2024-09-30 PROCEDURE — 83735 ASSAY OF MAGNESIUM: CPT

## 2024-09-30 PROCEDURE — 85027 COMPLETE CBC AUTOMATED: CPT

## 2024-09-30 PROCEDURE — 82150 ASSAY OF AMYLASE: CPT

## 2024-09-30 PROCEDURE — 99024 POSTOP FOLLOW-UP VISIT: CPT | Performed by: SURGERY

## 2024-09-30 PROCEDURE — 700102 HCHG RX REV CODE 250 W/ 637 OVERRIDE(OP): Performed by: HOSPITALIST

## 2024-09-30 PROCEDURE — 770001 HCHG ROOM/CARE - MED/SURG/GYN PRIV*

## 2024-09-30 PROCEDURE — 99232 SBSQ HOSP IP/OBS MODERATE 35: CPT | Performed by: FAMILY MEDICINE

## 2024-09-30 PROCEDURE — 700101 HCHG RX REV CODE 250: Performed by: NURSE PRACTITIONER

## 2024-09-30 PROCEDURE — A9270 NON-COVERED ITEM OR SERVICE: HCPCS

## 2024-09-30 RX ADMIN — AMPICILLIN AND SULBACTAM 3 G: 1; 2 INJECTION, POWDER, FOR SOLUTION INTRAMUSCULAR; INTRAVENOUS at 20:31

## 2024-09-30 RX ADMIN — AMLODIPINE BESYLATE 5 MG: 10 TABLET ORAL at 04:24

## 2024-09-30 RX ADMIN — APIXABAN 5 MG: 5 TABLET, FILM COATED ORAL at 04:25

## 2024-09-30 RX ADMIN — AMPICILLIN AND SULBACTAM 3 G: 1; 2 INJECTION, POWDER, FOR SOLUTION INTRAMUSCULAR; INTRAVENOUS at 02:11

## 2024-09-30 RX ADMIN — AMPICILLIN AND SULBACTAM 3 G: 1; 2 INJECTION, POWDER, FOR SOLUTION INTRAMUSCULAR; INTRAVENOUS at 15:15

## 2024-09-30 RX ADMIN — LEVOTHYROXINE SODIUM 100 MCG: 0.1 TABLET ORAL at 04:24

## 2024-09-30 RX ADMIN — LIOTHYRONINE SODIUM 5 MCG: 5 TABLET ORAL at 04:24

## 2024-09-30 RX ADMIN — SODIUM CHLORIDE, PRESERVATIVE FREE 10 ML: 5 INJECTION INTRAVENOUS at 04:25

## 2024-09-30 RX ADMIN — AMPICILLIN AND SULBACTAM 3 G: 1; 2 INJECTION, POWDER, FOR SOLUTION INTRAMUSCULAR; INTRAVENOUS at 09:24

## 2024-09-30 RX ADMIN — SODIUM CHLORIDE, PRESERVATIVE FREE 10 ML: 5 INJECTION INTRAVENOUS at 16:54

## 2024-09-30 ASSESSMENT — ENCOUNTER SYMPTOMS
WEIGHT LOSS: 0
WEAKNESS: 0
MYALGIAS: 0
SPEECH CHANGE: 0
HEARTBURN: 0
HEADACHES: 0
DEPRESSION: 1
ABDOMINAL PAIN: 0
NECK PAIN: 0
DIARRHEA: 0
ABDOMINAL PAIN: 1
WEAKNESS: 1
VOMITING: 0
PALPITATIONS: 0
DIZZINESS: 0
WHEEZING: 0
FEVER: 0
CONSTIPATION: 0
SENSORY CHANGE: 0
BACK PAIN: 0
COUGH: 0
NERVOUS/ANXIOUS: 1
SORE THROAT: 0
NAUSEA: 0
BLURRED VISION: 0
CHILLS: 0
SHORTNESS OF BREATH: 0
FLANK PAIN: 0
DIAPHORESIS: 0
FOCAL WEAKNESS: 0

## 2024-09-30 ASSESSMENT — PAIN DESCRIPTION - PAIN TYPE
TYPE: ACUTE PAIN
TYPE: ACUTE PAIN

## 2024-09-30 NOTE — CARE PLAN
The patient is Stable - Low risk of patient condition declining or worsening    Shift Goals  Clinical Goals: CT, abx, IV, and rest  Patient Goals: rest  Family Goals: updates on POC    Progress made toward(s) clinical / shift goals:  Pt went to CT at beginning of shift. New IV placed at beginning of shift. Abx administered as ordered. Pt slept intermittently throughout shift.       Problem: Pain - Standard  Goal: Alleviation of pain or a reduction in pain to the patient’s comfort goal  Outcome: Progressing     Problem: Hemodynamics  Goal: Patient's hemodynamics, fluid balance and neurologic status will be stable or improve  Outcome: Progressing

## 2024-09-30 NOTE — PROGRESS NOTES
Date of Service  September 30, 2024     Chief Complaint  Sent by MD (Sent by surgeon for elevated WBC. Pt reports abdominal drain removed last week and is concerned for infection at the site. Denies pain. )     Surgery  IR drain on 9/19/24 by Dr Ruff  US Thoracentesis on 9/24/24 by Jolie NP - 1150 mL  IR drain study on 9/24/24 by Dr Bowen  US Thoracentesis on 9/27/24 by Jolie NP - 600 mL    Hospital Course  POD# 11     Interval Problem Update  No acute events overnight  Minimal output from the YEMI drain 38 mL  Leukocytosis appears resolved WBC 9.4, afebrile, Unasyn  Tolerating diet, denies N/V    CT ABDOMEN on 9/29/24 noted  1.  Multiloculated enhancing fluid collection in the left abdomen with pigtail catheter in place, fluid collection has increased in size since prior study, approximately 3.9 x 1.9 cm.   2.  Loculated appearing left pleural effusion.    Problem List  Principal Problem:    Intra-abdominal abscess (HCC) (POA: Yes)  Active Problems:    Acquired hypothyroidism (POA: Yes)    History of breast cancer (POA: Yes)    Pulmonary embolism with acute cor pulmonale, unspecified chronicity, unspecified pulmonary embolism type (HCC) (POA: Yes)    Acute respiratory failure with hypoxia (HCC) (POA: Yes)    Deep vein thrombosis (DVT) of both lower extremities (HCC) (POA: Yes)    Atrial thrombus (POA: Yes)    Primary hypertension (POA: Yes)    AV block, Mobitz 1 (POA: Yes)    Abdominal infection (HCC) (POA: Yes)    Thrombocytosis (POA: Yes)    Pleural effusion (POA: Yes)    Hypomagnesemia (POA: Yes)    Hypokalemia (POA: Yes)  Resolved Problems:    Sepsis (HCC) (POA: Yes)     Subjective  Review of Systems   Constitutional:  Negative for chills, malaise/fatigue and weight loss.   Respiratory:  Negative for cough and shortness of breath.    Cardiovascular:  Negative for chest pain.   Gastrointestinal:  Negative for abdominal pain, constipation, diarrhea, nausea and vomiting.       Objective  Temp:  [36.4 °C (97.5  °F)-36.8 °C (98.2 °F)] 36.8 °C (98.2 °F)  Pulse:  [67-88] 69  Resp:  [16-18] 16  BP: (115-141)/(56-72) 141/68  SpO2:  [91 %-94 %] 91 %      Physical Exam  Vitals and nursing note reviewed.   Constitutional:       General: She is not in acute distress.     Appearance: Normal appearance.   HENT:      Head: Normocephalic and atraumatic.      Nose: Nose normal.      Mouth/Throat:      Pharynx: Oropharynx is clear.   Eyes:      Conjunctiva/sclera: Conjunctivae normal.   Cardiovascular:      Rate and Rhythm: Normal rate.   Pulmonary:      Effort: Pulmonary effort is normal. No respiratory distress.      Breath sounds: Normal breath sounds.      Comments: 0.5 LPM O2  Abdominal:      General: Abdomen is flat. There is no distension.      Tenderness: There is no abdominal tenderness.      Comments: LUQ IR drain   Skin:     General: Skin is warm and dry.   Neurological:      Mental Status: She is alert and oriented to person, place, and time.   Psychiatric:         Mood and Affect: Mood is depressed.         Behavior: Behavior normal.        Fluids    Intake/Output Summary (Last 24 hours) at 9/30/2024 1143  Last data filed at 9/30/2024 0705  Gross per 24 hour   Intake 440 ml   Output 18 ml   Net 422 ml       Labs  Lab Results   Component Value Date/Time    SODIUM 136 09/30/2024 04:47 AM    POTASSIUM 3.8 09/30/2024 04:47 AM    CHLORIDE 102 09/30/2024 04:47 AM    CO2 24 09/30/2024 04:47 AM    GLUCOSE 94 09/30/2024 04:47 AM    BUN 8 09/30/2024 04:47 AM    CREATININE 0.62 09/30/2024 04:47 AM         Lab Results   Component Value Date/Time    PROTHROMBTM 16.8 (H) 09/19/2024 12:33 AM    INR 1.36 (H) 09/19/2024 12:33 AM         Lab Results   Component Value Date/Time    WBC 9.4 09/30/2024 04:47 AM    RBC 4.31 09/30/2024 04:47 AM    HEMOGLOBIN 12.6 09/30/2024 04:47 AM    HEMATOCRIT 38.8 09/30/2024 04:47 AM    MCV 90.0 09/30/2024 04:47 AM    MCH 29.2 09/30/2024 04:47 AM    MCHC 32.5 09/30/2024 04:47 AM    MPV 8.9 (L) 09/30/2024 04:47  AM    NEUTSPOLYS 77.70 (H) 09/24/2024 12:04 AM    LYMPHOCYTES 9.20 (L) 09/24/2024 12:04 AM    MONOCYTES 10.60 09/24/2024 12:04 AM    EOSINOPHILS 0.40 09/24/2024 12:04 AM    BASOPHILS 0.20 09/24/2024 12:04 AM    ANISOCYTOSIS 1+ 08/15/2024 03:30 AM           Imaging  CT ABDOMEN (9/29/24)  IMPRESSION:  1.  Multiloculated enhancing fluid collection in the left abdomen with pigtail catheter in place, fluid collection has increased in size since prior study.  2.  Loculated appearing left pleural effusion.  3.  Linear consolidations in the bilateral lung bases, left greater than right, appearance favoring atelectasis.  4.  Enlarged bilateral inguinal lymph nodes, consider causes of adenopathy with additional workup as clinically appropriate  5.  Cholelithiasis  6.  Small hiatal hernia  7.  Atherosclerosis and atherosclerotic coronary artery disease    CT A/P (9/23/24)  IMPRESSION:  1.  Decreased size of the fluid collection in the distal pancreatectomy-lumpectomy bed following tube placement  2.  Increased LEFT pleural effusion with overlying atelectasis  3.  Hiatal hernia  4.  Persistently thickened endometrium for age. Underlying mass is possible. Ultrasound could better assess.    CT A/P (9/17/24)  IMPRESSION:  1.  Post distal pancreatectomy.     2.  Fluid collection with some peripheral enhancement is identified in the distal pancreatic region including the region of previous pancreatectomy. Developing pseudocyst is a possibility. No emphysema or hemorrhage is appreciated. Remaining pancreas   enhances normally.     3.  There is cholelithiasis.     4.  Left pleural effusion and consolidations in the left lung base.     5.  No change in hiatal hernia.     6.  Prominent endometrial cavity again noted.     Procedures  PROCEDURE (9/27/24)  IMPRESSION:  1. Ultrasound guided left sided therapeutic thoracentesis.  2. 600 mL of fluid withdrawn.    PROCEDURE (9/24/24)  IMPRESSION:  1. Ultrasound guided left sided therapeutic  thoracentesis.  2. 1150 mL of fluid withdrawn.    Assessment/Plan  Patient is a 78F with recent pancreatectomy and splenectomy, subsequent retroperitoneal abscess with drain placement. Now admitted from ED with leukocytosis WBC 23.8 noted to have recurrence of fluid collection in the distal pancreatic region. Left side pleural effusion with thoracentesis x2     Katelynn-pancreatic fluid collection, s/p distal pancreatectomy / splenectomy  - Continue IR drain  - Continue antibiotics  - Regular diet  - Flush of IR drain per protocol  - OOB/ambulate  - Encourage IS    This drain appears to be working well.  In great position.  Even though slight increase on CT scan this is most likely related to flushes.    2. Leukocytosis, WBC 9.4  - Antibiotics per ID, last dose on 10/4/24  - Daily CBC      3. Pleural effusion, s/p US thoracentesis completed on 9/24/24 and 9/27/24  - Consult with Thoracic surgery.  Discussion of the case with Dr. De Souza we have come to the conclusion of placing a pigtail catheter through interventional radiology into the left chest to allow it to drain to avoid continued return for thoracentesis.  This is a sympathetic response to the previous abdominal issue.  Hopefully this will resolve her shortness of breath.  Would hold off on chest tube due to discomfort involved.    Patient seen with LATONIA Luevano.

## 2024-09-30 NOTE — CONSULTS
Thoracic & General Surgery Consultation      Date of Service  9/30/2024    Referring Physician  Benito Chavez M.D.    Consulting Physician  Glenn Saul M.D.    Reason for Consultation  Left pleural effusion    History of Presenting Illness  Ms. Paige Gudino is a 78 y.o. female who presented 9/17/2024 with recurrent fluid collection in the left distal pancreatic area now status post drain.  She had a left pleural effusion and underwent thoracentesis on 9/24 and 9/27 and now has recurrent left pleural effusion.     Review of Systems  All other systems reviewed and negative except as noted above    Past Medical History   has a past medical history of Anesthesia, Cancer (HCC), Cancer of overlapping sites of left female breast (HCC), Cataract (2024), High cholesterol, Hypothyroidism, PONV (postoperative nausea and vomiting) (1987), and Thyroid nodule.    She has no past medical history of Osteoporosis.    Surgical History   has a past surgical history that includes other orthopedic surgery (2019); other (1987); other (1988); other (2001); pr mastectomy, partial (Left, 08/01/2022); primary c section; lumpectomy; laminotomy; pr ultrasonic guidance, intraoperative (8/5/2024); pancreatectomy (N/A, 8/5/2024); splenectomy (N/A, 8/5/2024); and creation, flap, omentum (N/A, 8/5/2024).    Family History  family history includes Cancer in her mother; Dementia in her father; Heart Disease in her father; Ovarian Cancer in her mother.    Social History   reports that she has never smoked. She has been exposed to tobacco smoke. She has never used smokeless tobacco. She reports that she does not drink alcohol and does not use drugs.    Medications  Current Facility-Administered Medications   Medication Dose Route Frequency Provider Last Rate Last Admin    simethicone (Mylicon) chewable tablet 125 mg  125 mg Oral TID PRN Benito Chavez M.D.   125 mg at 09/29/24 1449    cyclobenzaprine (Flexeril) tablet 10 mg   10 mg Oral TID PRN Anderson Hunt M.D.   10 mg at 09/23/24 1309    lidocaine (Asperflex) 4 % patch 1 Patch  1 Patch Transdermal Q24HR Anderson Hunt M.D.   1 Patch at 09/23/24 1241    apixaban (Eliquis) tablet 5 mg  5 mg Oral BID Markus Bhandari D.O.   5 mg at 09/30/24 0425    ampicillin/sulbactam (Unasyn) 3 g in  mL IVPB  3 g Intravenous Q6HRS Charisse Fuentes M.D.   Stopped at 09/30/24 0954    normal saline flush 0.9 % SOLN 10 mL  10 mL Tube BID Romy Florez A.P.R.NLuly   10 mL at 09/30/24 0425    liothyronine (Cytomel) tablet 5 mcg  5 mcg Oral Once per day on Monday Wednesday Friday Josr Tiwari D.O.   5 mcg at 09/30/24 0424    And    liothyronine (Cytomel) tablet 2.5 mcg  2.5 mcg Oral Once per day on Sunday Tuesday Thursday Saturday TANA DinhOLuly   2.5 mcg at 09/29/24 0539    acetaminophen (Tylenol) tablet 650 mg  650 mg Oral Q6HRS PRN LUIS ARMANDO Grubbs.OLuly   650 mg at 09/26/24 1944    senna-docusate (Pericolace Or Senokot S) 8.6-50 MG per tablet 2 Tablet  2 Tablet Oral Q EVENING LUIS ARMANDO Grubbs.O.   2 Tablet at 09/26/24 1718    And    polyethylene glycol/lytes (Miralax) Packet 1 Packet  1 Packet Oral QDAY PRN TANA GrubbsO.   1 Packet at 09/21/24 0635    amLODIPine (Norvasc) tablet 5 mg  5 mg Oral DAILY TANA DinhOLuly   5 mg at 09/30/24 0424    levothyroxine (Synthroid) tablet 100 mcg  100 mcg Oral AM ES TANA DinhOLuly   100 mcg at 09/30/24 0424       Allergies  Allergies   Allergen Reactions    Bloodless Unspecified     Does not want blood products    Synthroid [Fd&C Red #40 Al Paul-Levothyroxine] Hives and Unspecified     Hives, Brand specific. Some brands are ok and some are not    Tape Unspecified     Skin degradation with use of tegaderm     [place] Thyroid [Thyroid] Diarrhea     diarrhea       Physical Exam  Temp:  [36.4 °C (97.5 °F)-36.8 °C (98.2 °F)] 36.8 °C (98.2 °F)  Pulse:  [67-88] 69  Resp:  [16-18] 16  BP: (115-141)/(56-72) 141/68  SpO2:  [91 %-94  %] 91 %    GEN: Healthy appearing, well developed, no acute distress.  PSYCH: Alert and oriented x3. Normal  memory, mood, and affect.  HEENT     -Head: Normocephalic, atraumatic     -Eyes: PERRL, No discharge or redness;     -Ears: External ears are normal.      -Nose: Normal nares.     -Throat: moist mucus membranes, good dentition    NECK: Supple, trachea midline with no masses.  CV: RRR, radial pulses 2+, brisk cap refill on hands  LUNGS: no labored breathing on room air, no wheezing  ABD: Soft, nontender, nondistended  SKIN: Warm, well perfused. No skin rashes or abnormal lesions.  MSK: strength 5/5 throughout. No deformities  EXT: No clubbing, cyanosis, or edema.  NEURO: No focal deficits      Fluids  Date 09/30/24 0700 - 10/01/24 0659   Shift 2892-1708 3420-4519 9336-3985 24 Hour Total   INTAKE   P.O. 200   200   Shift Total 200   200   OUTPUT   Drains 0   0   Shift Total 0   0   Weight (kg) 72.5 72.5 72.5 72.5       Laboratory  Recent Labs     09/28/24  0641 09/29/24  0123 09/30/24  0447   WBC 13.7* 13.3* 9.4   RBC 4.43 4.18* 4.31   HEMOGLOBIN 12.9 12.2 12.6   HEMATOCRIT 39.7 38.0 38.8   MCV 89.6 90.9 90.0   MCH 29.1 29.2 29.2   MCHC 32.5 32.1* 32.5   RDW 46.5 47.4 46.6   PLATELETCT 622* 643* 726*   MPV 8.9* 8.7* 8.9*     Recent Labs     09/28/24  0641 09/29/24  0123 09/30/24  0447   SODIUM 135 135 136   POTASSIUM 4.0 4.0 3.8   CHLORIDE 102 99 102   CO2 23 24 24   GLUCOSE 105* 108* 94   BUN 10 10 8   CREATININE 0.54 0.63 0.62   CALCIUM 7.9* 8.3* 8.3*                     Imaging  CT-ABDOMEN WITH   Final Result         1.  Multiloculated enhancing fluid collection in the left abdomen with pigtail catheter in place, fluid collection has increased in size since prior study.   2.  Loculated appearing left pleural effusion.   3.  Linear consolidations in the bilateral lung bases, left greater than right, appearance favoring atelectasis.   4.  Enlarged bilateral inguinal lymph nodes, consider causes of adenopathy  with additional workup as clinically appropriate   5.  Cholelithiasis   6.  Small hiatal hernia   7.  Atherosclerosis and atherosclerotic coronary artery disease      DX-CHEST-LIMITED (1 VIEW)   Final Result      Increased moderate left-sided pleural effusion with associated compressive atelectasis and/or consolidation.      US-THORACENTESIS PUNCTURE LEFT   Final Result      1. Ultrasound guided left sided therapeutic thoracentesis.      2. 600 mL of fluid withdrawn.      DX-CHEST-PORTABLE (1 VIEW)   Final Result      Decreased size of left pleural effusion. No pneumothorax.      DX-CHEST-2 VIEWS   Final Result      1. Interval increase in size of the left pleural effusion, obscuring the left lung base.   2. The remainder of the lungs is clear.   3. Obscuration of the left cardiomediastinal border.   4. Other stable chronic degenerative changes.      US-THORACENTESIS PUNCTURE LEFT   Final Result      1. Ultrasound guided left sided diagnostic and therapeutic thoracentesis.      2. 1,150 mL of fluid withdrawn.      DX-CHEST-PORTABLE (1 VIEW)   Final Result      Moderate left pleural effusion and associated atelectasis versus consolidation. It is similar in size to 8/15/2024 radiograph.         IR-DRAINAGE-CATH EXCHANGE   Final Result      Small residual fluid collection with drainage catheter at the peripheral aspect.      CT-ABDOMEN-PELVIS WITH   Final Result      1.  Decreased size of the fluid collection in the distal pancreatectomy-lumpectomy bed following tube placement   2.  Increased LEFT pleural effusion with overlying atelectasis   3.  Hiatal hernia   4.  Persistently thickened endometrium for age. Underlying mass is possible. Ultrasound could better assess.      CT-IMAGE-GUIDED DRAIN PERITONEAL   Final Result      1.  CT GUIDED PERITONEAL LEFT UPPER QUADRANT ABDOMINAL ABSCESS DRAINAGE. PLACEMENT OF 10 Setswana LOCKING LOOP CATHETER.   2.  THE CURRENT PLAN IS TO IRRIGATE ONCE DAILY WITH 5 ML STERILE SALINE,  CHECK CULTURES, MONITOR DRAINAGE OUTPUT AND OBTAIN A FOLLOW-UP CT SCAN IN 5-7 DAYS IF CLINICALLY INDICATED.      CT-ABDOMEN-PELVIS WITH   Final Result      1.  Post distal pancreatectomy.      2.  Fluid collection with some peripheral enhancement is identified in the distal pancreatic region including the region of previous pancreatectomy. Developing pseudocyst is a possibility. No emphysema or hemorrhage is appreciated. Remaining pancreas    enhances normally.      3.  There is cholelithiasis.      4.  Left pleural effusion and consolidations in the left lung base.      5.  No change in hiatal hernia.      6.  Prominent endometrial cavity again noted.          Assessment/Plan  This is a 78 y.o. female who presents with a left pleural effusion after undergoing a distal pancreatectomy and splenectomy on 8/5.  She has had 2 thoracentesis 1 on 9/24/2024 and 1 on 9/27/2024.     -Recommend IR placed chest tube and will follow all the output which should be decreased now that the fluid collection has resolved and the abdomen can try talc slurry through the chest tube  -If the output is high she does not want surgery at this time as she is scared undergo general anesthesia.  -Hold Eliquis for potential procedure       Glenn Saul MD  Thoracic & General Surgeon  [facility]  [phone]

## 2024-09-30 NOTE — CARE PLAN
The patient is Stable - Low risk of patient condition declining or worsening    Shift Goals  Clinical Goals: IV abx, drain maintenance, ambulation, comfort  Patient Goals: Rest  Family Goals: Updates    Progress made toward(s) clinical / shift goals:  IV abx administered per MAR. YEMI drain flushed Q shift. Patient ambulating frequently in room and to halls. Patient continues to report low pain levels and declining intervention. Patient is able to verbalize emotions about procedures and results. Family at bedside. Patient is resting at this time.    Patient is not progressing towards the following goals:

## 2024-09-30 NOTE — PROGRESS NOTES
Bedside report received.  Assessment complete.  A&O x 4. Patient calls appropriately.  Patient ambulates with no assist.    Patient has 0/10 pain.   Denies N&V. Tolerating regular diet.  Surgical LUQ YEMI drain to bulb suction, DIP, CDI.  + void, - flatus, LBM 9/30.  Patient denies SOB.  Review plan with of care with patient. Call light and personal belongings within reach. Hourly rounding in place. All needs met at this time.

## 2024-09-30 NOTE — PROGRESS NOTES
[facility] Medicine Daily Progress Note    Date of Service  9/30/2024    Chief Complaint  Paige Gudino is a 78 y.o. female admitted 9/17/2024 with abdominal pain    Hospital Course  Admitted with abdominal pain, CT scan of the abdomen and pelvis showed an abdominal fluid collection.  Patient with history of pancreatic cystadenoma, underwent pancreatectomy, splenectomy, stomach and celiac node dissection on 8/5/2024.  Postoperatively, she had complications of retroperitoneal abscess, requiring CT-guided drainage, bilateral pulmonary embolism with right heart strain, bilateral lower extremity DVTs, right atrial thrombus that required thrombectomy.  Patient was started on empiric coverage with IV Unasyn.  Surgery Oncology was consulted on the case.  Interventional radiology was consulted.  Patient underwent CT guided peritoneal left upper quadrant abdominal abscess drainage, placement of 10 Lithuanian locking loop catheter on 9/19/2024.  Drain cultures showed MSSA and group B streptococcus.  Infectious disease was consulted on the case.  Patient had persistent leukocytosis.  Repeat CT scan showed decrease size of the fluid collection, and left pleural effusion.  Patient underwent IR abscessogram - small residual fluid collection with drainage catheter at the peripheral aspect and Ultrasound guided left sided diagnostic and therapeutic thoracentesis, 1,150 mL of fluid withdrawn on 9/25/2024.  Repeat chest x-ray showed recurrence of the pleural effusion, patient underwent Ultrasound guided left sided therapeutic thoracentesis, 600 mL of fluid withdrawn on 9/27/2024.  Pleural fluid cultures were negative.    Interval Problem Update  Abdominal abscess - pain controlled, discussed case with Surgery  Hypertension - sbp 115-141  Pleural effusion - loculated, plastic surgery consulted, discussed case with surgery    Updates given and plan of care discussed with patient's spouse who was at bedside.    I have discussed  this patient's plan of care and discharge plan at IDT rounds today with Case Management, Nursing, Nursing leadership, and other members of the IDT team.    Consultants/Specialty  infectious disease and surgery oncology, interventional radiology    Code Status  Full Code    Disposition  The patient is not medically cleared for discharge to home or a post-acute facility.  Anticipate discharge to: home with organized home healthcare and close outpatient follow-up    I have placed the appropriate orders for post-discharge needs.    Review of Systems  Review of Systems   Constitutional:  Negative for chills, diaphoresis, fever and malaise/fatigue.   HENT:  Negative for congestion, hearing loss and sore throat.    Eyes:  Negative for blurred vision.   Respiratory:  Negative for cough, shortness of breath and wheezing.    Cardiovascular:  Negative for chest pain, palpitations and leg swelling.   Gastrointestinal:  Positive for abdominal pain. Negative for diarrhea, heartburn, nausea and vomiting.   Genitourinary:  Negative for dysuria, flank pain and hematuria.   Musculoskeletal:  Negative for back pain, joint pain, myalgias and neck pain.   Skin:  Negative for rash.   Neurological:  Positive for weakness. Negative for dizziness, sensory change, speech change, focal weakness and headaches.   Psychiatric/Behavioral:  Positive for depression. The patient is nervous/anxious.         Physical Exam  Temp:  [36.4 °C (97.5 °F)-36.8 °C (98.2 °F)] 36.8 °C (98.2 °F)  Pulse:  [67-88] 69  Resp:  [16-18] 16  BP: (115-141)/(56-72) 141/68  SpO2:  [91 %-94 %] 91 %    Physical Exam  Vitals and nursing note reviewed.   HENT:      Head: Normocephalic and atraumatic.      Nose: No congestion.      Mouth/Throat:      Mouth: Mucous membranes are moist.   Eyes:      Extraocular Movements: Extraocular movements intact.      Conjunctiva/sclera: Conjunctivae normal.   Cardiovascular:      Rate and Rhythm: Normal rate and regular rhythm.    Pulmonary:      Effort: Pulmonary effort is normal.      Breath sounds: Decreased air movement present. Examination of the left-lower field reveals decreased breath sounds. Decreased breath sounds present.   Abdominal:      General: There is distension.      Tenderness: There is abdominal tenderness. There is no guarding or rebound.      Comments: Left upper quadrant drain   Musculoskeletal:      Cervical back: No tenderness.      Right lower leg: No edema.      Left lower leg: No edema.   Skin:     General: Skin is warm and dry.   Neurological:      General: No focal deficit present.      Mental Status: She is alert and oriented to person, place, and time.      Cranial Nerves: No cranial nerve deficit.   Psychiatric:         Mood and Affect: Mood is anxious and depressed. Affect is tearful.         Fluids    Intake/Output Summary (Last 24 hours) at 9/30/2024 1423  Last data filed at 9/30/2024 1411  Gross per 24 hour   Intake 1720 ml   Output 23 ml   Net 1697 ml        Laboratory  Recent Labs     09/28/24  0641 09/29/24  0123 09/30/24  0447   WBC 13.7* 13.3* 9.4   RBC 4.43 4.18* 4.31   HEMOGLOBIN 12.9 12.2 12.6   HEMATOCRIT 39.7 38.0 38.8   MCV 89.6 90.9 90.0   MCH 29.1 29.2 29.2   MCHC 32.5 32.1* 32.5   RDW 46.5 47.4 46.6   PLATELETCT 622* 643* 726*   MPV 8.9* 8.7* 8.9*     Recent Labs     09/28/24  0641 09/29/24  0123 09/30/24  0447   SODIUM 135 135 136   POTASSIUM 4.0 4.0 3.8   CHLORIDE 102 99 102   CO2 23 24 24   GLUCOSE 105* 108* 94   BUN 10 10 8   CREATININE 0.54 0.63 0.62   CALCIUM 7.9* 8.3* 8.3*                   Imaging  CT-ABDOMEN WITH   Final Result         1.  Multiloculated enhancing fluid collection in the left abdomen with pigtail catheter in place, fluid collection has increased in size since prior study.   2.  Loculated appearing left pleural effusion.   3.  Linear consolidations in the bilateral lung bases, left greater than right, appearance favoring atelectasis.   4.  Enlarged bilateral  inguinal lymph nodes, consider causes of adenopathy with additional workup as clinically appropriate   5.  Cholelithiasis   6.  Small hiatal hernia   7.  Atherosclerosis and atherosclerotic coronary artery disease      DX-CHEST-LIMITED (1 VIEW)   Final Result      Increased moderate left-sided pleural effusion with associated compressive atelectasis and/or consolidation.      US-THORACENTESIS PUNCTURE LEFT   Final Result      1. Ultrasound guided left sided therapeutic thoracentesis.      2. 600 mL of fluid withdrawn.      DX-CHEST-PORTABLE (1 VIEW)   Final Result      Decreased size of left pleural effusion. No pneumothorax.      DX-CHEST-2 VIEWS   Final Result      1. Interval increase in size of the left pleural effusion, obscuring the left lung base.   2. The remainder of the lungs is clear.   3. Obscuration of the left cardiomediastinal border.   4. Other stable chronic degenerative changes.      US-THORACENTESIS PUNCTURE LEFT   Final Result      1. Ultrasound guided left sided diagnostic and therapeutic thoracentesis.      2. 1,150 mL of fluid withdrawn.      DX-CHEST-PORTABLE (1 VIEW)   Final Result      Moderate left pleural effusion and associated atelectasis versus consolidation. It is similar in size to 8/15/2024 radiograph.         IR-DRAINAGE-CATH EXCHANGE   Final Result      Small residual fluid collection with drainage catheter at the peripheral aspect.      CT-ABDOMEN-PELVIS WITH   Final Result      1.  Decreased size of the fluid collection in the distal pancreatectomy-lumpectomy bed following tube placement   2.  Increased LEFT pleural effusion with overlying atelectasis   3.  Hiatal hernia   4.  Persistently thickened endometrium for age. Underlying mass is possible. Ultrasound could better assess.      CT-IMAGE-GUIDED DRAIN PERITONEAL   Final Result      1.  CT GUIDED PERITONEAL LEFT UPPER QUADRANT ABDOMINAL ABSCESS DRAINAGE. PLACEMENT OF 10 Polish LOCKING LOOP CATHETER.   2.  THE CURRENT PLAN IS  TO IRRIGATE ONCE DAILY WITH 5 ML STERILE SALINE, CHECK CULTURES, MONITOR DRAINAGE OUTPUT AND OBTAIN A FOLLOW-UP CT SCAN IN 5-7 DAYS IF CLINICALLY INDICATED.      CT-ABDOMEN-PELVIS WITH   Final Result      1.  Post distal pancreatectomy.      2.  Fluid collection with some peripheral enhancement is identified in the distal pancreatic region including the region of previous pancreatectomy. Developing pseudocyst is a possibility. No emphysema or hemorrhage is appreciated. Remaining pancreas    enhances normally.      3.  There is cholelithiasis.      4.  Left pleural effusion and consolidations in the left lung base.      5.  No change in hiatal hernia.      6.  Prominent endometrial cavity again noted.           Assessment/Plan  * Intra-abdominal abscess (HCC)- (present on admission)  Assessment & Plan  CT guided peritoneal left upper quadrant abdominal abscess drainage, placement of 10 Croatian locking loop catheter  9/19/2024  IR abscessogram - small residual fluid collection with drainage catheter at the peripheral aspect  9/24/2024  IV Unasyn  Pain control    Pleural effusion- (present on admission)  Assessment & Plan  Ultrasound guided left sided diagnostic and therapeutic thoracentesis, 1,150 mL of fluid withdrawn  9/24/2024  Ultrasound guided left sided therapeutic thoracentesis, 600 mL of fluid withdrawn  9/27/2024  Thoracic surgery consulted    Hypokalemia- (present on admission)  Assessment & Plan  Follow bmp    Hypomagnesemia- (present on admission)  Assessment & Plan  Follow level    Thrombocytosis- (present on admission)  Assessment & Plan  Follow cbc    AV block, Mobitz 1- (present on admission)  Assessment & Plan  monitor    Primary hypertension- (present on admission)  Assessment & Plan  Amlodipine    Atrial thrombus- (present on admission)  Assessment & Plan  History of thrombectomy  Hold Eliquis    Deep vein thrombosis (DVT) of both lower extremities (HCC)- (present on admission)  Assessment &  Plan  Hold Eliquis for possible procedure    Acute respiratory failure with hypoxia (HCC)- (present on admission)  Assessment & Plan  RT protocol    Pulmonary embolism with acute cor pulmonale, unspecified chronicity, unspecified pulmonary embolism type (HCC)- (present on admission)  Assessment & Plan  Hold Eliquis for possible procedure    History of breast cancer- (present on admission)  Assessment & Plan  Follow up with Oncology    Acquired hypothyroidism- (present on admission)  Assessment & Plan  Levothyroxine and Liothyronine          VTE prophylaxis:   SCDs/TEDs      I have performed a physical exam and reviewed and updated ROS and Plan today (9/30/2024). In review of yesterday's note (9/29/2024), there are no changes except as documented above.

## 2024-09-30 NOTE — PROGRESS NOTES
Radiology Progress Note   Author: SOUMYA Smith Date & Time created: 9/30/2024  11:52 AM   Date of admission  9/17/2024  Note to reader: this note follows the APSO format rather than the historical SOAP format. Assessment and plan located at the top of the note for ease of use.    Chief Complaint  78 y.o. female admitted 9/17/2024 with   Chief Complaint   Patient presents with    Sent by MD     Sent by surgeon for elevated WBC. Pt reports abdominal drain removed last week and is concerned for infection at the site. Denies pain.          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 CT A/P showed fluid collection in distal pancreatic region. IR consulted and pt underwent a 10 Gibraltarian left peritoneal upper quadrant abdominal abscess drain placement with IR Dr Ruff on  09/19/2024. 40 ML of purulent fluid removed and sent to lab.      Interval History:   09/20/2024-10 Gibraltarian left upper quadrant peritoneal drain to bulb suction with 82 mL purulent  output in the last 24 hours.  I flushed drain with 10 mL of sterile NSS  I reviewed today's labs: WBC 17.3; H&H 12.4/38.5, Cr 0.70; Coags are 1.36,  Micro- abscess drainage from 09/19 positive for staphylococcal aureus; urine from 9/18 positive for yeast. I discussed drain care with pt  at bedside     09/21/2024-10 [place]ralt[place] left upper quadrant peritoneal drain to bulb suction with 25 mL purulent  output in the last 24 hours.  I flushed drain with 10 mL of sterile NSS  I reviewed today's labs: WBC 8.7; H&H 14.5/46.4, Cr 0.53; Coags are 1.36,  Micro- abscess  drainage from 09/19 + for many GPC; urine from 9/18 + for yeast. I discussed drain care/with pt  at bedside, I reviewed drain flushing with patient and patient , all questions answered.      09/22/2024- LUQ 10 Faroese peritoneal drain to bulb suction with 35 mL purulent  output in the last 24 hours.  I flushed drain with 10 mL of sterile NSS  I reviewed most recent labs: WBC 8.7; H&H 14.5/46.4, Cr 0.53; Coags are 1.36,  Micro- abscess drainage from 09/19 + for MSSA, Streptococcus agalactiae group B; urine from 9/18 + for Candida albicans I discussed plan of care with infectious disease, and patient.  IDT notes reviewed.    09/23/24 - 10 Fr peritoneal drain to LUQ with 25 mL purulent output in the last 24 hours. Labs reviewed by me: WBC 14.2, creatinine 0.67. On ABX through ID. Drain flushed with 10 mL NS. IDT notes reviewed. Discussed with hospitalist; repeat CT pending for elevated white count.  at bedside and all questions answered.     09/24/24 - Repeat CT shows decrease in size of fluid collection. Abscessogram ordered which shows small fluid collection with drain in appropriate position.     09/25/24 - 10 Fr peritoneal drain to LUQ with 10 mL seropurulent output in the last 24 hours. Labs reviewed by me: WBC 16.0. On ABX through ID. Drain flushed with 10 mL NS. IDT notes reviewed.  Abscessogram results reviewed with patient.  Patient voiced frustration with fluctuating white blood cell count.    09/26/2024 - 10 Fr peritoneal drain to LUQ with 10 mL seropurulent output in the last 24 hours. Labs reviewed by me: WBC 14.7. On ABX through ID. Drain flushed with 10 mL NS. IDT notes reviewed.  Joint visit made with Dr. Rooney Infectious disease.  Patient discussed with hospitalist and HPB team.     09/27/24 - 10 Fr peritoneal drain to LUQ with 50 mL seropurulent output in the last 24 hours. Labs reviewed by me: WBC 15.1. On ABX through ID. Drain flushed with 10 mL NS. IDT notes reviewed.  HPB team  "repeating drain fluid amylase. CXR reviewed by me shows left pleural effusion. Patient discussed with hospitalist. Recent images reviewed with pt and daughter at bedside.     09/28/24 - 10 Fr peritoneal drain to LUQ with 23 mL seropurulent output in the last 24 hours. Labs reviewed by me: WBC 13.7, fluid amylase 24. On ABX through ID. Drain flushed with 10 mL NS. IDT notes reviewed.  CXR post thoracentesis yesterday shows decrease in size of left pleural effusion. Patient discussed with hospitalist. Family at bedside.  All questions answered.    09/29/24 - 10 Fr peritoneal drain to LUQ with 28 mL seropurulent output in the last 24 hours. Labs reviewed by me: WBC 13.3. On ABX through ID. Drain flushed with 10 mL NS. IDT notes reviewed. CXR shows slight increase in Left pleural effusion. Family at bedside.  All questions answered.    09/30/24 -  CT abdomen yesterday showed: \"Multiloculated enhancing fluid collection in the left abdomen with pigtail catheter in place, fluid collection has increased in size since prior study.\" 10 Fr peritoneal drain to LUQ with 38 mL seropurulent output in the last 24 hours. Labs reviewed by me: WBC 9.4, H/H 12.6/38.6, Cr 0.62. On ABX through ID. Drain flushed with 10 mL NS. Discussed plan in depth with patient and family at bedside. Discussed plan with ID, IR Dr. Ruff, and I reviewed IDT notes.    Assessment/Plan     Principal Problem:    Intra-abdominal abscess (HCC)  Active Problems:    Acquired hypothyroidism    History of breast cancer    Pulmonary embolism with acute cor pulmonale, unspecified chronicity, unspecified pulmonary embolism type (HCC)    Acute respiratory failure with hypoxia (HCC)    Deep vein thrombosis (DVT) of both lower extremities (HCC)    Atrial thrombus    Primary hypertension    AV block, Mobitz 1    Abdominal infection (HCC)    Thrombocytosis    Pleural effusion    Hypomagnesemia    Hypokalemia      Plan IR  - Continue to irrigate LUQ 10 Fr drain with 10 ml " of sterile saline each shift; do not aspirate back per hepatobiliary request  - Fluid cultures positive for MSSA and Streptococcus agalactiae group B  - pleural fluid from 09/24 with NGTD  - Recommend re-consulting surgery if current plan (drain and abx) is deemed ineffective by IDT.  - Abscessogram and/or modifying tube placement not appropriate at this time, as drain continues to put out 30+cc/24hrs.  - Ultimate plan is for patient to discharge with drain in place on oral abx and follow-up with surgical oncology for drain management/care and ID.   - Continue to monitor drain, VS, and labs   -   -Thank you for allowing Interventional Radiology team to participate in the patients care, if any additional care or requests are needed in the future please do not hesitate to call or place IR order.        Review of Systems  Physical Exam   Review of Systems   Constitutional:  Positive for malaise/fatigue. Negative for chills and fever.   Respiratory:  Negative for shortness of breath.    Cardiovascular:  Negative for chest pain and palpitations.   Gastrointestinal:  Negative for abdominal pain, nausea and vomiting.   Neurological:  Negative for weakness.      Vitals:    09/30/24 0705   BP: (!) 141/68   Pulse: 69   Resp: 16   Temp: 36.8 °C (98.2 °F)   SpO2: 91%        Physical Exam  Constitutional:       Appearance: Normal appearance.   Cardiovascular:      Rate and Rhythm: Normal rate.   Pulmonary:      Effort: Pulmonary effort is normal. No respiratory distress.   Abdominal:      General: There is no distension.      Comments: IR drain to LUQ   Skin:     General: Skin is warm and dry.   Neurological:      General: No focal deficit present.      Mental Status: She is alert and oriented to person, place, and time.   Psychiatric:         Mood and Affect: Mood normal.         Behavior: Behavior normal.             Labs    Recent Labs     09/28/24  0641 09/29/24  0123 09/30/24  0447   WBC 13.7* 13.3* 9.4   RBC 4.43 4.18*  4.31   HEMOGLOBIN 12.9 12.2 12.6   HEMATOCRIT 39.7 38.0 38.8   MCV 89.6 90.9 90.0   MCH 29.1 29.2 29.2   MCHC 32.5 32.1* 32.5   RDW 46.5 47.4 46.6   PLATELETCT 622* 643* 726*   MPV 8.9* 8.7* 8.9*     Recent Labs     09/28/24  0641 09/29/24  0123 09/30/24  0447   SODIUM 135 135 136   POTASSIUM 4.0 4.0 3.8   CHLORIDE 102 99 102   CO2 23 24 24   GLUCOSE 105* 108* 94   BUN 10 10 8   CREATININE 0.54 0.63 0.62   CALCIUM 7.9* 8.3* 8.3*     Recent Labs     09/28/24  0641 09/29/24  0123 09/30/24  0447   CREATININE 0.54 0.63 0.62     CT-ABDOMEN WITH   Final Result         1.  Multiloculated enhancing fluid collection in the left abdomen with pigtail catheter in place, fluid collection has increased in size since prior study.   2.  Loculated appearing left pleural effusion.   3.  Linear consolidations in the bilateral lung bases, left greater than right, appearance favoring atelectasis.   4.  Enlarged bilateral inguinal lymph nodes, consider causes of adenopathy with additional workup as clinically appropriate   5.  Cholelithiasis   6.  Small hiatal hernia   7.  Atherosclerosis and atherosclerotic coronary artery disease      DX-CHEST-LIMITED (1 VIEW)   Final Result      Increased moderate left-sided pleural effusion with associated compressive atelectasis and/or consolidation.      US-THORACENTESIS PUNCTURE LEFT   Final Result      1. Ultrasound guided left sided therapeutic thoracentesis.      2. 600 mL of fluid withdrawn.      DX-CHEST-PORTABLE (1 VIEW)   Final Result      Decreased size of left pleural effusion. No pneumothorax.      DX-CHEST-2 VIEWS   Final Result      1. Interval increase in size of the left pleural effusion, obscuring the left lung base.   2. The remainder of the lungs is clear.   3. Obscuration of the left cardiomediastinal border.   4. Other stable chronic degenerative changes.      US-THORACENTESIS PUNCTURE LEFT   Final Result      1. Ultrasound guided left sided diagnostic and therapeutic  "thoracentesis.      2. 1,150 mL of fluid withdrawn.      DX-CHEST-PORTABLE (1 VIEW)   Final Result      Moderate left pleural effusion and associated atelectasis versus consolidation. It is similar in size to 8/15/2024 radiograph.         IR-DRAINAGE-CATH EXCHANGE   Final Result      Small residual fluid collection with drainage catheter at the peripheral aspect.      CT-ABDOMEN-PELVIS WITH   Final Result      1.  Decreased size of the fluid collection in the distal pancreatectomy-lumpectomy bed following tube placement   2.  Increased LEFT pleural effusion with overlying atelectasis   3.  Hiatal hernia   4.  Persistently thickened endometrium for age. Underlying mass is possible. Ultrasound could better assess.      CT-IMAGE-GUIDED DRAIN PERITONEAL   Final Result      1.  CT GUIDED PERITONEAL LEFT UPPER QUADRANT ABDOMINAL ABSCESS DRAINAGE. PLACEMENT OF 10 Croatian LOCKING LOOP CATHETER.   2.  THE CURRENT PLAN IS TO IRRIGATE ONCE DAILY WITH 5 ML STERILE SALINE, CHECK CULTURES, MONITOR DRAINAGE OUTPUT AND OBTAIN A FOLLOW-UP CT SCAN IN 5-7 DAYS IF CLINICALLY INDICATED.      CT-ABDOMEN-PELVIS WITH   Final Result      1.  Post distal pancreatectomy.      2.  Fluid collection with some peripheral enhancement is identified in the distal pancreatic region including the region of previous pancreatectomy. Developing pseudocyst is a possibility. No emphysema or hemorrhage is appreciated. Remaining pancreas    enhances normally.      3.  There is cholelithiasis.      4.  Left pleural effusion and consolidations in the left lung base.      5.  No change in hiatal hernia.      6.  Prominent endometrial cavity again noted.        INR   Date Value Ref Range Status   09/19/2024 1.36 (H) 0.87 - 1.13 Final     Comment:     INR - Non-therapeutic Reference Range: 0.87-1.13  INR - Therapeutic Reference Range: 2.0-4.0       No results found for: \"POCINR\"     Intake/Output Summary (Last 24 hours) at 9/23/2024 1223  Last data filed at " 9/23/2024 0710  Gross per 24 hour   Intake 100 ml   Output 25 ml   Net 75 ml      I have personally reviewed the above labs and imaging      I have performed a physical exam and reviewed and updated ROS and Plan today (9/30/2024).     55 minutes in directly providing and coordinating care and extensive data review.  No time overlap and excludes procedures.

## 2024-10-01 ENCOUNTER — APPOINTMENT (OUTPATIENT)
Dept: MEDICAL GROUP | Age: 79
End: 2024-10-01
Payer: COMMERCIAL

## 2024-10-01 LAB
ANION GAP SERPL CALC-SCNC: 9 MMOL/L (ref 7–16)
BUN SERPL-MCNC: 9 MG/DL (ref 8–22)
CALCIUM SERPL-MCNC: 8.1 MG/DL (ref 8.5–10.5)
CHLORIDE SERPL-SCNC: 103 MMOL/L (ref 96–112)
CO2 SERPL-SCNC: 24 MMOL/L (ref 20–33)
CREAT SERPL-MCNC: 0.52 MG/DL (ref 0.5–1.4)
ERYTHROCYTE [DISTWIDTH] IN BLOOD BY AUTOMATED COUNT: 46.9 FL (ref 35.9–50)
GFR SERPLBLD CREATININE-BSD FMLA CKD-EPI: 95 ML/MIN/1.73 M 2
GLUCOSE SERPL-MCNC: 101 MG/DL (ref 65–99)
HCT VFR BLD AUTO: 35.9 % (ref 37–47)
HGB BLD-MCNC: 11.7 G/DL (ref 12–16)
MAGNESIUM SERPL-MCNC: 2.2 MG/DL (ref 1.5–2.5)
MCH RBC QN AUTO: 29.3 PG (ref 27–33)
MCHC RBC AUTO-ENTMCNC: 32.6 G/DL (ref 32.2–35.5)
MCV RBC AUTO: 90 FL (ref 81.4–97.8)
PLATELET # BLD AUTO: 745 K/UL (ref 164–446)
PMV BLD AUTO: 8.8 FL (ref 9–12.9)
POTASSIUM SERPL-SCNC: 3.5 MMOL/L (ref 3.6–5.5)
RBC # BLD AUTO: 3.99 M/UL (ref 4.2–5.4)
SODIUM SERPL-SCNC: 136 MMOL/L (ref 135–145)
WBC # BLD AUTO: 9.4 K/UL (ref 4.8–10.8)

## 2024-10-01 PROCEDURE — 770001 HCHG ROOM/CARE - MED/SURG/GYN PRIV*

## 2024-10-01 PROCEDURE — 80048 BASIC METABOLIC PNL TOTAL CA: CPT

## 2024-10-01 PROCEDURE — 85027 COMPLETE CBC AUTOMATED: CPT

## 2024-10-01 PROCEDURE — 700102 HCHG RX REV CODE 250 W/ 637 OVERRIDE(OP): Mod: JZ | Performed by: HOSPITALIST

## 2024-10-01 PROCEDURE — 700111 HCHG RX REV CODE 636 W/ 250 OVERRIDE (IP): Mod: JZ | Performed by: INTERNAL MEDICINE

## 2024-10-01 PROCEDURE — 700105 HCHG RX REV CODE 258: Performed by: INTERNAL MEDICINE

## 2024-10-01 PROCEDURE — 99024 POSTOP FOLLOW-UP VISIT: CPT | Performed by: SURGERY

## 2024-10-01 PROCEDURE — 83735 ASSAY OF MAGNESIUM: CPT

## 2024-10-01 PROCEDURE — A9270 NON-COVERED ITEM OR SERVICE: HCPCS

## 2024-10-01 PROCEDURE — A9270 NON-COVERED ITEM OR SERVICE: HCPCS | Mod: JZ | Performed by: HOSPITALIST

## 2024-10-01 PROCEDURE — 700102 HCHG RX REV CODE 250 W/ 637 OVERRIDE(OP)

## 2024-10-01 PROCEDURE — 99233 SBSQ HOSP IP/OBS HIGH 50: CPT | Performed by: INTERNAL MEDICINE

## 2024-10-01 PROCEDURE — 99233 SBSQ HOSP IP/OBS HIGH 50: CPT | Performed by: HOSPITALIST

## 2024-10-01 PROCEDURE — 700101 HCHG RX REV CODE 250: Performed by: NURSE PRACTITIONER

## 2024-10-01 RX ORDER — POTASSIUM CHLORIDE 1500 MG/1
20 TABLET, EXTENDED RELEASE ORAL ONCE
Status: COMPLETED | OUTPATIENT
Start: 2024-10-01 | End: 2024-10-01

## 2024-10-01 RX ADMIN — AMPICILLIN AND SULBACTAM 3 G: 1; 2 INJECTION, POWDER, FOR SOLUTION INTRAMUSCULAR; INTRAVENOUS at 13:02

## 2024-10-01 RX ADMIN — AMLODIPINE BESYLATE 5 MG: 10 TABLET ORAL at 05:10

## 2024-10-01 RX ADMIN — POTASSIUM CHLORIDE 20 MEQ: 1500 TABLET, EXTENDED RELEASE ORAL at 08:09

## 2024-10-01 RX ADMIN — LIOTHYRONINE SODIUM 2.5 MCG: 5 TABLET ORAL at 05:10

## 2024-10-01 RX ADMIN — SODIUM CHLORIDE, PRESERVATIVE FREE 10 ML: 5 INJECTION INTRAVENOUS at 05:11

## 2024-10-01 RX ADMIN — AMPICILLIN AND SULBACTAM 3 G: 1; 2 INJECTION, POWDER, FOR SOLUTION INTRAMUSCULAR; INTRAVENOUS at 01:57

## 2024-10-01 RX ADMIN — SODIUM CHLORIDE, PRESERVATIVE FREE 10 ML: 5 INJECTION INTRAVENOUS at 16:39

## 2024-10-01 RX ADMIN — AMPICILLIN AND SULBACTAM 3 G: 1; 2 INJECTION, POWDER, FOR SOLUTION INTRAMUSCULAR; INTRAVENOUS at 20:17

## 2024-10-01 RX ADMIN — AMPICILLIN AND SULBACTAM 3 G: 1; 2 INJECTION, POWDER, FOR SOLUTION INTRAMUSCULAR; INTRAVENOUS at 08:08

## 2024-10-01 RX ADMIN — LEVOTHYROXINE SODIUM 100 MCG: 0.1 TABLET ORAL at 05:10

## 2024-10-01 ASSESSMENT — ENCOUNTER SYMPTOMS
SHORTNESS OF BREATH: 0
BLURRED VISION: 0
WEAKNESS: 0
FEVER: 0
DEPRESSION: 1
VOMITING: 0
MYALGIAS: 0
NAUSEA: 0
SPEECH CHANGE: 0
CONSTIPATION: 0
DIZZINESS: 0
HEADACHES: 0
WEIGHT LOSS: 0
DIAPHORESIS: 0
FLANK PAIN: 0
DIARRHEA: 0
SORE THROAT: 0
ABDOMINAL PAIN: 0
HEARTBURN: 0
ABDOMINAL PAIN: 1
CHILLS: 0
NERVOUS/ANXIOUS: 1
SENSORY CHANGE: 0
WEAKNESS: 1
COUGH: 0
PALPITATIONS: 0
FOCAL WEAKNESS: 0

## 2024-10-01 ASSESSMENT — PAIN DESCRIPTION - PAIN TYPE
TYPE: ACUTE PAIN
TYPE: ACUTE PAIN

## 2024-10-01 NOTE — PROGRESS NOTES
[facility] Medicine Daily Progress Note    Date of Service  10/1/2024    Chief Complaint  Paige Gudino is a 78 y.o. female admitted 9/17/2024 with abdominal pain    Hospital Course  Admitted with abdominal pain, CT scan of the abdomen and pelvis showed an abdominal fluid collection.  Patient with history of pancreatic cystadenoma, underwent pancreatectomy, splenectomy, stomach and celiac node dissection on 8/5/2024.  Postoperatively, she had complications of retroperitoneal abscess, requiring CT-guided drainage, bilateral pulmonary embolism with right heart strain, bilateral lower extremity DVTs, right atrial thrombus that required thrombectomy.  Patient was started on empiric coverage with IV Unasyn.  Surgery Oncology was consulted on the case.  Interventional radiology was consulted.  Patient underwent CT guided peritoneal left upper quadrant abdominal abscess drainage, placement of 10 Bengali locking loop catheter on 9/19/2024.  Drain cultures showed MSSA and group B streptococcus.  Infectious disease was consulted on the case.  Patient had persistent leukocytosis.  Repeat CT scan showed decrease size of the fluid collection, and left pleural effusion.  Patient underwent IR abscessogram - small residual fluid collection with drainage catheter at the peripheral aspect and Ultrasound guided left sided diagnostic and therapeutic thoracentesis, 1,150 mL of fluid withdrawn on 9/25/2024.  Repeat chest x-ray showed recurrence of the pleural effusion, patient underwent Ultrasound guided left sided therapeutic thoracentesis, 600 mL of fluid withdrawn on 9/27/2024.  Pleural fluid cultures were negative.    Interval Problem Update  Abdominal abscess - pain controlled, discussed case with Surgery  Hypertension - sbp 115-141  Pleural effusion - loculated, plastic surgery consulted, discussed case with surgery    Updates given and plan of care discussed with patient's spouse who was at bedside.    10/1 patient is new  to me today, patient is in bed, no fever or chills no nausea or vomiting. Chest tube placement tomorrow, holding a/c,notes from surgery reviewed, available imaging studies and laboratory results reviewed. Discussed with bedside nurse, charge nurse, . Continue on unasyn.     I have discussed this patient's plan of care and discharge plan at IDT rounds today with Case Management, Nursing, Nursing leadership, and other members of the IDT team.    Consultants/Specialty  infectious disease and surgery oncology, interventional radiology    Code Status  Full Code    Disposition  The patient is not medically cleared for discharge to home or a post-acute facility.      I have placed the appropriate orders for post-discharge needs.    Review of Systems  Review of Systems   Constitutional:  Negative for chills, diaphoresis, fever and malaise/fatigue.   HENT:  Negative for congestion, hearing loss and sore throat.    Eyes:  Negative for blurred vision.   Respiratory:  Negative for cough.    Cardiovascular:  Negative for chest pain, palpitations and leg swelling.   Gastrointestinal:  Positive for abdominal pain. Negative for diarrhea, heartburn, nausea and vomiting.   Genitourinary:  Negative for dysuria, flank pain and hematuria.   Musculoskeletal:  Negative for joint pain and myalgias.   Skin:  Negative for rash.   Neurological:  Positive for weakness. Negative for dizziness, sensory change, speech change, focal weakness and headaches.   Psychiatric/Behavioral:  Positive for depression. The patient is nervous/anxious.         Physical Exam  Temp:  [36.5 °C (97.7 °F)-37.2 °C (99 °F)] 36.5 °C (97.7 °F)  Pulse:  [72-85] 72  Resp:  [16] 16  BP: (119-139)/(64-74) 125/64  SpO2:  [88 %-92 %] 91 %    Physical Exam  Vitals and nursing note reviewed.   HENT:      Head: Normocephalic and atraumatic.      Nose: No congestion.      Mouth/Throat:      Mouth: Mucous membranes are moist.   Eyes:      General: No scleral  icterus.  Cardiovascular:      Rate and Rhythm: Normal rate and regular rhythm.   Pulmonary:      Effort: Pulmonary effort is normal.      Breath sounds: Decreased air movement present. Examination of the left-lower field reveals decreased breath sounds. Decreased breath sounds present.   Abdominal:      General: There is distension.      Tenderness: There is abdominal tenderness. There is no guarding.      Comments: Left upper quadrant drain   Musculoskeletal:      Cervical back: No tenderness.      Right lower leg: No edema.      Left lower leg: No edema.   Skin:     General: Skin is warm and dry.   Neurological:      General: No focal deficit present.      Mental Status: She is alert and oriented to person, place, and time.      Cranial Nerves: No cranial nerve deficit.   Psychiatric:         Mood and Affect: Mood is anxious and depressed. Affect is tearful.         Fluids    Intake/Output Summary (Last 24 hours) at 10/1/2024 1352  Last data filed at 10/1/2024 0713  Gross per 24 hour   Intake 1640 ml   Output 7.5 ml   Net 1632.5 ml        Laboratory  Recent Labs     09/29/24  0123 09/30/24  0447 10/01/24  0226   WBC 13.3* 9.4 9.4   RBC 4.18* 4.31 3.99*   HEMOGLOBIN 12.2 12.6 11.7*   HEMATOCRIT 38.0 38.8 35.9*   MCV 90.9 90.0 90.0   MCH 29.2 29.2 29.3   MCHC 32.1* 32.5 32.6   RDW 47.4 46.6 46.9   PLATELETCT 643* 726* 745*   MPV 8.7* 8.9* 8.8*     Recent Labs     09/29/24  0123 09/30/24  0447 10/01/24  0226   SODIUM 135 136 136   POTASSIUM 4.0 3.8 3.5*   CHLORIDE 99 102 103   CO2 24 24 24   GLUCOSE 108* 94 101*   BUN 10 8 9   CREATININE 0.63 0.62 0.52   CALCIUM 8.3* 8.3* 8.1*                   Imaging  CT-ABDOMEN WITH   Final Result         1.  Multiloculated enhancing fluid collection in the left abdomen with pigtail catheter in place, fluid collection has increased in size since prior study.   2.  Loculated appearing left pleural effusion.   3.  Linear consolidations in the bilateral lung bases, left greater than  right, appearance favoring atelectasis.   4.  Enlarged bilateral inguinal lymph nodes, consider causes of adenopathy with additional workup as clinically appropriate   5.  Cholelithiasis   6.  Small hiatal hernia   7.  Atherosclerosis and atherosclerotic coronary artery disease      DX-CHEST-LIMITED (1 VIEW)   Final Result      Increased moderate left-sided pleural effusion with associated compressive atelectasis and/or consolidation.      US-THORACENTESIS PUNCTURE LEFT   Final Result      1. Ultrasound guided left sided therapeutic thoracentesis.      2. 600 mL of fluid withdrawn.      DX-CHEST-PORTABLE (1 VIEW)   Final Result      Decreased size of left pleural effusion. No pneumothorax.      DX-CHEST-2 VIEWS   Final Result      1. Interval increase in size of the left pleural effusion, obscuring the left lung base.   2. The remainder of the lungs is clear.   3. Obscuration of the left cardiomediastinal border.   4. Other stable chronic degenerative changes.      US-THORACENTESIS PUNCTURE LEFT   Final Result      1. Ultrasound guided left sided diagnostic and therapeutic thoracentesis.      2. 1,150 mL of fluid withdrawn.      DX-CHEST-PORTABLE (1 VIEW)   Final Result      Moderate left pleural effusion and associated atelectasis versus consolidation. It is similar in size to 8/15/2024 radiograph.         IR-DRAINAGE-CATH EXCHANGE   Final Result      Small residual fluid collection with drainage catheter at the peripheral aspect.      CT-ABDOMEN-PELVIS WITH   Final Result      1.  Decreased size of the fluid collection in the distal pancreatectomy-lumpectomy bed following tube placement   2.  Increased LEFT pleural effusion with overlying atelectasis   3.  Hiatal hernia   4.  Persistently thickened endometrium for age. Underlying mass is possible. Ultrasound could better assess.      CT-IMAGE-GUIDED DRAIN PERITONEAL   Final Result      1.  CT GUIDED PERITONEAL LEFT UPPER QUADRANT ABDOMINAL ABSCESS DRAINAGE.  PLACEMENT OF 10 Occitan LOCKING LOOP CATHETER.   2.  THE CURRENT PLAN IS TO IRRIGATE ONCE DAILY WITH 5 ML STERILE SALINE, CHECK CULTURES, MONITOR DRAINAGE OUTPUT AND OBTAIN A FOLLOW-UP CT SCAN IN 5-7 DAYS IF CLINICALLY INDICATED.      CT-ABDOMEN-PELVIS WITH   Final Result      1.  Post distal pancreatectomy.      2.  Fluid collection with some peripheral enhancement is identified in the distal pancreatic region including the region of previous pancreatectomy. Developing pseudocyst is a possibility. No emphysema or hemorrhage is appreciated. Remaining pancreas    enhances normally.      3.  There is cholelithiasis.      4.  Left pleural effusion and consolidations in the left lung base.      5.  No change in hiatal hernia.      6.  Prominent endometrial cavity again noted.      IR-CONSULT AND TREAT    (Results Pending)   IR-CONSULT AND TREAT    (Results Pending)        Assessment/Plan  * Intra-abdominal abscess (HCC)- (present on admission)  Assessment & Plan  CT guided peritoneal left upper quadrant abdominal abscess drainage, placement of 10 Bahamian locking loop catheter  9/19/2024  IR abscessogram - small residual fluid collection with drainage catheter at the peripheral aspect  9/24/2024  IV Unasyn  Pain control    Hypokalemia- (present on admission)  Assessment & Plan  Replacing today.     Hypomagnesemia- (present on admission)  Assessment & Plan  Mg 2.2     Pleural effusion- (present on admission)  Assessment & Plan  Ultrasound guided left sided diagnostic and therapeutic thoracentesis, 1,150 mL of fluid withdrawn  9/24/2024  Ultrasound guided left sided therapeutic thoracentesis, 600 mL of fluid withdrawn  9/27/2024  Thoracic surgery consulted  Chest tube in am.     Thrombocytosis- (present on admission)  Assessment & Plan  Follow cbc    Abdominal infection (HCC)- (present on admission)  Assessment & Plan  Monitoring.     AV block, Mobitz 1- (present on admission)  Assessment & Plan  monitor    Primary  hypertension- (present on admission)  Assessment & Plan  Amlodipine    Atrial thrombus- (present on admission)  Assessment & Plan  History of thrombectomy  Hold Eliquis  Chest tube in am.    Deep vein thrombosis (DVT) of both lower extremities (HCC)- (present on admission)  Assessment & Plan  Hold Eliquis for possible procedure  Chest tube in am.     Acute respiratory failure with hypoxia (HCC)- (present on admission)  Assessment & Plan  RT protocol    Pulmonary embolism with acute cor pulmonale, unspecified chronicity, unspecified pulmonary embolism type (HCC)- (present on admission)  Assessment & Plan  Hold Eliquis for possible procedure    History of breast cancer- (present on admission)  Assessment & Plan  Follow up with Oncology    Acquired hypothyroidism- (present on admission)  Assessment & Plan  Levothyroxine and Liothyronine          VTE prophylaxis: scd's     I have performed a physical exam and reviewed and updated ROS and Plan today (10/1/2024). In review of yesterday's note (9/30/2024), there are no changes except as documented above.      Total time of 52 minutes spent prepping to see patient (e.g. reviewing  tests/imaging results, notes from consultants, bedside nurse, night shift ) obtaining and/or reviewing separately obtained history. Performing a medically appropriate examination and evaluation.  Counseling and educating the patient.  Ordering medications, tests, or procedures.  Referring and communicating with other health care professionals.  Documenting clinical information in EPIC.  Independently interpreting results and communicating results to patient.  Care coordination.

## 2024-10-01 NOTE — PROGRESS NOTES
Date of Service  October 1, 2024     Chief Complaint  Sent by MD (Sent by surgeon for elevated WBC. Pt reports abdominal drain removed last week and is concerned for infection at the site. Denies pain. )     Surgery  IR drain on 9/19/24 by Dr Ruff  US Thoracentesis on 9/24/24 by Jolie NP - 1150 mL  IR drain study on 9/24/24 by Dr Bowen  US Thoracentesis on 9/27/24 by Jolie NP - 600 mL    Hospital Course  POD# 12     Interval Problem Update  No acute events overnight  Minimal output from the YEMI drain 12 mL  Leukocytosis appears resolved WBC 9.4, afebrile, Unasyn  Tolerating diet, denies N/V    Discussion with Thoracic surgery yesterday. Consideration for IR chest tube placement.    Problem List  Principal Problem:    Intra-abdominal abscess (HCC) (POA: Yes)  Active Problems:    Acquired hypothyroidism (POA: Yes)    History of breast cancer (POA: Yes)    Pulmonary embolism with acute cor pulmonale, unspecified chronicity, unspecified pulmonary embolism type (HCC) (POA: Yes)    Acute respiratory failure with hypoxia (HCC) (POA: Yes)    Deep vein thrombosis (DVT) of both lower extremities (HCC) (POA: Yes)    Atrial thrombus (POA: Yes)    Primary hypertension (POA: Yes)    AV block, Mobitz 1 (POA: Yes)    Abdominal infection (HCC) (POA: Yes)    Thrombocytosis (POA: Yes)    Pleural effusion (POA: Yes)    Hypomagnesemia (POA: Yes)    Hypokalemia (POA: Yes)  Resolved Problems:    Sepsis (HCC) (POA: Yes)     Subjective  Review of Systems   Constitutional:  Negative for chills, malaise/fatigue and weight loss.   Respiratory:  Negative for cough and shortness of breath.    Cardiovascular:  Negative for chest pain.   Gastrointestinal:  Negative for abdominal pain, constipation, diarrhea, nausea and vomiting.       Objective  Temp:  [36.5 °C (97.7 °F)-37.2 °C (99 °F)] 36.5 °C (97.7 °F)  Pulse:  [72-85] 72  Resp:  [16] 16  BP: (119-139)/(64-74) 125/64  SpO2:  [88 %-92 %] 91 %      Physical Exam  Vitals and nursing note reviewed.    Constitutional:       General: She is not in acute distress.     Appearance: Normal appearance.   HENT:      Head: Normocephalic and atraumatic.      Nose: Nose normal.      Mouth/Throat:      Pharynx: Oropharynx is clear.   Eyes:      Conjunctiva/sclera: Conjunctivae normal.   Cardiovascular:      Rate and Rhythm: Normal rate.   Pulmonary:      Effort: Pulmonary effort is normal. No respiratory distress.      Breath sounds: Normal breath sounds.      Comments: 0.5 LPM O2  Abdominal:      General: Abdomen is flat. There is no distension.      Tenderness: There is no abdominal tenderness.      Comments: LUQ IR drain   Skin:     General: Skin is warm and dry.   Neurological:      Mental Status: She is alert and oriented to person, place, and time.   Psychiatric:         Mood and Affect: Mood is depressed.         Behavior: Behavior normal.      Fluids    Intake/Output Summary (Last 24 hours) at 10/1/2024 0808  Last data filed at 10/1/2024 0000  Gross per 24 hour   Intake 1640 ml   Output 12.5 ml   Net 1627.5 ml       Labs  Lab Results   Component Value Date/Time    SODIUM 136 10/01/2024 02:26 AM    POTASSIUM 3.5 (L) 10/01/2024 02:26 AM    CHLORIDE 103 10/01/2024 02:26 AM    CO2 24 10/01/2024 02:26 AM    GLUCOSE 101 (H) 10/01/2024 02:26 AM    BUN 9 10/01/2024 02:26 AM    CREATININE 0.52 10/01/2024 02:26 AM         Lab Results   Component Value Date/Time    PROTHROMBTM 16.8 (H) 09/19/2024 12:33 AM    INR 1.36 (H) 09/19/2024 12:33 AM         Lab Results   Component Value Date/Time    WBC 9.4 10/01/2024 02:26 AM    RBC 3.99 (L) 10/01/2024 02:26 AM    HEMOGLOBIN 11.7 (L) 10/01/2024 02:26 AM    HEMATOCRIT 35.9 (L) 10/01/2024 02:26 AM    MCV 90.0 10/01/2024 02:26 AM    MCH 29.3 10/01/2024 02:26 AM    MCHC 32.6 10/01/2024 02:26 AM    MPV 8.8 (L) 10/01/2024 02:26 AM    NEUTSPOLYS 77.70 (H) 09/24/2024 12:04 AM    LYMPHOCYTES 9.20 (L) 09/24/2024 12:04 AM    MONOCYTES 10.60 09/24/2024 12:04 AM    EOSINOPHILS 0.40 09/24/2024  12:04 AM    BASOPHILS 0.20 09/24/2024 12:04 AM    ANISOCYTOSIS 1+ 08/15/2024 03:30 AM           Imaging  CT ABDOMEN (9/29/24)  IMPRESSION:  1.  Multiloculated enhancing fluid collection in the left abdomen with pigtail catheter in place, fluid collection has increased in size since prior study.  2.  Loculated appearing left pleural effusion.  3.  Linear consolidations in the bilateral lung bases, left greater than right, appearance favoring atelectasis.  4.  Enlarged bilateral inguinal lymph nodes, consider causes of adenopathy with additional workup as clinically appropriate  5.  Cholelithiasis  6.  Small hiatal hernia  7.  Atherosclerosis and atherosclerotic coronary artery disease    CT A/P (9/23/24)  IMPRESSION:  1.  Decreased size of the fluid collection in the distal pancreatectomy-lumpectomy bed following tube placement  2.  Increased LEFT pleural effusion with overlying atelectasis  3.  Hiatal hernia  4.  Persistently thickened endometrium for age. Underlying mass is possible. Ultrasound could better assess.    CT A/P (9/17/24)  IMPRESSION:  1.  Post distal pancreatectomy.     2.  Fluid collection with some peripheral enhancement is identified in the distal pancreatic region including the region of previous pancreatectomy. Developing pseudocyst is a possibility. No emphysema or hemorrhage is appreciated. Remaining pancreas   enhances normally.     3.  There is cholelithiasis.     4.  Left pleural effusion and consolidations in the left lung base.     5.  No change in hiatal hernia.     6.  Prominent endometrial cavity again noted.     Procedures  PROCEDURE (9/27/24)  IMPRESSION:  1. Ultrasound guided left sided therapeutic thoracentesis.  2. 600 mL of fluid withdrawn.    PROCEDURE (9/24/24)  IMPRESSION:  1. Ultrasound guided left sided therapeutic thoracentesis.  2. 1150 mL of fluid withdrawn.    Assessment/Plan  Patient is a 78F with recent pancreatectomy and splenectomy, subsequent retroperitoneal abscess  with drain placement. Now admitted from ED with leukocytosis WBC 23.8 noted to have recurrence of fluid collection in the distal pancreatic region. Left side pleural effusion with thoracentesis x2     Katelynn-pancreatic fluid collection, s/p distal pancreatectomy / splenectomy  - Continue IR drain, if continues to have low output may DC after chest tube placed  - NPO awaiting IR procedure   - Flush of IR drain per protocol  - OOB/ambulate  - Encourage IS    2. Leukocytosis, WBC 9.4, appears resolved  - Antibiotics per ID, last dose on 10/4/24      3. Pleural effusion, s/p US thoracentesis completed on 9/24/24 and 9/27/24  - Plan IR chest tube, followed by Dr Salu, Thoracic surgery    Patient seen with LATONIA Stout.

## 2024-10-01 NOTE — CARE PLAN
The patient is Stable - Low risk of patient condition declining or worsening    Shift Goals  Clinical Goals: oob activity, skin integrity, pain control  Patient Goals: updates, comfort  Family Goals: updates, comfort    Progress made toward(s) clinical / shift goals:  Patient ambulating frequently. Patient denies pain.    Problem: Knowledge Deficit - Standard  Goal: Patient and family/care givers will demonstrate understanding of plan of care, disease process/condition, diagnostic tests and medications  Description: Target End Date:  1-3 days or as soon as patient condition allows    Document in Patient Education    1.  Patient and family/caregiver oriented to unit, equipment, visitation policy and means for communicating concern  2.  Complete/review Learning Assessment  3.  Assess knowledge level of disease process/condition, treatment plan, diagnostic tests and medications  4.  Explain disease process/condition, treatment plan, diagnostic tests and medications  Outcome: Progressing     Problem: Pain - Standard  Goal: Alleviation of pain or a reduction in pain to the patient’s comfort goal  Description: Target End Date:  Prior to discharge or change in level of care    Document on Vitals flowsheet    1.  Document pain using the appropriate pain scale per order or unit policy  2.  Educate and implement non-pharmacologic comfort measures (i.e. relaxation, distraction, massage, cold/heat therapy, etc.)  3.  Pain management medications as ordered  4.  Reassess pain after pain med administration per policy  5.  If opiods administered assess patient's response to pain medication is appropriate per POSS sedation scale  6.  Follow pain management plan developed in collaboration with patient and interdisciplinary team (including palliative care or pain specialists if applicable)  Outcome: Progressing     Problem: Communication  Goal: The ability to communicate needs accurately and effectively will improve  Outcome:  Progressing

## 2024-10-01 NOTE — PROGRESS NOTES
Infectious Disease Progress Note    Author: Bao Rooney M.D. Date & Time of service: 10/1/2024  9:45 AM    Chief Complaint:  Follow-up for abdominal abscess    Interval History:   Tmax 100.3 yesterday evening, white count down to 8.7, tolerated switch to Unasyn, culture results as below, creatinine 0.53, normal transaminases   Tmax 99.1, cultures as below, no new labs this morning, repeat imaging.   patient remains afebrile, white count is 14.2 today and patient feeling somewhat ill compared to yesterday, tolerating Unasyn, creatinine 0.53, normal transaminases, cultures as below   patient remains afebrile, white count 17.9, CT scan with improvement in size of the abscess, measuring 5.2 x 2.4 cm, but the tube is not in the bulk of the fluid component, current plan is for upsize.  Also noted worsening left-sided pleural effusion, plan is for thoracentesis   patient remains afebrile, white count fluctuating but down some to 14.7 today, underwent thoracentesis on  with 3500 white cells, 66% polys, negative Gram stain and cultures thus far   patient remains afebrile, white count is down to 13.7 today, cultures as below, creatinine 0.54, magnesium 2.1, Fluid amylase was sent, presumably from the IR drain, measured at 24, feeling better overall today  10/1 Tmax 99, currently down to 9.4, IR drain continues with more than 30 cc output every 24 hours, repeat CT scan with slight worsening of size of abscess, no new growth on cultures, creatinine 0.52, magnesium 2.2    Labs Reviewed and Medications Reviewed.    Review of Systems:  Review of Systems   Constitutional:  Negative for chills and fever.   Skin:  Negative for itching and rash.     Hemodynamics:  Temp (24hrs), Av.8 °C (98.3 °F), Min:36.5 °C (97.7 °F), Max:37.2 °C (99 °F)  Temperature: 36.5 °C (97.7 °F)  Pulse  Av.8  Min: 43  Max: 129   Blood Pressure : 125/64       Physical Exam:  Physical Exam  Vitals and nursing note  reviewed.   Constitutional:       General: She is not in acute distress.     Appearance: She is not ill-appearing.   Eyes:      Conjunctiva/sclera: Conjunctivae normal.   Cardiovascular:      Rate and Rhythm: Normal rate.   Pulmonary:      Effort: Pulmonary effort is normal. No respiratory distress.      Breath sounds: No stridor.   Abdominal:      General: There is no distension.      Comments: IR drain in place with moderate amount of purulent output in bulb   Musculoskeletal:         General: No swelling or tenderness.   Skin:     Findings: No erythema or rash.   Neurological:      General: No focal deficit present.      Mental Status: She is alert.   Psychiatric:         Mood and Affect: Mood normal.         Behavior: Behavior normal.         Meds:    Current Facility-Administered Medications:     simethicone    cyclobenzaprine    lidocaine    ampicillin-sulbactam (UNASYN) IV    normal saline flush    liothyronine **AND** liothyronine    acetaminophen    senna-docusate **AND** polyethylene glycol/lytes    amLODIPine    levothyroxine    Labs:  Recent Labs     09/29/24  0123 09/30/24 0447 10/01/24  0226   WBC 13.3* 9.4 9.4   RBC 4.18* 4.31 3.99*   HEMOGLOBIN 12.2 12.6 11.7*   HEMATOCRIT 38.0 38.8 35.9*   MCV 90.9 90.0 90.0   MCH 29.2 29.2 29.3   RDW 47.4 46.6 46.9   PLATELETCT 643* 726* 745*   MPV 8.7* 8.9* 8.8*     Recent Labs     09/29/24  0123 09/30/24 0447 10/01/24  0226   SODIUM 135 136 136   POTASSIUM 4.0 3.8 3.5*   CHLORIDE 99 102 103   CO2 24 24 24   GLUCOSE 108* 94 101*   BUN 10 8 9     Recent Labs     09/29/24  0123 09/30/24 0447 10/01/24  0226   CREATININE 0.63 0.62 0.52       Imaging:  CT-IMAGE-GUIDED DRAIN PERITONEAL    Result Date: 9/19/2024 9/19/2024 9:41 AM HISTORY/REASON FOR EXAM:  Pancreatic abscess drain placement; Anterior LUQ. History of distal pancreas resection. Distal pancreatectomy. Previous catheter drainage for left upper quadrant collection 8/22/2024. Recurrent or residual  collection seen on recent CT 9/17/2024 TECHNIQUE/EXAM DESCRIPTION: Left upper quadrant abdominal  abscess drainage with CT guidance. Low dose optimization technique was utilized for this CT exam including automated exposure control and adjustment of the mA and/or kV according to patient size. ALL ELEMENTS OF MAXIMAL STERILE BARRIER TECHNIQUE WERE FOLLOWED. SEDATION TIME: 30 minutes. Moderate sedation was administered with fentanyl and Versed with continuous patient monitoring by the interventional radiology nurse. PROCEDURE: Informed consent was obtained. Localizing CT images were obtained with the patient in supine position. The skin was prepped with ChloraPrep and draped in a sterile fashion. Moderate sedation was provided. Pulse oximetry and continuous cardiac monitoring by the nurse was performed throughout the exam. Intraservice time was 30 minutes. Following local anesthesia with 1% Lidocaine, a 17 G guiding needle was placed and needle path confirmed with CT. An Amplatz guidewire was placed and following serial tract dilatation, a 10 [place] pigtail locking catheter was placed. A specimen was collected and submitted for culture and sensitivity and Gram stain. Total of 40 mL yellowish-greenish pus was drained. The catheter was secured to the skin and connected to suction bulb drainage. Final CT images were obtained documenting catheter position. The patient tolerated the procedure well with no evidence of complication. COMPARISON: CT of the abdomen and pelvis 9/17/2024, CT-guided left upper quadrant catheter drainage 8/22/2024 FINDINGS: The final CT images show satisfactory catheter position within the target collection.     1.  CT GUIDED PERITONEAL LEFT UPPER QUADRANT ABDOMINAL ABSCESS DRAINAGE. PLACEMENT OF 10 [place] LOCKING LOOP CATHETER. 2.  THE CURRENT PLAN IS TO IRRIGATE ONCE DAILY WITH 5 ML STERILE SALINE, CHECK CULTURES, MONITOR DRAINAGE OUTPUT AND OBTAIN A FOLLOW-UP CT SCAN IN 5-7 DAYS IF CLINICALLY  INDICATED.    CT-ABDOMEN-PELVIS WITH    Result Date: 9/17/2024 9/17/2024 9:29 PM HISTORY/REASON FOR EXAM:  Abdominal pelvic pain. TECHNIQUE/EXAM DESCRIPTION:   CT scan of the abdomen and pelvis with contrast. Contrast-enhanced helical scanning was obtained from the diaphragmatic domes through the pubic symphysis following the bolus administration of nonionic contrast without complication. 100 mL of Omnipaque 350 nonionic contrast was administered without complication. Low dose optimization technique was utilized for this CT exam including automated exposure control and adjustment of the mA and/or kV according to patient size. COMPARISON: 08/27/2024, 08/20/2024 FINDINGS: Lower Chest: There is left pleural effusion and consolidation in the left lower pulmonary lobe as well as lingular segment left upper lobe.. Hiatal hernia is identified. Liver: Normal. Spleen: Not identified. Pancreas: Post distal pancreatectomy and splenectomy. Fluid collection with irregular shape and some peripheral enhancement distal to the postsurgical site is identified which is multilobulated. Largest component measures approximately 5.7 x 3.0 cm. Fluid extends up to the left hemidiaphragm. Percutaneous drain no longer identified.. Gallbladder: Cholelithiasis. Biliary: Nondilated. Adrenal glands: Normal. Kidneys: Unremarkable without hydronephrosis. Bowel: No obstruction or acute inflammation. Lymph nodes: No adenopathy. Vasculature: Unremarkable. Peritoneum: Unremarkable without ascites. Musculoskeletal: No acute or destructive process. Pelvis: No adenopathy or free fluid. Endometrial cavity appears prominent as on the prior CT.     1.  Post distal pancreatectomy. 2.  Fluid collection with some peripheral enhancement is identified in the distal pancreatic region including the region of previous pancreatectomy. Developing pseudocyst is a possibility. No emphysema or hemorrhage is appreciated. Remaining pancreas enhances normally. 3.  There is  cholelithiasis. 4.  Left pleural effusion and consolidations in the left lung base. 5.  No change in hiatal hernia. 6.  Prominent endometrial cavity again noted.    CT-PANCREAS AND ABDOMEN WITH & W/O    Result Date: 8/27/2024 8/27/2024 6:38 PM HISTORY/REASON FOR EXAM:  s/p distal pancreatectomy and splenectomy with pathology of benign pancreatic serous cystadenoma in a background of chronic pancreatitis, hx LUQ fluid collection s/p aspiration. Evaluate for recurrent fluid collection.. TECHNIQUE/EXAM DESCRIPTION:  CT pancreas and abdomen without and with contrast. Initial precontrast thick-section images were obtained through the pancreas.  Following this, nonionic contrast was administered by IV, and thin-section helical scanning performed from the diaphragmatic domes through the iliac crests.  Pancreatic parenchymal phase and portal venous phase (dual-phase) images were obtained following contrast administration. 100 mL of Omnipaque 350 nonionic contrast was administered. Low dose optimization technique was utilized for this CT exam including automated exposure control and adjustment of the mA and/or kV according to patient size. COMPARISON: 8/22/2024 FINDINGS: Liver: Unremarkable Spleen: Absent Biliary system: Gallbladder shows multiple stones and minimal wall thickening. Common bile duct is not dilated. Pancreas: Prior distal pancreatectomy.  Minimal edema adjacent to surgical site.  No peripancreatic fluid collection demonstrated. Pancreatic duct: There is no pancreatic ductal dilatation. Arterial vasculature: The celiac axis has a normal branching pattern. The SMA is patent. Venous vasculature: Portable and superior mesenteric veins are patent.  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 Vascular encasement: None. Kidneys: Kidneys are unremarkable. Adrenals: Adrenals are unremarkable. Lymph nodes: There are no enlarged nodes. Bowel: No bowel obstruction.  No focal  bowel wall thickening.  Sparse colonic diverticula. Lung bases: Small LEFT pleural effusion with associated atelectasis.  Minimal RIGHT pleural fluid. Bones: Degenerative change of lumbar spine.  Prior lumbar spine surgery. LEFT upper quadrant drain in place.  No residual fluid collection demonstrated.  Minimal peritoneal gas.  Postoperative change of the anterior abdominal wall.     1.  Prior distal pancreatectomy and splenectomy.  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 2.  LEFT upper quadrant drain in place with minimal adjacent peritoneal gas.  No significant residual or recurrent fluid collection demonstrated. 3.  Gallstones and minimal gallbladder wall thickening.  Cholecystitis is not excluded. 4.  Bilateral pleural fluid collections with associated atelectasis, worse on the LEFT.     CT-IMAGE-GUIDED DRAIN PERITONEAL    Result Date: 8/23/2024 8/22/2024 3:15 PM HISTORY/REASON FOR EXAM:  LUQ fluid collection, hx distal pancreatectomy and splenectomy; Was on Eliquis, now on Heparin. TECHNIQUE/EXAM DESCRIPTION: Left upper quadrant fluid collection drainage with CT guidance. Low dose optimization technique was utilized for this CT exam including automated exposure control and adjustment of the mA and/or kV according to patient size. Moderate sedation was provided. Pulse oximetry and continuous cardiac monitoring by the nurse was performed throughout the exam. Intraservice time was 10 minutes. PROCEDURE: Informed consent was obtained. Localizing CT images were obtained with the patient in supine position. The procedure was performed using MAXIMAL STERILE BARRIER technique including sterile gown, mask, cap, and donning of sterile gloves following appropriate hand hygiene and/or sterile scrub. Patient skin site was prepped with 2% Chlorhexidine solution. Following local anesthesia with 1% Lidocaine, a 17 G guiding needle was placed and needle path confirmed with CT. An  Amplatz guidewire was placed and following serial tract dilatation, a 18 [place]ian pigtail locking catheter was placed. A specimen was collected and submitted for culture and sensitivity and Gram stain. Total of 200 mL thin brown fluid was drained. The catheter was secured to the skin and connected to suction bulb drainage. Final CT images were obtained documenting catheter position. The patient tolerated the procedure well with no evidence of complication. COMPARISON: 8/20/2024 FINDINGS: The final CT images show satisfactory catheter position within the target collection.     1.  CT guided left upper quadrant fluid collection catheter drainage.      Micro:  Results       Procedure Component Value Units Date/Time    Fungal Culture [[id number]] Collected: 09/24/24 1213    Order Status: Completed Specimen: Body Fluid Updated: 09/27/24 0919     Significant Indicator NEG     Source BF     Site Thoracentesis Fluid     Culture Result Culture in progress.     Fungal Smear Results No fungal elements seen.    AFB Culture [[id number]] Collected: 09/24/24 1213    Order Status: Completed Specimen: Body Fluid Updated: 09/27/24 0919     Significant Indicator NEG     Source BF     Site Thoracentesis Fluid     Culture Result Culture in progress.     AFB Smear Results No acid fast bacilli seen.    FLUID CULTURE W/GRAM STAIN [[id number]] Collected: 09/24/24 1213    Order Status: Completed Specimen: Body Fluid Updated: 09/27/24 0919     Significant Indicator NEG     Source BF     Site Thoracentesis Fluid     Culture Result No growth at 72 hours.     Gram Stain Result Few WBCs.  No organisms seen.      GRAM STAIN [[id number]] Collected: 09/24/24 1213    Order Status: Completed Specimen: Body Fluid Updated: 09/25/24 1650     Significant Indicator .     Source BF     Site Thoracentesis Fluid     Gram Stain Result Few WBCs.  No organisms seen.      Fungal Smear [[id number]] Collected: 09/24/24 1213    Order Status: Completed Specimen: Body Fluid  Updated: 09/25/24 1650     Significant Indicator NEG     Source BF     Site Thoracentesis Fluid     Fungal Smear Results No fungal elements seen.    Acid Fast Stain [[id number]] Collected: 09/24/24 1213    Order Status: Completed Specimen: Body Fluid Updated: 09/25/24 1650     Significant Indicator NEG     Source BF     Site Thoracentesis Fluid     AFB Smear Results No acid fast bacilli seen.            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 Cultures at the time grew Staph epidermidis, susceptibilities not performed.  Sent to the ER by surgical oncologist due to leukocytosis on recent labs along with episodes of epigastric pain.  CT abdomen and pelvis noted fluid collection in the distal pancreatic region.  IR drain placed on 9/19, 40 mL of purulent fluid was noted.  Cultures are growing MSSA and group B Strep.      Pertinent Diagnoses:  Intra-abdominal abscess  Polymicrobial infection  Left pleural effusion  Pancreatic cystadenoma status post recent pancreatectomy, splenectomy, stomach and celiac node dissection 8/5/2024  Recurrent breast cancer, on tamoxifen  History of PE    Plan:  -Continue IV Unasyn 3 g every 6 hours while inpatient  -IR on board.  Repeat CT scan 9/23 due to new leukocytosis, notes slight improvement in size of the abscess, underwent abscessogram, drain position appropriate, no obvious fistulous communication noted  -Also noted worsening left-sided pleural effusion, underwent thoracentesis 9/24, 3500 white  cells, 66% neutrophils, Gram stain and cultures negative  -Repeat CT scan 9/30 with slight worsening in size of abscess, ongoing IR drain output of more than 30 cc/ 24 hours  -Discussed with surgery APRN Anderson Gonzalez. Per surgery team assessment, the fluid collection is stable (and slight increase likely secondary to IR flushed), IR drain in the right spot, and patient will need more time for resolution.  They are focusing on the reaccumulating effusion and the current CT surgery plan is to place a pigtail catheter and once this is taken care of, they plan to discharge patient home and follow her in the outpatient setting with management of drains.  There is no surgery planned for the near future    Disposition: Anticipate no ID specific disposition needs.  Will defer management to surgery.  On discharge, can switch to p.o. Augmentin 875 mg twice daily for likely an additional 4 weeks through 10/28.  Note that the antibiotics are only preventing progression of infection to sepsis and are currently not performing a curative role given the lack of underlying source control.  Patient is at significant risk for increased morbidity from antibiotic adverse effects as well as increasing resistance of organisms and development of C. difficile  Repeat CBC with differential, CMP in 2 weeks ordered  Need for PICC line: No    UPDATE 10/2: Surgery team, Anderson KEITH notified us that they are going to do 5 days of PO Augmentin and then discontinue antibiotics. Will defer management to Surgery including outpatient drain management.    Discussed with IR and Dr. Schaffer.  ID will sign off.  This illness poses a threat to life.

## 2024-10-01 NOTE — PROGRESS NOTES
Report received from RN, assumed care at 1900  Pt is A0X4, and responds appropriately   Pt declines any SOB, chest pain, new onset of numbness/ tingling  Pt declines any pain at this time   LUQ IR drain with DIP C/D/I  Pt is voiding adequately and without hesitancy  Pt has + flatus, + bowel sounds, + BM on 9/30  Pt ambulates independently   Pt is tolerating a regular diet, pt denies any nausea/vomiting, NPO at midnight   Plan of care discussed, all questions answered. Explained importance of calling before getting OOB and pt verbalizes understanding. Explained importance of oral care. Call light is within reach, treaded slipper socks on, bed in lowest/ locked position, hourly rounding in place, all needs met at this time

## 2024-10-01 NOTE — DISCHARGE PLANNING
"Case Management Discharge Planning    Admission Date: 9/17/2024  GMLOS: 4.7  ALOS: 14    6-Clicks ADL Score: 24  6-Clicks Mobility Score: 24      Anticipated Discharge Dispo: Discharge Disposition: D/T to home under A care in anticipation of covered skilled care (06)  [facility] [place] [address]    DME Needed: No    Action(s) Taken: Updated Provider/Nurse on Discharge Plan       \"78 y.o. female admitted 9/17/2024 with abdominal pain\"      Pt is on  IV Unasyn with end date 10.4 per MAR  Pt  is pending I D clearance     Per I D notes ,  On discharge, Pt can switch to p.o. Augmentin 875 mg twice daily for likely an additional 2 to 4 weeks.      Pt has  [employer] Medicare HMO.   Pt is from [place].     Pt has been accepted with [facility]     Per Surgery notes today:\" Pleural effusion, s/p US thoracentesis completed on 9/24/24 and 9/27/24  - Plan IR chest tube, followed by Dr Saul, Thoracic surgery     Patient seen with Dr Esquivel\"         Escalations Completed: None    Medically Clear: No    Next Steps:     CM to continue to assist Pt with discharge as needed    Barriers to Discharge: Medical clearance    Is the patient up for discharge tomorrow: No        "

## 2024-10-01 NOTE — PROGRESS NOTES
Report received from previous shift RN.  Assessment complete.  Patient resting in bed comfortably, even and unlabored breathing present.  LUQ IR drain to bulb suction, CDI.  All needs met at this time. Call light within reach. Hourly rounding in place.

## 2024-10-01 NOTE — CARE PLAN
Problem: Knowledge Deficit - Standard  Goal: Patient and family/care givers will demonstrate understanding of plan of care, disease process/condition, diagnostic tests and medications  Outcome: Progressing     Problem: Pain - Standard  Goal: Alleviation of pain or a reduction in pain to the patient’s comfort goal  Outcome: Progressing     Problem: Communication  Goal: The ability to communicate needs accurately and effectively will improve  Outcome: Progressing       The patient is Stable - Low risk of patient condition declining or worsening    Shift Goals  Clinical Goals: IV abx, drain maintenance, ambulation  Patient Goals: updates, comfort  Family Goals: updates, comfort    Progress made toward(s) clinical / shift goals:  Pt received IV antibiotics per MAR. Pt drain monitored, output recorded. Pt ambulated and received updates this shift.

## 2024-10-02 ENCOUNTER — APPOINTMENT (OUTPATIENT)
Dept: RADIOLOGY | Facility: MEDICAL CENTER | Age: 79
DRG: 438 | End: 2024-10-02
Attending: SURGERY
Payer: COMMERCIAL

## 2024-10-02 ENCOUNTER — PHARMACY VISIT (OUTPATIENT)
Dept: PHARMACY | Facility: MEDICAL CENTER | Age: 79
End: 2024-10-02
Payer: COMMERCIAL

## 2024-10-02 ENCOUNTER — APPOINTMENT (OUTPATIENT)
Dept: RADIOLOGY | Facility: MEDICAL CENTER | Age: 79
DRG: 438 | End: 2024-10-02
Attending: NURSE PRACTITIONER
Payer: COMMERCIAL

## 2024-10-02 VITALS
OXYGEN SATURATION: 94 % | DIASTOLIC BLOOD PRESSURE: 67 MMHG | WEIGHT: 159.83 LBS | BODY MASS INDEX: 27.29 KG/M2 | HEIGHT: 64 IN | RESPIRATION RATE: 16 BRPM | SYSTOLIC BLOOD PRESSURE: 132 MMHG | HEART RATE: 71 BPM | TEMPERATURE: 97.7 F

## 2024-10-02 DIAGNOSIS — J90 PLEURAL EFFUSION: ICD-10-CM

## 2024-10-02 LAB
ANION GAP SERPL CALC-SCNC: 10 MMOL/L (ref 7–16)
BUN SERPL-MCNC: 6 MG/DL (ref 8–22)
CALCIUM SERPL-MCNC: 8.2 MG/DL (ref 8.5–10.5)
CHLORIDE SERPL-SCNC: 104 MMOL/L (ref 96–112)
CO2 SERPL-SCNC: 24 MMOL/L (ref 20–33)
CREAT SERPL-MCNC: 0.56 MG/DL (ref 0.5–1.4)
ERYTHROCYTE [DISTWIDTH] IN BLOOD BY AUTOMATED COUNT: 46.9 FL (ref 35.9–50)
GFR SERPLBLD CREATININE-BSD FMLA CKD-EPI: 93 ML/MIN/1.73 M 2
GLUCOSE SERPL-MCNC: 99 MG/DL (ref 65–99)
HCT VFR BLD AUTO: 39 % (ref 37–47)
HGB BLD-MCNC: 12.4 G/DL (ref 12–16)
INR PPP: 1.07 (ref 0.87–1.13)
MCH RBC QN AUTO: 28.7 PG (ref 27–33)
MCHC RBC AUTO-ENTMCNC: 31.8 G/DL (ref 32.2–35.5)
MCV RBC AUTO: 90.3 FL (ref 81.4–97.8)
PLATELET # BLD AUTO: 765 K/UL (ref 164–446)
PMV BLD AUTO: 8.8 FL (ref 9–12.9)
POTASSIUM SERPL-SCNC: 3.9 MMOL/L (ref 3.6–5.5)
PROTHROMBIN TIME: 13.9 SEC (ref 12–14.6)
RBC # BLD AUTO: 4.32 M/UL (ref 4.2–5.4)
SODIUM SERPL-SCNC: 138 MMOL/L (ref 135–145)
WBC # BLD AUTO: 8.1 K/UL (ref 4.8–10.8)

## 2024-10-02 PROCEDURE — 71046 X-RAY EXAM CHEST 2 VIEWS: CPT

## 2024-10-02 PROCEDURE — 700102 HCHG RX REV CODE 250 W/ 637 OVERRIDE(OP)

## 2024-10-02 PROCEDURE — 700105 HCHG RX REV CODE 258: Performed by: INTERNAL MEDICINE

## 2024-10-02 PROCEDURE — 99232 SBSQ HOSP IP/OBS MODERATE 35: CPT | Performed by: HOSPITALIST

## 2024-10-02 PROCEDURE — 99024 POSTOP FOLLOW-UP VISIT: CPT | Performed by: SURGERY

## 2024-10-02 PROCEDURE — 99238 HOSP IP/OBS DSCHRG MGMT 30/<: CPT | Performed by: SURGERY

## 2024-10-02 PROCEDURE — 85027 COMPLETE CBC AUTOMATED: CPT

## 2024-10-02 PROCEDURE — 700101 HCHG RX REV CODE 250: Performed by: NURSE PRACTITIONER

## 2024-10-02 PROCEDURE — RXMED WILLOW AMBULATORY MEDICATION CHARGE: Performed by: NURSE PRACTITIONER

## 2024-10-02 PROCEDURE — A9270 NON-COVERED ITEM OR SERVICE: HCPCS

## 2024-10-02 PROCEDURE — 80048 BASIC METABOLIC PNL TOTAL CA: CPT

## 2024-10-02 PROCEDURE — 85610 PROTHROMBIN TIME: CPT

## 2024-10-02 PROCEDURE — 700111 HCHG RX REV CODE 636 W/ 250 OVERRIDE (IP): Mod: JZ | Performed by: INTERNAL MEDICINE

## 2024-10-02 RX ORDER — ONDANSETRON 4 MG/1
4 TABLET, ORALLY DISINTEGRATING ORAL EVERY 6 HOURS PRN
Qty: 15 TABLET | Refills: 0 | Status: SHIPPED | OUTPATIENT
Start: 2024-10-02 | End: 2024-10-09

## 2024-10-02 RX ADMIN — LEVOTHYROXINE SODIUM 100 MCG: 0.1 TABLET ORAL at 04:29

## 2024-10-02 RX ADMIN — LIOTHYRONINE SODIUM 5 MCG: 5 TABLET ORAL at 04:29

## 2024-10-02 RX ADMIN — AMPICILLIN AND SULBACTAM 3 G: 1; 2 INJECTION, POWDER, FOR SOLUTION INTRAMUSCULAR; INTRAVENOUS at 01:38

## 2024-10-02 RX ADMIN — SODIUM CHLORIDE, PRESERVATIVE FREE 10 ML: 5 INJECTION INTRAVENOUS at 04:29

## 2024-10-02 RX ADMIN — AMLODIPINE BESYLATE 5 MG: 10 TABLET ORAL at 04:29

## 2024-10-02 RX ADMIN — AMPICILLIN AND SULBACTAM 3 G: 1; 2 INJECTION, POWDER, FOR SOLUTION INTRAMUSCULAR; INTRAVENOUS at 08:34

## 2024-10-02 ASSESSMENT — COGNITIVE AND FUNCTIONAL STATUS - GENERAL
DAILY ACTIVITIY SCORE: 24
MOBILITY SCORE: 24
SUGGESTED CMS G CODE MODIFIER MOBILITY: CH
SUGGESTED CMS G CODE MODIFIER DAILY ACTIVITY: CH

## 2024-10-02 ASSESSMENT — ENCOUNTER SYMPTOMS
WEAKNESS: 1
PALPITATIONS: 0
SORE THROAT: 0
DEPRESSION: 1
FOCAL WEAKNESS: 0
DIAPHORESIS: 0
ABDOMINAL PAIN: 1
FLANK PAIN: 0
SENSORY CHANGE: 0
SPEECH CHANGE: 0
HEARTBURN: 0
HEADACHES: 0
DIZZINESS: 0
VOMITING: 0
FEVER: 0
NAUSEA: 0
CHILLS: 0
COUGH: 0
CONSTIPATION: 0
WEIGHT LOSS: 0
SHORTNESS OF BREATH: 0
DIARRHEA: 0
ABDOMINAL PAIN: 0
BLURRED VISION: 0
MYALGIAS: 0
NERVOUS/ANXIOUS: 1

## 2024-10-02 ASSESSMENT — SOCIAL DETERMINANTS OF HEALTH (SDOH)
WITHIN THE PAST 12 MONTHS, YOU WORRIED THAT YOUR FOOD WOULD RUN OUT BEFORE YOU GOT THE MONEY TO BUY MORE: NEVER TRUE
WITHIN THE LAST YEAR, HAVE YOU BEEN HUMILIATED OR EMOTIONALLY ABUSED IN OTHER WAYS BY YOUR PARTNER OR EX-PARTNER?: NO
WITHIN THE LAST YEAR, HAVE YOU BEEN KICKED, HIT, SLAPPED, OR OTHERWISE PHYSICALLY HURT BY YOUR PARTNER OR EX-PARTNER?: NO
IN THE PAST 12 MONTHS, HAS THE ELECTRIC, GAS, OIL, OR WATER COMPANY THREATENED TO SHUT OFF SERVICE IN YOUR HOME?: NO
WITHIN THE PAST 12 MONTHS, THE FOOD YOU BOUGHT JUST DIDN'T LAST AND YOU DIDN'T HAVE MONEY TO GET MORE: NEVER TRUE
WITHIN THE LAST YEAR, HAVE TO BEEN RAPED OR FORCED TO HAVE ANY KIND OF SEXUAL ACTIVITY BY YOUR PARTNER OR EX-PARTNER?: NO
WITHIN THE LAST YEAR, HAVE YOU BEEN AFRAID OF YOUR PARTNER OR EX-PARTNER?: NO

## 2024-10-02 ASSESSMENT — LIFESTYLE VARIABLES
TOTAL SCORE: 0
EVER HAD A DRINK FIRST THING IN THE MORNING TO STEADY YOUR NERVES TO GET RID OF A HANGOVER: NO
ON A TYPICAL DAY WHEN YOU DRINK ALCOHOL HOW MANY DRINKS DO YOU HAVE: 1
CONSUMPTION TOTAL: NEGATIVE
AVERAGE NUMBER OF DAYS PER WEEK YOU HAVE A DRINK CONTAINING ALCOHOL: 0
EVER FELT BAD OR GUILTY ABOUT YOUR DRINKING: NO
HAVE PEOPLE ANNOYED YOU BY CRITICIZING YOUR DRINKING: NO
HAVE YOU EVER FELT YOU SHOULD CUT DOWN ON YOUR DRINKING: NO
DOES PATIENT WANT TO STOP DRINKING: NO
TOTAL SCORE: 0
TOTAL SCORE: 0
HOW MANY TIMES IN THE PAST YEAR HAVE YOU HAD 5 OR MORE DRINKS IN A DAY: 0
ALCOHOL_USE: YES

## 2024-10-02 ASSESSMENT — PAIN DESCRIPTION - PAIN TYPE
TYPE: ACUTE PAIN
TYPE: ACUTE PAIN

## 2024-10-02 NOTE — DISCHARGE INSTRUCTIONS
Surgery Discharge Instructions    Paige Gudino   Age: 78 y.o.   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) [I44.2]  Abdominal infection (HCC) [K65.9]  Patient Active Problem List    Diagnosis Date Noted    Hypokalemia 2024    Hypomagnesemia 2024    Thrombocytosis 2024    Pleural effusion 2024    Abdominal infection (HCC) 2024    AV block, Mobitz 1 2024    Intra-abdominal abscess (HCC) 2024    Primary hypertension 2024    Thoracic ascending aortic aneurysm (HCC) 2024    Second degree heart block 2024    Deep vein thrombosis (DVT) of both lower extremities (HCC) 08/15/2024    Atrial thrombus 08/15/2024    Pulmonary embolism with acute cor pulmonale, unspecified chronicity, unspecified pulmonary embolism type (HCC) 2024    Intra-abdominal fluid collection 2024    Acute respiratory failure with hypoxia (HCC) 2024    History of breast cancer 2024    S/P chemotherapy, time since greater than 12 weeks 2024    History of mastectomy 2024    Trigger finger, right middle finger 07/10/2024    Long term (current) use of aromatase inhibitors 2024    Other chronic sinusitis 2023    Tinnitus of both ears 2023    Cancer of overlapping sites of left breast (HCC) 2022    Sensorineural hearing loss (SNHL) of both ears 2022    Vitamin B 12 deficiency 2021    Acquired hypothyroidism 12/15/2020    Thyroid nodule 12/15/2020    Hashimoto's thyroiditis 12/15/2020    Dyslipidemia 12/15/2020    Hypovitaminosis D 12/15/2020       Activity:   Resume light to normal activity as tolerated.  (ie - ok to walk, climb stairs, ect)  Do not engage in strenuous activity for 7-10 days.   Do not lift more than 10 pounds for the next 4-6 weeks.   It is important you are up walking several times a day to decrease the risk of a blood  clot     Diet:  Resume regular diet as tolerated. To reduce risk of post-operative nausea and vomiting avoid spicy or greasy foods; eat small, frequent meals and maintain adequate fluid intake.    Medication(s):   Current Discharge Medication List        START taking these medications    Details   ondansetron (ZOFRAN ODT) 4 MG TABLET DISPERSIBLE Take 1 Tablet by mouth every 6 hours as needed for Nausea/Vomiting for up to 7 days.  Qty: 15 Tablet, Refills: 0    Associated Diagnoses: Intra-abdominal abscess (HCC); Intra-abdominal fluid collection      amoxicillin-clavulanate (AUGMENTIN) 875-125 MG Tab Take 1 Tablet by mouth 2 times a day for 5 days.  Qty: 10 Tablet, Refills: 0    Associated Diagnoses: Intra-abdominal abscess (HCC); Intra-abdominal fluid collection           CONTINUE these medications which have NOT CHANGED    Details   amLODIPine (NORVASC) 5 MG Tab Take 1 Tablet by mouth every day.  Qty: 30 Tablet, Refills: 1    Associated Diagnoses: Aneurysm of ascending aorta without rupture (HCC); Essential hypertension      apixaban (ELIQUIS) 5mg Tab Take 1 Tablet by mouth 2 times a day for 90 days. Indications: DVT/PE  Qty: 180 Tablet, Refills: 0    Associated Diagnoses: Pulmonary embolism with acute cor pulmonale, unspecified chronicity, unspecified pulmonary embolism type (HCC)      liothyronine (CYTOMEL) 5 MCG Tab Take 2.5-5 mcg by mouth every day. 5 mcg on Monday, Wednesday, Friday. 2.5 mcg on all other days      levothyroxine (SYNTHROID) 100 MCG Tab Take 100 mcg by mouth every morning on an empty stomach.      acetaminophen (TYLENOL) 500 MG Tab Take 500 mg by mouth 1 time a day as needed for Mild Pain.      ibandronate (BONIVA) 150 MG tablet Take 1 Tablet by mouth every 30 days.  Qty: 3 Tablet, Refills: 4    Associated Diagnoses: Cancer of overlapping sites of left breast (HCC); Age-related osteoporosis without current pathological fracture            See prescription(s); take as directed.   You do NOT have  to take all of your prescription pain medication.  Take only as needed if pain is not controlled with over-the-counter medications (ex: Tylenol, ibuprofen).    If you are taking narcotic pain medications be sure to take a stool softener (available over the counter - ex: miralax, magnesium citrate, dulcolax/bisacoidyl) to reduce risk of constipation.  Additionally, make sure that you are taking in enough fluids.  Do NOT drive or make any important decision while taking prescription pain medication.      Bathing/Showering:    Please shower daily starting in 24 hours. You may wash over incisions with regular soap and water and pat dry.  Ok to allow water from the shower to run over you incision.  Avoid submerging you incision under water (no baths, swimming or hot tubs) until fully healed.      Reducing post discharge nausea or vomiting:    Limit mobility, take slow, deep breaths.    Restrictions:   No smoking  No alcohol while taking prescription pain medications  No strenuous activity until cleared by surgery clinic  No heavy lifting (more than 10 lbs) for at least the next 4-6 weeks  No driving while taking prescription pain medication  No driving until you have the mobility to be a safe     Additional Instructions:   If you experience chest pain or shortness of breath, or have an acute emergency - please call 911    =====================================================================================================================  IF YOU EXPERIENCE SHORTNESS OF BREATH, POOR ABILITY TO TAKE A FULL BREATH, PAIN AT LEFT CHEST   OR LEFT UPPER BACK, FEVER, CHILLS, DIZZINESS, AND OTHER SERIOUS SYMPTOMS  THEN PLEASE SEEK MEDICAL CARE IN EMERGENCY ROOM OR URGENT CARE  =====================================================================================================================    Please call clinic or seek evaluation at the ER if you have any of the following:  - Fever >101 F      - Wound infection  (increasing redness, swelling, drainage, pus)     - Severe pain not controlled with oral medications     - Severe nausea or vomiting, inability to tolerate PO intake     - Bleeding that does not stop with holding pressure to the area       - Yellowing of the skin or eyes     - Change in mental status (confusion or lethargy)      - New numbness or weakness      - Any other concerns.    Follow-up:    Please follow-up in Surgery Clinic on 10/8/24  Please complete ordered chest x-ray before the clinic visit  Please call clinic to confirm appointment [phone]  Please keep all follow-up appointments

## 2024-10-02 NOTE — PROGRESS NOTES
Assumed care of patient at 1845. Bedside report received from Christine LAWLER. Assessment complete.  AA&Ox4. Denies CP/SOB.  Reporting 0/10 pain. Declined intervention at this time.  Educated patient regarding pharmacologic and non pharmacologic modalities for pain management.  Skin per flowsheets  Tolerating Regular diet. Denies N/V.  + void. + BM. Last BM 10/1  Pt ambulates by self  All needs met at this time. Call light within reach. Pt calls appropriately. Bed low and locked, non skid socks in place. Hourly rounding in place.

## 2024-10-02 NOTE — PROGRESS NOTES
Patient to be discharged today - patient aware and agreeable to plan. D/c instructions reviewed with patient - ?'s/concerns answered. No piv present and meds to beds handed to patient.

## 2024-10-02 NOTE — CARE PLAN
The patient is Stable - Low risk of patient condition declining or worsening    Shift Goals: IV Abx, Prep for procedure, Safety  Clinical Goals: IV Abx, Prep for procedure  Patient Goals: Rest, Info for procedure  Family Goals: HAYDEE    Progress made toward(s) clinical / shift goals:  Pt resting in bed, pain managed with current medication.  Pt remains free from falls and calls appropriately for assistance with OOBA.  Pt turns self in bed. This RN discussed POC and BTD with Pt, who verbalized understanding.

## 2024-10-02 NOTE — DISCHARGE PLANNING
@8165  Per CM Namita called [facility]  and spoke with Jessica advised pt will be discharge today .

## 2024-10-02 NOTE — PROGRESS NOTES
[facility] Medicine Daily Progress Note    Date of Service  10/2/2024    Chief Complaint  Paige Gudino is a 78 y.o. female admitted 9/17/2024 with abdominal pain    Hospital Course  Admitted with abdominal pain, CT scan of the abdomen and pelvis showed an abdominal fluid collection.  Patient with history of pancreatic cystadenoma, underwent pancreatectomy, splenectomy, stomach and celiac node dissection on 8/5/2024.  Postoperatively, she had complications of retroperitoneal abscess, requiring CT-guided drainage, bilateral pulmonary embolism with right heart strain, bilateral lower extremity DVTs, right atrial thrombus that required thrombectomy.  Patient was started on empiric coverage with IV Unasyn.  Surgery Oncology was consulted on the case.  Interventional radiology was consulted.  Patient underwent CT guided peritoneal left upper quadrant abdominal abscess drainage, placement of 10 Nepali locking loop catheter on 9/19/2024.  Drain cultures showed MSSA and group B streptococcus.  Infectious disease was consulted on the case.  Patient had persistent leukocytosis.  Repeat CT scan showed decrease size of the fluid collection, and left pleural effusion.  Patient underwent IR abscessogram - small residual fluid collection with drainage catheter at the peripheral aspect and Ultrasound guided left sided diagnostic and therapeutic thoracentesis, 1,150 mL of fluid withdrawn on 9/25/2024.  Repeat chest x-ray showed recurrence of the pleural effusion, patient underwent Ultrasound guided left sided therapeutic thoracentesis, 600 mL of fluid withdrawn on 9/27/2024.  Pleural fluid cultures were negative.    Interval Problem Update  Abdominal abscess - pain controlled, discussed case with Surgery  Hypertension - sbp 115-141  Pleural effusion - loculated, plastic surgery consulted, discussed case with surgery    Updates given and plan of care discussed with patient's spouse who was at bedside.    10/1 patient is new  to me today, patient is in bed, no fever or chills no nausea or vomiting. Chest tube placement tomorrow, holding a/c,notes from surgery reviewed, available imaging studies and laboratory results reviewed. Discussed with bedside nurse, charge nurse, . Continue on unasyn.   10/2 patient was seen earlier in the morning, she is alert oriented follows commands, she feels well, no shortness of breath, chest x-ray planned for today, discussed with surgical team, discussed with bedside nurse charge nurse  pharmacist.  Patient today surgical oncology team cleared patient for discharge and she was discharged by surgical oncology, please see discharge summary from surgical oncology for specific information.    I have discussed this patient's plan of care and discharge plan at IDT rounds today with Case Management, Nursing, Nursing leadership, and other members of the IDT team.    Consultants/Specialty  infectious disease and surgery oncology, interventional radiology    Code Status  Full Code    Disposition  The patient is not medically cleared for discharge to home or a post-acute facility.      I have placed the appropriate orders for post-discharge needs.    Review of Systems  Review of Systems   Constitutional:  Negative for chills, diaphoresis, fever and malaise/fatigue.   HENT:  Negative for congestion, hearing loss and sore throat.    Eyes:  Negative for blurred vision.   Respiratory:  Negative for cough.    Cardiovascular:  Negative for chest pain, palpitations and leg swelling.   Gastrointestinal:  Positive for abdominal pain. Negative for diarrhea, heartburn, nausea and vomiting.   Genitourinary:  Negative for dysuria, flank pain and hematuria.   Musculoskeletal:  Negative for joint pain and myalgias.   Skin:  Negative for rash.   Neurological:  Positive for weakness. Negative for dizziness, sensory change, speech change, focal weakness and headaches.   Psychiatric/Behavioral:  Positive for  depression. The patient is nervous/anxious.         Physical Exam  Temp:  [36.1 °C (97 °F)-36.7 °C (98.1 °F)] 36.5 °C (97.7 °F)  Pulse:  [67-86] 71  Resp:  [16] 16  BP: (111-132)/(59-73) 132/67  SpO2:  [91 %-94 %] 94 %    Physical Exam  Vitals and nursing note reviewed.   HENT:      Head: Normocephalic and atraumatic.      Nose: No congestion.      Mouth/Throat:      Mouth: Mucous membranes are moist.   Eyes:      General: No scleral icterus.  Cardiovascular:      Rate and Rhythm: Normal rate and regular rhythm.   Pulmonary:      Effort: Pulmonary effort is normal.      Breath sounds: Decreased air movement present. Examination of the left-lower field reveals decreased breath sounds. Decreased breath sounds present.   Abdominal:      General: There is distension.      Tenderness: There is abdominal tenderness. There is no guarding.      Comments: Left upper quadrant drain   Musculoskeletal:      Cervical back: No tenderness.      Right lower leg: No edema.      Left lower leg: No edema.   Skin:     General: Skin is warm and dry.   Neurological:      General: No focal deficit present.      Mental Status: She is alert and oriented to person, place, and time.      Cranial Nerves: No cranial nerve deficit.   Psychiatric:         Mood and Affect: Mood is anxious and depressed. Affect is tearful.         Fluids    Intake/Output Summary (Last 24 hours) at 10/2/2024 1258  Last data filed at 10/2/2024 0429  Gross per 24 hour   Intake 200 ml   Output 15 ml   Net 185 ml        Laboratory  Recent Labs     09/30/24  0447 10/01/24  0226 10/02/24  0435   WBC 9.4 9.4 8.1   RBC 4.31 3.99* 4.32   HEMOGLOBIN 12.6 11.7* 12.4   HEMATOCRIT 38.8 35.9* 39.0   MCV 90.0 90.0 90.3   MCH 29.2 29.3 28.7   MCHC 32.5 32.6 31.8*   RDW 46.6 46.9 46.9   PLATELETCT 726* 745* 765*   MPV 8.9* 8.8* 8.8*     Recent Labs     09/30/24  0447 10/01/24  0226 10/02/24  0435   SODIUM 136 136 138   POTASSIUM 3.8 3.5* 3.9   CHLORIDE 102 103 104   CO2 24 24 24    GLUCOSE 94 101* 99   BUN 8 9 6*   CREATININE 0.62 0.52 0.56   CALCIUM 8.3* 8.1* 8.2*     Recent Labs     10/02/24  0435   INR 1.07               Imaging  DX-CHEST-2 VIEWS   Final Result      Moderate left pleural effusion with adjacent airspace disease.      CT-ABDOMEN WITH   Final Result         1.  Multiloculated enhancing fluid collection in the left abdomen with pigtail catheter in place, fluid collection has increased in size since prior study.   2.  Loculated appearing left pleural effusion.   3.  Linear consolidations in the bilateral lung bases, left greater than right, appearance favoring atelectasis.   4.  Enlarged bilateral inguinal lymph nodes, consider causes of adenopathy with additional workup as clinically appropriate   5.  Cholelithiasis   6.  Small hiatal hernia   7.  Atherosclerosis and atherosclerotic coronary artery disease      DX-CHEST-LIMITED (1 VIEW)   Final Result      Increased moderate left-sided pleural effusion with associated compressive atelectasis and/or consolidation.      US-THORACENTESIS PUNCTURE LEFT   Final Result      1. Ultrasound guided left sided therapeutic thoracentesis.      2. 600 mL of fluid withdrawn.      DX-CHEST-PORTABLE (1 VIEW)   Final Result      Decreased size of left pleural effusion. No pneumothorax.      DX-CHEST-2 VIEWS   Final Result      1. Interval increase in size of the left pleural effusion, obscuring the left lung base.   2. The remainder of the lungs is clear.   3. Obscuration of the left cardiomediastinal border.   4. Other stable chronic degenerative changes.      US-THORACENTESIS PUNCTURE LEFT   Final Result      1. Ultrasound guided left sided diagnostic and therapeutic thoracentesis.      2. 1,150 mL of fluid withdrawn.      DX-CHEST-PORTABLE (1 VIEW)   Final Result      Moderate left pleural effusion and associated atelectasis versus consolidation. It is similar in size to 8/15/2024 radiograph.         IR-DRAINAGE-CATH EXCHANGE   Final Result       Small residual fluid collection with drainage catheter at the peripheral aspect.      CT-ABDOMEN-PELVIS WITH   Final Result      1.  Decreased size of the fluid collection in the distal pancreatectomy-lumpectomy bed following tube placement   2.  Increased LEFT pleural effusion with overlying atelectasis   3.  Hiatal hernia   4.  Persistently thickened endometrium for age. Underlying mass is possible. Ultrasound could better assess.      CT-IMAGE-GUIDED DRAIN PERITONEAL   Final Result      1.  CT GUIDED PERITONEAL LEFT UPPER QUADRANT ABDOMINAL ABSCESS DRAINAGE. PLACEMENT OF 10 [place]ian LOCKING LOOP CATHETER.   2.  THE CURRENT PLAN IS TO IRRIGATE ONCE DAILY WITH 5 ML STERILE SALINE, CHECK CULTURES, MONITOR DRAINAGE OUTPUT AND OBTAIN A FOLLOW-UP CT SCAN IN 5-7 DAYS IF CLINICALLY INDICATED.      CT-ABDOMEN-PELVIS WITH   Final Result      1.  Post distal pancreatectomy.      2.  Fluid collection with some peripheral enhancement is identified in the distal pancreatic region including the region of previous pancreatectomy. Developing pseudocyst is a possibility. No emphysema or hemorrhage is appreciated. Remaining pancreas    enhances normally.      3.  There is cholelithiasis.      4.  Left pleural effusion and consolidations in the left lung base.      5.  No change in hiatal hernia.      6.  Prominent endometrial cavity again noted.      IR-CHEST TUBE-EMPYEMA LEFT    (Results Pending)        Assessment/Plan  * Intra-abdominal abscess (HCC)- (present on admission)  Assessment & Plan  CT guided peritoneal left upper quadrant abdominal abscess drainage, placement of 10 Spanish locking loop catheter  9/19/2024  IR abscessogram - small residual fluid collection with drainage catheter at the peripheral aspect  9/24/2024  IV Unasyn  Pain control  Discussed with surgical oncology    Hypokalemia- (present on admission)  Assessment & Plan  Replacing today.     Hypomagnesemia- (present on admission)  Assessment & Plan  Mg 2.2      Pleural effusion- (present on admission)  Assessment & Plan  Ultrasound guided left sided diagnostic and therapeutic thoracentesis, 1,150 mL of fluid withdrawn  9/24/2024  Ultrasound guided left sided therapeutic thoracentesis, 600 mL of fluid withdrawn  9/27/2024  Thoracic surgery consulted  Chest x-ray today  Will follow-up recommendation from surgery    Thrombocytosis- (present on admission)  Assessment & Plan  Follow cbc    Abdominal infection (HCC)- (present on admission)  Assessment & Plan  Monitoring.     AV block, Mobitz 1- (present on admission)  Assessment & Plan  monitor    Primary hypertension- (present on admission)  Assessment & Plan  Amlodipine    Atrial thrombus- (present on admission)  Assessment & Plan  History of thrombectomy  Hold Eliquis  Chest tube in am.  Holding Eliquis for possible chest tube placement  X-ray today      Deep vein thrombosis (DVT) of both lower extremities (HCC)- (present on admission)  Assessment & Plan  Hold Eliquis for possible procedure  Chest tube in am.     Acute respiratory failure with hypoxia (HCC)- (present on admission)  Assessment & Plan  RT protocol    Pulmonary embolism with acute cor pulmonale, unspecified chronicity, unspecified pulmonary embolism type (HCC)- (present on admission)  Assessment & Plan  Hold Eliquis for possible procedure    History of breast cancer- (present on admission)  Assessment & Plan  Follow up with Oncology    Acquired hypothyroidism- (present on admission)  Assessment & Plan  Levothyroxine and Liothyronine          VTE prophylaxis: scd's     I have performed a physical exam and reviewed and updated ROS and Plan today (10/2/2024). In review of yesterday's note (10/1/2024), there are no changes except as documented above.      My total time spent caring for the patient on the day of the encounter was 41 minutes.   This does not include time spent on separately billable procedures/tests.

## 2024-10-02 NOTE — DISCHARGE SUMMARY
Discharge Summary    CHIEF COMPLAINT ON ADMISSION  Chief Complaint   Patient presents with    Sent by MD     Sent by surgeon for elevated WBC. Pt reports abdominal drain removed last week and is concerned for infection at the site. Denies pain.      Reason for Admission  Abnormal Labs/Sent by MD     Admission Date  9/17/2024    CODE STATUS  Full Code    HPI & HOSPITAL COURSE  This is a 78 y.o. female sent to hospital by surgeon for concern elevated WBC. Pt reports abdominal drain removed last week and is concerned for infection at the site.    Procedures completed  IR drain on 9/19/24 by Dr Ruff  US Thoracentesis on 9/24/24 by Jolie NP - 1150 mL  IR drain study on 9/24/24 by Dr Bowen  US Thoracentesis on 9/27/24 by Jolie NP - 600 mL    She was followed by ID team, ans placed on IV Unasyn from 9/20/24 up until discharge. She recommended continued therapy with PO Augmentin following discharge.    This morning she is awake, oriented, comfortable in bed. She is tolerating a diet, she has no fever, the leukocytosis appears resolved. Patient is eager to return home.    Chest XR in AM noted relatively unchanged left lung pleural effusion, vs imaging a few days ago. This was discussed with Dr Esquivel and Dr Ruff and Dr Rooney. Decision to DC patient home, with strict return precautions in case of recurrence of respiratory or infectious symptoms. Patient and family stated they understand it is essential patient be active and try remain out of bed, be ambulating or seated to help allow fur lung to expand, and help in recovery of pleural effusion.    Therefore, she is discharged in good and stable condition to home with close outpatient follow-up.    The patient met 2-midnight criteria for an inpatient stay at the time of discharge.    Discharge Date  10/2/24    FOLLOW UP ITEMS POST DISCHARGE  CHEST X-RAY NEXT WEEK  CLINIC VISIT ON 8/10/24    DISCHARGE DIAGNOSES  Principal Problem:    Intra-abdominal abscess (HCC) (POA:  Yes)  Active Problems:    Acquired hypothyroidism (POA: Yes)    History of breast cancer (POA: Yes)    Pulmonary embolism with acute cor pulmonale, unspecified chronicity, unspecified pulmonary embolism type (HCC) (POA: Yes)    Acute respiratory failure with hypoxia (HCC) (POA: Yes)    Deep vein thrombosis (DVT) of both lower extremities (HCC) (POA: Yes)    Atrial thrombus (POA: Yes)    Primary hypertension (POA: Yes)    AV block, Mobitz 1 (POA: Yes)    Abdominal infection (HCC) (POA: Yes)    Thrombocytosis (POA: Yes)    Pleural effusion (POA: Yes)    Hypomagnesemia (POA: Yes)    Hypokalemia (POA: Yes)  Resolved Problems:    Sepsis (HCC) (POA: Yes)      FOLLOW UP  Future Appointments   Date Time Provider Department Center   10/16/2024 10:30 AM SOUMYA Cortez [facility] None   10/17/2024  2:20 PM Chris Lee M.D. [facility] None   11/11/2024  4:40 PM Roula Ga P.A.-C. [facility]  [address] [facility] [address]  [place] [id number]  [phone]    Follow up on 10/8/2024  For follow up    Roula Ga P.A.-C.  [address] [place] [id number] [phone]    Schedule an appointment as soon as possible for a visit        MEDICATIONS ON DISCHARGE     Medication List        START taking these medications        Instructions   amoxicillin-clavulanate 875-125 MG Tabs  Commonly known as: Augmentin   Take 1 Tablet by mouth 2 times a day for 5 days.  Dose: 1 Tablet     ondansetron 4 MG Tbdp  Commonly known as: Zofran ODT   Dissolve 1 Tablet by mouth every 6 hours as needed for Nausea/Vomiting for up to 7 days.  Dose: 4 mg            CONTINUE taking these medications        Instructions   acetaminophen 500 MG Tabs  Commonly known as: Tylenol   Take 500 mg by mouth 1 time a day as needed for Mild Pain.  Dose: 500 mg     amLODIPine 5 MG Tabs  Commonly known as: Norvasc   Take 1 Tablet by mouth every day.  Dose: 5 mg     apixaban 5mg Tabs  Commonly known as: Eliquis   Take 1 Tablet by  mouth 2 times a day for 90 days. Indications: DVT/PE  Dose: 5 mg     ibandronate 150 MG tablet  Commonly known as: Boniva   Take 1 Tablet by mouth every 30 days.  Dose: 150 mg     levothyroxine 100 MCG Tabs  Commonly known as: Synthroid   Take 100 mcg by mouth every morning on an empty stomach.  Dose: 100 mcg     liothyronine 5 MCG Tabs  Commonly known as: Cytomel   Take 2.5-5 mcg by mouth every day. 5 mcg on Monday, Wednesday, Friday. 2.5 mcg on all other days  Dose: 2.5-5 mcg              Allergies  Allergies   Allergen Reactions    Bloodless Unspecified     Does not want blood products    Synthroid [Fd&C Red #40 Al Paul-Levothyroxine] Hives and Unspecified     Hives, Brand specific. Some brands are ok and some are not    Tape Unspecified     Skin degradation with use of tegaderm     Dresser Thyroid [Thyroid] Diarrhea     diarrhea       DIET  Orders Placed This Encounter   Procedures    Diet Order Diet: Regular     Standing Status:   Standing     Number of Occurrences:   1     Order Specific Question:   Diet:     Answer:   Regular [1]       ACTIVITY  As tolerated.  Weight bearing as tolerated    LABORATORY  Lab Results   Component Value Date    SODIUM 138 10/02/2024    POTASSIUM 3.9 10/02/2024    CHLORIDE 104 10/02/2024    CO2 24 10/02/2024    GLUCOSE 99 10/02/2024    BUN 6 (L) 10/02/2024    CREATININE 0.56 10/02/2024        Lab Results   Component Value Date    WBC 8.1 10/02/2024    HEMOGLOBIN 12.4 10/02/2024    HEMATOCRIT 39.0 10/02/2024    PLATELETCT 765 (H) 10/02/2024      Total time of the discharge process exceeds 35 minutes.

## 2024-10-02 NOTE — PROGRESS NOTES
Date of Service  October 2, 2024     Chief Complaint  Sent by MD (Sent by surgeon for elevated WBC. Pt reports abdominal drain removed last week and is concerned for infection at the site. Denies pain. )     Surgery  IR drain on 9/19/24 by Dr Ruff  US Thoracentesis on 9/24/24 by Jolie NP - 1150 mL  IR drain study on 9/24/24 by Dr Bowen  US Thoracentesis on 9/27/24 by Jolie NP - 600 mL    Hospital Course  POD# 13     Interval Problem Update  No acute events overnight  Minimal output from the YEMI drain 15 mL  Leukocytosis appears resolved WBC 8.1 from 9.4, afebrile, Unasyn  Tolerating diet, denies N/V    Discussion re CXR 2 view this AM to possibly avoid need for for IR chest tube placement.    Problem List  Principal Problem:    Intra-abdominal abscess (HCC) (POA: Yes)  Active Problems:    Acquired hypothyroidism (POA: Yes)    History of breast cancer (POA: Yes)    Pulmonary embolism with acute cor pulmonale, unspecified chronicity, unspecified pulmonary embolism type (HCC) (POA: Yes)    Acute respiratory failure with hypoxia (HCC) (POA: Yes)    Deep vein thrombosis (DVT) of both lower extremities (HCC) (POA: Yes)    Atrial thrombus (POA: Yes)    Primary hypertension (POA: Yes)    AV block, Mobitz 1 (POA: Yes)    Abdominal infection (HCC) (POA: Yes)    Thrombocytosis (POA: Yes)    Pleural effusion (POA: Yes)    Hypomagnesemia (POA: Yes)    Hypokalemia (POA: Yes)  Resolved Problems:    Sepsis (HCC) (POA: Yes)     Subjective  Review of Systems   Constitutional:  Negative for chills, malaise/fatigue and weight loss.   Respiratory:  Negative for cough and shortness of breath.    Cardiovascular:  Negative for chest pain.   Gastrointestinal:  Negative for abdominal pain, constipation, diarrhea, nausea and vomiting.       Objective  Temp:  [36.1 °C (97 °F)-36.7 °C (98.1 °F)] 36.5 °C (97.7 °F)  Pulse:  [67-86] 71  Resp:  [16] 16  BP: (111-132)/(59-73) 132/67  SpO2:  [91 %-94 %] 94 %      Physical Exam  Vitals and nursing note  reviewed.   Constitutional:       General: She is not in acute distress.     Appearance: Normal appearance.   HENT:      Head: Normocephalic and atraumatic.      Nose: Nose normal.      Mouth/Throat:      Pharynx: Oropharynx is clear.   Eyes:      Conjunctiva/sclera: Conjunctivae normal.   Cardiovascular:      Rate and Rhythm: Normal rate.   Pulmonary:      Effort: Pulmonary effort is normal. No respiratory distress.      Breath sounds: Normal breath sounds.      Comments: 0.5 LPM O2  Abdominal:      General: Abdomen is flat. There is no distension.      Tenderness: There is no abdominal tenderness.      Comments: LUQ IR drain   Skin:     General: Skin is warm and dry.   Neurological:      Mental Status: She is alert and oriented to person, place, and time.   Psychiatric:         Mood and Affect: Mood is depressed.         Behavior: Behavior normal.        Fluids    Intake/Output Summary (Last 24 hours) at 10/2/2024 0926  Last data filed at 10/2/2024 0429  Gross per 24 hour   Intake 320 ml   Output 15 ml   Net 305 ml       Labs  Lab Results   Component Value Date/Time    SODIUM 138 10/02/2024 04:35 AM    POTASSIUM 3.9 10/02/2024 04:35 AM    CHLORIDE 104 10/02/2024 04:35 AM    CO2 24 10/02/2024 04:35 AM    GLUCOSE 99 10/02/2024 04:35 AM    BUN 6 (L) 10/02/2024 04:35 AM    CREATININE 0.56 10/02/2024 04:35 AM         Lab Results   Component Value Date/Time    PROTHROMBTM 13.9 10/02/2024 04:35 AM    INR 1.07 10/02/2024 04:35 AM         Lab Results   Component Value Date/Time    WBC 8.1 10/02/2024 04:35 AM    RBC 4.32 10/02/2024 04:35 AM    HEMOGLOBIN 12.4 10/02/2024 04:35 AM    HEMATOCRIT 39.0 10/02/2024 04:35 AM    MCV 90.3 10/02/2024 04:35 AM    MCH 28.7 10/02/2024 04:35 AM    MCHC 31.8 (L) 10/02/2024 04:35 AM    MPV 8.8 (L) 10/02/2024 04:35 AM    NEUTSPOLYS 77.70 (H) 09/24/2024 12:04 AM    LYMPHOCYTES 9.20 (L) 09/24/2024 12:04 AM    MONOCYTES 10.60 09/24/2024 12:04 AM    EOSINOPHILS 0.40 09/24/2024 12:04 AM     BASOPHILS 0.20 09/24/2024 12:04 AM    ANISOCYTOSIS 1+ 08/15/2024 03:30 AM       Recent Labs     10/02/24  0435   INR 1.07     Imaging  CT ABDOMEN (9/29/24)  IMPRESSION:  1.  Multiloculated enhancing fluid collection in the left abdomen with pigtail catheter in place, fluid collection has increased in size since prior study.  2.  Loculated appearing left pleural effusion.  3.  Linear consolidations in the bilateral lung bases, left greater than right, appearance favoring atelectasis.  4.  Enlarged bilateral inguinal lymph nodes, consider causes of adenopathy with additional workup as clinically appropriate  5.  Cholelithiasis  6.  Small hiatal hernia  7.  Atherosclerosis and atherosclerotic coronary artery disease    CT A/P (9/23/24)  IMPRESSION:  1.  Decreased size of the fluid collection in the distal pancreatectomy-lumpectomy bed following tube placement  2.  Increased LEFT pleural effusion with overlying atelectasis  3.  Hiatal hernia  4.  Persistently thickened endometrium for age. Underlying mass is possible. Ultrasound could better assess.    CT A/P (9/17/24)  IMPRESSION:  1.  Post distal pancreatectomy.     2.  Fluid collection with some peripheral enhancement is identified in the distal pancreatic region including the region of previous pancreatectomy. Developing pseudocyst is a possibility. No emphysema or hemorrhage is appreciated. Remaining pancreas   enhances normally.     3.  There is cholelithiasis.     4.  Left pleural effusion and consolidations in the left lung base.     5.  No change in hiatal hernia.     6.  Prominent endometrial cavity again noted.     Procedures  PROCEDURE (9/27/24)  IMPRESSION:  1. Ultrasound guided left sided therapeutic thoracentesis.  2. 600 mL of fluid withdrawn.    PROCEDURE (9/24/24)  IMPRESSION:  1. Ultrasound guided left sided therapeutic thoracentesis.  2. 1150 mL of fluid withdrawn.    Assessment/Plan  Patient is a 78F with recent pancreatectomy and splenectomy,  subsequent retroperitoneal abscess with drain placement. Now admitted from ED with leukocytosis WBC 23.8 noted to have recurrence of fluid collection in the distal pancreatic region. Left side pleural effusion with thoracentesis x2     Katelynn-pancreatic fluid collection, s/p distal pancreatectomy / splenectomy  - IR drain will be removed today  - NPO awaiting possible IR procedure   - OOB/ambulate  - Encourage IS    2. Leukocytosis, WBC 9.4, appears resolved  - Antibiotics per ID, last dose on 10/4/24      3. Pleural effusion, s/p US thoracentesis completed on 9/24/24 and 9/27/24  - Plan possible IR chest tube, pending repeat CXR  - CXR this AM to assess status of L pleural effusion    Patient seen with Dr Esquivel    If patient continues to do well, and if no chest tube placed, then patient may be able to DC home today or tomorrow 10/3/24    LATONIA La.

## 2024-10-03 DIAGNOSIS — Z98.890 S/P EXPLORATORY LAPAROTOMY: ICD-10-CM

## 2024-10-03 DIAGNOSIS — R18.8 ABDOMINAL FLUID COLLECTION: ICD-10-CM

## 2024-10-03 DIAGNOSIS — K86.89 PANCREATIC MASS: ICD-10-CM

## 2024-10-03 RX ORDER — FUROSEMIDE 20 MG/1
20 TABLET ORAL DAILY
Qty: 5 TABLET | Refills: 0 | Status: SHIPPED | OUTPATIENT
Start: 2024-10-03 | End: 2024-10-08

## 2024-10-03 RX ORDER — POTASSIUM CHLORIDE 1500 MG/1
20 TABLET, EXTENDED RELEASE ORAL 2 TIMES DAILY
Qty: 10 TABLET | Refills: 0 | Status: SHIPPED | OUTPATIENT
Start: 2024-10-03 | End: 2024-10-08

## 2024-10-07 ENCOUNTER — HOSPITAL ENCOUNTER (OUTPATIENT)
Dept: RADIOLOGY | Facility: MEDICAL CENTER | Age: 79
End: 2024-10-07
Payer: COMMERCIAL

## 2024-10-07 DIAGNOSIS — J90 PLEURAL EFFUSION: ICD-10-CM

## 2024-10-07 PROCEDURE — 71046 X-RAY EXAM CHEST 2 VIEWS: CPT

## 2024-10-08 ENCOUNTER — OFFICE VISIT (OUTPATIENT)
Dept: SURGICAL ONCOLOGY | Facility: MEDICAL CENTER | Age: 79
End: 2024-10-08
Payer: COMMERCIAL

## 2024-10-08 ENCOUNTER — APPOINTMENT (OUTPATIENT)
Dept: URGENT CARE | Facility: CLINIC | Age: 79
End: 2024-10-08
Payer: COMMERCIAL

## 2024-10-08 ENCOUNTER — HOSPITAL ENCOUNTER (OUTPATIENT)
Facility: MEDICAL CENTER | Age: 79
End: 2024-10-08
Attending: NURSE PRACTITIONER
Payer: COMMERCIAL

## 2024-10-08 ENCOUNTER — OFFICE VISIT (OUTPATIENT)
Dept: URGENT CARE | Facility: CLINIC | Age: 79
End: 2024-10-08
Payer: COMMERCIAL

## 2024-10-08 VITALS
HEIGHT: 64 IN | SYSTOLIC BLOOD PRESSURE: 112 MMHG | WEIGHT: 135 LBS | TEMPERATURE: 97.8 F | OXYGEN SATURATION: 91 % | BODY MASS INDEX: 23.05 KG/M2 | HEART RATE: 121 BPM | DIASTOLIC BLOOD PRESSURE: 62 MMHG

## 2024-10-08 VITALS
HEIGHT: 64 IN | BODY MASS INDEX: 23.22 KG/M2 | WEIGHT: 136 LBS | HEART RATE: 107 BPM | SYSTOLIC BLOOD PRESSURE: 100 MMHG | RESPIRATION RATE: 18 BRPM | DIASTOLIC BLOOD PRESSURE: 60 MMHG | OXYGEN SATURATION: 92 % | TEMPERATURE: 99.2 F

## 2024-10-08 DIAGNOSIS — B37.9 ANTIBIOTIC-INDUCED YEAST INFECTION: ICD-10-CM

## 2024-10-08 DIAGNOSIS — J90 PLEURAL EFFUSION: ICD-10-CM

## 2024-10-08 DIAGNOSIS — R18.8 ABDOMINAL FLUID COLLECTION: ICD-10-CM

## 2024-10-08 DIAGNOSIS — T36.95XA ANTIBIOTIC-INDUCED YEAST INFECTION: ICD-10-CM

## 2024-10-08 DIAGNOSIS — Z98.890 H/O EXPLORATORY LAPAROTOMY: ICD-10-CM

## 2024-10-08 DIAGNOSIS — C50.812 CANCER OF OVERLAPPING SITES OF LEFT BREAST (HCC): ICD-10-CM

## 2024-10-08 DIAGNOSIS — K86.89 PANCREATIC MASS: ICD-10-CM

## 2024-10-08 DIAGNOSIS — M81.0 AGE-RELATED OSTEOPOROSIS WITHOUT CURRENT PATHOLOGICAL FRACTURE: ICD-10-CM

## 2024-10-08 DIAGNOSIS — Z90.10 HISTORY OF MASTECTOMY, UNSPECIFIED LATERALITY: ICD-10-CM

## 2024-10-08 LAB
FORWARD REASON: SPWHY: NORMAL
FORWARDED TO LAB: SPWHR: NORMAL
SPECIMEN SENT (2ND): SPWT2: NORMAL
SPECIMEN SENT (3RD): SPWT3: NORMAL
SPECIMEN SENT (4TH): SPWT4: NORMAL
SPECIMEN SENT: SPWT1: NORMAL

## 2024-10-08 PROCEDURE — 3078F DIAST BP <80 MM HG: CPT | Performed by: NURSE PRACTITIONER

## 2024-10-08 PROCEDURE — 99024 POSTOP FOLLOW-UP VISIT: CPT

## 2024-10-08 PROCEDURE — 3074F SYST BP LT 130 MM HG: CPT

## 2024-10-08 PROCEDURE — 3078F DIAST BP <80 MM HG: CPT

## 2024-10-08 PROCEDURE — 3074F SYST BP LT 130 MM HG: CPT | Performed by: NURSE PRACTITIONER

## 2024-10-08 PROCEDURE — 99213 OFFICE O/P EST LOW 20 MIN: CPT | Performed by: NURSE PRACTITIONER

## 2024-10-08 RX ORDER — IBANDRONATE SODIUM 150 MG/1
150 TABLET, FILM COATED ORAL
Qty: 3 TABLET | Refills: 4 | Status: SHIPPED | OUTPATIENT
Start: 2024-10-08

## 2024-10-08 RX ORDER — FLUCONAZOLE 150 MG/1
TABLET ORAL
Qty: 2 TABLET | Refills: 0 | Status: SHIPPED | OUTPATIENT
Start: 2024-10-08

## 2024-10-08 ASSESSMENT — ENCOUNTER SYMPTOMS
SHORTNESS OF BREATH: 0
ABDOMINAL PAIN: 0
COUGH: 0
WHEEZING: 0
CHILLS: 0
FEVER: 0
VOMITING: 0
NAUSEA: 0

## 2024-10-08 ASSESSMENT — FIBROSIS 4 INDEX
FIB4 SCORE: 0.6
FIB4 SCORE: 0.6

## 2024-10-10 ENCOUNTER — DOCUMENTATION (OUTPATIENT)
Dept: VASCULAR LAB | Facility: MEDICAL CENTER | Age: 79
End: 2024-10-10

## 2024-10-10 ENCOUNTER — APPOINTMENT (OUTPATIENT)
Dept: MEDICAL GROUP | Age: 79
End: 2024-10-10
Payer: COMMERCIAL

## 2024-10-10 ENCOUNTER — TELEPHONE (OUTPATIENT)
Dept: HEALTH INFORMATION MANAGEMENT | Facility: OTHER | Age: 79
End: 2024-10-10

## 2024-10-11 LAB
CANDIDA RRNA VAG QL PROBE: POSITIVE
G VAGINALIS RRNA GENITAL QL PROBE: NEGATIVE
T VAGINALIS RRNA GENITAL QL PROBE: NEGATIVE

## 2024-10-14 ENCOUNTER — APPOINTMENT (OUTPATIENT)
Dept: MEDICAL GROUP | Age: 79
End: 2024-10-14
Payer: COMMERCIAL

## 2024-10-15 ENCOUNTER — APPOINTMENT (OUTPATIENT)
Dept: MEDICAL GROUP | Age: 79
End: 2024-10-15
Payer: COMMERCIAL

## 2024-10-15 LAB
FUNGUS SPEC CULT: NORMAL
FUNGUS SPEC FUNGUS STN: NORMAL
SIGNIFICANT IND 70042: NORMAL
SITE SITE: NORMAL
SOURCE SOURCE: NORMAL

## 2024-10-16 ENCOUNTER — APPOINTMENT (OUTPATIENT)
Dept: HEMATOLOGY ONCOLOGY | Facility: MEDICAL CENTER | Age: 79
End: 2024-10-16
Payer: COMMERCIAL

## 2024-10-17 ENCOUNTER — APPOINTMENT (OUTPATIENT)
Dept: VASCULAR LAB | Facility: MEDICAL CENTER | Age: 79
End: 2024-10-17
Payer: COMMERCIAL

## 2024-10-18 ENCOUNTER — HOSPITAL ENCOUNTER (OUTPATIENT)
Dept: RADIOLOGY | Facility: MEDICAL CENTER | Age: 79
End: 2024-10-18
Payer: COMMERCIAL

## 2024-10-18 DIAGNOSIS — J90 PLEURAL EFFUSION: ICD-10-CM

## 2024-10-18 PROCEDURE — 71046 X-RAY EXAM CHEST 2 VIEWS: CPT

## 2024-10-21 ENCOUNTER — TELEPHONE (OUTPATIENT)
Dept: SURGICAL ONCOLOGY | Facility: MEDICAL CENTER | Age: 79
End: 2024-10-21
Payer: COMMERCIAL

## 2024-10-21 ENCOUNTER — HOSPITAL ENCOUNTER (OUTPATIENT)
Dept: LAB | Facility: MEDICAL CENTER | Age: 79
End: 2024-10-21
Payer: COMMERCIAL

## 2024-10-21 DIAGNOSIS — Z98.890 H/O EXPLORATORY LAPAROTOMY: ICD-10-CM

## 2024-10-21 DIAGNOSIS — J90 PLEURAL EFFUSION: ICD-10-CM

## 2024-10-21 DIAGNOSIS — K86.89 MASS OF PANCREAS: ICD-10-CM

## 2024-10-21 DIAGNOSIS — K86.89 PANCREATIC FISTULA: Primary | ICD-10-CM

## 2024-10-21 LAB
ALBUMIN SERPL BCP-MCNC: 3.3 G/DL (ref 3.2–4.9)
ALBUMIN/GLOB SERPL: 0.8 G/DL
ALP SERPL-CCNC: 119 U/L (ref 30–99)
ALT SERPL-CCNC: 19 U/L (ref 2–50)
ANION GAP SERPL CALC-SCNC: 17 MMOL/L (ref 7–16)
AST SERPL-CCNC: 24 U/L (ref 12–45)
BILIRUB SERPL-MCNC: 2 MG/DL (ref 0.1–1.5)
BUN SERPL-MCNC: 14 MG/DL (ref 8–22)
CALCIUM ALBUM COR SERPL-MCNC: 10.2 MG/DL (ref 8.5–10.5)
CALCIUM SERPL-MCNC: 9.6 MG/DL (ref 8.5–10.5)
CHLORIDE SERPL-SCNC: 95 MMOL/L (ref 96–112)
CO2 SERPL-SCNC: 20 MMOL/L (ref 20–33)
CREAT SERPL-MCNC: 0.8 MG/DL (ref 0.5–1.4)
ERYTHROCYTE [DISTWIDTH] IN BLOOD BY AUTOMATED COUNT: 49.2 FL (ref 35.9–50)
GFR SERPLBLD CREATININE-BSD FMLA CKD-EPI: 75 ML/MIN/1.73 M 2
GLOBULIN SER CALC-MCNC: 4.3 G/DL (ref 1.9–3.5)
GLUCOSE SERPL-MCNC: 144 MG/DL (ref 65–99)
HCT VFR BLD AUTO: 47.8 % (ref 37–47)
HGB BLD-MCNC: 15.5 G/DL (ref 12–16)
MCH RBC QN AUTO: 29 PG (ref 27–33)
MCHC RBC AUTO-ENTMCNC: 32.4 G/DL (ref 32.2–35.5)
MCV RBC AUTO: 89.3 FL (ref 81.4–97.8)
PLATELET # BLD AUTO: 438 K/UL (ref 164–446)
PMV BLD AUTO: 10 FL (ref 9–12.9)
POTASSIUM SERPL-SCNC: 4 MMOL/L (ref 3.6–5.5)
PROT SERPL-MCNC: 7.6 G/DL (ref 6–8.2)
RBC # BLD AUTO: 5.35 M/UL (ref 4.2–5.4)
SODIUM SERPL-SCNC: 132 MMOL/L (ref 135–145)
WBC # BLD AUTO: 17.1 K/UL (ref 4.8–10.8)

## 2024-10-21 PROCEDURE — 80053 COMPREHEN METABOLIC PANEL: CPT

## 2024-10-21 PROCEDURE — 36415 COLL VENOUS BLD VENIPUNCTURE: CPT

## 2024-10-21 PROCEDURE — 85027 COMPLETE CBC AUTOMATED: CPT

## 2024-10-22 ENCOUNTER — OFFICE VISIT (OUTPATIENT)
Dept: SURGICAL ONCOLOGY | Facility: MEDICAL CENTER | Age: 79
End: 2024-10-22
Payer: COMMERCIAL

## 2024-10-22 ENCOUNTER — HOSPITAL ENCOUNTER (OUTPATIENT)
Dept: RADIOLOGY | Facility: MEDICAL CENTER | Age: 79
End: 2024-10-22
Attending: SURGERY
Payer: COMMERCIAL

## 2024-10-22 VITALS
SYSTOLIC BLOOD PRESSURE: 102 MMHG | BODY MASS INDEX: 23.17 KG/M2 | HEIGHT: 64 IN | TEMPERATURE: 99.4 F | OXYGEN SATURATION: 95 % | DIASTOLIC BLOOD PRESSURE: 66 MMHG | WEIGHT: 135.7 LBS | HEART RATE: 91 BPM

## 2024-10-22 DIAGNOSIS — R18.8 ABDOMINAL FLUID COLLECTION: ICD-10-CM

## 2024-10-22 DIAGNOSIS — D72.829 LEUKOCYTOSIS, UNSPECIFIED TYPE: ICD-10-CM

## 2024-10-22 DIAGNOSIS — Z98.890 H/O EXPLORATORY LAPAROTOMY: ICD-10-CM

## 2024-10-22 DIAGNOSIS — K86.89 PANCREATIC MASS: ICD-10-CM

## 2024-10-22 DIAGNOSIS — K86.89 PANCREATIC FISTULA: ICD-10-CM

## 2024-10-22 DIAGNOSIS — J90 PLEURAL EFFUSION: ICD-10-CM

## 2024-10-22 DIAGNOSIS — Z90.10 HISTORY OF MASTECTOMY, UNSPECIFIED LATERALITY: ICD-10-CM

## 2024-10-22 PROCEDURE — 71260 CT THORAX DX C+: CPT

## 2024-10-22 PROCEDURE — 99024 POSTOP FOLLOW-UP VISIT: CPT

## 2024-10-22 PROCEDURE — 3074F SYST BP LT 130 MM HG: CPT

## 2024-10-22 PROCEDURE — 3078F DIAST BP <80 MM HG: CPT

## 2024-10-22 PROCEDURE — 700117 HCHG RX CONTRAST REV CODE 255: Performed by: SURGERY

## 2024-10-22 RX ADMIN — IOHEXOL 98 ML: 350 INJECTION, SOLUTION INTRAVENOUS at 19:01

## 2024-10-22 ASSESSMENT — ENCOUNTER SYMPTOMS
NAUSEA: 0
COUGH: 0
WHEEZING: 0
SHORTNESS OF BREATH: 0
HEADACHES: 0
FEVER: 0
WEAKNESS: 0
VOMITING: 0
CHILLS: 0
ABDOMINAL PAIN: 0
TREMORS: 1
DIZZINESS: 0

## 2024-10-22 ASSESSMENT — FIBROSIS 4 INDEX: FIB4 SCORE: 0.98

## 2024-10-23 ENCOUNTER — TELEPHONE (OUTPATIENT)
Dept: SURGICAL ONCOLOGY | Facility: MEDICAL CENTER | Age: 79
End: 2024-10-23
Payer: COMMERCIAL

## 2024-10-23 DIAGNOSIS — R18.8 ABDOMINAL FLUID COLLECTION: ICD-10-CM

## 2024-10-23 DIAGNOSIS — K86.89 PANCREATIC FISTULA: ICD-10-CM

## 2024-10-23 DIAGNOSIS — Z98.890 H/O EXPLORATORY LAPAROTOMY: ICD-10-CM

## 2024-10-23 DIAGNOSIS — I82.403 DEEP VEIN THROMBOSIS (DVT) OF BOTH LOWER EXTREMITIES, UNSPECIFIED CHRONICITY, UNSPECIFIED VEIN (HCC): ICD-10-CM

## 2024-10-23 DIAGNOSIS — J90 PLEURAL EFFUSION: ICD-10-CM

## 2024-10-23 DIAGNOSIS — K86.89 PANCREATIC MASS: ICD-10-CM

## 2024-10-23 DIAGNOSIS — I26.09 PULMONARY EMBOLISM WITH ACUTE COR PULMONALE, UNSPECIFIED CHRONICITY, UNSPECIFIED PULMONARY EMBOLISM TYPE (HCC): ICD-10-CM

## 2024-10-24 ENCOUNTER — HOSPITAL ENCOUNTER (OUTPATIENT)
Dept: RADIOLOGY | Facility: MEDICAL CENTER | Age: 79
End: 2024-10-24
Payer: COMMERCIAL

## 2024-10-24 ENCOUNTER — HOSPITAL ENCOUNTER (OUTPATIENT)
Facility: MEDICAL CENTER | Age: 79
End: 2024-10-24
Payer: COMMERCIAL

## 2024-10-24 DIAGNOSIS — J90 PLEURAL EFFUSION: ICD-10-CM

## 2024-10-24 DIAGNOSIS — K86.89 PANCREATIC MASS: ICD-10-CM

## 2024-10-24 DIAGNOSIS — R18.8 ABDOMINAL FLUID COLLECTION: ICD-10-CM

## 2024-10-24 DIAGNOSIS — Z98.890 H/O EXPLORATORY LAPAROTOMY: ICD-10-CM

## 2024-10-24 DIAGNOSIS — K86.89 PANCREATIC FISTULA: ICD-10-CM

## 2024-10-24 LAB
AMYLASE FLD-CCNC: 20 U/L
BODY FLD TYPE: NORMAL
GRAM STN SPEC: NORMAL
SIGNIFICANT IND 70042: NORMAL
SITE SITE: NORMAL
SOURCE SOURCE: NORMAL

## 2024-10-24 PROCEDURE — 82150 ASSAY OF AMYLASE: CPT

## 2024-10-24 PROCEDURE — 87070 CULTURE OTHR SPECIMN AEROBIC: CPT

## 2024-10-24 PROCEDURE — 87015 SPECIMEN INFECT AGNT CONCNTJ: CPT

## 2024-10-24 PROCEDURE — C1729 CATH, DRAINAGE: HCPCS

## 2024-10-24 PROCEDURE — 71045 X-RAY EXAM CHEST 1 VIEW: CPT

## 2024-10-24 PROCEDURE — 87205 SMEAR GRAM STAIN: CPT

## 2024-10-25 DIAGNOSIS — Z98.890 S/P EXPLORATORY LAPAROTOMY: ICD-10-CM

## 2024-10-25 DIAGNOSIS — Z98.890 H/O EXPLORATORY LAPAROTOMY: ICD-10-CM

## 2024-10-25 DIAGNOSIS — D72.829 LEUKOCYTOSIS, UNSPECIFIED TYPE: ICD-10-CM

## 2024-10-25 DIAGNOSIS — K86.89 PANCREATIC FISTULA: ICD-10-CM

## 2024-10-25 DIAGNOSIS — J90 PLEURAL EFFUSION: ICD-10-CM

## 2024-10-25 DIAGNOSIS — R18.8 ABDOMINAL FLUID COLLECTION: ICD-10-CM

## 2024-10-25 DIAGNOSIS — K86.89 PANCREATIC MASS: ICD-10-CM

## 2024-10-27 LAB
BACTERIA FLD AEROBE CULT: NORMAL
GRAM STN SPEC: NORMAL
SIGNIFICANT IND 70042: NORMAL
SITE SITE: NORMAL
SOURCE SOURCE: NORMAL

## 2024-10-28 ENCOUNTER — HOSPITAL ENCOUNTER (OUTPATIENT)
Dept: LAB | Facility: MEDICAL CENTER | Age: 79
End: 2024-10-28
Payer: COMMERCIAL

## 2024-10-28 DIAGNOSIS — R18.8 ABDOMINAL FLUID COLLECTION: ICD-10-CM

## 2024-10-28 DIAGNOSIS — J90 PLEURAL EFFUSION: ICD-10-CM

## 2024-10-28 DIAGNOSIS — K86.89 PANCREATIC MASS: ICD-10-CM

## 2024-10-28 DIAGNOSIS — K86.89 PANCREATIC FISTULA: ICD-10-CM

## 2024-10-28 DIAGNOSIS — D72.829 LEUKOCYTOSIS, UNSPECIFIED TYPE: ICD-10-CM

## 2024-10-28 DIAGNOSIS — Z98.890 H/O EXPLORATORY LAPAROTOMY: ICD-10-CM

## 2024-10-28 LAB
ALBUMIN SERPL BCP-MCNC: 3.4 G/DL (ref 3.2–4.9)
ALBUMIN/GLOB SERPL: 0.9 G/DL
ALP SERPL-CCNC: 98 U/L (ref 30–99)
ALT SERPL-CCNC: 31 U/L (ref 2–50)
ANION GAP SERPL CALC-SCNC: 11 MMOL/L (ref 7–16)
AST SERPL-CCNC: 45 U/L (ref 12–45)
BILIRUB SERPL-MCNC: 0.5 MG/DL (ref 0.1–1.5)
BUN SERPL-MCNC: 10 MG/DL (ref 8–22)
CALCIUM ALBUM COR SERPL-MCNC: 9.5 MG/DL (ref 8.5–10.5)
CALCIUM SERPL-MCNC: 9 MG/DL (ref 8.5–10.5)
CHLORIDE SERPL-SCNC: 104 MMOL/L (ref 96–112)
CO2 SERPL-SCNC: 24 MMOL/L (ref 20–33)
CREAT SERPL-MCNC: 0.82 MG/DL (ref 0.5–1.4)
ERYTHROCYTE [DISTWIDTH] IN BLOOD BY AUTOMATED COUNT: 50.4 FL (ref 35.9–50)
GFR SERPLBLD CREATININE-BSD FMLA CKD-EPI: 73 ML/MIN/1.73 M 2
GLOBULIN SER CALC-MCNC: 3.7 G/DL (ref 1.9–3.5)
GLUCOSE SERPL-MCNC: 111 MG/DL (ref 65–99)
HCT VFR BLD AUTO: 46.3 % (ref 37–47)
HGB BLD-MCNC: 14.6 G/DL (ref 12–16)
MCH RBC QN AUTO: 28.5 PG (ref 27–33)
MCHC RBC AUTO-ENTMCNC: 31.5 G/DL (ref 32.2–35.5)
MCV RBC AUTO: 90.3 FL (ref 81.4–97.8)
PLATELET # BLD AUTO: 841 K/UL (ref 164–446)
PMV BLD AUTO: 10 FL (ref 9–12.9)
POTASSIUM SERPL-SCNC: 3.9 MMOL/L (ref 3.6–5.5)
PROT SERPL-MCNC: 7.1 G/DL (ref 6–8.2)
RBC # BLD AUTO: 5.13 M/UL (ref 4.2–5.4)
SODIUM SERPL-SCNC: 139 MMOL/L (ref 135–145)
WBC # BLD AUTO: 12.9 K/UL (ref 4.8–10.8)

## 2024-10-28 PROCEDURE — 36415 COLL VENOUS BLD VENIPUNCTURE: CPT

## 2024-10-28 PROCEDURE — 80053 COMPREHEN METABOLIC PANEL: CPT

## 2024-10-28 PROCEDURE — 85027 COMPLETE CBC AUTOMATED: CPT

## 2024-10-28 NOTE — PROGRESS NOTES
Subjective:      Primary care physician: Roula Ga P.A.-C.  Referring Provider: Casimiro Fowler M.D.   Medical Oncologist: Casimiro Fowler M.D.    Chief Complaint: No chief complaint on file.    Diagnosis:   1. Pleural effusion        2. Abdominal fluid collection        3. Leukocytosis, unspecified type        4. Pancreatic mass        5. H/O exploratory laparotomy            History of presenting illness:    Paige Gudino is a 77 y.o. female with a history of noninvasive carcinoma of the left breast associated with calcifications found on mammogram 34 years ago.  She was treated with a total mastectomy at that time.  She had implant reconstruction and a right mamma pexy subsequently.  No systemic therapy was given.  She did well until May 2022 when she self detected a lump under the implant at the inframammary fold on the left.  The tumor was ER positive greater than 90% GA positive greater than 90% HER2 negative IHC 1+.   On 8/1/2022 she underwent resection of the lesion which demonstrated invasive grade 2 mammary carcinoma measuring 1.6 cm with a close posterior margin, all other margins clear.       She was started on anastrozole in Jan 2023, has tolerated well well with no hot flashes or musculoskeletal symptoms.       CT C/A/P on 6/28/24 noted a large solid and cystic necrotic pancreatic tail mass suspicious for malignancy with no surrounding vascular involvement or lymphadenopathy. Mild hepatic steatosis with no focal hepatic lesions. Cholelithiasis without biliary dilatation. Possible wall thickening of the gastric antrum. Moderate size hiatal hernia. Nonspecific borderline enlarged superior mediastinal right paraesophageal lymph node.     She is here today for evaluation of this large pancreatic mass that was found to tail the pancreas.  The characteristics of this tumor which were found incidentally without any symptoms appear  to have characteristics of a microcystic serous cystadenoma.  The patient has no family history of pancreatic cancer.  She does have a history of breast cancer and underwent a mastectomy on the left side.  In any event she is an extremely healthy 78-year-old.  She denies any fever or chills, nausea or vomiting.  She has no weight loss.  She did have a CT scan which I have personally reviewed from 2024 which showed a large mass with no adenopathy and no sign of focal invasion or metastatic disease.  The patient has not had an EUS or any biopsies.  She is not a diabetic.  She has been completely asymptomatic.     Update 24  On 24 she completed surgery by Dr. Espinoza, includin.  Exploratory laparotomy.  2.  Intraoperative ultrasound survey of the pancreas using 5/12 MHz T-probe.  3.  Distal pancreatectomy and splenectomy.  4.  Stomach and celiac node dissection.  5.  Omental flap.     Pathology noted benign pancreatic serous cystadenoma (6.8 cm), margins are negative for neoplasm, spleen with no significant pathologic abnormality.  She was eventually discharged on 24 with home oxygen and close outpatient follow-up.     On 24 she presented to the ED d/t worsening short of breath. CT CHEST on 24 noted bilateral segmental and subsegmental pulmonary emboli with changes compatible with significant right heart strain. CT ABD/PELVIS on 24 noted postop changes of splenectomy and distal pancreatectomy, fluid collection left upper quadrant is seen which could represent postop seroma or possibly pancreatic leak and right lower lobe segmental and subsegmental pulmonary embolus. She was subsequently admitted and started on antibiotics. On 8/15/24 she underwent CT-guided retroperitoneal abscess drainage and LUQ retroperitoneal catheter drainage with CT guidance. On 24 she underwent right atrial thrombectomy by Dr. Bowen, pathology noted benign thrombus. On 24 she underwent CT  "guided left upper quadrant fluid collection catheter drainage. Her condition eventually improved and she was discharged home on 8/23/24 with the drain in place and instructions to follow up with our office for drain management.     Yesterday 8/27/24 I received a call from patient's daughter Teri regarding minimal drain output of \"less than 1 mL daily\" since the patient was discharged home and that they would like to see if drain can be removed as the patient has been finding this uncomfortable and painful at times. Discussed with Dr. Esquivel, imaging ordered for confirmation. CT PANCREAS on 8/27/24 noted LEFT upper quadrant drain in place with minimal adjacent peritoneal gas and no significant residual or recurrent fluid collection demonstrated. Appointment scheduled with patient today to remove drain, patient arrived with spouse. Upon closer inspection, fluid in drain appears to be about 25 mL and patient states this is the output so far for today. Also received drain output record from patient, however measurements appear to be in ounces instead of mL. Based on log, patient has had about 1.5-1.6 oz (44-47 mL) per day. Discussed with patient that indications for removal of drain are output of 30 mL or less per day for 2 consecutive days and that we will need to keep the drain in place for now. However, also discussed that we can draw a fluid amylase from her drain and consider removing the drain this upcoming Friday if this comes back as normal. She is agreeable to this. Steris in place over abdominal incision and this appears to be healing well. Patient also brought up concerns regarding a \"small swollen area\" on her back measuring about 2-3 inches by 4 inches that migrated below her bra strap. Patient states that this has since resolved and denies shortness of breath or difficulty breathing. Also had questions regarding rescheduling CT abdomen scheduled for 9/16/24, advised to keep this appointment for now. " She reports no problems with eating or drinking, she has been taking multiple naps daily and states she feels energized afterwards. Denies fever, chills, nausea, or vomiting.     Update 9/4/24  She is here today for further follow-up. She is feeling pretty well. Drain is putting out about 25 cc of thick fluid a day. Last amylase a week ago as 5000     Update 9/11/24  She is here today for follow up regarding pancreatic drain.  The drain is putting out only about 5 cc a day.  She is feeling well.     Update 9/17/24  She is here today for reevaluation.  She has been feeling tired and a little bit off for the last day.  She had some nausea this morning.  Denies fevers     Update 10/8/24  She presented to the ED on 9/17/24 for elevated WBC and recurrent intra-abdominal fluid collection. CT ABD/PELVIS  on 9/17/24 notable for fluid collection with some peripheral enhancement is identified in the distal pancreatic region including the region of previous pancreatectomy, cholelithiasis, and left pleural effusion and consolidations in the left lung base. On 9/19/24 she underwent CT guided catheter drainage with placement of 10 Dutch locking loop catheter LUQ. CT ABD/PELVIS on 9/23/24 notable for decreased size of the fluid collection in the distal pancreatectomy-lumpectomy bed following tube placement and increased LEFT pleural effusion with overlying atelectasis. On 9/24/24 she underwent abscessogram, ultrasound guided left sided diagnostic, and therapeutic thoracentesis. Pathology did not identify malignancy in the thoracentesis fluid. CXR 2-VIEW on 9/27/24 noted interval increase in size of the left pleural effusion obscuring the left lung base and she then underwent ultrasound guided left sided therapeutic thoracentesis afterwards. Follow-up CXR 1-VIEW on 9/27/24 noted decreased size of left pleural effusion and no pneumothorax. CT ABDOMEN on 9/29/24 note multiloculated enhancing fluid collection in the left abdomen with  pigtail catheter in place with fluid collection increased in size since prior study, loculated appearing left pleural effusion, and linear consolidations in the bilateral lung bases with left greater than right appearance favoring atelectasis. IR chest tube was considered but ultimately deferred as her CXR on 10/2/24 noted relatively unchanged left lung pleural effusion compared to the previous study. After extensive multidisciplinary discussion, she was discharged on 10/2/24 with strict return precautions in case of recurrence of respiratory or infectious symptoms, orders for repeat chest x-ray, and follow-up visit with surgical oncology.     CXR 2-VIEW on 10/7/24 noted interval decrease in left pleural effusion.      She is here today accompanied by her  for follow-up after her recent discharge from the hospital. She continues recover well at home. She denies experiencing shortness of breath and states she is able to breather deeper now. She has also been using her incentive spirometer at home with improving results. States she is tolerating a regular diet and is slowly regaining her appetite. She is also slowly increasing her activity level at home. She is completing her course of Augmentin, Lasix, and potassium tablet today. She did have a question regarding a referral placed recently to vascular medicine which was discussed with Dr. Espinoza.     Update 10/22/24  CXR 2-VIEW on 10/18/24 noted smaller left pleural effusion with left basilar atelectasis.     On 10/22/24 her daughter called our office with concerns of side effects from Eliquis, specifically fatigue, shakiness, and GI upset. Denied fever or chills at the time. STAT CBC and CMP were ordered, notable for leukocytosis 17.1 and elevated Tbili 2.0.     She is here today accompanied by her  for follow up. They spoke to Dr. Esquivel last night after receiving lab results, Augmentin and STAT CT ABD/PELVIS ordered. She was also instructed to  "rehydrate based on her lab results. This morning she states she feels \"a little better\" but is still feeling tired. They state they have been rehydrating with Gatorade and she is able to tolerate eating and drinking however her  states she is still eating smaller options compared to before. Having normal BMs, denies fever, chills, nausea, or vomiting at this time. Her CT is scheduled for 7 PM tonight but is requesting an earlier appointment if possible.    Update 10/30/24  CT C/A/P on 10/22/24 noted postsurgical changes from the distal pancreatectomy and splenectomy with a 4.0 x 6.0 cm fluid collection adjacent to the pancreas/surgical bed extending to the left hemidiaphragm. Irregular enhancing soft tissue component along the diaphragmatic area raises concern for recurrent disease with exudate or infection being less likely. There is also a questionable area of breakthrough and possible extension into the chest cavity. Moderate left pleural effusion with left basilar atelectasis. Thoracentesis ordered with analysis of collected fluid per recommendations per Dr. Esquivel and Dr. Espinoza.    CXR 2-VIEW on 10/24/24 noted stable small to moderate left effusion and associated atelectasis. On 10/24/24 she completed ultrasound guided left sided diagnostic and therapeutic thoracentesis with 700 mL of pleural fluid withdrawn. Amylase was 20 and wound cultures were negative.    She is here today for follow up. She recently completed 7 day course of Augmentin. Labs on 10/28/24 notable for ***    Past Medical History:   Diagnosis Date    Anesthesia     nausea post op    Cancer (HCC)     1987 breast left    Cancer of overlapping sites of left female breast (HCC)     Cataract 2024    IOL bilat    High cholesterol     Hypothyroidism     PONV (postoperative nausea and vomiting) 1987    Just felt nausous after anesthesia    Thyroid nodule      Past Surgical History:   Procedure Laterality Date    [place] ULTRASONIC GUIDANCE, " INTRAOPERATIVE  8/5/2024    Procedure: ULTRASOUND GUIDANCE;  Surgeon: Sachin Espinoza M.D.;  Location: SURGERY [facility];  Service: General    PANCREATECTOMY N/A 8/5/2024    Procedure: OPEN DISTAL PANCREATECTOMY WITH SPLENECTOMY, NODE DISSECTION;  Surgeon: Sachin Espinoza M.D.;  Location: SURGERY [facility];  Service: General    SPLENECTOMY N/A 8/5/2024    Procedure: SPLENECTOMY;  Surgeon: Sachin Espinoza M.D.;  Location: SURGERY [facility];  Service: General    CREATION, FLAP, OMENTUM N/A 8/5/2024    Procedure: CREATION, FLAP, OMENTUM;  Surgeon: Sachin Espinoza M.D.;  Location: SURGERY [facility];  Service: General    PB MASTECTOMY, PARTIAL Left 08/01/2022    Procedure: MELLO LOCALIZED LEFT PARTIAL MASTECTOMY;  Surgeon: Elean Arroyo M.D.;  Location: SURGERY [facility];  Service: General    OTHER ORTHOPEDIC SURGERY  2019    back surgery    OTHER  2001    replace left breast implant    OTHER  1988    Left breast implant/lift    OTHER  1987    left mastectomy    LAMINOTOMY      LUMPECTOMY      PRIMARY C SECTION       Allergies   Allergen Reactions    Bloodless Unspecified     Does not want blood products    Synthroid [Fd&C Red #40 Al Paul-Levothyroxine] Hives and Unspecified     Hives, Brand specific. Some brands are ok and some are not    Tape Unspecified     Skin degradation with use of tegaderm     [place] Thyroid [Thyroid] Diarrhea     diarrhea     Outpatient Encounter Medications as of 10/29/2024   Medication Sig Dispense Refill    amoxicillin-clavulanate (AUGMENTIN) 875-125 MG Tab Take 1 Tablet by mouth 2 times a day for 7 days. 14 Tablet 0    ibandronate (BONIVA) 150 MG tablet Take 1 Tablet by mouth every 30 days. 3 Tablet 4    fluconazole (DIFLUCAN) 150 MG tablet Take one tablet orally for yeast infection, if symptoms persist, may repeat treatment after 72 hours. (Patient not taking: Reported on 10/22/2024) 2 Tablet 0    amLODIPine (NORVASC) 5 MG Tab Take 1  Tablet by mouth every day. 30 Tablet 1    apixaban (ELIQUIS) 5mg Tab Take 1 Tablet by mouth 2 times a day for 90 days. Indications: DVT/ Tablet 0    acetaminophen (TYLENOL) 500 MG Tab Take 500 mg by mouth 1 time a day as needed for Mild Pain. (Patient not taking: Reported on 10/22/2024)      liothyronine (CYTOMEL) 5 MCG Tab Take 2.5-5 mcg by mouth every day. 5 mcg on Monday, Wednesday, Friday. 2.5 mcg on all other days      levothyroxine (SYNTHROID) 100 MCG Tab Take 100 mcg by mouth every morning on an empty stomach.       No facility-administered encounter medications on file as of 10/29/2024.     Social History     Socioeconomic History    Marital status:      Spouse name: Not on file    Number of children: Not on file    Years of education: Not on file    Highest education level: Associate degree: academic program   Occupational History     Comment: retired banker   Tobacco Use    Smoking status: Never     Passive exposure: Past    Smokeless tobacco: Never   Vaping Use    Vaping status: Never Used   Substance and Sexual Activity    Alcohol use: Never    Drug use: Never    Sexual activity: Not Currently     Partners: Male     Birth control/protection: Abstinence, Male Sterilization, Post-Menopausal     Comment:    Other Topics Concern    Not on file   Social History Narrative    Not on file     Social Determinants of Health     Financial Resource Strain: Low Risk  (7/11/2024)    Overall Financial Resource Strain (CARDIA)     Difficulty of Paying Living Expenses: Not hard at all   Food Insecurity: No Food Insecurity (10/2/2024)    Hunger Vital Sign     Worried About Running Out of Food in the Last Year: Never true     Ran Out of Food in the Last Year: Never true   Transportation Needs: No Transportation Needs (10/2/2024)    PRAPARE - Transportation     Lack of Transportation (Medical): No     Lack of Transportation (Non-Medical): No   Physical Activity: Insufficiently Active (7/11/2024)     Exercise Vital Sign     Days of Exercise per Week: 2 days     Minutes of Exercise per Session: 10 min   Stress: No Stress Concern Present (7/11/2024)    [place] [employer] - Occupational Stress Questionnaire     Feeling of Stress : Not at all   Social Connections: Unknown (7/11/2024)    Social Connection and Isolation Panel [NHANES]     Frequency of Communication with Friends and Family: More than three times a week     Frequency of Social Gatherings with Friends and Family: Twice a week     Attends Yazidi Services: Patient declined     Active Member of Clubs or Organizations: Yes     Attends Club or Organization Meetings: More than 4 times per year     Marital Status:    Intimate Partner Violence: Not At Risk (10/2/2024)    Humiliation, Afraid, Rape, and Kick questionnaire     Fear of Current or Ex-Partner: No     Emotionally Abused: No     Physically Abused: No     Sexually Abused: No   Housing Stability: Low Risk  (10/2/2024)    Housing Stability Vital Sign     Unable to Pay for Housing in the Last Year: No     Number of Times Moved in the Last Year: 0     Homeless in the Last Year: No      Social History     Tobacco Use   Smoking Status Never    Passive exposure: Past   Smokeless Tobacco Never     Social History     Substance and Sexual Activity   Alcohol Use Never     Social History     Substance and Sexual Activity   Drug Use Never      Family History   Problem Relation Age of Onset    Cancer Mother         Ovarian    Ovarian Cancer Mother     Dementia Father     Heart Disease Father     Hyperlipidemia Neg Hx        ROS     Objective:   LMP  (LMP Unknown)     Physical Exam    Labs    Latest Reference Range & Units 10/21/24 14:49   WBC 4.8 - 10.8 K/uL 17.1 (H)   RBC 4.20 - 5.40 M/uL 5.35   Hemoglobin 12.0 - 16.0 g/dL 15.5   Hematocrit 37.0 - 47.0 % 47.8 (H)   MCV 81.4 - 97.8 fL 89.3   MCH 27.0 - 33.0 pg 29.0   MCHC 32.2 - 35.5 g/dL 32.4   RDW 35.9 - 50.0 fL 49.2   Platelet Count  164 - 446 K/uL 438   MPV 9.0 - 12.9 fL 10.0   (H): Data is abnormally high     ***      Latest Reference Range & Units 10/21/24 14:49   Sodium 135 - 145 mmol/L 132 (L)   Potassium 3.6 - 5.5 mmol/L 4.0   Chloride 96 - 112 mmol/L 95 (L)   Co2 20 - 33 mmol/L 20   Anion Gap 7.0 - 16.0  17.0 (H)   Glucose 65 - 99 mg/dL 144 (H)   Bun 8 - 22 mg/dL 14   Creatinine 0.50 - 1.40 mg/dL 0.80   GFR (CKD-EPI) >60 mL/min/1.73 m 2 75   Calcium 8.5 - 10.5 mg/dL 9.6   Correct Calcium 8.5 - 10.5 mg/dL 10.2   AST(SGOT) 12 - 45 U/L 24   ALT(SGPT) 2 - 50 U/L 19   Alkaline Phosphatase 30 - 99 U/L 119 (H)   Total Bilirubin 0.1 - 1.5 mg/dL 2.0 (H)   Albumin 3.2 - 4.9 g/dL 3.3   Total Protein 6.0 - 8.2 g/dL 7.6   Globulin 1.9 - 3.5 g/dL 4.3 (H)   A-G Ratio g/dL 0.8   (L): Data is abnormally low  (H): Data is abnormally high       Latest Reference Range & Units 08/28/24 16:10 09/24/24 12:13 09/27/24 14:30 09/30/24 09:17   Body Fluid Amylase U/L 6554 28 24 6       Latest Reference Range & Units 10/24/24 11:47   Fluid Type  Pleural   Body Fluid Amylase U/L 20      10/24/24 11:47   Significant Indicator NEG  .   Site Thoracentesis Fluid  Thoracentesis Fluid   Source BF  BF     Imaging  CXR 2-VIEW (10/24/24)  IMPRESSION:  1.  Stable small to moderate left effusion and associated atelectasis.  2.  Hazy opacities in the left midlung are nonspecific, pneumonia can be considered in the appropriate clinical settings.    CT C/A/P (10/22/24)  IMPRESSION:  1. Postsurgical changes are noted from the distal pancreatectomy and splenectomy. There is a 4.0 x 6.0 cm fluid collection adjacent to the pancreas/surgical bed, extending to the left hemidiaphragm. Irregular enhancing soft tissue component along the   diaphragmatic area raises concern for recurrent disease, with exudate or infection being less likely. There is also a questionable area of breakthrough and possible extension into the chest cavity. Moderate left pleural effusion with left basilar    atelectasis.  2. Moderate hiatal hernia containing food debris.    CXR 2-VIEW (10/18/24)  IMPRESSION:  Smaller left pleural effusion with left basilar atelectasis.     CXR 2-VIEW (10/7/24)  IMPRESSION:  Interval decrease in left pleural effusion      CXR 2-VIEW (10/2/24)  IMPRESSION:  Moderate left pleural effusion with adjacent airspace disease.     CT ABDOMEN (9/29/24)  IMPRESSION:  1.  Multiloculated enhancing fluid collection in the left abdomen with pigtail catheter in place, fluid collection has increased in size since prior study.  2.  Loculated appearing left pleural effusion.  3.  Linear consolidations in the bilateral lung bases, left greater than right, appearance favoring atelectasis.  4.  Enlarged bilateral inguinal lymph nodes, consider causes of adenopathy with additional workup as clinically appropriate  5.  Cholelithiasis  6.  Small hiatal hernia  7.  Atherosclerosis and atherosclerotic coronary artery disease     CXR 1-VIEW (9/27/24)  IMPRESSION:  Decreased size of left pleural effusion. No pneumothorax.     CXR 2-VIEW (9/27/24)  IMPRESSION:  1. Interval increase in size of the left pleural effusion, obscuring the left lung base.  2. The remainder of the lungs is clear.  3. Obscuration of the left cardiomediastinal border.  4. Other stable chronic degenerative changes.     CT ABD/PELVIS (9/23/24)  IMPRESSION:  1.  Decreased size of the fluid collection in the distal pancreatectomy-lumpectomy bed following tube placement  2.  Increased LEFT pleural effusion with overlying atelectasis  3.  Hiatal hernia  4.  Persistently thickened endometrium for age. Underlying mass is possible. Ultrasound could better assess.     CT ABD/PELVIS (9/17/24)  IMPRESSION:  1.  Post distal pancreatectomy.  2.  Fluid collection with some peripheral enhancement is identified in the distal pancreatic region including the region of previous pancreatectomy. Developing pseudocyst is a possibility. No emphysema or hemorrhage  is appreciated. Remaining pancreas enhances normally.  3.  There is cholelithiasis.  4.  Left pleural effusion and consolidations in the left lung base.  5.  No change in hiatal hernia.  6.  Prominent endometrial cavity again noted.     CT PANCREAS (8/27/24)  IMPRESSION:  1.  Prior distal pancreatectomy and splenectomy.  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  2.  LEFT upper quadrant drain in place with minimal adjacent peritoneal gas.  No significant residual or recurrent fluid collection demonstrated.  3.  Gallstones and minimal gallbladder wall thickening.  Cholecystitis is not excluded.  4.  Bilateral pleural fluid collections with associated atelectasis, worse on the LEFT.     CT ABD/PELVIS (8/20/24)  IMPRESSION:  1.  There is postoperative change consistent with distal pancreatectomy and splenectomy.  2.  The simple appearing left upper quadrant subphrenic fluid collection is again seen very similar to the recent prior study, possibly postoperative seroma as there is no rim enhancement to suggest abscess. Pancreatic leak is considered unlikely as the   majority of the fluid is in the splenic fossa rather than adjacent to the surgical margin of the pancreas.  3.  Stable bibasilar atelectasis and pleural effusions.  4.  Cholelithiasis again noted.  5.  Pulmonary arteries not well assessed on this exam in the right atrial thrombus is no longer evident. Small defect in the right ventricular apex is unchanged.     CT ABD/PELVIS (8/14/24)  IMPRESSION:  1.  Postop changes of splenectomy and distal pancreatectomy, fluid collection left upper quadrant is seen which could represent postop seroma or possibly pancreatic leak.  2.  Right lower lobe segmental and subsegmental pulmonary embolus.  3.  Trace right and small left pleural effusions.  4.  Linear densities of the lung bases suggesting atelectasis, component of left lower lobe infiltrate not excluded.  5.   Diverticulosis  6.  Cholelithiasis  7.  Cardiomegaly     CT CHEST (8/14/24)  IMPRESSION:  1.  Bilateral segmental and subsegmental pulmonary emboli with changes compatible with significant right heart strain.  2.  Small left and trace right pleural effusions.  3.  Linear consolidations in the bilateral lung bases suggests atelectasis, component of left lower lobe infiltrate is not excluded.  4.  4.0 cm ascending thoracic aortic aneurysm, radiographic follow-up and surveillance recommended as clinically appropriate.  5.  Atherosclerosis and atherosclerotic coronary artery disease     CT C/A/P (6/28/24)  IMPRESSION:  1.  No definite CT evidence of infiltrating gastric mass although there is poor distention of the stomach with possible wall thickening of the gastric antrum.  2.  There is a large solid and cystic necrotic pancreatic tail mass suspicious for malignancy with no surrounding vascular involvement or lymphadenopathy.  3.  Mild hepatic steatosis with no focal hepatic lesions.  4.  Cholelithiasis without biliary dilatation.  5.  Moderate size hiatal hernia.  6.  Colonic diverticulosis without diverticulitis.  7.  Nonspecific borderline enlarged superior mediastinal right paraesophageal lymph node.  8.  There are postoperative changes of left mastectomy and axillary lymph node dissection.  9.  No parenchymal lung metastases.     Pathology  PATH 9/24/24  FINAL DIAGNOSIS:   A. Thoracentesis fluid:         No malignancy identified.         Cytology preparations, including cell block, demonstrate numerous           reactive mesothelial cells in a background of mixed           inflammatory cells; findings consistent with cell count.      PATH 8/16/24  FINAL DIAGNOSIS:   A. Right atrial mass:          Benign thrombus.      PATH 8/5/24  FINAL DIAGNOSIS:   A. Distal pancreas, spleen and nodes, distal pancreatectomy and   splenectomy:         Benign pancreatic serous cystadenoma (6.8 cm).          Margins are negative  for neoplasm.          Background chronic pancreatitis.          Spleen with no significant pathologic abnormality.          Nine benign lymph nodes (0/9).      Procedures  10/24/24 Ultrasound guided left sided diagnostic and therapeutic thoracentesis by Cielo KEITH    9/27/24 Ultrasound guided left sided therapeutic thoracentesis by Jolie PEACE     9/24/24 Abscessogram by Dr. Bowen and ultrasound guided left sided diagnostic and therapeutic thoracentesis by Jolie PEACE     9/19/24 CT guided catheter drainage with placement of 10 fr locking loop catheter LUQ by Dr. Ruff     8/22/24 CT guided left upper quadrant fluid collection catheter drainage      8/16/24 Right atrial thrombectomy by Dr. Bowen     8/15/24 CT-guided retroperitoneal abscess drainage  LUQ retroperitoneal catheter drainage with CT guidance.     8/5/24 by Dr. Espinoza  1.  Exploratory laparotomy.  2.  Intraoperative ultrasound survey of the pancreas using 5/12 MHz T-probe.  3.  Distal pancreatectomy and splenectomy.  4.  Stomach and celiac node dissection.  5.  Omental flap.     Mastectomy 2022    Diagnosis:     1. Pleural effusion        2. Abdominal fluid collection        3. Leukocytosis, unspecified type        4. Pancreatic mass        5. H/O exploratory laparotomy            Medical Decision Making:  Today's Assessment / Status / Plan:     ***    Overall, Paige Gudino continues to slowly recover form her recent hospital admission. She is tolerating a regular diet and is slowly returning to her daily activities. Extensive discussion regarding adding supplements such as Ensure given she is tolerating small portions at this time. She was also able to schedule follow up appointments with her PCP and vascular medicine to discuss adjusting her anticoagulation and hypertensive medications.     Based on her clinical presentation, we discussed that it is safe for her to go home at this time given that we are following her closely and will be following  up with her upon completion of her CT scan. After speaking with imaging, earlier CT appointments were available at another facility in [place] but her daughter Teri declined and will keep their existing appointment at [facility] since this location is much more convenient.     She was also instructed to continue the Augmentin as prescribed. However, strict instructions given to present to the ED should she experience new or worsening symptoms. The patient was also seen and assessed by Anderson Gonzalez NP.    I, Denton Farmer, have entered, reviewed and confirmed the above diagnoses related to this patient on this date of service, as per the time and date noted at top of this note.    call her in 2 weeks to see how she is doing. Otherwise, she was instructed to reach out to our office should she experience any new or worsening symptoms.    I, Denton Farmer, have entered, reviewed and confirmed the above diagnoses related to this patient on this date of service, as per the time and date noted at top of this note.

## 2024-10-29 ENCOUNTER — TELEPHONE (OUTPATIENT)
Dept: SURGICAL ONCOLOGY | Facility: MEDICAL CENTER | Age: 79
End: 2024-10-29
Payer: COMMERCIAL

## 2024-10-29 DIAGNOSIS — R18.8 ABDOMINAL FLUID COLLECTION: ICD-10-CM

## 2024-10-29 DIAGNOSIS — Z98.890 H/O EXPLORATORY LAPAROTOMY: ICD-10-CM

## 2024-10-29 DIAGNOSIS — J90 PLEURAL EFFUSION: ICD-10-CM

## 2024-10-29 DIAGNOSIS — D72.829 LEUKOCYTOSIS, UNSPECIFIED TYPE: ICD-10-CM

## 2024-11-01 ENCOUNTER — APPOINTMENT (OUTPATIENT)
Dept: SURGICAL ONCOLOGY | Facility: MEDICAL CENTER | Age: 79
End: 2024-11-01
Payer: COMMERCIAL

## 2024-11-01 VITALS
HEIGHT: 64 IN | BODY MASS INDEX: 23.22 KG/M2 | DIASTOLIC BLOOD PRESSURE: 72 MMHG | HEART RATE: 75 BPM | OXYGEN SATURATION: 90 % | SYSTOLIC BLOOD PRESSURE: 122 MMHG | TEMPERATURE: 98.4 F | WEIGHT: 136 LBS

## 2024-11-01 DIAGNOSIS — D72.829 LEUKOCYTOSIS, UNSPECIFIED TYPE: ICD-10-CM

## 2024-11-01 DIAGNOSIS — Z98.890 H/O EXPLORATORY LAPAROTOMY: ICD-10-CM

## 2024-11-01 DIAGNOSIS — K86.89 PANCREATIC MASS: ICD-10-CM

## 2024-11-01 DIAGNOSIS — J90 PLEURAL EFFUSION: ICD-10-CM

## 2024-11-01 DIAGNOSIS — R18.8 ABDOMINAL FLUID COLLECTION: ICD-10-CM

## 2024-11-01 PROCEDURE — 99024 POSTOP FOLLOW-UP VISIT: CPT

## 2024-11-01 PROCEDURE — 3074F SYST BP LT 130 MM HG: CPT

## 2024-11-01 PROCEDURE — 3078F DIAST BP <80 MM HG: CPT

## 2024-11-01 ASSESSMENT — ENCOUNTER SYMPTOMS
CONSTIPATION: 0
CHILLS: 0
DIARRHEA: 0
NAUSEA: 0
FEVER: 0
ABDOMINAL PAIN: 0
VOMITING: 0
WHEEZING: 0
SHORTNESS OF BREATH: 0
COUGH: 0

## 2024-11-01 ASSESSMENT — FIBROSIS 4 INDEX: FIB4 SCORE: 0.75

## 2024-11-06 LAB
MYCOBACTERIUM SPEC CULT: NORMAL
RHODAMINE-AURAMINE STN SPEC: NORMAL
SIGNIFICANT IND 70042: NORMAL
SITE SITE: NORMAL
SOURCE SOURCE: NORMAL

## 2024-11-11 ENCOUNTER — APPOINTMENT (OUTPATIENT)
Dept: MEDICAL GROUP | Age: 79
End: 2024-11-11
Payer: COMMERCIAL

## 2024-11-12 ENCOUNTER — TELEPHONE (OUTPATIENT)
Dept: SURGICAL ONCOLOGY | Facility: MEDICAL CENTER | Age: 79
End: 2024-11-12
Payer: COMMERCIAL

## 2024-11-12 DIAGNOSIS — K86.89 PANCREATIC MASS: ICD-10-CM

## 2024-11-12 DIAGNOSIS — R18.8 ABDOMINAL FLUID COLLECTION: ICD-10-CM

## 2024-11-12 DIAGNOSIS — K86.89 PANCREATIC FISTULA: ICD-10-CM

## 2024-11-12 DIAGNOSIS — K86.2 CYST OF PANCREAS: ICD-10-CM

## 2024-11-12 DIAGNOSIS — Z98.890 H/O EXPLORATORY LAPAROTOMY: ICD-10-CM

## 2024-11-12 NOTE — TELEPHONE ENCOUNTER
"Called pt this AM to check on how she is feeling, was also able to speak to daughter Jennifer. Recently she was able to establish with cardiology at [facility] and trialed Xarelto this past weekend in lieu of her prescribed Eliquis; however she reported feeling \"worse\" side effects such as abdominal cramping and has returned to taking Eliquis again with her cardiologist's approval. She reports feeling much better after going back to Eliquis. Otherwise prior to this episode she reports continuing to feel better overall and is slowly getting stronger. She is slowly regaining her appetite, has had no trouble breathing, and is having normal BMs. She is also seeing PT 2x/week and is steadily increasing her activity level. Encouraged to continue to call our office for any new/worsening symptoms, otherwise they have no further questions at this point.    Discussed with Dr. Espinoza, she will return for follow up with an updated CT PANCREAS in approximately April 2025 (6 months from her last CT scan) . She is also scheduled for follow up with cardiology in Feb 2025. [employer] message sent.  "

## 2024-11-14 DIAGNOSIS — D72.829 LEUKOCYTOSIS, UNSPECIFIED TYPE: ICD-10-CM

## 2024-11-14 DIAGNOSIS — K86.2 CYST OF PANCREAS: ICD-10-CM

## 2024-11-14 DIAGNOSIS — K86.89 PANCREATIC FISTULA: ICD-10-CM

## 2024-11-14 DIAGNOSIS — R18.8 ABDOMINAL FLUID COLLECTION: ICD-10-CM

## 2024-11-14 DIAGNOSIS — Z98.890 H/O EXPLORATORY LAPAROTOMY: ICD-10-CM

## 2024-11-14 DIAGNOSIS — K86.89 PANCREATIC MASS: ICD-10-CM

## 2024-11-18 ENCOUNTER — TELEPHONE (OUTPATIENT)
Dept: SURGICAL ONCOLOGY | Facility: MEDICAL CENTER | Age: 79
End: 2024-11-18
Payer: COMMERCIAL

## 2024-11-18 NOTE — TELEPHONE ENCOUNTER
Patient's daughter called wanting to speak to Denton regarding stomach pain, she states Denton has spoken to her before regarding this, and she would like to speak to him again.

## 2024-11-18 NOTE — TELEPHONE ENCOUNTER
"Called patient back, states she is extremely frustrated with the \"abdominal cramping\" she continues to experience with Eliquis. States she has it most days during the week and is a significant barrier to her continued recovery. Still able to eat/drink without difficulty, slowly regaining appetite, and is having normal BMs. Denies fever, chills, nausea, or vomiting. She has also reached out to her cardiologist's office. She is requesting an OTC medication that provides relief of her symptoms and does not want a prescription at this time. Also was able to speak to daughter Jennifer. Advised that medications such as Mylanta or Tums are options that don't appear to interact with her Eliquis until she hears back from her cardiologist on potential alternatives. Agreeable to plan, no further questions at this time.  "

## 2024-11-19 ENCOUNTER — TELEPHONE (OUTPATIENT)
Dept: SURGICAL ONCOLOGY | Facility: MEDICAL CENTER | Age: 79
End: 2024-11-19
Payer: COMMERCIAL

## 2024-11-19 NOTE — TELEPHONE ENCOUNTER
Spoke with pt's daughter Jennifer this AM, states pt tried taking extra strength Tums yesterday but is now experiencing diarrhea. Discussed that pt was on 500 mg in the hospital and extra strength dose may be too strong. No other symptoms. At this point I also discussed that following up with her cardiologist regarding the Eliquis would be the best course of action as they would be best equipped to recommend suitable alternatives to hopefully prevent or mitigate the side effects she is experiencing. Agreeable, no further questions at this time. Will call our office back for any other questions or concerns.

## 2024-11-20 ENCOUNTER — APPOINTMENT (OUTPATIENT)
Dept: RADIOLOGY | Facility: MEDICAL CENTER | Age: 79
End: 2024-11-20
Attending: EMERGENCY MEDICINE
Payer: COMMERCIAL

## 2024-11-20 ENCOUNTER — HOSPITAL ENCOUNTER (EMERGENCY)
Facility: MEDICAL CENTER | Age: 79
End: 2024-11-20
Attending: EMERGENCY MEDICINE
Payer: COMMERCIAL

## 2024-11-20 ENCOUNTER — TELEPHONE (OUTPATIENT)
Dept: SURGICAL ONCOLOGY | Facility: MEDICAL CENTER | Age: 79
End: 2024-11-20
Payer: COMMERCIAL

## 2024-11-20 VITALS
RESPIRATION RATE: 20 BRPM | DIASTOLIC BLOOD PRESSURE: 78 MMHG | WEIGHT: 135.8 LBS | TEMPERATURE: 98.4 F | BODY MASS INDEX: 23.18 KG/M2 | HEART RATE: 91 BPM | SYSTOLIC BLOOD PRESSURE: 141 MMHG | HEIGHT: 64 IN | OXYGEN SATURATION: 96 %

## 2024-11-20 DIAGNOSIS — D72.829 LEUKOCYTOSIS, UNSPECIFIED TYPE: ICD-10-CM

## 2024-11-20 DIAGNOSIS — R11.2 NAUSEA VOMITING AND DIARRHEA: ICD-10-CM

## 2024-11-20 DIAGNOSIS — Z79.01 ANTICOAGULATED BY ANTICOAGULATION TREATMENT: ICD-10-CM

## 2024-11-20 DIAGNOSIS — I10 ESSENTIAL HYPERTENSION: ICD-10-CM

## 2024-11-20 DIAGNOSIS — I71.21 ANEURYSM OF ASCENDING AORTA WITHOUT RUPTURE (HCC): ICD-10-CM

## 2024-11-20 DIAGNOSIS — K52.9 GASTROENTERITIS: Primary | ICD-10-CM

## 2024-11-20 DIAGNOSIS — R10.84 GENERALIZED ABDOMINAL PAIN: ICD-10-CM

## 2024-11-20 DIAGNOSIS — R19.7 NAUSEA VOMITING AND DIARRHEA: ICD-10-CM

## 2024-11-20 LAB
ACANTHOCYTES BLD QL SMEAR: NORMAL
ALBUMIN SERPL BCP-MCNC: 3.4 G/DL (ref 3.2–4.9)
ALBUMIN/GLOB SERPL: 1 G/DL
ALP SERPL-CCNC: 104 U/L (ref 30–99)
ALT SERPL-CCNC: 12 U/L (ref 2–50)
ANION GAP SERPL CALC-SCNC: 13 MMOL/L (ref 7–16)
APPEARANCE UR: CLEAR
APTT PPP: 45.3 SEC (ref 24.7–36)
AST SERPL-CCNC: 15 U/L (ref 12–45)
BASOPHILS # BLD AUTO: 0 % (ref 0–1.8)
BASOPHILS # BLD: 0 K/UL (ref 0–0.12)
BILIRUB SERPL-MCNC: 1.1 MG/DL (ref 0.1–1.5)
BILIRUB UR QL STRIP.AUTO: NEGATIVE
BUN SERPL-MCNC: 11 MG/DL (ref 8–22)
CALCIUM ALBUM COR SERPL-MCNC: 9.9 MG/DL (ref 8.5–10.5)
CALCIUM SERPL-MCNC: 9.4 MG/DL (ref 8.4–10.2)
CHLORIDE SERPL-SCNC: 102 MMOL/L (ref 96–112)
CO2 SERPL-SCNC: 24 MMOL/L (ref 20–33)
COLOR UR: YELLOW
CREAT SERPL-MCNC: 0.74 MG/DL (ref 0.5–1.4)
EKG IMPRESSION: NORMAL
EOSINOPHIL # BLD AUTO: 0 K/UL (ref 0–0.51)
EOSINOPHIL NFR BLD: 0 % (ref 0–6.9)
ERYTHROCYTE [DISTWIDTH] IN BLOOD BY AUTOMATED COUNT: 49.6 FL (ref 35.9–50)
GFR SERPLBLD CREATININE-BSD FMLA CKD-EPI: 83 ML/MIN/1.73 M 2
GLOBULIN SER CALC-MCNC: 3.5 G/DL (ref 1.9–3.5)
GLUCOSE SERPL-MCNC: 119 MG/DL (ref 65–99)
GLUCOSE UR STRIP.AUTO-MCNC: NEGATIVE MG/DL
HCT VFR BLD AUTO: 44.5 % (ref 37–47)
HGB BLD-MCNC: 14.5 G/DL (ref 12–16)
INR PPP: 1.09 (ref 0.87–1.13)
KETONES UR STRIP.AUTO-MCNC: NEGATIVE MG/DL
LEUKOCYTE ESTERASE UR QL STRIP.AUTO: NEGATIVE
LG PLATELETS BLD QL SMEAR: NORMAL
LIPASE SERPL-CCNC: 21 U/L (ref 11–82)
LYMPHOCYTES # BLD AUTO: 0.79 K/UL (ref 1–4.8)
LYMPHOCYTES NFR BLD: 5 % (ref 22–41)
MANUAL DIFF BLD: NORMAL
MCH RBC QN AUTO: 28.9 PG (ref 27–33)
MCHC RBC AUTO-ENTMCNC: 32.6 G/DL (ref 32.2–35.5)
MCV RBC AUTO: 88.8 FL (ref 81.4–97.8)
MICRO URNS: NORMAL
MONOCYTES # BLD AUTO: 1.26 K/UL (ref 0–0.85)
MONOCYTES NFR BLD AUTO: 8 % (ref 0–13.4)
NEUTROPHILS # BLD AUTO: 13.75 K/UL (ref 1.82–7.42)
NEUTROPHILS NFR BLD: 85 % (ref 44–72)
NEUTS BAND NFR BLD MANUAL: 2 % (ref 0–10)
NITRITE UR QL STRIP.AUTO: NEGATIVE
NRBC # BLD AUTO: 0.02 K/UL
NRBC BLD-RTO: 0.1 /100 WBC (ref 0–0.2)
PH UR STRIP.AUTO: 6 [PH] (ref 5–8)
PLATELET # BLD AUTO: 799 K/UL (ref 164–446)
PLATELET BLD QL SMEAR: NORMAL
PMV BLD AUTO: 9.4 FL (ref 9–12.9)
POIKILOCYTOSIS BLD QL SMEAR: NORMAL
POTASSIUM SERPL-SCNC: 3.5 MMOL/L (ref 3.6–5.5)
PROT SERPL-MCNC: 6.9 G/DL (ref 6–8.2)
PROT UR QL STRIP: NEGATIVE MG/DL
PROTHROMBIN TIME: 14.5 SEC (ref 12–14.6)
RBC # BLD AUTO: 5.01 M/UL (ref 4.2–5.4)
RBC BLD AUTO: PRESENT
RBC UR QL AUTO: NEGATIVE
SODIUM SERPL-SCNC: 139 MMOL/L (ref 135–145)
SP GR UR STRIP.AUTO: 1.01
TROPONIN T SERPL-MCNC: 9 NG/L (ref 6–19)
WBC # BLD AUTO: 15.8 K/UL (ref 4.8–10.8)

## 2024-11-20 PROCEDURE — 36415 COLL VENOUS BLD VENIPUNCTURE: CPT

## 2024-11-20 PROCEDURE — 700117 HCHG RX CONTRAST REV CODE 255: Performed by: EMERGENCY MEDICINE

## 2024-11-20 PROCEDURE — 80053 COMPREHEN METABOLIC PANEL: CPT

## 2024-11-20 PROCEDURE — 83690 ASSAY OF LIPASE: CPT

## 2024-11-20 PROCEDURE — 93005 ELECTROCARDIOGRAM TRACING: CPT

## 2024-11-20 PROCEDURE — 81003 URINALYSIS AUTO W/O SCOPE: CPT

## 2024-11-20 PROCEDURE — 85730 THROMBOPLASTIN TIME PARTIAL: CPT

## 2024-11-20 PROCEDURE — 85027 COMPLETE CBC AUTOMATED: CPT

## 2024-11-20 PROCEDURE — 85610 PROTHROMBIN TIME: CPT

## 2024-11-20 PROCEDURE — 74177 CT ABD & PELVIS W/CONTRAST: CPT

## 2024-11-20 PROCEDURE — 85007 BL SMEAR W/DIFF WBC COUNT: CPT

## 2024-11-20 PROCEDURE — 99284 EMERGENCY DEPT VISIT MOD MDM: CPT

## 2024-11-20 PROCEDURE — 84484 ASSAY OF TROPONIN QUANT: CPT

## 2024-11-20 PROCEDURE — 93005 ELECTROCARDIOGRAM TRACING: CPT | Performed by: EMERGENCY MEDICINE

## 2024-11-20 RX ORDER — ONDANSETRON 4 MG/1
4 TABLET, ORALLY DISINTEGRATING ORAL EVERY 6 HOURS PRN
Qty: 10 TABLET | Refills: 0 | Status: SHIPPED | OUTPATIENT
Start: 2024-11-20

## 2024-11-20 RX ORDER — ONDANSETRON 2 MG/ML
4 INJECTION INTRAMUSCULAR; INTRAVENOUS ONCE
Status: DISCONTINUED | OUTPATIENT
Start: 2024-11-20 | End: 2024-11-20 | Stop reason: HOSPADM

## 2024-11-20 RX ORDER — WARFARIN SODIUM 5 MG/1
5 TABLET ORAL EVERY EVENING
COMMUNITY

## 2024-11-20 RX ADMIN — IOHEXOL 100 ML: 350 INJECTION, SOLUTION INTRAVENOUS at 17:28

## 2024-11-20 ASSESSMENT — FIBROSIS 4 INDEX: FIB4 SCORE: 0.75

## 2024-11-20 NOTE — ED TRIAGE NOTES
"/74   Pulse 89   Temp 36.9 °C (98.4 °F) (Temporal)   Resp 16   Ht 1.626 m (5' 4\")   Wt 61.6 kg (135 lb 12.9 oz)   LMP  (LMP Unknown)   SpO2 91%   BMI 23.31 kg/m²   Chief Complaint   Patient presents with    Nausea     Sudden onset of nausea around noon today       Abdominal Pain     On and off cramping to abdomen       Diarrhea     Started yesterday    multiple diarrhea       Pt comes inw/   very complicated medical Hx as of last   Hx of surgery then bld clots then fluid in abdomen that she has needed drained   the last couple of days not feeling as well  today just after eating sudden onset of Nausea around noon today  feeling chest tight due to nausea    "

## 2024-11-21 RX ORDER — AMLODIPINE BESYLATE 5 MG/1
5 TABLET ORAL DAILY
Qty: 90 TABLET | Refills: 3 | Status: SHIPPED | OUTPATIENT
Start: 2024-11-21

## 2024-11-21 NOTE — ED PROVIDER NOTES
ED Provider Note    Scribed for Sanjay Wilkerson by Sanjay Wilkerson. 11/20/2024  4:15 PM    Primary care provider: Roula Ga P.A.-C.  Means of arrival: private vehicle   History obtained from: Patient  History limited by: None    CHIEF COMPLAINT  Chief Complaint   Patient presents with    Nausea     Sudden onset of nausea around noon today       Abdominal Pain     On and off cramping to abdomen       Diarrhea     Started yesterday    multiple diarrhea       EXTERNAL RECORDS REVIEWED  Outpatient Notes patient was seen yesterday for anticoagulation visit, it appears that he is on apixaban    HPI/ROS  LIMITATION TO HISTORY   Select: : None  OUTSIDE HISTORIAN(S):   at bedside helps provide collateral formation regarding patient's presentation    HPI  Paige Gudino is a 79 y.o. 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  Pain is generalized.  Has been intermittent for the last few weeks that she thought it was due to her Eliquis but changed to Xarelto and the cramping continued and eventually changed to Coumadin recently.  The cramping continued yesterday evening and she developed loose watery diarrhea without blood.  She has nausea today but no episodes of vomiting.  She arrives with her  who helps provide collateral information.  Denies fevers, chills, chest pain, shortness of breath.    REVIEW OF SYSTEMS  As above, all other systems reviewed and are negative.   See HPI for further details.     PAST MEDICAL HISTORY   has a past medical history of Anesthesia, Cancer (HCC), Cancer of overlapping sites of left female breast (HCC), Cataract (2024), High cholesterol, Hypothyroidism, PONV (postoperative nausea and vomiting) (1987), and Thyroid nodule.  SURGICAL HISTORY   has  a past surgical history that includes other orthopedic surgery (2019); other (1987); other (1988); other (2001); mastectomy, partial (Left, 08/01/2022); primary c section; lumpectomy; laminotomy; ultrasonic guidance, intraoperative (8/5/2024); pancreatectomy (N/A, 8/5/2024); splenectomy (N/A, 8/5/2024); and creation, flap, omentum (N/A, 8/5/2024).  SOCIAL HISTORY  Social History     Tobacco Use    Smoking status: Never     Passive exposure: Past    Smokeless tobacco: Never   Vaping Use    Vaping status: Never Used   Substance Use Topics    Alcohol use: Never    Drug use: Never      Social History     Substance and Sexual Activity   Drug Use Never     FAMILY HISTORY  Family History   Problem Relation Age of Onset    Cancer Mother         Ovarian    Ovarian Cancer Mother     Dementia Father     Heart Disease Father     Hyperlipidemia Neg Hx      CURRENT MEDICATIONS  Home Medications       Reviewed by Jefferson Olivarez (Pharmacy Tech) on 11/20/24 at 1658  Med List Status: Complete     Medication Last Dose Status   amLODIPine (NORVASC) 5 MG Tab 11/20/2024 Active   amoxicillin-clavulanate (AUGMENTIN) 875-125 MG Tab 10/30/2024 Active   apixaban (ELIQUIS) 5mg Tab 11/19/2024 Active   fluconazole (DIFLUCAN) 150 MG tablet 10/30/2024 Active   ibandronate (BONIVA) 150 MG tablet 10/30/2024 Active   levothyroxine (SYNTHROID) 100 MCG Tab 11/20/2024 Active   liothyronine (CYTOMEL) 5 MCG Tab 11/20/2024 Active   warfarin (COUMADIN) 5 MG Tab 11/19/2024 Active                  Audit from Redirected Encounters    **Home medications have not yet been reviewed for this encounter**       ALLERGIES  Allergies   Allergen Reactions    Bloodless Unspecified     Does not want blood products    Synthroid [Fd&C Red #40 Al Paul-Levothyroxine] Hives and Unspecified     Hives, Brand specific. Some brands are ok and some are not    Tape Unspecified     Skin degradation with use of tegaderm     [place] Thyroid [Thyroid] Diarrhea     diarrhea        PHYSICAL EXAM    VITAL SIGNS:   Vitals:    24 1624 24 1639 24 1709 24 1739   BP: 125/71 129/72 134/65 (!) 141/78   Pulse: 81 90 85 91   Resp:    20   Temp:       TempSrc:       SpO2: 92% 92% 88% 96%   Weight:       Height:         Vitals: My interpretation: normotensive, not tachycardic, afebrile, not hypoxic    Reinterpretation of vitals: Unchanged and unremarkable    PE:   Gen: sitting comfortably, speaking clearly, appears in no acute distress   ENT: Mucous membranes moist, posterior pharynx clear, uvula midline, nares patent bilaterally   Neck: Supple, FROM  Pulmonary: Lungs are clear to auscultation bilaterally. No tachypnea  CV:  RRR, no murmur appreciated, pulses 2+ in both upper and lower extremities  Abdomen: soft, very mild generalized tenderness/ND; no rebound/guarding  : no CVA or suprapubic tenderness   Neuro: A&Ox4 (person, place, time, situation), speech fluent, gait steady, no focal deficits appreciated  Skin: No rash or lesions.  No pallor or jaundice.  No cyanosis.  Warm and dry.     DIAGNOSTIC STUDIES / PROCEDURES    LABS  Results for orders placed or performed during the hospital encounter of 24   EKG    Collection Time: 24  1:49 PM   Result Value Ref Range    Report       [facility] Emergency Dept.    Test Date:  2024  Pt Name:    ROLANDO VERDIN           Department: [facility]  MRN:        [id number]                      Room:  Gender:     Female                       Technician: [id number]  :        [id number]                   Requested By:ER TRIAGE PROTOCOL  Order #:    [id number]                    Reading MD: Sanjay Wilkerson    Measurements  Intervals                                Axis  Rate:       93                           P:          1  NV:         240                          QRS:        16  QRSD:       74                           T:          3  QT:         415  QTc:        517    Interpretive Statements  Sinus  rhythm  Prolonged WA interval  Low voltage, precordial leads  Borderline T abnormalities, anterior leads  Prolonged QT interval  Compared to ECG 09/19/2024 00:57:50  First degree AV block now present  Prolonged QT interval now present  Second-degree AV block, Mobitz type I (Wenckebach) no longer present  Alli tricular premature complex(es) no longer present  Possible ischemia no longer present  T-wave abnormality still present  Electronically Signed On 11- 16:34:10 PST by Sanjay Wilkerson     CBC with Differential    Collection Time: 11/20/24  2:24 PM   Result Value Ref Range    WBC 15.8 (H) 4.8 - 10.8 K/uL    RBC 5.01 4.20 - 5.40 M/uL    Hemoglobin 14.5 12.0 - 16.0 g/dL    Hematocrit 44.5 37.0 - 47.0 %    MCV 88.8 81.4 - 97.8 fL    MCH 28.9 27.0 - 33.0 pg    MCHC 32.6 32.2 - 35.5 g/dL    RDW 49.6 35.9 - 50.0 fL    Platelet Count 799 (H) 164 - 446 K/uL    MPV 9.4 9.0 - 12.9 fL    Neutrophils-Polys 85.00 (H) 44.00 - 72.00 %    Lymphocytes 5.00 (L) 22.00 - 41.00 %    Monocytes 8.00 0.00 - 13.40 %    Eosinophils 0.00 0.00 - 6.90 %    Basophils 0.00 0.00 - 1.80 %    Nucleated RBC 0.10 0.00 - 0.20 /100 WBC    Neutrophils (Absolute) 13.75 (H) 1.82 - 7.42 K/uL    Lymphs (Absolute) 0.79 (L) 1.00 - 4.80 K/uL    Monos (Absolute) 1.26 (H) 0.00 - 0.85 K/uL    Eos (Absolute) 0.00 0.00 - 0.51 K/uL    Baso (Absolute) 0.00 0.00 - 0.12 K/uL    NRBC (Absolute) 0.02 K/uL   Complete Metabolic Panel    Collection Time: 11/20/24  2:24 PM   Result Value Ref Range    Sodium 139 135 - 145 mmol/L    Potassium 3.5 (L) 3.6 - 5.5 mmol/L    Chloride 102 96 - 112 mmol/L    Co2 24 20 - 33 mmol/L    Anion Gap 13.0 7.0 - 16.0    Glucose 119 (H) 65 - 99 mg/dL    Bun 11 8 - 22 mg/dL    Creatinine 0.74 0.50 - 1.40 mg/dL    Calcium 9.4 8.4 - 10.2 mg/dL    Correct Calcium 9.9 8.5 - 10.5 mg/dL    AST(SGOT) 15 12 - 45 U/L    ALT(SGPT) 12 2 - 50 U/L    Alkaline Phosphatase 104 (H) 30 - 99 U/L    Total Bilirubin 1.1 0.1 - 1.5 mg/dL    Albumin 3.4 3.2  - 4.9 g/dL    Total Protein 6.9 6.0 - 8.2 g/dL    Globulin 3.5 1.9 - 3.5 g/dL    A-G Ratio 1.0 g/dL   Lipase    Collection Time: 11/20/24  2:24 PM   Result Value Ref Range    Lipase 21 11 - 82 U/L   ESTIMATED GFR    Collection Time: 11/20/24  2:24 PM   Result Value Ref Range    GFR (CKD-EPI) 83 >60 mL/min/1.73 m 2   DIFFERENTIAL MANUAL    Collection Time: 11/20/24  2:24 PM   Result Value Ref Range    Bands-Stabs 2.00 0.00 - 10.00 %    Manual Diff Status PERFORMED    PLATELET ESTIMATE    Collection Time: 11/20/24  2:24 PM   Result Value Ref Range    Plt Estimation Increased    MORPHOLOGY    Collection Time: 11/20/24  2:24 PM   Result Value Ref Range    RBC Morphology Present     Large Platelets 1+     Poikilocytosis 1+     Acanthocytes 1+    PROTHROMBIN TIME (INR)    Collection Time: 11/20/24  2:24 PM   Result Value Ref Range    PT 14.5 12.0 - 14.6 sec    INR 1.09 0.87 - 1.13   APTT    Collection Time: 11/20/24  2:24 PM   Result Value Ref Range    APTT 45.3 (H) 24.7 - 36.0 sec   TROPONIN    Collection Time: 11/20/24  4:25 PM   Result Value Ref Range    Troponin T 9 6 - 19 ng/L   Urinalysis    Collection Time: 11/20/24  5:35 PM    Specimen: Urine   Result Value Ref Range    Color Yellow     Character Clear     Specific Gravity 1.010 <1.035    Ph 6.0 5.0 - 8.0    Glucose Negative Negative mg/dL    Ketones Negative Negative mg/dL    Protein Negative Negative mg/dL    Bilirubin Negative Negative    Nitrite Negative Negative    Leukocyte Esterase Negative Negative    Occult Blood Negative Negative    Micro Urine Req see below       All labs reviewed by me. Labs were compared to prior labs if they were available. Significant for mild leukocytosis of 15, no anemia, normal electrolytes, normal glucose, normal renal function, normal liver enzymes, normal bilirubin, lipase normal, urinalysis negative for ketones, infection or blood.    RADIOLOGY  I have independently interpreted the diagnostic imaging associated with this  visit and am waiting the final reading from the radiologist.   My preliminary interpretation is a follows: No free air, pleural effusion of the left present  Radiologist interpretation is as follows:  CT-ABDOMEN-PELVIS WITH   Final Result         1. Left pleural effusion is slightly larger.   2. Fluid collections in the left upper quadrant of the abdomen are also slightly larger.   3. Pericardial effusion is slightly smaller.   4. The remainder of the exam appears stable.        COURSE & MEDICAL DECISION MAKING  Nursing notes, VS, PMSFHx, labs, imaging, EKG reviewed in chart.    ED Observation Status? No; Patient does not meet criteria for ED Observation.     Ddx: Gastroenteritis, gastritis, colitis, diverticulitis    MDM: 4:15 PM Paige Gudino is a 79 y.o. female who presented with evaluation of some mild abdominal generalized cramping for a few weeks followed by diarrhea that started yesterday that is nonbloody and nausea today.  Patient arrives with her  at bedside who helps provide collateral formation.  Patient does have a history of PE and DVT and is on Xarelto which was recently switched to Coumadin.  She denies any other concomitant symptoms like fevers, chills, chest pain, shortness of breath etc.  Her vital signs are normal upon arrival.  She has mild generalized discomfort to palpitation of the abdomen but no point tenderness rebound or guarding and nonsurgical abdomen.  The rest of her physical exam is benign.  Underwent workup with labs. All labs reviewed by me. Labs were compared to prior labs if they were available. Significant for mild leukocytosis of 15, no anemia, normal electrolytes, normal glucose, normal renal function, normal liver enzymes, normal bilirubin, lipase normal, urinalysis negative.  Will order CT imaging of the abdomen for rule out of possible complications of abdominal pain, diarrhea, and leukocytosis.  Will also order C. difficile as patient has recently been on  antibiotics.  Ultimately CT scan came back with some chronic changes but no acute findings.  I do suspect the patient has viral gastroenteritis and will discharge her on Zofran.  I did discuss with her that her Coumadin level is not therapeutic concerned she just started last night and she is currently not taking Eliquis, and I discussed that she needs to be covered with some type of anticoagulation and I recommended either admission for heparin until she is therapeutic or Lovenox or that she will need to follow-up first in the morning to discuss with her cardiology team why they are having her switch without being covered from Xarelto to Coumadin.  She verbalized understanding this conversation and declines admission at this time.  Overall vital signs stable, repeat benign exam patient verbalized understand strict return precautions.    ADDITIONAL PROBLEM LIST AND DISPOSITION    I have discussed management of the patient with the following physicians and CRISSY's: None    Discussion of management with other QHP or appropriate source(s): None     Escalation of care considered, and ultimately not performed:after discussion with the patient / family, they have elected to decline an escalation in care    Barriers to care at this time, including but not limited to:  None .     Decision tools and prescription drugs considered including, but not limited to:  None .    FINAL IMPRESSION  1. Gastroenteritis Acute   2. Anticoagulated by anticoagulation treatment Acute   3. Nausea vomiting and diarrhea Acute   4. Generalized abdominal pain Acute   5. Leukocytosis, unspecified type Acute      The note accurately reflects work and decisions made by me.  Sanjay Wilkerson  11/20/2024  4:16 PM

## 2024-11-21 NOTE — DISCHARGE INSTRUCTIONS
I think you likely have viral gastroenteritis which is a stomach flu.  I am sending you home with prescription for Zofran to help with nausea.  It also causes abdominal cramping and diarrhea often.  I want you to follow-up with your doctor tomorrow regarding your anticoagulation with Coumadin versus Xarelto and the fact that you need to be covered in the meantime while you are switching to Coumadin.  If you have any worsening symptoms or concerns, please return to the ED.  Thank you for coming today.

## 2024-11-21 NOTE — ED NOTES
Medication history reviewed with pt. Med rec is complete.  Allergies reviewed, per pt  Interviewed pt with  at bedside with permission from pt.  Had a pleasant conversation with pt and pts  at bedside.    Pt reports that she started XARELTO 20MG on 11/7/2024 for 3 days, then started ELIQUIS 5MG 11/10/2024 until 11/19/2024 in the morning and started WARFARIN 5MG on 11/19/2024 at 1700    Pt started AUGMENTIN 875-125MG on 10/29/2024 for 5 day course, pt only took 2 tablets and was told to stop taking.

## 2024-11-22 ENCOUNTER — PATIENT MESSAGE (OUTPATIENT)
Dept: SURGICAL ONCOLOGY | Facility: MEDICAL CENTER | Age: 79
End: 2024-11-22
Payer: COMMERCIAL

## 2024-11-22 NOTE — PROGRESS NOTES
Received [employer] message from patient and daughter Jennifer requesting ultrasound guided thoracentesis. Patient was recently in the ED on 11/20/24  for nausea, diarrhea, and abdominal cramping. CT in the ED noted left pleural effusion is slightly larger, fluid collections in the left upper quadrant of the abdomen are also slightly larger, pericardial effusion is slightly smaller, and the remainder of the exam appears stable. Suspected to have viral gastroenteritis and discharged home in stable condition with Zofran.    Spoke to patient, denies dyspnea, shortness of breath, fever, or chills at this time. In light of this, discussed that performing thoracentesis at this time is not indicated d/t lack of respiratory symptoms along with risk of infection and bleeding as she is also currently on anticoagulation. Encouraged to continue using her incentive spirometer and increasing activity at home. Advised to call our office should she experience any of the symptoms above. Agreeable, no further questions at this time. Dr. Sewell updated.

## 2024-11-25 ENCOUNTER — HOSPITAL ENCOUNTER (OUTPATIENT)
Dept: LAB | Facility: MEDICAL CENTER | Age: 79
End: 2024-11-25
Attending: STUDENT IN AN ORGANIZED HEALTH CARE EDUCATION/TRAINING PROGRAM
Payer: COMMERCIAL

## 2024-11-25 LAB
INR PPP: 2.5 (ref 0.87–1.13)
PROTHROMBIN TIME: 27.2 SEC (ref 12–14.6)

## 2024-11-25 PROCEDURE — 85610 PROTHROMBIN TIME: CPT

## 2024-11-25 PROCEDURE — 36415 COLL VENOUS BLD VENIPUNCTURE: CPT

## 2024-11-30 ENCOUNTER — APPOINTMENT (OUTPATIENT)
Dept: RADIOLOGY | Facility: MEDICAL CENTER | Age: 79
End: 2024-11-30
Attending: STUDENT IN AN ORGANIZED HEALTH CARE EDUCATION/TRAINING PROGRAM
Payer: COMMERCIAL

## 2024-11-30 ENCOUNTER — HOSPITAL ENCOUNTER (EMERGENCY)
Facility: MEDICAL CENTER | Age: 79
End: 2024-11-30
Attending: STUDENT IN AN ORGANIZED HEALTH CARE EDUCATION/TRAINING PROGRAM
Payer: COMMERCIAL

## 2024-11-30 VITALS
OXYGEN SATURATION: 93 % | HEIGHT: 64 IN | WEIGHT: 132.28 LBS | BODY MASS INDEX: 22.58 KG/M2 | HEART RATE: 82 BPM | TEMPERATURE: 98.4 F | DIASTOLIC BLOOD PRESSURE: 67 MMHG | SYSTOLIC BLOOD PRESSURE: 105 MMHG | RESPIRATION RATE: 16 BRPM

## 2024-11-30 DIAGNOSIS — J90 PLEURAL EFFUSION ON LEFT: Primary | ICD-10-CM

## 2024-11-30 DIAGNOSIS — Z98.890 H/O EXPLORATORY LAPAROTOMY: ICD-10-CM

## 2024-11-30 DIAGNOSIS — J90 PLEURAL EFFUSION: ICD-10-CM

## 2024-11-30 DIAGNOSIS — R18.8 ABDOMINAL FLUID COLLECTION: ICD-10-CM

## 2024-11-30 LAB
ALBUMIN SERPL BCP-MCNC: 3.3 G/DL (ref 3.2–4.9)
ALBUMIN/GLOB SERPL: 0.8 G/DL
ALP SERPL-CCNC: 105 U/L (ref 30–99)
ALT SERPL-CCNC: 10 U/L (ref 2–50)
ANION GAP SERPL CALC-SCNC: 13 MMOL/L (ref 7–16)
AST SERPL-CCNC: 15 U/L (ref 12–45)
BASOPHILS # BLD AUTO: 0 % (ref 0–1.8)
BASOPHILS # BLD: 0 K/UL (ref 0–0.12)
BILIRUB SERPL-MCNC: 0.9 MG/DL (ref 0.1–1.5)
BUN SERPL-MCNC: 13 MG/DL (ref 8–22)
CALCIUM ALBUM COR SERPL-MCNC: 9.7 MG/DL (ref 8.5–10.5)
CALCIUM SERPL-MCNC: 9.1 MG/DL (ref 8.4–10.2)
CHLORIDE SERPL-SCNC: 97 MMOL/L (ref 96–112)
CO2 SERPL-SCNC: 25 MMOL/L (ref 20–33)
CREAT SERPL-MCNC: 0.7 MG/DL (ref 0.5–1.4)
EKG IMPRESSION: NORMAL
EOSINOPHIL # BLD AUTO: 0.16 K/UL (ref 0–0.51)
EOSINOPHIL NFR BLD: 1 % (ref 0–6.9)
ERYTHROCYTE [DISTWIDTH] IN BLOOD BY AUTOMATED COUNT: 47.9 FL (ref 35.9–50)
GFR SERPLBLD CREATININE-BSD FMLA CKD-EPI: 88 ML/MIN/1.73 M 2
GIANT PLATELETS BLD QL SMEAR: NORMAL
GLOBULIN SER CALC-MCNC: 4.1 G/DL (ref 1.9–3.5)
GLUCOSE SERPL-MCNC: 126 MG/DL (ref 65–99)
HCT VFR BLD AUTO: 46.2 % (ref 37–47)
HGB BLD-MCNC: 15.1 G/DL (ref 12–16)
INR PPP: 4.95 (ref 0.87–1.13)
LYMPHOCYTES # BLD AUTO: 1.48 K/UL (ref 1–4.8)
LYMPHOCYTES NFR BLD: 9 % (ref 22–41)
MANUAL DIFF BLD: NORMAL
MCH RBC QN AUTO: 28.5 PG (ref 27–33)
MCHC RBC AUTO-ENTMCNC: 32.7 G/DL (ref 32.2–35.5)
MCV RBC AUTO: 87.3 FL (ref 81.4–97.8)
MONOCYTES # BLD AUTO: 1.31 K/UL (ref 0–0.85)
MONOCYTES NFR BLD AUTO: 8 % (ref 0–13.4)
NEUTROPHILS # BLD AUTO: 13.45 K/UL (ref 1.82–7.42)
NEUTROPHILS NFR BLD: 76 % (ref 44–72)
NEUTS BAND NFR BLD MANUAL: 6 % (ref 0–10)
NRBC # BLD AUTO: 0 K/UL
NRBC BLD-RTO: 0 /100 WBC (ref 0–0.2)
NT-PROBNP SERPL IA-MCNC: 395 PG/ML (ref 0–125)
PLATELET # BLD AUTO: 749 K/UL (ref 164–446)
PLATELET BLD QL SMEAR: NORMAL
PMV BLD AUTO: 9.4 FL (ref 9–12.9)
POTASSIUM SERPL-SCNC: 3.3 MMOL/L (ref 3.6–5.5)
PROT SERPL-MCNC: 7.4 G/DL (ref 6–8.2)
PROTHROMBIN TIME: 47.6 SEC (ref 12–14.6)
RBC # BLD AUTO: 5.29 M/UL (ref 4.2–5.4)
RBC BLD AUTO: NORMAL
SODIUM SERPL-SCNC: 135 MMOL/L (ref 135–145)
TROPONIN T SERPL-MCNC: 11 NG/L (ref 6–19)
WBC # BLD AUTO: 16.4 K/UL (ref 4.8–10.8)

## 2024-11-30 PROCEDURE — 36415 COLL VENOUS BLD VENIPUNCTURE: CPT

## 2024-11-30 PROCEDURE — 85007 BL SMEAR W/DIFF WBC COUNT: CPT

## 2024-11-30 PROCEDURE — 84484 ASSAY OF TROPONIN QUANT: CPT

## 2024-11-30 PROCEDURE — 71045 X-RAY EXAM CHEST 1 VIEW: CPT

## 2024-11-30 PROCEDURE — 80053 COMPREHEN METABOLIC PANEL: CPT

## 2024-11-30 PROCEDURE — 83880 ASSAY OF NATRIURETIC PEPTIDE: CPT

## 2024-11-30 PROCEDURE — 99283 EMERGENCY DEPT VISIT LOW MDM: CPT

## 2024-11-30 PROCEDURE — 85610 PROTHROMBIN TIME: CPT

## 2024-11-30 PROCEDURE — 93005 ELECTROCARDIOGRAM TRACING: CPT

## 2024-11-30 PROCEDURE — 85027 COMPLETE CBC AUTOMATED: CPT

## 2024-11-30 PROCEDURE — 93005 ELECTROCARDIOGRAM TRACING: CPT | Performed by: STUDENT IN AN ORGANIZED HEALTH CARE EDUCATION/TRAINING PROGRAM

## 2024-11-30 ASSESSMENT — FIBROSIS 4 INDEX: FIB4 SCORE: 0.42

## 2024-12-01 ENCOUNTER — TELEPHONE (OUTPATIENT)
Dept: SURGICAL ONCOLOGY | Facility: MEDICAL CENTER | Age: 79
End: 2024-12-01
Payer: COMMERCIAL

## 2024-12-01 NOTE — ED TRIAGE NOTES
"/67   Pulse 88   Temp 36.9 °C (98.4 °F) (Temporal)   Resp 16   Ht 1.626 m (5' 4\")   Wt 60 kg (132 lb 4.4 oz)   LMP  (LMP Unknown)   SpO2 92%   BMI 22.71 kg/m²   Chief Complaint   Patient presents with    Shortness of Breath     Started earlier today  while at home  noticed increased SOB  was told by PCP to come for bld work and possible XR     Comes in w/   denies CP or any other complaints  just the shortness of breath n  EKG done in triage   "

## 2024-12-01 NOTE — DISCHARGE INSTRUCTIONS
You are seen and evaluated the emergency department for your shortness of breath.  Your vital signs were largely reassuring.  You were not requiring supplemental oxygen.  Your INR was slightly high.  I do recommend following up as scheduled with that repeat test on Monday.  Your x-ray did show that your left-sided pleural effusion is slightly increased compared to previous.  You are not in any respiratory distress at this point, not requiring any supplemental oxygen.  I do recommend that you follow-up with your specialist so that you can get an outpatient thoracentesis scheduled as you have done previously.  If you have any other concerning symptoms please return to the emergency department for further evaluation.  Otherwise follow-up with your specialist as scheduled.

## 2024-12-01 NOTE — ED PROVIDER NOTES
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  Previous ED note where patient was seen for abdominal pain, had a CT scan that showed a slightly enlarged left pleural effusion, fluid collections in the left upper quadrant of the abdomen, small pericardial effusion.  She was diagnosed with a viral gastroenteritis and discharged with symptomatic treatment.    HPI/ROS  LIMITATION TO HISTORY   Select: : None  OUTSIDE HISTORIAN(S):  Significant other  was at bedside    Paige Gudino is a 79 y.o. female who presents with a chief complaint of some mildly worse shortness of breath today.  Patient states that she has been dealing with shortness of breath for some time, has had pleural effusion on her left side that has been drained twice.  It is currently being monitored.  She states that she also has a history of DVT and PE which she is currently on warfarin.  Recently was changed from Xarelto.  Patient denies any chest pain, cough, productive cough.  She denies any fevers, chills, unilateral bilateral lower extremity swelling.  She states that symptoms are worse when she is moving around, was quite active today.  Patient is not having any chest pain, palpitations.  Denies syncopal or presyncopal episodes.    PAST MEDICAL HISTORY   has a past medical history of Anesthesia, Cancer (HCC), Cancer of overlapping sites of left female breast (HCC), Cataract (2024), High cholesterol, Hypothyroidism, PONV (postoperative nausea and vomiting) (1987), and Thyroid nodule.    SURGICAL HISTORY   has a past surgical history that includes other orthopedic surgery (2019); other (1987); other (1988); other (2001); mastectomy, partial (Left, 08/01/2022);  "primary c section; lumpectomy; laminotomy; ultrasonic guidance, intraoperative (8/5/2024); pancreatectomy (N/A, 8/5/2024); splenectomy (N/A, 8/5/2024); and creation, flap, omentum (N/A, 8/5/2024).    FAMILY HISTORY  Family History   Problem Relation Age of Onset    Cancer Mother         Ovarian    Ovarian Cancer Mother     Dementia Father     Heart Disease Father     Hyperlipidemia Neg Hx        SOCIAL HISTORY  Social History     Tobacco Use    Smoking status: Never     Passive exposure: Past    Smokeless tobacco: Never   Vaping Use    Vaping status: Never Used   Substance and Sexual Activity    Alcohol use: Never    Drug use: Never    Sexual activity: Not Currently     Partners: Male     Birth control/protection: Abstinence, Male Sterilization, Post-Menopausal     Comment:        CURRENT MEDICATIONS  Home Medications       Reviewed by Ana Jiang R.N. (Registered Nurse) on 11/30/24 at 1653  Med List Status: Partial     Medication Last Dose Status   amLODIPine (NORVASC) 5 MG Tab  Active   amoxicillin-clavulanate (AUGMENTIN) 875-125 MG Tab  Active   fluconazole (DIFLUCAN) 150 MG tablet  Active   ibandronate (BONIVA) 150 MG tablet  Active   levothyroxine (SYNTHROID) 100 MCG Tab  Active   liothyronine (CYTOMEL) 5 MCG Tab  Active   ondansetron (ZOFRAN ODT) 4 MG TABLET DISPERSIBLE  Active   warfarin (COUMADIN) 5 MG Tab  Active                    ALLERGIES  Allergies   Allergen Reactions    Bloodless Unspecified     Does not want blood products    Synthroid [Fd&C Red #40 Al Paul-Levothyroxine] Hives and Unspecified     Hives, Brand specific. Some brands are ok and some are not    Tape Unspecified     Skin degradation with use of tegaderm     [place] Thyroid [Thyroid] Diarrhea     diarrhea       PHYSICAL EXAM  VITAL SIGNS: /67   Pulse 88   Temp 36.9 °C (98.4 °F) (Temporal)   Resp 16   Ht 1.626 m (5' 4\")   Wt 60 kg (132 lb 4.4 oz)   LMP  (LMP Unknown)   SpO2 92%   BMI 22.71 kg/m²  "   Constitutional: Awake and alert  HENT: Normal inspection  Eyes: Normal inspection  Neck: Grossly normal range of motion.  Cardiovascular: Normal heart rate, Normal rhythm.  Symmetric peripheral pulses.   Thorax & Lungs: No respiratory distress, diminished breath sounds in the left lower lobe, no wheezing, No rales, No rhonchi, No chest tenderness.   Abdomen: Bowel sounds normal, soft, non-distended, nontender, no mass  Skin: No obvious rash.  Back: No tenderness, No CVA tenderness.   Extremities: No clubbing, cyanosis, edema, no Homans or cords.  Neurologic: Grossly normal   Psychiatric: Normal for situation      EKG/LABS  Results for orders placed or performed during the hospital encounter of 24   EKG    Collection Time: 24  4:36 PM   Result Value Ref Range    Report       [facility] Emergency Dept.    Test Date:  2024  Pt Name:    ROLANDO VERDIN           Department: [facility]  MRN:        [id number]                      Room:  Gender:     Female                       Technician: [id number]  :        [id number]                   Requested By:ER TRIAGE PROTOCOL  Order #:    [id number]                    Reading MD:    Measurements  Intervals                                Axis  Rate:       94                           P:          -10  RI:         235                          QRS:        17  QRSD:       111                          T:          -49  QT:         411  QTc:        515    Interpretive Statements  Sinus rhythm  Prolonged RI interval  Low voltage, precordial leads  Prolonged QT interval  Compared to ECG 2024 13:49:02  T-wave abnormality no longer present     CBC WITH DIFFERENTIAL    Collection Time: 24  6:00 PM   Result Value Ref Range    WBC 16.4 (H) 4.8 - 10.8 K/uL    RBC 5.29 4.20 - 5.40 M/uL    Hemoglobin 15.1 12.0 - 16.0 g/dL    Hematocrit 46.2 37.0 - 47.0 %    MCV 87.3 81.4 - 97.8 fL    MCH 28.5 27.0 - 33.0 pg    MCHC 32.7 32.2 - 35.5 g/dL    RDW 47.9  35.9 - 50.0 fL    Platelet Count 749 (H) 164 - 446 K/uL    MPV 9.4 9.0 - 12.9 fL    Neutrophils-Polys 76.00 (H) 44.00 - 72.00 %    Lymphocytes 9.00 (L) 22.00 - 41.00 %    Monocytes 8.00 0.00 - 13.40 %    Eosinophils 1.00 0.00 - 6.90 %    Basophils 0.00 0.00 - 1.80 %    Nucleated RBC 0.00 0.00 - 0.20 /100 WBC    Neutrophils (Absolute) 13.45 (H) 1.82 - 7.42 K/uL    Lymphs (Absolute) 1.48 1.00 - 4.80 K/uL    Monos (Absolute) 1.31 (H) 0.00 - 0.85 K/uL    Eos (Absolute) 0.16 0.00 - 0.51 K/uL    Baso (Absolute) 0.00 0.00 - 0.12 K/uL    NRBC (Absolute) 0.00 K/uL   COMP METABOLIC PANEL    Collection Time: 11/30/24  6:00 PM   Result Value Ref Range    Sodium 135 135 - 145 mmol/L    Potassium 3.3 (L) 3.6 - 5.5 mmol/L    Chloride 97 96 - 112 mmol/L    Co2 25 20 - 33 mmol/L    Anion Gap 13.0 7.0 - 16.0    Glucose 126 (H) 65 - 99 mg/dL    Bun 13 8 - 22 mg/dL    Creatinine 0.70 0.50 - 1.40 mg/dL    Calcium 9.1 8.4 - 10.2 mg/dL    Correct Calcium 9.7 8.5 - 10.5 mg/dL    AST(SGOT) 15 12 - 45 U/L    ALT(SGPT) 10 2 - 50 U/L    Alkaline Phosphatase 105 (H) 30 - 99 U/L    Total Bilirubin 0.9 0.1 - 1.5 mg/dL    Albumin 3.3 3.2 - 4.9 g/dL    Total Protein 7.4 6.0 - 8.2 g/dL    Globulin 4.1 (H) 1.9 - 3.5 g/dL    A-G Ratio 0.8 g/dL   TROPONIN    Collection Time: 11/30/24  6:00 PM   Result Value Ref Range    Troponin T 11 6 - 19 ng/L   PROTHROMBIN TIME (INR)    Collection Time: 11/30/24  6:00 PM   Result Value Ref Range    PT 47.6 (H) 12.0 - 14.6 sec    INR 4.95 (H) 0.87 - 1.13   proBrain Natriuretic Peptide, NT    Collection Time: 11/30/24  6:00 PM   Result Value Ref Range    NT-proBNP 395 (H) 0 - 125 pg/mL   ESTIMATED GFR    Collection Time: 11/30/24  6:00 PM   Result Value Ref Range    GFR (CKD-EPI) 88 >60 mL/min/1.73 m 2   DIFFERENTIAL MANUAL    Collection Time: 11/30/24  6:00 PM   Result Value Ref Range    Bands-Stabs 6.00 0.00 - 10.00 %    Manual Diff Status PERFORMED    PLATELET ESTIMATE    Collection Time: 11/30/24  6:00 PM   Result  Value Ref Range    Plt Estimation Increased    MORPHOLOGY    Collection Time: 11/30/24  6:00 PM   Result Value Ref Range    RBC Morphology Normal     Giant Platelets 1+      *Note: Due to a large number of results and/or encounters for the requested time period, some results have not been displayed. A complete set of results can be found in Results Review.       I have independently interpreted this EKG    RADIOLOGY/PROCEDURES   I have independently interpreted the diagnostic imaging associated with this visit and am waiting the final reading from the radiologist.   My preliminary interpretation is as follows: Mildly enlarged left-sided pleural effusion    Radiologist interpretation:  DX-CHEST-PORTABLE (1 VIEW)   Final Result         Moderate left pleural effusion with left basilar atelectasis.          COURSE & MEDICAL DECISION MAKING    ASSESSMENT, COURSE AND PLAN  Care Narrative: Patient is a 79-year-old female with history of DVT, PE, known left-sided pleural effusion on warfarin presenting to the emergency department with mildly increased shortness of breath.  Denies any fevers, chills, productive cough.  No associated chest pain, palpitations, nausea, diaphoresis.  Denies palpitations, unilateral or bilateral lower extremity swelling.  On exam patient is not in any respiratory distress, not requiring any supplemental oxygen.  Patient did have diminished breath sounds in the left lower lobe.  Will get EKG, labs including BNP, coags.  Will repeat a chest x-ray to evaluate for worsening pleural effusion.    Labs reviewed showed mildly elevated BNP, normal troponin.  INR slightly supratherapeutic at 4.9.  Patient's white blood cell count was 16 which is around her baseline.  Metabolic panel largely normal.    X-ray reviewed and showed a mildly increased left-sided pleural effusion.    Patient reassessed, remains with no respiratory distress, saturating well on room air.  Shared decision-making was made with  patient regarding inpatient versus outpatient management for her enlarging left-sided pleural effusion which I suspect is the likely cause of her shortness of breath.  I doubt any acute bacterial process, pulmonary embolism, ACS.  Patient states that she wishes to follow-up on an outpatient basis and have an outpatient thoracentesis done which she has done in the past.  Patient has a repeat INR test on Monday.  She was instructed to return for any worsening shortness of breath, chest pain.  At this time shared decision makers made with patient to follow-up outpatient.  I doubt sepsis or septic shock or other acute life-threatening illness at this time.  Patient is agreeable, reliable.  Significant other also present at bedside is agreeable at this time.  Patient will be discharged home to care of self with strict turn precautions and anticipatory guidance.          ADDITIONAL PROBLEMS MANAGED  None    DISPOSITION AND DISCUSSIONS  I have discussed management of the patient with the following physicians and CRISSY's: None    Discussion of management with other QHP or appropriate source(s): None     Escalation of care considered, and ultimately not performed:after discussion with the patient / family, they have elected to decline an escalation in care    Barriers to care at this time, including but not limited to:  None .     FINAL DIAGNOSIS  1. Pleural effusion on left Acute        Electronically signed by: Marquise Marx M.D., 11/30/2024 5:26 PM

## 2024-12-01 NOTE — ED NOTES
Patient is stable for d/c at this time, anticipatory guidance provided, close follow-up encouraged, and ED return instructions have been detailed. Patient and family are both agreeable to the disposition, and plan and discharged home in ambulatory state and good condition.

## 2024-12-02 ENCOUNTER — HOSPITAL ENCOUNTER (OUTPATIENT)
Dept: LAB | Facility: MEDICAL CENTER | Age: 79
End: 2024-12-02
Attending: STUDENT IN AN ORGANIZED HEALTH CARE EDUCATION/TRAINING PROGRAM
Payer: COMMERCIAL

## 2024-12-02 ENCOUNTER — TELEPHONE (OUTPATIENT)
Dept: SURGICAL ONCOLOGY | Facility: MEDICAL CENTER | Age: 79
End: 2024-12-02
Payer: COMMERCIAL

## 2024-12-02 DIAGNOSIS — J90 RECURRENT PLEURAL EFFUSION ON LEFT: ICD-10-CM

## 2024-12-02 DIAGNOSIS — J90 RECURRENT PLEURAL EFFUSION: ICD-10-CM

## 2024-12-02 LAB
INR PPP: 6.07 (ref 0.87–1.13)
PROTHROMBIN TIME: 54.6 SEC (ref 12–14.6)

## 2024-12-02 PROCEDURE — 85610 PROTHROMBIN TIME: CPT

## 2024-12-02 PROCEDURE — 36415 COLL VENOUS BLD VENIPUNCTURE: CPT

## 2024-12-02 NOTE — TELEPHONE ENCOUNTER
"Received page from patient's daughter Teri regarding patient having \"heavy breathing and feeling feverish\". Called daughter back, reports patient complained of heaviness with breathing and feeling feverish although most recent temperature was WNL. Patient denies shortness of breath or dyspnea however daughter states patient has been feeling more fatigued than usual these past few days. Denies nausea, vomiting, abdominal pain or difficulty with eating/drinking but daughter endorses patient continues to have poor appetite. Patient was recently in the ED on 11/20/24 for suspected viral gastroenteritis and has continued to have moderate left pleural effusion on most recent chest xray last October. Discussed with Dr. Espinoza. Discussed with patient and daughter, I recommended drawing a new set of labs along with a new STAT chest xray as I suspect this is an exacerbation of her current medical issues. Also recommended the patient present to urgent care as the patient does not seem to be in acute distress but it would be helpful to be seen by a provider and to obtain a set of vitals most notably her oxygen saturation. They were in agreement and had no further questions. Will call patient back once these are completed.  "

## 2024-12-02 NOTE — TELEPHONE ENCOUNTER
Called patient and daughter Teri back to follow up regarding recent ED visit. Patient continues to feel fatigued d/t recent bout of viral gastroenteritis but denies fever, chills, nausea, vomiting, or inability to eat/drink. CXR on 11/30/24 noted moderate left pleural effusion with left basilar atelectasis, labs on 11/30/24 notable for slightly elevated WBC 16.4 otherwise unremarkable. Patient expressed significant frustration with this recurrent pleural effusion as she feels it has greatly impacted her quality of life. Discussed with Dr. Espinoza. Discussed with patient that since she has had multiple thoracenteses performed within these past few months, our recommendation is for her to re-establish with Dr. Saul (thoracic surgery) who she previously saw when she was admitted last September to discuss possible treatment options. Patient and daughter agreeable, no further questions at this time.

## 2024-12-04 ENCOUNTER — APPOINTMENT (OUTPATIENT)
Dept: RADIOLOGY | Facility: MEDICAL CENTER | Age: 79
End: 2024-12-04
Attending: EMERGENCY MEDICINE
Payer: COMMERCIAL

## 2024-12-04 ENCOUNTER — HOSPITAL ENCOUNTER (INPATIENT)
Facility: MEDICAL CENTER | Age: 79
LOS: 8 days | End: 2024-12-12
Attending: EMERGENCY MEDICINE | Admitting: INTERNAL MEDICINE
Payer: COMMERCIAL

## 2024-12-04 DIAGNOSIS — Z86.711 HISTORY OF PULMONARY EMBOLISM: ICD-10-CM

## 2024-12-04 DIAGNOSIS — D68.9 COAGULOPATHY (HCC): ICD-10-CM

## 2024-12-04 DIAGNOSIS — I51.3 ATRIAL THROMBUS: ICD-10-CM

## 2024-12-04 DIAGNOSIS — R06.00 DYSPNEA, UNSPECIFIED TYPE: ICD-10-CM

## 2024-12-04 DIAGNOSIS — J90 PLEURAL EFFUSION: ICD-10-CM

## 2024-12-04 DIAGNOSIS — J96.01 ACUTE RESPIRATORY FAILURE WITH HYPOXEMIA (HCC): ICD-10-CM

## 2024-12-04 LAB
ALBUMIN SERPL BCP-MCNC: 3.6 G/DL (ref 3.2–4.9)
ALBUMIN/GLOB SERPL: 0.8 G/DL
ALP SERPL-CCNC: 109 U/L (ref 30–99)
ALT SERPL-CCNC: 9 U/L (ref 2–50)
ANION GAP SERPL CALC-SCNC: 11 MMOL/L (ref 7–16)
APPEARANCE UR: CLEAR
AST SERPL-CCNC: 24 U/L (ref 12–45)
BACTERIA #/AREA URNS HPF: ABNORMAL /HPF
BASOPHILS # BLD AUTO: 0 % (ref 0–1.8)
BASOPHILS # BLD: 0 K/UL (ref 0–0.12)
BILIRUB SERPL-MCNC: 1 MG/DL (ref 0.1–1.5)
BILIRUB UR QL STRIP.AUTO: NEGATIVE
BUN SERPL-MCNC: 11 MG/DL (ref 8–22)
BURR CELLS BLD QL SMEAR: NORMAL
CALCIUM ALBUM COR SERPL-MCNC: 9.4 MG/DL (ref 8.5–10.5)
CALCIUM SERPL-MCNC: 9.1 MG/DL (ref 8.5–10.5)
CASTS URNS QL MICRO: ABNORMAL /LPF (ref 0–2)
CHLORIDE SERPL-SCNC: 100 MMOL/L (ref 96–112)
CO2 SERPL-SCNC: 26 MMOL/L (ref 20–33)
COLOR UR: ABNORMAL
CREAT SERPL-MCNC: 0.74 MG/DL (ref 0.5–1.4)
EKG IMPRESSION: NORMAL
EOSINOPHIL # BLD AUTO: 0 K/UL (ref 0–0.51)
EOSINOPHIL NFR BLD: 0 % (ref 0–6.9)
EPITHELIAL CELLS 1715: ABNORMAL /HPF (ref 0–5)
ERYTHROCYTE [DISTWIDTH] IN BLOOD BY AUTOMATED COUNT: 49.2 FL (ref 35.9–50)
GFR SERPLBLD CREATININE-BSD FMLA CKD-EPI: 83 ML/MIN/1.73 M 2
GLOBULIN SER CALC-MCNC: 4.4 G/DL (ref 1.9–3.5)
GLUCOSE SERPL-MCNC: 120 MG/DL (ref 65–99)
GLUCOSE UR STRIP.AUTO-MCNC: NEGATIVE MG/DL
HCT VFR BLD AUTO: 46.7 % (ref 37–47)
HGB BLD-MCNC: 15.2 G/DL (ref 12–16)
INR PPP: 7.09 (ref 0.87–1.13)
KETONES UR STRIP.AUTO-MCNC: 15 MG/DL
LEUKOCYTE ESTERASE UR QL STRIP.AUTO: ABNORMAL
LG PLATELETS BLD QL SMEAR: NORMAL
LYMPHOCYTES # BLD AUTO: 1.1 K/UL (ref 1–4.8)
LYMPHOCYTES NFR BLD: 6.1 % (ref 22–41)
MANUAL DIFF BLD: NORMAL
MCH RBC QN AUTO: 28.4 PG (ref 27–33)
MCHC RBC AUTO-ENTMCNC: 32.5 G/DL (ref 32.2–35.5)
MCV RBC AUTO: 87.1 FL (ref 81.4–97.8)
MICRO URNS: ABNORMAL
MONOCYTES # BLD AUTO: 1.24 K/UL (ref 0–0.85)
MONOCYTES NFR BLD AUTO: 6.9 % (ref 0–13.4)
MORPHOLOGY BLD-IMP: NORMAL
NEUTROPHILS # BLD AUTO: 15.66 K/UL (ref 1.82–7.42)
NEUTROPHILS NFR BLD: 86.1 % (ref 44–72)
NEUTS BAND NFR BLD MANUAL: 0.9 % (ref 0–10)
NITRITE UR QL STRIP.AUTO: NEGATIVE
NRBC # BLD AUTO: 0 K/UL
NRBC BLD-RTO: 0 /100 WBC (ref 0–0.2)
NT-PROBNP SERPL IA-MCNC: 455 PG/ML (ref 0–125)
PH UR STRIP.AUTO: 6 [PH] (ref 5–8)
PLATELET # BLD AUTO: 751 K/UL (ref 164–446)
PLATELET BLD QL SMEAR: NORMAL
PMV BLD AUTO: 9.1 FL (ref 9–12.9)
POIKILOCYTOSIS BLD QL SMEAR: NORMAL
POTASSIUM SERPL-SCNC: 3.8 MMOL/L (ref 3.6–5.5)
PROCALCITONIN SERPL-MCNC: 0.06 NG/ML
PROT SERPL-MCNC: 8 G/DL (ref 6–8.2)
PROT UR QL STRIP: NEGATIVE MG/DL
PROTHROMBIN TIME: 61.7 SEC (ref 12–14.6)
RBC # BLD AUTO: 5.36 M/UL (ref 4.2–5.4)
RBC # URNS HPF: ABNORMAL /HPF (ref 0–2)
RBC BLD AUTO: PRESENT
RBC UR QL AUTO: NEGATIVE
SODIUM SERPL-SCNC: 137 MMOL/L (ref 135–145)
SP GR UR STRIP.AUTO: 1.02
TARGETS BLD QL SMEAR: NORMAL
TROPONIN T SERPL-MCNC: 13 NG/L (ref 6–19)
UROBILINOGEN UR STRIP.AUTO-MCNC: 1 EU/DL
WBC # BLD AUTO: 18 K/UL (ref 4.8–10.8)
WBC #/AREA URNS HPF: ABNORMAL /HPF

## 2024-12-04 PROCEDURE — 81001 URINALYSIS AUTO W/SCOPE: CPT

## 2024-12-04 PROCEDURE — 770006 HCHG ROOM/CARE - MED/SURG/GYN SEMI*

## 2024-12-04 PROCEDURE — 80053 COMPREHEN METABOLIC PANEL: CPT

## 2024-12-04 PROCEDURE — 83880 ASSAY OF NATRIURETIC PEPTIDE: CPT

## 2024-12-04 PROCEDURE — 84145 PROCALCITONIN (PCT): CPT

## 2024-12-04 PROCEDURE — 99285 EMERGENCY DEPT VISIT HI MDM: CPT

## 2024-12-04 PROCEDURE — 93005 ELECTROCARDIOGRAM TRACING: CPT | Mod: TC

## 2024-12-04 PROCEDURE — 85007 BL SMEAR W/DIFF WBC COUNT: CPT

## 2024-12-04 PROCEDURE — 99223 1ST HOSP IP/OBS HIGH 75: CPT | Mod: AI | Performed by: NURSE PRACTITIONER

## 2024-12-04 PROCEDURE — 36415 COLL VENOUS BLD VENIPUNCTURE: CPT

## 2024-12-04 PROCEDURE — 93005 ELECTROCARDIOGRAM TRACING: CPT | Mod: TC | Performed by: EMERGENCY MEDICINE

## 2024-12-04 PROCEDURE — 84484 ASSAY OF TROPONIN QUANT: CPT

## 2024-12-04 PROCEDURE — 85610 PROTHROMBIN TIME: CPT

## 2024-12-04 PROCEDURE — 85027 COMPLETE CBC AUTOMATED: CPT

## 2024-12-04 PROCEDURE — 71045 X-RAY EXAM CHEST 1 VIEW: CPT

## 2024-12-04 RX ORDER — AMLODIPINE BESYLATE 10 MG/1
5 TABLET ORAL DAILY
Status: DISCONTINUED | OUTPATIENT
Start: 2024-12-05 | End: 2024-12-12 | Stop reason: HOSPADM

## 2024-12-04 RX ORDER — LIOTHYRONINE SODIUM 5 UG/1
2.5 TABLET ORAL
Status: DISCONTINUED | OUTPATIENT
Start: 2024-12-05 | End: 2024-12-12 | Stop reason: HOSPADM

## 2024-12-04 RX ORDER — LIOTHYRONINE SODIUM 5 UG/1
2.5-5 TABLET ORAL SEE ADMIN INSTRUCTIONS
Status: DISCONTINUED | OUTPATIENT
Start: 2024-12-04 | End: 2024-12-04

## 2024-12-04 RX ORDER — LEVOTHYROXINE SODIUM 50 UG/1
100 TABLET ORAL
Status: DISCONTINUED | OUTPATIENT
Start: 2024-12-05 | End: 2024-12-12 | Stop reason: HOSPADM

## 2024-12-04 RX ORDER — LABETALOL HYDROCHLORIDE 5 MG/ML
10 INJECTION, SOLUTION INTRAVENOUS EVERY 4 HOURS PRN
Status: DISCONTINUED | OUTPATIENT
Start: 2024-12-04 | End: 2024-12-12 | Stop reason: HOSPADM

## 2024-12-04 RX ORDER — ONDANSETRON 2 MG/ML
4 INJECTION INTRAMUSCULAR; INTRAVENOUS EVERY 4 HOURS PRN
Status: DISCONTINUED | OUTPATIENT
Start: 2024-12-04 | End: 2024-12-12 | Stop reason: HOSPADM

## 2024-12-04 RX ORDER — ACETAMINOPHEN 325 MG/1
650 TABLET ORAL EVERY 6 HOURS PRN
Status: DISCONTINUED | OUTPATIENT
Start: 2024-12-04 | End: 2024-12-12 | Stop reason: HOSPADM

## 2024-12-04 RX ORDER — ONDANSETRON 4 MG/1
4 TABLET, ORALLY DISINTEGRATING ORAL EVERY 4 HOURS PRN
Status: DISCONTINUED | OUTPATIENT
Start: 2024-12-04 | End: 2024-12-12 | Stop reason: HOSPADM

## 2024-12-04 RX ORDER — LIOTHYRONINE SODIUM 5 UG/1
5 TABLET ORAL
Status: DISCONTINUED | OUTPATIENT
Start: 2024-12-06 | End: 2024-12-12 | Stop reason: HOSPADM

## 2024-12-04 ASSESSMENT — COGNITIVE AND FUNCTIONAL STATUS - GENERAL
MOBILITY SCORE: 24
SUGGESTED CMS G CODE MODIFIER DAILY ACTIVITY: CH
DAILY ACTIVITIY SCORE: 24
SUGGESTED CMS G CODE MODIFIER MOBILITY: CH

## 2024-12-04 ASSESSMENT — LIFESTYLE VARIABLES
TOTAL SCORE: 0
HAVE YOU EVER FELT YOU SHOULD CUT DOWN ON YOUR DRINKING: NO
EVER FELT BAD OR GUILTY ABOUT YOUR DRINKING: NO
HAVE PEOPLE ANNOYED YOU BY CRITICIZING YOUR DRINKING: NO
EVER HAD A DRINK FIRST THING IN THE MORNING TO STEADY YOUR NERVES TO GET RID OF A HANGOVER: NO
TOTAL SCORE: 0
ALCOHOL_USE: NO
CONSUMPTION TOTAL: NEGATIVE
ON A TYPICAL DAY WHEN YOU DRINK ALCOHOL HOW MANY DRINKS DO YOU HAVE: 0
HOW MANY TIMES IN THE PAST YEAR HAVE YOU HAD 5 OR MORE DRINKS IN A DAY: 0
DOES PATIENT WANT TO STOP DRINKING: NO
TOTAL SCORE: 0
AVERAGE NUMBER OF DAYS PER WEEK YOU HAVE A DRINK CONTAINING ALCOHOL: 0

## 2024-12-04 ASSESSMENT — ENCOUNTER SYMPTOMS
DIZZINESS: 0
DEPRESSION: 0
SHORTNESS OF BREATH: 1
COUGH: 0
CHILLS: 1
STRIDOR: 0
NAUSEA: 0
FALLS: 0
TINGLING: 0
WEAKNESS: 0
VOMITING: 0
MYALGIAS: 0
HEADACHES: 0
ABDOMINAL PAIN: 0
DIARRHEA: 0
LOSS OF CONSCIOUSNESS: 0
CONSTIPATION: 0
FEVER: 0
SPUTUM PRODUCTION: 0
PALPITATIONS: 0

## 2024-12-04 ASSESSMENT — SOCIAL DETERMINANTS OF HEALTH (SDOH)
WITHIN THE LAST YEAR, HAVE TO BEEN RAPED OR FORCED TO HAVE ANY KIND OF SEXUAL ACTIVITY BY YOUR PARTNER OR EX-PARTNER?: NO
IN THE PAST 12 MONTHS, HAS THE ELECTRIC, GAS, OIL, OR WATER COMPANY THREATENED TO SHUT OFF SERVICE IN YOUR HOME?: NO
WITHIN THE PAST 12 MONTHS, THE FOOD YOU BOUGHT JUST DIDN'T LAST AND YOU DIDN'T HAVE MONEY TO GET MORE: NEVER TRUE
WITHIN THE LAST YEAR, HAVE YOU BEEN HUMILIATED OR EMOTIONALLY ABUSED IN OTHER WAYS BY YOUR PARTNER OR EX-PARTNER?: NO
WITHIN THE LAST YEAR, HAVE YOU BEEN AFRAID OF YOUR PARTNER OR EX-PARTNER?: NO
WITHIN THE PAST 12 MONTHS, YOU WORRIED THAT YOUR FOOD WOULD RUN OUT BEFORE YOU GOT THE MONEY TO BUY MORE: NEVER TRUE
WITHIN THE LAST YEAR, HAVE YOU BEEN KICKED, HIT, SLAPPED, OR OTHERWISE PHYSICALLY HURT BY YOUR PARTNER OR EX-PARTNER?: NO

## 2024-12-04 ASSESSMENT — PAIN DESCRIPTION - PAIN TYPE
TYPE: ACUTE PAIN
TYPE: ACUTE PAIN

## 2024-12-04 ASSESSMENT — FIBROSIS 4 INDEX
FIB4 SCORE: 0.49
FIB4 SCORE: 0.83

## 2024-12-04 NOTE — ED TRIAGE NOTES
"Chief Complaint   Patient presents with    Shortness of Breath     X5 days   \"It comes and goes\"   Hx of plural effusion L lung  \"I have been drained 3 times\"     Endorses weakness and fatigue      /88   Pulse 80   Temp 36.6 °C (97.9 °F) (Temporal)   Resp 16   Ht 1.626 m (5' 4\")   Wt 59.9 kg (132 lb)   LMP  (LMP Unknown)   SpO2 94%   BMI 22.66 kg/m²     Pt is alert/oriented and follows commands. Pt speaking in full sentences and responds appropriately to questions. No acute distress noted in triage and respirations are even and unlabored.      Pt placed in lobby and educated on triage process. Pt encouraged to alert staff for any changes in condition.    "

## 2024-12-04 NOTE — ED PROVIDER NOTES
"ER Provider Note    Scribed for Michael Slater D.O. by Estrella Black. 12/4/2024  2:03 PM    Primary Care Provider: Roula Ga P.A.-C.    CHIEF COMPLAINT  Chief Complaint   Patient presents with    Shortness of Breath     X5 days   \"It comes and goes\"   Hx of plural effusion L lung  \"I have been drained 3 times\"     Endorses weakness and fatigue        HPI/ROS    OUTSIDE HISTORIAN(S):  Daughter was present at bed side and contributed to the HPI as detailed below.    Paige Gudino is a 79 y.o. female with a history of plural effusion of the left lung who presents to the Emergency Department for evaluation of shortness of breath onset 5 days ago. The patient states she has an episodes of shortness of breath about once a day. The patients daughter states the patient was seen in the emergency department in [facility] 4 days ago for shortness of breath and severe fatigue. The daughter also mentions the patient has been having a upset stomach occasionally and has been feeling \"freezing\". She denies any coughing. She denies chest pain or tightness in chest. The patient also denies vomiting and states she has diarrhea. She notes she feels feverish occasionally, but has not had a fever. The patient has a history of blood clots in both legs, both lungs, and her heart. She is on blood thinners, warfarin which she takes as prescribed. The patient has a history of a left plural effusion 3 times over the past 2-3 months. She has had a paracentesis 2 times for this. The patient notes recently she had a procedure to remove a non cancerous cyst in August of 2024, she states she has been having symptoms ever since then.    ROS as per HPI.    PAST MEDICAL HISTORY  Past Medical History:   Diagnosis Date    Anesthesia     nausea post op    Cancer (HCC)     1987 breast left    Cancer of overlapping sites of left female breast (HCC)     Cataract 2024    IOL bilat    High cholesterol     Hypothyroidism     PONV " (postoperative nausea and vomiting) 1987    Just felt nausous after anesthesia    Thyroid nodule        SURGICAL HISTORY  Past Surgical History:   Procedure Laterality Date    WA ULTRASONIC GUIDANCE, INTRAOPERATIVE  8/5/2024    Procedure: ULTRASOUND GUIDANCE;  Surgeon: Sachin Espinoza M.D.;  Location: SURGERY [facility];  Service: General    PANCREATECTOMY N/A 8/5/2024    Procedure: OPEN DISTAL PANCREATECTOMY WITH SPLENECTOMY, NODE DISSECTION;  Surgeon: Sachin Espinoza M.D.;  Location: SURGERY [facility];  Service: General    SPLENECTOMY N/A 8/5/2024    Procedure: SPLENECTOMY;  Surgeon: Sachin Espinoza M.D.;  Location: SURGERY [facility];  Service: General    CREATION, FLAP, OMENTUM N/A 8/5/2024    Procedure: CREATION, FLAP, OMENTUM;  Surgeon: Sachin Espinoza M.D.;  Location: SURGERY [facility];  Service: General    PB MASTECTOMY, PARTIAL Left 08/01/2022    Procedure: MELLO LOCALIZED LEFT PARTIAL MASTECTOMY;  Surgeon: Elena Arroyo M.D.;  Location: [facility];  Service: General    OTHER ORTHOPEDIC SURGERY  2019    back surgery    OTHER  2001    replace left breast implant    OTHER  1988    Left breast implant/lift    OTHER  1987    left mastectomy    LAMINOTOMY      LUMPECTOMY      PRIMARY C SECTION         FAMILY HISTORY  Family History   Problem Relation Age of Onset    Cancer Mother         Ovarian    Ovarian Cancer Mother     Dementia Father     Heart Disease Father     Hyperlipidemia Neg Hx        SOCIAL HISTORY   reports that she has never smoked. She has been exposed to tobacco smoke. She has never used smokeless tobacco. She reports that she does not drink alcohol and does not use drugs.    CURRENT MEDICATIONS  Current Discharge Medication List        CONTINUE these medications which have NOT CHANGED    Details   amLODIPine (NORVASC) 5 MG Tab Take 1 Tablet by mouth every day.  Qty: 90 Tablet, Refills: 3    Associated Diagnoses: Aneurysm of ascending  "aorta without rupture (HCC); Essential hypertension      warfarin (COUMADIN) 5 MG Tab Take 5 mg by mouth every evening. Pt started on 11/19/2024 at 1700      ondansetron (ZOFRAN ODT) 4 MG TABLET DISPERSIBLE Take 1 Tablet by mouth every 6 hours as needed for Nausea/Vomiting.  Qty: 10 Tablet, Refills: 0    Associated Diagnoses: Nausea vomiting and diarrhea      liothyronine (CYTOMEL) 5 MCG Tab Take 2.5-5 mcg by mouth see administration instructions. 5 mcg on Monday, Wednesday, Friday.  2.5 mcg on all other days      levothyroxine (SYNTHROID) 100 MCG Tab Take 100 mcg by mouth every morning on an empty stomach. ONLY GENERIC      ibandronate (BONIVA) 150 MG tablet Take 1 Tablet by mouth every 30 days.  Qty: 3 Tablet, Refills: 4    Associated Diagnoses: Cancer of overlapping sites of left breast (HCC); Age-related osteoporosis without current pathological fracture             ALLERGIES  Bloodless, Synthroid [fd&c red #40 [place]-levothyroxine], Tape, and [place] thyroid [thyroid]    PHYSICAL EXAM  /88   Pulse 80   Temp 36.6 °C (97.9 °F) (Temporal)   Resp 16   Ht 1.626 m (5' 4\")   Wt 59.9 kg (132 lb)   LMP  (LMP Unknown)   SpO2 94%   BMI 22.66 kg/m²     General: No acute distress.  HENT: Normocephalic, Mucus membranes are moist.   Chest: Decreased lung sounds to the left lower lobe.  Cardiovascular: Regular rate and regular rhythm, No peripheral cyanosis.  Abdomen: Non distended.  Neuro: Awake, Conversive, Able to relay recent events.  Psychiatric: Calm and cooperative.     INITIAL ASSESSMENT  The patient presents with shortness of breath and a history of current plural effusion and pulmonary embolism. I will evaluate with a chest x ray and labs. Recheck of x ray shows a large left plural effusion. NP Anderson from surgery who is familiar with this patient stopped in to see her. I will discuss pulmonary and add a urine analysis due to her high white cell count, I will also reevaluate INR as her last INR was " elevated.    DIAGNOSTIC STUDIES    Labs:   Results for orders placed or performed during the hospital encounter of 24   EKG (NOW)    Collection Time: 24 11:36 AM   Result Value Ref Range    Report       [facility]    Test Date:  2024  Pt Name:    ROLANDO VERDIN           Department: ER  MRN:        [id number]                      Room:  Gender:     Female                       Technician: [id number]  :        1945                   Requested By:ER TRIAGE PROTOCOL  Order #:    [id number]                    Reading MD: JUANITA RM D.O.    Measurements  Intervals                                Axis  Rate:       97                           P:          0  MI:         188                          QRS:        16  QRSD:       83                           T:          29  QT:         359  QTc:        456    Interpretive Statements    Low voltage, precordial leads  Compared to ECG 2024 16:36:08    Sinus rhythm no longer present  First degree AV block no longer present  Prolonged QT interval no longer present  Electronically Signed On 2024 16:26:20 PST by JUANITA RM D.O.     CBC with Differential    Collection Time: 24 11:52 AM   Result Value Ref Range    WBC 18.0 (H) 4.8 - 10.8 K/uL    RBC 5.36 4.20 - 5.40 M/uL    Hemoglobin 15.2 12.0 - 16.0 g/dL    Hematocrit 46.7 37.0 - 47.0 %    MCV 87.1 81.4 - 97.8 fL    MCH 28.4 27.0 - 33.0 pg    MCHC 32.5 32.2 - 35.5 g/dL    RDW 49.2 35.9 - 50.0 fL    Platelet Count 751 (H) 164 - 446 K/uL    MPV 9.1 9.0 - 12.9 fL    Neutrophils-Polys 86.10 (H) 44.00 - 72.00 %    Lymphocytes 6.10 (L) 22.00 - 41.00 %    Monocytes 6.90 0.00 - 13.40 %    Eosinophils 0.00 0.00 - 6.90 %    Basophils 0.00 0.00 - 1.80 %    Nucleated RBC 0.00 0.00 - 0.20 /100 WBC    Neutrophils (Absolute) 15.66 (H) 1.82 - 7.42 K/uL    Lymphs (Absolute) 1.10 1.00 - 4.80 K/uL    Monos (Absolute) 1.24 (H) 0.00 - 0.85 K/uL    Eos (Absolute)  0.00 0.00 - 0.51 K/uL    Baso (Absolute) 0.00 0.00 - 0.12 K/uL    NRBC (Absolute) 0.00 K/uL   Comp Metabolic Panel    Collection Time: 12/04/24 11:52 AM   Result Value Ref Range    Sodium 137 135 - 145 mmol/L    Potassium 3.8 3.6 - 5.5 mmol/L    Chloride 100 96 - 112 mmol/L    Co2 26 20 - 33 mmol/L    Anion Gap 11.0 7.0 - 16.0    Glucose 120 (H) 65 - 99 mg/dL    Bun 11 8 - 22 mg/dL    Creatinine 0.74 0.50 - 1.40 mg/dL    Calcium 9.1 8.5 - 10.5 mg/dL    Correct Calcium 9.4 8.5 - 10.5 mg/dL    AST(SGOT) 24 12 - 45 U/L    ALT(SGPT) 9 2 - 50 U/L    Alkaline Phosphatase 109 (H) 30 - 99 U/L    Total Bilirubin 1.0 0.1 - 1.5 mg/dL    Albumin 3.6 3.2 - 4.9 g/dL    Total Protein 8.0 6.0 - 8.2 g/dL    Globulin 4.4 (H) 1.9 - 3.5 g/dL    A-G Ratio 0.8 g/dL   proBrain Natriuretic Peptide, NT    Collection Time: 12/04/24 11:52 AM   Result Value Ref Range    NT-proBNP 455 (H) 0 - 125 pg/mL   Troponin    Collection Time: 12/04/24 11:52 AM   Result Value Ref Range    Troponin T 13 6 - 19 ng/L   ESTIMATED GFR    Collection Time: 12/04/24 11:52 AM   Result Value Ref Range    GFR (CKD-EPI) 83 >60 mL/min/1.73 m 2   DIFFERENTIAL MANUAL    Collection Time: 12/04/24 11:52 AM   Result Value Ref Range    Bands-Stabs 0.90 0.00 - 10.00 %    Manual Diff Status PERFORMED    PERIPHERAL SMEAR REVIEW    Collection Time: 12/04/24 11:52 AM   Result Value Ref Range    Peripheral Smear Review see below    PLATELET ESTIMATE    Collection Time: 12/04/24 11:52 AM   Result Value Ref Range    Plt Estimation Increased    MORPHOLOGY    Collection Time: 12/04/24 11:52 AM   Result Value Ref Range    RBC Morphology Present     Large Platelets 1+     Poikilocytosis 1+     Target Cells 1+     Echinocytes 1+    PROTHROMBIN TIME (INR)    Collection Time: 12/04/24 11:52 AM   Result Value Ref Range    PT 61.7 () 12.0 - 14.6 sec    INR 7.09 () 0.87 - 1.13   PROCALCITONIN    Collection Time: 12/04/24 11:52 AM   Result Value Ref Range    Procalcitonin 0.06 <0.25 ng/mL    URINALYSIS CULTURE, IF INDICATED    Collection Time: 24  3:07 PM    Specimen: Urine, Clean Catch   Result Value Ref Range    Color Dark Yellow     Character Clear     Specific Gravity 1.020 <1.035    Ph 6.0 5.0 - 8.0    Glucose Negative Negative mg/dL    Ketones 15 (A) Negative mg/dL    Protein Negative Negative mg/dL    Bilirubin Negative Negative    Urobilinogen, Urine 1.0 <=1.0 EU/dL    Nitrite Negative Negative    Leukocyte Esterase Small (A) Negative    Occult Blood Negative Negative    Micro Urine Req Microscopic    URINE MICROSCOPIC (W/UA)    Collection Time: 24  3:07 PM   Result Value Ref Range    WBC 21-50 (A) /hpf    RBC 0-2 0 - 2 /hpf    Bacteria None Seen None /hpf    Epithelial Cells 11-20 (A) 0 - 5 /hpf    Urine Casts 0-2 0 - 2 /lpf     *Note: Due to a large number of results and/or encounters for the requested time period, some results have not been displayed. A complete set of results can be found in Results Review.     EKG:   I have independently interpreted the above EKG.  Results for orders placed or performed during the hospital encounter of 24   EKG (NOW)   Result Value Ref Range    Report       [facility] Emergency Dept.    Test Date:  2024  Pt Name:    ROLANDO VERDIN           Department: ER  MRN:        [id number]                      Room:  Gender:     Female                       Technician: [id number]  :        1945                   Requested By:ER TRIAGE PROTOCOL  Order #:    [id number]                    Reading MD: JUANITA RM D.O.    Measurements  Intervals                                Axis  Rate:       97                           P:          0  NC:         188                          QRS:        16  QRSD:       83                           T:          29  QT:         359  QTc:        456    Interpretive Statements    Low voltage, precordial leads  Compared to ECG 2024 16:36:08    Sinus rhythm no longer present  First  degree AV block no longer present  Prolonged QT interval no longer present  Electronically Signed On 12- 16:26:20 PST by JUANITA RM D.O.       Radiology:   The attending emergency physician has independently interpreted the diagnostic imaging associated with this visit and am waiting the final reading from the radiologist.   Preliminary interpretation is as follows: Left pleural effusion  Radiologist interpretation:   DX-CHEST-PORTABLE (1 VIEW)   Final Result      1. Stable large left pleural effusion, obscuring the mid and lower portions of the left hemithorax.   2. No acute findings in the interval.      US-THORACENTESIS PUNCTURE LEFT    (Results Pending)       COURSE & MEDICAL DECISION MAKING     COURSE AND PLAN  2:30 PM - Patient seen and examined at bedside. Discussed plan of care, including labs and imaging. Patient agrees to the plan of care. Ordered for UA, CT-CTA chest pulmonary artery w recons, INR, IV saline lock, oxygen and respiratory consult, cardiac monitoring CNC w diff, CMP, proBrain Natriuretic peptide, NT, troponin, DX-Chest, and EKG  to evaluate her symptoms.     3:07 PM I ordered a urine microscopic w/UA.    4:33 PM  I discussed the patient's case and the above findings with Dr. Vargas (Hospitalist) who agrees to see the patient.    4:35 PM - I reevaluated the patient at bedside. I discussed the patient's diagnostic study results which show acute abnormalities. I informed the patient of my plan to admit today given the patient's current presentation and diagnostic study results. Patient verbalizes understanding and support with my plan for admission.     ED Summary: Patient has a history of recurrent left lung effusion has she has had it drained 3 times in the last couple of months.  Num-Zit with complaints of shortness of breath she also has a history of pulmonary embolism and is on Coumadin.  Her Coumadin was reported to be elevated in the last count but there has been no  adjustment of her medications.  Her INR is 7.  This she has no active bleeding Coumadin will be held.  Chest x-ray shows a effusion.  And the patient had a visit here by surgical nurse practitioner Anderson and discussed the patient's history with him.    The patient did have an episode of hypoxemia with pulse oximetry of 87% she did respond well to low-dose supplemental oxygen and the patient will be admitted for further evaluation and treatment.      DISPOSITION AND DISCUSSIONS  I have discussed management of the patient with the following physicians and CRISSY's: Dr. Vargas (Hospitalist)    DISPOSITION:  Patient will be hospitalized by Dr. Vargas (Hospitalist) in guarded condition.    FINAL DIAGNOSIS  1. Dyspnea, unspecified type    2. Acute respiratory failure with hypoxemia (HCC)    3. Pleural effusion    4. Coagulopathy (HCC)        Estrella FOREMAN (Monica), am scribing for, and in the presence of, Michael Slater D.O..    Electronically signed by: Estrella Black (Monica), 12/4/2024    Michael FOREMAN D.O. personally performed the services described in this documentation, as scribed by Estrella Black in my presence, and it is both accurate and complete.     The note accurately reflects work and decisions made by me.  Michael Slater D.O.  12/4/2024  8:39 PM

## 2024-12-05 LAB
ANION GAP SERPL CALC-SCNC: 8 MMOL/L (ref 7–16)
BUN SERPL-MCNC: 11 MG/DL (ref 8–22)
CALCIUM SERPL-MCNC: 8.8 MG/DL (ref 8.5–10.5)
CFT BLD TEG: 10.8 MIN (ref 4.6–9.1)
CFT P HPASE BLD TEG: 9.7 MIN (ref 4.3–8.3)
CHLORIDE SERPL-SCNC: 100 MMOL/L (ref 96–112)
CLOT ANGLE BLD TEG: 73.5 DEGREES (ref 63–78)
CLOT LYSIS 30M P MA LENFR BLD TEG: 6.5 % (ref 0–2.6)
CO2 SERPL-SCNC: 24 MMOL/L (ref 20–33)
CREAT SERPL-MCNC: 0.65 MG/DL (ref 0.5–1.4)
CT.EXTRINSIC BLD ROTEM: 1.3 MIN (ref 0.8–2.1)
ERYTHROCYTE [DISTWIDTH] IN BLOOD BY AUTOMATED COUNT: 48.7 FL (ref 35.9–50)
GFR SERPLBLD CREATININE-BSD FMLA CKD-EPI: 90 ML/MIN/1.73 M 2
GLUCOSE SERPL-MCNC: 118 MG/DL (ref 65–99)
HCT VFR BLD AUTO: 40.1 % (ref 37–47)
HGB BLD-MCNC: 13.1 G/DL (ref 12–16)
INR PPP: 8.07 (ref 0.87–1.13)
MCF BLD TEG: 68.3 MM (ref 52–69)
MCF.PLATELET INHIB BLD ROTEM: 41.6 MM (ref 15–32)
MCH RBC QN AUTO: 28.2 PG (ref 27–33)
MCHC RBC AUTO-ENTMCNC: 32.7 G/DL (ref 32.2–35.5)
MCV RBC AUTO: 86.2 FL (ref 81.4–97.8)
PA AA BLD-ACNC: 0 % (ref 0–11)
PA ADP BLD-ACNC: 0 % (ref 0–17)
PLATELET # BLD AUTO: 628 K/UL (ref 164–446)
PMV BLD AUTO: 9.2 FL (ref 9–12.9)
POTASSIUM SERPL-SCNC: 3.6 MMOL/L (ref 3.6–5.5)
PROTHROMBIN TIME: 68.3 SEC (ref 12–14.6)
RBC # BLD AUTO: 4.65 M/UL (ref 4.2–5.4)
SODIUM SERPL-SCNC: 132 MMOL/L (ref 135–145)
TEG ALGORITHM TGALG: ABNORMAL
WBC # BLD AUTO: 12.6 K/UL (ref 4.8–10.8)

## 2024-12-05 PROCEDURE — A9270 NON-COVERED ITEM OR SERVICE: HCPCS | Performed by: INTERNAL MEDICINE

## 2024-12-05 PROCEDURE — 700102 HCHG RX REV CODE 250 W/ 637 OVERRIDE(OP): Performed by: INTERNAL MEDICINE

## 2024-12-05 PROCEDURE — 36415 COLL VENOUS BLD VENIPUNCTURE: CPT

## 2024-12-05 PROCEDURE — 85576 BLOOD PLATELET AGGREGATION: CPT | Mod: 91

## 2024-12-05 PROCEDURE — 85610 PROTHROMBIN TIME: CPT

## 2024-12-05 PROCEDURE — 85384 FIBRINOGEN ACTIVITY: CPT | Mod: 91

## 2024-12-05 PROCEDURE — 99232 SBSQ HOSP IP/OBS MODERATE 35: CPT | Performed by: STUDENT IN AN ORGANIZED HEALTH CARE EDUCATION/TRAINING PROGRAM

## 2024-12-05 PROCEDURE — 700105 HCHG RX REV CODE 258

## 2024-12-05 PROCEDURE — 85347 COAGULATION TIME ACTIVATED: CPT

## 2024-12-05 PROCEDURE — 99223 1ST HOSP IP/OBS HIGH 75: CPT | Performed by: SURGERY

## 2024-12-05 PROCEDURE — 770006 HCHG ROOM/CARE - MED/SURG/GYN SEMI*

## 2024-12-05 PROCEDURE — 700111 HCHG RX REV CODE 636 W/ 250 OVERRIDE (IP): Mod: JZ

## 2024-12-05 PROCEDURE — 85027 COMPLETE CBC AUTOMATED: CPT

## 2024-12-05 PROCEDURE — 80048 BASIC METABOLIC PNL TOTAL CA: CPT

## 2024-12-05 RX ADMIN — LIOTHYRONINE SODIUM 2.5 MCG: 5 TABLET ORAL at 05:10

## 2024-12-05 RX ADMIN — PHYTONADIONE 10 MG: 10 INJECTION, EMULSION INTRAMUSCULAR; INTRAVENOUS; SUBCUTANEOUS at 04:02

## 2024-12-05 RX ADMIN — AMLODIPINE BESYLATE 5 MG: 10 TABLET ORAL at 05:10

## 2024-12-05 ASSESSMENT — PAIN DESCRIPTION - PAIN TYPE
TYPE: ACUTE PAIN
TYPE: ACUTE PAIN

## 2024-12-05 ASSESSMENT — ENCOUNTER SYMPTOMS
ABDOMINAL PAIN: 0
NAUSEA: 0
HEADACHES: 0
COUGH: 1
FOCAL WEAKNESS: 0
SENSORY CHANGE: 0
VOMITING: 0
FEVER: 0
COUGH: 0
CHILLS: 0
INSOMNIA: 0
LOSS OF CONSCIOUSNESS: 0
DIARRHEA: 0
SPUTUM PRODUCTION: 0
EYE PAIN: 0
SHORTNESS OF BREATH: 1
PALPITATIONS: 0
BLURRED VISION: 0
CONSTIPATION: 0
WEIGHT LOSS: 0
BACK PAIN: 0
DIZZINESS: 0

## 2024-12-05 ASSESSMENT — LIFESTYLE VARIABLES: SUBSTANCE_ABUSE: 0

## 2024-12-05 NOTE — H&P
"Hospital Medicine History & Physical Note    Date of Service  12/4/2024    Primary Care Physician  Roula Ga P.A.-C.    Consultants  None    Specialist Names: None    Code Status  Full Code    Chief Complaint  Chief Complaint   Patient presents with    Shortness of Breath     X5 days   \"It comes and goes\"   Hx of plural effusion L lung  \"I have been drained 3 times\"     Endorses weakness and fatigue        History of Presenting Illness  Paige Gudino is a 79 y.o. female who presented 12/4/2024 with shortness of breath.  Patient states has been ongoing for approximately 5 days, intermittent however today it worsened so she presented to the emergency department.  She does complain of some mild chills but denies any fever.  No cough.  Patient was noted to be satting 87% on room air, improved with low-flow oxygen.  Patient does have a history of pleural effusion, states she has had thoracentesis x 3, has not received a diagnosis as to why she has had pleural effusions.  She does have a history of a left-sided breast cancer, did receive 30 days of radiation on that side.  I did discuss the case including labs and imaging with the ER physician.    I discussed the plan of care with patient and family.    Review of Systems  Review of Systems   Constitutional:  Positive for chills and malaise/fatigue. Negative for fever.   HENT:  Negative for congestion.    Respiratory:  Positive for shortness of breath. Negative for cough, sputum production and stridor.    Cardiovascular:  Negative for chest pain, palpitations and leg swelling.   Gastrointestinal:  Negative for abdominal pain, constipation, diarrhea, nausea and vomiting.   Genitourinary:  Negative for dysuria and urgency.   Musculoskeletal:  Negative for falls and myalgias.   Neurological:  Negative for dizziness, tingling, loss of consciousness, weakness and headaches.   Psychiatric/Behavioral:  Negative for depression and suicidal ideas.    All other " ----- Message from Rodo Monae PA-C sent at 3/10/2020  4:11 PM CDT -----  Please let the patient know that his urine was positive for urinary tract infection.  Let us start him on Ceftin 250 mg tablets -1 tablet twice daily for 10 days.  Would like to have his urine rechecked in 2 weeks time.  His lipids were also quite elevated.  Please confirm that he was off of his Zocor at the time of this lab draw.  If that is not the case, will need to changes medications if it is, we would like to have his cholesterol rechecked in 3 months.  Renal functions remain elevated, we discussed seeing a nephrologist which he was somewhat hesitant to, but this would be recommended.  His uric acid level looks good continue current diet and medication.   systems reviewed and are negative.      Past Medical History   has a past medical history of Anesthesia, Cancer (HCC), Cancer of overlapping sites of left female breast (HCC), Cataract (2024), High cholesterol, Hypothyroidism, PONV (postoperative nausea and vomiting) (1987), and Thyroid nodule.    Surgical History   has a past surgical history that includes other orthopedic surgery (2019); other (1987); other (1988); other (2001); pr mastectomy, partial (Left, 08/01/2022); primary c section; lumpectomy; laminotomy; pr ultrasonic guidance, intraoperative (8/5/2024); pancreatectomy (N/A, 8/5/2024); splenectomy (N/A, 8/5/2024); and creation, flap, omentum (N/A, 8/5/2024).     Family History  family history includes Cancer in her mother; Dementia in her father; Heart Disease in her father; Ovarian Cancer in her mother.   Family history reviewed with patient. There is no family history that is pertinent to the chief complaint.     Social History   reports that she has never smoked. She has been exposed to tobacco smoke. She has never used smokeless tobacco. She reports that she does not drink alcohol and does not use drugs.    Allergies  Allergies   Allergen Reactions    Bloodless Unspecified     Does not want blood products    Synthroid [Fd&C Red #40 Al Paul-Levothyroxine] Hives and Unspecified     Hives, Brand specific. Some brands are ok and some are not    Tape Unspecified     Skin degradation with use of tegaderm     [place] Thyroid [Thyroid] Diarrhea     diarrhea       Medications  Prior to Admission Medications   Prescriptions Last Dose Informant Patient Reported? Taking?   amLODIPine (NORVASC) 5 MG Tab   No No   Sig: Take 1 Tablet by mouth every day.   amoxicillin-clavulanate (AUGMENTIN) 875-125 MG Tab  Patient Yes No   Sig: Take 1 Tablet by mouth 2 times a day. Pt started on 10/29/2024 for 5 day course, pt reports that she only took 2 tablets and was told to stop taking   fluconazole (DIFLUCAN) 150 MG tablet   Patient No No   Sig: Take one tablet orally for yeast infection, if symptoms persist, may repeat treatment after 72 hours.   Patient taking differently: Take 150 mg by mouth see administration instructions. Take one tablet orally for yeast infection, if symptoms persist, may repeat treatment after 72 hours.   ibandronate (BONIVA) 150 MG tablet  Patient No No   Sig: Take 1 Tablet by mouth every 30 days.   levothyroxine (SYNTHROID) 100 MCG Tab  Patient Yes No   Sig: Take 100 mcg by mouth every morning on an empty stomach. ONLY GENERIC   liothyronine (CYTOMEL) 5 MCG Tab  Patient Yes No   Sig: Take 2.5-5 mcg by mouth see administration instructions. 5 mcg on Monday, Wednesday, Friday.  2.5 mcg on all other days   ondansetron (ZOFRAN ODT) 4 MG TABLET DISPERSIBLE   No No   Sig: Take 1 Tablet by mouth every 6 hours as needed for Nausea/Vomiting.   warfarin (COUMADIN) 5 MG Tab  Patient Yes No   Sig: Take 5 mg by mouth every evening. Pt started on 11/19/2024 at 1700      Facility-Administered Medications: None       Physical Exam  Temp:  [36.6 °C (97.9 °F)] 36.6 °C (97.9 °F)  Pulse:  [75-91] 87  Resp:  [16] 16  BP: (121-148)/(66-88) 130/67  SpO2:  [87 %-94 %] 93 %  Blood Pressure : 130/67   Temperature: 36.6 °C (97.9 °F)   Pulse: 87   Respiration: 16   Pulse Oximetry: 93 %       Physical Exam  Vitals and nursing note reviewed.   Constitutional:       General: She is not in acute distress.     Appearance: She is well-developed. She is not diaphoretic.   HENT:      Head: Normocephalic and atraumatic.      Right Ear: External ear normal.      Left Ear: External ear normal.      Nose: Nose normal. No congestion or rhinorrhea.      Mouth/Throat:      Mouth: Mucous membranes are moist.      Pharynx: No oropharyngeal exudate.   Eyes:      General:         Right eye: No discharge.         Left eye: No discharge.   Neck:      Trachea: No tracheal deviation.   Cardiovascular:      Rate and Rhythm: Normal rate and regular rhythm.       Heart sounds: No murmur heard.     No friction rub. No gallop.   Pulmonary:      Effort: Pulmonary effort is normal. No respiratory distress.      Breath sounds: No stridor. Examination of the left-upper field reveals decreased breath sounds. Examination of the left-middle field reveals decreased breath sounds. Examination of the left-lower field reveals decreased breath sounds. Decreased breath sounds present. No wheezing or rales.   Chest:      Chest wall: No tenderness.   Abdominal:      General: Bowel sounds are normal. There is no distension.      Palpations: Abdomen is soft.      Tenderness: There is no abdominal tenderness.   Musculoskeletal:         General: No tenderness. Normal range of motion.      Cervical back: Neck supple.      Right lower leg: No edema.      Left lower leg: No edema.   Lymphadenopathy:      Cervical: No cervical adenopathy.   Skin:     General: Skin is warm and dry.      Findings: No erythema or rash.   Neurological:      Mental Status: She is alert and oriented to person, place, and time.      Cranial Nerves: No cranial nerve deficit.   Psychiatric:         Mood and Affect: Mood normal.         Behavior: Behavior normal.         Thought Content: Thought content normal.         Judgment: Judgment normal.         Laboratory:  Recent Labs     12/04/24  1152   WBC 18.0*   RBC 5.36   HEMOGLOBIN 15.2   HEMATOCRIT 46.7   MCV 87.1   MCH 28.4   MCHC 32.5   RDW 49.2   PLATELETCT 751*   MPV 9.1     Recent Labs     12/04/24  1152   SODIUM 137   POTASSIUM 3.8   CHLORIDE 100   CO2 26   GLUCOSE 120*   BUN 11   CREATININE 0.74   CALCIUM 9.1     Recent Labs     12/04/24  1152   ALTSGPT 9   ASTSGOT 24   ALKPHOSPHAT 109*   TBILIRUBIN 1.0   GLUCOSE 120*     Recent Labs     12/02/24  1438 12/04/24  1152   INR 6.07* 7.09*     Recent Labs     12/04/24  1152   NTPROBNP 455*         Recent Labs     12/04/24  1152   TROPONINT 13       Imaging:  DX-CHEST-PORTABLE (1 VIEW)   Final Result      1. Stable large  left pleural effusion, obscuring the mid and lower portions of the left hemithorax.   2. No acute findings in the interval.          X-Ray:  I have personally reviewed the images and compared with prior images.    Assessment/Plan:  Justification for Admission Status  I anticipate this patient will require at least two midnights for appropriate medical management, necessitating inpatient admission because large left-sided pleural effusion    Patient will need a Med/Surg bed on SURGICAL service .  The need is secondary to large left-sided pleural effusion.    * Pleural effusion- (present on admission)  Assessment & Plan  Large left-sided recurrent pleural effusion  I think is likely secondary to significant radiation on the side  Fluid analysis is always been negative per patient  I will order thoracentesis for tomorrow but INR may be still elevated requiring more vitamin K  I will give a dose of vitamin K tonight repeat INR in the morning  IR will likely need INR less than 2.5  Causing acute respiratory failure with hypoxia  Less likely to be associated with pneumonia however I will obtain a procalcitonin  ERP was going to order a CTA chest, did have a recent CT scan, I think if a CT scan was deemed necessary would be more beneficial post thoracentesis, I will cancel the CTA chest for now    Acute respiratory failure with hypoxia (HCC)- (present on admission)  Assessment & Plan  Secondary to large left pleural effusion  She was satting 87% on room air  Improved on low-flow oxygen  Continuous pulse ox monitoring  Wean oxygen as able    History of pulmonary embolism- (present on admission)  Assessment & Plan  Medication induced PE and bilateral DVTs  Patient remains on Coumadin, INR is supratherapeutic at 7  Patient does need a thoracentesis, IR would likely need her INR less than 2.5, will give 1 dose of vitamin K tonight and repeat INR in the morning  Will avoid excessive vitamin K, this will make it more difficult  to increase her INR after the thoracentesis if significant amounts of vitamin K are given  May need a dose of FFP prior to thoracentesis    Primary hypertension- (present on admission)  Assessment & Plan  Continue home amlodipine  Start as needed labetalol  Adjust as needed    Acquired hypothyroidism- (present on admission)  Assessment & Plan  Continue home Synthroid at 100 mcg daily  Continue home Cytomel at 2.5-5  Most recent TSH was approximately 3 months ago was normal at 4.57        VTE prophylaxis: therapeutic anticoagulation with Coumadin

## 2024-12-05 NOTE — DISCHARGE PLANNING
Care Transition Team Assessment    ANANTH RN met with pt at the bedside to complete discharge assessment and chart review was completed to obtain the information used in this assessment. ANANTH RN Introduced self and department roles. Pt is A/Ox4 and agreeable to assessment. Pt verified information on face sheet.     - Prior to this admission, pt lived with her spouse and her adult daughter in a single story house. She confirmed the address on the face sheet is her current home residence and correct discharge address.  - Pt stated she was independent with ADL/IADLs with no use of DME at baseline prior to this admission.   - She reports to have good support from her family and they are to provide her with transportation home upon medical clearance.   - Pt states she is an active  with access to a car.   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   - Pt is insured through [employer] HMO.   - PCP is Roula Ga P.A.-C.   - Preferred pharmacy is the [facility].   - No AD paperwork on file.     Information Source  Orientation Level: Oriented X4  Information Given By: Patient  Who is responsible for making decisions for patient? : Patient    Readmission Evaluation  Is this a readmission?: No    Elopement Risk  Legal Hold: No  Ambulatory or Self Mobile in Wheelchair: Yes  Disoriented: No  Psychiatric Symptoms: None  History of Wandering: No  Elopement this Admit: No  Vocalizing Wanting to Leave: No  Displays Behaviors, Body Language Wanting to Leave: No-Not at Risk for Elopement  Elopement Risk: Not at Risk for Elopement    Interdisciplinary Discharge Planning  Lives with - Patient's Self Care Capacity: Spouse, Adult Children  Patient or legal guardian wants to designate a caregiver: No  Support Systems: Family Member(s)  Housing / Facility: 1 Story House    Discharge Preparedness  What is your plan after discharge?: Home with help  What are your  discharge supports?: Child, Spouse  Prior Functional Level: Ambulatory, Independent with Activities of Daily Living, Drives Self, Independent with Medication Management    Functional Assesment  Prior Functional Level: Ambulatory, Independent with Activities of Daily Living, Drives Self, Independent with Medication Management    Finances  Financial Barriers to Discharge: No  Prescription Coverage: Yes    Vision / Hearing Impairment  Vision Impairment : No  Hearing Impairment : No    Advance Directive  Advance Directive?: None    Domestic Abuse  Have you ever been the victim of abuse or violence?: No  Possible Abuse/Neglect Reported to:: Not Applicable    Anticipated Discharge Information  Discharge Disposition: Discharged to home/self care (01)

## 2024-12-05 NOTE — CARE PLAN
The patient is Stable - Low risk of patient condition declining or worsening    Shift Goals  Clinical Goals: pt's SpO2 will remain above 90%  Patient Goals: DC  Family Goals: HAYDEE    Progress made toward(s) clinical / shift goals:    SpO2 remains above 90 on 0-1L    Patient is not progressing towards the following goals:

## 2024-12-05 NOTE — DISCHARGE PLANNING
Case Management Discharge Planning    Admission Date: 12/4/2024  GMLOS: 4.4  ALOS: 1    6-Clicks ADL Score: 24  6-Clicks Mobility Score: 24    Anticipated Discharge Dispo: Discharge Disposition: Discharged to home/self care (01)    DME Needed: No    Action(s) Taken:   Pt was discussed during IDT rounds. Per MD, pt is not medically cleared, surgical oncology consulted. Plan is for IR thoracentesis pending improvement of INR.     Pt plans to return home with her spouse and daughter upon medical clearance. She reports she will have means of transport upon discharge.     Escalations Completed: None    Medically Clear: No    Next Steps:  CM RN to follow up with medical team to discuss discharge barriers or needs.     Barriers to Discharge: Medical clearance and Pending Procedures

## 2024-12-05 NOTE — CARE PLAN
The patient is Stable - Low risk of patient condition declining or worsening    Shift Goals  Clinical Goals: monitor oxygen, thoracentesis, safety, comfort  Patient Goals: comfort, procedure    Progress made toward(s) clinical / shift goals:        Problem: Knowledge Deficit - Standard  Goal: Patient and family/care givers will demonstrate understanding of plan of care, disease process/condition, diagnostic tests and medications  Outcome: Progressing  Note: Patient updated on plan of care by team members.       Patient is not progressing towards the following goals:

## 2024-12-05 NOTE — ED NOTES
Medication history reviewed with patient at bedside.   Med rec is complete  Allergies reviewed.     Patient has not had any outpatient antibiotics in the last 30 days.   Anticoagulants: Warfarin 5mg- last dose 12/3/24 pm.    Jefferson Rg

## 2024-12-05 NOTE — CARE PLAN
The patient is Stable - Low risk of patient condition declining or worsening    Shift Goals  Clinical Goals: patients oxygenation status will remain stable throughout the shift; pt will remain safe throughout the shift  Patient Goals: eat, sleep  Family Goals: HAYDEE    Progress made toward(s) clinical / shift goals:  Pts oxygenation lowest was 92%. Patient had no complaints of SOB. PT on . Patient remained safe throughout the shift. Pt called appropriately.       Problem: Knowledge Deficit - Standard  Goal: Patient and family/care givers will demonstrate understanding of plan of care, disease process/condition, diagnostic tests and medications  Outcome: Progressing     Problem: Respiratory  Goal: Patient will achieve/maintain optimum respiratory ventilation and gas exchange  Outcome: Progressing       Patient is not progressing towards the following goals:

## 2024-12-05 NOTE — PROGRESS NOTES
Patient arrived via transport. Patient is A&Ox 4 and on 1L nasal canula . Patient reporting a pain level of 0/10. Brought patient snacks. Family at bedside. Pulse Ox hooked up. Patient ambulated to bed with no assistance.    Call light within reach and bed in lowest position. Reinforced the need to call for assistance. Plan of care discussed and patient does not have any further needs at this time.

## 2024-12-05 NOTE — ASSESSMENT & PLAN NOTE
Secondary to large left pleural effusion  She was satting 87% on room air  Improved on low-flow oxygen  Continuous pulse ox monitoring  Has weaned off oxygen following pleurodesis

## 2024-12-05 NOTE — PROGRESS NOTES
NOC HOSPITALIST CROSS COVER    Notified by RN regarding critical INR of 8.07 and PT of 68.3, uptrending from 7.09 on admit. Phytonadione had been ordered, but was not given due to unclear reasons. New order placed for phytonadione 10 mg IV once.       Vitals:    12/05/24 0340   BP: 114/50   Pulse: (!) 58   Resp: 18   Temp: 36.2 °C (97.2 °F)   SpO2: 93%     -----------------------------------------------------------------------------------------------------------    Electronically signed by:  Jessica Nogueira, DNP, APRN, LUANP-BC  Hospitalist Services

## 2024-12-05 NOTE — ASSESSMENT & PLAN NOTE
History of medication induced PE and bilateral DVTs  Home warfarin held on presentation  INR 8 improved with vitamin K; currently warfarin is held for surgery  Was on lovenox pending surgery, now held too  Pleurodesis today 12/9  Resume anticoagulation post op once cleared by surgery; patient did not tolerate eliquis or xarelto in the past

## 2024-12-05 NOTE — PROGRESS NOTES
Report received from NOC RN and assumed patient care at 0700. Patient is A&Ox 4, on 1L nc, and awake and alert . Patient reporting a pain level of 0/10. Call light within reach and bed in lowest position. Reinforced the need to call for assistance. Plan of care discussed and patient does not have any further needs at this time.

## 2024-12-05 NOTE — PROGRESS NOTES
Hospital Medicine Daily Progress Note    Date of Service  12/5/2024    Chief Complaint  Paige Gudino is a 79 y.o. female admitted 12/4/2024 with dyspnea.    Hospital Course  Paige Gudino is a 79 y.o. female who presented 12/4/2024 with shortness of breath.  Patient states has been ongoing for approximately 5 days, intermittent however today it worsened so she presented to the emergency department.  She does complain of some mild chills but denies any fever.  No cough.  Patient was noted to be satting 87% on room air, improved with low-flow oxygen.    Per chart review, patient with history of partial pancreatectomy for mass-benign, complicated by abdominal fluid collection s/p drainage, recurrent left pleural effusion s/p thoracenteses. Patient follows with hepatobiliary/surgical oncology team as outpatient for management.   Patient admitted for evaluation of recurrent pleural effusion and acute hypoxic respiratory failure.    Interval Problem Update  No acute events overnight.  On 1L oxygen, wean as able.  CXR reviewed showing large left pleural effusion.  Surgical oncology/hepatobiliary surgery consulted, Dr Steele. Dr HUTCHISON will consult thoracic surgery.  Dr Saul came by and spoke with patient, per patient sounds like likely pleurodesis procedure tentatively Monday.  Appreciate thoracic surgery recommendations.  INR 8 this morning, given vitamin K.  Will monitor daily INR, currently no signs of bleeding.  Holding home warfarin. May consider heparin drip perioperatively. 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  Procalcitonin negative, currently no signs of infection.      I have discussed this patient's plan of care and discharge plan at IDT rounds today with Case Management, Nursing, Nursing leadership, and other members of the IDT team.    Consultants/Specialty  Thoracic surgery    Code Status  Full Code    Disposition  Medically Cleared  I have placed the appropriate  orders for post-discharge needs.    Review of Systems  Review of Systems   Constitutional:  Negative for chills and fever.   Eyes:  Negative for blurred vision and pain.   Respiratory:  Positive for cough and shortness of breath.    Cardiovascular:  Negative for chest pain, palpitations and leg swelling.   Gastrointestinal:  Negative for abdominal pain, nausea and vomiting.   Genitourinary:  Negative for dysuria and urgency.   Musculoskeletal:  Negative for back pain.   Skin:  Negative for itching and rash.   Neurological:  Negative for dizziness, sensory change, focal weakness, loss of consciousness and headaches.   Psychiatric/Behavioral:  Negative for substance abuse. The patient does not have insomnia.         Physical Exam  Temp:  [36.2 °C (97.2 °F)-36.9 °C (98.5 °F)] 36.7 °C (98.1 °F)  Pulse:  [58-91] 65  Resp:  [16-21] 21  BP: (114-148)/(50-80) 127/61  SpO2:  [87 %-96 %] 95 %    Physical Exam  Vitals reviewed.   Constitutional:       General: She is not in acute distress.     Appearance: She is not diaphoretic.   HENT:      Head: Normocephalic and atraumatic.      Right Ear: External ear normal.      Left Ear: External ear normal.      Nose: Nose normal. No congestion.      Mouth/Throat:      Pharynx: No oropharyngeal exudate or posterior oropharyngeal erythema.   Eyes:      Extraocular Movements: Extraocular movements intact.      Pupils: Pupils are equal, round, and reactive to light.   Cardiovascular:      Rate and Rhythm: Normal rate and regular rhythm.   Pulmonary:      Effort: Pulmonary effort is normal. No respiratory distress.      Breath sounds: Normal breath sounds. No wheezing.      Comments: Decreased breath sounds left mid upper lung to below  Abdominal:      General: Bowel sounds are normal. There is no distension.      Palpations: Abdomen is soft.      Tenderness: There is no abdominal tenderness. There is no guarding.   Musculoskeletal:         General: No swelling. Normal range of motion.       Cervical back: Normal range of motion and neck supple.      Right lower leg: No edema.      Left lower leg: No edema.   Skin:     General: Skin is warm and dry.   Neurological:      General: No focal deficit present.      Mental Status: She is alert and oriented to person, place, and time.      Cranial Nerves: No cranial nerve deficit.      Sensory: No sensory deficit.      Motor: No weakness.   Psychiatric:         Mood and Affect: Mood normal.         Behavior: Behavior normal.         Fluids    Intake/Output Summary (Last 24 hours) at 12/5/2024 1436  Last data filed at 12/5/2024 0900  Gross per 24 hour   Intake 480 ml   Output --   Net 480 ml        Laboratory  Recent Labs     12/04/24  1152 12/05/24  0124   WBC 18.0* 12.6*   RBC 5.36 4.65   HEMOGLOBIN 15.2 13.1   HEMATOCRIT 46.7 40.1   MCV 87.1 86.2   MCH 28.4 28.2   MCHC 32.5 32.7   RDW 49.2 48.7   PLATELETCT 751* 628*   MPV 9.1 9.2     Recent Labs     12/04/24  1152 12/05/24  0124   SODIUM 137 132*   POTASSIUM 3.8 3.6   CHLORIDE 100 100   CO2 26 24   GLUCOSE 120* 118*   BUN 11 11   CREATININE 0.74 0.65   CALCIUM 9.1 8.8     Recent Labs     12/02/24  1438 12/04/24  1152 12/05/24  0124   INR 6.07* 7.09* 8.07*               Imaging  DX-CHEST-PORTABLE (1 VIEW)   Final Result      1. Stable large left pleural effusion, obscuring the mid and lower portions of the left hemithorax.   2. No acute findings in the interval.      US-THORACENTESIS PUNCTURE LEFT    (Results Pending)        Assessment/Plan  * Pleural effusion- (present on admission)  Assessment & Plan  Large left-sided recurrent pleural effusion  Fluid analysis is always been negative per patient  INR 8, given vitamin K; no signs of bleeding but plan for surgery likely Monday  Monitor INR daily  Hold home warfarin  Currently holding off on thoracentesis until clarification of surgical plan by thoracic surgery Dr Saul    History of pulmonary embolism- (present on admission)  Assessment & Plan  Medication  induced PE and bilateral DVTs  Patient remains on Coumadin, INR is supratherapeutic at 8  Vitamin K ordered this morning, monitor INR  Surgery following, likely OR Monday for pleurodesis    Primary hypertension- (present on admission)  Assessment & Plan  Continue home amlodipine  Start as needed labetalol  Adjust as needed    Acute respiratory failure with hypoxia (HCC)- (present on admission)  Assessment & Plan  Secondary to large left pleural effusion  She was satting 87% on room air  Improved on low-flow oxygen  Continuous pulse ox monitoring  Wean oxygen as able    Acquired hypothyroidism- (present on admission)  Assessment & Plan  Continue home Synthroid at 100 mcg daily  Continue home Cytomel at 2.5-5  Most recent TSH was approximately 3 months ago was normal at 4.57         VTE prophylaxis: VTE Selection    I have performed a physical exam and reviewed and updated ROS and Plan today (12/5/2024). In review of yesterday's note (12/4/2024), there are no changes except as documented above.

## 2024-12-05 NOTE — ASSESSMENT & PLAN NOTE
Continue home Synthroid at 100 mcg daily  Continue home Cytomel at 2.5-5  Most recent TSH was approximately 3 months ago was normal at 4.57

## 2024-12-05 NOTE — CONSULTS
"       Date of Service  December 4, 2024     Reason for Consult: Shortness of breath, prior history distal pancreatectomy/splenectomy    Requested by: ED MD    Location: Seen in ED [facility]    Chief Complaint  Shortness of Breath (X5 days /\"It comes and goes\" /Hx of plural effusion L lung  \"I have been drained 3 times\" //Endorses weakness and fatigue )      HPI: Patient is a 79 female admitted on 12/4/24 for concern worsening SOB. Patient has had recurring episodes of pleural effusions which have been drained, and abdominal fluid collections - following an earlier distal pancreatectomy and splenectomy done earlier this year.    This afternoon the patient states she was feeling increasingly anxious at home as she felt her breathing was getting worse. She does not use oxygen. In the ED she has adequate O2 sat, speaks with full sentences, has good color and cap refill, no cough, no notable shortness of breath. She denies fever, chills, but states she has felt generalized malaise for a while and that she eats less than the typically does.    Family members at bedside.    Also, notable for very elevated INR of 8.07 as of 12/5/24. Was recently started on Coumadin, and concern this may not be dosed properly.    Past Medical History  Past Medical History:   Diagnosis Date    Anesthesia     nausea post op    Cancer (HCC)     1987 breast left    Cancer of overlapping sites of left female breast (HCC)     Cataract 2024    IOL bilat    High cholesterol     Hypothyroidism     PONV (postoperative nausea and vomiting) 1987    Just felt nausous after anesthesia    Thyroid nodule      Past Surgical History  Past Surgical History:   Procedure Laterality Date    WI ULTRASONIC GUIDANCE, INTRAOPERATIVE  8/5/2024    Procedure: ULTRASOUND GUIDANCE;  Surgeon: Sachin Espinoza M.D.;  Location: SURGERY [facility];  Service: General    PANCREATECTOMY N/A 8/5/2024    Procedure: OPEN DISTAL PANCREATECTOMY WITH SPLENECTOMY, NODE " DISSECTION;  Surgeon: Sachin Espinoza M.D.;  Location: SURGERY [facility];  Service: General    SPLENECTOMY N/A 8/5/2024    Procedure: SPLENECTOMY;  Surgeon: Sachin Espinoza M.D.;  Location: SURGERY [facility];  Service: General    CREATION, FLAP, OMENTUM N/A 8/5/2024    Procedure: CREATION, FLAP, OMENTUM;  Surgeon: Sachin Espinoza M.D.;  Location: SURGERY [facility];  Service: General    PB MASTECTOMY, PARTIAL Left 08/01/2022    Procedure: MELLO LOCALIZED LEFT PARTIAL MASTECTOMY;  Surgeon: Elena Arroyo M.D.;  Location: SURGERY [facility];  Service: General    OTHER ORTHOPEDIC SURGERY  2019    back surgery    OTHER  2001    replace left breast implant    OTHER  1988    Left breast implant/lift    OTHER  1987    left mastectomy    LAMINOTOMY      LUMPECTOMY      PRIMARY C SECTION       Allergies:   Allergies   Allergen Reactions    Bloodless Unspecified     Does not want blood products    Synthroid [Fd&C Red #40 Al Paul-Levothyroxine] Hives and Unspecified     Hives, Brand specific. Some brands are ok and some are not    Tape Unspecified     Skin degradation with use of tegaderm     [place] Thyroid [Thyroid] Diarrhea     diarrhea       Problem List  Principal Problem:    Pleural effusion (POA: Yes)  Active Problems:    Acquired hypothyroidism (POA: Yes)    Acute respiratory failure with hypoxia (HCC) (POA: Yes)    Primary hypertension (POA: Yes)    History of pulmonary embolism (POA: Yes)  Resolved Problems:    * No resolved hospital problems. *       Subjective  Review of Systems   Constitutional:  Positive for malaise/fatigue. Negative for chills, fever and weight loss.   Respiratory:  Positive for shortness of breath. Negative for cough and sputum production.    Cardiovascular:  Negative for chest pain.   Gastrointestinal:  Negative for abdominal pain, constipation, diarrhea, nausea and vomiting.         Objective  Temp:  [36.2 °C (97.2 °F)-36.9 °C (98.5 °F)] 36.7 °C (98.1  °F)  Pulse:  [58-91] 65  Resp:  [16-21] 21  BP: (114-148)/(50-80) 127/61  SpO2:  [87 %-96 %] 96 %      Physical Exam  Vitals reviewed.   Constitutional:       General: She is not in acute distress.     Appearance: Normal appearance. She is ill-appearing.   HENT:      Head: Normocephalic and atraumatic.      Nose: Nose normal.      Mouth/Throat:      Pharynx: Oropharynx is clear.   Eyes:      Conjunctiva/sclera: Conjunctivae normal.   Cardiovascular:      Rate and Rhythm: Normal rate.   Pulmonary:      Effort: Pulmonary effort is normal. No respiratory distress.      Comments: No supplemental O2 used in ED. On 12/5/24 used 0.5 LPM  Abdominal:      General: There is no distension.      Palpations: Abdomen is soft.      Tenderness: There is no abdominal tenderness. There is no guarding.   Skin:     General: Skin is warm.      Coloration: Skin is not jaundiced.   Neurological:      Mental Status: She is alert and oriented to person, place, and time.   Psychiatric:         Mood and Affect: Mood is anxious.         Behavior: Behavior normal.        Fluids    Intake/Output Summary (Last 24 hours) at 12/5/2024 1139  Last data filed at 12/5/2024 0900  Gross per 24 hour   Intake 480 ml   Output --   Net 480 ml         Labs  Lab Results   Component Value Date/Time    SODIUM 132 (L) 12/05/2024 01:24 AM    POTASSIUM 3.6 12/05/2024 01:24 AM    CHLORIDE 100 12/05/2024 01:24 AM    CO2 24 12/05/2024 01:24 AM    GLUCOSE 118 (H) 12/05/2024 01:24 AM    BUN 11 12/05/2024 01:24 AM    CREATININE 0.65 12/05/2024 01:24 AM         Lab Results   Component Value Date/Time    PROTHROMBTM 68.3 (HH) 12/05/2024 01:24 AM    INR 8.07 (HH) 12/05/2024 01:24 AM         Lab Results   Component Value Date/Time    WBC 12.6 (H) 12/05/2024 01:24 AM    RBC 4.65 12/05/2024 01:24 AM    HEMOGLOBIN 13.1 12/05/2024 01:24 AM    HEMATOCRIT 40.1 12/05/2024 01:24 AM    MCV 86.2 12/05/2024 01:24 AM    MCH 28.2 12/05/2024 01:24 AM    MCHC 32.7 12/05/2024 01:24 AM     MPV 9.2 12/05/2024 01:24 AM    NEUTSPOLYS 86.10 (H) 12/04/2024 11:52 AM    LYMPHOCYTES 6.10 (L) 12/04/2024 11:52 AM    MONOCYTES 6.90 12/04/2024 11:52 AM    EOSINOPHILS 0.00 12/04/2024 11:52 AM    BASOPHILS 0.00 12/04/2024 11:52 AM    ANISOCYTOSIS 1+ 08/15/2024 03:30 AM         Recent Labs     11/30/24  1800 12/02/24  1438 12/04/24  1152 12/05/24  0124   ASTSGOT 15  --  24  --    ALTSGPT 10  --  9  --    TBILIRUBIN 0.9  --  1.0  --    GLOBULIN 4.1*  --  4.4*  --    INR 4.95* 6.07* 7.09* 8.07*      Imaging  CXR (12/4/24)  IMPRESSION:  1. Stable large left pleural effusion, obscuring the mid and lower portions of the left hemithorax.  2. No acute findings in the interval.    Assessment and Plan  Patient is a 79F admitted on 12/4/24 for concern worsening SOB. Patient has had recurring episodes of pleural effusions which have been drained, and abdominal fluid collections - following an earlier distal pancreatectomy and splenectomy done earlier this year. CXR on 12/4/24 noted recurrent left pleural effusion. Supra therapeutic INR at 8.07    Left pleural effusion, recurring s/p prior thoracentesis  - Referral Dr Saul - tentative plan to see today  - IR thoracentesis - once safe INR  - NPO as appropriate ahead of IR procedure    2. Elevated INR 8.07, recently started on warfarin  - hold warfarin, no vitamin K per 2018 JACOBO guidelines  - serial INR checks  - patient will need adjustment of anticoagulation once discharged    The patient case discussed with Dr Espinoza

## 2024-12-05 NOTE — PROGRESS NOTES
Wendy from Lab called with critical result of INR of 8.07 and PT of 68.3 at 0204. Critical lab result read back to Wendy.   Dr. Nogueira notified of critical lab result at 0204.  Critical lab result read back by Dr. Nogueira.

## 2024-12-05 NOTE — PROGRESS NOTES
4 Eyes Skin Assessment Completed by NURIS Aguiar and NURIS Romero.    Head WDL  Ears WDL  Nose WDL  Mouth WDL  Neck WDL  Breast/Chest WDL  Shoulder Blades WDL  Spine WDL  (R) Arm/Elbow/Hand WDL  (L) Arm/Elbow/Hand WDL  Abdomen Scar  Groin pink R side  Scrotum/Coccyx/Buttocks Redness and Blanching  (R) Leg WDL  (L) Leg WDL  (R) Heel/Foot/Toe WDL  (L) Heel/Foot/Toe Redness and Blanching          Devices In Places Nasal Cannula      Interventions In Place Gray Ear Foams    Possible Skin Injury No    Pictures Uploaded Into Epic Yes  Wound Consult Placed N/A  RN Wound Prevention Protocol Ordered Yes

## 2024-12-05 NOTE — PROGRESS NOTES
Received report from day shift RN and assumed pt care at 1900. Patient assessment completed. Patient is A&Ox4 and on 1L via Ncv  vbbgb. Patient denies pain at this time. Skin check completed. Bed locked and in the lowest position. Call light within reach. Plan of care discussed and Patient expresses no further needs at this time.

## 2024-12-05 NOTE — ASSESSMENT & PLAN NOTE
Large left-sided recurrent pleural effusion  Fluid analysis has always been negative per patient  INR 8, given vitamin K; no signs of bleeding  INR improved with vitamin K subtherapeutic 1.28; holding home warfarin  Was on lovenox BID pending pleurodesis  Lovenox held yesterday, pleurodesis today 12/9  Appreciate surgery recommendations and plan of care post op  Status post pleurodesis 12/9

## 2024-12-06 LAB
ANION GAP SERPL CALC-SCNC: 10 MMOL/L (ref 7–16)
BUN SERPL-MCNC: 14 MG/DL (ref 8–22)
CALCIUM SERPL-MCNC: 8.1 MG/DL (ref 8.5–10.5)
CHLORIDE SERPL-SCNC: 101 MMOL/L (ref 96–112)
CO2 SERPL-SCNC: 25 MMOL/L (ref 20–33)
CREAT SERPL-MCNC: 0.74 MG/DL (ref 0.5–1.4)
ERYTHROCYTE [DISTWIDTH] IN BLOOD BY AUTOMATED COUNT: 48.3 FL (ref 35.9–50)
GFR SERPLBLD CREATININE-BSD FMLA CKD-EPI: 83 ML/MIN/1.73 M 2
GLUCOSE SERPL-MCNC: 117 MG/DL (ref 65–99)
HCT VFR BLD AUTO: 40.9 % (ref 37–47)
HGB BLD-MCNC: 13.3 G/DL (ref 12–16)
INR PPP: 1.28 (ref 0.87–1.13)
MCH RBC QN AUTO: 28.3 PG (ref 27–33)
MCHC RBC AUTO-ENTMCNC: 32.5 G/DL (ref 32.2–35.5)
MCV RBC AUTO: 87 FL (ref 81.4–97.8)
PLATELET # BLD AUTO: 651 K/UL (ref 164–446)
PMV BLD AUTO: 9.5 FL (ref 9–12.9)
POTASSIUM SERPL-SCNC: 3.8 MMOL/L (ref 3.6–5.5)
PROTHROMBIN TIME: 16 SEC (ref 12–14.6)
RBC # BLD AUTO: 4.7 M/UL (ref 4.2–5.4)
SODIUM SERPL-SCNC: 136 MMOL/L (ref 135–145)
WBC # BLD AUTO: 13.1 K/UL (ref 4.8–10.8)

## 2024-12-06 PROCEDURE — A9270 NON-COVERED ITEM OR SERVICE: HCPCS | Performed by: INTERNAL MEDICINE

## 2024-12-06 PROCEDURE — A9270 NON-COVERED ITEM OR SERVICE: HCPCS | Performed by: STUDENT IN AN ORGANIZED HEALTH CARE EDUCATION/TRAINING PROGRAM

## 2024-12-06 PROCEDURE — 36415 COLL VENOUS BLD VENIPUNCTURE: CPT

## 2024-12-06 PROCEDURE — 99232 SBSQ HOSP IP/OBS MODERATE 35: CPT | Performed by: STUDENT IN AN ORGANIZED HEALTH CARE EDUCATION/TRAINING PROGRAM

## 2024-12-06 PROCEDURE — 80048 BASIC METABOLIC PNL TOTAL CA: CPT

## 2024-12-06 PROCEDURE — 85610 PROTHROMBIN TIME: CPT

## 2024-12-06 PROCEDURE — 700102 HCHG RX REV CODE 250 W/ 637 OVERRIDE(OP): Performed by: STUDENT IN AN ORGANIZED HEALTH CARE EDUCATION/TRAINING PROGRAM

## 2024-12-06 PROCEDURE — 700102 HCHG RX REV CODE 250 W/ 637 OVERRIDE(OP): Performed by: INTERNAL MEDICINE

## 2024-12-06 PROCEDURE — 85027 COMPLETE CBC AUTOMATED: CPT

## 2024-12-06 PROCEDURE — 770006 HCHG ROOM/CARE - MED/SURG/GYN SEMI*

## 2024-12-06 PROCEDURE — 700111 HCHG RX REV CODE 636 W/ 250 OVERRIDE (IP): Performed by: STUDENT IN AN ORGANIZED HEALTH CARE EDUCATION/TRAINING PROGRAM

## 2024-12-06 PROCEDURE — 700111 HCHG RX REV CODE 636 W/ 250 OVERRIDE (IP): Performed by: INTERNAL MEDICINE

## 2024-12-06 RX ORDER — ENOXAPARIN SODIUM 100 MG/ML
60 INJECTION SUBCUTANEOUS EVERY 12 HOURS
Status: DISCONTINUED | OUTPATIENT
Start: 2024-12-06 | End: 2024-12-12

## 2024-12-06 RX ORDER — AMOXICILLIN 250 MG
2 CAPSULE ORAL EVERY EVENING
Status: DISCONTINUED | OUTPATIENT
Start: 2024-12-06 | End: 2024-12-07

## 2024-12-06 RX ORDER — POLYETHYLENE GLYCOL 3350 17 G/17G
1 POWDER, FOR SOLUTION ORAL
Status: DISCONTINUED | OUTPATIENT
Start: 2024-12-06 | End: 2024-12-07

## 2024-12-06 RX ADMIN — SENNOSIDES AND DOCUSATE SODIUM 2 TABLET: 50; 8.6 TABLET ORAL at 17:38

## 2024-12-06 RX ADMIN — LIOTHYRONINE SODIUM 5 MCG: 5 TABLET ORAL at 05:02

## 2024-12-06 RX ADMIN — ONDANSETRON 4 MG: 4 TABLET, ORALLY DISINTEGRATING ORAL at 13:57

## 2024-12-06 RX ADMIN — AMLODIPINE BESYLATE 5 MG: 10 TABLET ORAL at 05:02

## 2024-12-06 RX ADMIN — ENOXAPARIN SODIUM 60 MG: 100 INJECTION SUBCUTANEOUS at 17:38

## 2024-12-06 RX ADMIN — LEVOTHYROXINE SODIUM 100 MCG: 0.1 TABLET ORAL at 05:02

## 2024-12-06 RX ADMIN — ENOXAPARIN SODIUM 60 MG: 100 INJECTION SUBCUTANEOUS at 09:27

## 2024-12-06 ASSESSMENT — PAIN DESCRIPTION - PAIN TYPE
TYPE: ACUTE PAIN

## 2024-12-06 ASSESSMENT — ENCOUNTER SYMPTOMS
CHILLS: 0
BACK PAIN: 0
EYE PAIN: 0
SENSORY CHANGE: 0
VOMITING: 0
INSOMNIA: 0
DIZZINESS: 0
COUGH: 1
FOCAL WEAKNESS: 0
BLURRED VISION: 0
FEVER: 0
NAUSEA: 0
LOSS OF CONSCIOUSNESS: 0
ABDOMINAL PAIN: 0
PALPITATIONS: 0
SHORTNESS OF BREATH: 1
HEADACHES: 0

## 2024-12-06 ASSESSMENT — LIFESTYLE VARIABLES: SUBSTANCE_ABUSE: 0

## 2024-12-06 NOTE — PROGRESS NOTES
Assumed care of patient at 1900. Received bedside report from day RN Yennifer. Patient A&Ox4, on 1L NC, connected to , Reporting a pain level of 0/10. Call light within reach, belongings within reach, fall precautions in place, bed in lowest position. Patient does not have any other needs at this time.     POC was discussed with patient. All questions were answered. Patient verbalized understanding.

## 2024-12-06 NOTE — CARE PLAN
The patient is Stable - Low risk of patient condition declining or worsening    Shift Goals  Clinical Goals: Pt to be weaned from O2 as tolerated. Pt's SpO2 to remain above 90%.  Patient Goals: Rest, get off O2  Family Goals: HAYDEE    Progress made toward(s) clinical / shift goals:    Pt remains on 1L NC during shift to maintain SpO2 >90%. SpO2 monitored with . Pt declines shortness of breath at this time. Pt able to rest with no complaints of pain.    Problem: Knowledge Deficit - Standard  Goal: Patient and family/care givers will demonstrate understanding of plan of care, disease process/condition, diagnostic tests and medications  Outcome: Progressing     Problem: Respiratory  Goal: Patient will achieve/maintain optimum respiratory ventilation and gas exchange  Outcome: Progressing       Patient is not progressing towards the following goals:

## 2024-12-06 NOTE — PROGRESS NOTES
[facility] Medicine Daily Progress Note    Date of Service  12/6/2024    Chief Complaint  Paige Gudino is a 79 y.o. female admitted 12/4/2024 with dyspnea.    Hospital Course  Paige Gudino is a 79 y.o. female who presented 12/4/2024 with shortness of breath.  Patient states has been ongoing for approximately 5 days, intermittent however today it worsened so she presented to the emergency department.  She does complain of some mild chills but denies any fever.  No cough.  Patient was noted to be satting 87% on room air, improved with low-flow oxygen.    Per chart review, patient with history of partial pancreatectomy for mass-benign, complicated by abdominal fluid collection s/p drainage, recurrent left pleural effusion s/p thoracenteses. Patient follows with hepatobiliary/surgical oncology team as outpatient for management.   Patient admitted for evaluation of recurrent pleural effusion and acute hypoxic respiratory failure.    Interval Problem Update  No acute events overnight.  On 1L oxygen, monitor oxygen needs.  Patient with some labored breathing today, encouraged supportive care medications for symptoms.  Plan for OR on Monday for pleurodesis per thoracic surgery team.  Defer thoracentesis/chest tube unless oxygen levels become much worse.  INR 1.28 today. Starting lovenox BID for hx DVT/PE. Can hold day prior to OR.  Bowel regimen ordered.    I have discussed this patient's plan of care and discharge plan at IDT rounds today with Case Management, Nursing, Nursing leadership, and other members of the IDT team.    Consultants/Specialty  Thoracic surgery    Code Status  Full Code    Disposition  Medically Cleared  I have placed the appropriate orders for post-discharge needs.    Review of Systems  Review of Systems   Constitutional:  Negative for chills and fever.   Eyes:  Negative for blurred vision and pain.   Respiratory:  Positive for cough and shortness of breath.    Cardiovascular:   Negative for chest pain, palpitations and leg swelling.   Gastrointestinal:  Negative for abdominal pain, nausea and vomiting.   Genitourinary:  Negative for dysuria and urgency.   Musculoskeletal:  Negative for back pain.   Skin:  Negative for itching and rash.   Neurological:  Negative for dizziness, sensory change, focal weakness, loss of consciousness and headaches.   Psychiatric/Behavioral:  Negative for substance abuse. The patient does not have insomnia.         Physical Exam  Temp:  [36.2 °C (97.2 °F)-37.2 °C (99 °F)] 36.6 °C (97.8 °F)  Pulse:  [64-85] 77  Resp:  [14-24] 14  BP: (110-136)/(63-69) 127/69  SpO2:  [87 %-95 %] 94 %    Physical Exam  Vitals reviewed.   Constitutional:       General: She is not in acute distress.     Appearance: She is not diaphoretic.   HENT:      Head: Normocephalic and atraumatic.      Right Ear: External ear normal.      Left Ear: External ear normal.      Nose: Nose normal. No congestion.      Mouth/Throat:      Pharynx: No oropharyngeal exudate or posterior oropharyngeal erythema.   Eyes:      Extraocular Movements: Extraocular movements intact.      Pupils: Pupils are equal, round, and reactive to light.   Cardiovascular:      Rate and Rhythm: Normal rate and regular rhythm.   Pulmonary:      Effort: Pulmonary effort is normal. No respiratory distress.      Breath sounds: Normal breath sounds. No wheezing.      Comments: Decreased breath sounds left mid upper lung to below  Abdominal:      General: Bowel sounds are normal. There is no distension.      Palpations: Abdomen is soft.      Tenderness: There is no abdominal tenderness. There is no guarding.   Musculoskeletal:         General: No swelling. Normal range of motion.      Cervical back: Normal range of motion and neck supple.      Right lower leg: No edema.      Left lower leg: No edema.   Skin:     General: Skin is warm and dry.   Neurological:      General: No focal deficit present.      Mental Status: She is alert  and oriented to person, place, and time.      Cranial Nerves: No cranial nerve deficit.      Sensory: No sensory deficit.      Motor: No weakness.   Psychiatric:         Mood and Affect: Mood normal.         Behavior: Behavior normal.         Fluids  No intake or output data in the 24 hours ending 12/06/24 1534       Laboratory  Recent Labs     12/04/24  1152 12/05/24  0124 12/06/24  0132   WBC 18.0* 12.6* 13.1*   RBC 5.36 4.65 4.70   HEMOGLOBIN 15.2 13.1 13.3   HEMATOCRIT 46.7 40.1 40.9   MCV 87.1 86.2 87.0   MCH 28.4 28.2 28.3   MCHC 32.5 32.7 32.5   RDW 49.2 48.7 48.3   PLATELETCT 751* 628* 651*   MPV 9.1 9.2 9.5     Recent Labs     12/04/24  1152 12/05/24  0124 12/06/24  0132   SODIUM 137 132* 136   POTASSIUM 3.8 3.6 3.8   CHLORIDE 100 100 101   CO2 26 24 25   GLUCOSE 120* 118* 117*   BUN 11 11 14   CREATININE 0.74 0.65 0.74   CALCIUM 9.1 8.8 8.1*     Recent Labs     12/04/24  1152 12/05/24  0124 12/06/24  0132   INR 7.09* 8.07* 1.28*               Imaging  DX-CHEST-PORTABLE (1 VIEW)   Final Result      1. Stable large left pleural effusion, obscuring the mid and lower portions of the left hemithorax.   2. No acute findings in the interval.           Assessment/Plan  * Pleural effusion- (present on admission)  Assessment & Plan  Large left-sided recurrent pleural effusion  Fluid analysis is always been negative per patient  INR 8, given vitamin K; no signs of bleeding but plan for surgery likely Monday  INR improved with vitamin K, now subtherapeutic 1.28; hold warfarin  Start lovenox BID until surgery  Plan for OR Monday for pleurodesis  Defer thoracentesis/chest tube unless oxygen requirements worsen    History of pulmonary embolism- (present on admission)  Assessment & Plan  Medication induced PE and bilateral DVTs  Patient remains on Coumadin, INR is supratherapeutic at 8  INR lowered with vitamin K, 1.28  Hold warfarin, start lovenox BID until night before OR   Surgery following, likely OR Monday for  pleurodesis    Primary hypertension- (present on admission)  Assessment & Plan  Continue home amlodipine  Start as needed labetalol  Adjust as needed    Acute respiratory failure with hypoxia (HCC)- (present on admission)  Assessment & Plan  Secondary to large left pleural effusion  She was satting 87% on room air  Improved on low-flow oxygen  Continuous pulse ox monitoring  Wean oxygen as able    Acquired hypothyroidism- (present on admission)  Assessment & Plan  Continue home Synthroid at 100 mcg daily  Continue home Cytomel at 2.5-5  Most recent TSH was approximately 3 months ago was normal at 4.57         VTE prophylaxis: VTE Selection    I have performed a physical exam and reviewed and updated ROS and Plan today (12/6/2024). In review of yesterday's note (12/5/2024), there are no changes except as documented above.

## 2024-12-06 NOTE — PROGRESS NOTES
Thoracic Surgery Update    Patient seen an examined. Will plan for pleurodesis on Monday at 3pm. No need for chest tube or thoracentesis unless patients oxygen requirements become significantly worse.    Glenn Saul MD  Thoracic & General Surgeon  [facility]  [phone]

## 2024-12-06 NOTE — DIETARY
"Nutrition Services: Initial Assessment     Day of admit. Paige Gudino is 79 y.o., female with admitting DX of Pleural effusion.    Consult Received for...: MST    Current Hospital Problems List:    Principal Problem:    Pleural effusion (POA: Yes)  Active Problems:    Acquired hypothyroidism (POA: Yes)    Acute respiratory failure with hypoxia (HCC) (POA: Yes)    Primary hypertension (POA: Yes)    History of pulmonary embolism (POA: Yes)  Resolved Problems:    * No resolved hospital problems. *    Nutrition Assessment:      Height: 162.6 cm (5' 4\")  Weight: 59.9 kg (132 lb 0.9 oz)  Weight taken via: Bed Scale  BMI Calculated: 22.67  BMI Classification: WNL     Weight Readings from Last 5 encounters:   Wt Readings from Last 15 Encounters:   12/04/24 59.9 kg (132 lb 0.9 oz)   11/30/24 60 kg (132 lb 4.4 oz)   11/20/24 61.6 kg (135 lb 12.9 oz)   11/01/24 61.7 kg (136 lb)   10/22/24 61.6 kg (135 lb 11.2 oz)   10/08/24 61.7 kg (136 lb)   10/08/24 61.2 kg (135 lb)   09/20/24 72.5 kg (159 lb 13.3 oz)   09/17/24 64.9 kg (143 lb)   09/11/24 65.3 kg (144 lb)   09/04/24 65.3 kg (144 lb)   08/28/24 68 kg (150 lb)   08/26/24 68 kg (150 lb)   08/23/24 69.8 kg (153 lb 14.1 oz)   08/05/24 70.4 kg (155 lb 3.3 oz)     Objective:   Pertinent Labs: Reviewed, no concerns  Pertinent Meds: Reviewed, no concerns  Skin/Wounds:  No pressure injuries per chart review.  Food Allergies: None noted  Last BM:     12/03/24 (PTA)   Pleurodesis planned for Monday.    Current Diet Order/Intake:   Regular diet. Recorded PO intake breakfast 12/5 = %    Subjective:   Spoke with pt at bedside. She reports she lost weight since surgery in August. She has been overall eating poorly, though she reports this is variable and sometimes eats well. She states she has fluid accumulations in her abdomen which has affected her appetite.  Currently pt states some poor intake due to bland food provided. Provided menu and discussed food preferences. " Obtained menu orders for lunch and dinner today.    Nutrition Focused Physical Exam (NFPE)  Weight Loss: Per chart review, pt has lost 14.9% x 4 months which is severe.  Muscle Mass:  No significant loss noted.  Subcutaneous Fat:  No significant loss noted.  Fluid Accumulation: No edema noted  Reduced  Strength: N/A in acute care setting.    Nutrition Diagnosis:      Inadequate Oral Intake related to poor appetite vs food preferences as evidenced by pt reporting not eating as much due to bland food.      Based on RD assessment at this time, Patient does not meet criteria in congruence with ASPEN/[employer]y guidelines for malnutrition    Nutrition Interventions:      1. Regular diet. Menu provided. Food preferences and meal orders obtained.  2. Encourage PO intake >50%.  3. Patient aware of active plan of care as appropriate.     Nutrition Monitoring and Evaluation:      Monitor nutrition POC, goal for >50% intake from meals and supplements.  Additional fluids per MD/DO  Monitor vital signs pertinent to nutrition.    RD following and will provide updated recommendations as indicated.      Flores Campos R.D.                                         [employer]/AND CRITERIA FOR MALNUTRITION

## 2024-12-07 ENCOUNTER — ANESTHESIA EVENT (OUTPATIENT)
Dept: SURGERY | Facility: MEDICAL CENTER | Age: 79
End: 2024-12-07
Payer: COMMERCIAL

## 2024-12-07 PROCEDURE — 700102 HCHG RX REV CODE 250 W/ 637 OVERRIDE(OP): Performed by: INTERNAL MEDICINE

## 2024-12-07 PROCEDURE — 700111 HCHG RX REV CODE 636 W/ 250 OVERRIDE (IP): Performed by: STUDENT IN AN ORGANIZED HEALTH CARE EDUCATION/TRAINING PROGRAM

## 2024-12-07 PROCEDURE — 770006 HCHG ROOM/CARE - MED/SURG/GYN SEMI*

## 2024-12-07 PROCEDURE — 700102 HCHG RX REV CODE 250 W/ 637 OVERRIDE(OP): Performed by: STUDENT IN AN ORGANIZED HEALTH CARE EDUCATION/TRAINING PROGRAM

## 2024-12-07 PROCEDURE — 99232 SBSQ HOSP IP/OBS MODERATE 35: CPT | Performed by: STUDENT IN AN ORGANIZED HEALTH CARE EDUCATION/TRAINING PROGRAM

## 2024-12-07 PROCEDURE — A9270 NON-COVERED ITEM OR SERVICE: HCPCS | Performed by: INTERNAL MEDICINE

## 2024-12-07 PROCEDURE — A9270 NON-COVERED ITEM OR SERVICE: HCPCS | Performed by: STUDENT IN AN ORGANIZED HEALTH CARE EDUCATION/TRAINING PROGRAM

## 2024-12-07 RX ORDER — AMOXICILLIN 250 MG
2 CAPSULE ORAL 2 TIMES DAILY
Status: DISCONTINUED | OUTPATIENT
Start: 2024-12-07 | End: 2024-12-12 | Stop reason: HOSPADM

## 2024-12-07 RX ORDER — POLYETHYLENE GLYCOL 3350 17 G/17G
1 POWDER, FOR SOLUTION ORAL
Status: DISCONTINUED | OUTPATIENT
Start: 2024-12-07 | End: 2024-12-12 | Stop reason: HOSPADM

## 2024-12-07 RX ADMIN — ENOXAPARIN SODIUM 60 MG: 100 INJECTION SUBCUTANEOUS at 17:27

## 2024-12-07 RX ADMIN — SENNOSIDES AND DOCUSATE SODIUM 2 TABLET: 50; 8.6 TABLET ORAL at 17:27

## 2024-12-07 RX ADMIN — ENOXAPARIN SODIUM 60 MG: 100 INJECTION SUBCUTANEOUS at 05:54

## 2024-12-07 RX ADMIN — AMLODIPINE BESYLATE 5 MG: 10 TABLET ORAL at 05:55

## 2024-12-07 RX ADMIN — LIOTHYRONINE SODIUM 2.5 MCG: 5 TABLET ORAL at 05:55

## 2024-12-07 RX ADMIN — LEVOTHYROXINE SODIUM 100 MCG: 0.1 TABLET ORAL at 05:55

## 2024-12-07 ASSESSMENT — ENCOUNTER SYMPTOMS
SHORTNESS OF BREATH: 1
FEVER: 0
LOSS OF CONSCIOUSNESS: 0
INSOMNIA: 0
NAUSEA: 0
SENSORY CHANGE: 0
HEADACHES: 0
VOMITING: 0
CHILLS: 0
BLURRED VISION: 0
COUGH: 1
EYE PAIN: 0
ABDOMINAL PAIN: 0
PALPITATIONS: 0
DIZZINESS: 0
FOCAL WEAKNESS: 0
BACK PAIN: 0

## 2024-12-07 ASSESSMENT — PAIN DESCRIPTION - PAIN TYPE
TYPE: ACUTE PAIN

## 2024-12-07 ASSESSMENT — LIFESTYLE VARIABLES: SUBSTANCE_ABUSE: 0

## 2024-12-07 NOTE — CARE PLAN
The patient is Stable - Low risk of patient condition declining or worsening    Shift Goals  Clinical Goals: Pt's SpO2 to remain above 90%. Pt to be weaned from O2 as tolerated.  Patient Goals: Comfort, have procedure  Family Goals: HAYDEE    Progress made toward(s) clinical / shift goals:    Pt remains on 2L NC during shift to maintain SpO2 >90%. SpO2 monitored with . Pt declines shortness of breath at this time. Pt able to rest with no complaints of pain. Pt ambulatory to bathroom without any need for assistance.     Problem: Knowledge Deficit - Standard  Goal: Patient and family/care givers will demonstrate understanding of plan of care, disease process/condition, diagnostic tests and medications  Outcome: Progressing     Problem: Respiratory  Goal: Patient will achieve/maintain optimum respiratory ventilation and gas exchange  Outcome: Progressing       Patient is not progressing towards the following goals:

## 2024-12-07 NOTE — PROGRESS NOTES
Report received from NOC RN and assumed patient care at 0700. Patient is A&Ox 4 and on 2L nasal canula. Patient reporting a pain level of 1/10. Patient reporting mild abdominal discomfort due to constipation. Educated patient on ambulation and stool softeners.     Call light within reach and bed in lowest position. Reinforced the need to call for assistance. Plan of care discussed and patient does not have any further needs at this time.

## 2024-12-07 NOTE — PROGRESS NOTES
Assumed care of patient at 1900. Received bedside report from day RN Yennifer. Patient A&Ox4, on 2L NC, connected to , declines shortness of breath at this time. Reporting a pain level of 0/10. Call light within reach, belongings within reach, fall precautions in place, bed in lowest position. Patient does not have any other needs at this time.      POC was discussed with patient. All questions were answered. Patient verbalized understanding.

## 2024-12-07 NOTE — PROGRESS NOTES
[facility] Medicine Daily Progress Note    Date of Service  12/7/2024    Chief Complaint  Paige Gudino is a 79 y.o. female admitted 12/4/2024 with dyspnea.    Hospital Course  Paige Gudino is a 79 y.o. female who presented 12/4/2024 with shortness of breath.  Patient states has been ongoing for approximately 5 days, intermittent however today it worsened so she presented to the emergency department.  She does complain of some mild chills but denies any fever.  No cough.  Patient was noted to be satting 87% on room air, improved with low-flow oxygen.    Per chart review, patient with history of partial pancreatectomy for mass-benign, complicated by abdominal fluid collection s/p drainage, recurrent left pleural effusion s/p thoracenteses. Patient follows with hepatobiliary/surgical oncology team as outpatient for management.   Patient admitted for evaluation of recurrent pleural effusion and acute hypoxic respiratory failure.    Interval Problem Update  No acute events overnight.  On 1-2L oxygen, monitor oxygen needs.  Patient reports breathing is stable. Reports some abdominal distention, was able to have two bowel movements this morning.  Bowel regimen adjusted to docusate senna BID.  Plan for OR on Monday for pleurodesis.  Will stop lovenox BID tomorrow in anticipation of OR Monday.      I have discussed this patient's plan of care and discharge plan at IDT rounds today with Case Management, Nursing, Nursing leadership, and other members of the IDT team.    Consultants/Specialty  Thoracic surgery    Code Status  Full Code    Disposition  Medically Cleared  I have placed the appropriate orders for post-discharge needs.    Review of Systems  Review of Systems   Constitutional:  Negative for chills and fever.   Eyes:  Negative for blurred vision and pain.   Respiratory:  Positive for cough and shortness of breath.    Cardiovascular:  Negative for chest pain, palpitations and leg swelling.    Gastrointestinal:  Negative for abdominal pain, nausea and vomiting.   Genitourinary:  Negative for dysuria and urgency.   Musculoskeletal:  Negative for back pain.   Skin:  Negative for itching and rash.   Neurological:  Negative for dizziness, sensory change, focal weakness, loss of consciousness and headaches.   Psychiatric/Behavioral:  Negative for substance abuse. The patient does not have insomnia.         Physical Exam  Temp:  [36.1 °C (97 °F)-37.3 °C (99.1 °F)] 36.4 °C (97.5 °F)  Pulse:  [60-85] 60  Resp:  [20-25] 20  BP: (114-126)/(65-77) 119/66  SpO2:  [94 %-95 %] 94 %    Physical Exam  Vitals reviewed.   Constitutional:       General: She is not in acute distress.     Appearance: She is not diaphoretic.   HENT:      Head: Normocephalic and atraumatic.      Right Ear: External ear normal.      Left Ear: External ear normal.      Nose: Nose normal. No congestion.      Mouth/Throat:      Pharynx: No oropharyngeal exudate or posterior oropharyngeal erythema.   Eyes:      Extraocular Movements: Extraocular movements intact.      Pupils: Pupils are equal, round, and reactive to light.   Cardiovascular:      Rate and Rhythm: Normal rate and regular rhythm.   Pulmonary:      Effort: Pulmonary effort is normal. No respiratory distress.      Breath sounds: Normal breath sounds. No wheezing.      Comments: Decreased breath sounds left mid upper lung to below  Abdominal:      General: Bowel sounds are normal. There is no distension.      Palpations: Abdomen is soft.      Tenderness: There is no abdominal tenderness. There is no guarding.   Musculoskeletal:         General: No swelling. Normal range of motion.      Cervical back: Normal range of motion and neck supple.      Right lower leg: No edema.      Left lower leg: No edema.   Skin:     General: Skin is warm and dry.   Neurological:      General: No focal deficit present.      Mental Status: She is alert and oriented to person, place, and time.      Cranial  Nerves: No cranial nerve deficit.      Sensory: No sensory deficit.      Motor: No weakness.   Psychiatric:         Mood and Affect: Mood normal.         Behavior: Behavior normal.         Fluids    Intake/Output Summary (Last 24 hours) at 12/7/2024 1441  Last data filed at 12/7/2024 1300  Gross per 24 hour   Intake 550 ml   Output --   Net 550 ml          Laboratory  Recent Labs     12/05/24  0124 12/06/24  0132   WBC 12.6* 13.1*   RBC 4.65 4.70   HEMOGLOBIN 13.1 13.3   HEMATOCRIT 40.1 40.9   MCV 86.2 87.0   MCH 28.2 28.3   MCHC 32.7 32.5   RDW 48.7 48.3   PLATELETCT 628* 651*   MPV 9.2 9.5     Recent Labs     12/05/24  0124 12/06/24  0132   SODIUM 132* 136   POTASSIUM 3.6 3.8   CHLORIDE 100 101   CO2 24 25   GLUCOSE 118* 117*   BUN 11 14   CREATININE 0.65 0.74   CALCIUM 8.8 8.1*     Recent Labs     12/05/24  0124 12/06/24  0132   INR 8.07* 1.28*               Imaging  DX-CHEST-PORTABLE (1 VIEW)   Final Result      1. Stable large left pleural effusion, obscuring the mid and lower portions of the left hemithorax.   2. No acute findings in the interval.           Assessment/Plan  * Pleural effusion- (present on admission)  Assessment & Plan  Large left-sided recurrent pleural effusion  Fluid analysis is always been negative per patient  INR 8, given vitamin K; no signs of bleeding but plan for surgery likely Monday  INR improved with vitamin K, now subtherapeutic 1.28; hold warfarin  Start lovenox BID until surgery  Plan for OR Monday for pleurodesis  Defer thoracentesis/chest tube unless oxygen requirements worsen    History of pulmonary embolism- (present on admission)  Assessment & Plan  Medication induced PE and bilateral DVTs  Patient remains on Coumadin, INR is supratherapeutic at 8  INR lowered with vitamin K, 1.28  Hold warfarin, start lovenox BID until night before OR   Surgery following, likely OR Monday for pleurodesis    Primary hypertension- (present on admission)  Assessment & Plan  Continue home  amlodipine  Start as needed labetalol  Adjust as needed    Acute respiratory failure with hypoxia (HCC)- (present on admission)  Assessment & Plan  Secondary to large left pleural effusion  She was satting 87% on room air  Improved on low-flow oxygen  Continuous pulse ox monitoring  Wean oxygen as able    Acquired hypothyroidism- (present on admission)  Assessment & Plan  Continue home Synthroid at 100 mcg daily  Continue home Cytomel at 2.5-5  Most recent TSH was approximately 3 months ago was normal at 4.57         VTE prophylaxis: VTE Selection    I have performed a physical exam and reviewed and updated ROS and Plan today (12/7/2024). In review of yesterday's note (12/6/2024), there are no changes except as documented above.

## 2024-12-07 NOTE — CARE PLAN
The patient is Stable - Low risk of patient condition declining or worsening    Shift Goals  Clinical Goals: pt's SpO2 will remaon above 90%. Pt will titrate O2 needs to 0-0.5  Patient Goals: tranfer to private room  Family Goals: HAYDEE    Progress made toward(s) clinical / shift goals:    Spo2 remains above 90    Patient is not progressing towards the following goals:

## 2024-12-07 NOTE — CARE PLAN
The patient is Stable - Low risk of patient condition declining or worsening    Shift Goals  Clinical Goals: moniotor SpO2, increase BM, safety  Patient Goals: comfort, procedure, rest  Family Goals: rest, comfort, procedure    Progress made toward(s) clinical / shift goals:        Problem: Knowledge Deficit - Standard  Goal: Patient and family/care givers will demonstrate understanding of plan of care, disease process/condition, diagnostic tests and medications  Outcome: Progressing  Note: Patient updated on plan of care by team members.     Problem: Respiratory  Goal: Patient will achieve/maintain optimum respiratory ventilation and gas exchange  Outcome: Progressing  Note: Patient maintains adequate oxygenation on 2L nasal canula, oxygen demand has not increased.       Patient is not progressing towards the following goals:

## 2024-12-08 PROCEDURE — 99232 SBSQ HOSP IP/OBS MODERATE 35: CPT | Performed by: STUDENT IN AN ORGANIZED HEALTH CARE EDUCATION/TRAINING PROGRAM

## 2024-12-08 PROCEDURE — 700102 HCHG RX REV CODE 250 W/ 637 OVERRIDE(OP): Performed by: INTERNAL MEDICINE

## 2024-12-08 PROCEDURE — A9270 NON-COVERED ITEM OR SERVICE: HCPCS | Performed by: INTERNAL MEDICINE

## 2024-12-08 PROCEDURE — 700111 HCHG RX REV CODE 636 W/ 250 OVERRIDE (IP): Performed by: STUDENT IN AN ORGANIZED HEALTH CARE EDUCATION/TRAINING PROGRAM

## 2024-12-08 PROCEDURE — 770006 HCHG ROOM/CARE - MED/SURG/GYN SEMI*

## 2024-12-08 RX ADMIN — ENOXAPARIN SODIUM 60 MG: 100 INJECTION SUBCUTANEOUS at 05:13

## 2024-12-08 RX ADMIN — LEVOTHYROXINE SODIUM 100 MCG: 0.1 TABLET ORAL at 05:13

## 2024-12-08 RX ADMIN — LIOTHYRONINE SODIUM 2.5 MCG: 5 TABLET ORAL at 05:13

## 2024-12-08 RX ADMIN — AMLODIPINE BESYLATE 5 MG: 10 TABLET ORAL at 05:13

## 2024-12-08 ASSESSMENT — ENCOUNTER SYMPTOMS
INSOMNIA: 0
ABDOMINAL PAIN: 0
BACK PAIN: 0
SHORTNESS OF BREATH: 1
SENSORY CHANGE: 0
BLURRED VISION: 0
NAUSEA: 0
LOSS OF CONSCIOUSNESS: 0
HEADACHES: 0
VOMITING: 0
COUGH: 1
FEVER: 0
PALPITATIONS: 0
CHILLS: 0
DIZZINESS: 0
EYE PAIN: 0
FOCAL WEAKNESS: 0

## 2024-12-08 ASSESSMENT — PAIN DESCRIPTION - PAIN TYPE
TYPE: ACUTE PAIN
TYPE: ACUTE PAIN

## 2024-12-08 ASSESSMENT — LIFESTYLE VARIABLES: SUBSTANCE_ABUSE: 0

## 2024-12-08 ASSESSMENT — FIBROSIS 4 INDEX: FIB4 SCORE: 0.96

## 2024-12-08 NOTE — CARE PLAN
The patient is Stable - Low risk of patient condition declining or worsening    Shift Goals  Clinical Goals: Patient will maintain adequate oxygen saturation above 92%.  Patient Goals: Patient will be able to sleep comfortably throughout the night.  Family Goals: rest, comfort, procedure    Progress made toward(s) clinical / shift goals:      Problem: Knowledge Deficit - Standard  Goal: Patient and family/care givers will demonstrate understanding of plan of care, disease process/condition, diagnostic tests and medications  Outcome: Progressing     Problem: Respiratory  Goal: Patient will achieve/maintain optimum respiratory ventilation and gas exchange  Outcome: Progressing       Patient is not progressing towards the following goals:

## 2024-12-08 NOTE — PROGRESS NOTES
0800: Assumed care of patient at 0700. Received report from night shift RN. Pt is A&O x 4, on 2L NC. Reporting a pain level of 0/10. Assessment completed and VSS. Bowel sounds are active in all 4 quadrants. Lung sounds are diminished in bases. Call light within reach and bed is locked and in lowest position. Reinforced the need to call for assistance. Plan of care discussed and patient does not have any further needs at this time.

## 2024-12-08 NOTE — PROGRESS NOTES
[facility] Medicine Daily Progress Note    Date of Service  12/8/2024    Chief Complaint  Paige Gudino is a 79 y.o. female admitted 12/4/2024 with dyspnea.    Hospital Course  Paige Gudino is a 79 y.o. female who presented 12/4/2024 with shortness of breath.  Patient states has been ongoing for approximately 5 days, intermittent however today it worsened so she presented to the emergency department.  She does complain of some mild chills but denies any fever.  No cough.  Patient was noted to be satting 87% on room air, improved with low-flow oxygen.    Per chart review, patient with history of partial pancreatectomy for mass-benign, complicated by abdominal fluid collection s/p drainage, recurrent left pleural effusion s/p thoracenteses. Patient follows with hepatobiliary/surgical oncology team as outpatient for management.   Patient admitted for evaluation of recurrent pleural effusion and acute hypoxic respiratory failure.    Interval Problem Update  No acute events overnight.  On 2L oxygen, wean as able.  NPO at midnight, plan for OR tomorrow for pleurodesis.  Stop lovenox today in anticipation of OR tomorrow. Resume anticoagulation post op as appropriate for hx DVT/PE.      I have discussed this patient's plan of care and discharge plan at IDT rounds today with Case Management, Nursing, Nursing leadership, and other members of the IDT team.    Consultants/Specialty  Thoracic surgery    Code Status  Full Code    Disposition  Medically Cleared  I have placed the appropriate orders for post-discharge needs.    Review of Systems  Review of Systems   Constitutional:  Negative for chills and fever.   Eyes:  Negative for blurred vision and pain.   Respiratory:  Positive for cough and shortness of breath.    Cardiovascular:  Negative for chest pain, palpitations and leg swelling.   Gastrointestinal:  Negative for abdominal pain, nausea and vomiting.   Genitourinary:  Negative for dysuria and  urgency.   Musculoskeletal:  Negative for back pain.   Skin:  Negative for itching and rash.   Neurological:  Negative for dizziness, sensory change, focal weakness, loss of consciousness and headaches.   Psychiatric/Behavioral:  Negative for substance abuse. The patient does not have insomnia.         Physical Exam  Temp:  [36.3 °C (97.4 °F)-37.4 °C (99.4 °F)] 37 °C (98.6 °F)  Pulse:  [70-90] 75  Resp:  [17-18] 17  BP: (113-132)/(67-77) 113/77  SpO2:  [94 %-97 %] 94 %    Physical Exam  Vitals reviewed.   Constitutional:       General: She is not in acute distress.     Appearance: She is not diaphoretic.   HENT:      Head: Normocephalic and atraumatic.      Right Ear: External ear normal.      Left Ear: External ear normal.      Nose: Nose normal. No congestion.      Mouth/Throat:      Pharynx: No oropharyngeal exudate or posterior oropharyngeal erythema.   Eyes:      Extraocular Movements: Extraocular movements intact.      Pupils: Pupils are equal, round, and reactive to light.   Cardiovascular:      Rate and Rhythm: Normal rate and regular rhythm.   Pulmonary:      Effort: Pulmonary effort is normal. No respiratory distress.      Breath sounds: Normal breath sounds. No wheezing.      Comments: Decreased breath sounds left mid upper lung to below  Abdominal:      General: Bowel sounds are normal. There is no distension.      Palpations: Abdomen is soft.      Tenderness: There is no abdominal tenderness. There is no guarding.   Musculoskeletal:         General: No swelling. Normal range of motion.      Cervical back: Normal range of motion and neck supple.      Right lower leg: No edema.      Left lower leg: No edema.   Skin:     General: Skin is warm and dry.   Neurological:      General: No focal deficit present.      Mental Status: She is alert and oriented to person, place, and time.      Cranial Nerves: No cranial nerve deficit.      Sensory: No sensory deficit.      Motor: No weakness.   Psychiatric:          Mood and Affect: Mood normal.         Behavior: Behavior normal.         Fluids    Intake/Output Summary (Last 24 hours) at 12/8/2024 1441  Last data filed at 12/8/2024 1300  Gross per 24 hour   Intake 240 ml   Output --   Net 240 ml          Laboratory  Recent Labs     12/06/24 0132   WBC 13.1*   RBC 4.70   HEMOGLOBIN 13.3   HEMATOCRIT 40.9   MCV 87.0   MCH 28.3   MCHC 32.5   RDW 48.3   PLATELETCT 651*   MPV 9.5     Recent Labs     12/06/24 0132   SODIUM 136   POTASSIUM 3.8   CHLORIDE 101   CO2 25   GLUCOSE 117*   BUN 14   CREATININE 0.74   CALCIUM 8.1*     Recent Labs     12/06/24 0132   INR 1.28*               Imaging  DX-CHEST-PORTABLE (1 VIEW)   Final Result      1. Stable large left pleural effusion, obscuring the mid and lower portions of the left hemithorax.   2. No acute findings in the interval.           Assessment/Plan  * Pleural effusion- (present on admission)  Assessment & Plan  Large left-sided recurrent pleural effusion  Fluid analysis is always been negative per patient  INR 8, given vitamin K; no signs of bleeding but plan for surgery likely Monday  INR improved with vitamin K, now subtherapeutic 1.28; hold warfarin  Start lovenox BID until surgery  NPO at midnight, plan for pleurodesis tomorrow per thoracic surgery  Hold lovenox BID today; resume anticoagulation post op as appropriate; patient normally on warfarin at home for hx DVT/PE; did not tolerate NOACs in the past    History of pulmonary embolism- (present on admission)  Assessment & Plan  Medication induced PE and bilateral DVTs  Patient remains on Coumadin, INR is supratherapeutic at 8  INR lowered with vitamin K, 1.28  Hold warfarin, start lovenox BID until night before OR   Hold lovenox today  NPO at midnight, plan for pleurodesis tomorrow    Primary hypertension- (present on admission)  Assessment & Plan  Continue home amlodipine  Start as needed labetalol  Adjust as needed    Acute respiratory failure with hypoxia (HCC)- (present  on admission)  Assessment & Plan  Secondary to large left pleural effusion  She was satting 87% on room air  Improved on low-flow oxygen  Continuous pulse ox monitoring  Wean oxygen as able  Home oxygen eval after pleurodesis/prior to discharge    Acquired hypothyroidism- (present on admission)  Assessment & Plan  Continue home Synthroid at 100 mcg daily  Continue home Cytomel at 2.5-5  Most recent TSH was approximately 3 months ago was normal at 4.57         VTE prophylaxis: VTE Selection    I have performed a physical exam and reviewed and updated ROS and Plan today (12/8/2024). In review of yesterday's note (12/7/2024), there are no changes except as documented above.

## 2024-12-08 NOTE — PROGRESS NOTES
Received report and assumed care of patient at change of shift. Patient is A&Ox4, on 2L O2 nasal cannula, and denies pain at this time. Patient assessment completed, bed in lowest position, and call light and personal belongings are within reach. Patient expressed no further needs at this time.

## 2024-12-08 NOTE — CARE PLAN
The patient is Stable - Low risk of patient condition declining or worsening    Shift Goals  Clinical Goals: Pt will remain free from falls, maintain adequate O2 sat above 90%  Patient Goals: comfort  Family Goals: HAYDEE    Progress made toward(s) clinical / shift goals:      Pt remained free from falls throughout shift. Bed is locked and in lowest position. Call light and personal belongings are within reach. Pt maintained adequate O2 saturation above 90% throughout shift    Patient is not progressing towards the following goals:

## 2024-12-09 ENCOUNTER — ANESTHESIA (OUTPATIENT)
Dept: SURGERY | Facility: MEDICAL CENTER | Age: 79
End: 2024-12-09
Payer: COMMERCIAL

## 2024-12-09 ENCOUNTER — APPOINTMENT (OUTPATIENT)
Dept: RADIOLOGY | Facility: MEDICAL CENTER | Age: 79
End: 2024-12-09
Attending: STUDENT IN AN ORGANIZED HEALTH CARE EDUCATION/TRAINING PROGRAM
Payer: COMMERCIAL

## 2024-12-09 LAB
ALBUMIN SERPL BCP-MCNC: 3.1 G/DL (ref 3.2–4.9)
ALBUMIN/GLOB SERPL: 0.8 G/DL
ALP SERPL-CCNC: 109 U/L (ref 30–99)
ALT SERPL-CCNC: 9 U/L (ref 2–50)
ANION GAP SERPL CALC-SCNC: 10 MMOL/L (ref 7–16)
AST SERPL-CCNC: 19 U/L (ref 12–45)
BILIRUB SERPL-MCNC: 1.4 MG/DL (ref 0.1–1.5)
BUN SERPL-MCNC: 12 MG/DL (ref 8–22)
CALCIUM ALBUM COR SERPL-MCNC: 9.3 MG/DL (ref 8.5–10.5)
CALCIUM SERPL-MCNC: 8.6 MG/DL (ref 8.5–10.5)
CHLORIDE SERPL-SCNC: 101 MMOL/L (ref 96–112)
CO2 SERPL-SCNC: 25 MMOL/L (ref 20–33)
CREAT SERPL-MCNC: 0.62 MG/DL (ref 0.5–1.4)
ERYTHROCYTE [DISTWIDTH] IN BLOOD BY AUTOMATED COUNT: 48.2 FL (ref 35.9–50)
GFR SERPLBLD CREATININE-BSD FMLA CKD-EPI: 91 ML/MIN/1.73 M 2
GLOBULIN SER CALC-MCNC: 3.7 G/DL (ref 1.9–3.5)
GLUCOSE SERPL-MCNC: 108 MG/DL (ref 65–99)
HCT VFR BLD AUTO: 40 % (ref 37–47)
HGB BLD-MCNC: 13 G/DL (ref 12–16)
INR PPP: 1.1 (ref 0.87–1.13)
MCH RBC QN AUTO: 28.2 PG (ref 27–33)
MCHC RBC AUTO-ENTMCNC: 32.5 G/DL (ref 32.2–35.5)
MCV RBC AUTO: 86.8 FL (ref 81.4–97.8)
PLATELET # BLD AUTO: 649 K/UL (ref 164–446)
PMV BLD AUTO: 9.3 FL (ref 9–12.9)
POTASSIUM SERPL-SCNC: 3.7 MMOL/L (ref 3.6–5.5)
PROT SERPL-MCNC: 6.8 G/DL (ref 6–8.2)
PROTHROMBIN TIME: 14.2 SEC (ref 12–14.6)
RBC # BLD AUTO: 4.61 M/UL (ref 4.2–5.4)
SODIUM SERPL-SCNC: 136 MMOL/L (ref 135–145)
WBC # BLD AUTO: 11.6 K/UL (ref 4.8–10.8)

## 2024-12-09 PROCEDURE — 160035 HCHG PACU - 1ST 60 MINS PHASE I: Performed by: STUDENT IN AN ORGANIZED HEALTH CARE EDUCATION/TRAINING PROGRAM

## 2024-12-09 PROCEDURE — A9270 NON-COVERED ITEM OR SERVICE: HCPCS | Performed by: INTERNAL MEDICINE

## 2024-12-09 PROCEDURE — 700101 HCHG RX REV CODE 250: Performed by: STUDENT IN AN ORGANIZED HEALTH CARE EDUCATION/TRAINING PROGRAM

## 2024-12-09 PROCEDURE — 80053 COMPREHEN METABOLIC PANEL: CPT

## 2024-12-09 PROCEDURE — 700102 HCHG RX REV CODE 250 W/ 637 OVERRIDE(OP): Performed by: NURSE PRACTITIONER

## 2024-12-09 PROCEDURE — 700101 HCHG RX REV CODE 250: Performed by: ANESTHESIOLOGY

## 2024-12-09 PROCEDURE — 160002 HCHG RECOVERY MINUTES (STAT): Performed by: STUDENT IN AN ORGANIZED HEALTH CARE EDUCATION/TRAINING PROGRAM

## 2024-12-09 PROCEDURE — 160029 HCHG SURGERY MINUTES - 1ST 30 MINS LEVEL 4: Performed by: STUDENT IN AN ORGANIZED HEALTH CARE EDUCATION/TRAINING PROGRAM

## 2024-12-09 PROCEDURE — 36415 COLL VENOUS BLD VENIPUNCTURE: CPT

## 2024-12-09 PROCEDURE — 99233 SBSQ HOSP IP/OBS HIGH 50: CPT | Performed by: STUDENT IN AN ORGANIZED HEALTH CARE EDUCATION/TRAINING PROGRAM

## 2024-12-09 PROCEDURE — 700111 HCHG RX REV CODE 636 W/ 250 OVERRIDE (IP): Mod: JZ | Performed by: ANESTHESIOLOGY

## 2024-12-09 PROCEDURE — 160036 HCHG PACU - EA ADDL 30 MINS PHASE I: Performed by: STUDENT IN AN ORGANIZED HEALTH CARE EDUCATION/TRAINING PROGRAM

## 2024-12-09 PROCEDURE — 160009 HCHG ANES TIME/MIN: Performed by: STUDENT IN AN ORGANIZED HEALTH CARE EDUCATION/TRAINING PROGRAM

## 2024-12-09 PROCEDURE — 700111 HCHG RX REV CODE 636 W/ 250 OVERRIDE (IP): Performed by: ANESTHESIOLOGY

## 2024-12-09 PROCEDURE — 700102 HCHG RX REV CODE 250 W/ 637 OVERRIDE(OP): Performed by: INTERNAL MEDICINE

## 2024-12-09 PROCEDURE — C1729 CATH, DRAINAGE: HCPCS | Performed by: STUDENT IN AN ORGANIZED HEALTH CARE EDUCATION/TRAINING PROGRAM

## 2024-12-09 PROCEDURE — 0BJ08ZZ INSPECTION OF TRACHEOBRONCHIAL TREE, VIA NATURAL OR ARTIFICIAL OPENING ENDOSCOPIC: ICD-10-PCS | Performed by: STUDENT IN AN ORGANIZED HEALTH CARE EDUCATION/TRAINING PROGRAM

## 2024-12-09 PROCEDURE — 85610 PROTHROMBIN TIME: CPT

## 2024-12-09 PROCEDURE — 160048 HCHG OR STATISTICAL LEVEL 1-5: Performed by: STUDENT IN AN ORGANIZED HEALTH CARE EDUCATION/TRAINING PROGRAM

## 2024-12-09 PROCEDURE — 770001 HCHG ROOM/CARE - MED/SURG/GYN PRIV*

## 2024-12-09 PROCEDURE — 71045 X-RAY EXAM CHEST 1 VIEW: CPT

## 2024-12-09 PROCEDURE — 85027 COMPLETE CBC AUTOMATED: CPT

## 2024-12-09 PROCEDURE — A9270 NON-COVERED ITEM OR SERVICE: HCPCS | Performed by: NURSE PRACTITIONER

## 2024-12-09 PROCEDURE — 3E0L4GC INTRODUCTION OF OTHER THERAPEUTIC SUBSTANCE INTO PLEURAL CAVITY, PERCUTANEOUS ENDOSCOPIC APPROACH: ICD-10-PCS | Performed by: STUDENT IN AN ORGANIZED HEALTH CARE EDUCATION/TRAINING PROGRAM

## 2024-12-09 PROCEDURE — 160041 HCHG SURGERY MINUTES - EA ADDL 1 MIN LEVEL 4: Performed by: STUDENT IN AN ORGANIZED HEALTH CARE EDUCATION/TRAINING PROGRAM

## 2024-12-09 RX ORDER — PHENYLEPHRINE HCL IN 0.9% NACL 1 MG/10 ML
SYRINGE (ML) INTRAVENOUS PRN
Status: DISCONTINUED | OUTPATIENT
Start: 2024-12-09 | End: 2024-12-09 | Stop reason: SURG

## 2024-12-09 RX ORDER — CEFAZOLIN SODIUM 1 G/3ML
INJECTION, POWDER, FOR SOLUTION INTRAMUSCULAR; INTRAVENOUS PRN
Status: DISCONTINUED | OUTPATIENT
Start: 2024-12-09 | End: 2024-12-09 | Stop reason: SURG

## 2024-12-09 RX ORDER — ONDANSETRON 2 MG/ML
4 INJECTION INTRAMUSCULAR; INTRAVENOUS
Status: DISCONTINUED | OUTPATIENT
Start: 2024-12-09 | End: 2024-12-09 | Stop reason: HOSPADM

## 2024-12-09 RX ORDER — ONDANSETRON 2 MG/ML
INJECTION INTRAMUSCULAR; INTRAVENOUS PRN
Status: DISCONTINUED | OUTPATIENT
Start: 2024-12-09 | End: 2024-12-09 | Stop reason: SURG

## 2024-12-09 RX ORDER — HYDROMORPHONE HYDROCHLORIDE 1 MG/ML
0.1 INJECTION, SOLUTION INTRAMUSCULAR; INTRAVENOUS; SUBCUTANEOUS
Status: DISCONTINUED | OUTPATIENT
Start: 2024-12-09 | End: 2024-12-09 | Stop reason: HOSPADM

## 2024-12-09 RX ORDER — BUPIVACAINE HYDROCHLORIDE AND EPINEPHRINE 5; 5 MG/ML; UG/ML
INJECTION, SOLUTION EPIDURAL; INTRACAUDAL; PERINEURAL
Status: DISCONTINUED | OUTPATIENT
Start: 2024-12-09 | End: 2024-12-09 | Stop reason: HOSPADM

## 2024-12-09 RX ORDER — OXYCODONE HYDROCHLORIDE 5 MG/1
5 TABLET ORAL EVERY 4 HOURS PRN
Status: DISCONTINUED | OUTPATIENT
Start: 2024-12-09 | End: 2024-12-12 | Stop reason: HOSPADM

## 2024-12-09 RX ORDER — HYDROMORPHONE HYDROCHLORIDE 1 MG/ML
0.2 INJECTION, SOLUTION INTRAMUSCULAR; INTRAVENOUS; SUBCUTANEOUS
Status: DISCONTINUED | OUTPATIENT
Start: 2024-12-09 | End: 2024-12-09 | Stop reason: HOSPADM

## 2024-12-09 RX ORDER — HYDROMORPHONE HYDROCHLORIDE 1 MG/ML
0.4 INJECTION, SOLUTION INTRAMUSCULAR; INTRAVENOUS; SUBCUTANEOUS
Status: DISCONTINUED | OUTPATIENT
Start: 2024-12-09 | End: 2024-12-09 | Stop reason: HOSPADM

## 2024-12-09 RX ORDER — HYDROMORPHONE HYDROCHLORIDE 1 MG/ML
0.5 INJECTION, SOLUTION INTRAMUSCULAR; INTRAVENOUS; SUBCUTANEOUS ONCE
Status: ACTIVE | OUTPATIENT
Start: 2024-12-09 | End: 2024-12-10

## 2024-12-09 RX ORDER — SODIUM CHLORIDE 9 MG/ML
INJECTION, SOLUTION INTRAVENOUS CONTINUOUS
Status: DISCONTINUED | OUTPATIENT
Start: 2024-12-09 | End: 2024-12-09 | Stop reason: HOSPADM

## 2024-12-09 RX ORDER — OXYCODONE HCL 5 MG/5 ML
10 SOLUTION, ORAL ORAL
Status: DISCONTINUED | OUTPATIENT
Start: 2024-12-09 | End: 2024-12-09 | Stop reason: HOSPADM

## 2024-12-09 RX ORDER — DIPHENHYDRAMINE HYDROCHLORIDE 50 MG/ML
12.5 INJECTION INTRAMUSCULAR; INTRAVENOUS
Status: DISCONTINUED | OUTPATIENT
Start: 2024-12-09 | End: 2024-12-09 | Stop reason: HOSPADM

## 2024-12-09 RX ORDER — ROCURONIUM BROMIDE 10 MG/ML
INJECTION, SOLUTION INTRAVENOUS PRN
Status: DISCONTINUED | OUTPATIENT
Start: 2024-12-09 | End: 2024-12-09 | Stop reason: SURG

## 2024-12-09 RX ORDER — DEXAMETHASONE SODIUM PHOSPHATE 4 MG/ML
INJECTION, SOLUTION INTRA-ARTICULAR; INTRALESIONAL; INTRAMUSCULAR; INTRAVENOUS; SOFT TISSUE PRN
Status: DISCONTINUED | OUTPATIENT
Start: 2024-12-09 | End: 2024-12-09 | Stop reason: SURG

## 2024-12-09 RX ORDER — OXYCODONE HCL 5 MG/5 ML
5 SOLUTION, ORAL ORAL
Status: DISCONTINUED | OUTPATIENT
Start: 2024-12-09 | End: 2024-12-09 | Stop reason: HOSPADM

## 2024-12-09 RX ADMIN — DEXAMETHASONE SODIUM PHOSPHATE 8 MG: 4 INJECTION INTRA-ARTICULAR; INTRALESIONAL; INTRAMUSCULAR; INTRAVENOUS; SOFT TISSUE at 16:42

## 2024-12-09 RX ADMIN — Medication 100 MCG: at 16:46

## 2024-12-09 RX ADMIN — SUGAMMADEX 200 MG: 100 INJECTION, SOLUTION INTRAVENOUS at 17:19

## 2024-12-09 RX ADMIN — FENTANYL CITRATE 25 MCG: 50 INJECTION, SOLUTION INTRAMUSCULAR; INTRAVENOUS at 17:57

## 2024-12-09 RX ADMIN — FENTANYL CITRATE 25 MCG: 50 INJECTION, SOLUTION INTRAMUSCULAR; INTRAVENOUS at 18:45

## 2024-12-09 RX ADMIN — CEFAZOLIN 2 G: 1 INJECTION, POWDER, FOR SOLUTION INTRAMUSCULAR; INTRAVENOUS at 16:41

## 2024-12-09 RX ADMIN — PROPOFOL 130 MG: 10 INJECTION, EMULSION INTRAVENOUS at 16:36

## 2024-12-09 RX ADMIN — LEVOTHYROXINE SODIUM 100 MCG: 0.1 TABLET ORAL at 05:29

## 2024-12-09 RX ADMIN — ROCURONIUM BROMIDE 50 MG: 50 INJECTION, SOLUTION INTRAVENOUS at 16:37

## 2024-12-09 RX ADMIN — ONDANSETRON 8 MG: 2 INJECTION INTRAMUSCULAR; INTRAVENOUS at 17:16

## 2024-12-09 RX ADMIN — FENTANYL CITRATE 100 MCG: 50 INJECTION, SOLUTION INTRAMUSCULAR; INTRAVENOUS at 16:31

## 2024-12-09 RX ADMIN — LIOTHYRONINE SODIUM 5 MCG: 5 TABLET ORAL at 05:29

## 2024-12-09 RX ADMIN — AMLODIPINE BESYLATE 5 MG: 10 TABLET ORAL at 05:28

## 2024-12-09 RX ADMIN — OXYCODONE 5 MG: 5 TABLET ORAL at 22:47

## 2024-12-09 RX ADMIN — Medication 100 MCG: at 16:58

## 2024-12-09 ASSESSMENT — ENCOUNTER SYMPTOMS
ABDOMINAL PAIN: 0
NAUSEA: 0
EYE PAIN: 0
FEVER: 0
PALPITATIONS: 0
INSOMNIA: 0
SENSORY CHANGE: 0
LOSS OF CONSCIOUSNESS: 0
VOMITING: 0
DIZZINESS: 0
COUGH: 1
CHILLS: 0
BACK PAIN: 0
BLURRED VISION: 0
SHORTNESS OF BREATH: 1
FOCAL WEAKNESS: 0
HEADACHES: 0

## 2024-12-09 ASSESSMENT — PAIN DESCRIPTION - PAIN TYPE
TYPE: SURGICAL PAIN

## 2024-12-09 ASSESSMENT — PAIN SCALES - GENERAL: PAIN_LEVEL: 0

## 2024-12-09 ASSESSMENT — LIFESTYLE VARIABLES: SUBSTANCE_ABUSE: 0

## 2024-12-09 NOTE — CARE PLAN
The patient is Stable - Low risk of patient condition declining or worsening    Shift Goals  Clinical Goals: pt will remain adequate oxygen saturation above 90% with supplemental xygen therapy, pt will have Pleurodesis today  Patient Goals: Switch to private room, rest  Family Goals: HAYDEE    Progress made toward(s) clinical / shift goals:    Problem: Knowledge Deficit - Standard  Goal: Patient and family/care givers will demonstrate understanding of plan of care, disease process/condition, diagnostic tests and medications  Outcome: Progressing     Problem: Respiratory  Goal: Patient will achieve/maintain optimum respiratory ventilation and gas exchange  Outcome: Progressing       Patient is not progressing towards the following goals:

## 2024-12-09 NOTE — DISCHARGE PLANNING
Case Management Discharge Planning    Admission Date: 12/4/2024  GMLOS: 4.4  ALOS: 5    6-Clicks ADL Score: 24  6-Clicks Mobility Score: 24    Anticipated Discharge Dispo: Discharge Disposition: Discharged to home/self care (01)    DME Needed: No    Action(s) Taken:   Pt was discussed during IDT rounds. Per MD, pt is not medically cleared. Plan is for pleurodesis procedure today in the OR. Walking O2 evaluation to be completed post-procedure.     Escalations Completed: None    Medically Clear: No    Next Steps:  CM RN to follow up with medical team to discuss discharge barriers or needs.     Barriers to Discharge: Medical clearance and Pending Procedures

## 2024-12-09 NOTE — PROGRESS NOTES
Hospital Medicine Daily Progress Note    Date of Service  12/9/2024    Chief Complaint  Paige Gudino is a 79 y.o. female admitted 12/4/2024 with dyspnea.    Hospital Course  Paige Gudino is a 79 y.o. female who presented 12/4/2024 with shortness of breath.  Patient with history of DVT/PE on warfarin, hypertension, hypothyroidism, recurrent pleural effusion, who presented for dyspnea.  On presentation patient requiring 1-2L oxygen, she was admitted for acute hypoxic respiratory failure due to recurrent pleural effusion. CXR shows large left pleural effusion. Patients INR supratherapeutic at 8, given vitamin K with improvement of INR. She received lovenox BID pending surgical evaluation.    Per chart review, patient with history of partial pancreatectomy for mass-benign, complicated by abdominal fluid collection s/p drainage, recurrent left pleural effusion s/p thoracenteses. Patient follows with hepatobiliary/surgical oncology team as outpatient for management.     Surgical oncology consulted thoracic surgery, who plan for pleurodesis 12/9. Patient will need home anticoagulation resumed when cleared by surgery team.    Interval Problem Update  No acute events overnight.  On 2L oxygen, stable.  Patient feeling well, she is upset with correction services, nobody has been in to clean room despite roommate urinating on floor.  NPO, plan for pleurodesis today by thoracic surgery.  Resume anticoagulation post op as appropriate for hx DVT/PE.      I have discussed this patient's plan of care and discharge plan at IDT rounds today with Case Management, Nursing, Nursing leadership, and other members of the IDT team.    Consultants/Specialty  Thoracic surgery    Code Status  Full Code    Disposition  Medically Cleared  I have placed the appropriate orders for post-discharge needs.    Review of Systems  Review of Systems   Constitutional:  Negative for chills and fever.   Eyes:  Negative for blurred  vision and pain.   Respiratory:  Positive for cough and shortness of breath.    Cardiovascular:  Negative for chest pain, palpitations and leg swelling.   Gastrointestinal:  Negative for abdominal pain, nausea and vomiting.   Genitourinary:  Negative for dysuria and urgency.   Musculoskeletal:  Negative for back pain.   Skin:  Negative for itching and rash.   Neurological:  Negative for dizziness, sensory change, focal weakness, loss of consciousness and headaches.   Psychiatric/Behavioral:  Negative for substance abuse. The patient does not have insomnia.         Physical Exam  Temp:  [36 °C (96.8 °F)-36.8 °C (98.2 °F)] 36.6 °C (97.9 °F)  Pulse:  [60-88] 64  Resp:  [16-17] 16  BP: (111-128)/(55-73) 128/65  SpO2:  [94 %-96 %] 96 %    Physical Exam  Vitals reviewed.   Constitutional:       General: She is not in acute distress.     Appearance: She is not diaphoretic.   HENT:      Head: Normocephalic and atraumatic.      Right Ear: External ear normal.      Left Ear: External ear normal.      Nose: Nose normal. No congestion.      Mouth/Throat:      Pharynx: No oropharyngeal exudate or posterior oropharyngeal erythema.   Eyes:      Extraocular Movements: Extraocular movements intact.      Pupils: Pupils are equal, round, and reactive to light.   Cardiovascular:      Rate and Rhythm: Normal rate and regular rhythm.   Pulmonary:      Effort: Pulmonary effort is normal. No respiratory distress.      Breath sounds: Normal breath sounds. No wheezing.      Comments: Decreased breath sounds left mid upper lung to below  Abdominal:      General: Bowel sounds are normal. There is no distension.      Palpations: Abdomen is soft.      Tenderness: There is no abdominal tenderness. There is no guarding.   Musculoskeletal:         General: No swelling. Normal range of motion.      Cervical back: Normal range of motion and neck supple.      Right lower leg: No edema.      Left lower leg: No edema.   Skin:     General: Skin is warm  and dry.   Neurological:      General: No focal deficit present.      Mental Status: She is alert and oriented to person, place, and time.      Cranial Nerves: No cranial nerve deficit.      Sensory: No sensory deficit.      Motor: No weakness.   Psychiatric:         Mood and Affect: Mood normal.         Behavior: Behavior normal.         Fluids  No intake or output data in the 24 hours ending 12/09/24 1542         Laboratory  Recent Labs     12/09/24  0114   WBC 11.6*   RBC 4.61   HEMOGLOBIN 13.0   HEMATOCRIT 40.0   MCV 86.8   MCH 28.2   MCHC 32.5   RDW 48.2   PLATELETCT 649*   MPV 9.3     Recent Labs     12/09/24  0114   SODIUM 136   POTASSIUM 3.7   CHLORIDE 101   CO2 25   GLUCOSE 108*   BUN 12   CREATININE 0.62   CALCIUM 8.6     Recent Labs     12/09/24  0114   INR 1.10               Imaging  DX-CHEST-PORTABLE (1 VIEW)   Final Result      1. Stable large left pleural effusion, obscuring the mid and lower portions of the left hemithorax.   2. No acute findings in the interval.           Assessment/Plan  * Pleural effusion- (present on admission)  Assessment & Plan  Large left-sided recurrent pleural effusion  Fluid analysis has always been negative per patient  INR 8, given vitamin K; no signs of bleeding  INR improved with vitamin K subtherapeutic 1.28; holding home warfarin  Was on lovenox BID pending pleurodesis  Lovenox held yesterday, pleurodesis today 12/9  Appreciate surgery recommendations and plan of care post op    History of pulmonary embolism- (present on admission)  Assessment & Plan  History of medication induced PE and bilateral DVTs  Home warfarin held on presentation  INR 8 improved with vitamin K; currently warfarin is held for surgery  Was on lovenox pending surgery, now held too  Pleurodesis today 12/9  Resume anticoagulation post op once cleared by surgery; patient did not tolerate eliquis or xarelto in the past, likely go back on warfarin    Primary hypertension- (present on  admission)  Assessment & Plan  Continue home amlodipine  Start as needed labetalol  Adjust as needed    Acute respiratory failure with hypoxia (HCC)- (present on admission)  Assessment & Plan  Secondary to large left pleural effusion  She was satting 87% on room air  Improved on low-flow oxygen  Continuous pulse ox monitoring  Wean oxygen as able  Home oxygen eval after pleurodesis/prior to discharge    Acquired hypothyroidism- (present on admission)  Assessment & Plan  Continue home Synthroid at 100 mcg daily  Continue home Cytomel at 2.5-5  Most recent TSH was approximately 3 months ago was normal at 4.57         VTE prophylaxis: VTE Selection    I have performed a physical exam and reviewed and updated ROS and Plan today (12/9/2024). In review of yesterday's note (12/8/2024), there are no changes except as documented above.    My total time spent caring for the patient on the day of the encounter was 45 minutes. This time includes reviewing the hospital chart vitals, lab tests, and imaging; counseling and educating the patient about their diagnoses; coordinating care with the nurse, pharmacist, and specialists; documenting clinical information in the electronic medical record.  This does not include time spent on separately billable procedures/tests.

## 2024-12-09 NOTE — CARE PLAN
The patient is Stable - Low risk of patient condition declining or worsening    Shift Goals  Clinical Goals: Patient will remain free from falls. Patient will maintain adequate oxygen saturation above 90%. Patient will be NPO after midnight.  Patient Goals: Patient will be able to sleep comfortably throughout the night.    Progress made toward(s) clinical / shift goals:      Problem: Knowledge Deficit - Standard  Goal: Patient and family/care givers will demonstrate understanding of plan of care, disease process/condition, diagnostic tests and medications  Outcome: Progressing     Problem: Respiratory  Goal: Patient will achieve/maintain optimum respiratory ventilation and gas exchange  Outcome: Progressing       Patient is not progressing towards the following goals:

## 2024-12-09 NOTE — PROGRESS NOTES
Thoracic surgery progress note    Will plan to proceed with left thoracoscopic pleurodesis today.    Glenn Saul MD  Thoracic & General Surgeon  [facility]  [phone]

## 2024-12-09 NOTE — PROGRESS NOTES
Late entry for 0800: Report received from NOC RN and assumed patient care at 0700. Patient is A&Ox 4, on 2 L NC, and bed alarm is off.  . Patient denies pain at this time. Pt denies SOB, difficulty breathing, lightheadedness or dizziness. Pt states she requested a private room last Thursday and was told he would transfer to [place] [id number]. Pt expresses concern having a roommate being a risk towards her health, spoke to NURIS Leonardo who stated there are no rooms available at this time, but that she would like transfer after her procedure today to a unit for higher level of care. Pt denies any other symptoms or needs at this time. Call light within reach and bed in lowest position. Reinforced the need to call for assistance. Plan of care discussed and patient does not have any further needs at this time.

## 2024-12-10 ENCOUNTER — APPOINTMENT (OUTPATIENT)
Dept: RADIOLOGY | Facility: MEDICAL CENTER | Age: 79
End: 2024-12-10
Attending: STUDENT IN AN ORGANIZED HEALTH CARE EDUCATION/TRAINING PROGRAM
Payer: COMMERCIAL

## 2024-12-10 LAB
ANION GAP SERPL CALC-SCNC: 11 MMOL/L (ref 7–16)
BUN SERPL-MCNC: 13 MG/DL (ref 8–22)
CALCIUM SERPL-MCNC: 8.9 MG/DL (ref 8.5–10.5)
CHLORIDE SERPL-SCNC: 101 MMOL/L (ref 96–112)
CO2 SERPL-SCNC: 25 MMOL/L (ref 20–33)
CREAT SERPL-MCNC: 0.55 MG/DL (ref 0.5–1.4)
ERYTHROCYTE [DISTWIDTH] IN BLOOD BY AUTOMATED COUNT: 47.6 FL (ref 35.9–50)
GFR SERPLBLD CREATININE-BSD FMLA CKD-EPI: 94 ML/MIN/1.73 M 2
GLUCOSE SERPL-MCNC: 147 MG/DL (ref 65–99)
HCT VFR BLD AUTO: 44 % (ref 37–47)
HGB BLD-MCNC: 14.4 G/DL (ref 12–16)
MCH RBC QN AUTO: 28.2 PG (ref 27–33)
MCHC RBC AUTO-ENTMCNC: 32.7 G/DL (ref 32.2–35.5)
MCV RBC AUTO: 86.3 FL (ref 81.4–97.8)
PLATELET # BLD AUTO: 689 K/UL (ref 164–446)
PMV BLD AUTO: 9.6 FL (ref 9–12.9)
POTASSIUM SERPL-SCNC: 4.2 MMOL/L (ref 3.6–5.5)
PROCALCITONIN SERPL-MCNC: 0.11 NG/ML
RBC # BLD AUTO: 5.1 M/UL (ref 4.2–5.4)
SODIUM SERPL-SCNC: 137 MMOL/L (ref 135–145)
WBC # BLD AUTO: 23.4 K/UL (ref 4.8–10.8)

## 2024-12-10 PROCEDURE — 84145 PROCALCITONIN (PCT): CPT

## 2024-12-10 PROCEDURE — 700102 HCHG RX REV CODE 250 W/ 637 OVERRIDE(OP): Performed by: INTERNAL MEDICINE

## 2024-12-10 PROCEDURE — 700102 HCHG RX REV CODE 250 W/ 637 OVERRIDE(OP): Performed by: NURSE PRACTITIONER

## 2024-12-10 PROCEDURE — 770001 HCHG ROOM/CARE - MED/SURG/GYN PRIV*

## 2024-12-10 PROCEDURE — 700111 HCHG RX REV CODE 636 W/ 250 OVERRIDE (IP): Mod: JZ | Performed by: STUDENT IN AN ORGANIZED HEALTH CARE EDUCATION/TRAINING PROGRAM

## 2024-12-10 PROCEDURE — A9270 NON-COVERED ITEM OR SERVICE: HCPCS | Performed by: INTERNAL MEDICINE

## 2024-12-10 PROCEDURE — A9270 NON-COVERED ITEM OR SERVICE: HCPCS | Performed by: NURSE PRACTITIONER

## 2024-12-10 PROCEDURE — 99232 SBSQ HOSP IP/OBS MODERATE 35: CPT | Performed by: STUDENT IN AN ORGANIZED HEALTH CARE EDUCATION/TRAINING PROGRAM

## 2024-12-10 PROCEDURE — 36415 COLL VENOUS BLD VENIPUNCTURE: CPT

## 2024-12-10 PROCEDURE — A9270 NON-COVERED ITEM OR SERVICE: HCPCS | Performed by: STUDENT IN AN ORGANIZED HEALTH CARE EDUCATION/TRAINING PROGRAM

## 2024-12-10 PROCEDURE — 700102 HCHG RX REV CODE 250 W/ 637 OVERRIDE(OP): Performed by: STUDENT IN AN ORGANIZED HEALTH CARE EDUCATION/TRAINING PROGRAM

## 2024-12-10 PROCEDURE — 80048 BASIC METABOLIC PNL TOTAL CA: CPT

## 2024-12-10 PROCEDURE — 85027 COMPLETE CBC AUTOMATED: CPT

## 2024-12-10 PROCEDURE — 71045 X-RAY EXAM CHEST 1 VIEW: CPT

## 2024-12-10 RX ORDER — METHOCARBAMOL 500 MG/1
1000 TABLET, FILM COATED ORAL 4 TIMES DAILY
Status: DISCONTINUED | OUTPATIENT
Start: 2024-12-10 | End: 2024-12-12 | Stop reason: HOSPADM

## 2024-12-10 RX ORDER — KETOROLAC TROMETHAMINE 15 MG/ML
15 INJECTION, SOLUTION INTRAMUSCULAR; INTRAVENOUS EVERY 6 HOURS
Status: DISCONTINUED | OUTPATIENT
Start: 2024-12-10 | End: 2024-12-12 | Stop reason: HOSPADM

## 2024-12-10 RX ADMIN — METHOCARBAMOL 1000 MG: 500 TABLET ORAL at 13:04

## 2024-12-10 RX ADMIN — METHOCARBAMOL 1000 MG: 500 TABLET ORAL at 23:36

## 2024-12-10 RX ADMIN — OXYCODONE 5 MG: 5 TABLET ORAL at 15:19

## 2024-12-10 RX ADMIN — LEVOTHYROXINE SODIUM 100 MCG: 0.1 TABLET ORAL at 05:34

## 2024-12-10 RX ADMIN — LIOTHYRONINE SODIUM 2.5 MCG: 5 TABLET ORAL at 05:35

## 2024-12-10 RX ADMIN — KETOROLAC TROMETHAMINE 15 MG: 15 INJECTION, SOLUTION INTRAMUSCULAR; INTRAVENOUS at 13:05

## 2024-12-10 RX ADMIN — SENNOSIDES AND DOCUSATE SODIUM 2 TABLET: 50; 8.6 TABLET ORAL at 05:34

## 2024-12-10 RX ADMIN — AMLODIPINE BESYLATE 5 MG: 10 TABLET ORAL at 05:35

## 2024-12-10 RX ADMIN — KETOROLAC TROMETHAMINE 15 MG: 15 INJECTION, SOLUTION INTRAMUSCULAR; INTRAVENOUS at 18:30

## 2024-12-10 RX ADMIN — KETOROLAC TROMETHAMINE 15 MG: 15 INJECTION, SOLUTION INTRAMUSCULAR; INTRAVENOUS at 23:36

## 2024-12-10 RX ADMIN — SENNOSIDES AND DOCUSATE SODIUM 2 TABLET: 50; 8.6 TABLET ORAL at 18:30

## 2024-12-10 RX ADMIN — OXYCODONE 5 MG: 5 TABLET ORAL at 06:34

## 2024-12-10 ASSESSMENT — LIFESTYLE VARIABLES: SUBSTANCE_ABUSE: 0

## 2024-12-10 ASSESSMENT — ENCOUNTER SYMPTOMS
SHORTNESS OF BREATH: 1
PALPITATIONS: 0
HEADACHES: 0
INSOMNIA: 0
CHILLS: 0
SENSORY CHANGE: 0
NAUSEA: 0
BLURRED VISION: 0
FOCAL WEAKNESS: 0
SHORTNESS OF BREATH: 0
COUGH: 1
LOSS OF CONSCIOUSNESS: 0
EYE PAIN: 0
ABDOMINAL PAIN: 0
DIZZINESS: 0
VOMITING: 0
FEVER: 0
BACK PAIN: 0

## 2024-12-10 ASSESSMENT — PAIN DESCRIPTION - PAIN TYPE
TYPE: SURGICAL PAIN
TYPE: ACUTE PAIN
TYPE: SURGICAL PAIN
TYPE: ACUTE PAIN
TYPE: SURGICAL PAIN
TYPE: SURGICAL PAIN
TYPE: ACUTE PAIN;SURGICAL PAIN
TYPE: ACUTE PAIN

## 2024-12-10 NOTE — CARE PLAN
The patient is Stable - Low risk of patient condition declining or worsening    Shift Goals  Clinical Goals: Pain control, chest tube management  Patient Goals: pain control, rest  Family Goals: updates    Progress made toward(s) clinical / shift goals:  Pt was able to manage pain with medication on the MAR, Chest tube is patent and functioning properly    Patient is not progressing towards the following goals:

## 2024-12-10 NOTE — PROGRESS NOTES
[facility] Medicine Daily Progress Note    Date of Service  12/11/2024    Chief Complaint  Paige Gudino is a 79 y.o. female admitted 12/4/2024 with dyspnea.    Hospital Course  Paige Gudino is a 79 y.o. female who presented 12/4/2024 with shortness of breath.  Patient with history of DVT/PE on warfarin, hypertension, hypothyroidism, recurrent pleural effusion, who presented for dyspnea.  On presentation patient requiring 1-2L oxygen, she was admitted for acute hypoxic respiratory failure due to recurrent pleural effusion. CXR shows large left pleural effusion. Patients INR supratherapeutic at 8, given vitamin K with improvement of INR. She received lovenox BID pending surgical evaluation.    Per chart review, patient with history of partial pancreatectomy for mass-benign, complicated by abdominal fluid collection s/p drainage, recurrent left pleural effusion s/p thoracenteses. Patient follows with hepatobiliary/surgical oncology team as outpatient for management.     Surgical oncology consulted thoracic surgery, who plan for pleurodesis 12/9. Patient will need home anticoagulation resumed when cleared by surgery team.    Interval Problem Update  12/11  No acute events overnight.  Has been on room air however was placed on minimal oxygen overnight at 0.5 L  K3.3, replacement ordered  Discussed with thoracic surgery, chest tube to suction, likely pull tomorrow and repeat chest x-ray    I have discussed this patient's plan of care and discharge plan at IDT rounds today with Case Management, Nursing, Nursing leadership, and other members of the IDT team.    Consultants/Specialty  Thoracic surgery    Code Status  Full Code    Disposition  Medically Cleared  I have placed the appropriate orders for post-discharge needs.    Review of Systems  Review of Systems   Constitutional:  Negative for chills and fever.   Eyes:  Negative for blurred vision and pain.   Respiratory:  Positive for cough and  shortness of breath.    Cardiovascular:  Negative for chest pain, palpitations and leg swelling.   Gastrointestinal:  Negative for abdominal pain, nausea and vomiting.   Genitourinary:  Negative for dysuria and urgency.   Musculoskeletal:  Negative for back pain.   Skin:  Negative for itching and rash.   Neurological:  Negative for dizziness, sensory change, focal weakness, loss of consciousness and headaches.   Psychiatric/Behavioral:  Negative for substance abuse. The patient does not have insomnia.         Physical Exam  Temp:  [36.1 °C (97 °F)-36.8 °C (98.2 °F)] 36.8 °C (98.2 °F)  Pulse:  [60-90] 60  Resp:  [14-18] 14  BP: (104-119)/(53-64) 112/57  SpO2:  [90 %-100 %] 90 %    Physical Exam  Vitals reviewed.   Constitutional:       General: She is not in acute distress.     Appearance: She is not diaphoretic.   HENT:      Head: Normocephalic and atraumatic.      Right Ear: External ear normal.      Left Ear: External ear normal.      Nose: Nose normal. No congestion.      Mouth/Throat:      Pharynx: No oropharyngeal exudate or posterior oropharyngeal erythema.   Eyes:      Extraocular Movements: Extraocular movements intact.      Pupils: Pupils are equal, round, and reactive to light.   Cardiovascular:      Rate and Rhythm: Normal rate and regular rhythm.   Pulmonary:      Effort: Pulmonary effort is normal. No respiratory distress.      Breath sounds: Normal breath sounds. No wheezing.      Comments: Decreased breath sounds left mid upper lung to below  Abdominal:      General: Bowel sounds are normal. There is no distension.      Palpations: Abdomen is soft.      Tenderness: There is no abdominal tenderness. There is no guarding.   Musculoskeletal:         General: No swelling. Normal range of motion.      Cervical back: Normal range of motion and neck supple.      Right lower leg: No edema.      Left lower leg: No edema.   Skin:     General: Skin is warm and dry.   Neurological:      General: No focal deficit  present.      Mental Status: She is alert and oriented to person, place, and time.      Cranial Nerves: No cranial nerve deficit.      Sensory: No sensory deficit.      Motor: No weakness.   Psychiatric:         Mood and Affect: Mood normal.         Behavior: Behavior normal.         Fluids    Intake/Output Summary (Last 24 hours) at 12/11/2024 1609  Last data filed at 12/11/2024 1233  Gross per 24 hour   Intake 540 ml   Output 190 ml   Net 350 ml            Laboratory  Recent Labs     12/09/24  0114 12/10/24  0512 12/11/24  0927   WBC 11.6* 23.4* 19.4*   RBC 4.61 5.10 4.71   HEMOGLOBIN 13.0 14.4 13.2   HEMATOCRIT 40.0 44.0 40.8   MCV 86.8 86.3 86.6   MCH 28.2 28.2 28.0   MCHC 32.5 32.7 32.4   RDW 48.2 47.6 48.3   PLATELETCT 649* 689* 640*   MPV 9.3 9.6 9.2     Recent Labs     12/09/24  0114 12/10/24  0512 12/11/24  0927   SODIUM 136 137 135   POTASSIUM 3.7 4.2 3.3*   CHLORIDE 101 101 99   CO2 25 25 21   GLUCOSE 108* 147* 144*   BUN 12 13 21   CREATININE 0.62 0.55 0.74   CALCIUM 8.6 8.9 9.5     Recent Labs     12/09/24  0114   INR 1.10               Imaging  DX-CHEST-PORTABLE (1 VIEW)   Final Result         1.  Hazy left pulmonary infiltrates, stable since prior study   2.  Small left lung base pneumothorax, decreased compared to prior study.   3.  Left rib fractures      DX-CHEST-PORTABLE (1 VIEW)   Final Result         1.  Hazy left pulmonary infiltrates   2.  Small left lung base pneumothorax, slightly increased compared to prior study.      DX-CHEST-PORTABLE (1 VIEW)   Final Result         1.  Small left pneumothorax is now noted just above the left hemidiaphragm at site where a left chest tube enters the left hemithorax.      2.  Chest tube tip is in the medial left upper hemithorax.      3.  Apparent resolution of the majority of the left pleural effusion.      4.  No new endotracheal consolidations.      5.  These findings were discussed with CEFERINO MADERA on 12/09/2024.      DX-CHEST-PORTABLE (1 VIEW)    Final Result      1. Stable large left pleural effusion, obscuring the mid and lower portions of the left hemithorax.   2. No acute findings in the interval.           Assessment/Plan  * Pleural effusion- (present on admission)  Assessment & Plan  Large left-sided recurrent pleural effusion  Fluid analysis has always been negative per patient  INR 8, given vitamin K; no signs of bleeding  INR improved with vitamin K subtherapeutic 1.28; holding home warfarin  Was on lovenox BID pending pleurodesis  Lovenox held yesterday, pleurodesis today 12/9  Appreciate surgery recommendations and plan of care post op  Status post pleurodesis 12/9    History of pulmonary embolism- (present on admission)  Assessment & Plan  History of medication induced PE and bilateral DVTs  Home warfarin held on presentation  INR 8 improved with vitamin K; currently warfarin is held for surgery  Was on lovenox pending surgery, now held too  Pleurodesis today 12/9  Resume anticoagulation post op once cleared by surgery; patient did not tolerate eliquis or xarelto in the past    Primary hypertension- (present on admission)  Assessment & Plan  Continue home amlodipine  Start as needed labetalol  Adjust as needed    Acute respiratory failure with hypoxia (HCC)- (present on admission)  Assessment & Plan  Secondary to large left pleural effusion  She was satting 87% on room air  Improved on low-flow oxygen  Continuous pulse ox monitoring  Has weaned off oxygen following pleurodesis    Acquired hypothyroidism- (present on admission)  Assessment & Plan  Continue home Synthroid at 100 mcg daily  Continue home Cytomel at 2.5-5  Most recent TSH was approximately 3 months ago was normal at 4.57         VTE prophylaxis: VTE Selection    I have performed a physical exam and reviewed and updated ROS and Plan today (12/11/2024). In review of yesterday's note (12/10/2024), there are no changes except as documented above.    My total time spent caring for the  patient on the day of the encounter was 45 minutes. This time includes reviewing the hospital chart vitals, lab tests, and imaging; counseling and educating the patient about their diagnoses; coordinating care with the nurse, pharmacist, and specialists; documenting clinical information in the electronic medical record.  This does not include time spent on separately billable procedures/tests.

## 2024-12-10 NOTE — ANESTHESIA PROCEDURE NOTES
Airway    Date/Time: 12/9/2024 4:39 PM    Performed by: Dhruv Aranda M.D.  Authorized by: Dhruv Aranda M.D.    Location:  OR  Urgency:  Elective  Indications for Airway Management:  Anesthesia      Spontaneous Ventilation: absent    Sedation Level:  Deep  Preoxygenated: Yes    Patient Position:  Sniffing  Mask Difficulty Assessment:  1 - vent by mask  Final Airway Type:  Endotracheal airway  Final Endotracheal Airway:  ETT - double lumen left with ONE LUNG VENTILATION  Cuffed: Yes    Technique Used for Successful ETT Placement:  Direct laryngoscopy    Insertion Site:  Oral  Blade Type:  Glide  Laryngoscope Blade/Videolaryngoscope Blade Size:  3  ETT Double Lumen (fr):  35  Measured from:  Teeth  ETT to Teeth (cm):  31  Placement Verified by: auscultation and capnometry    Cormack-Lehane Classification:  Grade I - full view of glottis  Number of Attempts at Approach:  1   SIVI, ATET, no teeth contact. Soft bite block. Turned lateral. Tube placement reconfirmed with fiberscope

## 2024-12-10 NOTE — PROGRESS NOTES
"Received report from PACU at 1930, Patient arrived on unit at 2012.  Assessment complete.  AA&Ox4. Denies CP/SOB.  Reporting 5/10 pain. Declined intervention at this time.  Educated patient regarding pharmacologic and non pharmacologic modalities for pain management.  Skin per flowsheet.  Chest tube on left to -20 suction and 3 puncture sites derma bond and ИВАН  Tolerating Regular diet. Denies N/V.  + void. - BM. Last BM 12/8  Pt ambulates SBA    All needs met at this time. Call light within reach. Pt calls appropriately. Bed low and locked, non skid socks in place. Hourly rounding in place.    /72   Pulse 87   Temp 36.6 °C (97.9 °F) (Temporal)   Resp 17   Ht 1.626 m (5' 4\")   Wt 59.9 kg (132 lb 0.9 oz)   LMP  (LMP Unknown)   SpO2 95%   BMI 22.67 kg/m²     "

## 2024-12-10 NOTE — ANESTHESIA PREPROCEDURE EVALUATION
Case: [id number] Date/Time: 12/09/24 1545    Procedure: THORACOSCOPY - LEFT THORACOSCOPIC PLEURODESIS (Left: Chest)    Anesthesia type: General    Location: [place] [id number] / SURGERY [facility]    Surgeons: Glenn Saul M.D.            Relevant Problems   CARDIAC   (positive) AV block, Mobitz 1   (positive) Deep vein thrombosis (DVT) of both lower extremities (HCC)   (positive) Primary hypertension   (positive) Pulmonary embolism with acute cor pulmonale, unspecified chronicity, unspecified pulmonary embolism type (HCC)   (positive) Second degree heart block   (positive) Thoracic ascending aortic aneurysm (HCC)      ENDO   (positive) Acquired hypothyroidism       Physical Exam    Airway   Mallampati: II  TM distance: >3 FB  Neck ROM: full       Cardiovascular - normal exam  Rhythm: regular  Rate: normal  (-) murmur     Dental - normal exam           Pulmonary - normal exam  Breath sounds clear to auscultation     Abdominal    Neurological - normal exam                   Anesthesia Plan    ASA 2       Plan - general       Airway plan will be ETT          Induction: intravenous    Postoperative Plan: Postoperative administration of opioids is intended.    Pertinent diagnostic labs and testing reviewed    Informed Consent:    Anesthetic plan and risks discussed with patient.    Use of blood products discussed with: patient whom consented to blood products.

## 2024-12-10 NOTE — PROGRESS NOTES
AA&Ox4. Denies CP/SOB.  No reports of pain.  Educated patient regarding pharmacologic and non pharmacologic modalities for pain management.  Skin per flowsheet.  Tolerating regular diet. Denies N/V.  + void. Last BM 12/8.  Pt ambulates SB assist.  All needs met at this time. Call light within reach. Pt calls appropriately. Bed low and locked, non skid socks in place. Hourly rounding in place.

## 2024-12-10 NOTE — PROGRESS NOTES
4 Eyes Skin Assessment Completed by Cheryle, RN and NURIS James.    Head WDL  Ears WDL  Nose WDL  Mouth WDL  Neck WDL  Breast/Chest CT to left chest with DIP CDI, 2 puncture/incision sites with derma bond.   Shoulder Blades WDL  Spine WDL  (R) Arm/Elbow/Hand WDL  (L) Arm/Elbow/Hand WDL  Abdomen WDL  Groin WDL  Scrotum/Coccyx/Buttocks Redness and Blanching  (R) Leg WDL  (L) Leg WDL  (R) Heel/Foot/Toe WDL  (L) Heel/Foot/Toe WDL          Devices In Places Blood Pressure Cuff, SCD's, and Nasal Cannula      Interventions In Place NC W/Ear Foams, Pillows, and Heels Loaded W/Pillows    Possible Skin Injury No    Pictures Uploaded Into Epic N/A  Wound Consult Placed N/A  RN Wound Prevention Protocol Ordered No

## 2024-12-10 NOTE — OR NURSING
1728  Patient arrived from OR. Responds to voice, maintaining own airway. Received report from Dr. Aranda and Aleshia NERI RN, assumed care. VSS. Left chest dressing CDI, chest tube to suction. No s/s of pain or nausea noted. Orders reviewed and implemented.      1735  Patient oriented x 4, denies pain or nausea.    1738  CXR bedside.     1740  Tolerating ice chips.     1757  Fentanyl administered for reported pain.     1806    updated.    1835   bedside to see patient.     1900  Patient resting comfortably, reports pain improved and tolerable. VSS. Left chest dressing remains CDI. Awaiting room.     1930  Report to Cheryle A. RN.     2007  Patient discharged to room with oxygen and all belongings.

## 2024-12-10 NOTE — ANESTHESIA POSTPROCEDURE EVALUATION
Patient: Paige Gudino    Procedure Summary       Date: 12/09/24 Room / Location: [facility] 02 / SURGERY [facility]    Anesthesia Start: 1628 Anesthesia Stop: 1731    Procedure: THORACOSCOPY - LEFT THORACOSCOPIC PLEURODESIS (Left: Chest) Diagnosis:     Surgeons: Glenn Saul M.D. Responsible Provider: Dhruv Aranda M.D.    Anesthesia Type: general ASA Status: 2            Final Anesthesia Type: general  Last vitals  BP   Blood Pressure : 137/73    Temp   36.7 °C (98.1 °F)    Pulse   78   Resp   16    SpO2   98 %      Anesthesia Post Evaluation    Patient location during evaluation: PACU  Patient participation: complete - patient participated  Level of consciousness: awake and alert  Pain score: 0    Airway patency: patent  Anesthetic complications: no  Cardiovascular status: hemodynamically stable  Respiratory status: acceptable  Hydration status: euvolemic    PONV: none          There were no known notable events for this encounter.     Nurse Pain Score: 0 (NPRS)

## 2024-12-10 NOTE — ANESTHESIA TIME REPORT
Anesthesia Start and Stop Event Times       Date Time Event    12/9/2024 1610 Ready for Procedure     1628 Anesthesia Start     1731 Anesthesia Stop          Responsible Staff  12/09/24      Name Role Begin End    Dhruv Aranda M.D. Anesth 1628 1731          Overtime Reason:  no overtime (within assigned shift)    Comments:

## 2024-12-11 ENCOUNTER — APPOINTMENT (OUTPATIENT)
Dept: RADIOLOGY | Facility: MEDICAL CENTER | Age: 79
End: 2024-12-11
Attending: STUDENT IN AN ORGANIZED HEALTH CARE EDUCATION/TRAINING PROGRAM
Payer: COMMERCIAL

## 2024-12-11 LAB
ALBUMIN SERPL BCP-MCNC: 3 G/DL (ref 3.2–4.9)
ALBUMIN/GLOB SERPL: 0.8 G/DL
ALP SERPL-CCNC: 84 U/L (ref 30–99)
ALT SERPL-CCNC: 9 U/L (ref 2–50)
ANION GAP SERPL CALC-SCNC: 15 MMOL/L (ref 7–16)
AST SERPL-CCNC: 22 U/L (ref 12–45)
BASOPHILS # BLD AUTO: 0.2 % (ref 0–1.8)
BASOPHILS # BLD: 0.03 K/UL (ref 0–0.12)
BILIRUB SERPL-MCNC: 1.1 MG/DL (ref 0.1–1.5)
BUN SERPL-MCNC: 21 MG/DL (ref 8–22)
CALCIUM ALBUM COR SERPL-MCNC: 10.3 MG/DL (ref 8.5–10.5)
CALCIUM SERPL-MCNC: 9.5 MG/DL (ref 8.5–10.5)
CHLORIDE SERPL-SCNC: 99 MMOL/L (ref 96–112)
CO2 SERPL-SCNC: 21 MMOL/L (ref 20–33)
CREAT SERPL-MCNC: 0.74 MG/DL (ref 0.5–1.4)
EOSINOPHIL # BLD AUTO: 0.03 K/UL (ref 0–0.51)
EOSINOPHIL NFR BLD: 0.2 % (ref 0–6.9)
ERYTHROCYTE [DISTWIDTH] IN BLOOD BY AUTOMATED COUNT: 48.3 FL (ref 35.9–50)
GFR SERPLBLD CREATININE-BSD FMLA CKD-EPI: 83 ML/MIN/1.73 M 2
GLOBULIN SER CALC-MCNC: 3.9 G/DL (ref 1.9–3.5)
GLUCOSE SERPL-MCNC: 144 MG/DL (ref 65–99)
HCT VFR BLD AUTO: 40.8 % (ref 37–47)
HGB BLD-MCNC: 13.2 G/DL (ref 12–16)
IMM GRANULOCYTES # BLD AUTO: 0.2 K/UL (ref 0–0.11)
IMM GRANULOCYTES NFR BLD AUTO: 1 % (ref 0–0.9)
LYMPHOCYTES # BLD AUTO: 1.31 K/UL (ref 1–4.8)
LYMPHOCYTES NFR BLD: 6.8 % (ref 22–41)
MCH RBC QN AUTO: 28 PG (ref 27–33)
MCHC RBC AUTO-ENTMCNC: 32.4 G/DL (ref 32.2–35.5)
MCV RBC AUTO: 86.6 FL (ref 81.4–97.8)
MONOCYTES # BLD AUTO: 1.35 K/UL (ref 0–0.85)
MONOCYTES NFR BLD AUTO: 7 % (ref 0–13.4)
NEUTROPHILS # BLD AUTO: 16.44 K/UL (ref 1.82–7.42)
NEUTROPHILS NFR BLD: 84.8 % (ref 44–72)
NRBC # BLD AUTO: 0 K/UL
NRBC BLD-RTO: 0 /100 WBC (ref 0–0.2)
PLATELET # BLD AUTO: 640 K/UL (ref 164–446)
PMV BLD AUTO: 9.2 FL (ref 9–12.9)
POTASSIUM SERPL-SCNC: 3.3 MMOL/L (ref 3.6–5.5)
PROT SERPL-MCNC: 6.9 G/DL (ref 6–8.2)
RBC # BLD AUTO: 4.71 M/UL (ref 4.2–5.4)
SODIUM SERPL-SCNC: 135 MMOL/L (ref 135–145)
WBC # BLD AUTO: 19.4 K/UL (ref 4.8–10.8)

## 2024-12-11 PROCEDURE — 85025 COMPLETE CBC W/AUTO DIFF WBC: CPT

## 2024-12-11 PROCEDURE — A9270 NON-COVERED ITEM OR SERVICE: HCPCS | Performed by: STUDENT IN AN ORGANIZED HEALTH CARE EDUCATION/TRAINING PROGRAM

## 2024-12-11 PROCEDURE — 700102 HCHG RX REV CODE 250 W/ 637 OVERRIDE(OP): Performed by: STUDENT IN AN ORGANIZED HEALTH CARE EDUCATION/TRAINING PROGRAM

## 2024-12-11 PROCEDURE — 700111 HCHG RX REV CODE 636 W/ 250 OVERRIDE (IP): Mod: JZ | Performed by: STUDENT IN AN ORGANIZED HEALTH CARE EDUCATION/TRAINING PROGRAM

## 2024-12-11 PROCEDURE — 99232 SBSQ HOSP IP/OBS MODERATE 35: CPT | Performed by: STUDENT IN AN ORGANIZED HEALTH CARE EDUCATION/TRAINING PROGRAM

## 2024-12-11 PROCEDURE — 71045 X-RAY EXAM CHEST 1 VIEW: CPT

## 2024-12-11 PROCEDURE — 770001 HCHG ROOM/CARE - MED/SURG/GYN PRIV*

## 2024-12-11 PROCEDURE — 700111 HCHG RX REV CODE 636 W/ 250 OVERRIDE (IP): Performed by: INTERNAL MEDICINE

## 2024-12-11 PROCEDURE — 36415 COLL VENOUS BLD VENIPUNCTURE: CPT

## 2024-12-11 PROCEDURE — 700102 HCHG RX REV CODE 250 W/ 637 OVERRIDE(OP): Mod: JZ | Performed by: STUDENT IN AN ORGANIZED HEALTH CARE EDUCATION/TRAINING PROGRAM

## 2024-12-11 PROCEDURE — A9270 NON-COVERED ITEM OR SERVICE: HCPCS | Performed by: INTERNAL MEDICINE

## 2024-12-11 PROCEDURE — 80053 COMPREHEN METABOLIC PANEL: CPT

## 2024-12-11 PROCEDURE — 700102 HCHG RX REV CODE 250 W/ 637 OVERRIDE(OP): Performed by: INTERNAL MEDICINE

## 2024-12-11 PROCEDURE — A9270 NON-COVERED ITEM OR SERVICE: HCPCS | Mod: JZ | Performed by: STUDENT IN AN ORGANIZED HEALTH CARE EDUCATION/TRAINING PROGRAM

## 2024-12-11 RX ORDER — POTASSIUM CHLORIDE 1500 MG/1
40 TABLET, EXTENDED RELEASE ORAL ONCE
Status: COMPLETED | OUTPATIENT
Start: 2024-12-11 | End: 2024-12-11

## 2024-12-11 RX ADMIN — ONDANSETRON 4 MG: 4 TABLET, ORALLY DISINTEGRATING ORAL at 15:20

## 2024-12-11 RX ADMIN — KETOROLAC TROMETHAMINE 15 MG: 15 INJECTION, SOLUTION INTRAMUSCULAR; INTRAVENOUS at 04:43

## 2024-12-11 RX ADMIN — POTASSIUM CHLORIDE 40 MEQ: 1500 TABLET, EXTENDED RELEASE ORAL at 17:58

## 2024-12-11 RX ADMIN — KETOROLAC TROMETHAMINE 15 MG: 15 INJECTION, SOLUTION INTRAMUSCULAR; INTRAVENOUS at 11:59

## 2024-12-11 RX ADMIN — KETOROLAC TROMETHAMINE 15 MG: 15 INJECTION, SOLUTION INTRAMUSCULAR; INTRAVENOUS at 23:34

## 2024-12-11 RX ADMIN — LIOTHYRONINE SODIUM 5 MCG: 5 TABLET ORAL at 04:43

## 2024-12-11 RX ADMIN — LEVOTHYROXINE SODIUM 100 MCG: 0.1 TABLET ORAL at 04:43

## 2024-12-11 RX ADMIN — SENNOSIDES AND DOCUSATE SODIUM 2 TABLET: 50; 8.6 TABLET ORAL at 04:43

## 2024-12-11 RX ADMIN — METHOCARBAMOL 1000 MG: 500 TABLET ORAL at 17:58

## 2024-12-11 RX ADMIN — KETOROLAC TROMETHAMINE 15 MG: 15 INJECTION, SOLUTION INTRAMUSCULAR; INTRAVENOUS at 17:58

## 2024-12-11 RX ADMIN — SENNOSIDES AND DOCUSATE SODIUM 2 TABLET: 50; 8.6 TABLET ORAL at 17:58

## 2024-12-11 RX ADMIN — AMLODIPINE BESYLATE 5 MG: 10 TABLET ORAL at 08:52

## 2024-12-11 ASSESSMENT — PAIN DESCRIPTION - PAIN TYPE
TYPE: ACUTE PAIN
TYPE: ACUTE PAIN;SURGICAL PAIN
TYPE: ACUTE PAIN
TYPE: ACUTE PAIN

## 2024-12-11 NOTE — CARE PLAN
The patient is Stable - Low risk of patient condition declining or worsening    Shift Goals  Clinical Goals: up to chair for meals, ambulate hallway x2 100ft, pulmonary hygiene  Patient Goals: rest, better food  Family Goals: updates    Progress made toward(s) clinical / shift goals:  up to chair x1 today, ambulating to bathroom, encouraged use of IS    Patient is not progressing towards the following goals:      Problem: Knowledge Deficit - Standard  Goal: Patient and family/care givers will demonstrate understanding of plan of care, disease process/condition, diagnostic tests and medications  Outcome: Progressing     Problem: Respiratory  Goal: Patient will achieve/maintain optimum respiratory ventilation and gas exchange  Outcome: Progressing     Problem: Pain - Standard  Goal: Alleviation of pain or a reduction in pain to the patient’s comfort goal  Outcome: Progressing

## 2024-12-11 NOTE — PROGRESS NOTES
Hospital Medicine Daily Progress Note    Date of Service  12/10/2024    Chief Complaint  Paige Gudino is a 79 y.o. female admitted 12/4/2024 with dyspnea.    Hospital Course  Paige Gudino is a 79 y.o. female who presented 12/4/2024 with shortness of breath.  Patient with history of DVT/PE on warfarin, hypertension, hypothyroidism, recurrent pleural effusion, who presented for dyspnea.  On presentation patient requiring 1-2L oxygen, she was admitted for acute hypoxic respiratory failure due to recurrent pleural effusion. CXR shows large left pleural effusion. Patients INR supratherapeutic at 8, given vitamin K with improvement of INR. She received lovenox BID pending surgical evaluation.    Per chart review, patient with history of partial pancreatectomy for mass-benign, complicated by abdominal fluid collection s/p drainage, recurrent left pleural effusion s/p thoracenteses. Patient follows with hepatobiliary/surgical oncology team as outpatient for management.     Surgical oncology consulted thoracic surgery, who plan for pleurodesis 12/9. Patient will need home anticoagulation resumed when cleared by surgery team.    Interval Problem Update  12/10  No acute events overnight  Patient is status post pleurodesis 12/9  Patient weaned off oxygen  Thoracic surgery following, appreciate recommendations  Discussed with patient and daughter at bedside    I have discussed this patient's plan of care and discharge plan at IDT rounds today with Case Management, Nursing, Nursing leadership, and other members of the IDT team.    Consultants/Specialty  Thoracic surgery    Code Status  Full Code    Disposition  The patient is not medically cleared for discharge to home or a post-acute facility.      I have placed the appropriate orders for post-discharge needs.    Review of Systems  Review of Systems   Constitutional:  Negative for chills and fever.   Eyes:  Negative for blurred vision and pain.    Respiratory:  Positive for cough. Negative for shortness of breath.    Cardiovascular:  Negative for chest pain, palpitations and leg swelling.   Gastrointestinal:  Negative for abdominal pain, nausea and vomiting.   Genitourinary:  Negative for dysuria and urgency.   Musculoskeletal:  Negative for back pain.   Skin:  Negative for itching and rash.   Neurological:  Negative for dizziness, sensory change, focal weakness, loss of consciousness and headaches.   Psychiatric/Behavioral:  Negative for substance abuse. The patient does not have insomnia.         Physical Exam  Temp:  [36.4 °C (97.5 °F)-37.2 °C (99 °F)] 36.4 °C (97.5 °F)  Pulse:  [76-97] 87  Resp:  [16-23] 16  BP: (100-141)/(57-72) 121/67  SpO2:  [92 %-99 %] 92 %    Physical Exam  Vitals reviewed.   Constitutional:       General: She is not in acute distress.     Appearance: She is not diaphoretic.   HENT:      Head: Normocephalic and atraumatic.      Right Ear: External ear normal.      Left Ear: External ear normal.      Nose: Nose normal. No congestion.      Mouth/Throat:      Pharynx: No oropharyngeal exudate or posterior oropharyngeal erythema.   Eyes:      Extraocular Movements: Extraocular movements intact.      Pupils: Pupils are equal, round, and reactive to light.   Cardiovascular:      Rate and Rhythm: Normal rate and regular rhythm.   Pulmonary:      Effort: Pulmonary effort is normal. No respiratory distress.      Breath sounds: Normal breath sounds. No wheezing.      Comments: Chest tube in place  Abdominal:      General: Bowel sounds are normal. There is no distension.      Palpations: Abdomen is soft.      Tenderness: There is no abdominal tenderness. There is no guarding.   Musculoskeletal:         General: No swelling. Normal range of motion.      Cervical back: Normal range of motion and neck supple.      Right lower leg: No edema.      Left lower leg: No edema.   Skin:     General: Skin is warm and dry.   Neurological:      General: No  focal deficit present.      Mental Status: She is alert and oriented to person, place, and time.      Cranial Nerves: No cranial nerve deficit.      Sensory: No sensory deficit.      Motor: No weakness.   Psychiatric:         Mood and Affect: Mood normal.         Behavior: Behavior normal.         Fluids    Intake/Output Summary (Last 24 hours) at 12/10/2024 1622  Last data filed at 12/10/2024 1247  Gross per 24 hour   Intake 120 ml   Output 289 ml   Net -169 ml            Laboratory  Recent Labs     12/09/24  0114 12/10/24  0512   WBC 11.6* 23.4*   RBC 4.61 5.10   HEMOGLOBIN 13.0 14.4   HEMATOCRIT 40.0 44.0   MCV 86.8 86.3   MCH 28.2 28.2   MCHC 32.5 32.7   RDW 48.2 47.6   PLATELETCT 649* 689*   MPV 9.3 9.6     Recent Labs     12/09/24  0114 12/10/24  0512   SODIUM 136 137   POTASSIUM 3.7 4.2   CHLORIDE 101 101   CO2 25 25   GLUCOSE 108* 147*   BUN 12 13   CREATININE 0.62 0.55   CALCIUM 8.6 8.9     Recent Labs     12/09/24 0114   INR 1.10               Imaging  DX-CHEST-PORTABLE (1 VIEW)   Final Result         1.  Hazy left pulmonary infiltrates   2.  Small left lung base pneumothorax, slightly increased compared to prior study.      DX-CHEST-PORTABLE (1 VIEW)   Final Result         1.  Small left pneumothorax is now noted just above the left hemidiaphragm at site where a left chest tube enters the left hemithorax.      2.  Chest tube tip is in the medial left upper hemithorax.      3.  Apparent resolution of the majority of the left pleural effusion.      4.  No new endotracheal consolidations.      5.  These findings were discussed with CEFERINO MADERA on 12/09/2024.      DX-CHEST-PORTABLE (1 VIEW)   Final Result      1. Stable large left pleural effusion, obscuring the mid and lower portions of the left hemithorax.   2. No acute findings in the interval.           Assessment/Plan  * Pleural effusion- (present on admission)  Assessment & Plan  Large left-sided recurrent pleural effusion  Fluid analysis has  always been negative per patient  INR 8, given vitamin K; no signs of bleeding  INR improved with vitamin K subtherapeutic 1.28; holding home warfarin  Was on lovenox BID pending pleurodesis  Lovenox held yesterday, pleurodesis today 12/9  Appreciate surgery recommendations and plan of care post op  Status post pleurodesis 12/9    History of pulmonary embolism- (present on admission)  Assessment & Plan  History of medication induced PE and bilateral DVTs  Home warfarin held on presentation  INR 8 improved with vitamin K; currently warfarin is held for surgery  Was on lovenox pending surgery, now held too  Pleurodesis today 12/9  Resume anticoagulation post op once cleared by surgery; patient did not tolerate eliquis or xarelto in the past    Primary hypertension- (present on admission)  Assessment & Plan  Continue home amlodipine  Start as needed labetalol  Adjust as needed    Acute respiratory failure with hypoxia (HCC)- (present on admission)  Assessment & Plan  Secondary to large left pleural effusion  She was satting 87% on room air  Improved on low-flow oxygen  Continuous pulse ox monitoring  Has weaned off oxygen following pleurodesis    Acquired hypothyroidism- (present on admission)  Assessment & Plan  Continue home Synthroid at 100 mcg daily  Continue home Cytomel at 2.5-5  Most recent TSH was approximately 3 months ago was normal at 4.57         VTE prophylaxis: VTE Selection    I have performed a physical exam and reviewed and updated ROS and Plan today (12/10/2024). In review of yesterday's note (12/9/2024), there are no changes except as documented above.    My total time spent caring for the patient on the day of the encounter was 45 minutes. This time includes reviewing the hospital chart vitals, lab tests, and imaging; counseling and educating the patient about their diagnoses; coordinating care with the nurse, pharmacist, and specialists; documenting clinical information in the electronic medical  record.  This does not include time spent on separately billable procedures/tests.

## 2024-12-11 NOTE — PROGRESS NOTES
Bedside report received.  Assessment complete.    A&O x 4. Patient calls appropriately.  Patient ambulates with standby assist. Bed alarm off.   Patient has 0/10 pain. No pharmacological interventions provided at this time. Patient medicated per MAR.  Denies N&V. Tolerating regular diet.  Left chest tube to -20 suction, patent, no air leak noted.  + void, + flatus, - BM, last BM 12/9 per pt.  Patient denies SOB.    Review plan with of care with patient. Call light and personal belongings within reach. Hourly rounding in place. All needs met at this time.

## 2024-12-11 NOTE — CARE PLAN
The patient is Stable - Low risk of patient condition declining or worsening    Shift Goals  Clinical Goals: pain control; pulmonary hygiene  Patient Goals: pain control; comfort  Family Goals: updates    Progress made toward(s) clinical / shift goals:       Patient is not progressing towards the following goals:

## 2024-12-11 NOTE — CARE PLAN
The patient is Stable - Low risk of patient condition declining or worsening    Problem: Knowledge Deficit - Standard  Goal: Patient and family/care givers will demonstrate understanding of plan of care, disease process/condition, diagnostic tests and medications  Outcome: Progressing     Problem: Pain - Standard  Goal: Alleviation of pain or a reduction in pain to the patient’s comfort goal  Outcome: Progressing     Shift Goals  Clinical Goals: pain control, monitor chest tube, rest, comfort  Patient Goals: pain control, rest, comfort  Family Goals: updates    Progress made toward(s) clinical / shift goals:  Patient's pain managed per MAR. Chest tube to -20 suction, patent, no air leaks noted this shift, output monitored this shift. Patient able to rest during this shift.     Patient is not progressing towards the following goals:

## 2024-12-11 NOTE — PROGRESS NOTES
Assumed care of patient at 0700. Patient is alert and oriented, respirations are unlabored and regular on room air at 91%. Standby assist to bathroom, voiding without difficulty. Lung sounds clear, diminished to LLL, popping sound heard on inspiration. Left chest tube in place to -20cm suction with yellow output. Abdomen soft, non tender, +bowel sounds, +flatus, patient hasn't eaten much due to disliking the food. Patient denies pain. Bed in lowest position, call light within reach, patient states no needs at this time.

## 2024-12-12 ENCOUNTER — APPOINTMENT (OUTPATIENT)
Dept: RADIOLOGY | Facility: MEDICAL CENTER | Age: 79
End: 2024-12-12
Attending: STUDENT IN AN ORGANIZED HEALTH CARE EDUCATION/TRAINING PROGRAM
Payer: COMMERCIAL

## 2024-12-12 VITALS
HEIGHT: 64 IN | SYSTOLIC BLOOD PRESSURE: 118 MMHG | HEART RATE: 73 BPM | OXYGEN SATURATION: 97 % | DIASTOLIC BLOOD PRESSURE: 62 MMHG | WEIGHT: 132.06 LBS | BODY MASS INDEX: 22.55 KG/M2 | RESPIRATION RATE: 16 BRPM | TEMPERATURE: 97.5 F

## 2024-12-12 LAB
ANION GAP SERPL CALC-SCNC: 8 MMOL/L (ref 7–16)
BUN SERPL-MCNC: 23 MG/DL (ref 8–22)
CALCIUM SERPL-MCNC: 8.8 MG/DL (ref 8.5–10.5)
CHLORIDE SERPL-SCNC: 104 MMOL/L (ref 96–112)
CO2 SERPL-SCNC: 25 MMOL/L (ref 20–33)
CREAT SERPL-MCNC: 0.72 MG/DL (ref 0.5–1.4)
ERYTHROCYTE [DISTWIDTH] IN BLOOD BY AUTOMATED COUNT: 50 FL (ref 35.9–50)
GFR SERPLBLD CREATININE-BSD FMLA CKD-EPI: 85 ML/MIN/1.73 M 2
GLUCOSE SERPL-MCNC: 108 MG/DL (ref 65–99)
HCT VFR BLD AUTO: 38.5 % (ref 37–47)
HGB BLD-MCNC: 12.2 G/DL (ref 12–16)
MCH RBC QN AUTO: 28 PG (ref 27–33)
MCHC RBC AUTO-ENTMCNC: 31.7 G/DL (ref 32.2–35.5)
MCV RBC AUTO: 88.3 FL (ref 81.4–97.8)
PLATELET # BLD AUTO: 619 K/UL (ref 164–446)
PMV BLD AUTO: 9.5 FL (ref 9–12.9)
POTASSIUM SERPL-SCNC: 4.4 MMOL/L (ref 3.6–5.5)
RBC # BLD AUTO: 4.36 M/UL (ref 4.2–5.4)
SODIUM SERPL-SCNC: 137 MMOL/L (ref 135–145)
WBC # BLD AUTO: 12.2 K/UL (ref 4.8–10.8)

## 2024-12-12 PROCEDURE — 700111 HCHG RX REV CODE 636 W/ 250 OVERRIDE (IP): Mod: JZ | Performed by: STUDENT IN AN ORGANIZED HEALTH CARE EDUCATION/TRAINING PROGRAM

## 2024-12-12 PROCEDURE — 99239 HOSP IP/OBS DSCHRG MGMT >30: CPT | Performed by: STUDENT IN AN ORGANIZED HEALTH CARE EDUCATION/TRAINING PROGRAM

## 2024-12-12 PROCEDURE — 700102 HCHG RX REV CODE 250 W/ 637 OVERRIDE(OP): Performed by: STUDENT IN AN ORGANIZED HEALTH CARE EDUCATION/TRAINING PROGRAM

## 2024-12-12 PROCEDURE — A9270 NON-COVERED ITEM OR SERVICE: HCPCS | Performed by: INTERNAL MEDICINE

## 2024-12-12 PROCEDURE — A9270 NON-COVERED ITEM OR SERVICE: HCPCS | Performed by: STUDENT IN AN ORGANIZED HEALTH CARE EDUCATION/TRAINING PROGRAM

## 2024-12-12 PROCEDURE — 71045 X-RAY EXAM CHEST 1 VIEW: CPT

## 2024-12-12 PROCEDURE — 36415 COLL VENOUS BLD VENIPUNCTURE: CPT

## 2024-12-12 PROCEDURE — 80048 BASIC METABOLIC PNL TOTAL CA: CPT

## 2024-12-12 PROCEDURE — 700111 HCHG RX REV CODE 636 W/ 250 OVERRIDE (IP): Performed by: STUDENT IN AN ORGANIZED HEALTH CARE EDUCATION/TRAINING PROGRAM

## 2024-12-12 PROCEDURE — 700102 HCHG RX REV CODE 250 W/ 637 OVERRIDE(OP): Performed by: INTERNAL MEDICINE

## 2024-12-12 PROCEDURE — 85027 COMPLETE CBC AUTOMATED: CPT

## 2024-12-12 RX ORDER — DABIGATRAN ETEXILATE 150 MG/1
150 CAPSULE ORAL 2 TIMES DAILY
Qty: 60 CAPSULE | Refills: 1 | Status: ON HOLD | OUTPATIENT
Start: 2024-12-13 | End: 2024-12-24

## 2024-12-12 RX ORDER — DABIGATRAN ETEXILATE 150 MG/1
150 CAPSULE ORAL 2 TIMES DAILY
Qty: 60 CAPSULE | Refills: 1 | Status: SHIPPED | OUTPATIENT
Start: 2024-12-12 | End: 2024-12-12

## 2024-12-12 RX ORDER — ENOXAPARIN SODIUM 100 MG/ML
60 INJECTION SUBCUTANEOUS EVERY 12 HOURS
Status: DISCONTINUED | OUTPATIENT
Start: 2024-12-12 | End: 2024-12-12 | Stop reason: HOSPADM

## 2024-12-12 RX ADMIN — KETOROLAC TROMETHAMINE 15 MG: 15 INJECTION, SOLUTION INTRAMUSCULAR; INTRAVENOUS at 12:11

## 2024-12-12 RX ADMIN — SENNOSIDES AND DOCUSATE SODIUM 2 TABLET: 50; 8.6 TABLET ORAL at 05:00

## 2024-12-12 RX ADMIN — KETOROLAC TROMETHAMINE 15 MG: 15 INJECTION, SOLUTION INTRAMUSCULAR; INTRAVENOUS at 05:00

## 2024-12-12 RX ADMIN — AMLODIPINE BESYLATE 5 MG: 10 TABLET ORAL at 08:04

## 2024-12-12 RX ADMIN — LEVOTHYROXINE SODIUM 100 MCG: 0.1 TABLET ORAL at 04:59

## 2024-12-12 RX ADMIN — ENOXAPARIN SODIUM 60 MG: 100 INJECTION SUBCUTANEOUS at 15:06

## 2024-12-12 RX ADMIN — LIOTHYRONINE SODIUM 2.5 MCG: 5 TABLET ORAL at 05:00

## 2024-12-12 ASSESSMENT — COGNITIVE AND FUNCTIONAL STATUS - GENERAL
TURNING FROM BACK TO SIDE WHILE IN FLAT BAD: A LITTLE
SUGGESTED CMS G CODE MODIFIER MOBILITY: CJ
SUGGESTED CMS G CODE MODIFIER DAILY ACTIVITY: CH
MOVING FROM LYING ON BACK TO SITTING ON SIDE OF FLAT BED: A LITTLE
MOBILITY SCORE: 22
DAILY ACTIVITIY SCORE: 24

## 2024-12-12 ASSESSMENT — PAIN DESCRIPTION - PAIN TYPE
TYPE: ACUTE PAIN

## 2024-12-12 NOTE — PROGRESS NOTES
Bedside report received.  Assessment complete.     A&O x 4. Patient calls appropriately.  Patient ambulates with standby assist. Bed alarm off.   Patient has 0/10 pain. No pharmacological interventions provided at this time. Patient medicated per MAR.  Denies N&V. Tolerating regular diet.  Left chest tube to -20 suction, patent, no air leak noted, dressing CDI.  + void, + flatus, - BM, last BM 12/9 per pt.  Patient denies SOB.     Review plan with of care with patient. Call light and personal belongings within reach. Hourly rounding in place. All needs met at this time.

## 2024-12-12 NOTE — DIETARY
Nutrition Update:    Day 7 of admit.  Paige Gudino is a 79 y.o. female with admitting DX of Pleural effusion    Patient being followed to optimize nutrition.    Current Diet: Regular    Problem: Nutritional:  Goal: Achieve adequate nutritional intake  Description: Patient will consume 50% of meals  Outcome: Progressing    PO per flow sheet has been variable (<%).  RD will follow for consistent, adequate PO intake.  Consider oral nutrition supplements to bolster nutrition as needed.

## 2024-12-12 NOTE — PROGRESS NOTES
"Assumed care of patient from night shift RN at 7.  Patient is alert and oriented times 4, denies pain at this time.  VSS /62   Pulse 67   Temp 36.3 °C (97.3 °F) (Temporal)   Resp 16   Ht 1.626 m (5' 4\")   Wt 59.9 kg (132 lb 0.9 oz)   LMP  (LMP Unknown)   SpO2 90%   BMI 22.67 kg/m²   PIV in the RFA, patent and saline locked.  On RA with saturations in the mid 90s.  IS encouraged.  Last BM 12/9, hyperactive bowel sounds this AM.  Regular diet, tolerating well.  CT to the L to -20cm of suction, yellow output noted.  Dressing CDI  3 lap sites to the L chest, well approximated with dermabond and ИВАН  Patient is a SBA, demonstrates steady gait, minimal assistance required.  POC discussed for the day, bed is locked and in the lowest position, call light is within reach.  All needs are met at this time, hourly rounding is in place.  "

## 2024-12-12 NOTE — DISCHARGE SUMMARY
"Discharge Summary    CHIEF COMPLAINT ON ADMISSION  Chief Complaint   Patient presents with    Shortness of Breath     X5 days   \"It comes and goes\"   Hx of plural effusion L lung  \"I have been drained 3 times\"     Endorses weakness and fatigue        Reason for Admission  Post Op Complication/SOB     Admission Date  12/4/2024    CODE STATUS  Full Code    HPI & HOSPITAL COURSE  Paige Gudino is a very pleasant 79-year-old woman who presented with shortness of breath.  She has a history of previous breast cancer status post mastectomy 34 years ago and invasive grade 2 mammary carcinoma in 2022, benign pancreatic serous cystadenoma, more recent retroperitoneal abscess with drain placement in August and removal in September.  She also has a history of pulmonary embolism and atrial thrombus, cannot tolerate Eliquis or Xarelto due to symptoms such as abdominal cramping and diarrhea, has been on warfarin for anticoagulation.  She was apparently lost to follow-up with [facility] anticoagulation clinic.  She also has a history of recurrent pleural effusion with multiple thoracenteses.  She was found to have a recurrent pleural effusion and acute hypoxic respiratory failure.  Thoracic surgery was consulted, Dr. Saul, and left-sided thorascopic pleurodesis was performed on 12/9 with chest tube to suction.  She tolerated it well and was quickly weaned off of oxygen.  On 12/12 chest tube was removed and repeat chest x-ray showed no pneumothorax.  She was cleared for discharge by thoracic surgery.  Of note when she presented her INR was 8 and was reversed with vitamin K.  She was placed on therapeutic Lovenox perioperatively.  I discussed with her resuming anticoagulation, however resuming with warfarin is complicated given the lack of reliable follow-up with current anticoagulation clinic and inability to reliably follow labs or transition with Lovenox.  Had extensive discussion with patient,  as well as " pharmacy at bedside.  Through shared decision making we ultimately decided on a trial of dabigatran, discussed with pharmacy and there are no contraindications to starting dabigatran.  She was written for 2 months of dabigatran and was given a referral to anticoagulation clinic.  She was counseled on side effects and given return precautions which she expressed understanding for.  She was very eager for discharge home and declined further monitoring.  She has to follow-up with her PCP and establish with anticoagulation clinic.    Therefore, she is discharged in good and stable condition to home with close outpatient follow-up.    The patient met 2-midnight criteria for an inpatient stay at the time of discharge.    Discharge Date  12/12    FOLLOW UP ITEMS POST DISCHARGE  Follow-up with anticoagulation clinic and PCP    DISCHARGE DIAGNOSES  Principal Problem:    Pleural effusion (POA: Yes)  Active Problems:    Acquired hypothyroidism (POA: Yes)    Acute respiratory failure with hypoxia (HCC) (POA: Yes)    Primary hypertension (POA: Yes)    History of pulmonary embolism (POA: Yes)  Resolved Problems:    * No resolved hospital problems. *      FOLLOW UP  Future Appointments   Date Time Provider Department Center   12/30/2024  1:20 PM BLANE GLEASON MG 1 [facility]   4/22/2025  4:00 PM [facility] Saul M.D[facility]  [address] [id number] [phone]    Schedule an appointment as soon as possible for a visit in 1 week(s)        MEDICATIONS ON DISCHARGE     Medication List        START taking these medications        Instructions   dabigatran 150 MG Caps capsule  Start taking on: December 13, 2024  Commonly known as: Pradaxa   Take 1 Capsule by mouth 2 times a day for 60 days.  Dose: 150 mg            CONTINUE taking these medications        Instructions   amLODIPine 5 MG Tabs  Commonly known as: Norvasc   Take 1 Tablet by mouth every day.  Dose: 5 mg     ibandronate 150  MG tablet  Commonly known as: Boniva   Take 1 Tablet by mouth every 30 days.  Dose: 150 mg     levothyroxine 100 MCG Tabs  Commonly known as: Synthroid   Take 100 mcg by mouth every morning on an empty stomach. ONLY GENERIC  Dose: 100 mcg     liothyronine 5 MCG Tabs  Commonly known as: Cytomel   Take 2.5-5 mcg by mouth see administration instructions. 5 mcg on Monday, Wednesday, Friday.  2.5 mcg on all other days  Dose: 2.5-5 mcg     ondansetron 4 MG Tbdp  Commonly known as: Zofran ODT   Take 1 Tablet by mouth every 6 hours as needed for Nausea/Vomiting.  Dose: 4 mg            STOP taking these medications      warfarin 5 MG Tabs  Commonly known as: Coumadin              Allergies  Allergies   Allergen Reactions    Synthroid [Fd&C Red #40 Al Paul-Levothyroxine] Hives and Unspecified     Hives, Brand specific. Some brands are ok and some are not    Tape Unspecified     Skin degradation with use of tegaderm     [place] Thyroid [Thyroid] Diarrhea     diarrhea       DIET  Orders Placed This Encounter   Procedures    Diet Order Diet: Regular     Standing Status:   Standing     Number of Occurrences:   1     Order Specific Question:   Diet:     Answer:   Regular [1]    Discontinue Diet Tray     Standing Status:   Standing     Number of Occurrences:   1       ACTIVITY  As tolerated.  Weight bearing as tolerated    CONSULTATIONS  Thoracic surgery, surgical oncology    PROCEDURES  Pleurodesis    LABORATORY  Lab Results   Component Value Date    SODIUM 137 12/12/2024    POTASSIUM 4.4 12/12/2024    CHLORIDE 104 12/12/2024    CO2 25 12/12/2024    GLUCOSE 108 (H) 12/12/2024    BUN 23 (H) 12/12/2024    CREATININE 0.72 12/12/2024        Lab Results   Component Value Date    WBC 12.2 (H) 12/12/2024    HEMOGLOBIN 12.2 12/12/2024    HEMATOCRIT 38.5 12/12/2024    PLATELETCT 619 (H) 12/12/2024        Total time of the discharge process exceeds 49 minutes.

## 2024-12-12 NOTE — CARE PLAN
Problem: Knowledge Deficit - Standard  Goal: Patient and family/care givers will demonstrate understanding of plan of care, disease process/condition, diagnostic tests and medications  Outcome: Progressing     Problem: Respiratory  Goal: Patient will achieve/maintain optimum respiratory ventilation and gas exchange  Outcome: Progressing     Problem: Pain - Standard  Goal: Alleviation of pain or a reduction in pain to the patient’s comfort goal  Outcome: Progressing   The patient is Stable - Low risk of patient condition declining or worsening    Shift Goals  Clinical Goals: pain control, mobility  Patient Goals: have  abowel movement  Family Goals: pain control    Progress made toward(s) clinical / shift goals:  medicated per MAR for pain    Patient is not progressing towards the following goals:

## 2024-12-12 NOTE — PROGRESS NOTES
Discharging Patient home per physician order.  Discharged with family.  Demonstrated understanding of discharge instructions, follow up appointments, home medications, prescriptions, home care for surgical wound, and nursing care instructions for thoracentesis.  Ambulating without assistance, voiding without difficulty, pain well controlled, tolerating oral medications, oxygen saturation greater than 90% , tolerating diet. Educational handouts given and discussed.  Verbalized understanding of discharge instructions and educational handouts.  Stated several reasons why to return to ED or seek medical attention. All questions answered.  Belongings and dressing supplies with patient at time of discharge.

## 2024-12-12 NOTE — CARE PLAN
The patient is Stable - Low risk of patient condition declining or worsening    Problem: Knowledge Deficit - Standard  Goal: Patient and family/care givers will demonstrate understanding of plan of care, disease process/condition, diagnostic tests and medications  Outcome: Progressing     Problem: Respiratory  Goal: Patient will achieve/maintain optimum respiratory ventilation and gas exchange  Outcome: Progressing     Problem: Pain - Standard  Goal: Alleviation of pain or a reduction in pain to the patient’s comfort goal  Outcome: Progressing     Shift Goals  Clinical Goals: pain control, monitor chest tube, rest, comfort  Patient Goals: pain control, rest, comfort  Family Goals: updates    Progress made toward(s) clinical / shift goals:  Patient's pain managed per MAR. Chest tube to -20 suction, patent, no air leaks noted, dressing CDI. Patient able to rest during this shift.     Patient is not progressing towards the following goals:

## 2024-12-12 NOTE — PROGRESS NOTES
Thoracic surgery update     chest tube removed at bedside at 9 AM.  I have ordered a chest x-ray for 1 PM.  If this chest x-ray does not have any significant pneumothorax she is okay for discharge today from a surgical standpoint.  She should go home with oxycodone and a muscle relaxer such as Robaxin to help with the postoperative pain.  She should also continue to use her incentive spirometry at home.    Glenn Saul MD  Thoracic & General Surgeon  [facility]  [phone]

## 2024-12-13 ENCOUNTER — TELEPHONE (OUTPATIENT)
Dept: VASCULAR LAB | Facility: MEDICAL CENTER | Age: 79
End: 2024-12-13
Payer: COMMERCIAL

## 2024-12-13 NOTE — TELEPHONE ENCOUNTER
[facility]     Received anticoagulation referral from Dr. Corcoran on 12/12/24.     Called and spoke to patient. Initial visit scheduled on 12/20/24 at [facility]     Insurance: [facility]  PCP: [facility]  Locations to be seen: Any     If no response by 1/12/25 (1 month from referral date) OR 2 no shows/cancellations, will remove from referral list and send FYI to referring provider    Rishabh Atwood, PharmD, BCACP  [facility]  Phone: [phone], Fax [phone]

## 2024-12-17 ENCOUNTER — APPOINTMENT (OUTPATIENT)
Dept: MEDICAL GROUP | Age: 79
End: 2024-12-17
Payer: COMMERCIAL

## 2024-12-19 ENCOUNTER — APPOINTMENT (OUTPATIENT)
Dept: RADIOLOGY | Facility: MEDICAL CENTER | Age: 79
DRG: 371 | End: 2024-12-19
Attending: EMERGENCY MEDICINE
Payer: COMMERCIAL

## 2024-12-19 ENCOUNTER — HOSPITAL ENCOUNTER (INPATIENT)
Facility: MEDICAL CENTER | Age: 79
LOS: 5 days | DRG: 371 | End: 2024-12-24
Attending: EMERGENCY MEDICINE | Admitting: STUDENT IN AN ORGANIZED HEALTH CARE EDUCATION/TRAINING PROGRAM
Payer: COMMERCIAL

## 2024-12-19 DIAGNOSIS — J90 LOCULATED PLEURAL EFFUSION: ICD-10-CM

## 2024-12-19 DIAGNOSIS — K65.1 LEFT UPPER QUADRANT ABDOMINAL ABSCESS (HCC): ICD-10-CM

## 2024-12-19 LAB
ALBUMIN SERPL BCP-MCNC: 3 G/DL (ref 3.2–4.9)
ALBUMIN/GLOB SERPL: 0.9 G/DL
ALP SERPL-CCNC: 98 U/L (ref 30–99)
ALT SERPL-CCNC: <5 U/L (ref 2–50)
ANION GAP SERPL CALC-SCNC: 9 MMOL/L (ref 7–16)
APTT PPP: 35.1 SEC (ref 24.7–36)
AST SERPL-CCNC: 17 U/L (ref 12–45)
BASOPHILS # BLD AUTO: 0.1 % (ref 0–1.8)
BASOPHILS # BLD: 0.02 K/UL (ref 0–0.12)
BILIRUB SERPL-MCNC: 1 MG/DL (ref 0.1–1.5)
BUN SERPL-MCNC: 11 MG/DL (ref 8–22)
CALCIUM ALBUM COR SERPL-MCNC: 9.4 MG/DL (ref 8.5–10.5)
CALCIUM SERPL-MCNC: 8.6 MG/DL (ref 8.5–10.5)
CHLORIDE SERPL-SCNC: 101 MMOL/L (ref 96–112)
CO2 SERPL-SCNC: 25 MMOL/L (ref 20–33)
CREAT SERPL-MCNC: 0.62 MG/DL (ref 0.5–1.4)
CRP SERPL HS-MCNC: 7.91 MG/DL (ref 0–0.75)
EKG IMPRESSION: NORMAL
EOSINOPHIL # BLD AUTO: 0.03 K/UL (ref 0–0.51)
EOSINOPHIL NFR BLD: 0.2 % (ref 0–6.9)
ERYTHROCYTE [DISTWIDTH] IN BLOOD BY AUTOMATED COUNT: 51.8 FL (ref 35.9–50)
GFR SERPLBLD CREATININE-BSD FMLA CKD-EPI: 91 ML/MIN/1.73 M 2
GLOBULIN SER CALC-MCNC: 3.4 G/DL (ref 1.9–3.5)
GLUCOSE SERPL-MCNC: 108 MG/DL (ref 65–99)
HCT VFR BLD AUTO: 42.8 % (ref 37–47)
HGB BLD-MCNC: 13.7 G/DL (ref 12–16)
IMM GRANULOCYTES # BLD AUTO: 0.16 K/UL (ref 0–0.11)
IMM GRANULOCYTES NFR BLD AUTO: 1 % (ref 0–0.9)
INR PPP: 1.77 (ref 0.87–1.13)
LACTATE SERPL-SCNC: 2.2 MMOL/L (ref 0.5–2)
LYMPHOCYTES # BLD AUTO: 1.32 K/UL (ref 1–4.8)
LYMPHOCYTES NFR BLD: 8.1 % (ref 22–41)
MAGNESIUM SERPL-MCNC: 1.9 MG/DL (ref 1.5–2.5)
MCH RBC QN AUTO: 28.6 PG (ref 27–33)
MCHC RBC AUTO-ENTMCNC: 32 G/DL (ref 32.2–35.5)
MCV RBC AUTO: 89.4 FL (ref 81.4–97.8)
MONOCYTES # BLD AUTO: 1.41 K/UL (ref 0–0.85)
MONOCYTES NFR BLD AUTO: 8.7 % (ref 0–13.4)
NEUTROPHILS # BLD AUTO: 13.33 K/UL (ref 1.82–7.42)
NEUTROPHILS NFR BLD: 81.9 % (ref 44–72)
NRBC # BLD AUTO: 0 K/UL
NRBC BLD-RTO: 0 /100 WBC (ref 0–0.2)
PHOSPHATE SERPL-MCNC: 3 MG/DL (ref 2.5–4.5)
PLATELET # BLD AUTO: 810 K/UL (ref 164–446)
PMV BLD AUTO: 9.5 FL (ref 9–12.9)
POTASSIUM SERPL-SCNC: 3.9 MMOL/L (ref 3.6–5.5)
PROCALCITONIN SERPL-MCNC: 0.07 NG/ML
PROT SERPL-MCNC: 6.4 G/DL (ref 6–8.2)
PROTHROMBIN TIME: 20.7 SEC (ref 12–14.6)
RBC # BLD AUTO: 4.79 M/UL (ref 4.2–5.4)
SODIUM SERPL-SCNC: 135 MMOL/L (ref 135–145)
TROPONIN T SERPL-MCNC: 12 NG/L (ref 6–19)
WBC # BLD AUTO: 16.3 K/UL (ref 4.8–10.8)

## 2024-12-19 PROCEDURE — 700105 HCHG RX REV CODE 258: Performed by: EMERGENCY MEDICINE

## 2024-12-19 PROCEDURE — 83605 ASSAY OF LACTIC ACID: CPT

## 2024-12-19 PROCEDURE — 36415 COLL VENOUS BLD VENIPUNCTURE: CPT

## 2024-12-19 PROCEDURE — 700102 HCHG RX REV CODE 250 W/ 637 OVERRIDE(OP): Performed by: STUDENT IN AN ORGANIZED HEALTH CARE EDUCATION/TRAINING PROGRAM

## 2024-12-19 PROCEDURE — 71275 CT ANGIOGRAPHY CHEST: CPT

## 2024-12-19 PROCEDURE — 83735 ASSAY OF MAGNESIUM: CPT

## 2024-12-19 PROCEDURE — 99285 EMERGENCY DEPT VISIT HI MDM: CPT

## 2024-12-19 PROCEDURE — 96365 THER/PROPH/DIAG IV INF INIT: CPT

## 2024-12-19 PROCEDURE — 84100 ASSAY OF PHOSPHORUS: CPT

## 2024-12-19 PROCEDURE — 87040 BLOOD CULTURE FOR BACTERIA: CPT | Mod: 91

## 2024-12-19 PROCEDURE — 86140 C-REACTIVE PROTEIN: CPT

## 2024-12-19 PROCEDURE — 80053 COMPREHEN METABOLIC PANEL: CPT

## 2024-12-19 PROCEDURE — 700111 HCHG RX REV CODE 636 W/ 250 OVERRIDE (IP): Mod: JZ | Performed by: EMERGENCY MEDICINE

## 2024-12-19 PROCEDURE — 85730 THROMBOPLASTIN TIME PARTIAL: CPT

## 2024-12-19 PROCEDURE — 84484 ASSAY OF TROPONIN QUANT: CPT

## 2024-12-19 PROCEDURE — 700117 HCHG RX CONTRAST REV CODE 255: Performed by: EMERGENCY MEDICINE

## 2024-12-19 PROCEDURE — 96367 TX/PROPH/DG ADDL SEQ IV INF: CPT

## 2024-12-19 PROCEDURE — 74177 CT ABD & PELVIS W/CONTRAST: CPT

## 2024-12-19 PROCEDURE — 93005 ELECTROCARDIOGRAM TRACING: CPT | Mod: TC | Performed by: EMERGENCY MEDICINE

## 2024-12-19 PROCEDURE — 99223 1ST HOSP IP/OBS HIGH 75: CPT | Performed by: STUDENT IN AN ORGANIZED HEALTH CARE EDUCATION/TRAINING PROGRAM

## 2024-12-19 PROCEDURE — 93005 ELECTROCARDIOGRAM TRACING: CPT | Mod: TC

## 2024-12-19 PROCEDURE — 770006 HCHG ROOM/CARE - MED/SURG/GYN SEMI*

## 2024-12-19 PROCEDURE — 85610 PROTHROMBIN TIME: CPT

## 2024-12-19 PROCEDURE — 85025 COMPLETE CBC W/AUTO DIFF WBC: CPT

## 2024-12-19 PROCEDURE — 84145 PROCALCITONIN (PCT): CPT

## 2024-12-19 PROCEDURE — 71045 X-RAY EXAM CHEST 1 VIEW: CPT

## 2024-12-19 PROCEDURE — A9270 NON-COVERED ITEM OR SERVICE: HCPCS | Performed by: STUDENT IN AN ORGANIZED HEALTH CARE EDUCATION/TRAINING PROGRAM

## 2024-12-19 RX ORDER — HYDROCODONE BITARTRATE AND ACETAMINOPHEN 5; 325 MG/1; MG/1
1 TABLET ORAL EVERY 6 HOURS PRN
Status: DISCONTINUED | OUTPATIENT
Start: 2024-12-19 | End: 2024-12-24 | Stop reason: HOSPADM

## 2024-12-19 RX ORDER — LIOTHYRONINE SODIUM 5 UG/1
5 TABLET ORAL
Status: DISCONTINUED | OUTPATIENT
Start: 2024-12-20 | End: 2024-12-24 | Stop reason: HOSPADM

## 2024-12-19 RX ORDER — WARFARIN SODIUM 5 MG/1
5 TABLET ORAL DAILY
Status: ON HOLD | COMMUNITY
End: 2024-12-24

## 2024-12-19 RX ORDER — LIOTHYRONINE SODIUM 5 UG/1
2.5-5 TABLET ORAL SEE ADMIN INSTRUCTIONS
Status: DISCONTINUED | OUTPATIENT
Start: 2024-12-19 | End: 2024-12-19

## 2024-12-19 RX ORDER — AMOXICILLIN 250 MG
2 CAPSULE ORAL EVERY EVENING
Status: DISCONTINUED | OUTPATIENT
Start: 2024-12-19 | End: 2024-12-24 | Stop reason: HOSPADM

## 2024-12-19 RX ORDER — POLYETHYLENE GLYCOL 3350 17 G/17G
1 POWDER, FOR SOLUTION ORAL
Status: DISCONTINUED | OUTPATIENT
Start: 2024-12-19 | End: 2024-12-24 | Stop reason: HOSPADM

## 2024-12-19 RX ORDER — ONDANSETRON 4 MG/1
4 TABLET, ORALLY DISINTEGRATING ORAL EVERY 4 HOURS PRN
Status: DISCONTINUED | OUTPATIENT
Start: 2024-12-19 | End: 2024-12-24 | Stop reason: HOSPADM

## 2024-12-19 RX ORDER — LABETALOL HYDROCHLORIDE 5 MG/ML
10 INJECTION, SOLUTION INTRAVENOUS EVERY 4 HOURS PRN
Status: DISCONTINUED | OUTPATIENT
Start: 2024-12-19 | End: 2024-12-24 | Stop reason: HOSPADM

## 2024-12-19 RX ORDER — ACETAMINOPHEN 325 MG/1
650 TABLET ORAL EVERY 6 HOURS PRN
Status: DISCONTINUED | OUTPATIENT
Start: 2024-12-19 | End: 2024-12-24 | Stop reason: HOSPADM

## 2024-12-19 RX ORDER — ONDANSETRON 2 MG/ML
4 INJECTION INTRAMUSCULAR; INTRAVENOUS EVERY 4 HOURS PRN
Status: DISCONTINUED | OUTPATIENT
Start: 2024-12-19 | End: 2024-12-24 | Stop reason: HOSPADM

## 2024-12-19 RX ORDER — LIOTHYRONINE SODIUM 5 UG/1
2.5 TABLET ORAL
Status: DISCONTINUED | OUTPATIENT
Start: 2024-12-21 | End: 2024-12-24 | Stop reason: HOSPADM

## 2024-12-19 RX ADMIN — PIPERACILLIN AND TAZOBACTAM 4.5 G: 4; .5 INJECTION, POWDER, FOR SOLUTION INTRAVENOUS at 20:28

## 2024-12-19 RX ADMIN — IOHEXOL 100 ML: 350 INJECTION, SOLUTION INTRAVENOUS at 19:00

## 2024-12-19 RX ADMIN — VANCOMYCIN HYDROCHLORIDE 1500 MG: 5 INJECTION, POWDER, LYOPHILIZED, FOR SOLUTION INTRAVENOUS at 21:04

## 2024-12-19 RX ADMIN — SENNOSIDES AND DOCUSATE SODIUM 2 TABLET: 50; 8.6 TABLET ORAL at 21:00

## 2024-12-19 ASSESSMENT — LIFESTYLE VARIABLES
TOTAL SCORE: 0
TOTAL SCORE: 0
EVER FELT BAD OR GUILTY ABOUT YOUR DRINKING: NO
TOTAL SCORE: 0
DOES PATIENT WANT TO STOP DRINKING: NO
ON A TYPICAL DAY WHEN YOU DRINK ALCOHOL HOW MANY DRINKS DO YOU HAVE: 0
AVERAGE NUMBER OF DAYS PER WEEK YOU HAVE A DRINK CONTAINING ALCOHOL: 0
HOW MANY TIMES IN THE PAST YEAR HAVE YOU HAD 5 OR MORE DRINKS IN A DAY: 0
EVER HAD A DRINK FIRST THING IN THE MORNING TO STEADY YOUR NERVES TO GET RID OF A HANGOVER: NO
HAVE PEOPLE ANNOYED YOU BY CRITICIZING YOUR DRINKING: NO
HAVE YOU EVER FELT YOU SHOULD CUT DOWN ON YOUR DRINKING: NO
ALCOHOL_USE: YES
CONSUMPTION TOTAL: NEGATIVE

## 2024-12-19 ASSESSMENT — SOCIAL DETERMINANTS OF HEALTH (SDOH)

## 2024-12-19 ASSESSMENT — FIBROSIS 4 INDEX
FIB4 SCORE: 0.77
FIB4 SCORE: 0.92

## 2024-12-19 NOTE — ED TRIAGE NOTES
"Chief Complaint   Patient presents with    Shortness of Breath     Pt BIB EMS from home for SOB onset this morning. Pt says she was admitted on 12/4 for recurrent pleural effusions requiring multiple thoracentesis. Pt also says she was hospitalized recently for DVT, PE and PNA. Pt tachypneic for EMS with respirations in the 30's. Pt arrives in no respiratory distress. Pt states \"I need to get off Eliquis, I believe I am allergic.\" No interventions by EMS.    Blood Pressure : 139/75, Pulse: 88, Respiration: (!) 21, Temperature: 37.4 °C (99.4 °F), Height: 162.6 cm (5' 4\"), Weight: 56.7 kg (125 lb), BMI (Calculated): 21.46, BSA (Calculated): 1.6, Pulse Oximetry: 93 %, O2 Delivery Device: None - Room Air  "

## 2024-12-19 NOTE — ED PROVIDER NOTES
ED Provider Note  CHIEF COMPLAINT  Chief Complaint   Patient presents with    Shortness of Breath       HPI  Paige Gudino is a 79 y.o. female who presents for evaluation of shortness of breath.  Patient notes she can feel pressure in the left lower portion of her chest and left upper quadrant.  She notes no fevers or chills but does have a decreased appetite.  She is concerned that she might have fluid collecting once again either in her abdomen or in her chest.  She notes that she had been recovering well from her recent pleurodesis of the left lung due to a large effusion.  Patient has a past medical history of breast cancer on the left and has had surgical evaluation of a pancreatic cyst which is apparently benign.  She had a drain in this until September but it was removed as the drain had stopped draining.  EXTERNAL RECORDS REVIEWED  Reviewed extensive medical records dating back to this summer.  Patient has a history of invasive grade 2 mammary carcinoma.  She had resection of this in early August 2022.  She underwent a CT and June 2024 and noted a large solid and cystic necrotic pancreatic tail mass.  Patient had exploratory laparotomy performed on August 5, 2024 by Dr. Yvon Malik.  She had an ultrasound survey of the pancreas, distal pancreatectomy and splenectomy.  Pathology noted benign pancreatic serous cystadenoma with margins negative for neoplasm at that time.  Spleen was normal on pathology.  She developed shortness of breath on 14 August and CT showed bilateral segmental and subsegmental pulmonary emboli.  There was also fluid collection with postop changes of splenectomy and distal pancreatectomy.  It is felt this could be a pancreatic tail leak or postop seroma.  She was started on antibiotics and underwent a CT-guided retroperitoneal abscess drainage and left upper quadrant retroperitoneal catheter drainage with CT guidance.  She also underwent a right atrial lobectomy and this  was found to be a benign thrombus.  She was discharged with a drain in place and eventually had the drain pulled once drainage was below 25 cc/day.  ROS  Constitutional: No fevers or chills.  Fatigue noted.  Skin: No rashes  HEENT: No sore throat, or runny nose  Neck: No neck pain  Chest: Left low anterior chest pain with deep inspiration.  Pulm: Shortness of breath noted.  Gastrointestinal: No nausea, vomiting, diarrhea, constipation, bloating, melena, hematochezia   Genitourinary: No dysuria or hematuria  Musculoskeletal: No pain, swelling, or focal weakness  Neurologic: No sensory or focal motor changes to extremities. No confusion or disorientation.              Heme: No bleeding or bruising problems.   Immuno: History of breast cancer.        LIMITATION TO HISTORY   none  OUTSIDE HISTORIAN(S):  none        PAST FAM HISTORY  Family History   Problem Relation Age of Onset    Cancer Mother         Ovarian    Ovarian Cancer Mother     Dementia Father     Heart Disease Father     Hyperlipidemia Neg Hx        PAST MEDICAL HISTORY   has a past medical history of Anesthesia, Cancer (HCC), Cancer of overlapping sites of left female breast (HCC), Cataract (2024), High cholesterol, Hypothyroidism, PONV (postoperative nausea and vomiting) (1987), and Thyroid nodule.    SOCIAL HISTORY  Social History     Tobacco Use    Smoking status: Never     Passive exposure: Past    Smokeless tobacco: Never   Vaping Use    Vaping status: Never Used   Substance and Sexual Activity    Alcohol use: Never    Drug use: Never    Sexual activity: Not Currently     Partners: Male     Birth control/protection: Abstinence, Male Sterilization, Post-Menopausal     Comment:        SURGICAL HISTORY   has a past surgical history that includes other orthopedic surgery (2019); other (1987); other (1988); other (2001); mastectomy, partial (Left, 08/01/2022); primary c section; lumpectomy; laminotomy; ultrasonic guidance, intraoperative (8/5/2024);  "pancreatectomy (N/A, 8/5/2024); splenectomy (N/A, 8/5/2024); creation, flap, omentum (N/A, 8/5/2024); and thoracoscopy,dx no bx (Left, 12/9/2024).    CURRENT MEDICATIONS  Home Medications       Reviewed by Alyce Barron R.N. (Registered Nurse) on 12/19/24 at 1254  Med List Status: Not Addressed     Medication Last Dose Status   amLODIPine (NORVASC) 5 MG Tab  Active   dabigatran (PRADAXA) 150 MG Cap capsule  Active   ibandronate (BONIVA) 150 MG tablet  Active   levothyroxine (SYNTHROID) 100 MCG Tab  Active   liothyronine (CYTOMEL) 5 MCG Tab  Active   ondansetron (ZOFRAN ODT) 4 MG TABLET DISPERSIBLE  Active                  Audit from Redirected Encounters    **Home medications have not yet been reviewed for this encounter**          ALLERGIES  Allergies   Allergen Reactions    Synthroid [Fd&C Red #40 Al Paul-Levothyroxine] Hives and Unspecified     Hives, Brand specific. Some brands are ok and some are not    Tape Unspecified     Skin degradation with use of tegaderm     [place] Thyroid [Thyroid] Diarrhea     diarrhea       PHYSICAL EXAM  VITAL SIGNS: /69   Pulse 72   Temp 37.4 °C (99.4 °F) (Oral)   Resp 16   Ht 1.626 m (5' 4\")   Wt 56.7 kg (125 lb)   LMP  (LMP Unknown)   SpO2 92%   BMI 21.46 kg/m²    Gen: Appears tired, otherwise alert and in no distress.  HEENT: No signs of trauma, Bilateral external ears normal, Nose normal. Conjunctiva normal, Non-icteric.   Neck:  No tenderness, Supple, No masses  Lymphatic: No cervical lymphadenopathy noted.   Cardiovascular: Regular rate and rhythm, no murmurs.  Capillary refill less than 3 seconds to all extremities, 2+ distal pulses.  Thorax & Lungs: Mild tachypnea, no increased work of breathing.  Diminished breath sounds in left base  Abdomen: Bowel sounds normal, Soft, moderate tenderness left upper quadrant, No masses, No pulsatile masses. No Guarding or rebound  Skin: Warm, Dry, No erythema, No rash noted to exposed areas.   Extremities: Intact distal " pulses, No edema  Neurologic: Alert , no facial droop, grossly normal coordination and strength  Psychiatric: Affect pleasant    INITIAL IMPRESSION  Patient arrives for evaluation of symptoms that are reminiscent of the abdominal pain and shortness of breath she had with the fluid collection in her left upper quadrant which was apparently diagnosed as a pancreatic cystadenoma of a benign nature.  Given her recent interpretation and pleurodesis, she is at high risk for infective process although she does not appear overtly ill or toxic.  She states understanding we will likely need to scan her chest abdomen and pelvis once the labs have returned.    ED observation? No    LABS  Results for orders placed or performed during the hospital encounter of 12/19/24   CBC WITH DIFFERENTIAL    Collection Time: 12/19/24  1:00 PM   Result Value Ref Range    WBC 16.3 (H) 4.8 - 10.8 K/uL    RBC 4.79 4.20 - 5.40 M/uL    Hemoglobin 13.7 12.0 - 16.0 g/dL    Hematocrit 42.8 37.0 - 47.0 %    MCV 89.4 81.4 - 97.8 fL    MCH 28.6 27.0 - 33.0 pg    MCHC 32.0 (L) 32.2 - 35.5 g/dL    RDW 51.8 (H) 35.9 - 50.0 fL    Platelet Count 810 (H) 164 - 446 K/uL    MPV 9.5 9.0 - 12.9 fL    Neutrophils-Polys 81.90 (H) 44.00 - 72.00 %    Lymphocytes 8.10 (L) 22.00 - 41.00 %    Monocytes 8.70 0.00 - 13.40 %    Eosinophils 0.20 0.00 - 6.90 %    Basophils 0.10 0.00 - 1.80 %    Immature Granulocytes 1.00 (H) 0.00 - 0.90 %    Nucleated RBC 0.00 0.00 - 0.20 /100 WBC    Neutrophils (Absolute) 13.33 (H) 1.82 - 7.42 K/uL    Lymphs (Absolute) 1.32 1.00 - 4.80 K/uL    Monos (Absolute) 1.41 (H) 0.00 - 0.85 K/uL    Eos (Absolute) 0.03 0.00 - 0.51 K/uL    Baso (Absolute) 0.02 0.00 - 0.12 K/uL    Immature Granulocytes (abs) 0.16 (H) 0.00 - 0.11 K/uL    NRBC (Absolute) 0.00 K/uL   PROTHROMBIN TIME (INR)    Collection Time: 12/19/24  1:00 PM   Result Value Ref Range    PT 20.7 (H) 12.0 - 14.6 sec    INR 1.77 (H) 0.87 - 1.13   APTT    Collection Time: 12/19/24  1:00 PM    Result Value Ref Range    APTT 35.1 24.7 - 36.0 sec   EKG    Collection Time: 24  1:08 PM   Result Value Ref Range    Report       [facility]    Test Date:  2024  Pt Name:    ROLANDO VERDIN           Department: ER  MRN:        [id number]                      Room:        02  Gender:     Female                       Technician: [id number]  :        1945                   Requested By:ER TRIAGE PROTOCOL  Order #:    [id number]                    Reading MD:    Measurements  Intervals                                Axis  Rate:       84                           P:          -19  FL:         233                          QRS:        21  QRSD:       78                           T:          -7  QT:         357  QTc:        422    Interpretive Statements  Sinus rhythm  Atrial premature complex  Prolonged FL interval  Low voltage, precordial leads  Borderline T abnormalities, diffuse leads  Compared to ECG 2024 11:36:26  Atrial premature complex(es) now present  First degree AV block now present  T-wave abnormality now present     LACTIC ACID    Collection Time: 24  1:49 PM   Result Value Ref Range    Lactic Acid 2.2 (H) 0.5 - 2.0 mmol/L   Comp Metabolic Panel    Collection Time: 24  3:37 PM   Result Value Ref Range    Sodium 135 135 - 145 mmol/L    Potassium 3.9 3.6 - 5.5 mmol/L    Chloride 101 96 - 112 mmol/L    Co2 25 20 - 33 mmol/L    Anion Gap 9.0 7.0 - 16.0    Glucose 108 (H) 65 - 99 mg/dL    Bun 11 8 - 22 mg/dL    Creatinine 0.62 0.50 - 1.40 mg/dL    Calcium 8.6 8.5 - 10.5 mg/dL    Correct Calcium 9.4 8.5 - 10.5 mg/dL    AST(SGOT) 17 12 - 45 U/L    ALT(SGPT) <5 2 - 50 U/L    Alkaline Phosphatase 98 30 - 99 U/L    Total Bilirubin 1.0 0.1 - 1.5 mg/dL    Albumin 3.0 (L) 3.2 - 4.9 g/dL    Total Protein 6.4 6.0 - 8.2 g/dL    Globulin 3.4 1.9 - 3.5 g/dL    A-G Ratio 0.9 g/dL   TROPONIN    Collection Time: 24  3:37 PM   Result Value Ref Range     Troponin T 12 6 - 19 ng/L   PROCALCITONIN    Collection Time: 12/19/24  3:37 PM   Result Value Ref Range    Procalcitonin 0.07 <0.25 ng/mL   ESTIMATED GFR    Collection Time: 12/19/24  3:37 PM   Result Value Ref Range    GFR (CKD-EPI) 91 >60 mL/min/1.73 m 2     I have independently interpreted this EKG  Sinus rhythm rate of 84, MA interval slightly prolonged, there are no convincing ST or T wave to suggest acute ischemia.  There is a PAC noted.  Compared to an EKG performed December 4, 2024, MA interval is prolonged.  I have independently interpreted the diagnostic imaging associated with this visit and am waiting the final reading from the radiologist.   My preliminary interpretation is a follows: COVID chest x-ray: There are densities in the left upper quadrant and left low lung lobes consistent with small effusion, possibly loculated.  There are no convincing parenchymal abnormalities to suggest lobar pneumonia.  RADIOLOGY  CT-ABDOMEN-PELVIS WITH   Final Result      1.  Rim-enhancing irregular fluid collection under the left hemidiaphragm is larger since prior study, now 7.2 x 8.9 cm, and likely represents an abscess.   2.  Partially visualized loculated left pleural effusion/empyema containing some pockets of air, likely related to infection under the hemidiaphragm. Fluid in the left major fissure.   3.  Cholelithiasis.   4.  Nonobstructing left nephrolithiasis.   5.  Small hiatal hernia.   6.  Colonic diverticulosis.   7.  Persistent small pericardial effusion.      CT-CTA CHEST PULMONARY ARTERY W/ RECONS   Final Result      1. No acute pulmonary embolus.   2. Chronic appearing changes in the left lung with loculated fluid in the left pleural space, including the major fissure, posteriorly and laterally. Adjacent parenchymal volume loss.   3. Cardiomegaly with small pericardial effusion.   4. Moderate hiatal hernia.   5. Left upper quadrant fluid collection measures 4.4 x 6.3 cm in diameter, with evaluation  suboptimal without IV contrast administration.            DX-CHEST-PORTABLE (1 VIEW)   Final Result      1.  Small left pleural effusion.      2.  Wedge-shaped opacity in left lower lobe consistent with atelectasis, pneumonitis, or possibly soft tissue overlay.            ASSESSMENT, COURSE AND PLAN  Care Narrative: 3:17 PM  Reevaluated patient at bedside.  She has not experienced any deterioration and is hemodynamically stable.  She is low normal in terms of her oxygen saturation but is in no respiratory distress.  I explained that lab apparently stated her first samples were hemolyzed in terms of her troponin, CMP, and procalcitonin, so they canceled the original order and then reordered it.  Unfortunately they ordered it for midnight tonight and have not come to draw the a sample.  I am awaiting the sample of her CMP to be resulted before she can go to CT for the abnormality seen on the left lung on her plain film x-ray.  We called lab to inform them of their error.  I reordered the test just to make sure.    7:42 PM  Case discussed with Dr. Sewell, oncological/hepatobiliary surgeon, who stated understanding situation and agreed with broad-spectrum antibiotics.  She will see the patient tomorrow in consultation.  Unclear at this point whether the patient will need IR drainage but she is staying hemodynamically and respiratorily stable.  Case will be discussed with the hospitalist for primary admission.          I have discussed management of the patient with the following physicians and CRISSY's: Dr. Sewell, surgeon.  Dr. Tinsley, hospitalist    Escalation of care considered, and ultimately not performed: None    Barriers to care at this time, including but not limited to: None.     Decision tools and Rx drugs considered including, but not limited to : Antibiotics    Discussion of management with other QHP or appropriate source(s): None        FINAL IMPRESSION  1. Left upper quadrant abdominal abscess (HCC)     2. Loculated pleural effusion        Electronically signed by: Milton Santacruz M.D., 12/19/2024 1:20 PM

## 2024-12-20 ENCOUNTER — APPOINTMENT (OUTPATIENT)
Dept: VASCULAR LAB | Facility: MEDICAL CENTER | Age: 79
End: 2024-12-20
Attending: INTERNAL MEDICINE
Payer: COMMERCIAL

## 2024-12-20 PROBLEM — Z71.89 ADVANCE CARE PLANNING: Status: ACTIVE | Noted: 2024-12-20

## 2024-12-20 LAB
ANION GAP SERPL CALC-SCNC: 13 MMOL/L (ref 7–16)
BASOPHILS # BLD AUTO: 0.1 % (ref 0–1.8)
BASOPHILS # BLD: 0.02 K/UL (ref 0–0.12)
BUN SERPL-MCNC: 10 MG/DL (ref 8–22)
CALCIUM SERPL-MCNC: 8.5 MG/DL (ref 8.5–10.5)
CHLORIDE SERPL-SCNC: 100 MMOL/L (ref 96–112)
CO2 SERPL-SCNC: 21 MMOL/L (ref 20–33)
CREAT SERPL-MCNC: 0.74 MG/DL (ref 0.5–1.4)
EOSINOPHIL # BLD AUTO: 0.05 K/UL (ref 0–0.51)
EOSINOPHIL NFR BLD: 0.3 % (ref 0–6.9)
ERYTHROCYTE [DISTWIDTH] IN BLOOD BY AUTOMATED COUNT: 51.1 FL (ref 35.9–50)
ERYTHROCYTE [SEDIMENTATION RATE] IN BLOOD BY WESTERGREN METHOD: 35 MM/HOUR (ref 0–25)
GFR SERPLBLD CREATININE-BSD FMLA CKD-EPI: 83 ML/MIN/1.73 M 2
GLUCOSE SERPL-MCNC: 131 MG/DL (ref 65–99)
HCT VFR BLD AUTO: 39.6 % (ref 37–47)
HGB BLD-MCNC: 12.5 G/DL (ref 12–16)
IMM GRANULOCYTES # BLD AUTO: 0.18 K/UL (ref 0–0.11)
IMM GRANULOCYTES NFR BLD AUTO: 1.2 % (ref 0–0.9)
LYMPHOCYTES # BLD AUTO: 1.09 K/UL (ref 1–4.8)
LYMPHOCYTES NFR BLD: 7 % (ref 22–41)
MCH RBC QN AUTO: 27.8 PG (ref 27–33)
MCHC RBC AUTO-ENTMCNC: 31.6 G/DL (ref 32.2–35.5)
MCV RBC AUTO: 88.2 FL (ref 81.4–97.8)
MONOCYTES # BLD AUTO: 1.4 K/UL (ref 0–0.85)
MONOCYTES NFR BLD AUTO: 9 % (ref 0–13.4)
NEUTROPHILS # BLD AUTO: 12.81 K/UL (ref 1.82–7.42)
NEUTROPHILS NFR BLD: 82.4 % (ref 44–72)
NRBC # BLD AUTO: 0 K/UL
NRBC BLD-RTO: 0 /100 WBC (ref 0–0.2)
PLATELET # BLD AUTO: 805 K/UL (ref 164–446)
PMV BLD AUTO: 9.3 FL (ref 9–12.9)
POTASSIUM SERPL-SCNC: 3.9 MMOL/L (ref 3.6–5.5)
RBC # BLD AUTO: 4.49 M/UL (ref 4.2–5.4)
SODIUM SERPL-SCNC: 134 MMOL/L (ref 135–145)
WBC # BLD AUTO: 15.6 K/UL (ref 4.8–10.8)

## 2024-12-20 PROCEDURE — 700111 HCHG RX REV CODE 636 W/ 250 OVERRIDE (IP): Mod: JZ | Performed by: STUDENT IN AN ORGANIZED HEALTH CARE EDUCATION/TRAINING PROGRAM

## 2024-12-20 PROCEDURE — 770006 HCHG ROOM/CARE - MED/SURG/GYN SEMI*

## 2024-12-20 PROCEDURE — A9270 NON-COVERED ITEM OR SERVICE: HCPCS | Performed by: STUDENT IN AN ORGANIZED HEALTH CARE EDUCATION/TRAINING PROGRAM

## 2024-12-20 PROCEDURE — 99222 1ST HOSP IP/OBS MODERATE 55: CPT | Performed by: SURGERY

## 2024-12-20 PROCEDURE — 99497 ADVNCD CARE PLAN 30 MIN: CPT | Performed by: STUDENT IN AN ORGANIZED HEALTH CARE EDUCATION/TRAINING PROGRAM

## 2024-12-20 PROCEDURE — 700105 HCHG RX REV CODE 258: Performed by: STUDENT IN AN ORGANIZED HEALTH CARE EDUCATION/TRAINING PROGRAM

## 2024-12-20 PROCEDURE — 700102 HCHG RX REV CODE 250 W/ 637 OVERRIDE(OP): Performed by: STUDENT IN AN ORGANIZED HEALTH CARE EDUCATION/TRAINING PROGRAM

## 2024-12-20 PROCEDURE — 80048 BASIC METABOLIC PNL TOTAL CA: CPT

## 2024-12-20 PROCEDURE — 99233 SBSQ HOSP IP/OBS HIGH 50: CPT | Performed by: STUDENT IN AN ORGANIZED HEALTH CARE EDUCATION/TRAINING PROGRAM

## 2024-12-20 PROCEDURE — 36415 COLL VENOUS BLD VENIPUNCTURE: CPT

## 2024-12-20 PROCEDURE — 85652 RBC SED RATE AUTOMATED: CPT

## 2024-12-20 PROCEDURE — 85025 COMPLETE CBC W/AUTO DIFF WBC: CPT

## 2024-12-20 RX ORDER — AMLODIPINE BESYLATE 5 MG/1
5 TABLET ORAL DAILY
Status: DISCONTINUED | OUTPATIENT
Start: 2024-12-20 | End: 2024-12-24 | Stop reason: HOSPADM

## 2024-12-20 RX ORDER — LEVOTHYROXINE SODIUM 100 UG/1
100 TABLET ORAL
Status: DISCONTINUED | OUTPATIENT
Start: 2024-12-20 | End: 2024-12-24 | Stop reason: HOSPADM

## 2024-12-20 RX ADMIN — ACETAMINOPHEN 650 MG: 325 TABLET ORAL at 08:05

## 2024-12-20 RX ADMIN — ACETAMINOPHEN 650 MG: 325 TABLET ORAL at 19:29

## 2024-12-20 RX ADMIN — PIPERACILLIN AND TAZOBACTAM 4.5 G: 4; .5 INJECTION, POWDER, FOR SOLUTION INTRAVENOUS at 00:23

## 2024-12-20 RX ADMIN — LIOTHYRONINE SODIUM 5 MCG: 5 TABLET ORAL at 05:44

## 2024-12-20 RX ADMIN — PIPERACILLIN AND TAZOBACTAM 4.5 G: 4; .5 INJECTION, POWDER, FOR SOLUTION INTRAVENOUS at 05:49

## 2024-12-20 RX ADMIN — ONDANSETRON 4 MG: 4 TABLET, ORALLY DISINTEGRATING ORAL at 06:45

## 2024-12-20 RX ADMIN — ONDANSETRON 4 MG: 2 INJECTION INTRAMUSCULAR; INTRAVENOUS at 17:15

## 2024-12-20 RX ADMIN — ONDANSETRON 4 MG: 2 INJECTION INTRAMUSCULAR; INTRAVENOUS at 12:28

## 2024-12-20 RX ADMIN — PIPERACILLIN AND TAZOBACTAM 4.5 G: 4; .5 INJECTION, POWDER, FOR SOLUTION INTRAVENOUS at 13:09

## 2024-12-20 RX ADMIN — PIPERACILLIN AND TAZOBACTAM 4.5 G: 4; .5 INJECTION, POWDER, FOR SOLUTION INTRAVENOUS at 21:40

## 2024-12-20 ASSESSMENT — ENCOUNTER SYMPTOMS
NAUSEA: 1
SHORTNESS OF BREATH: 1
ABDOMINAL PAIN: 1

## 2024-12-20 ASSESSMENT — PAIN DESCRIPTION - PAIN TYPE
TYPE: ACUTE PAIN

## 2024-12-20 NOTE — ASSESSMENT & PLAN NOTE
Recurrent. Peripancreatic fluid.   history of pancreatic serous cystadenoma, status post pancreatectomy, splenectomy, stomach and celiac node dissection 8/5/2024  Hepatobiliary surgery, IR, ID on board    S/p drain placed 12/21. Drain culture MSSA  GI consulted, s/p ERCP, sphincterotomy and PD stent placement 12/23  ID following  On iv Unasyn

## 2024-12-20 NOTE — ASSESSMENT & PLAN NOTE
history of PE and atrial thrombus unable to tolerate Eliquis or Xarelto due to symptoms such as abdominal cramping and diarrhea, was on coumadin, however she did not follow up with anticoagulation clinic constantly. She was last discharged on dabigatran 12/12.    Hold OAC given possible procedures while she is here  Now on lovenox 1mg/kg bid  I doubt she is truly intolerance of Eliquis, xarelto. Her main complaints are GI complaints, those likely due to recurrent peripancreatic fluid.   Consider trial Eliquis/Xarelto once no procedure planned. Or switch back to Dabigatran as she was discharged on 12/12

## 2024-12-20 NOTE — ASSESSMENT & PLAN NOTE
Blood pressure in the ED in the low 100s, hold amlodipine for now and watch trend, IV antihypertensives ordered with parameters.

## 2024-12-20 NOTE — ED NOTES
Medication history reviewed with patient.  Med rec is complete  Allergies reviewed.   Patient denies any outpatient antibiotics in the last 30 days.   Anticoagulants taken in the last 14 days? Yes Anticoagulant: Eliquis, Last dose: 12/19/24 AM.    Brandie Figueroa, PhT

## 2024-12-20 NOTE — PROGRESS NOTES
Patient refused patient offloading dressing on sacrum after assessing nonblanching sacrum. Patient ok with placing pillow on bottom to offload. Wound care consult place.

## 2024-12-20 NOTE — CONSULTS
Surgical Oncology Consult History and Physical    Reason for Consult: LUQ fluid collection    Consulted By: LUCÍA  Location: 14    HPI: Paige Gudino is a 79 y.o. female with prior breast cancer status postmastectomy, benign pancreatic serous cystadenoma, retroperitoneal abscess with drain placement in August by Dr. Bailey and removal in September, history of PE and atrial thrombus unable to tolerate Eliquis or Xarelto due to symptoms such as abdominal cramping and diarrhea had been on warfarin recently discharged on dabigatran, history of recurrent pleural effusion with multiple thoracenteses, underwent left-sided thorascopic pleurodesis with Dr. Saul on  who presented 2024 with return of dyspnea, abdominal pains and cramping.     Patient is well-known to the surgical oncology service.  In speaking with the patient she endorses a history above.  She states the reason she came in was left upper quadrant pain, difficulty with deep inspiration and abdominal cramping.  She denies any changes in her eating habits.  She denies any systemic symptoms such as fever, malaise, chills, rigors.  CT scan obtained in the emergency department demonstrates a rim-enhancing left upper quadrant fluid collection.  She has a white count of 15.    Body mass index is 21.46 kg/m².    ECO    Past Medical History:          Past Medical History:   Diagnosis Date    Anesthesia     nausea post op    Cancer (HCC)      breast left    Cancer of overlapping sites of left female breast (HCC)     Cataract     IOL bilat    High cholesterol     Hypothyroidism     PONV (postoperative nausea and vomiting)     Just felt nausous after anesthesia    Thyroid nodule        Past Surgical History:        Past Surgical History:   Procedure Laterality Date    MN THORACOSCOPY,DX NO BX Left 2024    Procedure: THORACOSCOPY - LEFT THORACOSCOPIC PLEURODESIS;  Surgeon: Glenn Saul M.D.;  Location: SURGERY [facility]  [place];  Service: Thoracic    AR ULTRASONIC GUIDANCE, INTRAOPERATIVE  8/5/2024    Procedure: ULTRASOUND GUIDANCE;  Surgeon: Sachin Espinoza M.D.;  Location: SURGERY [facility];  Service: General    PANCREATECTOMY N/A 8/5/2024    Procedure: OPEN DISTAL PANCREATECTOMY WITH SPLENECTOMY, NODE DISSECTION;  Surgeon: Sachin Espinoza M.D.;  Location: SURGERY [facility];  Service: General    SPLENECTOMY N/A 8/5/2024    Procedure: SPLENECTOMY;  Surgeon: Sachin Espinoza M.D.;  Location: SURGERY [facility];  Service: General    CREATION, FLAP, OMENTUM N/A 8/5/2024    Procedure: CREATION, FLAP, OMENTUM;  Surgeon: Sachin Espinoza M.D.;  Location: SURGERY [facility];  Service: General    PB MASTECTOMY, PARTIAL Left 08/01/2022    Procedure: MELLO LOCALIZED LEFT PARTIAL MASTECTOMY;  Surgeon: Elena Arroyo M.D.;  Location: SURGERY [facility];  Service: General    OTHER ORTHOPEDIC SURGERY  2019    back surgery    OTHER  2001    replace left breast implant    OTHER  1988    Left breast implant/lift    OTHER  1987    left mastectomy    LAMINOTOMY      LUMPECTOMY      PRIMARY C SECTION         Current Medications:     Current Facility-Administered Medications:     acetaminophen (Tylenol) tablet 650 mg, 650 mg, Oral, Q6HRS PRN, Gaudencio Tinsley M.D., 650 mg at 12/20/24 0805    senna-docusate (Pericolace Or Senokot S) 8.6-50 MG per tablet 2 Tablet, 2 Tablet, Oral, Q EVENING, 2 Tablet at 12/19/24 2100 **AND** polyethylene glycol/lytes (Miralax) Packet 1 Packet, 1 Packet, Oral, QDAY PRN, Gaudencio Tinsley M.D.    labetalol (Normodyne/Trandate) injection 10 mg, 10 mg, Intravenous, Q4HRS PRN, Gaudencio Tinsley M.D.    ondansetron (Zofran) syringe/vial injection 4 mg, 4 mg, Intravenous, Q4HRS PRN, Gaudencio Tinsley M.D.    ondansetron (Zofran ODT) dispertab 4 mg, 4 mg, Oral, Q4HRS PRN, Gaudencio Tinsley M.D., 4 mg at 12/20/24 0645    piperacillin-tazobactam (Zosyn) 4.5 g in  mL IVPB, 4.5  g, Intravenous, Q8HRS, Gaudencio Tinsley M.D., Last Rate: 25 mL/hr at 12/20/24 0549, 4.5 g at 12/20/24 0549    HYDROcodone-acetaminophen (Norco) 5-325 MG per tablet 1 Tablet, 1 Tablet, Oral, Q6HRS PRN, Gaudencio Tinsley M.D.    liothyronine (Cytomel) tablet 5 mcg, 5 mcg, Oral, MO, WE + FR, 5 mcg at 12/20/24 0544 **AND** [START ON 12/21/2024] liothyronine (Cytomel) tablet 2.5 mcg, 2.5 mcg, Oral, Once per day on Sunday Tuesday Thursday Saturday, Gaudencio Tinsley M.D.      Allergies:         Allergies   Allergen Reactions    Synthroid [Fd&C Red #40 Al Paul-Levothyroxine] Hives and Unspecified     Hives, Brand specific. Some brands are ok and some are not    Tape Unspecified     Skin degradation with use of tegaderm     [place] Thyroid [Thyroid] Diarrhea     diarrhea       Family History:          Family History   Problem Relation Age of Onset    Cancer Mother         Ovarian    Ovarian Cancer Mother     Dementia Father     Heart Disease Father     Hyperlipidemia Neg Hx        Social History:           Social History     Socioeconomic History    Marital status:      Spouse name: Not on file    Number of children: Not on file    Years of education: Not on file    Highest education level: Associate degree: academic program   Occupational History     Comment: retired banker   Tobacco Use    Smoking status: Never     Passive exposure: Past    Smokeless tobacco: Never   Vaping Use    Vaping status: Never Used   Substance and Sexual Activity    Alcohol use: Never    Drug use: Never    Sexual activity: Not Currently     Partners: Male     Birth control/protection: Abstinence, Male Sterilization, Post-Menopausal     Comment:    Other Topics Concern    Not on file   Social History Narrative    Not on file     Social Drivers of Health     Financial Resource Strain: Low Risk  (7/11/2024)    Overall Financial Resource Strain (CARDIA)     Difficulty of Paying Living Expenses: Not hard at all   Food Insecurity: No Food  "Insecurity (12/4/2024)    Hunger Vital Sign     Worried About Running Out of Food in the Last Year: Never true     Ran Out of Food in the Last Year: Never true   Transportation Needs: No Transportation Needs (12/4/2024)    PRAPARE - Transportation     Lack of Transportation (Medical): No     Lack of Transportation (Non-Medical): No   Physical Activity: Insufficiently Active (7/11/2024)    Exercise Vital Sign     Days of Exercise per Week: 2 days     Minutes of Exercise per Session: 10 min   Stress: No Stress Concern Present (7/11/2024)    Citizen of [place] [employer] - Occupational Stress Questionnaire     Feeling of Stress : Not at all   Social Connections: Unknown (7/11/2024)    Social Connection and Isolation Panel [NHANES]     Frequency of Communication with Friends and Family: More than three times a week     Frequency of Social Gatherings with Friends and Family: Twice a week     Attends [employer] Services: Patient declined     Active Member of Clubs or Organizations: Yes     Attends Club or Organization Meetings: More than 4 times per year     Marital Status:    Intimate Partner Violence: Not At Risk (12/19/2024)    Humiliation, Afraid, Rape, and Kick questionnaire     Fear of Current or Ex-Partner: No     Emotionally Abused: No     Physically Abused: No     Sexually Abused: No   Housing Stability: Low Risk  (12/4/2024)    Housing Stability Vital Sign     Unable to Pay for Housing in the Last Year: No     Number of Times Moved in the Last Year: 0     Homeless in the Last Year: No       Review of Systems:  ROS    All other ROS negative.      Physical Exam:  BP (!) 141/76   Pulse 70   Temp 36.5 °C (97.7 °F) (Temporal)   Resp 17   Ht 1.626 m (5' 4\")   Wt 56.7 kg (125 lb)   LMP  (LMP Unknown)   SpO2 95%   BMI 21.46 kg/m²       -General: Patient is cooperative, appears stated age, in no acute distress, AAOx3     -HEENT:  Head is atraumatic, neck supple, no scleral icterus     -Neck: " trachea is midline     -Respiratory: Lungs clear to auscultation bilaterally, no increased work of breathing, stable on room air       -Cardiovascular: normal rate, regular, no murmur appreciated     -Abdomen: soft, non-distended, TTP in LUQ    -: Deferred     -MSK/Extremities: atraumatic, normal range of motion and strength, ambulatory     -Integumentary: warm, dry, no obvious skin lesions or rashes appreciated     -Neurologic: alert, speech clear and coherent, functional cognition intact     -Psychiatric:  cooperative, appropriate mood and affect        Imaging:   CT A/P, 12/19:   IMPRESSION:     1.  Rim-enhancing irregular fluid collection under the left hemidiaphragm is larger since prior study, now 7.2 x 8.9 cm, and likely represents an abscess.  2.  Partially visualized loculated left pleural effusion/empyema containing some pockets of air, likely related to infection under the hemidiaphragm. Fluid in the left major fissure.  3.  Cholelithiasis.  4.  Nonobstructing left nephrolithiasis.  5.  Small hiatal hernia.  6.  Colonic diverticulosis.  7.  Persistent small pericardial effusion.    Pathology: n/a    Labs:   Lab Results   Component Value Date/Time    WBC 15.6 (H) 12/20/2024 12:04 AM    RBC 4.49 12/20/2024 12:04 AM    HEMOGLOBIN 12.5 12/20/2024 12:04 AM    HEMATOCRIT 39.6 12/20/2024 12:04 AM    MCV 88.2 12/20/2024 12:04 AM    MCH 27.8 12/20/2024 12:04 AM    MCHC 31.6 (L) 12/20/2024 12:04 AM    MPV 9.3 12/20/2024 12:04 AM    NEUTSPOLYS 82.40 (H) 12/20/2024 12:04 AM    LYMPHOCYTES 7.00 (L) 12/20/2024 12:04 AM    MONOCYTES 9.00 12/20/2024 12:04 AM    EOSINOPHILS 0.30 12/20/2024 12:04 AM    BASOPHILS 0.10 12/20/2024 12:04 AM    ANISOCYTOSIS 1+ 08/15/2024 03:30 AM      Recent Labs     12/19/24  1300 12/19/24  1537   ASTSGOT  --  17   ALTSGPT  --  <5   TBILIRUBIN  --  1.0   GLOBULIN  --  3.4   INR 1.77*  --      Lab Results   Component Value Date/Time    SODIUM 134 (L) 12/20/2024 12:04 AM    POTASSIUM 3.9  12/20/2024 12:04 AM    CHLORIDE 100 12/20/2024 12:04 AM    CO2 21 12/20/2024 12:04 AM    GLUCOSE 131 (H) 12/20/2024 12:04 AM    BUN 10 12/20/2024 12:04 AM    CREATININE 0.74 12/20/2024 12:04 AM          Assessment and Plan:   Patient is a 79-year-old woman well-known to our service status post distal pancreatectomy for serous cystadenoma.  For the last several months she has had recurrent left upper quadrant fluid collections.  She returns with the same.  On review of the imaging this collection appears to be rim-enhancing and is associated with a leukocytosis.  We have discussed with Dr. Bowen from IR about draining this fluid collection.  He agrees this is tactically feasible and will work on getting the patient today.  IR consult order placed.  Please keep patient NPOfor now.    Will continue to follow      -Primary team updated on plan of care.      Love Sewell MD, MS  Surgical Oncology  December 20, 2024, 9:31 AM

## 2024-12-20 NOTE — H&P
Hospital Medicine History & Physical Note    Date of Service  12/19/2024    Primary Care Physician  Roula Ga P.A.-C.    Consultants  Erp discussed with Dr. Monge with [facility]    Code Status  Full Code    Chief Complaint  Chief Complaint   Patient presents with    Shortness of Breath       History of Presenting Illness  Paige Gudino is a 79 y.o. female with prior breast cancer status postmastectomy, benign pancreatic serous cystadenoma, retroperitoneal abscess with drain placement in August and removal in September, history of PE and atrial thrombus unable to tolerate Eliquis or Xarelto due to symptoms such as abdominal cramping and diarrhea had been on warfarin recently discharged on dabigatran, history of recurrent pleural effusion with multiple thoracenteses, underwent left-sided thorascopic pleurodesis with Dr. Saul on 12/9 who presented 12/19/2024 with return of dyspnea, abdominal pains and cramping.    Patient has been feeling abdominal cramping and pressure left upper quadrant now with dyspnea that she attributes to pleural effusion as well as fluid collection/abscess in left upper quadrant.  Denies any headache or vision changes fevers or chills, does have some occasional chest pain/left upper quadrant pain, occasional nausea, decreased appetite, no real vomiting no constipation or diarrhea, no dysuria.    In the ED she is afebrile with normal pulse slightly tachypneic slightly hypertensive normal oxygen saturation on room air.  She has had leukocytosis 16.3 CMP glucose 108 albumin 3.0 otherwise unremarkable lactic acid 2.2 troponin T 12 INR 1.77 procalcitonin 0.07 CRP 7.91 chest x-ray shows small left pleural effusion, wedge-shaped opacity left lower lobe consistent with atelectasis pneumonitis or possible soft tissue overlay EKG showed sinus rhythm PACs prolonged IN interval.  CTA chest negative for PE shows chronic appearing changes in the left lung with loculated fluid in the left  pleural space including major fissure posteriorly and laterally adjacent parenchymal volume loss, cardiomegaly with small pericardial effusion, moderate hiatal hernia, left upper quadrant fluid collection measuring 4.4 x 6.3, CT abdomen pelvis with contrast shows rim-enhancing irregular fluid collection under the left hemidiaphragm larger since prior to now 7.2 x 8.9 cm likely represents abscess.  ERP discussed case with Dr. Sewell from hepatobiliary surgical team, recommends admission, agrees with antibiotics, will discuss with team to decide on IR versus surgical treatment, per ERP there are concerns that there could be another cystic lesion at the tail of the pancreas.  Subsequently referred to hospitalist for admission.    I discussed the plan of care with patient, bedside RN, charge RN, , and pharmacy.    Review of Systems  Review of Systems   Constitutional:  Positive for malaise/fatigue.   Respiratory:  Positive for shortness of breath.    Cardiovascular:  Positive for chest pain.   Gastrointestinal:  Positive for abdominal pain and nausea.       Past Medical History   has a past medical history of Anesthesia, Cancer (HCC), Cancer of overlapping sites of left female breast (HCC), Cataract (2024), High cholesterol, Hypothyroidism, PONV (postoperative nausea and vomiting) (1987), and Thyroid nodule.    Surgical History   has a past surgical history that includes other orthopedic surgery (2019); other (1987); other (1988); other (2001); pr mastectomy, partial (Left, 08/01/2022); primary c section; lumpectomy; laminotomy; pr ultrasonic guidance, intraoperative (8/5/2024); pancreatectomy (N/A, 8/5/2024); splenectomy (N/A, 8/5/2024); creation, flap, omentum (N/A, 8/5/2024); and pr thoracoscopy,dx no bx (Left, 12/9/2024).     Family History  family history includes Cancer in her mother; Dementia in her father; Heart Disease in her father; Ovarian Cancer in her mother.     Social History   reports that  she has never smoked. She has been exposed to tobacco smoke. She has never used smokeless tobacco. She reports that she does not drink alcohol and does not use drugs.    Allergies  Allergies   Allergen Reactions    Synthroid [Fd&C Red #40 Al Paul-Levothyroxine] Hives and Unspecified     Hives, Brand specific. Some brands are ok and some are not    Tape Unspecified     Skin degradation with use of tegaderm     [place] Thyroid [Thyroid] Diarrhea     diarrhea       Medications  Prior to Admission Medications   Prescriptions Last Dose Informant Patient Reported? Taking?   amLODIPine (NORVASC) 5 MG Tab 12/19/2024 Morning Patient No Yes   Sig: Take 1 Tablet by mouth every day.   apixaban (ELIQUIS) 5mg Tab 12/19/2024 Morning  Yes Yes   Sig: Take 5 mg by mouth 2 times a day.   dabigatran (PRADAXA) 150 MG Cap capsule Not Taking  No No   Sig: Take 1 Capsule by mouth 2 times a day for 60 days.   Patient not taking: Reported on 12/19/2024   ibandronate (BONIVA) 150 MG tablet 11/30/2024 Morning Patient No No   Sig: Take 1 Tablet by mouth every 30 days.   levothyroxine (SYNTHROID) 100 MCG Tab 12/19/2024 Morning Patient Yes Yes   Sig: Take 100 mcg by mouth every morning on an empty stomach. ONLY GENERIC   liothyronine (CYTOMEL) 5 MCG Tab 12/19/2024 Morning Patient Yes Yes   Sig: Take 2.5-5 mcg by mouth see administration instructions. 5 mcg on Monday, Wednesday, Friday.  2.5 mcg on all other days   ondansetron (ZOFRAN ODT) 4 MG TABLET DISPERSIBLE 12/18/2024 Noon Patient No Yes   Sig: Take 1 Tablet by mouth every 6 hours as needed for Nausea/Vomiting.   warfarin (COUMADIN) 5 MG Tab 12/3/2024  Yes Yes   Sig: Take 5 mg by mouth every day.   Patient taking differently: Take 5 mg by mouth every day. DISCONTINUED BY PROVIDER      Facility-Administered Medications: None       Physical Exam  Temp:  [36.1 °C (96.9 °F)-37.4 °C (99.4 °F)] 36.1 °C (96.9 °F)  Pulse:  [67-88] 67  Resp:  [15-21] 18  BP: (109-141)/(29-75) 120/63  SpO2:  [90 %-94  %] 91 %  Blood Pressure : 120/63   Temperature: 36.1 °C (96.9 °F)   Pulse: 67   Respiration: 18   Pulse Oximetry: 91 %       Physical Exam  Vitals and nursing note reviewed.   Constitutional:       General: She is not in acute distress.     Appearance: She is not toxic-appearing.   HENT:      Head: Normocephalic and atraumatic.      Nose: Nose normal. No rhinorrhea.      Mouth/Throat:      Mouth: Mucous membranes are moist.      Pharynx: Oropharynx is clear.   Eyes:      General: No scleral icterus.     Extraocular Movements: Extraocular movements intact.      Conjunctiva/sclera: Conjunctivae normal.   Cardiovascular:      Rate and Rhythm: Normal rate and regular rhythm.      Pulses: Normal pulses.   Pulmonary:      Effort: No respiratory distress.      Breath sounds: Rales present. No wheezing or rhonchi.      Comments: Reduced breath sounds in Left lung base, rales  Abdominal:      General: There is no distension.      Palpations: Abdomen is soft.      Tenderness: There is abdominal tenderness (LUQ, mild). There is no guarding or rebound.   Musculoskeletal:         General: Normal range of motion.      Cervical back: Normal range of motion and neck supple. No rigidity.   Skin:     General: Skin is warm and dry.      Capillary Refill: Capillary refill takes less than 2 seconds.   Neurological:      General: No focal deficit present.      Mental Status: She is alert and oriented to person, place, and time. Mental status is at baseline.      Cranial Nerves: No cranial nerve deficit.      Sensory: No sensory deficit.      Motor: No weakness.      Coordination: Coordination normal.   Psychiatric:         Mood and Affect: Mood normal.         Behavior: Behavior normal.         Thought Content: Thought content normal.         Judgment: Judgment normal.         Laboratory:  Recent Labs     12/19/24  1300 12/20/24  0004   WBC 16.3* 15.6*   RBC 4.79 4.49   HEMOGLOBIN 13.7 12.5   HEMATOCRIT 42.8 39.6   MCV 89.4 88.2   MCH  "28.6 27.8   MCHC 32.0* 31.6*   RDW 51.8* 51.1*   PLATELETCT 810* 805*   MPV 9.5 9.3     Recent Labs     12/19/24  1537 12/20/24  0004   SODIUM 135 134*   POTASSIUM 3.9 3.9   CHLORIDE 101 100   CO2 25 21   GLUCOSE 108* 131*   BUN 11 10   CREATININE 0.62 0.74   CALCIUM 8.6 8.5     Recent Labs     12/19/24  1537 12/20/24  0004   ALTSGPT <5  --    ASTSGOT 17  --    ALKPHOSPHAT 98  --    TBILIRUBIN 1.0  --    GLUCOSE 108* 131*     Recent Labs     12/19/24  1300   APTT 35.1   INR 1.77*     No results for input(s): \"NTPROBNP\" in the last 72 hours.      Recent Labs     12/19/24  1537   TROPONINT 12       Imaging:  CT-ABDOMEN-PELVIS WITH   Final Result      1.  Rim-enhancing irregular fluid collection under the left hemidiaphragm is larger since prior study, now 7.2 x 8.9 cm, and likely represents an abscess.   2.  Partially visualized loculated left pleural effusion/empyema containing some pockets of air, likely related to infection under the hemidiaphragm. Fluid in the left major fissure.   3.  Cholelithiasis.   4.  Nonobstructing left nephrolithiasis.   5.  Small hiatal hernia.   6.  Colonic diverticulosis.   7.  Persistent small pericardial effusion.      CT-CTA CHEST PULMONARY ARTERY W/ RECONS   Final Result      1. No acute pulmonary embolus.   2. Chronic appearing changes in the left lung with loculated fluid in the left pleural space, including the major fissure, posteriorly and laterally. Adjacent parenchymal volume loss.   3. Cardiomegaly with small pericardial effusion.   4. Moderate hiatal hernia.   5. Left upper quadrant fluid collection measures 4.4 x 6.3 cm in diameter, with evaluation suboptimal without IV contrast administration.            DX-CHEST-PORTABLE (1 VIEW)   Final Result      1.  Small left pleural effusion.      2.  Wedge-shaped opacity in left lower lobe consistent with atelectasis, pneumonitis, or possibly soft tissue overlay.          X-Ray:  I have personally reviewed the images and compared " with prior images.  EKG:  I have personally reviewed the images and compared with prior images.    Assessment/Plan:  Justification for Admission Status  I anticipate this patient will require at least two midnights for appropriate medical management, necessitating inpatient admission because intra-abdominal abscess, loculated pleural effusion      * Intra-abdominal abscess (HCC)- (present on admission)  Assessment & Plan  ERP discussed with Dr. Sewell from hepatobiliary surgery team, they will review case and imaging and come up with plan either surgery versus IR drainage, per ERP there was some concern that there could be another cystic process and pancreatic tail.  Continue Zosyn, follow cultures.    History of pulmonary embolism- (present on admission)  Assessment & Plan  Would like to talk about ongoing anticoagulation, thinks she would prefer Lovenox, holding anticoagulation given surgical plan/IR plan to be made on 12/20/2024.    Pleural effusion- (present on admission)  Assessment & Plan  Status post pleurodesis with Dr. Saul earlier in December, CT abdomen pelvis shows partially visualized loculated left pleural effusion/empyema containing some pockets of air, likely related to infection under the hemidiaphragm. Fluid in the left major fissure.   On Zosyn, recommend discussing with pulmonary.    Primary hypertension- (present on admission)  Assessment & Plan  Blood pressure in the ED in the low 100s, hold amlodipine for now and watch trend, IV antihypertensives ordered with parameters.    Acquired hypothyroidism- (present on admission)  Assessment & Plan  Continue home liothyronine        VTE prophylaxis: SCDs/TEDs  Total time spent on admission 77 minutes.    This included my review with ER physician, review of ED events, patient's history, outside records, recent records, face to face interview, physical examination; my review of lab results (CBC, chemistry panel), imaging review (CXR) and ECG. Also  includes counseling patient on admission, treatment plan, and documentation of encounter.  In addition, speaking with specialist Dr. Santacruz

## 2024-12-20 NOTE — ASSESSMENT & PLAN NOTE
Status post pleurodesis with Dr. Saul earlier in December  CTA chest noted no evidence of acute PE. Chronic appearing changes in the left lung with loculated fluid in the left pleural space, including the major fissure, posteriorly and laterally. Adjacent parenchymal volume loss.     Discussed with thoracic surgery Dr. Ganser, finding c/w post pleurodesis. No interventions.

## 2024-12-20 NOTE — PROGRESS NOTES
4 Eyes Skin Assessment Completed by Margaret RN and Joleen RN.    Head WDL  Ears WDL  Nose WDL  Mouth WDL  Neck WDL  Breast/Chest - left flank puncture site with dressing  Shoulder Blades WDL  Spine WDL  (R) Arm/Elbow/Hand WDL  (L) Arm/Elbow/Hand WDL  Abdomen WDL  Groin WDL  Scrotum/Coccyx/Buttocks Redness and Blanching, pt refuses sacral mepilex and barrier cream)  (R) Leg WDL  (L) Leg WDL  (R) Heel/Foot/Toe Redness and Blanching, dry/flaky  (L) Heel/Foot/Toe Redness and Blanching, dry/flaky      Devices In Places Pulse Ox, PIV      Interventions In Place Pillows and Pressure Redistribution Mattress    Possible Skin Injury No    Pictures Uploaded Into Epic Yes  Wound Consult Placed N/A  RN Wound Prevention Protocol Ordered No

## 2024-12-20 NOTE — ED NOTES
Report to NURIS Blount   Spoke with pt. She states today is not a good day. She thinks she overdid it yesterday.  She has a headache and cramps in her legs. Tingling feeling from her forehead down. She is on a new medication for her thyroid too that she is trying to get used to.    She states her weight has been pretty stable.  Weight 285lbs today. Denies sweling, bloating.  States that since she had the tumor removed she seems to be breathing much better.     Pt instructed to call if symptoms develop/worsen.

## 2024-12-21 ENCOUNTER — APPOINTMENT (OUTPATIENT)
Dept: RADIOLOGY | Facility: MEDICAL CENTER | Age: 79
DRG: 371 | End: 2024-12-21
Attending: STUDENT IN AN ORGANIZED HEALTH CARE EDUCATION/TRAINING PROGRAM
Payer: COMMERCIAL

## 2024-12-21 LAB
ANION GAP SERPL CALC-SCNC: 13 MMOL/L (ref 7–16)
BUN SERPL-MCNC: 11 MG/DL (ref 8–22)
CALCIUM SERPL-MCNC: 8.3 MG/DL (ref 8.5–10.5)
CHLORIDE SERPL-SCNC: 102 MMOL/L (ref 96–112)
CO2 SERPL-SCNC: 21 MMOL/L (ref 20–33)
CREAT SERPL-MCNC: 0.56 MG/DL (ref 0.5–1.4)
ERYTHROCYTE [DISTWIDTH] IN BLOOD BY AUTOMATED COUNT: 50 FL (ref 35.9–50)
GFR SERPLBLD CREATININE-BSD FMLA CKD-EPI: 93 ML/MIN/1.73 M 2
GLUCOSE SERPL-MCNC: 101 MG/DL (ref 65–99)
GRAM STN SPEC: NORMAL
HCT VFR BLD AUTO: 38.9 % (ref 37–47)
HGB BLD-MCNC: 12.6 G/DL (ref 12–16)
MAGNESIUM SERPL-MCNC: 2 MG/DL (ref 1.5–2.5)
MCH RBC QN AUTO: 28 PG (ref 27–33)
MCHC RBC AUTO-ENTMCNC: 32.4 G/DL (ref 32.2–35.5)
MCV RBC AUTO: 86.4 FL (ref 81.4–97.8)
PHOSPHATE SERPL-MCNC: 4 MG/DL (ref 2.5–4.5)
PLATELET # BLD AUTO: 729 K/UL (ref 164–446)
PMV BLD AUTO: 9 FL (ref 9–12.9)
POTASSIUM SERPL-SCNC: 3.9 MMOL/L (ref 3.6–5.5)
RBC # BLD AUTO: 4.5 M/UL (ref 4.2–5.4)
SIGNIFICANT IND 70042: NORMAL
SITE SITE: NORMAL
SODIUM SERPL-SCNC: 136 MMOL/L (ref 135–145)
SOURCE SOURCE: NORMAL
WBC # BLD AUTO: 12.6 K/UL (ref 4.8–10.8)

## 2024-12-21 PROCEDURE — 99233 SBSQ HOSP IP/OBS HIGH 50: CPT | Performed by: STUDENT IN AN ORGANIZED HEALTH CARE EDUCATION/TRAINING PROGRAM

## 2024-12-21 PROCEDURE — 36415 COLL VENOUS BLD VENIPUNCTURE: CPT

## 2024-12-21 PROCEDURE — A9270 NON-COVERED ITEM OR SERVICE: HCPCS | Performed by: STUDENT IN AN ORGANIZED HEALTH CARE EDUCATION/TRAINING PROGRAM

## 2024-12-21 PROCEDURE — 700111 HCHG RX REV CODE 636 W/ 250 OVERRIDE (IP): Mod: JZ | Performed by: STUDENT IN AN ORGANIZED HEALTH CARE EDUCATION/TRAINING PROGRAM

## 2024-12-21 PROCEDURE — 700105 HCHG RX REV CODE 258: Performed by: STUDENT IN AN ORGANIZED HEALTH CARE EDUCATION/TRAINING PROGRAM

## 2024-12-21 PROCEDURE — 80048 BASIC METABOLIC PNL TOTAL CA: CPT

## 2024-12-21 PROCEDURE — 85027 COMPLETE CBC AUTOMATED: CPT

## 2024-12-21 PROCEDURE — 83690 ASSAY OF LIPASE: CPT

## 2024-12-21 PROCEDURE — 87070 CULTURE OTHR SPECIMN AEROBIC: CPT

## 2024-12-21 PROCEDURE — 0W9G30Z DRAINAGE OF PERITONEAL CAVITY WITH DRAINAGE DEVICE, PERCUTANEOUS APPROACH: ICD-10-PCS | Performed by: STUDENT IN AN ORGANIZED HEALTH CARE EDUCATION/TRAINING PROGRAM

## 2024-12-21 PROCEDURE — 87205 SMEAR GRAM STAIN: CPT

## 2024-12-21 PROCEDURE — 87147 CULTURE TYPE IMMUNOLOGIC: CPT

## 2024-12-21 PROCEDURE — 770006 HCHG ROOM/CARE - MED/SURG/GYN SEMI*

## 2024-12-21 PROCEDURE — 700111 HCHG RX REV CODE 636 W/ 250 OVERRIDE (IP): Mod: JZ

## 2024-12-21 PROCEDURE — 700102 HCHG RX REV CODE 250 W/ 637 OVERRIDE(OP): Performed by: STUDENT IN AN ORGANIZED HEALTH CARE EDUCATION/TRAINING PROGRAM

## 2024-12-21 PROCEDURE — 87077 CULTURE AEROBIC IDENTIFY: CPT

## 2024-12-21 PROCEDURE — 84100 ASSAY OF PHOSPHORUS: CPT

## 2024-12-21 PROCEDURE — 83735 ASSAY OF MAGNESIUM: CPT

## 2024-12-21 PROCEDURE — 87186 SC STD MICRODIL/AGAR DIL: CPT

## 2024-12-21 PROCEDURE — 49406 IMAGE CATH FLUID PERI/RETRO: CPT

## 2024-12-21 RX ORDER — ONDANSETRON 2 MG/ML
4 INJECTION INTRAMUSCULAR; INTRAVENOUS PRN
Status: ACTIVE | OUTPATIENT
Start: 2024-12-21 | End: 2024-12-21

## 2024-12-21 RX ORDER — ENOXAPARIN SODIUM 100 MG/ML
1 INJECTION SUBCUTANEOUS EVERY 12 HOURS
Status: DISCONTINUED | OUTPATIENT
Start: 2024-12-22 | End: 2024-12-24 | Stop reason: HOSPADM

## 2024-12-21 RX ORDER — MIDAZOLAM HYDROCHLORIDE 1 MG/ML
.5-2 INJECTION INTRAMUSCULAR; INTRAVENOUS PRN
Status: ACTIVE | OUTPATIENT
Start: 2024-12-21 | End: 2024-12-21

## 2024-12-21 RX ORDER — SODIUM CHLORIDE 9 MG/ML
500 INJECTION, SOLUTION INTRAVENOUS
Status: ACTIVE | OUTPATIENT
Start: 2024-12-21 | End: 2024-12-21

## 2024-12-21 RX ORDER — MIDAZOLAM HYDROCHLORIDE 1 MG/ML
INJECTION INTRAMUSCULAR; INTRAVENOUS
Status: COMPLETED
Start: 2024-12-21 | End: 2024-12-21

## 2024-12-21 RX ADMIN — PIPERACILLIN AND TAZOBACTAM 4.5 G: 4; .5 INJECTION, POWDER, FOR SOLUTION INTRAVENOUS at 22:10

## 2024-12-21 RX ADMIN — FENTANYL CITRATE 50 MCG: 50 INJECTION, SOLUTION INTRAMUSCULAR; INTRAVENOUS at 09:48

## 2024-12-21 RX ADMIN — PIPERACILLIN AND TAZOBACTAM 4.5 G: 4; .5 INJECTION, POWDER, FOR SOLUTION INTRAVENOUS at 12:44

## 2024-12-21 RX ADMIN — ACETAMINOPHEN 650 MG: 325 TABLET ORAL at 10:35

## 2024-12-21 RX ADMIN — MIDAZOLAM HYDROCHLORIDE 1 MG: 1 INJECTION, SOLUTION INTRAMUSCULAR; INTRAVENOUS at 09:48

## 2024-12-21 RX ADMIN — LIOTHYRONINE SODIUM 2.5 MCG: 5 TABLET ORAL at 04:38

## 2024-12-21 RX ADMIN — PIPERACILLIN AND TAZOBACTAM 4.5 G: 4; .5 INJECTION, POWDER, FOR SOLUTION INTRAVENOUS at 04:51

## 2024-12-21 RX ADMIN — SENNOSIDES AND DOCUSATE SODIUM 2 TABLET: 50; 8.6 TABLET ORAL at 16:15

## 2024-12-21 RX ADMIN — LEVOTHYROXINE SODIUM 100 MCG: 0.1 TABLET ORAL at 04:39

## 2024-12-21 RX ADMIN — MIDAZOLAM HYDROCHLORIDE 1 MG: 1 INJECTION, SOLUTION INTRAMUSCULAR; INTRAVENOUS at 09:53

## 2024-12-21 RX ADMIN — AMLODIPINE BESYLATE 5 MG: 5 TABLET ORAL at 04:39

## 2024-12-21 RX ADMIN — HYDROCODONE BITARTRATE AND ACETAMINOPHEN 1 TABLET: 5; 325 TABLET ORAL at 12:39

## 2024-12-21 ASSESSMENT — PAIN DESCRIPTION - PAIN TYPE
TYPE: ACUTE PAIN

## 2024-12-21 ASSESSMENT — FIBROSIS 4 INDEX: FIB4 SCORE: 0.86

## 2024-12-21 NOTE — PROGRESS NOTES
Patient declined miralax twice as offered by this RN even after education was provided. Patient ok with taking senna later today to help have a BM.

## 2024-12-21 NOTE — PROGRESS NOTES
Pt presents to CT4. Pt was consented by MD at bedside, confirmed by this RN and consent at bedside. Pt transferred to CT4 table in Supine position. Patient underwent an image guided peritoneal drain placement by Dr. Bowen. Procedure site was marked by MD and verified using imaging guidance. Pt placed on monitor, prepped and draped in a sterile fashion. Vitals were taken every 5 minutes and remained stable during procedure (see doc flow sheet for results). CO2 waveform capnography was monitored and remained WNL throughout procedure. Report called to Britney LAWLER. Pt transported by stretcher with RN to S614.     Specimen: *** in syrringe hand delivered to lab.      REF: ***  LOT: ***  EXP: ***

## 2024-12-21 NOTE — OR SURGEON
Immediate Post- Operative Note        Findings: LUQ fluid collection      Procedure(s): Drain placement      Estimated Blood Loss: Less than 5 ml        Complications: None            12/21/2024     10:02 AM     Glenn Bowen M.D.

## 2024-12-21 NOTE — ASSESSMENT & PLAN NOTE
Patient is 80 yo female with multiple comorbidities including breast cancer status postmastectomy, benign pancreatic serous cystadenoma, retroperitoneal abscess with drain placement in August and removal in September, history of PE and atrial thrombus currently on Pradaxa, recurrent pleural effusion requiring multiple thoracentesis in the past.  She is admitted for abdominal pain and was found to have intra-abdominal abscess requiring IR drainage.  Discussed goal of care and code status with patient. She confirms FULL code. ACP 16 mins

## 2024-12-21 NOTE — PROGRESS NOTES
Pt presents to CT4. Mrs. Gudino was consented by MD at bedside, confirmed by this RN and consent at bedside. Pt transferred to CT4 table in Supine position. Patient underwent a LeftChest / Peritroneal Drain by Dr. Bowen. Procedure site was marked by MD and verified using imaging guidance. Pt placed on monitor, prepped and draped in a sterile fashion. Vitals were taken every 5 minutes and remained stable during procedure (see doc flow sheet for results). CO2 waveform capnography was monitored and remained WNL throughout procedure. Report called to Lawson LAWLER. Pt transported by stretcher with RN to S614-1.     Specimen: 10ml in syringe hand delivered to lab.    [employer] Flexima APDL 10f x 25cm  REF: [id number]  LOT: [id number]  EXP: 2027-09-27     25ml Total Output

## 2024-12-21 NOTE — PROGRESS NOTES
[facility] Medicine Daily Progress Note    Date of Service  12/21/2024    Chief Complaint  Paige Gudino is a 79 y.o. female admitted 12/19/2024 with abd pain    Hospital Course  79 y.o. female with prior breast cancer status postmastectomy, benign pancreatic serous cystadenoma, retroperitoneal abscess with drain placement in August and removal in September, history of PE and atrial thrombus unable to tolerate Eliquis or Xarelto due to symptoms such as abdominal cramping and diarrhea, was on coumadin, however she did not follow up with anticoagulation clinic constantly. She was last discharged on dabigatran 12/12 , history of recurrent pleural effusion with multiple thoracenteses, underwent left-sided thorascopic pleurodesis with Dr. Saul on 12/9. Patient presented 12/19/2024 with return of dyspnea, abdominal pains and cramping.     CTA chest noted no evidence of acute PE. Chronic appearing changes in the left lung with loculated fluid in the left pleural space, including the major fissure, posteriorly and laterally. Adjacent parenchymal volume loss.     CT abdomen Rim-enhancing irregular fluid collection under the left hemidiaphragm is larger since prior study, now 7.2 x 8.9 cm, and likely represents an abscess.     S/p drain placed 12/21    Regarding CTA finding of chest, discussed with thoracic surgery Dr. Ganser, finding c/w post pleurodesis. No interventions.      Interval Problem Update  -Notes were extensively reviewed from primary team, radiology, ED, surgery, specialists, etc.   -Reviewed labs to date, microbiology for current admit and prior  -Imaging was independently reviewed and interpreted    Seen patient at bedside  No acute overnight events  S/p drain placement 12/21 by IR  Follow drain culture  Continue iv zosyn   Denies chest pain, on RA  I have reviewed CTA chest, no evidence of PE. Noted chronic loculated fluid in the L pleural space. I have discussed with pulmonary and thoracic  surgery Dr. Ganser and CT findings c/w post pleurodesis. No intervention indicated at this point.   Patient has a history of PE and currently on Pradaxa.  Will hold on Pradaxa.  Start Lovenox 1 mg/kg twice daily    I have discussed this patient's plan of care and discharge plan at IDT rounds today with Case Management, Nursing, Nursing leadership, and other members of the IDT team.    Consultants/Specialty  Surgical oncology  IR  pulmonary    Code Status  Full Code    Disposition  The patient is not medically cleared for discharge to home or a post-acute facility.      I have placed the appropriate orders for post-discharge needs.    Review of Systems  ROS     Physical Exam  Temp:  [36 °C (96.8 °F)-37.2 °C (98.9 °F)] 36.6 °C (97.9 °F)  Pulse:  [53-93] 53  Resp:  [9-34] 16  BP: ()/(54-74) 114/62  SpO2:  [90 %-97 %] 95 %    Physical Exam    Fluids    Intake/Output Summary (Last 24 hours) at 12/21/2024 1438  Last data filed at 12/21/2024 1130  Gross per 24 hour   Intake 240 ml   Output --   Net 240 ml        Laboratory  Recent Labs     12/19/24  1300 12/20/24  0004 12/21/24  0346   WBC 16.3* 15.6* 12.6*   RBC 4.79 4.49 4.50   HEMOGLOBIN 13.7 12.5 12.6   HEMATOCRIT 42.8 39.6 38.9   MCV 89.4 88.2 86.4   MCH 28.6 27.8 28.0   MCHC 32.0* 31.6* 32.4   RDW 51.8* 51.1* 50.0   PLATELETCT 810* 805* 729*   MPV 9.5 9.3 9.0     Recent Labs     12/19/24  1537 12/20/24  0004 12/21/24  0346   SODIUM 135 134* 136   POTASSIUM 3.9 3.9 3.9   CHLORIDE 101 100 102   CO2 25 21 21   GLUCOSE 108* 131* 101*   BUN 11 10 11   CREATININE 0.62 0.74 0.56   CALCIUM 8.6 8.5 8.3*     Recent Labs     12/19/24  1300   APTT 35.1   INR 1.77*               Imaging  CT-ABDOMEN-PELVIS WITH   Final Result      1.  Rim-enhancing irregular fluid collection under the left hemidiaphragm is larger since prior study, now 7.2 x 8.9 cm, and likely represents an abscess.   2.  Partially visualized loculated left pleural effusion/empyema containing some pockets of  air, likely related to infection under the hemidiaphragm. Fluid in the left major fissure.   3.  Cholelithiasis.   4.  Nonobstructing left nephrolithiasis.   5.  Small hiatal hernia.   6.  Colonic diverticulosis.   7.  Persistent small pericardial effusion.      CT-CTA CHEST PULMONARY ARTERY W/ RECONS   Final Result      1. No acute pulmonary embolus.   2. Chronic appearing changes in the left lung with loculated fluid in the left pleural space, including the major fissure, posteriorly and laterally. Adjacent parenchymal volume loss.   3. Cardiomegaly with small pericardial effusion.   4. Moderate hiatal hernia.   5. Left upper quadrant fluid collection measures 4.4 x 6.3 cm in diameter, with evaluation suboptimal without IV contrast administration.            DX-CHEST-PORTABLE (1 VIEW)   Final Result      1.  Small left pleural effusion.      2.  Wedge-shaped opacity in left lower lobe consistent with atelectasis, pneumonitis, or possibly soft tissue overlay.      CT-IMAGE-GUIDED DRAIN PERITONEAL    (Results Pending)        Assessment/Plan  * Intra-abdominal abscess (HCC)- (present on admission)  Assessment & Plan  Noted on CT abdomen  Discussed with surgical oncology, rec IR drain placement  IR consulted   Continue iv zosyn  S/p drain placed 12/21  Follow cultures     Advance care planning  Assessment & Plan  Patient is 78 yo female with multiple comorbidities including breast cancer status postmastectomy, benign pancreatic serous cystadenoma, retroperitoneal abscess with drain placement in August and removal in September, history of PE and atrial thrombus currently on Pradaxa, recurrent pleural effusion requiring multiple thoracentesis in the past.  She is admitted for abdominal pain and was found to have intra-abdominal abscess requiring IR drainage.  Discussed goal of care and code status with patient. She confirms FULL code. ACP 16 mins    History of pulmonary embolism- (present on admission)  Assessment &  Plan  Would like to talk about ongoing anticoagulation, thinks she would prefer Lovenox, holding anticoagulation given IR drain planning  Will eventually restart Pradaxa once no procedure need  Will start lovenox 1mg/kg bid    Pleural effusion- (present on admission)  Assessment & Plan  Status post pleurodesis with Dr. Saul earlier in December, CT abdomen pelvis shows partially visualized loculated left pleural effusion/empyema containing some pockets of air, likely related to infection under the hemidiaphragm. Fluid in the left major fissure.   On Zosyn  Given her recent history with chest tube placement, I have discussed with pulmonary and thoracic surgery Dr. Ganser and CT findings c/w post pleurodesis. No intervention indicated at this point.    Primary hypertension- (present on admission)  Assessment & Plan  Blood pressure in the ED in the low 100s, hold amlodipine for now and watch trend, IV antihypertensives ordered with parameters.    Acquired hypothyroidism- (present on admission)  Assessment & Plan  Continue home liothyronine and levothyroxine         VTE prophylaxis: lovenox 1mg/kg bid    I have performed a physical exam and reviewed and updated ROS and Plan today (12/21/2024). In review of yesterday's note (12/20/2024), there are no changes except as documented above.    Patient is has a high medical complexity, complex decision making and is at high risk for complication, morbidity, and mortality.  I spent 52 minutes, reviewing the chart, obtaining and/or reviewing separately obtained history. Performing a medically appropriate examination and evaluation.  Counseling and educating the patient. Ordering and reviewing medications, tests, or procedures.  Discussing the case with IR, surgery, pulmonary and thoracic surgery.  Documenting clinical information in EPIC. Independently interpreting results and communicating results to patient. Discussing future disposition of care with patient, RN and case  management.  This does not include time spent on separately billable procedures/tests.

## 2024-12-21 NOTE — PROGRESS NOTES
[facility] Medicine Daily Progress Note    Date of Service  12/20/2024    Chief Complaint  Paige Gudino is a 79 y.o. female admitted 12/19/2024 with abd pain    Hospital Course  79 y.o. female with prior breast cancer status postmastectomy, benign pancreatic serous cystadenoma, retroperitoneal abscess with drain placement in August and removal in September, history of PE and atrial thrombus unable to tolerate Eliquis or Xarelto due to symptoms such as abdominal cramping and diarrhea, was on coumadin, however she did not follow up with anticoagulation clinic constantly. She was last discharged on dabigatran 12/12 , history of recurrent pleural effusion with multiple thoracenteses, underwent left-sided thorascopic pleurodesis with Dr. Saul on 12/9. Patient presented 12/19/2024 with return of dyspnea, abdominal pains and cramping.     CTA chest noted no evidence of acute PE. Chronic appearing changes in the left lung with loculated fluid in the left pleural space, including the major fissure, posteriorly and laterally. Adjacent parenchymal volume loss.     CT abdomen Rim-enhancing irregular fluid collection under the left hemidiaphragm is larger since prior study, now 7.2 x 8.9 cm, and likely represents an abscess.      Interval Problem Update  -Notes were extensively reviewed from primary team, radiology, ED, surgery, specialists, etc.   -Reviewed labs to date, microbiology for current admit and prior  -Imaging was independently reviewed and interpreted    Seen patient at bedside  Reports abdominal pain.   Denies chest pain, on RA  CT abdomen reviewed, per my read, noted fluid collection likely abscess  Discussed with surgical oncology, rec IR drain placement  Placed IR consult  I have reviewed CTA chest, no evidence of PE. Noted chronic loculated fluid in the L pleural space. I have consulted pulmonary  Continue iv zosyn  Regarding CTA chest finding, noted chronic loculated pleural fluid. Given her  recent history with chest tube placement, I have discussed with pulmonary, concerning abdominal abscess is connected to pleural loculated fluid. Will check with thoracic surgery.     I have discussed this patient's plan of care and discharge plan at IDT rounds today with Case Management, Nursing, Nursing leadership, and other members of the IDT team.    Consultants/Specialty  Surgical oncology  IR  pulmonary    Code Status  Full Code    Disposition  The patient is not medically cleared for discharge to home or a post-acute facility.      I have placed the appropriate orders for post-discharge needs.    Review of Systems  ROS     Physical Exam  Temp:  [36.1 °C (96.9 °F)-36.6 °C (97.9 °F)] 36.5 °C (97.7 °F)  Pulse:  [67-82] 70  Resp:  [16-21] 17  BP: (114-141)/(29-76) 141/76  SpO2:  [91 %-95 %] 95 %    Physical Exam    Fluids    Intake/Output Summary (Last 24 hours) at 12/20/2024 1640  Last data filed at 12/19/2024 2059  Gross per 24 hour   Intake 100 ml   Output --   Net 100 ml        Laboratory  Recent Labs     12/19/24  1300 12/20/24  0004   WBC 16.3* 15.6*   RBC 4.79 4.49   HEMOGLOBIN 13.7 12.5   HEMATOCRIT 42.8 39.6   MCV 89.4 88.2   MCH 28.6 27.8   MCHC 32.0* 31.6*   RDW 51.8* 51.1*   PLATELETCT 810* 805*   MPV 9.5 9.3     Recent Labs     12/19/24  1537 12/20/24  0004   SODIUM 135 134*   POTASSIUM 3.9 3.9   CHLORIDE 101 100   CO2 25 21   GLUCOSE 108* 131*   BUN 11 10   CREATININE 0.62 0.74   CALCIUM 8.6 8.5     Recent Labs     12/19/24  1300   APTT 35.1   INR 1.77*               Imaging  CT-ABDOMEN-PELVIS WITH   Final Result      1.  Rim-enhancing irregular fluid collection under the left hemidiaphragm is larger since prior study, now 7.2 x 8.9 cm, and likely represents an abscess.   2.  Partially visualized loculated left pleural effusion/empyema containing some pockets of air, likely related to infection under the hemidiaphragm. Fluid in the left major fissure.   3.  Cholelithiasis.   4.  Nonobstructing left  nephrolithiasis.   5.  Small hiatal hernia.   6.  Colonic diverticulosis.   7.  Persistent small pericardial effusion.      CT-CTA CHEST PULMONARY ARTERY W/ RECONS   Final Result      1. No acute pulmonary embolus.   2. Chronic appearing changes in the left lung with loculated fluid in the left pleural space, including the major fissure, posteriorly and laterally. Adjacent parenchymal volume loss.   3. Cardiomegaly with small pericardial effusion.   4. Moderate hiatal hernia.   5. Left upper quadrant fluid collection measures 4.4 x 6.3 cm in diameter, with evaluation suboptimal without IV contrast administration.            DX-CHEST-PORTABLE (1 VIEW)   Final Result      1.  Small left pleural effusion.      2.  Wedge-shaped opacity in left lower lobe consistent with atelectasis, pneumonitis, or possibly soft tissue overlay.      IR-CONSULT AND TREAT    (Results Pending)        Assessment/Plan  * Intra-abdominal abscess (HCC)- (present on admission)  Assessment & Plan  Noted on CT abdomen  Discussed with surgical oncology, rec IR drain placement  IR consulted   Continue iv zosyn    Advance care planning  Assessment & Plan  Patient is 78 yo female with multiple comorbidities including breast cancer status postmastectomy, benign pancreatic serous cystadenoma, retroperitoneal abscess with drain placement in August and removal in September, history of PE and atrial thrombus currently on Pradaxa, recurrent pleural effusion requiring multiple thoracentesis in the past.  She is admitted for abdominal pain and was found to have intra-abdominal abscess requiring IR drainage.  Discussed goal of care and code status with patient. She confirms FULL code. ACP 16 mins    History of pulmonary embolism- (present on admission)  Assessment & Plan  Would like to talk about ongoing anticoagulation, thinks she would prefer Lovenox, holding anticoagulation given IR drain planning  Will eventually restart Pradaxa once no procedure  need    Pleural effusion- (present on admission)  Assessment & Plan  Status post pleurodesis with Dr. Saul earlier in December, CT abdomen pelvis shows partially visualized loculated left pleural effusion/empyema containing some pockets of air, likely related to infection under the hemidiaphragm. Fluid in the left major fissure.   On Zosyn  Given her recent history with chest tube placement, I have discussed with pulmonary, concerning abdominal abscess is connected to pleural loculated fluid. Will check with thoracic surgery.     Primary hypertension- (present on admission)  Assessment & Plan  Blood pressure in the ED in the low 100s, hold amlodipine for now and watch trend, IV antihypertensives ordered with parameters.    Acquired hypothyroidism- (present on admission)  Assessment & Plan  Continue home liothyronine and levothyroxine         VTE prophylaxis: SCD    I have performed a physical exam and reviewed and updated ROS and Plan today (12/20/2024). In review of yesterday's note (12/19/2024), there are no changes except as documented above.    Patient is has a high medical complexity, complex decision making and is at high risk for complication, morbidity, and mortality.  I spent 68 minutes, reviewing the chart, obtaining and/or reviewing separately obtained history. Performing a medically appropriate examination and evaluation.  Counseling and educating the patient. Ordering and reviewing medications, tests, or procedures.  Discussing the case with IR, surgery, pulmonary and thoracic surgery.  Documenting clinical information in EPIC. Independently interpreting results and communicating results to patient. Discussing future disposition of care with patient, RN and case management.  This does not include time spent on separately billable procedures/tests.

## 2024-12-22 LAB
ANION GAP SERPL CALC-SCNC: 12 MMOL/L (ref 7–16)
BUN SERPL-MCNC: 11 MG/DL (ref 8–22)
CALCIUM SERPL-MCNC: 8.5 MG/DL (ref 8.5–10.5)
CHLORIDE SERPL-SCNC: 103 MMOL/L (ref 96–112)
CO2 SERPL-SCNC: 22 MMOL/L (ref 20–33)
CREAT SERPL-MCNC: 0.64 MG/DL (ref 0.5–1.4)
ERYTHROCYTE [DISTWIDTH] IN BLOOD BY AUTOMATED COUNT: 51.9 FL (ref 35.9–50)
GFR SERPLBLD CREATININE-BSD FMLA CKD-EPI: 90 ML/MIN/1.73 M 2
GLUCOSE SERPL-MCNC: 99 MG/DL (ref 65–99)
HCT VFR BLD AUTO: 38.6 % (ref 37–47)
HGB BLD-MCNC: 12.3 G/DL (ref 12–16)
MAGNESIUM SERPL-MCNC: 2 MG/DL (ref 1.5–2.5)
MCH RBC QN AUTO: 28.3 PG (ref 27–33)
MCHC RBC AUTO-ENTMCNC: 31.9 G/DL (ref 32.2–35.5)
MCV RBC AUTO: 88.9 FL (ref 81.4–97.8)
PHOSPHATE SERPL-MCNC: 3.3 MG/DL (ref 2.5–4.5)
PLATELET # BLD AUTO: 760 K/UL (ref 164–446)
PMV BLD AUTO: 9.3 FL (ref 9–12.9)
POTASSIUM SERPL-SCNC: 3.9 MMOL/L (ref 3.6–5.5)
RBC # BLD AUTO: 4.34 M/UL (ref 4.2–5.4)
SODIUM SERPL-SCNC: 137 MMOL/L (ref 135–145)
WBC # BLD AUTO: 13.1 K/UL (ref 4.8–10.8)

## 2024-12-22 PROCEDURE — 770006 HCHG ROOM/CARE - MED/SURG/GYN SEMI*

## 2024-12-22 PROCEDURE — 700111 HCHG RX REV CODE 636 W/ 250 OVERRIDE (IP): Performed by: STUDENT IN AN ORGANIZED HEALTH CARE EDUCATION/TRAINING PROGRAM

## 2024-12-22 PROCEDURE — 36415 COLL VENOUS BLD VENIPUNCTURE: CPT

## 2024-12-22 PROCEDURE — 700102 HCHG RX REV CODE 250 W/ 637 OVERRIDE(OP): Performed by: STUDENT IN AN ORGANIZED HEALTH CARE EDUCATION/TRAINING PROGRAM

## 2024-12-22 PROCEDURE — 99233 SBSQ HOSP IP/OBS HIGH 50: CPT | Performed by: STUDENT IN AN ORGANIZED HEALTH CARE EDUCATION/TRAINING PROGRAM

## 2024-12-22 PROCEDURE — A9270 NON-COVERED ITEM OR SERVICE: HCPCS | Performed by: STUDENT IN AN ORGANIZED HEALTH CARE EDUCATION/TRAINING PROGRAM

## 2024-12-22 PROCEDURE — 700101 HCHG RX REV CODE 250: Performed by: NURSE PRACTITIONER

## 2024-12-22 PROCEDURE — 700111 HCHG RX REV CODE 636 W/ 250 OVERRIDE (IP): Mod: JZ | Performed by: STUDENT IN AN ORGANIZED HEALTH CARE EDUCATION/TRAINING PROGRAM

## 2024-12-22 PROCEDURE — 99232 SBSQ HOSP IP/OBS MODERATE 35: CPT | Performed by: SURGERY

## 2024-12-22 PROCEDURE — 83735 ASSAY OF MAGNESIUM: CPT

## 2024-12-22 PROCEDURE — 99222 1ST HOSP IP/OBS MODERATE 55: CPT | Performed by: SPECIALIST

## 2024-12-22 PROCEDURE — 80048 BASIC METABOLIC PNL TOTAL CA: CPT

## 2024-12-22 PROCEDURE — 84100 ASSAY OF PHOSPHORUS: CPT

## 2024-12-22 PROCEDURE — 700105 HCHG RX REV CODE 258: Performed by: STUDENT IN AN ORGANIZED HEALTH CARE EDUCATION/TRAINING PROGRAM

## 2024-12-22 PROCEDURE — 85027 COMPLETE CBC AUTOMATED: CPT

## 2024-12-22 RX ORDER — ENOXAPARIN SODIUM 100 MG/ML
1 INJECTION SUBCUTANEOUS EVERY 12 HOURS
Status: CANCELLED | OUTPATIENT
Start: 2024-12-23

## 2024-12-22 RX ORDER — SODIUM CHLORIDE 0.9 % (FLUSH) 0.9 %
10 SYRINGE (ML) INJECTION 2 TIMES DAILY
Status: DISCONTINUED | OUTPATIENT
Start: 2024-12-22 | End: 2024-12-24 | Stop reason: HOSPADM

## 2024-12-22 RX ADMIN — PIPERACILLIN AND TAZOBACTAM 4.5 G: 4; .5 INJECTION, POWDER, FOR SOLUTION INTRAVENOUS at 12:33

## 2024-12-22 RX ADMIN — SENNOSIDES AND DOCUSATE SODIUM 2 TABLET: 50; 8.6 TABLET ORAL at 16:46

## 2024-12-22 RX ADMIN — POLYETHYLENE GLYCOL 3350 1 PACKET: 17 POWDER, FOR SOLUTION ORAL at 07:42

## 2024-12-22 RX ADMIN — SODIUM CHLORIDE, PRESERVATIVE FREE 10 ML: 5 INJECTION INTRAVENOUS at 12:23

## 2024-12-22 RX ADMIN — PIPERACILLIN AND TAZOBACTAM 4.5 G: 4; .5 INJECTION, POWDER, FOR SOLUTION INTRAVENOUS at 20:40

## 2024-12-22 RX ADMIN — ACETAMINOPHEN 650 MG: 325 TABLET ORAL at 12:27

## 2024-12-22 RX ADMIN — AMLODIPINE BESYLATE 5 MG: 5 TABLET ORAL at 05:20

## 2024-12-22 RX ADMIN — ENOXAPARIN SODIUM 60 MG: 100 INJECTION SUBCUTANEOUS at 16:46

## 2024-12-22 RX ADMIN — ENOXAPARIN SODIUM 60 MG: 100 INJECTION SUBCUTANEOUS at 05:20

## 2024-12-22 RX ADMIN — PIPERACILLIN AND TAZOBACTAM 4.5 G: 4; .5 INJECTION, POWDER, FOR SOLUTION INTRAVENOUS at 05:25

## 2024-12-22 RX ADMIN — SODIUM CHLORIDE, PRESERVATIVE FREE 10 ML: 5 INJECTION INTRAVENOUS at 17:30

## 2024-12-22 RX ADMIN — LIOTHYRONINE SODIUM 2.5 MCG: 5 TABLET ORAL at 05:20

## 2024-12-22 RX ADMIN — LEVOTHYROXINE SODIUM 100 MCG: 0.1 TABLET ORAL at 05:20

## 2024-12-22 ASSESSMENT — ENCOUNTER SYMPTOMS
FEVER: 0
ABDOMINAL PAIN: 1
WEAKNESS: 0
CHILLS: 0
SHORTNESS OF BREATH: 0
NAUSEA: 0
VOMITING: 0
PALPITATIONS: 0

## 2024-12-22 ASSESSMENT — PAIN DESCRIPTION - PAIN TYPE
TYPE: ACUTE PAIN

## 2024-12-22 NOTE — PROGRESS NOTES
Surgical Progress Note    Author: Chaim Velasco M.D. Date & Time created: 2024   10:37 AM     Interval Events:  Patient underwent drainage of peripancreatic fluid collection yesterday.  Better this morning.      Hemodynamics:  Temp (24hrs), Av.8 °C (98.2 °F), Min:36.1 °C (96.9 °F), Max:37.4 °C (99.3 °F)  Temperature: 37.2 °C (98.9 °F)  Pulse  Av.6  Min: 51  Max: 97   Blood Pressure : 117/61     Respiratory:    Respiration: 16, Pulse Oximetry: 92 %        RUL Breath Sounds: Clear, RML Breath Sounds: Diminished, RLL Breath Sounds: Diminished, KADEN Breath Sounds: Clear, LLL Breath Sounds: Diminished  Neuro:  GCS       Fluids:    Intake/Output Summary (Last 24 hours) at 2024 1037  Last data filed at 2024 0359  Gross per 24 hour   Intake 720 ml   Output 27 ml   Net 693 ml     Weight: 59.5 kg (131 lb 2.8 oz)  Current Diet Order   Procedures    Diet Order Diet: Cardiac     Physical Exam  Constitutional:       General: She is not in acute distress.  HENT:      Mouth/Throat:      Mouth: Mucous membranes are moist.   Eyes:      General: No scleral icterus.     Extraocular Movements: Extraocular movements intact.      Conjunctiva/sclera: Conjunctivae normal.      Pupils: Pupils are equal, round, and reactive to light.   Abdominal:      General: There is no distension.      Palpations: Abdomen is soft.      Comments: Pain with purulent looking material   Neurological:      Mental Status: She is alert.       Labs:  Recent Results (from the past 24 hours)   CBC WITHOUT DIFFERENTIAL    Collection Time: 24 12:30 AM   Result Value Ref Range    WBC 13.1 (H) 4.8 - 10.8 K/uL    RBC 4.34 4.20 - 5.40 M/uL    Hemoglobin 12.3 12.0 - 16.0 g/dL    Hematocrit 38.6 37.0 - 47.0 %    MCV 88.9 81.4 - 97.8 fL    MCH 28.3 27.0 - 33.0 pg    MCHC 31.9 (L) 32.2 - 35.5 g/dL    RDW 51.9 (H) 35.9 - 50.0 fL    Platelet Count 760 (H) 164 - 446 K/uL    MPV 9.3 9.0 - 12.9 fL   Basic Metabolic Panel    Collection Time:  12/22/24 12:30 AM   Result Value Ref Range    Sodium 137 135 - 145 mmol/L    Potassium 3.9 3.6 - 5.5 mmol/L    Chloride 103 96 - 112 mmol/L    Co2 22 20 - 33 mmol/L    Glucose 99 65 - 99 mg/dL    Bun 11 8 - 22 mg/dL    Creatinine 0.64 0.50 - 1.40 mg/dL    Calcium 8.5 8.5 - 10.5 mg/dL    Anion Gap 12.0 7.0 - 16.0   MAGNESIUM    Collection Time: 12/22/24 12:30 AM   Result Value Ref Range    Magnesium 2.0 1.5 - 2.5 mg/dL   PHOSPHORUS    Collection Time: 12/22/24 12:30 AM   Result Value Ref Range    Phosphorus 3.3 2.5 - 4.5 mg/dL   ESTIMATED GFR    Collection Time: 12/22/24 12:30 AM   Result Value Ref Range    GFR (CKD-EPI) 90 >60 mL/min/1.73 m 2     Medical Decision Making, by Problem:  Active Hospital Problems    Diagnosis     Advance care planning [Z71.89]     History of pulmonary embolism [Z86.711]     Pleural effusion [J90]     Intra-abdominal abscess (HCC) [K65.1]     Primary hypertension [I10]     Acquired hypothyroidism [E03.9]      Plan:  Patient overall is doing well status post drainage.  As she has had multiple recurrent pancreatic fluid collections I wonder if a pancreatic duct stent might lower the pressure head and help this resolve.  I will consult GI to obtain their opinion.    I am okay with the patient going home even prior to culture data coming back.  She has had multiple drains and knows how to care for them.  Antibiotics could be adjusted as an outpatient.    Would wait and get her pancreatic duct stent done as an inpatient prior to discharge.            Discussed patient condition with Family and Patient

## 2024-12-22 NOTE — PROGRESS NOTES
Pt alert/oriented x4, denies pain at this time. Tolerating IV abx, no adverse rxn noted. YEMI drain patent, flushed with 10mL sterile NS per order. Pt resting quietly in bed, needs met. Call light within reach, personal belongings available, bed in lowest position, treaded socks on, and hourly rounding in place.

## 2024-12-22 NOTE — PROGRESS NOTES
[facility] Medicine Daily Progress Note    Date of Service  12/22/2024    Chief Complaint  Paige Gudino is a 79 y.o. female admitted 12/19/2024 with abd pain    Hospital Course  79 y.o. female with prior breast cancer status postmastectomy, benign pancreatic serous cystadenoma, retroperitoneal abscess with drain placement in August and removal in September, history of PE and atrial thrombus unable to tolerate Eliquis or Xarelto due to symptoms such as abdominal cramping and diarrhea, was on coumadin, however she did not follow up with anticoagulation clinic constantly. She was last discharged on dabigatran 12/12 , history of recurrent pleural effusion with multiple thoracenteses, underwent left-sided thorascopic pleurodesis with Dr. Saul on 12/9. Patient presented 12/19/2024 with return of dyspnea, abdominal pains and cramping.     CTA chest noted no evidence of acute PE. Chronic appearing changes in the left lung with loculated fluid in the left pleural space, including the major fissure, posteriorly and laterally. Adjacent parenchymal volume loss.     CT abdomen Rim-enhancing irregular fluid collection under the left hemidiaphragm is larger since prior study, now 7.2 x 8.9 cm, and likely represents an abscess.     S/p drain placed 12/21    Regarding CTA finding of chest, discussed with thoracic surgery Dr. Ganser, finding c/w post pleurodesis. No interventions.      Interval Problem Update  -Notes were extensively reviewed from primary team, radiology, ED, surgery, specialists, etc.   -Reviewed labs to date, microbiology for current admit and prior  -Imaging was independently reviewed and interpreted    Seen patient at bedside  No acute overnight events  S/p drain placement 12/21 by IR  Follow drain culture negative  Continue iv zosyn   I have consulted and discussed with ID  I have discussed with IR  Denies chest pain, on RA  Patient has a history of PE and currently on Pradaxa.  Will hold on  Pradaxa.  Start Lovenox 1 mg/kg twice daily    I have discussed this patient's plan of care and discharge plan at IDT rounds today with Case Management, Nursing, Nursing leadership, and other members of the IDT team.    Consultants/Specialty  Surgical oncology  IR  pulmonary    Code Status  Full Code    Disposition  The patient is not medically cleared for discharge to home or a post-acute facility.      I have placed the appropriate orders for post-discharge needs.    Review of Systems  ROS     Physical Exam  Temp:  [36.4 °C (97.5 °F)-37.4 °C (99.3 °F)] 37.2 °C (98.9 °F)  Pulse:  [59-85] 59  Resp:  [16-17] 16  BP: (113-127)/(61-68) 117/61  SpO2:  [90 %-95 %] 92 %    Physical Exam    Fluids    Intake/Output Summary (Last 24 hours) at 12/22/2024 1414  Last data filed at 12/22/2024 1400  Gross per 24 hour   Intake 720 ml   Output 27 ml   Net 693 ml        Laboratory  Recent Labs     12/20/24  0004 12/21/24  0346 12/22/24  0030   WBC 15.6* 12.6* 13.1*   RBC 4.49 4.50 4.34   HEMOGLOBIN 12.5 12.6 12.3   HEMATOCRIT 39.6 38.9 38.6   MCV 88.2 86.4 88.9   MCH 27.8 28.0 28.3   MCHC 31.6* 32.4 31.9*   RDW 51.1* 50.0 51.9*   PLATELETCT 805* 729* 760*   MPV 9.3 9.0 9.3     Recent Labs     12/20/24  0004 12/21/24  0346 12/22/24  0030   SODIUM 134* 136 137   POTASSIUM 3.9 3.9 3.9   CHLORIDE 100 102 103   CO2 21 21 22   GLUCOSE 131* 101* 99   BUN 10 11 11   CREATININE 0.74 0.56 0.64   CALCIUM 8.5 8.3* 8.5                     Imaging  CT-IMAGE-GUIDED DRAIN PERITONEAL   Final Result      1.  CT guided left upper quadrant fluid collection catheter drainage.      CT-ABDOMEN-PELVIS WITH   Final Result      1.  Rim-enhancing irregular fluid collection under the left hemidiaphragm is larger since prior study, now 7.2 x 8.9 cm, and likely represents an abscess.   2.  Partially visualized loculated left pleural effusion/empyema containing some pockets of air, likely related to infection under the hemidiaphragm. Fluid in the left major  fissure.   3.  Cholelithiasis.   4.  Nonobstructing left nephrolithiasis.   5.  Small hiatal hernia.   6.  Colonic diverticulosis.   7.  Persistent small pericardial effusion.      CT-CTA CHEST PULMONARY ARTERY W/ RECONS   Final Result      1. No acute pulmonary embolus.   2. Chronic appearing changes in the left lung with loculated fluid in the left pleural space, including the major fissure, posteriorly and laterally. Adjacent parenchymal volume loss.   3. Cardiomegaly with small pericardial effusion.   4. Moderate hiatal hernia.   5. Left upper quadrant fluid collection measures 4.4 x 6.3 cm in diameter, with evaluation suboptimal without IV contrast administration.            DX-CHEST-PORTABLE (1 VIEW)   Final Result      1.  Small left pleural effusion.      2.  Wedge-shaped opacity in left lower lobe consistent with atelectasis, pneumonitis, or possibly soft tissue overlay.           Assessment/Plan  * Intra-abdominal abscess (HCC)- (present on admission)  Assessment & Plan  Noted on CT abdomen  Discussed with surgical oncology, rec IR drain placement  IR consulted   Continue iv zosyn  S/p drain placed 12/21  Follow cultures   ID was consulted  Surgical oncology following     Advance care planning  Assessment & Plan  Patient is 78 yo female with multiple comorbidities including breast cancer status postmastectomy, benign pancreatic serous cystadenoma, retroperitoneal abscess with drain placement in August and removal in September, history of PE and atrial thrombus currently on Pradaxa, recurrent pleural effusion requiring multiple thoracentesis in the past.  She is admitted for abdominal pain and was found to have intra-abdominal abscess requiring IR drainage.  Discussed goal of care and code status with patient. She confirms FULL code. ACP 16 mins    History of pulmonary embolism- (present on admission)  Assessment & Plan  Would like to talk about ongoing anticoagulation, thinks she would prefer Lovenox,  holding anticoagulation given IR drain planning  Will eventually restart Pradaxa once no procedure need  Will start lovenox 1mg/kg bid    Pleural effusion- (present on admission)  Assessment & Plan  Status post pleurodesis with Dr. Saul earlier in December, CT abdomen pelvis shows partially visualized loculated left pleural effusion/empyema containing some pockets of air, likely related to infection under the hemidiaphragm. Fluid in the left major fissure.   On Zosyn  Given her recent history with chest tube placement, I have discussed with pulmonary and thoracic surgery Dr. Ganser and CT findings c/w post pleurodesis. No intervention indicated at this point.    Thrombocytosis- (present on admission)  Assessment & Plan  Likely reactive intraabdominal abscess  Continue monitoring  Currently on lovenox 1mg/kg bid    Primary hypertension- (present on admission)  Assessment & Plan  Blood pressure in the ED in the low 100s, hold amlodipine for now and watch trend, IV antihypertensives ordered with parameters.    Acquired hypothyroidism- (present on admission)  Assessment & Plan  Continue home liothyronine and levothyroxine         VTE prophylaxis: lovenox 1mg/kg bid    I have performed a physical exam and reviewed and updated ROS and Plan today (12/22/2024). In review of yesterday's note (12/21/2024), there are no changes except as documented above.    Patient is has a high medical complexity, complex decision making and is at high risk for complication, morbidity, and mortality.  I spent 51 minutes, reviewing the chart, obtaining and/or reviewing separately obtained history. Performing a medically appropriate examination and evaluation.  Counseling and educating the patient. Ordering and reviewing medications, tests, or procedures.  Discussing the case with IR, surgery, pulmonary and thoracic surgery.  Documenting clinical information in EPIC. Independently interpreting results and communicating results to patient.  Discussing future disposition of care with patient, RN and case management.  This does not include time spent on separately billable procedures/tests.

## 2024-12-22 NOTE — PROGRESS NOTES
Radiology Progress Note   Author: SOUMYA Avelar Date & Time created: 12/22/2024  8:49 AM   Date of admission  12/19/2024  Note to reader: this note follows the APSO format rather than the historical SOAP format. Assessment and plan located at the top of the note for ease of use.    Chief Complaint  79 y.o. 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  Hepatobiliary was consulted and recommended drain placement.  IR was consulted and patient underwent a LUQ abscess 10F drain placement with IR Dr. Bowen on 12/21/2024.    Interval History:   12/22/2024 - peritoneal abscess 10F drain to LUQ with 27 mL purulent output in the last 24 hours. Labs reviewed; WBC 13.1. Cultures pending. On ABX. Drain flushed with 10 mL NS.  I reviewed IDT notes patient with Dr. Arshad.     Assessment/Plan     Principal Problem:    Intra-abdominal abscess (HCC)  Active Problems:    Acquired hypothyroidism    Primary hypertension    Pleural effusion    History of pulmonary embolism    Advance care planning      Plan IR  - Order placed to irrigate LUQ drain with 10 ml of sterile saline each shift  - Fluid cultures pending   - Hepatobiliary following; GI consult placed for possible pancreatic duct stent placement  -Repeat CT in approximately 1 week if patient still in the hospital  -Anticipate patient will be  discharged home with drain in place following biliary duct stent and follow-up with hepatobiliary office as an outpatient.  - Patient has had multiple drains and is comfortable caring for them at home.  -Okay to discharge with drain in place once medically cleared with hepatobiliary follow-up from an IR standpoint.  - Continue to monitor drains, VS, and labs    -  -Thank you for allowing Interventional Radiology team to participate in the patients care, if any additional care or requests are needed in the future please do not hesitate to call or place IR order           Review of Systems  Physical Exam   Review of Systems   Constitutional:  Negative for chills and fever.   Respiratory:  Negative for shortness of breath.    Cardiovascular:  Negative for chest pain and palpitations.   Gastrointestinal:  Positive for abdominal pain. Negative for nausea and vomiting.   Neurological:  Negative for weakness.      Vitals:    12/22/24 0712   BP: 117/61   Pulse: (!) 59   Resp: 16   Temp: 37.2 °C (98.9 °F)   SpO2: 92%        Physical Exam  Constitutional:       General: She is not in acute distress.  Cardiovascular:      Rate and Rhythm: Normal rate.   Pulmonary:      Effort: No respiratory distress.   Abdominal:      General: There is no distension.      Comments: IR drain to LUQ   Skin:     General: Skin is warm and dry.   Neurological:      General: No focal deficit present.      Mental Status: She is alert and oriented to person, place, and time.   Psychiatric:         Mood and Affect: Mood normal.         Behavior: Behavior normal.             Labs    Recent Labs     12/20/24  0004 12/21/24  0346 12/22/24  0030   WBC 15.6* 12.6* 13.1*   RBC 4.49 4.50 4.34   HEMOGLOBIN 12.5 12.6 12.3   HEMATOCRIT 39.6 38.9 38.6   MCV 88.2 86.4 88.9   MCH 27.8 28.0 28.3   MCHC 31.6* 32.4 31.9*   RDW 51.1* 50.0 51.9*   PLATELETCT 805* 729* 760*   MPV 9.3 9.0 9.3     Recent Labs     12/20/24  0004 12/21/24  0346 12/22/24  0030   SODIUM 134*  136 137   POTASSIUM 3.9 3.9 3.9   CHLORIDE 100 102 103   CO2 21 21 22   GLUCOSE 131* 101* 99   BUN 10 11 11   CREATININE 0.74 0.56 0.64   CALCIUM 8.5 8.3* 8.5     Recent Labs     12/19/24  1537 12/20/24  0004 12/21/24  0346 12/22/24  0030   ALBUMIN 3.0*  --   --   --    TBILIRUBIN 1.0  --   --   --    ALKPHOSPHAT 98  --   --   --    TOTPROTEIN 6.4  --   --   --    ALTSGPT <5  --   --   --    ASTSGOT 17  --   --   --    CREATININE 0.62 0.74 0.56 0.64     CT-IMAGE-GUIDED DRAIN PERITONEAL   Final Result      1.  CT guided left upper quadrant fluid collection catheter drainage.      CT-ABDOMEN-PELVIS WITH   Final Result      1.  Rim-enhancing irregular fluid collection under the left hemidiaphragm is larger since prior study, now 7.2 x 8.9 cm, and likely represents an abscess.   2.  Partially visualized loculated left pleural effusion/empyema containing some pockets of air, likely related to infection under the hemidiaphragm. Fluid in the left major fissure.   3.  Cholelithiasis.   4.  Nonobstructing left nephrolithiasis.   5.  Small hiatal hernia.   6.  Colonic diverticulosis.   7.  Persistent small pericardial effusion.      CT-CTA CHEST PULMONARY ARTERY W/ RECONS   Final Result      1. No acute pulmonary embolus.   2. Chronic appearing changes in the left lung with loculated fluid in the left pleural space, including the major fissure, posteriorly and laterally. Adjacent parenchymal volume loss.   3. Cardiomegaly with small pericardial effusion.   4. Moderate hiatal hernia.   5. Left upper quadrant fluid collection measures 4.4 x 6.3 cm in diameter, with evaluation suboptimal without IV contrast administration.            DX-CHEST-PORTABLE (1 VIEW)   Final Result      1.  Small left pleural effusion.      2.  Wedge-shaped opacity in left lower lobe consistent with atelectasis, pneumonitis, or possibly soft tissue overlay.        INR   Date Value Ref Range Status   12/19/2024 1.77 (H) 0.87 - 1.13 Final     Comment:     " INR - Non-therapeutic Reference Range: 0.87-1.13  INR - Therapeutic Reference Range: 2.0-4.0       No results found for: \"POCINR\"     Intake/Output Summary (Last 24 hours) at 12/22/2024 0849  Last data filed at 12/22/2024 0359  Gross per 24 hour   Intake 720 ml   Output 27 ml   Net 693 ml      I have personally reviewed the above labs and imaging      I have performed a physical exam and reviewed and updated ROS and Plan today (12/22/2024).     50 minutes in directly providing and coordinating care and extensive data review.  No time overlap and excludes procedures.   "

## 2024-12-23 ENCOUNTER — ANESTHESIA (OUTPATIENT)
Dept: SURGERY | Facility: MEDICAL CENTER | Age: 79
DRG: 371 | End: 2024-12-23
Payer: COMMERCIAL

## 2024-12-23 ENCOUNTER — ANESTHESIA EVENT (OUTPATIENT)
Dept: SURGERY | Facility: MEDICAL CENTER | Age: 79
DRG: 371 | End: 2024-12-23
Payer: COMMERCIAL

## 2024-12-23 ENCOUNTER — APPOINTMENT (OUTPATIENT)
Dept: RADIOLOGY | Facility: MEDICAL CENTER | Age: 79
DRG: 371 | End: 2024-12-23
Attending: INTERNAL MEDICINE
Payer: COMMERCIAL

## 2024-12-23 LAB
ANION GAP SERPL CALC-SCNC: 9 MMOL/L (ref 7–16)
BACTERIA WND AEROBE CULT: ABNORMAL
BACTERIA WND AEROBE CULT: ABNORMAL
BUN SERPL-MCNC: 8 MG/DL (ref 8–22)
CALCIUM SERPL-MCNC: 8.2 MG/DL (ref 8.5–10.5)
CHLORIDE SERPL-SCNC: 101 MMOL/L (ref 96–112)
CO2 SERPL-SCNC: 24 MMOL/L (ref 20–33)
CREAT SERPL-MCNC: 0.54 MG/DL (ref 0.5–1.4)
ERYTHROCYTE [DISTWIDTH] IN BLOOD BY AUTOMATED COUNT: 49.1 FL (ref 35.9–50)
GFR SERPLBLD CREATININE-BSD FMLA CKD-EPI: 94 ML/MIN/1.73 M 2
GLUCOSE SERPL-MCNC: 96 MG/DL (ref 65–99)
GRAM STN SPEC: ABNORMAL
HCT VFR BLD AUTO: 36.4 % (ref 37–47)
HGB BLD-MCNC: 11.9 G/DL (ref 12–16)
LIPASE FLD-CCNC: 14 U/L
MAGNESIUM SERPL-MCNC: 1.9 MG/DL (ref 1.5–2.5)
MCH RBC QN AUTO: 27.9 PG (ref 27–33)
MCHC RBC AUTO-ENTMCNC: 32.7 G/DL (ref 32.2–35.5)
MCV RBC AUTO: 85.2 FL (ref 81.4–97.8)
PHOSPHATE SERPL-MCNC: 3.1 MG/DL (ref 2.5–4.5)
PLATELET # BLD AUTO: 758 K/UL (ref 164–446)
PMV BLD AUTO: 9.1 FL (ref 9–12.9)
POTASSIUM SERPL-SCNC: 3.4 MMOL/L (ref 3.6–5.5)
RBC # BLD AUTO: 4.27 M/UL (ref 4.2–5.4)
SIGNIFICANT IND 70042: ABNORMAL
SITE SITE: ABNORMAL
SODIUM SERPL-SCNC: 134 MMOL/L (ref 135–145)
SOURCE SOURCE: ABNORMAL
SPECIMEN SOURCE: NORMAL
WBC # BLD AUTO: 9.6 K/UL (ref 4.8–10.8)

## 2024-12-23 PROCEDURE — A9270 NON-COVERED ITEM OR SERVICE: HCPCS | Performed by: STUDENT IN AN ORGANIZED HEALTH CARE EDUCATION/TRAINING PROGRAM

## 2024-12-23 PROCEDURE — A9270 NON-COVERED ITEM OR SERVICE: HCPCS

## 2024-12-23 PROCEDURE — 0F7D8DZ DILATION OF PANCREATIC DUCT WITH INTRALUMINAL DEVICE, VIA NATURAL OR ARTIFICIAL OPENING ENDOSCOPIC: ICD-10-PCS | Performed by: INTERNAL MEDICINE

## 2024-12-23 PROCEDURE — 160028 HCHG SURGERY MINUTES - 1ST 30 MINS LEVEL 3: Performed by: INTERNAL MEDICINE

## 2024-12-23 PROCEDURE — 700105 HCHG RX REV CODE 258: Performed by: INTERNAL MEDICINE

## 2024-12-23 PROCEDURE — 160009 HCHG ANES TIME/MIN: Performed by: INTERNAL MEDICINE

## 2024-12-23 PROCEDURE — 770006 HCHG ROOM/CARE - MED/SURG/GYN SEMI*

## 2024-12-23 PROCEDURE — 99233 SBSQ HOSP IP/OBS HIGH 50: CPT | Performed by: STUDENT IN AN ORGANIZED HEALTH CARE EDUCATION/TRAINING PROGRAM

## 2024-12-23 PROCEDURE — 160048 HCHG OR STATISTICAL LEVEL 1-5: Performed by: INTERNAL MEDICINE

## 2024-12-23 PROCEDURE — 700105 HCHG RX REV CODE 258: Performed by: STUDENT IN AN ORGANIZED HEALTH CARE EDUCATION/TRAINING PROGRAM

## 2024-12-23 PROCEDURE — 43262 ENDO CHOLANGIOPANCREATOGRAPH: CPT | Performed by: INTERNAL MEDICINE

## 2024-12-23 PROCEDURE — 700101 HCHG RX REV CODE 250: Performed by: STUDENT IN AN ORGANIZED HEALTH CARE EDUCATION/TRAINING PROGRAM

## 2024-12-23 PROCEDURE — 83735 ASSAY OF MAGNESIUM: CPT

## 2024-12-23 PROCEDURE — 700111 HCHG RX REV CODE 636 W/ 250 OVERRIDE (IP): Mod: JZ | Performed by: INTERNAL MEDICINE

## 2024-12-23 PROCEDURE — 700102 HCHG RX REV CODE 250 W/ 637 OVERRIDE(OP)

## 2024-12-23 PROCEDURE — 700111 HCHG RX REV CODE 636 W/ 250 OVERRIDE (IP): Performed by: STUDENT IN AN ORGANIZED HEALTH CARE EDUCATION/TRAINING PROGRAM

## 2024-12-23 PROCEDURE — 80048 BASIC METABOLIC PNL TOTAL CA: CPT

## 2024-12-23 PROCEDURE — 74328 X-RAY BILE DUCT ENDOSCOPY: CPT

## 2024-12-23 PROCEDURE — 160002 HCHG RECOVERY MINUTES (STAT): Performed by: INTERNAL MEDICINE

## 2024-12-23 PROCEDURE — 700102 HCHG RX REV CODE 250 W/ 637 OVERRIDE(OP): Performed by: STUDENT IN AN ORGANIZED HEALTH CARE EDUCATION/TRAINING PROGRAM

## 2024-12-23 PROCEDURE — 700101 HCHG RX REV CODE 250: Performed by: NURSE PRACTITIONER

## 2024-12-23 PROCEDURE — C1769 GUIDE WIRE: HCPCS | Performed by: INTERNAL MEDICINE

## 2024-12-23 PROCEDURE — 85027 COMPLETE CBC AUTOMATED: CPT

## 2024-12-23 PROCEDURE — 700117 HCHG RX CONTRAST REV CODE 255

## 2024-12-23 PROCEDURE — 700111 HCHG RX REV CODE 636 W/ 250 OVERRIDE (IP): Mod: JZ | Performed by: STUDENT IN AN ORGANIZED HEALTH CARE EDUCATION/TRAINING PROGRAM

## 2024-12-23 PROCEDURE — 43276 ERCP STENT EXCHANGE W/DILATE: CPT | Mod: 59 | Performed by: INTERNAL MEDICINE

## 2024-12-23 PROCEDURE — 160035 HCHG PACU - 1ST 60 MINS PHASE I: Performed by: INTERNAL MEDICINE

## 2024-12-23 PROCEDURE — 84100 ASSAY OF PHOSPHORUS: CPT

## 2024-12-23 PROCEDURE — C2617 STENT, NON-COR, TEM W/O DEL: HCPCS | Performed by: INTERNAL MEDICINE

## 2024-12-23 DEVICE — STENT PANCREATIC PIGTAIL ADVANIX 5FR X 5CM: Type: IMPLANTABLE DEVICE | Site: ABDOMEN | Status: FUNCTIONAL

## 2024-12-23 RX ORDER — INDOMETHACIN 100 MG
SUPPOSITORY, RECTAL RECTAL
Status: COMPLETED
Start: 2024-12-23 | End: 2024-12-23

## 2024-12-23 RX ORDER — ROCURONIUM BROMIDE 10 MG/ML
INJECTION, SOLUTION INTRAVENOUS PRN
Status: DISCONTINUED | OUTPATIENT
Start: 2024-12-23 | End: 2024-12-23 | Stop reason: SURG

## 2024-12-23 RX ORDER — ONDANSETRON 2 MG/ML
INJECTION INTRAMUSCULAR; INTRAVENOUS PRN
Status: DISCONTINUED | OUTPATIENT
Start: 2024-12-23 | End: 2024-12-23 | Stop reason: SURG

## 2024-12-23 RX ORDER — DIPHENHYDRAMINE HYDROCHLORIDE 50 MG/ML
12.5 INJECTION INTRAMUSCULAR; INTRAVENOUS
Status: DISCONTINUED | OUTPATIENT
Start: 2024-12-23 | End: 2024-12-23 | Stop reason: HOSPADM

## 2024-12-23 RX ORDER — HYDROMORPHONE HYDROCHLORIDE 1 MG/ML
0.2 INJECTION, SOLUTION INTRAMUSCULAR; INTRAVENOUS; SUBCUTANEOUS
Status: DISCONTINUED | OUTPATIENT
Start: 2024-12-23 | End: 2024-12-23 | Stop reason: HOSPADM

## 2024-12-23 RX ORDER — HYDROMORPHONE HYDROCHLORIDE 1 MG/ML
0.1 INJECTION, SOLUTION INTRAMUSCULAR; INTRAVENOUS; SUBCUTANEOUS
Status: DISCONTINUED | OUTPATIENT
Start: 2024-12-23 | End: 2024-12-23 | Stop reason: HOSPADM

## 2024-12-23 RX ORDER — HALOPERIDOL 5 MG/ML
1 INJECTION INTRAMUSCULAR
Status: DISCONTINUED | OUTPATIENT
Start: 2024-12-23 | End: 2024-12-23 | Stop reason: HOSPADM

## 2024-12-23 RX ORDER — SODIUM CHLORIDE, SODIUM LACTATE, POTASSIUM CHLORIDE, CALCIUM CHLORIDE 600; 310; 30; 20 MG/100ML; MG/100ML; MG/100ML; MG/100ML
INJECTION, SOLUTION INTRAVENOUS CONTINUOUS
Status: DISCONTINUED | OUTPATIENT
Start: 2024-12-23 | End: 2024-12-23 | Stop reason: HOSPADM

## 2024-12-23 RX ORDER — SODIUM CHLORIDE, SODIUM LACTATE, POTASSIUM CHLORIDE, CALCIUM CHLORIDE 600; 310; 30; 20 MG/100ML; MG/100ML; MG/100ML; MG/100ML
INJECTION, SOLUTION INTRAVENOUS
Status: DISCONTINUED | OUTPATIENT
Start: 2024-12-23 | End: 2024-12-23 | Stop reason: SURG

## 2024-12-23 RX ORDER — ONDANSETRON 2 MG/ML
4 INJECTION INTRAMUSCULAR; INTRAVENOUS
Status: DISCONTINUED | OUTPATIENT
Start: 2024-12-23 | End: 2024-12-23 | Stop reason: HOSPADM

## 2024-12-23 RX ORDER — POTASSIUM CHLORIDE 1500 MG/1
40 TABLET, EXTENDED RELEASE ORAL ONCE
Status: COMPLETED | OUTPATIENT
Start: 2024-12-23 | End: 2024-12-23

## 2024-12-23 RX ORDER — ALBUTEROL SULFATE 5 MG/ML
2.5 SOLUTION RESPIRATORY (INHALATION)
Status: DISCONTINUED | OUTPATIENT
Start: 2024-12-23 | End: 2024-12-23 | Stop reason: HOSPADM

## 2024-12-23 RX ORDER — OXYCODONE HCL 5 MG/5 ML
5 SOLUTION, ORAL ORAL
Status: DISCONTINUED | OUTPATIENT
Start: 2024-12-23 | End: 2024-12-23 | Stop reason: HOSPADM

## 2024-12-23 RX ORDER — OXYCODONE HCL 5 MG/5 ML
10 SOLUTION, ORAL ORAL
Status: DISCONTINUED | OUTPATIENT
Start: 2024-12-23 | End: 2024-12-23 | Stop reason: HOSPADM

## 2024-12-23 RX ORDER — DEXAMETHASONE SODIUM PHOSPHATE 4 MG/ML
INJECTION, SOLUTION INTRA-ARTICULAR; INTRALESIONAL; INTRAMUSCULAR; INTRAVENOUS; SOFT TISSUE PRN
Status: DISCONTINUED | OUTPATIENT
Start: 2024-12-23 | End: 2024-12-23 | Stop reason: SURG

## 2024-12-23 RX ORDER — LIDOCAINE HYDROCHLORIDE 20 MG/ML
INJECTION, SOLUTION EPIDURAL; INFILTRATION; INTRACAUDAL; PERINEURAL PRN
Status: DISCONTINUED | OUTPATIENT
Start: 2024-12-23 | End: 2024-12-23 | Stop reason: SURG

## 2024-12-23 RX ORDER — HYDROMORPHONE HYDROCHLORIDE 1 MG/ML
0.4 INJECTION, SOLUTION INTRAMUSCULAR; INTRAVENOUS; SUBCUTANEOUS
Status: DISCONTINUED | OUTPATIENT
Start: 2024-12-23 | End: 2024-12-23 | Stop reason: HOSPADM

## 2024-12-23 RX ADMIN — IOHEXOL 5.5 ML: 300 INJECTION, SOLUTION INTRAVENOUS at 12:04

## 2024-12-23 RX ADMIN — Medication 100 MG: at 11:34

## 2024-12-23 RX ADMIN — LIDOCAINE HYDROCHLORIDE 60 MG: 20 INJECTION, SOLUTION EPIDURAL; INFILTRATION; INTRACAUDAL; PERINEURAL at 11:47

## 2024-12-23 RX ADMIN — AMPICILLIN AND SULBACTAM 3 G: 1; 2 INJECTION, POWDER, FOR SOLUTION INTRAMUSCULAR; INTRAVENOUS at 23:18

## 2024-12-23 RX ADMIN — AMLODIPINE BESYLATE 5 MG: 5 TABLET ORAL at 04:10

## 2024-12-23 RX ADMIN — ONDANSETRON 4 MG: 2 INJECTION INTRAMUSCULAR; INTRAVENOUS at 12:05

## 2024-12-23 RX ADMIN — LIOTHYRONINE SODIUM 5 MCG: 5 TABLET ORAL at 04:10

## 2024-12-23 RX ADMIN — ROCURONIUM BROMIDE 40 MG: 50 INJECTION, SOLUTION INTRAVENOUS at 11:47

## 2024-12-23 RX ADMIN — SODIUM CHLORIDE, POTASSIUM CHLORIDE, SODIUM LACTATE AND CALCIUM CHLORIDE: 600; 310; 30; 20 INJECTION, SOLUTION INTRAVENOUS at 11:41

## 2024-12-23 RX ADMIN — SENNOSIDES AND DOCUSATE SODIUM 2 TABLET: 50; 8.6 TABLET ORAL at 17:36

## 2024-12-23 RX ADMIN — LEVOTHYROXINE SODIUM 100 MCG: 0.1 TABLET ORAL at 04:10

## 2024-12-23 RX ADMIN — SUGAMMADEX 200 MG: 100 INJECTION, SOLUTION INTRAVENOUS at 12:05

## 2024-12-23 RX ADMIN — SODIUM CHLORIDE, PRESERVATIVE FREE 10 ML: 5 INJECTION INTRAVENOUS at 04:18

## 2024-12-23 RX ADMIN — SODIUM CHLORIDE, PRESERVATIVE FREE 10 ML: 5 INJECTION INTRAVENOUS at 17:27

## 2024-12-23 RX ADMIN — PROPOFOL 120 MG: 10 INJECTION, EMULSION INTRAVENOUS at 11:47

## 2024-12-23 RX ADMIN — PIPERACILLIN AND TAZOBACTAM 4.5 G: 4; .5 INJECTION, POWDER, FOR SOLUTION INTRAVENOUS at 04:14

## 2024-12-23 RX ADMIN — DEXAMETHASONE SODIUM PHOSPHATE 8 MG: 4 INJECTION INTRA-ARTICULAR; INTRALESIONAL; INTRAMUSCULAR; INTRAVENOUS; SOFT TISSUE at 11:54

## 2024-12-23 RX ADMIN — AMPICILLIN AND SULBACTAM 3 G: 1; 2 INJECTION, POWDER, FOR SOLUTION INTRAMUSCULAR; INTRAVENOUS at 17:26

## 2024-12-23 RX ADMIN — POTASSIUM CHLORIDE 40 MEQ: 1500 TABLET, EXTENDED RELEASE ORAL at 17:36

## 2024-12-23 ASSESSMENT — ENCOUNTER SYMPTOMS
CHILLS: 0
MYALGIAS: 0
ABDOMINAL PAIN: 1
DEPRESSION: 0
SHORTNESS OF BREATH: 0
FEVER: 0
NAUSEA: 0
HEADACHES: 0
WEAKNESS: 0
VOMITING: 0
COUGH: 0

## 2024-12-23 ASSESSMENT — PAIN DESCRIPTION - PAIN TYPE
TYPE: ACUTE PAIN
TYPE: ACUTE PAIN

## 2024-12-23 ASSESSMENT — PAIN SCALES - GENERAL: PAIN_LEVEL: 0

## 2024-12-23 NOTE — ANESTHESIA TIME REPORT
Anesthesia Start and Stop Event Times       Date Time Event    12/23/2024 1138 Ready for Procedure     1141 Anesthesia Start     1212 Anesthesia Stop          Responsible Staff  12/23/24      Name Role Begin End    Hari Alves M.D. Anesth 1141 1212          Overtime Reason:  no overtime (within assigned shift)    Comments:

## 2024-12-23 NOTE — PROGRESS NOTES
"Pt refused skin check when returning from ERCP. Pt states \"Wound nurse saw me yesterday and says there is nothing to worry about. My skin is good.\" Pt has pillow under sacrum for support.   "

## 2024-12-23 NOTE — OR NURSING
1210 Patient arrived to PACU. Report received from anesthesia and OR RN. Patient on 6L of oxygen via mask. Placed on monitor. Patient sleeping.     1223 handoff to Azucena LAWLER.

## 2024-12-23 NOTE — ANESTHESIA PROCEDURE NOTES
Airway    Date/Time: 12/23/2024 11:49 AM    Performed by: Hari Alves M.D.  Authorized by: Hari Alves M.D.    Location:  OR  Urgency:  Elective  Difficult Airway: No    Indications for Airway Management:  Anesthesia      Spontaneous Ventilation: absent    Sedation Level:  Deep  Preoxygenated: Yes    Patient Position:  Sniffing  Mask Difficulty Assessment:  1 - vent by mask  Final Airway Type:  Endotracheal airway  Final Endotracheal Airway:  ETT  Cuffed: Yes    Technique Used for Successful ETT Placement:  Direct laryngoscopy  Devices/Methods Used in Placement:  Intubating stylet    Insertion Site:  Oral  Blade Type:  Griffith  Laryngoscope Blade/Videolaryngoscope Blade Size:  2  ETT Size (mm):  7.5  Measured from:  Lips  ETT to Lips (cm):  21  Placement Verified by: capnometry    Cormack-Lehane Classification:  Grade I - full view of glottis  Number of Attempts at Approach:  1

## 2024-12-23 NOTE — INTERVAL H&P NOTE
Patient seen preoperatively.  Discussed intention to pursue pancreatic gram with sphincterotomy and stent placement.  Principal risk is failure to obtain access to the pancreatic duct.  Alternatively standard risk to include acute pancreatitis and bleeding reviewed.  Patient willing to proceed forward understanding risk benefits and alternatives.    Plan: ERCP, with possible pancreatic sphincterotomy and stent placement.  Pre-op indomethacin.   89 y.o presenting with cough x 1 day. per family, patient was recently discharged to rehab facility x 1 day. patient endorses chills. denies n, v, abd pain, cp, sob.

## 2024-12-23 NOTE — H&P (VIEW-ONLY)
GASTROENTEROLOGY CONSULTATION    PATIENT NAME: Paige Gudino  : 1945  CSN: [id number]  MRN:  [id number]     CONSULTATION DATE:  2024    PRIMARY CARE PROVIDER:  Roula Ga P.A.-C.      REASON FOR CONSULT:  Pancreatic abscess  Consult requested by Dr. Chaim Velasco    HISTORY OF PRESENT ILLNESS:  Paige Gudino is a 79 y.o. female with prior breast cancer status postmastectomy, benign pancreatic serous cystadenoma who underwent partial pancreatectomy in 2024 and has had repeated fluid collections abutting pancreas and stomach and pleural effusions since that time. Retroperitoneal abscess with drain placement in August and removal in September. She had another drainage yesterday for increase fluid collection. Post operatively she also developed bilateral pulmonary embolism with right heart strain, bilateral lower extremity DVTs, right atrial thrombus that required thrombectomy.  She was unable to tolerate Eliquis or Xarelto due to symptoms such as abdominal cramping and diarrhea, was on coumadin, however she did not follow up with anticoagulation clinic constantly. She was last discharged on dabigatran  and in house is on lovenox. She also has a history of recurrent pleural effusion with multiple thoracenteses, underwent left-sided thorascopic pleurodesis with Dr. Saul on . Patient presented 2024 with return of dyspnea, abdominal pains and cramping.     CTA chest noted no evidence of acute PE. Chronic appearing changes in the left lung with loculated fluid in the left pleural space, including the major fissure, posteriorly and laterally. Adjacent parenchymal volume loss.      CT abdomen Rim-enhancing irregular fluid collection under the left hemidiaphragm is larger since prior study, now 7.2 x 8.9 cm, and likely represents an abscess.      Cultures pending on fluid    PAST MEDICAL HISTORY:  Past Medical History:   Diagnosis Date    Anesthesia     nausea post  op    Cancer (HCC)     1987 breast left    Cancer of overlapping sites of left female breast (HCC)     Cataract 2024    IOL bilat    High cholesterol     Hypothyroidism     PONV (postoperative nausea and vomiting) 1987    Just felt nausous after anesthesia    Thyroid nodule        PAST SURGICAL HISTORY:  Past Surgical History:   Procedure Laterality Date    MS THORACOSCOPY,DX NO BX Left 12/9/2024    Procedure: THORACOSCOPY - LEFT THORACOSCOPIC PLEURODESIS;  Surgeon: Glenn Saul M.D.;  Location: SURGERY [facility];  Service: Thoracic    MS ULTRASONIC GUIDANCE, INTRAOPERATIVE  8/5/2024    Procedure: ULTRASOUND GUIDANCE;  Surgeon: Sachin Espinoza M.D.;  Location: SURGERY [facility];  Service: General    PANCREATECTOMY N/A 8/5/2024    Procedure: OPEN DISTAL PANCREATECTOMY WITH SPLENECTOMY, NODE DISSECTION;  Surgeon: Sachin Espinoza M.D.;  Location: SURGERY [facility];  Service: General    SPLENECTOMY N/A 8/5/2024    Procedure: SPLENECTOMY;  Surgeon: Sachin Espinoza M.D.;  Location: SURGERY [facility];  Service: General    CREATION, FLAP, OMENTUM N/A 8/5/2024    Procedure: CREATION, FLAP, OMENTUM;  Surgeon: Sachin Espinoza M.D.;  Location: SURGERY [facility];  Service: General    PB MASTECTOMY, PARTIAL Left 08/01/2022    Procedure: MELLO LOCALIZED LEFT PARTIAL MASTECTOMY;  Surgeon: Elena Arroyo M.D.;  Location: [facility];  Service: General    OTHER ORTHOPEDIC SURGERY  2019    back surgery    OTHER  2001    replace left breast implant    OTHER  1988    Left breast implant/lift    OTHER  1987    left mastectomy    LAMINOTOMY      LUMPECTOMY      PRIMARY C SECTION          CURRENT MEDS:  Current Facility-Administered Medications   Medication Dose Route Frequency Provider Last Rate Last Admin    normal saline flush 0.9 % SOLN 10 mL  10 mL Tube BID Romy Florez, A.P.R.N.   10 mL at 12/22/24 1223    enoxaparin (Lovenox) inj 60 mg  1 mg/kg Subcutaneous  Q12HRS Ayde Arshad M.D.   60 mg at 12/22/24 0520    amLODIPine (Norvasc) tablet 5 mg  5 mg Oral DAILY Ayde Arshad M.D.   5 mg at 12/22/24 0520    levothyroxine (Synthroid) tablet 100 mcg  100 mcg Oral AM ES Ayde Arshad M.D.   100 mcg at 12/22/24 0520    acetaminophen (Tylenol) tablet 650 mg  650 mg Oral Q6HRS PRN Gaudencio Tinsley M.D.   650 mg at 12/22/24 1227    senna-docusate (Pericolace Or Senokot S) 8.6-50 MG per tablet 2 Tablet  2 Tablet Oral Q EVENING Gaudencio Tinsley M.D.   2 Tablet at 12/21/24 1615    And    polyethylene glycol/lytes (Miralax) Packet 1 Packet  1 Packet Oral QDAY PRN Gaudencio Tinsley M.D.   1 Packet at 12/22/24 0742    labetalol (Normodyne/Trandate) injection 10 mg  10 mg Intravenous Q4HRS PRN Gaudencio Tinsley M.D.        ondansetron (Zofran) syringe/vial injection 4 mg  4 mg Intravenous Q4HRS PRN Gaudencio Tinsley M.D.   4 mg at 12/20/24 1715    ondansetron (Zofran ODT) dispertab 4 mg  4 mg Oral Q4HRS PRN Gaudencio Tinsley M.D.   4 mg at 12/20/24 0645    piperacillin-tazobactam (Zosyn) 4.5 g in  mL IVPB  4.5 g Intravenous Q8HRS Gaudencio Tinsley M.D. 25 mL/hr at 12/22/24 1233 4.5 g at 12/22/24 1233    HYDROcodone-acetaminophen (Norco) 5-325 MG per tablet 1 Tablet  1 Tablet Oral Q6HRS PRN Gaudencio Tinsley M.D.   1 Tablet at 12/21/24 1239    liothyronine (Cytomel) tablet 5 mcg  5 mcg Oral MO, WE + FR Gaudencio Tinsley M.D.   5 mcg at 12/20/24 0544    And    liothyronine (Cytomel) tablet 2.5 mcg  2.5 mcg Oral Once per day on Sunday Tuesday Thursday Saturday Gaudencio Tinsley M.D.   2.5 mcg at 12/22/24 0520        ALLERGIES:  Allergies   Allergen Reactions    Synthroid [Fd&C Red #40 Al Paul-Levothyroxine] Hives and Unspecified     Hives, Brand specific. Some brands are ok and some are not    Tape Unspecified     Skin degradation with use of tegaderm     [place] Thyroid [Thyroid] Diarrhea     diarrhea       SOCIAL HISTORY:  Social History     Socioeconomic History    Marital  status:      Spouse name: Not on file    Number of children: Not on file    Years of education: Not on file    Highest education level: Associate degree: academic program   Occupational History     Comment: retired banker   Tobacco Use    Smoking status: Never     Passive exposure: Past    Smokeless tobacco: Never   Vaping Use    Vaping status: Never Used   Substance and Sexual Activity    Alcohol use: Never    Drug use: Never    Sexual activity: Not Currently     Partners: Male     Birth control/protection: Abstinence, Male Sterilization, Post-Menopausal     Comment:    Other Topics Concern    Not on file   Social History Narrative    Not on file     Social Drivers of Health     Financial Resource Strain: Low Risk  (7/11/2024)    Overall Financial Resource Strain (CARDIA)     Difficulty of Paying Living Expenses: Not hard at all   Food Insecurity: No Food Insecurity (12/4/2024)    Hunger Vital Sign     Worried About Running Out of Food in the Last Year: Never true     Ran Out of Food in the Last Year: Never true   Transportation Needs: No Transportation Needs (12/4/2024)    PRAPARE - Transportation     Lack of Transportation (Medical): No     Lack of Transportation (Non-Medical): No   Physical Activity: Insufficiently Active (7/11/2024)    Exercise Vital Sign     Days of Exercise per Week: 2 days     Minutes of Exercise per Session: 10 min   Stress: No Stress Concern Present (7/11/2024)    [employer] [place] [employer] - Occupational Stress Questionnaire     Feeling of Stress : Not at all   Social Connections: Unknown (7/11/2024)    Social Connection and Isolation Panel [NHANES]     Frequency of Communication with Friends and Family: More than three times a week     Frequency of Social Gatherings with Friends and Family: Twice a week     Attends [employer] Services: Patient declined     Active Member of Clubs or Organizations: Yes     Attends Club or Organization Meetings: More than 4 times  "per year     Marital Status:    Intimate Partner Violence: Not At Risk (12/19/2024)    Humiliation, Afraid, Rape, and Kick questionnaire     Fear of Current or Ex-Partner: No     Emotionally Abused: No     Physically Abused: No     Sexually Abused: No   Housing Stability: Low Risk  (12/4/2024)    Housing Stability Vital Sign     Unable to Pay for Housing in the Last Year: No     Number of Times Moved in the Last Year: 0     Homeless in the Last Year: No       FAMILY HISTORY:  Family History   Problem Relation Age of Onset    Cancer Mother         Ovarian    Ovarian Cancer Mother     Dementia Father     Heart Disease Father     Hyperlipidemia Neg Hx         REVIEW OF SYSTEMS:  General ROS: Negative for - chills, fever, night sweats or weight loss.  HEENT ROS: Negative  Respiratory ROS: Negative for - cough or shortness of breath.  Cardiovascular ROS:  Negative for - chest pain or palpitations.  Gastrointestinal ROS: As per the history of present illness.  Genito-Urinary ROS: Negative  Musculoskeletal ROS: Negative.  Neurological ROS: Negative  Skin ROS: negative  Hematology ROS: negative  Endocrinology ROS: Negative        PHYSICAL EXAM:  VITALS: BP (!) 142/75   Pulse (!) 114   Temp 36.7 °C (98 °F) (Temporal)   Resp 18   Ht 1.626 m (5' 4\")   Wt 59.5 kg (131 lb 2.8 oz)   LMP  (LMP Unknown)   SpO2 93%   Breastfeeding No   BMI 22.52 kg/m²   GEN:  Paige Gudino is a 79 y.o. female in no acute distress.  HEENT: Mucous membranes pink and moist.  Sclera anicteric.    NECK:    Neck supple without lymphadenopathy or thyromegaly.  LUNGS: Clear to auscultation posteriorly.  HEART: Regular rate and rhythm. S1 and S2 normal. No murmurs, gallops  ABD:  + BS drain in position and was just emptied  RECTAL: Not done at this time.  EXT:  Without cyanosis, deformity or pitting edema.  SKIN:  Pink, warm, dry.  NEURO: Grossly intact, A/OR.    LABS:  Recent Labs     12/20/24  0004 12/21/24  0346 12/22/24  0030 " "  WBC 15.6* 12.6* 13.1*   MCV 88.2 86.4 88.9     Recent Labs     12/20/24  0004 12/21/24  0346 12/22/24  0030   GLUCOSE 131* 101* 99   BUN 10 11 11   CO2 21 21 22     Lab Results   Component Value Date    INR 1.77 (H) 12/19/2024    INR 1.10 12/09/2024    INR 1.28 (H) 12/06/2024     No components found for: \"ALT\", \"AST\", \"GGT\", \"ALKPHOS\"  No results found for: \"BILINEO\"      @LASTIMGCAT(AA0024)@     @LASTIMGCAT(TW9756)@       IMPRESSION/PLAN:  Intra-abdominal abscess - s/p IR drainage. Culture pending. Continue antibiotics  Pleural effusion s/p pleurodesis - breathing well   History of PE/DVT. Hold am dose of lovenox  Ercp tomorrow with pancreatic stent placement to see if placement and lower pressure in the pancreas will reduce pancreatic fluid collections  NPO after midnight         Teri Guy M.D.  Gastroenterology      "

## 2024-12-23 NOTE — ANESTHESIA POSTPROCEDURE EVALUATION
Patient: Paige Gudino    Procedure Summary       Date: 12/23/24 Room / Location: [facility] ROOM 26 / SURGERY SAME DAY [facility]    Anesthesia Start: 1141 Anesthesia Stop: 1212    Procedures:       ERCP (ENDOSCOPIC RETROGRADE CHOLANGIOPANCREATOGRAPHY) (Esophagus)      SPHINCTEROTOMY (Abdomen)      ERCP, WITH TUBE OR STENT INSERTION (Abdomen) Diagnosis: (Pancreatic leak with stent placement)    Surgeons: Derrick Quiroz M.D. Responsible Provider: Hari Alves M.D.    Anesthesia Type: general ASA Status: 3            Final Anesthesia Type: general  Last vitals  BP   Blood Pressure : (!) 160/70    Temp   36.3 °C (97.3 °F)    Pulse   80   Resp   16    SpO2   94 %      Anesthesia Post Evaluation    Patient location during evaluation: PACU  Patient participation: complete - patient participated  Level of consciousness: sleepy but conscious and responsive to verbal stimuli  Pain score: 0    Airway patency: patent  Anesthetic complications: no  Cardiovascular status: hemodynamically stable  Respiratory status: acceptable and face mask  Hydration status: euvolemic    PONV: none          No notable events documented.     Nurse Pain Score: 0 (NPRS)

## 2024-12-23 NOTE — CONSULTS
GASTROENTEROLOGY CONSULTATION    PATIENT NAME: Paige Gudino  : 1945  CSN: [id number]  MRN:  [id number]     CONSULTATION DATE:  2024    PRIMARY CARE PROVIDER:  Roula Ga P.A.-C.      REASON FOR CONSULT:  Pancreatic abscess  Consult requested by Dr. Chaim Velasco    HISTORY OF PRESENT ILLNESS:  Paige Gudino is a 79 y.o. female with prior breast cancer status postmastectomy, benign pancreatic serous cystadenoma who underwent partial pancreatectomy in 2024 and has had repeated fluid collections abutting pancreas and stomach and pleural effusions since that time. Retroperitoneal abscess with drain placement in August and removal in September. She had another drainage yesterday for increase fluid collection. Post operatively she also developed bilateral pulmonary embolism with right heart strain, bilateral lower extremity DVTs, right atrial thrombus that required thrombectomy.  She was unable to tolerate Eliquis or Xarelto due to symptoms such as abdominal cramping and diarrhea, was on coumadin, however she did not follow up with anticoagulation clinic constantly. She was last discharged on dabigatran  and in house is on lovenox. She also has a history of recurrent pleural effusion with multiple thoracenteses, underwent left-sided thorascopic pleurodesis with Dr. Saul on . Patient presented 2024 with return of dyspnea, abdominal pains and cramping.     CTA chest noted no evidence of acute PE. Chronic appearing changes in the left lung with loculated fluid in the left pleural space, including the major fissure, posteriorly and laterally. Adjacent parenchymal volume loss.      CT abdomen Rim-enhancing irregular fluid collection under the left hemidiaphragm is larger since prior study, now 7.2 x 8.9 cm, and likely represents an abscess.      Cultures pending on fluid    PAST MEDICAL HISTORY:  Past Medical History:   Diagnosis Date    Anesthesia     nausea post  op    Cancer (HCC)     1987 breast left    Cancer of overlapping sites of left female breast (HCC)     Cataract 2024    IOL bilat    High cholesterol     Hypothyroidism     PONV (postoperative nausea and vomiting) 1987    Just felt nausous after anesthesia    Thyroid nodule        PAST SURGICAL HISTORY:  Past Surgical History:   Procedure Laterality Date    OH THORACOSCOPY,DX NO BX Left 12/9/2024    Procedure: THORACOSCOPY - LEFT THORACOSCOPIC PLEURODESIS;  Surgeon: Glenn Saul M.D.;  Location: SURGERY [facility];  Service: Thoracic    OH ULTRASONIC GUIDANCE, INTRAOPERATIVE  8/5/2024    Procedure: ULTRASOUND GUIDANCE;  Surgeon: Sachin Espinoza M.D.;  Location: SURGERY [facility];  Service: General    PANCREATECTOMY N/A 8/5/2024    Procedure: OPEN DISTAL PANCREATECTOMY WITH SPLENECTOMY, NODE DISSECTION;  Surgeon: Sachin Espinoza M.D.;  Location: SURGERY [facility];  Service: General    SPLENECTOMY N/A 8/5/2024    Procedure: SPLENECTOMY;  Surgeon: Sachin Espinoza M.D.;  Location: SURGERY [facility];  Service: General    CREATION, FLAP, OMENTUM N/A 8/5/2024    Procedure: CREATION, FLAP, OMENTUM;  Surgeon: Sachin Espinoza M.D.;  Location: SURGERY [facility];  Service: General    PB MASTECTOMY, PARTIAL Left 08/01/2022    Procedure: MELLO LOCALIZED LEFT PARTIAL MASTECTOMY;  Surgeon: Elena Arroyo M.D.;  Location: [facility];  Service: General    OTHER ORTHOPEDIC SURGERY  2019    back surgery    OTHER  2001    replace left breast implant    OTHER  1988    Left breast implant/lift    OTHER  1987    left mastectomy    LAMINOTOMY      LUMPECTOMY      PRIMARY C SECTION          CURRENT MEDS:  Current Facility-Administered Medications   Medication Dose Route Frequency Provider Last Rate Last Admin    normal saline flush 0.9 % SOLN 10 mL  10 mL Tube BID Romy Florez, A.P.R.N.   10 mL at 12/22/24 1223    enoxaparin (Lovenox) inj 60 mg  1 mg/kg Subcutaneous  Q12HRS Ayde Arshad M.D.   60 mg at 12/22/24 0520    amLODIPine (Norvasc) tablet 5 mg  5 mg Oral DAILY Ayde Arshad M.D.   5 mg at 12/22/24 0520    levothyroxine (Synthroid) tablet 100 mcg  100 mcg Oral AM ES Ayde Arshad M.D.   100 mcg at 12/22/24 0520    acetaminophen (Tylenol) tablet 650 mg  650 mg Oral Q6HRS PRN Gaudencio Tinsley M.D.   650 mg at 12/22/24 1227    senna-docusate (Pericolace Or Senokot S) 8.6-50 MG per tablet 2 Tablet  2 Tablet Oral Q EVENING Gaudencio Tinsley M.D.   2 Tablet at 12/21/24 1615    And    polyethylene glycol/lytes (Miralax) Packet 1 Packet  1 Packet Oral QDAY PRN Gaudencio Tinsley M.D.   1 Packet at 12/22/24 0742    labetalol (Normodyne/Trandate) injection 10 mg  10 mg Intravenous Q4HRS PRN Gaudencio Tinsley M.D.        ondansetron (Zofran) syringe/vial injection 4 mg  4 mg Intravenous Q4HRS PRN Gaudencio Tinsley M.D.   4 mg at 12/20/24 1715    ondansetron (Zofran ODT) dispertab 4 mg  4 mg Oral Q4HRS PRN Gaudencio Tinsley M.D.   4 mg at 12/20/24 0645    piperacillin-tazobactam (Zosyn) 4.5 g in  mL IVPB  4.5 g Intravenous Q8HRS Gaudencio Tinsley M.D. 25 mL/hr at 12/22/24 1233 4.5 g at 12/22/24 1233    HYDROcodone-acetaminophen (Norco) 5-325 MG per tablet 1 Tablet  1 Tablet Oral Q6HRS PRN Gaudencio Tinsley M.D.   1 Tablet at 12/21/24 1239    liothyronine (Cytomel) tablet 5 mcg  5 mcg Oral MO, WE + FR Gaudencio Tinsley M.D.   5 mcg at 12/20/24 0544    And    liothyronine (Cytomel) tablet 2.5 mcg  2.5 mcg Oral Once per day on Sunday Tuesday Thursday Saturday Gaudencio Tinsley M.D.   2.5 mcg at 12/22/24 0520        ALLERGIES:  Allergies   Allergen Reactions    Synthroid [Fd&C Red #40 Al Paul-Levothyroxine] Hives and Unspecified     Hives, Brand specific. Some brands are ok and some are not    Tape Unspecified     Skin degradation with use of tegaderm     [place] Thyroid [Thyroid] Diarrhea     diarrhea       SOCIAL HISTORY:  Social History     Socioeconomic History    Marital  status:      Spouse name: Not on file    Number of children: Not on file    Years of education: Not on file    Highest education level: Associate degree: academic program   Occupational History     Comment: retired banker   Tobacco Use    Smoking status: Never     Passive exposure: Past    Smokeless tobacco: Never   Vaping Use    Vaping status: Never Used   Substance and Sexual Activity    Alcohol use: Never    Drug use: Never    Sexual activity: Not Currently     Partners: Male     Birth control/protection: Abstinence, Male Sterilization, Post-Menopausal     Comment:    Other Topics Concern    Not on file   Social History Narrative    Not on file     Social Drivers of Health     Financial Resource Strain: Low Risk  (7/11/2024)    Overall Financial Resource Strain (CARDIA)     Difficulty of Paying Living Expenses: Not hard at all   Food Insecurity: No Food Insecurity (12/4/2024)    Hunger Vital Sign     Worried About Running Out of Food in the Last Year: Never true     Ran Out of Food in the Last Year: Never true   Transportation Needs: No Transportation Needs (12/4/2024)    PRAPARE - Transportation     Lack of Transportation (Medical): No     Lack of Transportation (Non-Medical): No   Physical Activity: Insufficiently Active (7/11/2024)    Exercise Vital Sign     Days of Exercise per Week: 2 days     Minutes of Exercise per Session: 10 min   Stress: No Stress Concern Present (7/11/2024)    [employer] [place] [employer] - Occupational Stress Questionnaire     Feeling of Stress : Not at all   Social Connections: Unknown (7/11/2024)    Social Connection and Isolation Panel [NHANES]     Frequency of Communication with Friends and Family: More than three times a week     Frequency of Social Gatherings with Friends and Family: Twice a week     Attends Roman [employer] Services: Patient declined     Active Member of Clubs or Organizations: Yes     Attends Club or Organization Meetings: More than 4 times  "per year     Marital Status:    Intimate Partner Violence: Not At Risk (12/19/2024)    Humiliation, Afraid, Rape, and Kick questionnaire     Fear of Current or Ex-Partner: No     Emotionally Abused: No     Physically Abused: No     Sexually Abused: No   Housing Stability: Low Risk  (12/4/2024)    Housing Stability Vital Sign     Unable to Pay for Housing in the Last Year: No     Number of Times Moved in the Last Year: 0     Homeless in the Last Year: No       FAMILY HISTORY:  Family History   Problem Relation Age of Onset    Cancer Mother         Ovarian    Ovarian Cancer Mother     Dementia Father     Heart Disease Father     Hyperlipidemia Neg Hx         REVIEW OF SYSTEMS:  General ROS: Negative for - chills, fever, night sweats or weight loss.  HEENT ROS: Negative  Respiratory ROS: Negative for - cough or shortness of breath.  Cardiovascular ROS:  Negative for - chest pain or palpitations.  Gastrointestinal ROS: As per the history of present illness.  Genito-Urinary ROS: Negative  Musculoskeletal ROS: Negative.  Neurological ROS: Negative  Skin ROS: negative  Hematology ROS: negative  Endocrinology ROS: Negative        PHYSICAL EXAM:  VITALS: BP (!) 142/75   Pulse (!) 114   Temp 36.7 °C (98 °F) (Temporal)   Resp 18   Ht 1.626 m (5' 4\")   Wt 59.5 kg (131 lb 2.8 oz)   LMP  (LMP Unknown)   SpO2 93%   Breastfeeding No   BMI 22.52 kg/m²   GEN:  Paige Gudino is a 79 y.o. female in no acute distress.  HEENT: Mucous membranes pink and moist.  Sclera anicteric.    NECK:    Neck supple without lymphadenopathy or thyromegaly.  LUNGS: Clear to auscultation posteriorly.  HEART: Regular rate and rhythm. S1 and S2 normal. No murmurs, gallops  ABD:  + BS drain in position and was just emptied  RECTAL: Not done at this time.  EXT:  Without cyanosis, deformity or pitting edema.  SKIN:  Pink, warm, dry.  NEURO: Grossly intact, A/OR.    LABS:  Recent Labs     12/20/24  0004 12/21/24  0346 12/22/24  0030 " "  WBC 15.6* 12.6* 13.1*   MCV 88.2 86.4 88.9     Recent Labs     12/20/24  0004 12/21/24  0346 12/22/24  0030   GLUCOSE 131* 101* 99   BUN 10 11 11   CO2 21 21 22     Lab Results   Component Value Date    INR 1.77 (H) 12/19/2024    INR 1.10 12/09/2024    INR 1.28 (H) 12/06/2024     No components found for: \"ALT\", \"AST\", \"GGT\", \"ALKPHOS\"  No results found for: \"BILINEO\"      @LASTIMGCAT([id number])@     @LASTIMGCAT([id number])@       IMPRESSION/PLAN:  Intra-abdominal abscess - s/p IR drainage. Culture pending. Continue antibiotics  Pleural effusion s/p pleurodesis - breathing well   History of PE/DVT. Hold am dose of lovenox  Ercp tomorrow with pancreatic stent placement to see if placement and lower pressure in the pancreas will reduce pancreatic fluid collections  NPO after midnight         Teri Guy M.D.  Gastroenterology      "

## 2024-12-23 NOTE — OP REPORT
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      PostOp Diagnosis: Normal pancreatogram, status post pancreatic sphincterotomy with 5 Bur[place] by 5 cm single pigtail plastic PD stent placement      Procedure(s):  ERCP (ENDOSCOPIC RETROGRADE CHOLANGIOPANCREATOGRAPHY) - Wound Class: Clean Contaminated  SPHINCTEROTOMY - Wound Class: Clean Contaminated  ERCP, WITH TUBE OR STENT INSERTION - Wound Class: Clean Contaminated    Surgeon(s):  Derrick Quiroz M.D.    Anesthesiologist/Type of Anesthesia:  Anesthesiologist: Hari Alves M.D./General    Surgical Staff:  Endoscopy Technician: Jaime Soares  Radiology Technologist: Blossom Ferguson  Endoscopy Nurse: Kristan Aviles R.N.    Specimens removed if any:  * No specimens in log *      CONSENT: The risks, benefits and alternatives of the procedure were discussed in detail. The risks include and are not limited to bleeding, infection, perforation, missed lesions, and sedations risks (cardiopulmonary compromise and allergic reaction to medications).    DESCRIPTION:   The patient presented to the operating room.  A time out was performed prior to beginning the procedure.   The patient was placed in the supine position.   Patient was sedated by anesthesia: GETA.  Indomethacin given per rectum preoperatively.    OPERATIVE FINDINGS:    Endoscope advanced under oblique visualization the second portion of the duodenum.  Ampulla normal.  PD cannulated.  Pancreatogram normal.  No leak identified.  7 mm pancreatic sphincterotomy pursued with a standard sphincterotome.  No bleeding.  5 Burundian by 5 cm single pigtail PD stent deployed 5 cm into the PD.    Blood loss: None    The patient tolerated the procedure well.      There were no immediate complications.    IMPRESSION:  Normal pancreatogram  Pancreatic sphincterotomy pursued with 5 Bur[place]ian single pigtail PD stent placement      RECOMMENDATIONS:  Watch for complications  Abdominal  x-ray in 4 weeks.  Remove PD stent by upper endoscopy if the stent has not spontaneously migrated in the interim.

## 2024-12-23 NOTE — DISCHARGE PLANNING
Case Management Discharge Planning    Admission Date: 12/19/2024  GMLOS: 5.3  ALOS: 4    6-Clicks ADL Score: 24  6-Clicks Mobility Score: 24      Anticipated Discharge Dispo: Discharge Disposition: Discharged to home/self care (01)    DME Needed: No    Action(s) Taken: Updated Provider/Nurse on Discharge Plan  RNCM discussed pt during IDT rounds. CM discussed discharge plan with Dr. Arshad. Pt is not medically cleared. Pt underwent GI procedure today and requires further evaluation from ID according to Dr Arshad. Primary RN reports pt is on room air. Possible PO antibiotics upon discharge. Anticipate plan to home with no CM needs at this time.  CM will continue to follow and assist with discharge plan.     Escalations Completed: None    Medically Clear: No    Next Steps: Follow-up with medical team to discuss DC needs and barriers.    Barriers to Discharge: Medical clearance and Pending Procedures

## 2024-12-23 NOTE — WOUND TEAM
[facility]  Inpatient Services  Wound and Skin Care Brief Evaluation    Admission Date: 12/19/2024     Last order of IP CONSULT TO WOUND CARE was found on 12/20/2024 from Hospital Encounter on 12/19/2024     HPI, PMH, SH: Reviewed    Chief Complaint   Patient presents with    Shortness of Breath     Diagnosis: Intra-abdominal abscess (HCC) [K65.1]    Unit where seen by Wound Team: S614/01     Wound consult placed regarding sacrum. Chart and images reviewed. This clinician in to assess patient. Patient pleasant and agreeable. Patient sacrum intact with blanchable redness. Brown discoloration noted to left sacral area, appears to be scar tissue. Continue with offloading. BL heels also assessed during encounter and found to be intact.    No pressure injuries or advanced wound care needs identified. Wound consult completed. No further follow up unless indicated and consulted.     Sacrum      Left Heel      Right Heel           PREVENTATIVE INTERVENTIONS:    Q shift Imer - performed per nursing policy  Q shift pressure point assessments - performed per nursing policy    Surface/Positioning  Standard/trauma mattress - Currently in Place    Offloading/Redistribution  Sacral offloading dressing (Silicone dressing) - Currently in Place  Heel offloading dressing (Silicone dressing) - Currently in Place

## 2024-12-23 NOTE — ANESTHESIA PREPROCEDURE EVALUATION
Case: [id number] Date/Time: 12/23/24 1255    Procedure: ERCP (ENDOSCOPIC RETROGRADE CHOLANGIOPANCREATOGRAPHY)    Location: [facility] ROOM 26 / SURGERY SAME DAY [facility]    Surgeons: Derrick Quiroz M.D.            Relevant Problems   CARDIAC   (positive) AV block, Mobitz 1   (positive) Deep vein thrombosis (DVT) of both lower extremities (HCC)   (positive) Primary hypertension   (positive) Pulmonary embolism with acute cor pulmonale, unspecified chronicity, unspecified pulmonary embolism type (HCC)   (positive) Second degree heart block   (positive) Thoracic ascending aortic aneurysm (HCC)      ENDO   (positive) Acquired hypothyroidism      Other   (positive) History of pulmonary embolism   (positive) Intra-abdominal abscess (HCC)     No prior anesthetic complications. Appropriately NPO.    Vitals:    12/23/24 0722   BP: 134/65   Pulse: 72   Resp: 18   Temp: 36.8 °C (98.2 °F)   SpO2: 93%        Recent Labs     12/21/24  0346 12/22/24  0030 12/23/24  0252   WBC 12.6* 13.1* 9.6   RBC 4.50 4.34 4.27   HEMOGLOBIN 12.6 12.3 11.9*   HEMATOCRIT 38.9 38.6 36.4*   MCV 86.4 88.9 85.2   MCH 28.0 28.3 27.9   RDW 50.0 51.9* 49.1   PLATELETCT 729* 760* 758*   MPV 9.0 9.3 9.1       Recent Labs     12/21/24  0346 12/22/24  0030 12/23/24  0252   SODIUM 136 137 134*   POTASSIUM 3.9 3.9 3.4*   CHLORIDE 102 103 101   CO2 21 22 24   GLUCOSE 101* 99 96   BUN 11 11 8       Physical Exam    Airway   Mallampati: II  TM distance: >3 FB  Neck ROM: full       Cardiovascular - normal exam  Rhythm: regular  Rate: normal  (-) murmur     Dental - normal exam           Pulmonary - normal exam  Breath sounds clear to auscultation     Abdominal    Neurological - normal exam                   Anesthesia Plan    ASA 3   ASA physical status 3 criteria: a thrombophilic disease requiring anticoagulation    Plan - general       Airway plan will be ETT          Induction: intravenous    Postoperative Plan: Postoperative administration of opioids is  intended.    Pertinent diagnostic labs and testing reviewed    Informed Consent:    Anesthetic plan and risks discussed with patient.    Use of blood products discussed with: patient whom consented to blood products.

## 2024-12-23 NOTE — OR NURSING
1223 Report received from Mariely LAWLER.  1230 Patient tolerating PO clear liquids    1240 Report given to Juliette LAWLER     1241 RN updated patient family via phone call.     1254 Patient transferred back to floor via gurney in stable condition.

## 2024-12-23 NOTE — PROGRESS NOTES
Received bedside report from previous RN, patient is resting in bed alert and oriented x4. On RA, denies pain or SOB at this time. YEMI drain CDI. Fall precautions in place and call light within reach. All needs met at this time.

## 2024-12-24 ENCOUNTER — PHARMACY VISIT (OUTPATIENT)
Dept: PHARMACY | Facility: MEDICAL CENTER | Age: 79
End: 2024-12-24
Payer: COMMERCIAL

## 2024-12-24 VITALS
HEART RATE: 68 BPM | WEIGHT: 131.17 LBS | HEIGHT: 64 IN | OXYGEN SATURATION: 96 % | SYSTOLIC BLOOD PRESSURE: 146 MMHG | RESPIRATION RATE: 18 BRPM | TEMPERATURE: 97.2 F | BODY MASS INDEX: 22.39 KG/M2 | DIASTOLIC BLOOD PRESSURE: 66 MMHG

## 2024-12-24 LAB
ALBUMIN SERPL BCP-MCNC: 3.2 G/DL (ref 3.2–4.9)
ALBUMIN/GLOB SERPL: 0.8 G/DL
ALP SERPL-CCNC: 93 U/L (ref 30–99)
ALT SERPL-CCNC: 6 U/L (ref 2–50)
ANION GAP SERPL CALC-SCNC: 13 MMOL/L (ref 7–16)
AST SERPL-CCNC: 20 U/L (ref 12–45)
BACTERIA BLD CULT: NORMAL
BACTERIA BLD CULT: NORMAL
BILIRUB SERPL-MCNC: 0.6 MG/DL (ref 0.1–1.5)
BUN SERPL-MCNC: 9 MG/DL (ref 8–22)
CALCIUM ALBUM COR SERPL-MCNC: 10.1 MG/DL (ref 8.5–10.5)
CALCIUM SERPL-MCNC: 9.5 MG/DL (ref 8.5–10.5)
CHLORIDE SERPL-SCNC: 101 MMOL/L (ref 96–112)
CO2 SERPL-SCNC: 22 MMOL/L (ref 20–33)
CREAT SERPL-MCNC: 0.63 MG/DL (ref 0.5–1.4)
ERYTHROCYTE [DISTWIDTH] IN BLOOD BY AUTOMATED COUNT: 50.5 FL (ref 35.9–50)
GFR SERPLBLD CREATININE-BSD FMLA CKD-EPI: 91 ML/MIN/1.73 M 2
GLOBULIN SER CALC-MCNC: 3.8 G/DL (ref 1.9–3.5)
GLUCOSE SERPL-MCNC: 117 MG/DL (ref 65–99)
HCT VFR BLD AUTO: 41.9 % (ref 37–47)
HGB BLD-MCNC: 13.4 G/DL (ref 12–16)
MCH RBC QN AUTO: 28.2 PG (ref 27–33)
MCHC RBC AUTO-ENTMCNC: 32 G/DL (ref 32.2–35.5)
MCV RBC AUTO: 88 FL (ref 81.4–97.8)
PLATELET # BLD AUTO: 842 K/UL (ref 164–446)
PMV BLD AUTO: 9.1 FL (ref 9–12.9)
POTASSIUM SERPL-SCNC: 4.1 MMOL/L (ref 3.6–5.5)
PROT SERPL-MCNC: 7 G/DL (ref 6–8.2)
RBC # BLD AUTO: 4.76 M/UL (ref 4.2–5.4)
SIGNIFICANT IND 70042: NORMAL
SIGNIFICANT IND 70042: NORMAL
SITE SITE: NORMAL
SITE SITE: NORMAL
SODIUM SERPL-SCNC: 136 MMOL/L (ref 135–145)
SOURCE SOURCE: NORMAL
SOURCE SOURCE: NORMAL
WBC # BLD AUTO: 12.1 K/UL (ref 4.8–10.8)

## 2024-12-24 PROCEDURE — A9270 NON-COVERED ITEM OR SERVICE: HCPCS | Performed by: STUDENT IN AN ORGANIZED HEALTH CARE EDUCATION/TRAINING PROGRAM

## 2024-12-24 PROCEDURE — 700102 HCHG RX REV CODE 250 W/ 637 OVERRIDE(OP): Performed by: STUDENT IN AN ORGANIZED HEALTH CARE EDUCATION/TRAINING PROGRAM

## 2024-12-24 PROCEDURE — 80053 COMPREHEN METABOLIC PANEL: CPT

## 2024-12-24 PROCEDURE — 700111 HCHG RX REV CODE 636 W/ 250 OVERRIDE (IP): Mod: JZ | Performed by: INTERNAL MEDICINE

## 2024-12-24 PROCEDURE — 99232 SBSQ HOSP IP/OBS MODERATE 35: CPT | Performed by: SURGERY

## 2024-12-24 PROCEDURE — 99232 SBSQ HOSP IP/OBS MODERATE 35: CPT | Performed by: NURSE PRACTITIONER

## 2024-12-24 PROCEDURE — 700102 HCHG RX REV CODE 250 W/ 637 OVERRIDE(OP): Mod: JZ | Performed by: STUDENT IN AN ORGANIZED HEALTH CARE EDUCATION/TRAINING PROGRAM

## 2024-12-24 PROCEDURE — 99239 HOSP IP/OBS DSCHRG MGMT >30: CPT | Performed by: STUDENT IN AN ORGANIZED HEALTH CARE EDUCATION/TRAINING PROGRAM

## 2024-12-24 PROCEDURE — A9270 NON-COVERED ITEM OR SERVICE: HCPCS | Mod: JZ | Performed by: STUDENT IN AN ORGANIZED HEALTH CARE EDUCATION/TRAINING PROGRAM

## 2024-12-24 PROCEDURE — RXMED WILLOW AMBULATORY MEDICATION CHARGE: Performed by: STUDENT IN AN ORGANIZED HEALTH CARE EDUCATION/TRAINING PROGRAM

## 2024-12-24 PROCEDURE — 85027 COMPLETE CBC AUTOMATED: CPT

## 2024-12-24 PROCEDURE — 700105 HCHG RX REV CODE 258: Performed by: INTERNAL MEDICINE

## 2024-12-24 PROCEDURE — 700101 HCHG RX REV CODE 250: Performed by: NURSE PRACTITIONER

## 2024-12-24 RX ORDER — POTASSIUM CHLORIDE 1500 MG/1
40 TABLET, EXTENDED RELEASE ORAL ONCE
Status: COMPLETED | OUTPATIENT
Start: 2024-12-24 | End: 2024-12-24

## 2024-12-24 RX ADMIN — AMPICILLIN AND SULBACTAM 3 G: 1; 2 INJECTION, POWDER, FOR SOLUTION INTRAMUSCULAR; INTRAVENOUS at 05:26

## 2024-12-24 RX ADMIN — POTASSIUM CHLORIDE 40 MEQ: 1500 TABLET, EXTENDED RELEASE ORAL at 09:49

## 2024-12-24 RX ADMIN — SODIUM CHLORIDE, PRESERVATIVE FREE 10 ML: 5 INJECTION INTRAVENOUS at 05:16

## 2024-12-24 RX ADMIN — LIOTHYRONINE SODIUM 2.5 MCG: 5 TABLET ORAL at 05:15

## 2024-12-24 RX ADMIN — LEVOTHYROXINE SODIUM 100 MCG: 0.1 TABLET ORAL at 05:15

## 2024-12-24 RX ADMIN — AMLODIPINE BESYLATE 5 MG: 5 TABLET ORAL at 05:16

## 2024-12-24 ASSESSMENT — ENCOUNTER SYMPTOMS
DIARRHEA: 0
CHILLS: 0
ABDOMINAL PAIN: 1
NAUSEA: 0
ABDOMINAL PAIN: 0
DEPRESSION: 0
HEARTBURN: 0
COUGH: 0
VOMITING: 0
HEADACHES: 0
DIZZINESS: 0
WEAKNESS: 0
SHORTNESS OF BREATH: 0
BLOOD IN STOOL: 0
CONSTIPATION: 0
MYALGIAS: 0
BACK PAIN: 0
FEVER: 0
BLURRED VISION: 0

## 2024-12-24 ASSESSMENT — PAIN DESCRIPTION - PAIN TYPE: TYPE: ACUTE PAIN

## 2024-12-24 NOTE — OP REPORT
THORACIC SURGERY OPERATIVE NOTE          DATE OF OPERATION:    12/24/2024    PATIENT:      Paige Gudino; [id number]     PREOPERATIVE DIAGNOSIS:    Left pleural effusion     POSTOPERATIVE DIAGNOSIS:   same    PROCEDURE PERFORMED:   1) Bronchoscopy (CPT 84373)  2) Thoracoscopic pleurodesis (CPT 80745)      SURGEON:      Glenn Saul M.D.    ASSISTANT:      none    ANESTHESIOLOGIST:        ANESTHESIA:     Double lumen endotracheal tube general anesthesia.    INDICATIONS:   Paige Gudino is a 79 y.o. year-old female with a chronic left pleural efffusion. She is taken to the operating room for pleurodesis.        FINDINGS:   Left lower lobe chronically collapsed and did reinflate with positive pressure. Pleurodesis with 6-8 g talc performed.     WOUND CLASSIFICATION:    Class I, Clean.    SPECIMEN:       none.    ESTIMATED BLOOD LOSS:    10 mL.    DESCRIPTION OF PROCEDURE:    The patient was seen and examined in the preoperative holding area.  The risks benefits and alternatives of the procedure were discussed with the patient who wished to proceed with the procedure as described. General endotracheal anesthesia was induced and preoperative antibiotics were given per SCIP protocol. The patient was transferred to the operating room placed in the right lateral decubitus position and all pressure points were properly padded. Patient's chest was prepped with ChloraPrep and draped in the normal sterile fashion.  A timeout was performed confirming correct patient, correct procedure, and that all necessary equipment was in the room.      A bronchoscopy was performed while placing the double-lumen tube.  The tube was correctly positioned into the left mainstem.  There were no lesions located in the right or left mainstem bronchus, the lobar bronchi, or in the segmental bronchi on either side. There were no significant secretions on either side.       Local anesthetic was used to infiltrate the area  above the 7th intercostal space. An incision was made and the pleural space was entered bluntly and a 5 mm trocar inserted.  On inspection of the pleural cavity with the camera, there is no injury from trocar placement.  There was no rind and a collpased lower lobe.  I placed 2 additional 5 mm trocars.  I then placed 8 grams of talc powder into an [place] bulb syringe. This was attached to an 18 Green[place]ic red rubber catheter.  This was inserted to the chest cavity and talc sprayed the throughout the pleural cavity covering the chest wall and lung anteriorly, posteriorly, and inferiorly.  I placed a 24 Fr chest tube anteriorly and directed toward the apex.     The lung was then inflated under direct visualization and the lower lobe which appeared chronically collpased reinflated with positive pressure. The tubes was secured with a 0 silk suture. The port sites were closed with a subcuticular Monocryl. Dermabond was applied to the skin. Sponge and needle counts were correct at the end of the case x2. The patient was awakened from general anesthesia, and was taken to the recovery room in stable condition.    Glenn Saul MD  Thoracic & General Surgeon  [facility]  [phone]

## 2024-12-24 NOTE — PROGRESS NOTES
Patient arrived to discharge lounge. Discussed discharge paperwork and medications with patient and family. Answered all questions. No home meds to return, M2B given to patient. Patient escorted out with family, personal belongings in hand.

## 2024-12-24 NOTE — CARE PLAN
Pt AO x 4.  Pt c/o pain in abdomen, medication intervention given per MAR. Be alarm on. Call light and belongings within reach. Bed locked and in lowest position.  Hourly rounding.  Needs currently met.         The patient is Stable - Low risk of patient condition declining or worsening    Shift Goals  Clinical Goals: pain control and IR drain  Patient Goals: updates  Family Goals: updates    Progress made toward(s) clinical / shift goals:      Problem: Knowledge Deficit - Standard  Goal: Patient and family/care givers will demonstrate understanding of plan of care, disease process/condition, diagnostic tests and medications  Outcome: Progressing     Problem: Pain - Standard  Goal: Alleviation of pain or a reduction in pain to the patient’s comfort goal  Outcome: Progressing       Patient is not progressing towards the following goals:      
Pt AO x 4.  Pt denies pain during initial assessment.  Call light and belongings within reach.  Bed locked and in lowest position.  Hourly rounding.  Needs currently met.           The patient is Stable - Low risk of patient condition declining or worsening    Shift Goals  Clinical Goals: pain mgmt, monitor drain output, have BM  Patient Goals: have BM, rest, updates  Family Goals: updates    Progress made toward(s) clinical / shift goals:      Problem: Knowledge Deficit - Standard  Goal: Patient and family/care givers will demonstrate understanding of plan of care, disease process/condition, diagnostic tests and medications  Outcome: Progressing     Problem: Pain - Standard  Goal: Alleviation of pain or a reduction in pain to the patient’s comfort goal  Outcome: Progressing       Patient is not progressing towards the following goals:      
Pt AO x 4.  Pt denies pain during initial assessment.  Call light and belongings within reach.  Bed locked and in lowest position.  Hourly rounding.  Needs currently met.         The patient is Stable - Low risk of patient condition declining or worsening    Shift Goals  Clinical Goals: safety, and drain placement  Patient Goals: drain placement  Family Goals: updates    Progress made toward(s) clinical / shift goals:      Problem: Knowledge Deficit - Standard  Goal: Patient and family/care givers will demonstrate understanding of plan of care, disease process/condition, diagnostic tests and medications  Outcome: Progressing     Problem: Pain - Standard  Goal: Alleviation of pain or a reduction in pain to the patient’s comfort goal  Outcome: Progressing       Patient is not progressing towards the following goals:      
The patient is Stable - Low risk of patient condition declining or worsening    Shift Goals  Clinical Goals: Continue IV abx  Patient Goals: Rest  Family Goals: updates    Progress made toward(s) clinical / shift goals:  Patient is alert and oriented x4. Updated on POC and verbalized understanding. Medicated per MAR and tolerated well. Fall precautions and hourly rounding in place. Needs attended.     Patient is not progressing towards the following goals:      
The patient is Stable - Low risk of patient condition declining or worsening    Shift Goals  Clinical Goals: NPO at MN for ERCP with pancreatic stent placement  Patient Goals: Rest  Family Goals: Updates    Progress made toward(s) clinical / shift goals: Patient is alert and oriented x4. Updated on POC and verbalized understanding. NPO at MN. Medicated per MAR and tolerated well. Non-pharmacological interventions for pian provided. Fall precautions and hourly rounding in place.    Patient is not progressing towards the following goals:      
The patient is Stable - Low risk of patient condition declining or worsening    Shift Goals  Clinical Goals: Pt pain will remain < 5 throughout shift  Patient Goals: Rest, and comfort  Family Goals: HAYDEE    Progress made toward(s) clinical / shift goals:  Pt pain is under control with PRN tylenol. Pt was able to sleep comfortably through the night      Problem: Knowledge Deficit - Standard  Goal: Patient and family/care givers will demonstrate understanding of plan of care, disease process/condition, diagnostic tests and medications  Outcome: Progressing     Problem: Pain - Standard  Goal: Alleviation of pain or a reduction in pain to the patient’s comfort goal  Outcome: Progressing       Patient is not progressing towards the following goals:      
The patient is Stable - Low risk of patient condition declining or worsening    Shift Goals  Clinical Goals: Pt to have ERCP done today  Patient Goals: Pt to have procedure done soon  Family Goals: Updates    Progress made toward(s) clinical / shift goals:    Problem: Knowledge Deficit - Standard  Goal: Patient and family/care givers will demonstrate understanding of plan of care, disease process/condition, diagnostic tests and medications  Description: Target End Date:  1-3 days or as soon as patient condition allows    Document in Patient Education    1.  Patient and family/caregiver oriented to unit, equipment, visitation policy and means for communicating concern  2.  Complete/review Learning Assessment  3.  Assess knowledge level of disease process/condition, treatment plan, diagnostic tests and medications  4.  Explain disease process/condition, treatment plan, diagnostic tests and medications  Outcome: Progressing     Pt had ERCP done with stent placement. Pt denied pain throughout the shift.   Patient is not progressing towards the following goals:      
The patient is Stable - Low risk of patient condition declining or worsening    Shift Goals  Clinical Goals: pain management throughout shift  Patient Goals: decrease pain  Family Goals: updates    Progress made toward(s) clinical / shift goals:    Problem: Knowledge Deficit - Standard  Goal: Patient and family/care givers will demonstrate understanding of plan of care, disease process/condition, diagnostic tests and medications  Outcome: Progressing     Problem: Pain - Standard  Goal: Alleviation of pain or a reduction in pain to the patient’s comfort goal  Outcome: Progressing       Patient is not progressing towards the following goals:      
spouse

## 2024-12-24 NOTE — PROGRESS NOTES
Radiology Progress Note     Author: Lisette Dave DNP Date & Time created: 12/23/2024  4:23 PM   Date of admission  12/19/2024  Note to reader: this note follows the APSO format rather than the historical SOAP format. Assessment and plan located at the top of the note for ease of use.    Chief Complaint  79 y.o. female admitted 12/19/2024 with   Chief Complaint   Patient presents with    Shortness of Breath         HPI  Rosangela Stockstill is a 79 year old female with PMH significant for breast cancer status postmastectomy, benign pancreatic serous cystadenoma s/p distal pancreatectomy/with no splenectomy (08/05/24) complicated by PE, arterial thrombus s/p thrombectomy 08/16/24, recurrent LUQ abscess with drains placed 08/22/24 and 09/19/24, both of which were removed in the outpatient setting, recurrent pleural effusions s/p multiple thoracentesis and thorascopic pleurodesis 12/09/2024. She was admitted 12/19/2024 with yet another CT finding of LUQ abscess after presenting with dyspnea, abdominal pain, cramping.  Hepatobiliary was consulted and recommended drain placement.  IR was consulted and patient underwent a LUQ abscess 10F drain placement with IR Dr. Bowen on 12/21/2024.    Interval History:   12/22/2024 - peritoneal abscess 10F drain to LUQ with 27 mL purulent output in the last 24 hours. Labs reviewed; WBC 13.1. Cultures pending. On ABX. Drain flushed with 10 mL NS.  I reviewed IDT notes patient with Dr. Arshad.     12/23/2024: LUQ peritoneal abscess drain (10F) to bulb suction with 29 mL purulent gray sanguinous fluid out in the last 24 hours.  Irrigated drain with 10 mL sterile saline 10 mL purulent fluid aspirated.  [facility] resting in hospital bed after ERCP, sphincterotomy, and pigtail plastic PD stent placement.  Patient denies significant abdominal pain with small amount of discomfort at drain site, noted she is still a little sleepy.  I reviewed patient's most recent labs including WBC  9.6<13.1, Hgb 11.9, CR 0.54. Coordinated post IR procedure care with patient and hospital nursing staff.    Assessment/Plan     Principal Problem:    Intra-abdominal abscess (HCC)  Active Problems:    Acquired hypothyroidism    Primary hypertension    Thrombocytosis    Pleural effusion    History of pulmonary embolism    Advance care planning      Plan  - ERCP today with stent placement(12/23/24)  - Order placed to irrigate LUQ drain with 10 ml of sterile saline each shift  - Peritoneal Fluid cultures positive for Staph aureus  - Antimicrobials per ID recommendation  - Hepatobiliary, surgical services,ID and GI following  -Repeat CT in approximately 1 week if patient still in the hospital  -Anticipate patient will be discharged home with drain in place following biliary duct stent and follow-up with hepatobiliary office as an outpatient.  - Patient has had multiple drains and is comfortable caring for them at home.  -Okay to discharge with drain in place once medically cleared with hepatobiliary follow-up from an IR standpoint.  - Continue to monitor drains, VS, and labs      -Thank you for allowing Interventional Radiology team to participate in the patients care, if any additional care or requests are needed in the future please do not hesitate to call or place IR order           Review of Systems  Physical Exam   Review of Systems   Constitutional:  Negative for chills and fever.   HENT:  Negative for hearing loss.    Respiratory:  Negative for cough and shortness of breath.    Cardiovascular:  Negative for chest pain and leg swelling.   Gastrointestinal:  Positive for abdominal pain (Mild to moderate left-sided). Negative for nausea and vomiting.   Musculoskeletal:  Negative for myalgias.   Neurological:  Negative for weakness and headaches.   Psychiatric/Behavioral:  Negative for depression.       Vitals:    12/23/24 1530   BP: (!) 144/79   Pulse: 74   Resp: 18   Temp: 36.5 °C (97.7 °F)   SpO2: 92%         Physical Exam  Vitals and nursing note reviewed.   Constitutional:       General: She is not in acute distress.     Appearance: Normal appearance. She is not ill-appearing.   HENT:      Head: Atraumatic.   Cardiovascular:      Rate and Rhythm: Normal rate.      Pulses: Normal pulses.   Pulmonary:      Effort: Pulmonary effort is normal. No respiratory distress.   Abdominal:      General: There is no distension.      Tenderness: There is no abdominal tenderness.      Comments: IR drain to LUQ   Musculoskeletal:      Right lower leg: No edema.      Left lower leg: No edema.   Skin:     General: Skin is warm and dry.      Capillary Refill: Capillary refill takes less than 2 seconds.      Coloration: Skin is not pale.   Neurological:      General: No focal deficit present.      Mental Status: She is alert and oriented to person, place, and time.      Sensory: No sensory deficit.      Coordination: Coordination normal.   Psychiatric:         Mood and Affect: Mood normal.         Behavior: Behavior normal.             Labs    Recent Labs     12/21/24 0346 12/22/24  0030 12/23/24  0252   WBC 12.6* 13.1* 9.6   RBC 4.50 4.34 4.27   HEMOGLOBIN 12.6 12.3 11.9*   HEMATOCRIT 38.9 38.6 36.4*   MCV 86.4 88.9 85.2   MCH 28.0 28.3 27.9   MCHC 32.4 31.9* 32.7   RDW 50.0 51.9* 49.1   PLATELETCT 729* 760* 758*   MPV 9.0 9.3 9.1     Recent Labs     12/21/24  0346 12/22/24  0030 12/23/24  0252   SODIUM 136 137 134*   POTASSIUM 3.9 3.9 3.4*   CHLORIDE 102 103 101   CO2 21 22 24   GLUCOSE 101* 99 96   BUN 11 11 8   CREATININE 0.56 0.64 0.54   CALCIUM 8.3* 8.5 8.2*     Recent Labs     12/21/24  0346 12/22/24  0030 12/23/24  0252   CREATININE 0.56 0.64 0.54     [id number] DUCTS   Preliminary Result      Digitized intraoperative radiograph is submitted for review. This examination is not for diagnostic purpose but for guidance during a surgical procedure. Please see the patient's chart for full procedural details.        "  INTERPRETING LOCATION: [address] [place] [address] [place], [id number]      CT-IMAGE-GUIDED DRAIN PERITONEAL   Final Result      1.  CT guided left upper quadrant fluid collection catheter drainage.      CT-ABDOMEN-PELVIS WITH   Final Result      1.  Rim-enhancing irregular fluid collection under the left hemidiaphragm is larger since prior study, now 7.2 x 8.9 cm, and likely represents an abscess.   2.  Partially visualized loculated left pleural effusion/empyema containing some pockets of air, likely related to infection under the hemidiaphragm. Fluid in the left major fissure.   3.  Cholelithiasis.   4.  Nonobstructing left nephrolithiasis.   5.  Small hiatal hernia.   6.  Colonic diverticulosis.   7.  Persistent small pericardial effusion.      CT-CTA CHEST PULMONARY ARTERY W/ RECONS   Final Result      1. No acute pulmonary embolus.   2. Chronic appearing changes in the left lung with loculated fluid in the left pleural space, including the major fissure, posteriorly and laterally. Adjacent parenchymal volume loss.   3. Cardiomegaly with small pericardial effusion.   4. Moderate hiatal hernia.   5. Left upper quadrant fluid collection measures 4.4 x 6.3 cm in diameter, with evaluation suboptimal without IV contrast administration.            DX-CHEST-PORTABLE (1 VIEW)   Final Result      1.  Small left pleural effusion.      2.  Wedge-shaped opacity in left lower lobe consistent with atelectasis, pneumonitis, or possibly soft tissue overlay.        INR   Date Value Ref Range Status   12/19/2024 1.77 (H) 0.87 - 1.13 Final     Comment:     INR - Non-therapeutic Reference Range: 0.87-1.13  INR - Therapeutic Reference Range: 2.0-4.0       No results found for: \"POCINR\"     Intake/Output Summary (Last 24 hours) at 12/22/2024 0849  Last data filed at 12/22/2024 0359  Gross per 24 hour   Intake 720 ml   Output 27 ml   Net 693 ml      I have personally reviewed the above labs and imaging      I have performed a physical exam and " reviewed and updated ROS and Plan today (12/23/2024).     39 minutes in directly providing and coordinating care and extensive data review.  No time overlap and excludes procedures.

## 2024-12-24 NOTE — PROGRESS NOTES
Surgical Progress Note    Author: Chaim Velasco M.D. Date & Time created: 2024   8:26 AM     Interval Events:  The patient had a pancreatic duct stent placed yesterday.  She is feeling better today.  She is hungry.  Pain well-controlled.        Hemodynamics:  Temp (24hrs), Av.3 °C (97.4 °F), Min:35.9 °C (96.6 °F), Max:36.7 °C (98 °F)  Temperature: 36.2 °C (97.2 °F)  Pulse  Av.4  Min: 51  Max: 114   Blood Pressure : (!) 146/66     Respiratory:    Respiration: 18, Pulse Oximetry: 96 %        RUL Breath Sounds: Clear, RML Breath Sounds: Clear, RLL Breath Sounds: Diminished, KADEN Breath Sounds: Clear, LLL Breath Sounds: Diminished  Neuro:  GCS       Fluids:    Intake/Output Summary (Last 24 hours) at 2024 0804  Last data filed at 2024 0528  Gross per 24 hour   Intake 362 ml   Output 45 ml   Net 317 ml        Current Diet Order   Procedures    Diet Order Diet: Full Liquid     Physical Exam  Constitutional:       General: She is not in acute distress.     Appearance: She is ill-appearing. She is not toxic-appearing.   HENT:      Head: Normocephalic and atraumatic.   Eyes:      General: No scleral icterus.     Extraocular Movements: Extraocular movements intact.      Conjunctiva/sclera: Conjunctivae normal.      Pupils: Pupils are equal, round, and reactive to light.   Abdominal:      Palpations: Abdomen is soft.      Tenderness: There is no abdominal tenderness.   Neurological:      Mental Status: She is alert.       Labs:  Recent Results (from the past 24 hours)   CBC WITHOUT DIFFERENTIAL    Collection Time: 24  7:44 AM   Result Value Ref Range    WBC 12.1 (H) 4.8 - 10.8 K/uL    RBC 4.76 4.20 - 5.40 M/uL    Hemoglobin 13.4 12.0 - 16.0 g/dL    Hematocrit 41.9 37.0 - 47.0 %    MCV 88.0 81.4 - 97.8 fL    MCH 28.2 27.0 - 33.0 pg    MCHC 32.0 (L) 32.2 - 35.5 g/dL    RDW 50.5 (H) 35.9 - 50.0 fL    Platelet Count 842 (H) 164 - 446 K/uL    MPV 9.1 9.0 - 12.9 fL     Medical Decision Making,  by Problem:  Active Hospital Problems    Diagnosis     Advance care planning [Z71.89]     History of pulmonary embolism [Z86.711]     Pleural effusion [J90]     Thrombocytosis [D75.839]     Intra-abdominal abscess (HCC) [K65.1]     Primary hypertension [I10]     Acquired hypothyroidism [E03.9]      Plan:  Overall the patient is doing well.  I would recommend discharge this afternoon or tomorrow on oral antibiotics.  The patient knows how to care for her drain.  We will see her back in clinic next week for drain management.  My hope is that the pancreatic duct stent will help resolve her recurrent peripancreatic fluid collection.            Discussed patient condition with Hospitalist, RN, and Patient

## 2024-12-24 NOTE — PROGRESS NOTES
..Gastroenterology Progress Note               Author:  Vonda Park, DNP,  APRN Date & Time Created: 12/24/2024 8:47 AM       Patient ID:  Name:             Paige Gudino  YOB: 1945  Age:                 79 y.o.  female  MRN:               [id number]    Medical Decision Making, by Problem:  Active Hospital Problems    Diagnosis     Advance care planning [Z71.89]     History of pulmonary embolism [Z86.711]     Pleural effusion [J90]     Thrombocytosis [D75.839]     Intra-abdominal abscess (HCC) [K65.1]     Primary hypertension [I10]     Acquired hypothyroidism [E03.9]        Presenting Chief Complaint:  Pancreatic abscess     HISTORY OF PRESENT ILLNESS:  Paige Gudino is a 79 y.o. female with prior breast cancer status postmastectomy, benign pancreatic serous cystadenoma who underwent partial pancreatectomy in 8/2024 and has had repeated fluid collections abutting pancreas and stomach and pleural effusions since that time. Retroperitoneal abscess with drain placement in August and removal in September. She had another drainage yesterday for increase fluid collection. Post operatively she also developed bilateral pulmonary embolism with right heart strain, bilateral lower extremity DVTs, right atrial thrombus that required thrombectomy.  She was unable to tolerate Eliquis or Xarelto due to symptoms such as abdominal cramping and diarrhea, was on coumadin, however she did not follow up with anticoagulation clinic constantly. She was last discharged on dabigatran 12/12 and in house is on lovenox. She also has a history of recurrent pleural effusion with multiple thoracenteses, underwent left-sided thorascopic pleurodesis with Dr. Saul on 12/9. Patient presented 12/19/2024 with return of dyspnea, abdominal pains and cramping.     CTA chest noted no evidence of acute PE. Chronic appearing changes in the left lung with loculated fluid in the left pleural space, including  the major fissure, posteriorly and laterally. Adjacent parenchymal volume loss.      CT abdomen rim-enhancing irregular fluid collection under the left hemidiaphragm is larger since prior study, now 7.2 x 8.9 cm, and likely represents an abscess. Peritoneal fluid culture positive for staph aureus.      12/23/2024: ERCP with normal pancreatogram, status post pancreatic sphincterotomy with 5 Pashto by 5 cm single pigtail plastic PD stent placement     Interval History:  12/24/2024: Patient seen, feels much better. Eager to discharge. Drain with purulent fluid 45 ml past 24 hours. WBC 12.1, plt 842, normal transaminases and T[facility] Medications:  Current Facility-Administered Medications   Medication Dose Frequency Provider Last Rate Last Admin    potassium chloride SA (Kdur) tablet 40 mEq  40 mEq Once Cathy Cristina M.D.        ampicillin/sulbactam (Unasyn) 3 g in  mL IVPB  3 g Q6HRS Bao Rooney M.D.   Stopped at 12/24/24 0556    normal saline flush 0.9 % SOLN 10 mL  10 mL BID ЕЛЕНА AvelarP.R.N.   10 mL at 12/24/24 0516    [Held by provider] enoxaparin (Lovenox) inj 60 mg  1 mg/kg Q12HRS Ayde Arshad M.D.   60 mg at 12/22/24 1646    amLODIPine (Norvasc) tablet 5 mg  5 mg DAILY Ayde Arshad M.D.   5 mg at 12/24/24 0516    levothyroxine (Synthroid) tablet 100 mcg  100 mcg AM ES Ayde Arshad M.D.   100 mcg at 12/24/24 0515    acetaminophen (Tylenol) tablet 650 mg  650 mg Q6HRS PRN Gaudencio Tinsley M.D.   650 mg at 12/22/24 1227    senna-docusate (Pericolace Or Senokot S) 8.6-50 MG per tablet 2 Tablet  2 Tablet Q EVENING Gaudencio Tinsley M.D.   2 Tablet at 12/23/24 1736    And    polyethylene glycol/lytes (Miralax) Packet 1 Packet  1 Packet QDAY PRN Gaudencio Tinsley M.D.   1 Packet at 12/22/24 0742    labetalol (Normodyne/Trandate) injection 10 mg  10 mg Q4HRS PRN Gaudencio Tinsley M.D.        ondansetron (Zofran) syringe/vial injection 4 mg  4 mg Q4HRS PRN Gaudenico Tinsley M.D.   4 mg at  "12/20/24 1715    ondansetron (Zofran ODT) dispertab 4 mg  4 mg Q4HRS PRN Gaudencio Tinsley M.D.   4 mg at 12/20/24 0645    HYDROcodone-acetaminophen (Norco) 5-325 MG per tablet 1 Tablet  1 Tablet Q6HRS PRN Gaudencio Tinsley M.D.   1 Tablet at 12/21/24 1239    liothyronine (Cytomel) tablet 5 mcg  5 mcg MO, WE + FR Gaudencio Tinsley M.D.   5 mcg at 12/23/24 0410    And    liothyronine (Cytomel) tablet 2.5 mcg  2.5 mcg Once per day on Sunday Tuesday Thursday Saturday Gaudencio Tinsley M.D.   2.5 mcg at 12/24/24 0515   Last reviewed on 12/23/2024 11:10 AM by Naye Meléndez R.N.       Review of Systems:  Review of Systems   Constitutional:  Negative for chills, fever and malaise/fatigue.   HENT:  Negative for hearing loss.    Eyes:  Negative for blurred vision.   Respiratory:  Negative for cough and shortness of breath.    Cardiovascular:  Negative for chest pain and leg swelling.   Gastrointestinal:  Positive for abdominal pain. Negative for blood in stool, constipation, diarrhea, heartburn, melena, nausea and vomiting.   Genitourinary:  Negative for dysuria.   Musculoskeletal:  Negative for back pain.   Skin:  Negative for rash.   Neurological:  Negative for dizziness and weakness.   Psychiatric/Behavioral:  Negative for depression.    All other systems reviewed and are negative.        Vital signs:  Weight/BMI: Body mass index is 22.52 kg/m².  BP (!) 146/66   Pulse 68   Temp 36.2 °C (97.2 °F) (Temporal)   Resp 18   Ht 1.626 m (5' 4\")   Wt 59.5 kg (131 lb 2.8 oz)   SpO2 96%   Vitals:    12/23/24 2234 12/24/24 0233 12/24/24 0627 12/24/24 0725   BP: 132/76 (!) 148/74 (!) 153/86 (!) 146/66   Pulse: 71 62 76 68   Resp: 18 18 17 18   Temp: 36.1 °C (96.9 °F) 35.9 °C (96.6 °F) 36.2 °C (97.1 °F) 36.2 °C (97.2 °F)   TempSrc: Temporal Temporal Temporal Temporal   SpO2: 96% 97% 98% 96%   Weight:       Height:         Oxygen Therapy:  Pulse Oximetry: 96 %, O2 (LPM): 0, O2 Delivery Device: None - Room Air    Intake/Output " Summary (Last 24 hours) at 12/24/2024 0847  Last data filed at 12/24/2024 0528  Gross per 24 hour   Intake 362 ml   Output 45 ml   Net 317 ml       Physical Exam  Vitals and nursing note reviewed.   Constitutional:       General: She is not in acute distress.     Appearance: Normal appearance.   HENT:      Head: Normocephalic and atraumatic.      Right Ear: External ear normal.      Left Ear: External ear normal.      Nose: Nose normal.      Mouth/Throat:      Mouth: Mucous membranes are moist.      Pharynx: Oropharynx is clear.   Eyes:      General: No scleral icterus.  Cardiovascular:      Rate and Rhythm: Normal rate and regular rhythm.      Pulses: Normal pulses.      Heart sounds: Normal heart sounds.   Pulmonary:      Effort: Pulmonary effort is normal. No respiratory distress.      Breath sounds: Normal breath sounds.   Abdominal:      General: Abdomen is flat. Bowel sounds are normal. There is no distension.      Palpations: Abdomen is soft.      Tenderness: There is no abdominal tenderness.      Comments: Drain left flank  Abdominal tenderness   Musculoskeletal:         General: Normal range of motion.      Cervical back: Normal range of motion.   Skin:     General: Skin is warm and dry.      Capillary Refill: Capillary refill takes less than 2 seconds.   Neurological:      Mental Status: She is alert and oriented to person, place, and time.   Psychiatric:         Mood and Affect: Mood normal.         Behavior: Behavior normal.       Labs:  Recent Labs     12/22/24  0030 12/23/24  0252 12/24/24  0744   SODIUM 137 134* 136   POTASSIUM 3.9 3.4* 4.1   CHLORIDE 103 101 101   CO2 22 24 22   BUN 11 8 9   CREATININE 0.64 0.54 0.63   MAGNESIUM 2.0 1.9  --    PHOSPHORUS 3.3 3.1  --    CALCIUM 8.5 8.2* 9.5     Recent Labs     12/22/24  0030 12/23/24  0252 12/24/24  0744   ALTSGPT  --   --  6   ASTSGOT  --   --  20   ALKPHOSPHAT  --   --  93   TBILIRUBIN  --   --  0.6   GLUCOSE 99 96 117*     Recent Labs      12/22/24  0030 12/23/24  0252 12/24/24  0744   WBC 13.1* 9.6 12.1*   ASTSGOT  --   --  20   ALTSGPT  --   --  6   ALKPHOSPHAT  --   --  93   TBILIRUBIN  --   --  0.6     Recent Labs     12/22/24  0030 12/23/24  0252 12/24/24  0744   RBC 4.34 4.27 4.76   HEMOGLOBIN 12.3 11.9* 13.4   HEMATOCRIT 38.6 36.4* 41.9   PLATELETCT 760* 758* 842*     Recent Results (from the past 24 hours)   CBC WITHOUT DIFFERENTIAL    Collection Time: 12/24/24  7:44 AM   Result Value Ref Range    WBC 12.1 (H) 4.8 - 10.8 K/uL    RBC 4.76 4.20 - 5.40 M/uL    Hemoglobin 13.4 12.0 - 16.0 g/dL    Hematocrit 41.9 37.0 - 47.0 %    MCV 88.0 81.4 - 97.8 fL    MCH 28.2 27.0 - 33.0 pg    MCHC 32.0 (L) 32.2 - 35.5 g/dL    RDW 50.5 (H) 35.9 - 50.0 fL    Platelet Count 842 (H) 164 - 446 K/uL    MPV 9.1 9.0 - 12.9 fL   Comp Metabolic Panel    Collection Time: 12/24/24  7:44 AM   Result Value Ref Range    Sodium 136 135 - 145 mmol/L    Potassium 4.1 3.6 - 5.5 mmol/L    Chloride 101 96 - 112 mmol/L    Co2 22 20 - 33 mmol/L    Anion Gap 13.0 7.0 - 16.0    Glucose 117 (H) 65 - 99 mg/dL    Bun 9 8 - 22 mg/dL    Creatinine 0.63 0.50 - 1.40 mg/dL    Calcium 9.5 8.5 - 10.5 mg/dL    Correct Calcium 10.1 8.5 - 10.5 mg/dL    AST(SGOT) 20 12 - 45 U/L    ALT(SGPT) 6 2 - 50 U/L    Alkaline Phosphatase 93 30 - 99 U/L    Total Bilirubin 0.6 0.1 - 1.5 mg/dL    Albumin 3.2 3.2 - 4.9 g/dL    Total Protein 7.0 6.0 - 8.2 g/dL    Globulin 3.8 (H) 1.9 - 3.5 g/dL    A-G Ratio 0.8 g/dL   ESTIMATED GFR    Collection Time: 12/24/24  7:44 AM   Result Value Ref Range    GFR (CKD-EPI) 91 >60 mL/min/1.73 m 2       Radiology Review:  [id number] DUCTS   Preliminary Result      Digitized intraoperative radiograph is submitted for review. This examination is not for diagnostic purpose but for guidance during a surgical procedure. Please see the patient's chart for full procedural details.         INTERPRETING LOCATION: [address] [place] [address], [place], [address]      CT-IMAGE-GUIDED DRAIN PERITONEAL    Final Result      1.  CT guided left upper quadrant fluid collection catheter drainage.      CT-ABDOMEN-PELVIS WITH   Final Result      1.  Rim-enhancing irregular fluid collection under the left hemidiaphragm is larger since prior study, now 7.2 x 8.9 cm, and likely represents an abscess.   2.  Partially visualized loculated left pleural effusion/empyema containing some pockets of air, likely related to infection under the hemidiaphragm. Fluid in the left major fissure.   3.  Cholelithiasis.   4.  Nonobstructing left nephrolithiasis.   5.  Small hiatal hernia.   6.  Colonic diverticulosis.   7.  Persistent small pericardial effusion.      CT-CTA CHEST PULMONARY ARTERY W/ RECONS   Final Result      1. No acute pulmonary embolus.   2. Chronic appearing changes in the left lung with loculated fluid in the left pleural space, including the major fissure, posteriorly and laterally. Adjacent parenchymal volume loss.   3. Cardiomegaly with small pericardial effusion.   4. Moderate hiatal hernia.   5. Left upper quadrant fluid collection measures 4.4 x 6.3 cm in diameter, with evaluation suboptimal without IV contrast administration.            DX-CHEST-PORTABLE (1 VIEW)   Final Result      1.  Small left pleural effusion.      2.  Wedge-shaped opacity in left lower lobe consistent with atelectasis, pneumonitis, or possibly soft tissue overlay.          MDM (Data Review):   -Records reviewed and summarized in current documentation  -I personally reviewed and interpreted the laboratory results  -I personally reviewed the radiology images    Assessment/Recommendations:  Intra-abdominal abscess - s/p IR drainage. Culture pending. Continue antibiotics  Pleural effusion s/p pleurodesis - breathing well   Drain culture positive for MSSA    Recommendations  Abdominal x-ray in 4 weeks. Remove PD stent by upper endoscopy if the stent has not spontaneously migrated in the interim. Orders placed by GI team who will follow  results.  Antibiotics per primary team  Further management per hepatobiliary surgery, she has an appointment next week    Discharging today.    Plan discussed with patient, RN, Dr. Cristina, Dr. Guy    ..Vonda Park, KOBI,  APRN    Core Quality Measures   Reviewed items::  Labs, Medications and Radiology reports reviewed

## 2024-12-24 NOTE — PROGRESS NOTES
Radiology Progress Note     Author: Lisette Dave DNP Date & Time created: 12/24/2024  2:20 PM   Date of admission  12/19/2024  Note to reader: this note follows the APSO format rather than the historical SOAP format. Assessment and plan located at the top of the note for ease of use.    Chief Complaint  79 y.o. female admitted 12/19/2024 with   Chief Complaint   Patient presents with    Shortness of Breath         HPI  Rosangela Stockstill is a 79 year old female with PMH significant for breast cancer status postmastectomy, benign pancreatic serous cystadenoma s/p distal pancreatectomy/with no splenectomy (08/05/24) complicated by PE, arterial thrombus s/p thrombectomy 08/16/24, recurrent LUQ abscess with drains placed 08/22/24 and 09/19/24, both of which were removed in the outpatient setting, recurrent pleural effusions s/p multiple thoracentesis and thorascopic pleurodesis 12/09/2024. She was admitted 12/19/2024 with yet another CT finding of LUQ abscess after presenting with dyspnea, abdominal pain, cramping.  Hepatobiliary was consulted and recommended drain placement.  IR was consulted and patient underwent a LUQ abscess 10F drain placement with IR Dr. Bowen on 12/21/2024.    Interval History:   12/22/2024 - peritoneal abscess 10F drain to LUQ with 27 mL purulent output in the last 24 hours. Labs reviewed; WBC 13.1. Cultures pending. On ABX. Drain flushed with 10 mL NS.  I reviewed IDT notes patient with Dr. Arshad.     12/23/2024: LUQ peritoneal abscess drain (10F) to bulb suction with 29 mL purulent gray sanguinous fluid out in the last 24 hours.  Irrigated drain with 10 mL sterile saline 10 mL purulent fluid aspirated.  [facility] in hospital bed after ERCP, sphincterotomy, and pigtail plastic PD stent placement.  Patient denies significant abdominal pain with small amount of discomfort at drain site, noted she is still a little sleepy.  I reviewed patient's most recent labs including WBC  9.6<13.1, Hgb 11.9, CR 0.54.  Coordinated post IR procedure care with patient and hospital nursing staff.    12/24/2024: LUQ peritoneal abscess drain (10F) to bulb suction with 45 mL purulent gray sanguinous fluid out in the last 24 hours.  Patient is [facility] to [facility], dressed and ambulatory in the discharge lounge with spouse.  Patient reports she feels much better than yesterday, and is especially excited about going home.  She has experience with drain care and maintenance at home from prior drain.  She has no questions and has extra supplies at home..  I reviewed patient's most recent labs including WBC 12.1, Hgb 13.4<11.9, CR 0. 6 3.  Coordinated post IR procedure care with patient, patient's spouse, and hospital nursing staff.    Assessment/Plan     Principal Problem:    Intra-abdominal abscess (HCC)  Active Problems:    Acquired hypothyroidism    Primary hypertension    Thrombocytosis    Pleural effusion    History of pulmonary embolism    Advance care planning      Plan  - Pending discharge home (12/24/2024)     - Discharged home with drain in place following biliary duct stent and follow-up with hepatobiliary office as an outpatient.  - Peritoneal Fluid cultures positive for Staph aureus  - Antimicrobials per ID recommendation  - Hepatobiliary, surgical services,ID and GI following.    - Patient has had multiple drains and is comfortable caring for them at home.  - Okay to discharge with drain in place once medically cleared with hepatobiliary follow-up from an IR standpoint.    -Thank you for allowing Interventional Radiology team to participate in the patients care, if any additional care or requests are needed in the future please do not hesitate to call or place IR order           Review of Systems  Physical Exam   Review of Systems   Constitutional:  Negative for chills and fever.   HENT:  Negative for hearing loss.    Respiratory:  Negative for cough and shortness of breath.    Cardiovascular:  Negative  for chest pain and leg swelling.   Gastrointestinal:  Negative for abdominal pain, nausea and vomiting.   Musculoskeletal:  Negative for myalgias.   Neurological:  Negative for weakness and headaches.   Psychiatric/Behavioral:  Negative for depression.       Vitals:    12/24/24 0725   BP: (!) 146/66   Pulse: 68   Resp: 18   Temp: 36.2 °C (97.2 °F)   SpO2: 96%        Physical Exam  Vitals and nursing note reviewed.   Constitutional:       General: She is not in acute distress.     Appearance: Normal appearance. She is not ill-appearing.   HENT:      Head: Atraumatic.   Cardiovascular:      Rate and Rhythm: Normal rate.      Pulses: Normal pulses.   Pulmonary:      Effort: Pulmonary effort is normal. No respiratory distress.   Abdominal:      General: There is no distension.      Tenderness: There is no abdominal tenderness.      Comments: IR drain to LUQ   Musculoskeletal:      Right lower leg: No edema.      Left lower leg: No edema.   Skin:     General: Skin is warm and dry.      Capillary Refill: Capillary refill takes less than 2 seconds.      Coloration: Skin is not pale.   Neurological:      General: No focal deficit present.      Mental Status: She is alert and oriented to person, place, and time.      Sensory: No sensory deficit.      Coordination: Coordination normal.   Psychiatric:         Mood and Affect: Mood normal.         Behavior: Behavior normal.             Labs    Recent Labs     12/22/24  0030 12/23/24  0252 12/24/24  0744   WBC 13.1* 9.6 12.1*   RBC 4.34 4.27 4.76   HEMOGLOBIN 12.3 11.9* 13.4   HEMATOCRIT 38.6 36.4* 41.9   MCV 88.9 85.2 88.0   MCH 28.3 27.9 28.2   MCHC 31.9* 32.7 32.0*   RDW 51.9* 49.1 50.5*   PLATELETCT 760* 758* 842*   MPV 9.3 9.1 9.1     Recent Labs     12/22/24  0030 12/23/24  0252 12/24/24  0744   SODIUM 137 134* 136   POTASSIUM 3.9 3.4* 4.1   CHLORIDE 103 101 101   CO2 22 24 22   GLUCOSE 99 96 117*   BUN 11 8 9   CREATININE 0.64 0.54 0.63   CALCIUM 8.5 8.2* 9.5     Recent  Labs     12/22/24  0030 12/23/24  0252 12/24/24  0744   ALBUMIN  --   --  3.2   TBILIRUBIN  --   --  0.6   ALKPHOSPHAT  --   --  93   TOTPROTEIN  --   --  7.0   ALTSGPT  --   --  6   ASTSGOT  --   --  20   CREATININE 0.64 0.54 0.63     [id number] DUCTS   Final Result      Digitized intraoperative radiograph is submitted for review. This examination is not for diagnostic purpose but for guidance during a surgical procedure. Please see the patient's chart for full procedural details.         INTERPRETING LOCATION: [address] [place] [address], [id number]      CT-IMAGE-GUIDED DRAIN PERITONEAL   Final Result      1.  CT guided left upper quadrant fluid collection catheter drainage.      CT-ABDOMEN-PELVIS WITH   Final Result      1.  Rim-enhancing irregular fluid collection under the left hemidiaphragm is larger since prior study, now 7.2 x 8.9 cm, and likely represents an abscess.   2.  Partially visualized loculated left pleural effusion/empyema containing some pockets of air, likely related to infection under the hemidiaphragm. Fluid in the left major fissure.   3.  Cholelithiasis.   4.  Nonobstructing left nephrolithiasis.   5.  Small hiatal hernia.   6.  Colonic diverticulosis.   7.  Persistent small pericardial effusion.      CT-CTA CHEST PULMONARY ARTERY W/ RECONS   Final Result      1. No acute pulmonary embolus.   2. Chronic appearing changes in the left lung with loculated fluid in the left pleural space, including the major fissure, posteriorly and laterally. Adjacent parenchymal volume loss.   3. Cardiomegaly with small pericardial effusion.   4. Moderate hiatal hernia.   5. Left upper quadrant fluid collection measures 4.4 x 6.3 cm in diameter, with evaluation suboptimal without IV contrast administration.            DX-CHEST-PORTABLE (1 VIEW)   Final Result      1.  Small left pleural effusion.      2.  Wedge-shaped opacity in left lower lobe consistent with atelectasis, pneumonitis, or possibly soft tissue  "overlay.        INR   Date Value Ref Range Status   12/19/2024 1.77 (H) 0.87 - 1.13 Final     Comment:     INR - Non-therapeutic Reference Range: 0.87-1.13  INR - Therapeutic Reference Range: 2.0-4.0       No results found for: \"POCINR\"     Intake/Output Summary (Last 24 hours) at 12/22/2024 0849  Last data filed at 12/22/2024 0359  Gross per 24 hour   Intake 720 ml   Output 27 ml   Net 693 ml      I have personally reviewed the above labs and imaging      I have performed a physical exam and reviewed and updated ROS and Plan today (12/24/2024).     35 minutes in directly providing and coordinating care and extensive data review.  No time overlap and excludes procedures.   "

## 2024-12-24 NOTE — PROGRESS NOTES
Hospital Medicine Daily Progress Note    Date of Service  12/23/2024    Chief Complaint  Paige Gudino is a 79 y.o. female admitted 12/19/2024 with abd pain    Hospital Course  79 y.o. 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 status post radiation and chemotherapy, also with history of pancreatic serous cystadenoma, status post pancreatectomy, splenectomy, stomach and celiac node dissection 8/5/2024 , complicated by retroperitoneal fluid collection with infection and fluid collection in the distal pancreatic region with IR drain placed and removed, recurrent pleural effusion s/p thoracoscopic pleurodesis 12/9, history of PE and atrial thrombus unable to tolerate Eliquis or Xarelto due to symptoms such as abdominal cramping and diarrhea, was on coumadin, however she did not follow up with anticoagulation clinic constantly. She was last discharged on dabigatran 12/12.  Patient presented 12/19/2024 with return of dyspnea, abdominal pains and cramping.     CTA chest noted no evidence of acute PE. Chronic appearing changes in the left lung with loculated fluid in the left pleural space, including the major fissure, posteriorly and laterally. Adjacent parenchymal volume loss.     CT abdomen Rim-enhancing irregular fluid collection under the left hemidiaphragm is larger since prior study, now 7.2 x 8.9 cm, and likely represents an abscess.     S/p drain placed 12/21 by IR. Drain culture MSSA. ID on board  Gi consulted. S/p ERCP, sphincterotomy and PD stent placement 12/23    Patient has a history of PE and currently on Pradaxa at home.  Will hold on Pradaxa.  Start Lovenox 1 mg/kg twice daily while she is here    Regarding CTA finding of chest, discussed with thoracic surgery Dr. Ganser, finding c/w post pleurodesis. No interventions.      Interval Problem Update  Seen patient at bedside  No acute overnight  events  Drain culture noted MSSA, change iv abx to iv Unasyn  ID following  Discussed with hepatic surgery, GI. Will plan ERCP with sphincterotomy and PD stent placement today  Discussed with ID  Labs reviewed K 3.4, replaced     I have discussed this patient's plan of care and discharge plan at IDT rounds today with Case Management, Nursing, Nursing leadership, and other members of the IDT team.    Consultants/Specialty  Surgical oncology  IR  GI  ID    Code Status  Full Code    Disposition  The patient is not medically cleared for discharge to home or a post-acute facility.      I have placed the appropriate orders for post-discharge needs.    Review of Systems  ROS     Physical Exam  Temp:  [36 °C (96.8 °F)-36.8 °C (98.2 °F)] 36.6 °C (97.8 °F)  Pulse:  [64-84] 80  Resp:  [14-20] 18  BP: (122-160)/(65-79) 140/76  SpO2:  [91 %-100 %] 92 %    Physical Exam    Fluids    Intake/Output Summary (Last 24 hours) at 12/23/2024 1705  Last data filed at 12/23/2024 1212  Gross per 24 hour   Intake 452 ml   Output 9 ml   Net 443 ml        Laboratory  Recent Labs     12/21/24  0346 12/22/24  0030 12/23/24  0252   WBC 12.6* 13.1* 9.6   RBC 4.50 4.34 4.27   HEMOGLOBIN 12.6 12.3 11.9*   HEMATOCRIT 38.9 38.6 36.4*   MCV 86.4 88.9 85.2   MCH 28.0 28.3 27.9   MCHC 32.4 31.9* 32.7   RDW 50.0 51.9* 49.1   PLATELETCT 729* 760* 758*   MPV 9.0 9.3 9.1     Recent Labs     12/21/24  0346 12/22/24  0030 12/23/24  0252   SODIUM 136 137 134*   POTASSIUM 3.9 3.9 3.4*   CHLORIDE 102 103 101   CO2 21 22 24   GLUCOSE 101* 99 96   BUN 11 11 8   CREATININE 0.56 0.64 0.54   CALCIUM 8.3* 8.5 8.2*                     Imaging  [id number] DUCTS   Preliminary Result      Digitized intraoperative radiograph is submitted for review. This examination is not for diagnostic purpose but for guidance during a surgical procedure. Please see the patient's chart for full procedural details.         INTERPRETING LOCATION: [address] [place] [address], [place], [address]       CT-IMAGE-GUIDED DRAIN PERITONEAL   Final Result      1.  CT guided left upper quadrant fluid collection catheter drainage.      CT-ABDOMEN-PELVIS WITH   Final Result      1.  Rim-enhancing irregular fluid collection under the left hemidiaphragm is larger since prior study, now 7.2 x 8.9 cm, and likely represents an abscess.   2.  Partially visualized loculated left pleural effusion/empyema containing some pockets of air, likely related to infection under the hemidiaphragm. Fluid in the left major fissure.   3.  Cholelithiasis.   4.  Nonobstructing left nephrolithiasis.   5.  Small hiatal hernia.   6.  Colonic diverticulosis.   7.  Persistent small pericardial effusion.      CT-CTA CHEST PULMONARY ARTERY W/ RECONS   Final Result      1. No acute pulmonary embolus.   2. Chronic appearing changes in the left lung with loculated fluid in the left pleural space, including the major fissure, posteriorly and laterally. Adjacent parenchymal volume loss.   3. Cardiomegaly with small pericardial effusion.   4. Moderate hiatal hernia.   5. Left upper quadrant fluid collection measures 4.4 x 6.3 cm in diameter, with evaluation suboptimal without IV contrast administration.            DX-CHEST-PORTABLE (1 VIEW)   Final Result      1.  Small left pleural effusion.      2.  Wedge-shaped opacity in left lower lobe consistent with atelectasis, pneumonitis, or possibly soft tissue overlay.           Assessment/Plan  * Intra-abdominal abscess (HCC)- (present on admission)  Assessment & Plan  Recurrent. Peripancreatic fluid.   history of pancreatic serous cystadenoma, status post pancreatectomy, splenectomy, stomach and celiac node dissection 8/5/2024  Hepatobiliary surgery, IR, ID on board    S/p drain placed 12/21. Drain culture MSSA  GI consulted, s/p ERCP, sphincterotomy and PD stent placement 12/23  ID following  On iv Unasyn    Advance care planning  Assessment & Plan  Patient is 78 yo female with multiple comorbidities  including breast cancer status postmastectomy, benign pancreatic serous cystadenoma, retroperitoneal abscess with drain placement in August and removal in September, history of PE and atrial thrombus currently on Pradaxa, recurrent pleural effusion requiring multiple thoracentesis in the past.  She is admitted for abdominal pain and was found to have intra-abdominal abscess requiring IR drainage.  Discussed goal of care and code status with patient. She confirms FULL code. ACP 16 mins    History of pulmonary embolism- (present on admission)  Assessment & Plan  history of PE and atrial thrombus unable to tolerate Eliquis or Xarelto due to symptoms such as abdominal cramping and diarrhea, was on coumadin, however she did not follow up with anticoagulation clinic constantly. She was last discharged on dabigatran 12/12.    Hold OAC given possible procedures while she is here  Now on lovenox 1mg/kg bid  I doubt she is truly intolerance of Eliquis, xarelto. Her main complaints are GI complaints, those likely due to recurrent peripancreatic fluid.   Consider trial Eliquis/Xarelto once no procedure planned. Or switch back to Dabigatran as she was discharged on 12/12    Pleural effusion- (present on admission)  Assessment & Plan  Status post pleurodesis with Dr. Saul earlier in December  CTA chest noted no evidence of acute PE. Chronic appearing changes in the left lung with loculated fluid in the left pleural space, including the major fissure, posteriorly and laterally. Adjacent parenchymal volume loss.     Discussed with thoracic surgery Dr. Ganser, finding c/w post pleurodesis. No interventions.     Thrombocytosis- (present on admission)  Assessment & Plan  Likely reactive intraabdominal abscess  Continue monitoring  Currently on lovenox 1mg/kg bid    Primary hypertension- (present on admission)  Assessment & Plan  Blood pressure in the ED in the low 100s, hold amlodipine for now and watch trend, IV antihypertensives  ordered with parameters.    Acquired hypothyroidism- (present on admission)  Assessment & Plan  Continue home liothyronine and levothyroxine         VTE prophylaxis: lovenox 1mg/kg bid    I have performed a physical exam and reviewed and updated ROS and Plan today (12/23/2024). In review of yesterday's note (12/22/2024), there are no changes except as documented above.    Patient is has a high medical complexity, complex decision making and is at high risk for complication, morbidity, and mortality.  I spent 54 minutes, reviewing the chart, obtaining and/or reviewing separately obtained history. Performing a medically appropriate examination and evaluation.  Counseling and educating the patient. Ordering and reviewing medications, tests, or procedures.  Discussing the case with IR, surgery, pulmonary and thoracic surgery.  Documenting clinical information in EPIC. Independently interpreting results and communicating results to patient. Discussing future disposition of care with patient, RN and case management.  This does not include time spent on separately billable procedures/tests.

## 2024-12-24 NOTE — DISCHARGE INSTRUCTIONS
- Follow up with primary care physician in 1 week.   - Follow up with surgery as instructed  - Follow up with GI as instructed  - Please take the medications as instructed    - Go to the local Emergency Department if you have any worsening condition.    175.26

## 2024-12-24 NOTE — DISCHARGE SUMMARY
Discharge Summary    CHIEF COMPLAINT ON ADMISSION  Chief Complaint   Patient presents with    Shortness of Breath       Reason for Admission  ems     Admission Date  12/19/2024    CODE STATUS  Prior    HPI & HOSPITAL COURSE  79 y.o. 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 status post radiation and chemotherapy, also with history of pancreatic serous cystadenoma, status post pancreatectomy, splenectomy, stomach and celiac node dissection 8/5/2024 , complicated by retroperitoneal fluid collection with infection and fluid collection in the distal pancreatic region with IR drain placed and removed, recurrent pleural effusion s/p thoracoscopic pleurodesis 12/9, history of PE and atrial thrombus unable to tolerate Eliquis or Xarelto due to symptoms such as abdominal cramping and diarrhea, was on coumadin, however she did not follow up with anticoagulation clinic constantly. She was last discharged on dabigatran 12/12.  Patient presented 12/19/2024 with return of dyspnea, abdominal pains and cramping.     CTA chest noted no evidence of acute PE. Chronic appearing changes in the left lung with loculated fluid in the left pleural space, including the major fissure, posteriorly and laterally. Adjacent parenchymal volume loss.      CT abdomen Rim-enhancing irregular fluid collection under the left hemidiaphragm is larger since prior study, now 7.2 x 8.9 cm, and likely represents an abscess.      S/p drain placed 12/21 by IR. Drain culture MSSA. ID on board  Gi consulted. S/p ERCP, sphincterotomy and PD stent placement 12/23     Patient has a history of PE and currently on Pradaxa at home.  She has been treated with Lovenox 1 mg/kg twice daily while she is here.  At discharge, patient would prefer to continue Eliquis.      Regarding CTA finding of chest, discussed with thoracic surgery Dr. Ganser, finding c/w post pleurodesis. No  interventions.     Oncology surgery and GI cleared the patient for discharge.  Patient will follow-up with surgery for the drain maintenance and monitoring.  I have placed the outpatient GI referral.     Therefore, she is discharged in good and stable condition to home with close outpatient follow-up.    The patient met 2-midnight criteria for an inpatient stay at the time of discharge.    Discharge Date  12/24/2024    FOLLOW UP ITEMS POST DISCHARGE  - Follow up with primary care physician in 1 week.   - Follow up with surgery as instructed  - Follow up with GI as instructed  - Please take the medications as instructed    - Go to the local Emergency Department if you have any worsening condition.       DISCHARGE DIAGNOSES  Principal Problem:    Intra-abdominal abscess (HCC) (POA: Yes)  Active Problems:    Acquired hypothyroidism (POA: Yes)    Primary hypertension (POA: Yes)    Thrombocytosis (POA: Yes)    Pleural effusion (POA: Yes)    History of pulmonary embolism (POA: Yes)    Advance care planning (POA: Unknown)  Resolved Problems:    * No resolved hospital problems. *      FOLLOW UP  Future Appointments   Date Time Provider Department Center   12/29/2024 11:15 AM [facility] DX 3 SMDX TEQUILA [place]   12/30/2024  1:20 PM BLANE GLEASON MG 1 [facility]   2/13/2025  2:20 PM Mary Jo Harry PDIANNE.-YADIRA MG TEQUILA [place]   4/22/2025  4:00 PM [facility]  [address] [place] [id number] [phone]    Follow up in 1 week(s)  Please call your primary care provider to schedule, recent hospitalization follow up    Vonda Park, DNP,  APRN  [address]  [address]  [place] [address]  [phone]    Follow up        MEDICATIONS ON DISCHARGE     Medication List        START taking these medications        Instructions   amoxicillin-clavulanate 875-125 MG Tabs  Commonly known as: Augmentin   Take 1 Tablet by mouth 2 times a day for 7 days.  Dose: 1 Tablet            CONTINUE taking  these medications        Instructions   amLODIPine 5 MG Tabs  Commonly known as: Norvasc   Take 1 Tablet by mouth every day.  Dose: 5 mg     Eliquis 5mg Tabs  Generic drug: apixaban   Take 5 mg by mouth 2 times a day.  Dose: 5 mg     ibandronate 150 MG tablet  Commonly known as: Boniva   Take 1 Tablet by mouth every 30 days.  Dose: 150 mg     levothyroxine 100 MCG Tabs  Commonly known as: Synthroid   Take 100 mcg by mouth every morning on an empty stomach. ONLY GENERIC  Dose: 100 mcg     liothyronine 5 MCG Tabs  Commonly known as: Cytomel   Take 2.5-5 mcg by mouth see administration instructions. 5 mcg on Monday, Wednesday, Friday.  2.5 mcg on all other days  Dose: 2.5-5 mcg     ondansetron 4 MG Tbdp  Commonly known as: Zofran ODT   Take 1 Tablet by mouth every 6 hours as needed for Nausea/Vomiting.  Dose: 4 mg            STOP taking these medications      dabigatran 150 MG Caps capsule  Commonly known as: Pradaxa     warfarin 5 MG Tabs  Commonly known as: Coumadin              Allergies  Allergies   Allergen Reactions    Synthroid [Fd&C Red #40 Al Paul-Levothyroxine] Hives and Unspecified     Hives, Brand specific. Some brands are ok and some are not    Tape Unspecified     Skin degradation with use of tegaderm     Xenia Thyroid [Thyroid] Diarrhea     diarrhea       DIET  No orders of the defined types were placed in this encounter.      ACTIVITY  As tolerated.  Weight bearing as tolerated    CONSULTATIONS  Oncology surgery, GI    PROCEDURES  IR drain placement, ERCP    LABORATORY  Lab Results   Component Value Date    SODIUM 136 12/24/2024    POTASSIUM 4.1 12/24/2024    CHLORIDE 101 12/24/2024    CO2 22 12/24/2024    GLUCOSE 117 (H) 12/24/2024    BUN 9 12/24/2024    CREATININE 0.63 12/24/2024        Lab Results   Component Value Date    WBC 12.1 (H) 12/24/2024    HEMOGLOBIN 13.4 12/24/2024    HEMATOCRIT 41.9 12/24/2024    PLATELETCT 842 (H) 12/24/2024        Total time of the discharge process exceeds 36  minutes.

## 2024-12-28 SDOH — HEALTH STABILITY: PHYSICAL HEALTH: ON AVERAGE, HOW MANY DAYS PER WEEK DO YOU ENGAGE IN MODERATE TO STRENUOUS EXERCISE (LIKE A BRISK WALK)?: 0 DAYS

## 2024-12-28 SDOH — ECONOMIC STABILITY: FOOD INSECURITY: WITHIN THE PAST 12 MONTHS, YOU WORRIED THAT YOUR FOOD WOULD RUN OUT BEFORE YOU GOT MONEY TO BUY MORE.: NEVER TRUE

## 2024-12-28 SDOH — HEALTH STABILITY: PHYSICAL HEALTH: ON AVERAGE, HOW MANY MINUTES DO YOU ENGAGE IN EXERCISE AT THIS LEVEL?: 0 MIN

## 2024-12-28 SDOH — ECONOMIC STABILITY: FOOD INSECURITY: WITHIN THE PAST 12 MONTHS, THE FOOD YOU BOUGHT JUST DIDN'T LAST AND YOU DIDN'T HAVE MONEY TO GET MORE.: NEVER TRUE

## 2024-12-28 SDOH — ECONOMIC STABILITY: INCOME INSECURITY: HOW HARD IS IT FOR YOU TO PAY FOR THE VERY BASICS LIKE FOOD, HOUSING, MEDICAL CARE, AND HEATING?: NOT HARD AT ALL

## 2024-12-28 SDOH — ECONOMIC STABILITY: INCOME INSECURITY: IN THE LAST 12 MONTHS, WAS THERE A TIME WHEN YOU WERE NOT ABLE TO PAY THE MORTGAGE OR RENT ON TIME?: NO

## 2024-12-28 ASSESSMENT — LIFESTYLE VARIABLES
HOW OFTEN DO YOU HAVE A DRINK CONTAINING ALCOHOL: NEVER
HOW OFTEN DO YOU HAVE SIX OR MORE DRINKS ON ONE OCCASION: NEVER
AUDIT-C TOTAL SCORE: 0
SKIP TO QUESTIONS 9-10: 1
HOW MANY STANDARD DRINKS CONTAINING ALCOHOL DO YOU HAVE ON A TYPICAL DAY: PATIENT DOES NOT DRINK

## 2024-12-28 ASSESSMENT — SOCIAL DETERMINANTS OF HEALTH (SDOH)
WITHIN THE PAST 12 MONTHS, YOU WORRIED THAT YOUR FOOD WOULD RUN OUT BEFORE YOU GOT THE MONEY TO BUY MORE: NEVER TRUE
IN THE PAST 12 MONTHS, HAS THE ELECTRIC, GAS, OIL, OR WATER COMPANY THREATENED TO SHUT OFF SERVICE IN YOUR HOME?: NO
HOW OFTEN DO YOU ATTENT MEETINGS OF THE CLUB OR ORGANIZATION YOU BELONG TO?: MORE THAN 4 TIMES PER YEAR
HOW OFTEN DO YOU ATTEND CHURCH OR RELIGIOUS SERVICES?: NEVER
HOW HARD IS IT FOR YOU TO PAY FOR THE VERY BASICS LIKE FOOD, HOUSING, MEDICAL CARE, AND HEATING?: NOT HARD AT ALL
HOW MANY DRINKS CONTAINING ALCOHOL DO YOU HAVE ON A TYPICAL DAY WHEN YOU ARE DRINKING: PATIENT DOES NOT DRINK
DO YOU BELONG TO ANY CLUBS OR ORGANIZATIONS SUCH AS CHURCH GROUPS UNIONS, FRATERNAL OR ATHLETIC GROUPS, OR SCHOOL GROUPS?: YES
IN A TYPICAL WEEK, HOW MANY TIMES DO YOU TALK ON THE PHONE WITH FAMILY, FRIENDS, OR NEIGHBORS?: MORE THAN THREE TIMES A WEEK
DO YOU BELONG TO ANY CLUBS OR ORGANIZATIONS SUCH AS CHURCH GROUPS UNIONS, FRATERNAL OR ATHLETIC GROUPS, OR SCHOOL GROUPS?: YES
HOW OFTEN DO YOU HAVE SIX OR MORE DRINKS ON ONE OCCASION: NEVER
HOW OFTEN DO YOU GET TOGETHER WITH FRIENDS OR RELATIVES?: ONCE A WEEK
HOW OFTEN DO YOU HAVE A DRINK CONTAINING ALCOHOL: NEVER
IN A TYPICAL WEEK, HOW MANY TIMES DO YOU TALK ON THE PHONE WITH FAMILY, FRIENDS, OR NEIGHBORS?: MORE THAN THREE TIMES A WEEK
HOW OFTEN DO YOU ATTEND CHURCH OR RELIGIOUS SERVICES?: NEVER
HOW OFTEN DO YOU ATTENT MEETINGS OF THE CLUB OR ORGANIZATION YOU BELONG TO?: MORE THAN 4 TIMES PER YEAR
HOW OFTEN DO YOU GET TOGETHER WITH FRIENDS OR RELATIVES?: ONCE A WEEK

## 2024-12-29 ENCOUNTER — HOSPITAL ENCOUNTER (OUTPATIENT)
Dept: RADIOLOGY | Facility: MEDICAL CENTER | Age: 79
End: 2024-12-29
Payer: COMMERCIAL

## 2024-12-29 DIAGNOSIS — J90 PLEURAL EFFUSION: ICD-10-CM

## 2024-12-29 PROCEDURE — 71046 X-RAY EXAM CHEST 2 VIEWS: CPT

## 2024-12-30 ENCOUNTER — APPOINTMENT (OUTPATIENT)
Dept: RADIOLOGY | Facility: MEDICAL CENTER | Age: 79
End: 2024-12-30
Attending: PHYSICIAN ASSISTANT
Payer: COMMERCIAL

## 2024-12-30 DIAGNOSIS — Z12.31 VISIT FOR SCREENING MAMMOGRAM: ICD-10-CM

## 2024-12-30 DIAGNOSIS — Z90.410 HISTORY OF PANCREATECTOMY: ICD-10-CM

## 2024-12-31 ENCOUNTER — APPOINTMENT (OUTPATIENT)
Dept: MEDICAL GROUP | Facility: LAB | Age: 79
End: 2024-12-31
Payer: COMMERCIAL

## 2024-12-31 ENCOUNTER — OFFICE VISIT (OUTPATIENT)
Dept: MEDICAL GROUP | Facility: LAB | Age: 79
End: 2024-12-31
Payer: COMMERCIAL

## 2024-12-31 VITALS
RESPIRATION RATE: 16 BRPM | OXYGEN SATURATION: 94 % | TEMPERATURE: 98.2 F | HEIGHT: 64 IN | SYSTOLIC BLOOD PRESSURE: 98 MMHG | WEIGHT: 122 LBS | BODY MASS INDEX: 20.83 KG/M2 | HEART RATE: 82 BPM | DIASTOLIC BLOOD PRESSURE: 62 MMHG

## 2024-12-31 DIAGNOSIS — Z86.711 HISTORY OF PULMONARY EMBOLISM: ICD-10-CM

## 2024-12-31 DIAGNOSIS — Z86.718 HISTORY OF DVT (DEEP VEIN THROMBOSIS): ICD-10-CM

## 2024-12-31 DIAGNOSIS — Z86.79 HISTORY OF HYPERTENSION: ICD-10-CM

## 2024-12-31 DIAGNOSIS — E03.9 ACQUIRED HYPOTHYROIDISM: ICD-10-CM

## 2024-12-31 DIAGNOSIS — D75.839 THROMBOCYTOSIS: ICD-10-CM

## 2024-12-31 DIAGNOSIS — K65.1 INTRA-ABDOMINAL ABSCESS (HCC): ICD-10-CM

## 2024-12-31 DIAGNOSIS — E55.9 HYPOVITAMINOSIS D: ICD-10-CM

## 2024-12-31 DIAGNOSIS — E53.8 VITAMIN B 12 DEFICIENCY: ICD-10-CM

## 2024-12-31 PROBLEM — Z90.81 HISTORY OF SPLENECTOMY: Status: ACTIVE | Noted: 2024-12-31

## 2024-12-31 PROBLEM — Z90.410 HISTORY OF PANCREATECTOMY: Status: ACTIVE | Noted: 2024-12-31

## 2024-12-31 PROBLEM — Z90.411 HISTORY OF PARTIAL PANCREATECTOMY: Status: ACTIVE | Noted: 2024-12-31

## 2024-12-31 PROBLEM — E87.6 HYPOKALEMIA: Status: RESOLVED | Noted: 2024-09-29 | Resolved: 2024-12-31

## 2024-12-31 PROBLEM — K65.9 ABDOMINAL INFECTION (HCC): Status: RESOLVED | Noted: 2024-09-20 | Resolved: 2024-12-31

## 2024-12-31 PROBLEM — M81.0 OSTEOPOROSIS: Status: ACTIVE | Noted: 2024-12-31

## 2024-12-31 PROBLEM — I10 PRIMARY HYPERTENSION: Status: RESOLVED | Noted: 2024-09-17 | Resolved: 2024-12-31

## 2024-12-31 PROBLEM — R18.8 INTRA-ABDOMINAL FLUID COLLECTION: Status: RESOLVED | Noted: 2024-08-14 | Resolved: 2024-12-31

## 2024-12-31 PROBLEM — Z79.811 LONG TERM (CURRENT) USE OF AROMATASE INHIBITORS: Status: RESOLVED | Noted: 2024-01-16 | Resolved: 2024-12-31

## 2024-12-31 PROBLEM — I51.3 ATRIAL THROMBUS: Status: RESOLVED | Noted: 2024-08-15 | Resolved: 2024-12-31

## 2024-12-31 PROBLEM — J96.01 ACUTE RESPIRATORY FAILURE WITH HYPOXIA (HCC): Status: RESOLVED | Noted: 2024-08-14 | Resolved: 2024-12-31

## 2024-12-31 PROBLEM — I26.09 PULMONARY EMBOLISM WITH ACUTE COR PULMONALE, UNSPECIFIED CHRONICITY, UNSPECIFIED PULMONARY EMBOLISM TYPE (HCC): Status: RESOLVED | Noted: 2024-08-14 | Resolved: 2024-12-31

## 2024-12-31 PROBLEM — E83.42 HYPOMAGNESEMIA: Status: RESOLVED | Noted: 2024-09-25 | Resolved: 2024-12-31

## 2024-12-31 RX ORDER — PSEUDOEPHEDRINE HCL 30 MG
1 TABLET ORAL 2 TIMES DAILY
COMMUNITY

## 2024-12-31 ASSESSMENT — ENCOUNTER SYMPTOMS
CHILLS: 0
DIARRHEA: 0
VOMITING: 0
ABDOMINAL PAIN: 0
COUGH: 0
WEIGHT LOSS: 1
NAUSEA: 0
SHORTNESS OF BREATH: 0
FEVER: 0

## 2024-12-31 ASSESSMENT — FIBROSIS 4 INDEX: FIB4 SCORE: 0.76

## 2024-12-31 NOTE — PROGRESS NOTES
Subjective:     Chief Complaint   Patient presents with    Establish Care     HISTORY OF THE PRESENT ILLNESS: Patient is a 79 y.o. female. This pleasant patient is here today to establish care and discuss medications. His/her prior PCP was Roula Ga P.A.-C.    Verbal consent was acquired by the patient to use [employer] ambient listening note generation during this visit Yes.    History of Present Illness  The patient is a 79-year-old female who presents to [facility].    She reports experiencing severe fatigue, which she attributes to her recent antibiotic regimen. She has completed a 7-day course of Augmentin, with the final dose taken on Tuesday morning. She believes the oral medication was more potent than the intravenous form she received during her hospital stay. Despite attempts to alleviate her fatigue through rest, she finds it difficult to remain active.    She expresses a desire to discontinue Eliquis and reduce or eliminate amlodipine 5 mg, which she takes for blood pressure management. She notes that her blood pressure readings have consistently been below 110, even during her hospitalizations. She took her last dose of amlodipine at 10:30am today. She admits to a high salt intake and has noticed a change in her taste preferences over the past 5 months, with a decreased appetite for sweets and chips. Her appetite is beginning to improve. She reports no difficulty swallowing.    She underwent elective surgery in 08/2024, after which she was started on Eliquis due to the development of blood clots. She attributes these clots to her previous use of tamoxifen. She has a history of cancer treatment, including the removal of a small lump under her left breast approximately 2.5 years ago. She was prescribed anastrozole by her oncologist to block hormones. However, after a few years on this medication, she was diagnosed with osteoporosis following a bone density test. She has not seen her oncologist  since these events. She has a history of multiple blood clots, including one in each leg, one in each lung, and one in her heart, the later which was removed by Dr. Olmos. She has been on Eliquis since mid-08/2024. She reports experiencing difficulty breathing, which led to her hospitalization. She was discharged on 09/2024 but returned 5 days later due to persistent breathing difficulties. She reports a weight loss of 30 pounds. She underwent a procedure to remove fluid from her abdomen on 08/15/2024, performed by Dr. Olmos. She also had a drain placed in her left lung to remove fluid. She saw Dr. Davies this morning, who reviewed her x-ray from Sunday and informed her that there is only a small amount of fluid remaining, which is expected to resolve. He advised her to return if she experiences any breathing difficulties or pain. She had a drain placed in her abdomen on the Sunday before Christmas, which was removed around 01/07/2024. She underwent an ERCP with stent placement in her pancreas. She is scheduled for an x-ray in 4 weeks. She reports no current abdominal pain or cramping. She has an appointment with Dr. Jauregui's nurse practitioner in 02/2024, where she plans to discuss discontinuing Eliquis. She has a mammogram scheduled for the last week of 12/2024. She has a history of splenectomy and has been advised to update her vaccines. She has a history of elevated white blood cell count, which led to her hospitalization a few months ago. She has a history of B12 deficiency and anemia, for which she was prescribed iron. She has not taken iron since 08/2024. She reports difficulty walking and plans to start physical therapy next week.    She is currently taking bone density medication for osteoporosis.     She has a history of B12 deficiency and anemia, for which she was prescribed iron. She has not taken iron since 08/2024. She is currently taking levothyroxine 100 mcg and liothyronine 5 mcg for thyroid  "management. She believes to have a follow up with her endocrinologist next month.    Health Maintenance: Declines all vaccines.     Review of Systems   Constitutional:  Positive for malaise/fatigue and weight loss. Negative for chills and fever.   Respiratory:  Negative for cough and shortness of breath.    Cardiovascular:  Negative for chest pain and leg swelling.   Gastrointestinal:  Negative for abdominal pain, diarrhea, nausea and vomiting.   Skin:  Negative for rash.         Objective:     Exam: BP 98/62 (BP Location: Right arm, Patient Position: Sitting, BP Cuff Size: Adult long)   Pulse 82   Temp 36.8 °C (98.2 °F) (Temporal)   Resp 16   Ht 1.626 m (5' 4\")   Wt 55.3 kg (122 lb)   SpO2 94%  Body mass index is 20.94 kg/m².    Physical Exam  Vitals reviewed.   Constitutional:       General: She is not in acute distress.     Appearance: Normal appearance. She is not ill-appearing.   HENT:      Head: Normocephalic and atraumatic.      Mouth/Throat:      Mouth: Mucous membranes are moist.   Eyes:      General: No scleral icterus.  Cardiovascular:      Rate and Rhythm: Normal rate and regular rhythm.      Heart sounds: Normal heart sounds.   Pulmonary:      Effort: Pulmonary effort is normal.      Breath sounds: Decreased breath sounds (left posterior inferior) present.   Abdominal:      General: Abdomen is flat. Bowel sounds are normal. There is no distension.      Palpations: Abdomen is soft. There is no mass.      Tenderness: There is abdominal tenderness (minimal generalized ttp to deep palpation). There is no guarding or rebound.      Comments: Drain intact with dressing clean, small amount of cloudy off white fluid in bulb reservoir.    Musculoskeletal:      Right lower leg: Normal. No swelling or tenderness. No edema.      Left lower leg: Normal. No swelling or tenderness. No edema.   Skin:     General: Skin is warm and dry.      Coloration: Skin is not jaundiced.   Neurological:      General: No focal " deficit present.      Mental Status: She is alert and oriented to person, place, and time.   Psychiatric:         Mood and Affect: Mood normal.         Behavior: Behavior normal.         Thought Content: Thought content normal.       Labs: Reviewed from 2024  Imaging: Reviewed from 2024    Assessment & Plan:   79 y.o. female with the following -    1. History of hypertension  Chronic HTN, over treated. Stop norvasc. Discussed ideal ranges/return precautions, recommend low salt diet. Monitor BP at home, if needed can resume norvasc but at lower dose of 2.5mg daily.     2. History of DVT (deep vein thrombosis)  3. History of pulmonary embolism  Currently anticoagulated with elquis, also had right atrial thrombus which required thrombectomy. Sounds to be provoked as this was post surgery, but also breast cancer and prior use of anastrozole may have contributed-currently off anastrozole after starting 1/2023 (s/p resection 8/1/2022 with invasive grade 2 mammary carcinoma with close posterior margin, all other margins clear. No oncology notes to review at time of appointment, prior followed by Dr. Fowler and not interested in follow up). Agree with cardiology recommendation for minimum 6m anticoagulation (from 8/15/2024) however did discuss if she has ongoing risk factors (untreated breast cancer and thrombocytosis) that can increase her risk of recurrence. Patient aware VTE could be life threatening. No PE seen on CTA 12/19/2024, no signs/symptoms of DVT but patient states she didn't have symptoms in legs prior to diagnosis in August. Plan for now after good discussion of her preferences is to complete 6 months anticoagulation.     apixaban (ELIQUIS) 5mg Tab, Take 5 mg by mouth 2 times a day., Disp: , Rfl:     4. Thrombocytosis  Chronic, since August and worsening with last count 842. Hx of splenectomy likely contributing, but suspect reactive thrombocytosis from recent infection may be as well. Patient wondering  if she needs to resume iron, will obtain labs as below. Plan pending results.   - FERRITIN; Future  - IRON/TOTAL IRON BIND; Future  - CBC WITH DIFFERENTIAL; Future  - CRP QUANTITIVE (NON-CARDIAC); Future    5. Intra-abdominal abscess (HCC)  S/p drain with reducing fluid output, just completed antibiotics today, followed by surgery with plan to repeat abdominal x-ray in 1m. She is aware of return precautions and proper drain care, continue care with surgery/GI.    6. Acquired hypothyroidism  Chronic, controlled based on labs from August but has lost weight due to recurrent illness/hospital stay. Continue medication(s) as below pending repeat labs, continues her care with endocrinology.     liothyronine (CYTOMEL) 5 MCG Tab, Take 2.5-5 mcg by mouth see administration instructions. 5 mcg on Monday, Wednesday, Friday, 2.5 mcg on all other days, Disp: , Rfl:     levothyroxine (SYNTHROID) 100 MCG Tab, Take 100 mcg by mouth every morning on an empty stomach. Only generic, Disp: , Rfl:   - TSH; Future  - FREE THYROXINE; Future  - TRIIDOTHYRONINE; Future    7. Vitamin B 12 deficiency  Not currently taking B12, this is followed by endocrinology and unsure if ordered with next labs so will trend.   - VITAMIN B12; Future    8. Hypovitaminosis D  Trend.   - VITAMIN D,25 HYDROXY (DEFICIENCY); Future    I spent a total of 45 minutes with record review, exam, communication with the patient, and documentation of this encounter.    Return in about 3 weeks (around 1/21/2025), or if symptoms worsen or fail to improve, for follow up.    Please note that this dictation was created using voice recognition software. I have made every reasonable attempt to correct obvious errors, but I expect that there are errors of grammar and possibly content that I did not discover before finalizing the note.

## 2024-12-31 NOTE — PATIENT INSTRUCTIONS
Ideal blood pressure <130/80 and at least <140/90.  If <100 for the top number we may be over treating.  Severe range >180/120, with symptoms=ER  Stop amlodipine, can restart at 2.5mg daily instead of 5mg if needed    fasting labs due 1-2 weeks

## 2025-01-02 NOTE — PROGRESS NOTES
Subjective:   1/7/2025 10:59 AM  Primary care physician: Tahira Meléndez D.O.  Referring Provider: Casimiro Fowler M.D.   Medical Oncologist: Casimiro Fowler M.D    Chief Complaint:   Chief Complaint   Patient presents with    Follow-Up     DIST PANC/SPLN 8/5  ERCP & STENT 12/23  LUQ DRAIN 12/21  PLEURODESIS 12/9  HOSP FF UP        Diagnosis:   1. Cyst of pancreas        2. H/O exploratory laparotomy        3. Recurrent pleural effusion on left        4. Abdominal fluid collection            History of presenting illness:    Paige Gudino is a 77 y.o. female with a history of noninvasive carcinoma of the left breast associated with calcifications found on mammogram 34 years ago.  She was treated with a total mastectomy at that time.  She had implant reconstruction and a right mamma pexy subsequently.  No systemic therapy was given.  She did well until May 2022 when she self detected a lump under the implant at the inframammary fold on the left.  The tumor was ER positive greater than 90% OR positive greater than 90% HER2 negative IHC 1+.   On 8/1/2022 she underwent resection of the lesion which demonstrated invasive grade 2 mammary carcinoma measuring 1.6 cm with a close posterior margin, all other margins clear.       She was started on anastrozole in Jan 2023, has tolerated well well with no hot flashes or musculoskeletal symptoms.       CT C/A/P on 6/28/24 noted a large solid and cystic necrotic pancreatic tail mass suspicious for malignancy with no surrounding vascular involvement or lymphadenopathy. Mild hepatic steatosis with no focal hepatic lesions. Cholelithiasis without biliary dilatation. Possible wall thickening of the gastric antrum. Moderate size hiatal hernia. Nonspecific borderline enlarged superior mediastinal right paraesophageal lymph node.     She is here today for evaluation of this large pancreatic mass that was found to  tail the pancreas.  The characteristics of this tumor which were found incidentally without any symptoms appear to have characteristics of a microcystic serous cystadenoma.  The patient has no family history of pancreatic cancer.  She does have a history of breast cancer and underwent a mastectomy on the left side.  In any event she is an extremely healthy 78-year-old.  She denies any fever or chills, nausea or vomiting.  She has no weight loss.  She did have a CT scan which I have personally reviewed from 2024 which showed a large mass with no adenopathy and no sign of focal invasion or metastatic disease.  The patient has not had an EUS or any biopsies.  She is not a diabetic.  She has been completely asymptomatic.     Update 24  On 24 she completed surgery by Dr. Espinoza, includin.  Exploratory laparotomy.  2.  Intraoperative ultrasound survey of the pancreas using 5/12 MHz T-probe.  3.  Distal pancreatectomy and splenectomy.  4.  Stomach and celiac node dissection.  5.  Omental flap.     Pathology noted benign pancreatic serous cystadenoma (6.8 cm), margins are negative for neoplasm, spleen with no significant pathologic abnormality.  She was eventually discharged on 24 with home oxygen and close outpatient follow-up.     On 24 she presented to the ED d/t worsening short of breath. CT CHEST on 24 noted bilateral segmental and subsegmental pulmonary emboli with changes compatible with significant right heart strain. CT ABD/PELVIS on 24 noted postop changes of splenectomy and distal pancreatectomy, fluid collection left upper quadrant is seen which could represent postop seroma or possibly pancreatic leak and right lower lobe segmental and subsegmental pulmonary embolus. She was subsequently admitted and started on antibiotics. On 8/15/24 she underwent CT-guided retroperitoneal abscess drainage and LUQ retroperitoneal catheter drainage with CT guidance. On 24 she  "underwent right atrial thrombectomy by Dr. Bowen, pathology noted benign thrombus. On 8/22/24 she underwent CT guided left upper quadrant fluid collection catheter drainage. Her condition eventually improved and she was discharged home on 8/23/24 with the drain in place and instructions to follow up with our office for drain management.     Yesterday 8/27/24 I received a call from patient's daughter Teri regarding minimal drain output of \"less than 1 mL daily\" since the patient was discharged home and that they would like to see if drain can be removed as the patient has been finding this uncomfortable and painful at times. Discussed with Dr. Esquivel, imaging ordered for confirmation. CT PANCREAS on 8/27/24 noted LEFT upper quadrant drain in place with minimal adjacent peritoneal gas and no significant residual or recurrent fluid collection demonstrated. Appointment scheduled with patient today to remove drain, patient arrived with spouse. Upon closer inspection, fluid in drain appears to be about 25 mL and patient states this is the output so far for today. Also received drain output record from patient, however measurements appear to be in ounces instead of mL. Based on log, patient has had about 1.5-1.6 oz (44-47 mL) per day. Discussed with patient that indications for removal of drain are output of 30 mL or less per day for 2 consecutive days and that we will need to keep the drain in place for now. However, also discussed that we can draw a fluid amylase from her drain and consider removing the drain this upcoming Friday if this comes back as normal. She is agreeable to this. Steris in place over abdominal incision and this appears to be healing well. Patient also brought up concerns regarding a \"small swollen area\" on her back measuring about 2-3 inches by 4 inches that migrated below her bra strap. Patient states that this has since resolved and denies shortness of breath or difficulty breathing. Also had " questions regarding rescheduling CT abdomen scheduled for 9/16/24, advised to keep this appointment for now. She reports no problems with eating or drinking, she has been taking multiple naps daily and states she feels energized afterwards. Denies fever, chills, nausea, or vomiting.     Update 9/4/24  She is here today for further follow-up. She is feeling pretty well. Drain is putting out about 25 cc of thick fluid a day. Last amylase a week ago as 5000     Update 9/11/24  She is here today for follow up regarding pancreatic drain.  The drain is putting out only about 5 cc a day.  She is feeling well.     Update 9/17/24  She is here today for reevaluation.  She has been feeling tired and a little bit off for the last day.  She had some nausea this morning.  Denies fevers     Update 10/8/24  She presented to the ED on 9/17/24 for elevated WBC and recurrent intra-abdominal fluid collection. CT ABD/PELVIS  on 9/17/24 notable for fluid collection with some peripheral enhancement is identified in the distal pancreatic region including the region of previous pancreatectomy, cholelithiasis, and left pleural effusion and consolidations in the left lung base. On 9/19/24 she underwent CT guided catheter drainage with placement of 10 Sammarinese locking loop catheter LUQ. CT ABD/PELVIS on 9/23/24 notable for decreased size of the fluid collection in the distal pancreatectomy-lumpectomy bed following tube placement and increased LEFT pleural effusion with overlying atelectasis. On 9/24/24 she underwent abscessogram, ultrasound guided left sided diagnostic, and therapeutic thoracentesis. Pathology did not identify malignancy in the thoracentesis fluid. CXR 2-VIEW on 9/27/24 noted interval increase in size of the left pleural effusion obscuring the left lung base and she then underwent ultrasound guided left sided therapeutic thoracentesis afterwards. Follow-up CXR 1-VIEW on 9/27/24 noted decreased size of left pleural effusion and  no pneumothorax. CT ABDOMEN on 9/29/24 note multiloculated enhancing fluid collection in the left abdomen with pigtail catheter in place with fluid collection increased in size since prior study, loculated appearing left pleural effusion, and linear consolidations in the bilateral lung bases with left greater than right appearance favoring atelectasis. IR chest tube was considered but ultimately deferred as her CXR on 10/2/24 noted relatively unchanged left lung pleural effusion compared to the previous study. After extensive multidisciplinary discussion, she was discharged on 10/2/24 with strict return precautions in case of recurrence of respiratory or infectious symptoms, orders for repeat chest x-ray, and follow-up visit with surgical oncology.     CXR 2-VIEW on 10/7/24 noted interval decrease in left pleural effusion.      She is here today accompanied by her  for follow-up after her recent discharge from the hospital. She continues recover well at home. She denies experiencing shortness of breath and states she is able to breather deeper now. She has also been using her incentive spirometer at home with improving results. States she is tolerating a regular diet and is slowly regaining her appetite. She is also slowly increasing her activity level at home. She is completing her course of Augmentin, Lasix, and potassium tablet today. She did have a question regarding a referral placed recently to vascular medicine which was discussed with Dr. Espinoza.     Update 10/22/24  CXR 2-VIEW on 10/18/24 noted smaller left pleural effusion with left basilar atelectasis.     On 10/22/24 her daughter called our office with concerns of side effects from Eliquis, specifically fatigue, shakiness, and GI upset. Denied fever or chills at the time. STAT CBC and CMP were ordered, notable for leukocytosis 17.1 and elevated Tbili 2.0.     She is here today accompanied by her  for follow up. They spoke to   "Alvin last night after receiving lab results, Augmentin and STAT CT ABD/PELVIS ordered. She was also instructed to rehydrate based on her lab results. This morning she states she feels \"a little better\" but is still feeling tired. They state they have been rehydrating with Gatorade and she is able to tolerate eating and drinking however her  states she is still eating smaller options compared to before. Having normal BMs, denies fever, chills, nausea, or vomiting at this time. Her CT is scheduled for 7 PM tonight but is requesting an earlier appointment if possible.     Update 10/30/24  CT C/A/P on 10/22/24 noted postsurgical changes from the distal pancreatectomy and splenectomy with a 4.0 x 6.0 cm fluid collection adjacent to the pancreas/surgical bed extending to the left hemidiaphragm. Irregular enhancing soft tissue component along the diaphragmatic area raises concern for recurrent disease with exudate or infection being less likely. There is also a questionable area of breakthrough and possible extension into the chest cavity. Moderate left pleural effusion with left basilar atelectasis. Thoracentesis ordered with analysis of collected fluid per recommendations per Dr. Esquivel and Dr. Espinoza.     CXR 2-VIEW on 10/24/24 noted stable small to moderate left effusion and associated atelectasis. On 10/24/24 she completed ultrasound guided left sided diagnostic and therapeutic thoracentesis with 700 mL of pleural fluid withdrawn. Amylase was 20 and wound cultures were negative.     She is here today accompanied by  for follow up. She is finishing course of Augmentin. Labs on 10/28/24 notable for slightly elevated WBC 12.9, otherwise overall improved from 10/21/24. She otherwise states feeling better especially after completion of thoracentesis. Reports appetite noticeably improving with regular, formed BMs. Still having occasional abdominal cramping, states \"d/t Eliquis\". Denies fever, chills, " nausea or vomiting. Denies shortness of breath or difficulty breathing. Slowly increasing activity at home.     Update 1/7/25  On 12/4/24 she returned to the ED due to shortness of breath, CXR on 12/4/24 noted stable large left pleural effusion. On 12/9/24 she completed bronchoscopy with thoracoscopic pleurodesis by Dr. Saul. She was eventually discharged on 12/12/24.    She returned to the ED on 12/19/24 due to dyspnea, abdominal pain, and cramping. CT ABD/PELVIS on 12/19/24 noted rim-enhancing irregular fluid collection under the left hemidiaphragm is larger since prior study, now 7.2 x 8.9 cm, and likely represents an abscess, partially visualized loculated left pleural effusion/empyema containing some pockets of air likely related to infection under the hemidiaphragm with fluid in the left major fissure, cholelithiasis, small hiatal hernia, and colonic diverticulosis. On 12/21/24 she completed LUQ drain placement by Dr. Bowen. On 12/23/24 she completed ERCP, sphincterotomy, and stent placement by Dr. Quiroz. She was eventually discharged on 12/24/24.    CXR 2-VIEW on 12/29/24 noted small left pleural effusion with patchy left basilar opacities.    She is here today for follow-up and drain assessment.  Average drain output has been somewhere between 15 and 20 cc a day.  Somewhat thick.  She has had no fevers or chills.  She is otherwise feeling very well.  She feels like she is getting her energy back and she is eating better.  Denies any fevers or chills.    Past Medical History:   Diagnosis Date    Abdominal infection (HCC) 09/20/2024    Acute respiratory failure with hypoxia (HCC) 08/14/2024    Anesthesia     nausea post op    Atrial thrombus 08/15/2024    Cancer (HCC)     1987 breast left    Cancer of overlapping sites of left female breast (HCC)     Cataract 2024    IOL bilat    High cholesterol     Hypokalemia 09/29/2024    Hypomagnesemia 09/25/2024    Hypothyroidism     Intra-abdominal fluid collection  08/14/2024    Long term (current) use of aromatase inhibitors 01/16/2024    Osteoporosis 12/31/2024    PONV (postoperative nausea and vomiting) 1987    Just felt nausous after anesthesia    Primary hypertension 09/17/2024    Pulmonary embolism with acute cor pulmonale, unspecified chronicity, unspecified pulmonary embolism type (HCC) 08/14/2024    Thyroid nodule      Past Surgical History:   Procedure Laterality Date    CO ERCP,DIAGNOSTIC N/A 12/23/2024    Procedure: ERCP (ENDOSCOPIC RETROGRADE CHOLANGIOPANCREATOGRAPHY);  Surgeon: Derrick Quiroz M.D.;  Location: SURGERY SAME DAY [facility];  Service: Gastroenterology    CO ERCP DUCT STENT PLACEMENT N/A 12/23/2024    Procedure: ERCP, WITH TUBE OR STENT INSERTION;  Surgeon: Derrick Quiroz M.D.;  Location: SURGERY SAME DAY [facility];  Service: Gastroenterology    SPHINCTEROTOMY N/A 12/23/2024    Procedure: SPHINCTEROTOMY;  Surgeon: Derrick Quiroz M.D.;  Location: SURGERY SAME DAY [facility];  Service: Gastroenterology    CO THORACOSCOPY,DX NO BX Left 12/9/2024    Procedure: THORACOSCOPY - LEFT THORACOSCOPIC PLEURODESIS;  Surgeon: Glenn Saul M.D.;  Location: SURGERY [facility];  Service: Thoracic    CO ULTRASONIC GUIDANCE, INTRAOPERATIVE  8/5/2024    Procedure: ULTRASOUND GUIDANCE;  Surgeon: Sachin Espinoza M.D.;  Location: [facility];  Service: General    PANCREATECTOMY N/A 8/5/2024    Procedure: OPEN DISTAL PANCREATECTOMY WITH SPLENECTOMY, NODE DISSECTION;  Surgeon: Sachin Espinoza M.D.;  Location: SURGERY [facility];  Service: General    SPLENECTOMY N/A 8/5/2024    Procedure: SPLENECTOMY;  Surgeon: Sachin Espinoza M.D.;  Location: SURGERY [facility];  Service: General    CREATION, FLAP, OMENTUM N/A 8/5/2024    Procedure: CREATION, FLAP, OMENTUM;  Surgeon: Sachin Espinoza M.D.;  Location: [facility];  Service: General    PB MASTECTOMY, PARTIAL Left 08/01/2022    Procedure: MELLO LOCALIZED LEFT PARTIAL  MASTECTOMY;  Surgeon: Elena Arroyo M.D.;  Location: SURGERY [facility];  Service: General    OTHER ORTHOPEDIC SURGERY  2019    back surgery    OTHER  2001    replace left breast implant    OTHER  1988    Left breast implant/lift    OTHER  1987    left mastectomy    LAMINOTOMY      LUMPECTOMY      PRIMARY C SECTION       Allergies   Allergen Reactions    Synthroid [Fd&C Red #40 Al Paul-Levothyroxine] Hives and Unspecified     Hives, Brand specific. Some brands are ok and some are not    Tape Unspecified     Skin degradation with use of tegaderm     Gentry Thyroid [Thyroid] Diarrhea     diarrhea     Outpatient Encounter Medications as of 1/7/2025   Medication Sig Dispense Refill    apixaban (ELIQUIS) 5mg Tab Take 5 mg by mouth 2 times a day.      ibandronate (BONIVA) 150 MG tablet Take 1 Tablet by mouth every 30 days. 3 Tablet 4    liothyronine (CYTOMEL) 5 MCG Tab Take 2.5-5 mcg by mouth see administration instructions. 5 mcg on Monday, Wednesday, Friday, 2.5 mcg on all other days      levothyroxine (SYNTHROID) 100 MCG Tab Take 100 mcg by mouth every morning on an empty stomach. Only generic      docusate sodium 100 MG Cap Take 1 Capsule by mouth 2 times a day. (Patient not taking: Reported on 1/7/2025)       No facility-administered encounter medications on file as of 1/7/2025.     Social History     Socioeconomic History    Marital status:      Spouse name: Not on file    Number of children: Not on file    Years of education: Not on file    Highest education level: Bachelor's degree (e.g., BA, AB, BS)   Occupational History     Comment: retired banker   Tobacco Use    Smoking status: Never     Passive exposure: Past    Smokeless tobacco: Never   Vaping Use    Vaping status: Never Used   Substance and Sexual Activity    Alcohol use: Never    Drug use: Never    Sexual activity: Not Currently     Partners: Male     Birth control/protection: Abstinence, Male Sterilization, Post-Menopausal     Comment:     Other Topics Concern    Not on file   Social History Narrative    Not on file     Social Drivers of Health     Financial Resource Strain: Low Risk  (12/28/2024)    Overall Financial Resource Strain (CARDIA)     Difficulty of Paying Living Expenses: Not hard at all   Food Insecurity: No Food Insecurity (12/28/2024)    Hunger Vital Sign     Worried About Running Out of Food in the Last Year: Never true     Ran Out of Food in the Last Year: Never true   Transportation Needs: No Transportation Needs (12/28/2024)    PRAPARE - Transportation     Lack of Transportation (Medical): No     Lack of Transportation (Non-Medical): No   Physical Activity: Inactive (12/28/2024)    Exercise Vital Sign     Days of Exercise per Week: 0 days     Minutes of Exercise per Session: 0 min   Stress: No Stress Concern Present (12/28/2024)    [place] [employer] - Occupational Stress Questionnaire     Feeling of Stress : Not at all   Social Connections: Moderately Integrated (12/28/2024)    Social Connection and Isolation Panel [NHANES]     Frequency of Communication with Friends and Family: More than three times a week     Frequency of Social Gatherings with Friends and Family: Once a week     Attends Mandaen Services: Never     Active Member of Clubs or Organizations: Yes     Attends Club or Organization Meetings: More than 4 times per year     Marital Status:    Intimate Partner Violence: Not At Risk (12/19/2024)    Humiliation, Afraid, Rape, and Kick questionnaire     Fear of Current or Ex-Partner: No     Emotionally Abused: No     Physically Abused: No     Sexually Abused: No   Housing Stability: Low Risk  (12/28/2024)    Housing Stability Vital Sign     Unable to Pay for Housing in the Last Year: No     Number of Times Moved in the Last Year: 0     Homeless in the Last Year: No      Social History     Tobacco Use   Smoking Status Never    Passive exposure: Past   Smokeless Tobacco Never     Social  History     Substance and Sexual Activity   Alcohol Use Never     Social History     Substance and Sexual Activity   Drug Use Never      Family History   Problem Relation Age of Onset    Cancer Mother         Ovarian    Ovarian Cancer Mother     Dementia Father     Heart Disease Father     Hyperlipidemia Neg Hx        Review of Systems   Constitutional:  Negative for chills, fever, malaise/fatigue and weight loss.   HENT:  Negative for congestion, ear discharge, ear pain, hearing loss and nosebleeds.    Eyes:  Negative for blurred vision, double vision, photophobia, pain and discharge.   Respiratory:  Negative for cough, hemoptysis and sputum production.    Cardiovascular:  Negative for chest pain, palpitations, orthopnea and claudication.   Gastrointestinal:  Negative for abdominal pain, constipation, diarrhea, heartburn, nausea and vomiting.   Genitourinary:  Negative for dysuria, frequency, hematuria and urgency.   Musculoskeletal:  Negative for back pain, joint pain, myalgias and neck pain.   Skin:  Negative for itching and rash.   Neurological:  Negative for dizziness, tingling, tremors, sensory change, speech change, focal weakness and headaches.   Endo/Heme/Allergies:  Negative for environmental allergies. Does not bruise/bleed easily.   Psychiatric/Behavioral:  Negative for depression, hallucinations, substance abuse and suicidal ideas. The patient is not nervous/anxious.         Objective:   /56 (BP Location: Right arm, Patient Position: Sitting, BP Cuff Size: Adult)   Pulse 94   Temp 36.3 °C (97.3 °F) (Temporal)   Wt 55.3 kg (122 lb)   LMP  (LMP Unknown)   SpO2 94%   BMI 20.94 kg/m²     Physical Exam  Constitutional:       General: She is not in acute distress.  HENT:      Head: Normocephalic and atraumatic.   Eyes:      General: No scleral icterus.  Cardiovascular:      Rate and Rhythm: Normal rate and regular rhythm.   Pulmonary:      Effort: No respiratory distress.   Abdominal:       Palpations: Abdomen is soft.      Comments: Well-healed surgical incision.  Drain with serous/pancreatic output   Musculoskeletal:      Cervical back: Normal range of motion.   Neurological:      Mental Status: She is alert.         Labs   Latest Reference Range & Units 12/24/24 07:44   WBC 4.8 - 10.8 K/uL 12.1 (H)   RBC 4.20 - 5.40 M/uL 4.76   Hemoglobin 12.0 - 16.0 g/dL 13.4   Hematocrit 37.0 - 47.0 % 41.9   MCV 81.4 - 97.8 fL 88.0   MCH 27.0 - 33.0 pg 28.2   MCHC 32.2 - 35.5 g/dL 32.0 (L)   RDW 35.9 - 50.0 fL 50.5 (H)   Platelet Count 164 - 446 K/uL 842 (H)   MPV 9.0 - 12.9 fL 9.1   (H): Data is abnormally high  (L): Data is abnormally low      Latest Reference Range & Units 12/24/24 07:44   Sodium 135 - 145 mmol/L 136   Potassium 3.6 - 5.5 mmol/L 4.1   Chloride 96 - 112 mmol/L 101   Co2 20 - 33 mmol/L 22   Anion Gap 7.0 - 16.0  13.0   Glucose 65 - 99 mg/dL 117 (H)   Bun 8 - 22 mg/dL 9   Creatinine 0.50 - 1.40 mg/dL 0.63   GFR (CKD-EPI) >60 mL/min/1.73 m 2 91   Calcium 8.5 - 10.5 mg/dL 9.5   Correct Calcium 8.5 - 10.5 mg/dL 10.1   AST(SGOT) 12 - 45 U/L 20   ALT(SGPT) 2 - 50 U/L 6   Alkaline Phosphatase 30 - 99 U/L 93   Total Bilirubin 0.1 - 1.5 mg/dL 0.6   Albumin 3.2 - 4.9 g/dL 3.2   Total Protein 6.0 - 8.2 g/dL 7.0   Globulin 1.9 - 3.5 g/dL 3.8 (H)   A-G Ratio g/dL 0.8   (H): Data is abnormally high      Imaging  CXR 2-VIEW 12/29/24  IMPRESSION:  Small left pleural effusion with patchy left basilar opacities.    CT ABD/PELVIS 12/19/24  IMPRESSION:  1.  Rim-enhancing irregular fluid collection under the left hemidiaphragm is larger since prior study, now 7.2 x 8.9 cm, and likely represents an abscess.  2.  Partially visualized loculated left pleural effusion/empyema containing some pockets of air, likely related to infection under the hemidiaphragm. Fluid in the left major fissure.  3.  Cholelithiasis.  4.  Nonobstructing left nephrolithiasis.  5.  Small hiatal hernia.  6.  Colonic diverticulosis.  7.   Persistent small pericardial effusion.    CXR 2-VIEW (10/24/24)  IMPRESSION:  1.  Stable small to moderate left effusion and associated atelectasis.  2.  Hazy opacities in the left midlung are nonspecific, pneumonia can be considered in the appropriate clinical settings.     CT C/A/P (10/22/24)  IMPRESSION:  1. Postsurgical changes are noted from the distal pancreatectomy and splenectomy. There is a 4.0 x 6.0 cm fluid collection adjacent to the pancreas/surgical bed, extending to the left hemidiaphragm. Irregular enhancing soft tissue component along the   diaphragmatic area raises concern for recurrent disease, with exudate or infection being less likely. There is also a questionable area of breakthrough and possible extension into the chest cavity. Moderate left pleural effusion with left basilar   atelectasis.  2. Moderate hiatal hernia containing food debris.     CXR 2-VIEW (10/18/24)  IMPRESSION:  Smaller left pleural effusion with left basilar atelectasis.     CXR 2-VIEW (10/7/24)  IMPRESSION:  Interval decrease in left pleural effusion      CXR 2-VIEW (10/2/24)  IMPRESSION:  Moderate left pleural effusion with adjacent airspace disease.     CT ABDOMEN (9/29/24)  IMPRESSION:  1.  Multiloculated enhancing fluid collection in the left abdomen with pigtail catheter in place, fluid collection has increased in size since prior study.  2.  Loculated appearing left pleural effusion.  3.  Linear consolidations in the bilateral lung bases, left greater than right, appearance favoring atelectasis.  4.  Enlarged bilateral inguinal lymph nodes, consider causes of adenopathy with additional workup as clinically appropriate  5.  Cholelithiasis  6.  Small hiatal hernia  7.  Atherosclerosis and atherosclerotic coronary artery disease     CXR 1-VIEW (9/27/24)  IMPRESSION:  Decreased size of left pleural effusion. No pneumothorax.     CXR 2-VIEW (9/27/24)  IMPRESSION:  1. Interval increase in size of the left pleural effusion,  obscuring the left lung base.  2. The remainder of the lungs is clear.  3. Obscuration of the left cardiomediastinal border.  4. Other stable chronic degenerative changes.     CT ABD/PELVIS (9/23/24)  IMPRESSION:  1.  Decreased size of the fluid collection in the distal pancreatectomy-lumpectomy bed following tube placement  2.  Increased LEFT pleural effusion with overlying atelectasis  3.  Hiatal hernia  4.  Persistently thickened endometrium for age. Underlying mass is possible. Ultrasound could better assess.     CT ABD/PELVIS (9/17/24)  IMPRESSION:  1.  Post distal pancreatectomy.  2.  Fluid collection with some peripheral enhancement is identified in the distal pancreatic region including the region of previous pancreatectomy. Developing pseudocyst is a possibility. No emphysema or hemorrhage is appreciated. Remaining pancreas enhances normally.  3.  There is cholelithiasis.  4.  Left pleural effusion and consolidations in the left lung base.  5.  No change in hiatal hernia.  6.  Prominent endometrial cavity again noted.     CT PANCREAS (8/27/24)  IMPRESSION:  1.  Prior distal pancreatectomy and splenectomy.  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  2.  LEFT upper quadrant drain in place with minimal adjacent peritoneal gas.  No significant residual or recurrent fluid collection demonstrated.  3.  Gallstones and minimal gallbladder wall thickening.  Cholecystitis is not excluded.  4.  Bilateral pleural fluid collections with associated atelectasis, worse on the LEFT.     CT ABD/PELVIS (8/20/24)  IMPRESSION:  1.  There is postoperative change consistent with distal pancreatectomy and splenectomy.  2.  The simple appearing left upper quadrant subphrenic fluid collection is again seen very similar to the recent prior study, possibly postoperative seroma as there is no rim enhancement to suggest abscess. Pancreatic leak is considered unlikely as the   majority of the  fluid is in the splenic fossa rather than adjacent to the surgical margin of the pancreas.  3.  Stable bibasilar atelectasis and pleural effusions.  4.  Cholelithiasis again noted.  5.  Pulmonary arteries not well assessed on this exam in the right atrial thrombus is no longer evident. Small defect in the right ventricular apex is unchanged.     CT ABD/PELVIS (8/14/24)  IMPRESSION:  1.  Postop changes of splenectomy and distal pancreatectomy, fluid collection left upper quadrant is seen which could represent postop seroma or possibly pancreatic leak.  2.  Right lower lobe segmental and subsegmental pulmonary embolus.  3.  Trace right and small left pleural effusions.  4.  Linear densities of the lung bases suggesting atelectasis, component of left lower lobe infiltrate not excluded.  5.  Diverticulosis  6.  Cholelithiasis  7.  Cardiomegaly     CT CHEST (8/14/24)  IMPRESSION:  1.  Bilateral segmental and subsegmental pulmonary emboli with changes compatible with significant right heart strain.  2.  Small left and trace right pleural effusions.  3.  Linear consolidations in the bilateral lung bases suggests atelectasis, component of left lower lobe infiltrate is not excluded.  4.  4.0 cm ascending thoracic aortic aneurysm, radiographic follow-up and surveillance recommended as clinically appropriate.  5.  Atherosclerosis and atherosclerotic coronary artery disease     CT C/A/P (6/28/24)  IMPRESSION:  1.  No definite CT evidence of infiltrating gastric mass although there is poor distention of the stomach with possible wall thickening of the gastric antrum.  2.  There is a large solid and cystic necrotic pancreatic tail mass suspicious for malignancy with no surrounding vascular involvement or lymphadenopathy.  3.  Mild hepatic steatosis with no focal hepatic lesions.  4.  Cholelithiasis without biliary dilatation.  5.  Moderate size hiatal hernia.  6.  Colonic diverticulosis without diverticulitis.  7.  Nonspecific  borderline enlarged superior mediastinal right paraesophageal lymph node.  8.  There are postoperative changes of left mastectomy and axillary lymph node dissection.  9.  No parenchymal lung metastases.     Pathology  PATH 9/24/24  FINAL DIAGNOSIS:   A. Thoracentesis fluid:         No malignancy identified.         Cytology preparations, including cell block, demonstrate numerous           reactive mesothelial cells in a background of mixed           inflammatory cells; findings consistent with cell count.      PATH 8/16/24  FINAL DIAGNOSIS:   A. Right atrial mass:          Benign thrombus.      PATH 8/5/24  FINAL DIAGNOSIS:   A. Distal pancreas, spleen and nodes, distal pancreatectomy and   splenectomy:         Benign pancreatic serous cystadenoma (6.8 cm).          Margins are negative for neoplasm.          Background chronic pancreatitis.          Spleen with no significant pathologic abnormality.          Nine benign lymph nodes (0/9).      Procedures  12/23/24 ERCP, sphincterotomy, and stent placement by Dr. Quiroz    12/21/24 LUQ drain placement by Dr. Bowen    12/9/24 Bronchoscopy with thoracoscopic pleurodesis by Dr. Saul    10/24/24 Ultrasound guided left sided diagnostic and therapeutic thoracentesis by Cielo KEITH     9/27/24 Ultrasound guided left sided therapeutic thoracentesis by [facility]     9/24/24 Abscessogram by Dr. Bowen and ultrasound guided left sided diagnostic and therapeutic thoracentesis by [facility]     9/19/24 CT guided catheter drainage with placement of 10 fr locking loop catheter LUQ by Dr. Ruff     8/22/24 CT guided left upper quadrant fluid collection catheter drainage      8/16/24 Right atrial thrombectomy by Dr. Bowen     8/15/24 CT-guided retroperitoneal abscess drainage  LUQ retroperitoneal catheter drainage with CT guidance.     8/5/24 by Dr. Espinoza  1.  Exploratory laparotomy.  2.  Intraoperative ultrasound survey of the pancreas using 5/12 MHz T-probe.  3.  Distal  pancreatectomy and splenectomy.  4.  Stomach and celiac node dissection.  5.  Omental flap.     Mastectomy 2022    Diagnosis:     1. Cyst of pancreas        2. H/O exploratory laparotomy        3. Recurrent pleural effusion on left        4. Abdominal fluid collection            Medical Decision Making:  Today's Assessment / Status / Plan:     79-year-old female status post distal pancreatectomy and splenectomy for what ultimately was a benign serous cystadenoma.  She has had a complicated course with an ongoing pancreatic leak as well as a persistent pleural effusion.  She ultimately underwent a decortication with thoracic surgery.  She had a drain replaced and a pancreatic stent placed.  Her drain output has dropped significantly.  We will obtain a CT scan of the abdomen pelvis to assess for any undrained fluid collections.  Otherwise we likely will be able to remove the drain given the output is minimal.    IWillie MD have entered, reviewed and confirmed the above diagnosis related to this patient on this date of service, January 7, 2025     Willie Esquivel M.D.

## 2025-01-06 ENCOUNTER — APPOINTMENT (OUTPATIENT)
Dept: MEDICAL GROUP | Facility: MEDICAL CENTER | Age: 80
End: 2025-01-06
Payer: COMMERCIAL

## 2025-01-06 NOTE — OP REPORT
THORACIC SURGERY OPERATIVE NOTE          DATE OF OPERATION:    12/09/2024    PATIENT:      Paige Gudino; [id number]     PREOPERATIVE DIAGNOSIS:    Left pleural effusion     POSTOPERATIVE DIAGNOSIS:   same    PROCEDURE PERFORMED:   1) Bronchoscopy (CPT 34509)  2) Thoracoscopic pleurodesis (CPT 23436)      SURGEON:      Glenn Saul M.D.    ASSISTANT:      none    ANESTHESIOLOGIST:        ANESTHESIA:     Double lumen endotracheal tube general anesthesia.    INDICATIONS:   Paige Gudino is a 79 y.o. year-old female with a chronic left pleural efffusion. She is taken to the operating room for pleurodesis.        FINDINGS:   Left lower lobe chronically collapsed and did reinflate with positive pressure. Pleurodesis with 6-8 g talc performed.     WOUND CLASSIFICATION:    Class I, Clean.    SPECIMEN:       none.    ESTIMATED BLOOD LOSS:    10 mL.    DESCRIPTION OF PROCEDURE:    The patient was seen and examined in the preoperative holding area.  The risks benefits and alternatives of the procedure were discussed with the patient who wished to proceed with the procedure as described. General endotracheal anesthesia was induced and preoperative antibiotics were given per SCIP protocol. The patient was transferred to the operating room placed in the right lateral decubitus position and all pressure points were properly padded. Patient's chest was prepped with ChloraPrep and draped in the normal sterile fashion.  A timeout was performed confirming correct patient, correct procedure, and that all necessary equipment was in the room.      A bronchoscopy was performed while placing the double-lumen tube.  The tube was correctly positioned into the left mainstem.  There were no lesions located in the right or left mainstem bronchus, the lobar bronchi, or in the segmental bronchi on either side. There were no significant secretions on either side.       Local anesthetic was used to infiltrate the area  above the 7th intercostal space. An incision was made and the pleural space was entered bluntly and a 5 mm trocar inserted.  On inspection of the pleural cavity with the camera, there is no injury from trocar placement.  There was no rind and a collpased lower lobe.  I placed 2 additional 5 mm trocars.  I then placed 8 grams of talc powder into an Asepto bulb syringe. This was attached to an 18 Uzbek red rubber catheter.  This was inserted to the chest cavity and talc sprayed the throughout the pleural cavity covering the chest wall and lung anteriorly, posteriorly, and inferiorly.  I placed a 24 Fr chest tube anteriorly and directed toward the apex.     The lung was then inflated under direct visualization and the lower lobe which appeared chronically collpased reinflated with positive pressure. The tubes was secured with a 0 silk suture. The port sites were closed with a subcuticular Monocryl. Dermabond was applied to the skin. Sponge and needle counts were correct at the end of the case x2. The patient was awakened from general anesthesia, and was taken to the recovery room in stable condition.    Glenn Saul MD  Thoracic & General Surgeon  [facility]  [phone]

## 2025-01-07 ENCOUNTER — OFFICE VISIT (OUTPATIENT)
Dept: SURGICAL ONCOLOGY | Facility: MEDICAL CENTER | Age: 80
End: 2025-01-07
Payer: COMMERCIAL

## 2025-01-07 ENCOUNTER — TELEPHONE (OUTPATIENT)
Dept: VASCULAR LAB | Facility: MEDICAL CENTER | Age: 80
End: 2025-01-07

## 2025-01-07 VITALS
BODY MASS INDEX: 20.94 KG/M2 | WEIGHT: 122 LBS | TEMPERATURE: 97.3 F | SYSTOLIC BLOOD PRESSURE: 110 MMHG | OXYGEN SATURATION: 94 % | DIASTOLIC BLOOD PRESSURE: 56 MMHG | HEART RATE: 94 BPM

## 2025-01-07 DIAGNOSIS — R18.8 ABDOMINAL FLUID COLLECTION: ICD-10-CM

## 2025-01-07 DIAGNOSIS — K86.2 CYST OF PANCREAS: ICD-10-CM

## 2025-01-07 DIAGNOSIS — J90 RECURRENT PLEURAL EFFUSION ON LEFT: ICD-10-CM

## 2025-01-07 DIAGNOSIS — Z98.890 H/O EXPLORATORY LAPAROTOMY: ICD-10-CM

## 2025-01-07 DIAGNOSIS — K65.1 INTRA-ABDOMINAL ABSCESS (HCC): ICD-10-CM

## 2025-01-07 PROCEDURE — 3074F SYST BP LT 130 MM HG: CPT | Performed by: SURGERY

## 2025-01-07 PROCEDURE — 99215 OFFICE O/P EST HI 40 MIN: CPT | Performed by: SURGERY

## 2025-01-07 PROCEDURE — 3078F DIAST BP <80 MM HG: CPT | Performed by: SURGERY

## 2025-01-07 ASSESSMENT — FIBROSIS 4 INDEX: FIB4 SCORE: 0.76

## 2025-01-07 NOTE — TELEPHONE ENCOUNTER
[facility]     Received anticoagulation referral from Dr. Corcoran on 12/12/24.    Pt has missed two initial appts at this time.      Called pt to r/s - s/w pt daughter. They prefer to follow w/ PCP and [facility]'s Cardiology. Will close referral.     Insurance: [facility]  PCP: [facility]  Locations to be seen: Any     If no response by 1/12/25 (1 month from referral date) OR 2 no shows/cancellations, will remove from referral list and send FYI to referring provider     Rishabh Atwood, PharmD, BCACP  [facility]  Phone: [phone], Fax [phone]

## 2025-01-08 ASSESSMENT — ENCOUNTER SYMPTOMS
SPEECH CHANGE: 0
ORTHOPNEA: 0
PALPITATIONS: 0
SENSORY CHANGE: 0
DEPRESSION: 0
BLURRED VISION: 0
COUGH: 0
BRUISES/BLEEDS EASILY: 0
CLAUDICATION: 0
PHOTOPHOBIA: 0
HALLUCINATIONS: 0
NAUSEA: 0
DOUBLE VISION: 0
WEIGHT LOSS: 0
HEADACHES: 0
NECK PAIN: 0
HEARTBURN: 0
VOMITING: 0
TREMORS: 0
TINGLING: 0
FEVER: 0
DIZZINESS: 0
NERVOUS/ANXIOUS: 0
ABDOMINAL PAIN: 0
HEMOPTYSIS: 0
SPUTUM PRODUCTION: 0
EYE DISCHARGE: 0
CHILLS: 0
BACK PAIN: 0
CONSTIPATION: 0
FOCAL WEAKNESS: 0
DIARRHEA: 0
MYALGIAS: 0
EYE PAIN: 0

## 2025-01-08 ASSESSMENT — LIFESTYLE VARIABLES: SUBSTANCE_ABUSE: 0

## 2025-01-09 ENCOUNTER — APPOINTMENT (OUTPATIENT)
Dept: RADIOLOGY | Facility: MEDICAL CENTER | Age: 80
End: 2025-01-09
Payer: COMMERCIAL

## 2025-01-09 ENCOUNTER — HOSPITAL ENCOUNTER (OUTPATIENT)
Dept: RADIOLOGY | Facility: MEDICAL CENTER | Age: 80
End: 2025-01-09
Payer: COMMERCIAL

## 2025-01-09 DIAGNOSIS — R18.8 ABDOMINAL FLUID COLLECTION: ICD-10-CM

## 2025-01-09 DIAGNOSIS — Z98.890 H/O EXPLORATORY LAPAROTOMY: ICD-10-CM

## 2025-01-09 DIAGNOSIS — K65.1 INTRA-ABDOMINAL ABSCESS (HCC): ICD-10-CM

## 2025-01-09 PROCEDURE — 700117 HCHG RX CONTRAST REV CODE 255

## 2025-01-09 PROCEDURE — 74177 CT ABD & PELVIS W/CONTRAST: CPT

## 2025-01-09 RX ADMIN — IOHEXOL 100 ML: 350 INJECTION, SOLUTION INTRAVENOUS at 19:16

## 2025-01-10 ENCOUNTER — TELEPHONE (OUTPATIENT)
Dept: SURGICAL ONCOLOGY | Facility: MEDICAL CENTER | Age: 80
End: 2025-01-10
Payer: COMMERCIAL

## 2025-01-10 DIAGNOSIS — K65.1 INTRA-ABDOMINAL ABSCESS (HCC): ICD-10-CM

## 2025-01-10 DIAGNOSIS — Z98.890 H/O EXPLORATORY LAPAROTOMY: ICD-10-CM

## 2025-01-10 DIAGNOSIS — R18.8 ABDOMINAL FLUID COLLECTION: ICD-10-CM

## 2025-01-10 PROCEDURE — RXMED WILLOW AMBULATORY MEDICATION CHARGE

## 2025-01-10 RX ORDER — SODIUM CHLORIDE 0.9 % (FLUSH) 0.9 %
10 SYRINGE (ML) INJECTION DAILY
Qty: 300 ML | Refills: 0 | Status: SHIPPED | OUTPATIENT
Start: 2025-01-10 | End: 2025-01-26 | Stop reason: SDUPTHER

## 2025-01-10 NOTE — TELEPHONE ENCOUNTER
Teri called in regards to Rosangela. Teri stated that she saw the CT scan that [place] ordered was resulted today. She is wondering if someone can call her to discuss the results and see if Rosangela still needs her drain.    Could someone please give her a call.

## 2025-01-10 NOTE — TELEPHONE ENCOUNTER
Spoke to patient's daughter Jennifer about imaging results, fluid collection has decreased with drain in place. However discussed that drain needs to stay in place until fluid collection resolves and plan is to continue flushing drain daily and repeat scan in 2 weeks to evaluate. Agreeable to plan, no further questions at this time. Refill of saline flushes sent to [facility].

## 2025-01-11 ENCOUNTER — PHARMACY VISIT (OUTPATIENT)
Dept: PHARMACY | Facility: MEDICAL CENTER | Age: 80
End: 2025-01-11
Payer: COMMERCIAL

## 2025-01-17 ENCOUNTER — APPOINTMENT (OUTPATIENT)
Dept: RADIOLOGY | Facility: MEDICAL CENTER | Age: 80
End: 2025-01-17
Attending: PHYSICIAN ASSISTANT
Payer: COMMERCIAL

## 2025-01-17 ENCOUNTER — HOSPITAL ENCOUNTER (OUTPATIENT)
Dept: LAB | Facility: MEDICAL CENTER | Age: 80
End: 2025-01-17
Attending: STUDENT IN AN ORGANIZED HEALTH CARE EDUCATION/TRAINING PROGRAM
Payer: COMMERCIAL

## 2025-01-17 DIAGNOSIS — D75.839 THROMBOCYTOSIS: ICD-10-CM

## 2025-01-17 DIAGNOSIS — E03.9 ACQUIRED HYPOTHYROIDISM: ICD-10-CM

## 2025-01-17 DIAGNOSIS — E55.9 HYPOVITAMINOSIS D: ICD-10-CM

## 2025-01-17 DIAGNOSIS — E53.8 VITAMIN B 12 DEFICIENCY: ICD-10-CM

## 2025-01-17 LAB
BASOPHILS # BLD AUTO: 0.2 % (ref 0–1.8)
BASOPHILS # BLD: 0.02 K/UL (ref 0–0.12)
EOSINOPHIL # BLD AUTO: 0.09 K/UL (ref 0–0.51)
EOSINOPHIL NFR BLD: 1.1 % (ref 0–6.9)
ERYTHROCYTE [DISTWIDTH] IN BLOOD BY AUTOMATED COUNT: 52.9 FL (ref 35.9–50)
HCT VFR BLD AUTO: 43.4 % (ref 37–47)
HGB BLD-MCNC: 13.9 G/DL (ref 12–16)
IMM GRANULOCYTES # BLD AUTO: 0.06 K/UL (ref 0–0.11)
IMM GRANULOCYTES NFR BLD AUTO: 0.7 % (ref 0–0.9)
LYMPHOCYTES # BLD AUTO: 1.67 K/UL (ref 1–4.8)
LYMPHOCYTES NFR BLD: 20 % (ref 22–41)
MCH RBC QN AUTO: 28.5 PG (ref 27–33)
MCHC RBC AUTO-ENTMCNC: 32 G/DL (ref 32.2–35.5)
MCV RBC AUTO: 89.1 FL (ref 81.4–97.8)
MONOCYTES # BLD AUTO: 0.89 K/UL (ref 0–0.85)
MONOCYTES NFR BLD AUTO: 10.7 % (ref 0–13.4)
NEUTROPHILS # BLD AUTO: 5.62 K/UL (ref 1.82–7.42)
NEUTROPHILS NFR BLD: 67.3 % (ref 44–72)
NRBC # BLD AUTO: 0 K/UL
NRBC BLD-RTO: 0 /100 WBC (ref 0–0.2)
PLATELET # BLD AUTO: 740 K/UL (ref 164–446)
PMV BLD AUTO: 10.1 FL (ref 9–12.9)
RBC # BLD AUTO: 4.87 M/UL (ref 4.2–5.4)
WBC # BLD AUTO: 8.4 K/UL (ref 4.8–10.8)

## 2025-01-17 PROCEDURE — 83550 IRON BINDING TEST: CPT

## 2025-01-17 PROCEDURE — 82607 VITAMIN B-12: CPT

## 2025-01-17 PROCEDURE — 84480 ASSAY TRIIODOTHYRONINE (T3): CPT

## 2025-01-17 PROCEDURE — 84443 ASSAY THYROID STIM HORMONE: CPT

## 2025-01-17 PROCEDURE — 83540 ASSAY OF IRON: CPT

## 2025-01-17 PROCEDURE — 84439 ASSAY OF FREE THYROXINE: CPT

## 2025-01-17 PROCEDURE — 85025 COMPLETE CBC W/AUTO DIFF WBC: CPT

## 2025-01-17 PROCEDURE — 82306 VITAMIN D 25 HYDROXY: CPT

## 2025-01-17 PROCEDURE — 82728 ASSAY OF FERRITIN: CPT

## 2025-01-17 PROCEDURE — 86140 C-REACTIVE PROTEIN: CPT

## 2025-01-17 PROCEDURE — 36415 COLL VENOUS BLD VENIPUNCTURE: CPT

## 2025-01-18 LAB
25(OH)D3 SERPL-MCNC: 35 NG/ML (ref 30–100)
CRP SERPL HS-MCNC: 1.78 MG/DL (ref 0–0.75)
FERRITIN SERPL-MCNC: 158 NG/ML (ref 10–291)
IRON SATN MFR SERPL: 15 % (ref 15–55)
IRON SERPL-MCNC: 33 UG/DL (ref 40–170)
T3 SERPL-MCNC: 111 NG/DL (ref 60–181)
T4 FREE SERPL-MCNC: 1.41 NG/DL (ref 0.93–1.7)
TIBC SERPL-MCNC: 217 UG/DL (ref 250–450)
TSH SERPL-ACNC: 0.77 UIU/ML (ref 0.35–5.5)
UIBC SERPL-MCNC: 184 UG/DL (ref 110–370)
VIT B12 SERPL-MCNC: 256 PG/ML (ref 211–911)

## 2025-01-22 ENCOUNTER — HOSPITAL ENCOUNTER (OUTPATIENT)
Dept: RADIOLOGY | Facility: MEDICAL CENTER | Age: 80
End: 2025-01-22
Payer: COMMERCIAL

## 2025-01-22 ENCOUNTER — OFFICE VISIT (OUTPATIENT)
Dept: MEDICAL GROUP | Facility: LAB | Age: 80
End: 2025-01-22
Payer: COMMERCIAL

## 2025-01-22 VITALS
HEIGHT: 64 IN | TEMPERATURE: 97.5 F | DIASTOLIC BLOOD PRESSURE: 78 MMHG | SYSTOLIC BLOOD PRESSURE: 118 MMHG | WEIGHT: 122 LBS | HEART RATE: 82 BPM | RESPIRATION RATE: 20 BRPM | BODY MASS INDEX: 20.83 KG/M2 | OXYGEN SATURATION: 95 %

## 2025-01-22 DIAGNOSIS — J86.9 EMPYEMA, LEFT (HCC): ICD-10-CM

## 2025-01-22 DIAGNOSIS — Z90.81 HISTORY OF SPLENECTOMY: ICD-10-CM

## 2025-01-22 DIAGNOSIS — Z98.890 H/O EXPLORATORY LAPAROTOMY: ICD-10-CM

## 2025-01-22 DIAGNOSIS — D75.839 THROMBOCYTOSIS: ICD-10-CM

## 2025-01-22 DIAGNOSIS — Z86.718 HISTORY OF DVT (DEEP VEIN THROMBOSIS): ICD-10-CM

## 2025-01-22 DIAGNOSIS — R18.8 ABDOMINAL FLUID COLLECTION: ICD-10-CM

## 2025-01-22 DIAGNOSIS — K65.1 INTRA-ABDOMINAL ABSCESS (HCC): ICD-10-CM

## 2025-01-22 DIAGNOSIS — E53.8 LOW SERUM VITAMIN B12: ICD-10-CM

## 2025-01-22 DIAGNOSIS — Z86.711 HISTORY OF PULMONARY EMBOLISM: ICD-10-CM

## 2025-01-22 PROCEDURE — 3074F SYST BP LT 130 MM HG: CPT | Performed by: STUDENT IN AN ORGANIZED HEALTH CARE EDUCATION/TRAINING PROGRAM

## 2025-01-22 PROCEDURE — 3078F DIAST BP <80 MM HG: CPT | Performed by: STUDENT IN AN ORGANIZED HEALTH CARE EDUCATION/TRAINING PROGRAM

## 2025-01-22 PROCEDURE — 99214 OFFICE O/P EST MOD 30 MIN: CPT | Performed by: STUDENT IN AN ORGANIZED HEALTH CARE EDUCATION/TRAINING PROGRAM

## 2025-01-22 PROCEDURE — 700117 HCHG RX CONTRAST REV CODE 255

## 2025-01-22 PROCEDURE — 74177 CT ABD & PELVIS W/CONTRAST: CPT

## 2025-01-22 RX ADMIN — IOHEXOL 100 ML: 350 INJECTION, SOLUTION INTRAVENOUS at 09:47

## 2025-01-22 ASSESSMENT — FIBROSIS 4 INDEX: FIB4 SCORE: 0.86

## 2025-01-22 ASSESSMENT — PATIENT HEALTH QUESTIONNAIRE - PHQ9: CLINICAL INTERPRETATION OF PHQ2 SCORE: 0

## 2025-01-22 NOTE — PROGRESS NOTES
Verbal consent was acquired by the patient to use Tuee ambient listening note generation during this visit Yes.    Subjective:     Chief Complaint   Patient presents with    Follow-Up    Lab Results     HPI:     History of Present Illness  The patient is a 79-year-old female who presents for follow-up and discussion of lab results.    She reports no current health concerns. She underwent a CT scan this morning at 9:30 AM. She is uncertain if this reduction in the abscess size is sufficient for drain removal. She continues to have a drain in place. She has a CAT scan scheduled in April.    She has been off vitamin B12 supplements for approximately 5 to 6 months and is considering resuming them. She is not currently taking any other supplements. Her diet includes occasional beef and chicken, and she reports an improvement in her energy levels. She is interested in finding a supplement to further boost her energy.    She has not consulted with Dr. Fowler since July 2024 regarding her breast cancer. She is currently on Eliquis and does not require any medication refills at this time. Her breathing is good and she denies LE edema or chest pain.    She had a successful physical therapy session yesterday, during which she performed various exercises involving her legs and arms, including the use of 1-pound weights. She also engaged in an activity simulating stair climbing, where she stepped on cones placed at intervals of approximately 1.5 feet. The session concluded with a walk around the building, which she was able to complete without experiencing shortness of breath. This was a significant achievement for her, as it was the longest distance she had walked in a single attempt. She attends physical therapy sessions every Tuesday and has been adhering to the prescribed home exercises. She has been more active throughout the day, preparing her own meals and snacks without assistance.      Review of Systems  "  Constitutional:  Positive for malaise/fatigue. Negative for chills, fever and weight loss.   Respiratory:  Negative for cough and shortness of breath.    Cardiovascular:  Negative for chest pain.   Gastrointestinal:  Negative for abdominal pain, diarrhea, nausea and vomiting.   Skin:  Negative for rash.   Neurological:  Positive for weakness.       Objective:     Exam:  /78 (BP Location: Right arm, Patient Position: Sitting, BP Cuff Size: Small adult)   Pulse 82   Temp 36.4 °C (97.5 °F) (Temporal)   Resp 20   Ht 1.626 m (5' 4\")   Wt 55.3 kg (122 lb)   LMP  (LMP Unknown)   SpO2 95%   BMI 20.94 kg/m²  Body mass index is 20.94 kg/m².    Physical Exam  Vitals reviewed.   Constitutional:       General: She is not in acute distress.     Appearance: Normal appearance. She is not ill-appearing.   HENT:      Head: Normocephalic and atraumatic.      Nose: Nose normal.      Mouth/Throat:      Mouth: Mucous membranes are moist.   Eyes:      Conjunctiva/sclera: Conjunctivae normal.   Cardiovascular:      Rate and Rhythm: Normal rate and regular rhythm.      Heart sounds: Normal heart sounds. No murmur heard.  Pulmonary:      Effort: Pulmonary effort is normal. No respiratory distress.      Breath sounds: No stridor. Decreased breath sounds (left posterior inferior) present. No wheezing, rhonchi or rales.   Musculoskeletal:      Cervical back: Normal range of motion and neck supple. No rigidity or tenderness.      Right lower leg: No edema.      Left lower leg: No edema.   Skin:     General: Skin is warm and dry.   Neurological:      General: No focal deficit present.      Mental Status: She is alert and oriented to person, place, and time.   Psychiatric:         Mood and Affect: Mood normal.         Behavior: Behavior normal.         Thought Content: Thought content normal.       Labs: Reviewed from 1/17/2025  Imaging: Reviewed from 1/22/2025    Assessment & Plan:     79 y.o. female with the following -     1. " Thrombocytosis  2. 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 Post splenectomy and reactive thrombocytosis suspected causes, no iron deficiency. My plan is to monitor with future labs to trend, patient agrees to e-consult to hematology to confirm that no other studies/treatment is indicated.  - E-consult to Hematology/Oncology    3. History of pulmonary embolism  4. History of DVT (deep vein thrombosis)  Currently anticoagulated with elquis for PE/DVT and right atrial thrombus which required thrombectomy in 8/2024 suspected to be provoked as it was post surgery of pancreatic mass. She was recommended for 6m of anticoagulation, but has a history of recurrent breast cancer diagnosed 2022 (off anastrozole after starting in 1/2023). Patient soon to have completed 6m course of anticoagulation-my concern is for ongoing risk factors (?untreated breast cancer and thrombocytosis). I don't have her current oncology record in regards to the breast cancer. She agrees to e-consult to hematology in regards to duration of her anticoagulation, plan for now is to complete 6 months course.  - E-consult to Hematology/Oncology    apixaban (ELIQUIS) 5mg Tab, Take 5 mg by mouth 2 times a day., Disp: , Rfl:     5. Low serum vitamin B12  Chronic, B12 low but not deficient. Ideal >400. Ok to start MVI or B12 500mcg alone daily.     6. Intra-abdominal abscess (HCC)  7. Empyema, left (HCC)  Chronic, stable and still with drain in place. No GI or respiratory issues currently, strength/appetite/mood improving. Patient has not heard from surgical oncology yet regarding plan, continue care with specialist.    Return in about 3 months (around 4/22/2025), or if symptoms worsen or fail to improve, for Annual Medicare Visit.    Please note that this dictation was created using voice recognition software. I have made every reasonable attempt to correct obvious errors, but I expect that there are errors of grammar and possibly content that I  did not discover before finalizing the note.

## 2025-01-23 DIAGNOSIS — R18.8 ABDOMINAL FLUID COLLECTION: ICD-10-CM

## 2025-01-23 DIAGNOSIS — K65.1 INTRA-ABDOMINAL ABSCESS (HCC): ICD-10-CM

## 2025-01-23 DIAGNOSIS — Z98.890 H/O EXPLORATORY LAPAROTOMY: ICD-10-CM

## 2025-01-23 NOTE — PROGRESS NOTES
CT ABD/PELVIS on 1/22/25 noted stable rim-enhancing multiloculated left upper quadrant fluid collection/abscess which has decreased since CT. Reviewed results with Dr. Esquivel.    Called patient back, daughter Jennifer present for call. They report that has been doing significantly better since last week with improved appetite and increased activity levels. State that drain continues to have approximately 15-30 cc of serous output daily and they continue to flush it regularly with no issues. Discussed that recommendation per Dr. Esquivel is to continue regular flushes and keep drain in place at this time, plan will be to repeat CT next Tuesday 1/28 with follow up appt in clinic on 1/29 to discuss results. Pt and daughter agreeable, no further questions at this time. Appointment set for Wed 1/29 at 1 PM.

## 2025-01-24 ENCOUNTER — APPOINTMENT (OUTPATIENT)
Dept: RADIOLOGY | Facility: MEDICAL CENTER | Age: 80
End: 2025-01-24
Attending: STUDENT IN AN ORGANIZED HEALTH CARE EDUCATION/TRAINING PROGRAM
Payer: COMMERCIAL

## 2025-01-24 DIAGNOSIS — Z12.31 VISIT FOR SCREENING MAMMOGRAM: ICD-10-CM

## 2025-01-25 PROBLEM — J86.9 EMPYEMA, LEFT (HCC): Status: ACTIVE | Noted: 2024-09-24

## 2025-01-25 ASSESSMENT — ENCOUNTER SYMPTOMS
WEIGHT LOSS: 0
VOMITING: 0
NAUSEA: 0
FEVER: 0
DIARRHEA: 0
SHORTNESS OF BREATH: 0
CHILLS: 0
COUGH: 0
WEAKNESS: 1
ABDOMINAL PAIN: 0

## 2025-01-26 DIAGNOSIS — R18.8 ABDOMINAL FLUID COLLECTION: ICD-10-CM

## 2025-01-26 DIAGNOSIS — Z98.890 H/O EXPLORATORY LAPAROTOMY: ICD-10-CM

## 2025-01-26 DIAGNOSIS — K65.1 INTRA-ABDOMINAL ABSCESS (HCC): ICD-10-CM

## 2025-01-26 PROCEDURE — RXMED WILLOW AMBULATORY MEDICATION CHARGE: Performed by: NURSE PRACTITIONER

## 2025-01-26 RX ORDER — SODIUM CHLORIDE 0.9 % (FLUSH) 0.9 %
10 SYRINGE (ML) INJECTION DAILY
Qty: 300 ML | Refills: 0 | Status: SHIPPED | OUTPATIENT
Start: 2025-01-26 | End: 2025-02-26

## 2025-01-27 ENCOUNTER — PHARMACY VISIT (OUTPATIENT)
Dept: PHARMACY | Facility: MEDICAL CENTER | Age: 80
End: 2025-01-27
Payer: COMMERCIAL

## 2025-01-27 ENCOUNTER — E-CONSULT (OUTPATIENT)
Dept: HEMATOLOGY ONCOLOGY | Facility: MEDICAL CENTER | Age: 80
End: 2025-01-27
Payer: COMMERCIAL

## 2025-01-27 PROCEDURE — RXOTC WILLOW AMBULATORY OTC CHARGE

## 2025-01-27 NOTE — PROGRESS NOTES
E-Consult Response     After careful review of the patient's information available in the medical record, the following are my findings and recommendations:    Reason for consult:      Anticoagulation-Currently anticoagulated with elquis for PE/DVT and right atrial thrombus which required thrombectomy in 8/2024 suspected to be provoked as it was post surgery of pancreatic mass. She was recommended for 6m of anticoagulation, but has a history of recurrent breast cancer diagnosed 2022 (off anastrozole after starting in 1/2023). Patient soon to have completed 6m course of anticoagulation-my concern is for ongoing risk factors (?untreated breast cancer and thrombocytosis). I don't have her current oncology record in regards to the breast cancer. Is it reasonable to have her discontinue anticoagulation after 6 months or is there reason to have to her continue beyond 6m?     Thrombocytosis-Since August 2024. Post splenectomy and reactive thrombocytosis suspected causes, no iron deficiency. My plan was to monitor with future labs to trend, but wanted to confirm that no further evaluation/treatment is needed.     Please assess and respond with your recommendations regarding interpretation of clinical findings and medication management.     Summary of data reviewed:     Ms. Gudino is a 78 yo F who presents  elquis for PE/DVT and right atrial thrombus which required thrombectomy in 8/2024.    She did have surgery at this time, which may have provoked the clot; however she has a malignancy history (recurrent breast cancer, although we do not have notes to confirm this) and reactive thrombocytosis, with a platelet count of 740.     Recommendations:     For a 78 yo F who has a recent PE DVT and R atrial thrombus who has been on eliquis for 6 months, the decision to continue should be carefully considered.  According to the [employer] College [employer], extended AC is recommended if the clot is unprovoked or provoked  with persistent risk factors and the patient is not at a high risk for bleeding.  Given her reactive thrombocytosis (several studies have demonstrated that reactive thrombocytosis is associated with an increased risk of TE events) and recurrent breast cancer, I think it is reasonable to say that she has persistent risk factors that warrant extended AC.  Please ensure that she is not at high risk for bleeding.     E-Consult Time: 13 minutes were spent with >50% of the total time spent reviewing items outlined in the summary of data reviewed (Use code [id number])    Vika Fleming M.D.

## 2025-01-28 ENCOUNTER — HOSPITAL ENCOUNTER (OUTPATIENT)
Dept: RADIOLOGY | Facility: MEDICAL CENTER | Age: 80
End: 2025-01-28
Payer: COMMERCIAL

## 2025-01-28 DIAGNOSIS — R18.8 ABDOMINAL FLUID COLLECTION: ICD-10-CM

## 2025-01-28 DIAGNOSIS — Z98.890 H/O EXPLORATORY LAPAROTOMY: ICD-10-CM

## 2025-01-28 DIAGNOSIS — K65.1 INTRA-ABDOMINAL ABSCESS (HCC): ICD-10-CM

## 2025-01-28 PROCEDURE — 74177 CT ABD & PELVIS W/CONTRAST: CPT

## 2025-01-28 PROCEDURE — 700117 HCHG RX CONTRAST REV CODE 255

## 2025-01-28 RX ADMIN — IOHEXOL 100 ML: 350 INJECTION, SOLUTION INTRAVENOUS at 17:20

## 2025-01-28 NOTE — PROGRESS NOTES
Subjective:   1/29/2025  5:25 AM  Primary care physician: Tahira Meléndez D.O.  Referring Provider: Casimiro Fowler M.D.   Medical Oncologist: Casimiro Fowler M.D    Chief Complaint:   Chief Complaint   Patient presents with    Follow-Up     DIST PANC/SPLN 8/5  ERCP & STENT 12/23  LUQ DRAIN 12/21  PLEURODESIS 12/9  DRAIN CHECK (CT 1/28)        Diagnosis:   1. Cyst of pancreas        2. H/O exploratory laparotomy        3. Abdominal fluid collection        4. Recurrent pleural effusion on left          History of presenting illness:    Paige Gudino is a 77 y.o. female with a history of noninvasive carcinoma of the left breast associated with calcifications found on mammogram 34 years ago.  She was treated with a total mastectomy at that time.  She had implant reconstruction and a right mamma pexy subsequently.  No systemic therapy was given.  She did well until May 2022 when she self detected a lump under the implant at the inframammary fold on the left.  The tumor was ER positive greater than 90% TN positive greater than 90% HER2 negative IHC 1+.     On 8/1/22 she underwent resection of the lesion which demonstrated invasive grade 2 mammary carcinoma measuring 1.6 cm with a close posterior margin, all other margins clear.       She was started on anastrozole in Jan 2023, has tolerated well well with no hot flashes or musculoskeletal symptoms.       CT C/A/P on 6/28/24 noted a large solid and cystic necrotic pancreatic tail mass suspicious for malignancy with no surrounding vascular involvement or lymphadenopathy. Mild hepatic steatosis with no focal hepatic lesions. Cholelithiasis without biliary dilatation. Possible wall thickening of the gastric antrum. Moderate size hiatal hernia. Nonspecific borderline enlarged superior mediastinal right paraesophageal lymph node.     She is here today for evaluation of this large pancreatic mass that  was found to tail the pancreas.  The characteristics of this tumor which were found incidentally without any symptoms appear to have characteristics of a microcystic serous cystadenoma.  The patient has no family history of pancreatic cancer.  She does have a history of breast cancer and underwent a mastectomy on the left side.  In any event she is an extremely healthy 78-year-old.  She denies any fever or chills, nausea or vomiting.  She has no weight loss.  She did have a CT scan which I have personally reviewed from 2024 which showed a large mass with no adenopathy and no sign of focal invasion or metastatic disease.  The patient has not had an EUS or any biopsies.  She is not a diabetic.  She has been completely asymptomatic.     Update 24  On 24 she completed surgery by Dr. Espinoza, includin.  Exploratory laparotomy.  2.  Intraoperative ultrasound survey of the pancreas using 5/12 MHz T-probe.  3.  Distal pancreatectomy and splenectomy.  4.  Stomach and celiac node dissection.  5.  Omental flap.     Pathology noted benign pancreatic serous cystadenoma (6.8 cm), margins are negative for neoplasm, spleen with no significant pathologic abnormality.  She was eventually discharged on 24 with home oxygen and close outpatient follow-up.     On 24 she presented to the ED d/t worsening short of breath. CT CHEST on 24 noted bilateral segmental and subsegmental pulmonary emboli with changes compatible with significant right heart strain. CT ABD/PELVIS on 24 noted postop changes of splenectomy and distal pancreatectomy, fluid collection left upper quadrant is seen which could represent postop seroma or possibly pancreatic leak and right lower lobe segmental and subsegmental pulmonary embolus. She was subsequently admitted and started on antibiotics. On 8/15/24 she underwent CT-guided retroperitoneal abscess drainage and LUQ retroperitoneal catheter drainage with CT guidance.  "On 8/16/24 she underwent right atrial thrombectomy by Dr. Bowen, pathology noted benign thrombus. On 8/22/24 she underwent CT guided left upper quadrant fluid collection catheter drainage. Her condition eventually improved and she was discharged home on 8/23/24 with the drain in place and instructions to follow up with our office for drain management.     Yesterday 8/27/24 I received a call from patient's daughter Teri regarding minimal drain output of \"less than 1 mL daily\" since the patient was discharged home and that they would like to see if drain can be removed as the patient has been finding this uncomfortable and painful at times. Discussed with Dr. Esquivel, imaging ordered for confirmation. CT PANCREAS on 8/27/24 noted LEFT upper quadrant drain in place with minimal adjacent peritoneal gas and no significant residual or recurrent fluid collection demonstrated. Appointment scheduled with patient today to remove drain, patient arrived with spouse. Upon closer inspection, fluid in drain appears to be about 25 mL and patient states this is the output so far for today. Also received drain output record from patient, however measurements appear to be in ounces instead of mL. Based on log, patient has had about 1.5-1.6 oz (44-47 mL) per day. Discussed with patient that indications for removal of drain are output of 30 mL or less per day for 2 consecutive days and that we will need to keep the drain in place for now. However, also discussed that we can draw a fluid amylase from her drain and consider removing the drain this upcoming Friday if this comes back as normal. She is agreeable to this. Steris in place over abdominal incision and this appears to be healing well. Patient also brought up concerns regarding a \"small swollen area\" on her back measuring about 2-3 inches by 4 inches that migrated below her bra strap. Patient states that this has since resolved and denies shortness of breath or difficulty " breathing. Also had questions regarding rescheduling CT abdomen scheduled for 9/16/24, advised to keep this appointment for now. She reports no problems with eating or drinking, she has been taking multiple naps daily and states she feels energized afterwards. Denies fever, chills, nausea, or vomiting.     Update 9/4/24  She is here today for further follow-up. She is feeling pretty well. Drain is putting out about 25 cc of thick fluid a day. Last amylase a week ago as 5000     Update 9/11/24  She is here today for follow up regarding pancreatic drain.  The drain is putting out only about 5 cc a day.  She is feeling well.     Update 9/17/24  She is here today for reevaluation.  She has been feeling tired and a little bit off for the last day.  She had some nausea this morning.  Denies fevers     Update 10/8/24  She presented to the ED on 9/17/24 for elevated WBC and recurrent intra-abdominal fluid collection. CT ABD/PELVIS  on 9/17/24 notable for fluid collection with some peripheral enhancement is identified in the distal pancreatic region including the region of previous pancreatectomy, cholelithiasis, and left pleural effusion and consolidations in the left lung base. On 9/19/24 she underwent CT guided catheter drainage with placement of 10 Danish locking loop catheter LUQ. CT ABD/PELVIS on 9/23/24 notable for decreased size of the fluid collection in the distal pancreatectomy-lumpectomy bed following tube placement and increased LEFT pleural effusion with overlying atelectasis. On 9/24/24 she underwent abscessogram, ultrasound guided left sided diagnostic, and therapeutic thoracentesis. Pathology did not identify malignancy in the thoracentesis fluid. CXR 2-VIEW on 9/27/24 noted interval increase in size of the left pleural effusion obscuring the left lung base and she then underwent ultrasound guided left sided therapeutic thoracentesis afterwards. Follow-up CXR 1-VIEW on 9/27/24 noted decreased size of left  pleural effusion and no pneumothorax. CT ABDOMEN on 9/29/24 note multiloculated enhancing fluid collection in the left abdomen with pigtail catheter in place with fluid collection increased in size since prior study, loculated appearing left pleural effusion, and linear consolidations in the bilateral lung bases with left greater than right appearance favoring atelectasis. IR chest tube was considered but ultimately deferred as her CXR on 10/2/24 noted relatively unchanged left lung pleural effusion compared to the previous study. After extensive multidisciplinary discussion, she was discharged on 10/2/24 with strict return precautions in case of recurrence of respiratory or infectious symptoms, orders for repeat chest x-ray, and follow-up visit with surgical oncology.     CXR 2-VIEW on 10/7/24 noted interval decrease in left pleural effusion.      She is here today accompanied by her  for follow-up after her recent discharge from the hospital. She continues recover well at home. She denies experiencing shortness of breath and states she is able to breather deeper now. She has also been using her incentive spirometer at home with improving results. States she is tolerating a regular diet and is slowly regaining her appetite. She is also slowly increasing her activity level at home. She is completing her course of Augmentin, Lasix, and potassium tablet today. She did have a question regarding a referral placed recently to vascular medicine which was discussed with Dr. Espinoza.     Update 10/22/24  CXR 2-VIEW on 10/18/24 noted smaller left pleural effusion with left basilar atelectasis.     On 10/22/24 her daughter called our office with concerns of side effects from Eliquis, specifically fatigue, shakiness, and GI upset. Denied fever or chills at the time. STAT CBC and CMP were ordered, notable for leukocytosis 17.1 and elevated Tbili 2.0.     She is here today accompanied by her  for follow up.  "They spoke to Dr. Esquivel last night after receiving lab results, Augmentin and STAT CT ABD/PELVIS ordered. She was also instructed to rehydrate based on her lab results. This morning she states she feels \"a little better\" but is still feeling tired. They state they have been rehydrating with Gatorade and she is able to tolerate eating and drinking however her  states she is still eating smaller options compared to before. Having normal BMs, denies fever, chills, nausea, or vomiting at this time. Her CT is scheduled for 7 PM tonight but is requesting an earlier appointment if possible.     Update 10/30/24  CT C/A/P on 10/22/24 noted postsurgical changes from the distal pancreatectomy and splenectomy with a 4.0 x 6.0 cm fluid collection adjacent to the pancreas/surgical bed extending to the left hemidiaphragm. Irregular enhancing soft tissue component along the diaphragmatic area raises concern for recurrent disease with exudate or infection being less likely. There is also a questionable area of breakthrough and possible extension into the chest cavity. Moderate left pleural effusion with left basilar atelectasis. Thoracentesis ordered with analysis of collected fluid per recommendations per Dr. Esquivel and Dr. Espinoza.     CXR 2-VIEW on 10/24/24 noted stable small to moderate left effusion and associated atelectasis. On 10/24/24 she completed ultrasound guided left sided diagnostic and therapeutic thoracentesis with 700 mL of pleural fluid withdrawn. Amylase was 20 and wound cultures were negative.     She is here today accompanied by  for follow up. She is finishing course of Augmentin. Labs on 10/28/24 notable for slightly elevated WBC 12.9, otherwise overall improved from 10/21/24. She otherwise states feeling better especially after completion of thoracentesis. Reports appetite noticeably improving with regular, formed BMs. Still having occasional abdominal cramping, states \"d/t Eliquis\". " Denies fever, chills, nausea or vomiting. Denies shortness of breath or difficulty breathing. Slowly increasing activity at home.      Update 1/7/25  On 12/4/24 she returned to the ED due to shortness of breath, CXR on 12/4/24 noted stable large left pleural effusion. On 12/9/24 she completed bronchoscopy with thoracoscopic pleurodesis by Dr. Saul. She was eventually discharged on 12/12/24.     She returned to the ED on 12/19/24 due to dyspnea, abdominal pain, and cramping. CT ABD/PELVIS on 12/19/24 noted rim-enhancing irregular fluid collection under the left hemidiaphragm is larger since prior study, now 7.2 x 8.9 cm, and likely represents an abscess, partially visualized loculated left pleural effusion/empyema containing some pockets of air likely related to infection under the hemidiaphragm with fluid in the left major fissure, cholelithiasis, small hiatal hernia, and colonic diverticulosis. On 12/21/24 she completed LUQ drain placement by Dr. Bowen. On 12/23/24 she completed ERCP, sphincterotomy, and stent placement by Dr. Quiroz. She was eventually discharged on 12/24/24.     CXR 2-VIEW on 12/29/24 noted small left pleural effusion with patchy left basilar opacities.     She is here today for follow-up and drain assessment.  Average drain output has been somewhere between 15 and 20 cc a day.  Somewhat thick.  She has had no fevers or chills.  She is otherwise feeling very well.  She feels like she is getting her energy back and she is eating better.  Denies any fevers or chills.    Update 1/29/25  CT ABD/PELVIS on 1/9/25 noted interval decrease in size of rim-enhancing multiloculated left upper quadrant fluid collection/abscess with size measurements above, decreased size of left empyema with small residual, and no other significant change.    CT ABD/PELVIS on 1/22/25 noted stable rim-enhancing multiloculated left upper quadrant fluid collection/abscess, stable small left empyema, and no significant  change.    CT ABD/PELVIS on 1/28/25 noted small loculated pleural effusion/empyema in left lung base unchanged. Stable 5 x 5.2 cm fluid collection/abscess in the splenic bed. Locking loop drain is unchanged in position.     She is here today for follow up regarding overall recovery and drain management. She states overall she is doing well, she is eating better, and has gained a little bit weight. She states she is more active at home. She has tracked daily output from drain - and it typically is 15-25 mL per day. They flush the drain twice per day with 5-10 mL NS. She denies fever, chills, and other systemic concerns. She recently changed PCP, and had full set of labs and started taking multi-vitamins.    Past Medical History:   Diagnosis Date    Abdominal infection (HCC) 09/20/2024    Acute respiratory failure with hypoxia (HCC) 08/14/2024    Anesthesia     nausea post op    Atrial thrombus 08/15/2024    Cancer (HCC)     1987 breast left    Cancer of overlapping sites of left female breast (HCC)     Cataract 2024    IOL bilat    High cholesterol     Hypokalemia 09/29/2024    Hypomagnesemia 09/25/2024    Hypothyroidism     Intra-abdominal fluid collection 08/14/2024    Long term (current) use of aromatase inhibitors 01/16/2024    Osteoporosis 12/31/2024    PONV (postoperative nausea and vomiting) 1987    Just felt nausous after anesthesia    Primary hypertension 09/17/2024    Pulmonary embolism with acute cor pulmonale, unspecified chronicity, unspecified pulmonary embolism type (HCC) 08/14/2024    Thyroid nodule      Past Surgical History:   Procedure Laterality Date    [place] ERCP,DIAGNOSTIC N/A 12/23/2024    Procedure: ERCP (ENDOSCOPIC RETROGRADE CHOLANGIOPANCREATOGRAPHY);  Surgeon: Derrick Quiroz M.D.;  Location: SURGERY SAME DAY [facility];  Service: Gastroenterology    [place] ERCP DUCT STENT PLACEMENT N/A 12/23/2024    Procedure: ERCP, WITH TUBE OR STENT INSERTION;  Surgeon: Derrick Quiroz M.D.;  Location: SURGERY  SAME DAY [facility];  Service: Gastroenterology    SPHINCTEROTOMY N/A 12/23/2024    Procedure: SPHINCTEROTOMY;  Surgeon: Derrick Quiroz M.D.;  Location: SURGERY SAME DAY [facility];  Service: Gastroenterology    RI THORACOSCOPY,DX NO BX Left 12/9/2024    Procedure: THORACOSCOPY - LEFT THORACOSCOPIC PLEURODESIS;  Surgeon: Glenn Saul M.D.;  Location: SURGERY [facility];  Service: Thoracic    RI ULTRASONIC GUIDANCE, INTRAOPERATIVE  8/5/2024    Procedure: ULTRASOUND GUIDANCE;  Surgeon: Sachin Espinoza M.D.;  Location: SURGERY [facility];  Service: General    PANCREATECTOMY N/A 8/5/2024    Procedure: OPEN DISTAL PANCREATECTOMY WITH SPLENECTOMY, NODE DISSECTION;  Surgeon: Sachin Espinoza M.D.;  Location: SURGERY [facility];  Service: General    SPLENECTOMY N/A 8/5/2024    Procedure: SPLENECTOMY;  Surgeon: Sachin Espinoza M.D.;  Location: SURGERY [facility];  Service: General    CREATION, FLAP, OMENTUM N/A 8/5/2024    Procedure: CREATION, FLAP, OMENTUM;  Surgeon: Sachin Espinoza M.D.;  Location: SURGERY [facility];  Service: General    PB MASTECTOMY, PARTIAL Left 08/01/2022    Procedure: MELLO LOCALIZED LEFT PARTIAL MASTECTOMY;  Surgeon: Elena Arroyo M.D.;  Location: SURGERY [facility];  Service: General    OTHER ORTHOPEDIC SURGERY  2019    back surgery    OTHER  2001    replace left breast implant    OTHER  1988    Left breast implant/lift    OTHER  1987    left mastectomy    LAMINOTOMY      LUMPECTOMY      PRIMARY C SECTION       Allergies   Allergen Reactions    Synthroid [Fd&C Red #40 Al Paul-Levothyroxine] Hives and Unspecified     Hives, Brand specific. Some brands are ok and some are not    Tape Unspecified     Skin degradation with use of tegaderm     [place] Thyroid [Thyroid] Diarrhea     diarrhea     Outpatient Encounter Medications as of 1/29/2025   Medication Sig Dispense Refill    Sodium Chloride Flush (NORMAL SALINE FLUSH) 0.9 % Solution Use 10 mL by  Intracatheter route every day for 30 days. 300 mL 0    apixaban (ELIQUIS) 5mg Tab Take 5 mg by mouth 2 times a day.      ibandronate (BONIVA) 150 MG tablet Take 1 Tablet by mouth every 30 days. 3 Tablet 4    liothyronine (CYTOMEL) 5 MCG Tab Take 2.5-5 mcg by mouth see administration instructions. 5 mcg on Monday, Wednesday, Friday, 2.5 mcg on all other days      levothyroxine (SYNTHROID) 100 MCG Tab Take 100 mcg by mouth every morning on an empty stomach. Only generic       No facility-administered encounter medications on file as of 1/29/2025.     Social History     Socioeconomic History    Marital status:      Spouse name: Not on file    Number of children: Not on file    Years of education: Not on file    Highest education level: Bachelor's degree (e.g., BA, AB, BS)   Occupational History     Comment: retired banker   Tobacco Use    Smoking status: Never     Passive exposure: Past    Smokeless tobacco: Never   Vaping Use    Vaping status: Never Used   Substance and Sexual Activity    Alcohol use: Never    Drug use: Never    Sexual activity: Not Currently     Partners: Male     Birth control/protection: Abstinence, Male Sterilization, Post-Menopausal     Comment:    Other Topics Concern    Not on file   Social History Narrative    Not on file     Social Drivers of Health     Financial Resource Strain: Low Risk  (12/28/2024)    Overall Financial Resource Strain (CARDIA)     Difficulty of Paying Living Expenses: Not hard at all   Food Insecurity: No Food Insecurity (12/28/2024)    Hunger Vital Sign     Worried About Running Out of Food in the Last Year: Never true     Ran Out of Food in the Last Year: Never true   Transportation Needs: No Transportation Needs (12/28/2024)    PRAPARE - Transportation     Lack of Transportation (Medical): No     Lack of Transportation (Non-Medical): No   Physical Activity: Inactive (12/28/2024)    Exercise Vital Sign     Days of Exercise per Week: 0 days     Minutes of  "Exercise per Session: 0 min   Stress: No Stress Concern Present (12/28/2024)    [employer] - Occupational Stress Questionnaire     Feeling of Stress : Not at all   Social Connections: Moderately Integrated (12/28/2024)    Social Connection and Isolation Panel [NHANES]     Frequency of Communication with Friends and Family: More than three times a week     Frequency of Social Gatherings with Friends and Family: Once a week     Attends Evangelical Services: Never     Active Member of Clubs or Organizations: Yes     Attends Club or Organization Meetings: More than 4 times per year     Marital Status:    Intimate Partner Violence: Not At Risk (12/19/2024)    Humiliation, Afraid, Rape, and Kick questionnaire     Fear of Current or Ex-Partner: No     Emotionally Abused: No     Physically Abused: No     Sexually Abused: No   Housing Stability: Low Risk  (12/28/2024)    Housing Stability Vital Sign     Unable to Pay for Housing in the Last Year: No     Number of Times Moved in the Last Year: 0     Homeless in the Last Year: No      Social History     Tobacco Use   Smoking Status Never    Passive exposure: Past   Smokeless Tobacco Never     Social History     Substance and Sexual Activity   Alcohol Use Never     Social History     Substance and Sexual Activity   Drug Use Never      Family History   Problem Relation Age of Onset    Cancer Mother         Ovarian    Ovarian Cancer Mother     Dementia Father     Heart Disease Father     Hyperlipidemia Neg Hx        Review of Systems   Constitutional:  Negative for chills, fever, malaise/fatigue and weight loss.   Respiratory:  Negative for cough and shortness of breath.    Cardiovascular:  Negative for chest pain.   Gastrointestinal:  Negative for abdominal pain, constipation, diarrhea, nausea and vomiting.        Objective:   Pulse 95   Temp 36.4 °C (97.6 °F) (Temporal)   Ht 1.626 m (5' 4\")   Wt 55.3 kg (122 lb)   LMP  (LMP Unknown)   " SpO2 92%   BMI 20.94 kg/m²     Physical Exam  Vitals reviewed.   Constitutional:       General: She is not in acute distress.  HENT:      Head: Normocephalic and atraumatic.      Nose: Nose normal.      Mouth/Throat:      Pharynx: Oropharynx is clear.   Eyes:      Conjunctiva/sclera: Conjunctivae normal.   Cardiovascular:      Rate and Rhythm: Normal rate.   Pulmonary:      Effort: Pulmonary effort is normal. No respiratory distress.      Breath sounds: Normal breath sounds.   Abdominal:      General: Abdomen is flat. There is no distension.      Palpations: Abdomen is soft.      Tenderness: There is no abdominal tenderness. There is no guarding.      Comments: Left side abdominal drain with grey cloudy output   Musculoskeletal:      Comments: Ambulates independently   Skin:     General: Skin is warm.   Neurological:      Mental Status: She is alert and oriented to person, place, and time.   Psychiatric:         Mood and Affect: Mood normal.         Behavior: Behavior normal.         Labs   Latest Reference Range & Units 01/17/25 15:12   WBC 4.8 - 10.8 K/uL 8.4   RBC 4.20 - 5.40 M/uL 4.87   Hemoglobin 12.0 - 16.0 g/dL 13.9   Hematocrit 37.0 - 47.0 % 43.4   MCV 81.4 - 97.8 fL 89.1   MCH 27.0 - 33.0 pg 28.5   MCHC 32.2 - 35.5 g/dL 32.0 (L)   RDW 35.9 - 50.0 fL 52.9 (H)   Platelet Count 164 - 446 K/uL 740 (H)   MPV 9.0 - 12.9 fL 10.1   (L): Data is abnormally low  (H): Data is abnormally high       Latest Reference Range & Units 12/24/24 07:44   Sodium 135 - 145 mmol/L 136   Potassium 3.6 - 5.5 mmol/L 4.1   Chloride 96 - 112 mmol/L 101   Co2 20 - 33 mmol/L 22   Anion Gap 7.0 - 16.0  13.0   Glucose 65 - 99 mg/dL 117 (H)   Bun 8 - 22 mg/dL 9   Creatinine 0.50 - 1.40 mg/dL 0.63   GFR (CKD-EPI) >60 mL/min/1.73 m 2 91   Calcium 8.5 - 10.5 mg/dL 9.5   Correct Calcium 8.5 - 10.5 mg/dL 10.1   AST(SGOT) 12 - 45 U/L 20   ALT(SGPT) 2 - 50 U/L 6   Alkaline Phosphatase 30 - 99 U/L 93   Total Bilirubin 0.1 - 1.5 mg/dL 0.6   Albumin  3.2 - 4.9 g/dL 3.2   Total Protein 6.0 - 8.2 g/dL 7.0   Globulin 1.9 - 3.5 g/dL 3.8 (H)   A-G Ratio g/dL 0.8   (H): Data is abnormally high      Imaging  CT ABD/PELVIS 1/28/25  IMPRESSION:     1.  Small loculated pleural effusion/empyema in left lung base unchanged.     2.  Previous splenectomy and distal pancreatectomy.     3.  Stable 5 x 5.2 cm fluid collection/abscess in the splenic bed. Locking loop drain is unchanged in position.     4.  Nonobstructing left-sided nephrolithiasis.     5.  Cholelithiasis.    CT ABD/PELVIS 1/22/25  IMPRESSION:  1.  Stable rim-enhancing multiloculated left upper quadrant fluid collection/abscess.  2.  Stable small left empyema.  3.  No significant change.    CT ABD/PELVIS 1/9/25  IMPRESSION:  1.  Interval decrease in size of rim-enhancing multiloculated left upper quadrant fluid collection/abscess with size measurements above.  2.  Decreased size of left empyema with small residual.  3.  No other significant change.    CXR 2-VIEW 12/29/24  IMPRESSION:  Small left pleural effusion with patchy left basilar opacities.     CT ABD/PELVIS 12/19/24  IMPRESSION:  1.  Rim-enhancing irregular fluid collection under the left hemidiaphragm is larger since prior study, now 7.2 x 8.9 cm, and likely represents an abscess.  2.  Partially visualized loculated left pleural effusion/empyema containing some pockets of air, likely related to infection under the hemidiaphragm. Fluid in the left major fissure.  3.  Cholelithiasis.  4.  Nonobstructing left nephrolithiasis.  5.  Small hiatal hernia.  6.  Colonic diverticulosis.  7.  Persistent small pericardial effusion.     CXR 2-VIEW (10/24/24)  IMPRESSION:  1.  Stable small to moderate left effusion and associated atelectasis.  2.  Hazy opacities in the left midlung are nonspecific, pneumonia can be considered in the appropriate clinical settings.     CT C/A/P (10/22/24)  IMPRESSION:  1. Postsurgical changes are noted from the distal pancreatectomy and  splenectomy. There is a 4.0 x 6.0 cm fluid collection adjacent to the pancreas/surgical bed, extending to the left hemidiaphragm. Irregular enhancing soft tissue component along the   diaphragmatic area raises concern for recurrent disease, with exudate or infection being less likely. There is also a questionable area of breakthrough and possible extension into the chest cavity. Moderate left pleural effusion with left basilar   atelectasis.  2. Moderate hiatal hernia containing food debris.     CXR 2-VIEW (10/18/24)  IMPRESSION:  Smaller left pleural effusion with left basilar atelectasis.     CXR 2-VIEW (10/7/24)  IMPRESSION:  Interval decrease in left pleural effusion      CXR 2-VIEW (10/2/24)  IMPRESSION:  Moderate left pleural effusion with adjacent airspace disease.     CT ABDOMEN (9/29/24)  IMPRESSION:  1.  Multiloculated enhancing fluid collection in the left abdomen with pigtail catheter in place, fluid collection has increased in size since prior study.  2.  Loculated appearing left pleural effusion.  3.  Linear consolidations in the bilateral lung bases, left greater than right, appearance favoring atelectasis.  4.  Enlarged bilateral inguinal lymph nodes, consider causes of adenopathy with additional workup as clinically appropriate  5.  Cholelithiasis  6.  Small hiatal hernia  7.  Atherosclerosis and atherosclerotic coronary artery disease     CXR 1-VIEW (9/27/24)  IMPRESSION:  Decreased size of left pleural effusion. No pneumothorax.     CXR 2-VIEW (9/27/24)  IMPRESSION:  1. Interval increase in size of the left pleural effusion, obscuring the left lung base.  2. The remainder of the lungs is clear.  3. Obscuration of the left cardiomediastinal border.  4. Other stable chronic degenerative changes.     CT ABD/PELVIS (9/23/24)  IMPRESSION:  1.  Decreased size of the fluid collection in the distal pancreatectomy-lumpectomy bed following tube placement  2.  Increased LEFT pleural effusion with overlying  atelectasis  3.  Hiatal hernia  4.  Persistently thickened endometrium for age. Underlying mass is possible. Ultrasound could better assess.     CT ABD/PELVIS (9/17/24)  IMPRESSION:  1.  Post distal pancreatectomy.  2.  Fluid collection with some peripheral enhancement is identified in the distal pancreatic region including the region of previous pancreatectomy. Developing pseudocyst is a possibility. No emphysema or hemorrhage is appreciated. Remaining pancreas enhances normally.  3.  There is cholelithiasis.  4.  Left pleural effusion and consolidations in the left lung base.  5.  No change in hiatal hernia.  6.  Prominent endometrial cavity again noted.     CT PANCREAS (8/27/24)  IMPRESSION:  1.  Prior distal pancreatectomy and splenectomy.  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  2.  LEFT upper quadrant drain in place with minimal adjacent peritoneal gas.  No significant residual or recurrent fluid collection demonstrated.  3.  Gallstones and minimal gallbladder wall thickening.  Cholecystitis is not excluded.  4.  Bilateral pleural fluid collections with associated atelectasis, worse on the LEFT.     CT ABD/PELVIS (8/20/24)  IMPRESSION:  1.  There is postoperative change consistent with distal pancreatectomy and splenectomy.  2.  The simple appearing left upper quadrant subphrenic fluid collection is again seen very similar to the recent prior study, possibly postoperative seroma as there is no rim enhancement to suggest abscess. Pancreatic leak is considered unlikely as the   majority of the fluid is in the splenic fossa rather than adjacent to the surgical margin of the pancreas.  3.  Stable bibasilar atelectasis and pleural effusions.  4.  Cholelithiasis again noted.  5.  Pulmonary arteries not well assessed on this exam in the right atrial thrombus is no longer evident. Small defect in the right ventricular apex is unchanged.     CT ABD/PELVIS  (8/14/24)  IMPRESSION:  1.  Postop changes of splenectomy and distal pancreatectomy, fluid collection left upper quadrant is seen which could represent postop seroma or possibly pancreatic leak.  2.  Right lower lobe segmental and subsegmental pulmonary embolus.  3.  Trace right and small left pleural effusions.  4.  Linear densities of the lung bases suggesting atelectasis, component of left lower lobe infiltrate not excluded.  5.  Diverticulosis  6.  Cholelithiasis  7.  Cardiomegaly     CT CHEST (8/14/24)  IMPRESSION:  1.  Bilateral segmental and subsegmental pulmonary emboli with changes compatible with significant right heart strain.  2.  Small left and trace right pleural effusions.  3.  Linear consolidations in the bilateral lung bases suggests atelectasis, component of left lower lobe infiltrate is not excluded.  4.  4.0 cm ascending thoracic aortic aneurysm, radiographic follow-up and surveillance recommended as clinically appropriate.  5.  Atherosclerosis and atherosclerotic coronary artery disease     CT C/A/P (6/28/24)  IMPRESSION:  1.  No definite CT evidence of infiltrating gastric mass although there is poor distention of the stomach with possible wall thickening of the gastric antrum.  2.  There is a large solid and cystic necrotic pancreatic tail mass suspicious for malignancy with no surrounding vascular involvement or lymphadenopathy.  3.  Mild hepatic steatosis with no focal hepatic lesions.  4.  Cholelithiasis without biliary dilatation.  5.  Moderate size hiatal hernia.  6.  Colonic diverticulosis without diverticulitis.  7.  Nonspecific borderline enlarged superior mediastinal right paraesophageal lymph node.  8.  There are postoperative changes of left mastectomy and axillary lymph node dissection.  9.  No parenchymal lung metastases.     Pathology  PATH 9/24/24  FINAL DIAGNOSIS:   A. Thoracentesis fluid:         No malignancy identified.         Cytology preparations, including cell block,  demonstrate numerous           reactive mesothelial cells in a background of mixed           inflammatory cells; findings consistent with cell count.      PATH 8/16/24  FINAL DIAGNOSIS:   A. Right atrial mass:          Benign thrombus.      PATH 8/5/24  FINAL DIAGNOSIS:   A. Distal pancreas, spleen and nodes, distal pancreatectomy and   splenectomy:         Benign pancreatic serous cystadenoma (6.8 cm).          Margins are negative for neoplasm.          Background chronic pancreatitis.          Spleen with no significant pathologic abnormality.          Nine benign lymph nodes (0/9).      Procedures  12/23/24 ERCP, sphincterotomy, and stent placement by Dr. Quiroz     12/21/24 LUQ drain placement by Dr. Bowen     12/9/24 Bronchoscopy with thoracoscopic pleurodesis by Dr. Saul     10/24/24 Ultrasound guided left sided diagnostic and therapeutic thoracentesis by Cielo KEITH     9/27/24 Ultrasound guided left sided therapeutic thoracentesis by [facility]     9/24/24 Abscessogram by Dr. Bowen and ultrasound guided left sided diagnostic and therapeutic thoracentesis by [facility]     9/19/24 CT guided catheter drainage with placement of 10 fr locking loop catheter LUQ by Dr. Ruff     8/22/24 CT guided left upper quadrant fluid collection catheter drainage      8/16/24 Right atrial thrombectomy by Dr. Bowen     8/15/24 CT-guided retroperitoneal abscess drainage  LUQ retroperitoneal catheter drainage with CT guidance.     8/5/24 by Dr. Espinoza  1.  Exploratory laparotomy.  2.  Intraoperative ultrasound survey of the pancreas using 5/12 MHz T-probe.  3.  Distal pancreatectomy and splenectomy.  4.  Stomach and celiac node dissection.  5.  Omental flap.     Mastectomy 2022    Diagnosis:     1. Cyst of pancreas        2. H/O exploratory laparotomy        3. Abdominal fluid collection        4. Recurrent pleural effusion on left          Medical Decision Making:  Today's Assessment / Status / Plan:     Overall, Paige  [place]  appears to be continuing slow but steady recovery. The patient is tolerating a regular diet and is back to basic daily activities at home.     The abdominal drain has low steady output of grey watery fluid. And is being irrigated daily as instructed. Patient instructed to continue same process with drain to remain in place.    The patient was also seen and assessed by Dr Velasco.    The CT ABDOMEN on 1/28/25 is mostly unchanged vs prior scan.    The patient is scheduled for next follow up visit in 2 weeks     Anderson FOREMAN NP, have entered, reviewed and confirmed the above diagnoses related to this patient on this date of service, as per the time and date noted at top of this note.

## 2025-01-29 ENCOUNTER — OFFICE VISIT (OUTPATIENT)
Dept: SURGICAL ONCOLOGY | Facility: MEDICAL CENTER | Age: 80
End: 2025-01-29
Payer: COMMERCIAL

## 2025-01-29 VITALS
HEIGHT: 64 IN | OXYGEN SATURATION: 92 % | HEART RATE: 95 BPM | TEMPERATURE: 97.6 F | WEIGHT: 122 LBS | BODY MASS INDEX: 20.83 KG/M2

## 2025-01-29 DIAGNOSIS — J90 RECURRENT PLEURAL EFFUSION ON LEFT: ICD-10-CM

## 2025-01-29 DIAGNOSIS — Z98.890 H/O EXPLORATORY LAPAROTOMY: ICD-10-CM

## 2025-01-29 DIAGNOSIS — K86.2 CYST OF PANCREAS: ICD-10-CM

## 2025-01-29 DIAGNOSIS — R18.8 ABDOMINAL FLUID COLLECTION: ICD-10-CM

## 2025-01-29 ASSESSMENT — FIBROSIS 4 INDEX: FIB4 SCORE: 0.86

## 2025-01-29 ASSESSMENT — ENCOUNTER SYMPTOMS
CHILLS: 0
SHORTNESS OF BREATH: 0
FEVER: 0
ABDOMINAL PAIN: 0
DIARRHEA: 0
WEIGHT LOSS: 0
NAUSEA: 0
VOMITING: 0
CONSTIPATION: 0
COUGH: 0

## 2025-01-29 NOTE — PROGRESS NOTES
Subjective:   2/12/2025 10:56 AM  Primary care physician: Tahira Meléndez D.O.  Referring Provider: Casimiro Fowler M.D.   Medical Oncologist: Casimiro Fowler M.D    Chief Complaint:   Chief Complaint   Patient presents with    Follow-Up     DIST PANC/SPLN 8/5  ERCP & STENT 12/23  LUQ DRAIN 12/21  PLEURODESIS 12/9  DRAIN CHECK        Diagnosis:   1. Pancreatic mass        2. Cyst of pancreas        3. H/O exploratory laparotomy        4. Pancreatic fistula        5. Abdominal fluid collection          History of presenting illness:    Paige Gudino is a 77 y.o. female with a history of noninvasive carcinoma of the left breast associated with calcifications found on mammogram 34 years ago.  She was treated with a total mastectomy at that time.  She had implant reconstruction and a right mamma pexy subsequently.  No systemic therapy was given.  She did well until May 2022 when she self detected a lump under the implant at the inframammary fold on the left.  The tumor was ER positive greater than 90% SC positive greater than 90% HER2 negative IHC 1+.     On 8/1/22 she underwent resection of the lesion which demonstrated invasive grade 2 mammary carcinoma measuring 1.6 cm with a close posterior margin, all other margins clear.       She was started on anastrozole in Jan 2023, has tolerated well well with no hot flashes or musculoskeletal symptoms.       CT C/A/P on 6/28/24 noted a large solid and cystic necrotic pancreatic tail mass suspicious for malignancy with no surrounding vascular involvement or lymphadenopathy. Mild hepatic steatosis with no focal hepatic lesions. Cholelithiasis without biliary dilatation. Possible wall thickening of the gastric antrum. Moderate size hiatal hernia. Nonspecific borderline enlarged superior mediastinal right paraesophageal lymph node.     She is here today for evaluation of this large pancreatic mass that  was found to tail the pancreas.  The characteristics of this tumor which were found incidentally without any symptoms appear to have characteristics of a microcystic serous cystadenoma.  The patient has no family history of pancreatic cancer.  She does have a history of breast cancer and underwent a mastectomy on the left side.  In any event she is an extremely healthy 78-year-old.  She denies any fever or chills, nausea or vomiting.  She has no weight loss.  She did have a CT scan which I have personally reviewed from 2024 which showed a large mass with no adenopathy and no sign of focal invasion or metastatic disease.  The patient has not had an EUS or any biopsies.  She is not a diabetic.  She has been completely asymptomatic.     Update 24  On 24 she completed surgery by Dr. Espinoza, includin.  Exploratory laparotomy.  2.  Intraoperative ultrasound survey of the pancreas using 5/12 MHz T-probe.  3.  Distal pancreatectomy and splenectomy.  4.  Stomach and celiac node dissection.  5.  Omental flap.     Pathology noted benign pancreatic serous cystadenoma (6.8 cm), margins are negative for neoplasm, spleen with no significant pathologic abnormality.  She was eventually discharged on 24 with home oxygen and close outpatient follow-up.     On 24 she presented to the ED d/t worsening short of breath. CT CHEST on 24 noted bilateral segmental and subsegmental pulmonary emboli with changes compatible with significant right heart strain. CT ABD/PELVIS on 24 noted postop changes of splenectomy and distal pancreatectomy, fluid collection left upper quadrant is seen which could represent postop seroma or possibly pancreatic leak and right lower lobe segmental and subsegmental pulmonary embolus. She was subsequently admitted and started on antibiotics. On 8/15/24 she underwent CT-guided retroperitoneal abscess drainage and LUQ retroperitoneal catheter drainage with CT guidance.  "On 8/16/24 she underwent right atrial thrombectomy by Dr. Bowen, pathology noted benign thrombus. On 8/22/24 she underwent CT guided left upper quadrant fluid collection catheter drainage. Her condition eventually improved and she was discharged home on 8/23/24 with the drain in place and instructions to follow up with our office for drain management.     Yesterday 8/27/24 I received a call from patient's daughter Teri regarding minimal drain output of \"less than 1 mL daily\" since the patient was discharged home and that they would like to see if drain can be removed as the patient has been finding this uncomfortable and painful at times. Discussed with Dr. Esquivel, imaging ordered for confirmation. CT PANCREAS on 8/27/24 noted LEFT upper quadrant drain in place with minimal adjacent peritoneal gas and no significant residual or recurrent fluid collection demonstrated. Appointment scheduled with patient today to remove drain, patient arrived with spouse. Upon closer inspection, fluid in drain appears to be about 25 mL and patient states this is the output so far for today. Also received drain output record from patient, however measurements appear to be in ounces instead of mL. Based on log, patient has had about 1.5-1.6 oz (44-47 mL) per day. Discussed with patient that indications for removal of drain are output of 30 mL or less per day for 2 consecutive days and that we will need to keep the drain in place for now. However, also discussed that we can draw a fluid amylase from her drain and consider removing the drain this upcoming Friday if this comes back as normal. She is agreeable to this. Steris in place over abdominal incision and this appears to be healing well. Patient also brought up concerns regarding a \"small swollen area\" on her back measuring about 2-3 inches by 4 inches that migrated below her bra strap. Patient states that this has since resolved and denies shortness of breath or difficulty " breathing. Also had questions regarding rescheduling CT abdomen scheduled for 9/16/24, advised to keep this appointment for now. She reports no problems with eating or drinking, she has been taking multiple naps daily and states she feels energized afterwards. Denies fever, chills, nausea, or vomiting.     Update 9/4/24  She is here today for further follow-up. She is feeling pretty well. Drain is putting out about 25 cc of thick fluid a day. Last amylase a week ago as 5000     Update 9/11/24  She is here today for follow up regarding pancreatic drain.  The drain is putting out only about 5 cc a day.  She is feeling well.     Update 9/17/24  She is here today for reevaluation.  She has been feeling tired and a little bit off for the last day.  She had some nausea this morning.  Denies fevers     Update 10/8/24  She presented to the ED on 9/17/24 for elevated WBC and recurrent intra-abdominal fluid collection. CT ABD/PELVIS  on 9/17/24 notable for fluid collection with some peripheral enhancement is identified in the distal pancreatic region including the region of previous pancreatectomy, cholelithiasis, and left pleural effusion and consolidations in the left lung base. On 9/19/24 she underwent CT guided catheter drainage with placement of 10 Serbian locking loop catheter LUQ. CT ABD/PELVIS on 9/23/24 notable for decreased size of the fluid collection in the distal pancreatectomy-lumpectomy bed following tube placement and increased LEFT pleural effusion with overlying atelectasis. On 9/24/24 she underwent abscessogram, ultrasound guided left sided diagnostic, and therapeutic thoracentesis. Pathology did not identify malignancy in the thoracentesis fluid. CXR 2-VIEW on 9/27/24 noted interval increase in size of the left pleural effusion obscuring the left lung base and she then underwent ultrasound guided left sided therapeutic thoracentesis afterwards. Follow-up CXR 1-VIEW on 9/27/24 noted decreased size of left  pleural effusion and no pneumothorax. CT ABDOMEN on 9/29/24 note multiloculated enhancing fluid collection in the left abdomen with pigtail catheter in place with fluid collection increased in size since prior study, loculated appearing left pleural effusion, and linear consolidations in the bilateral lung bases with left greater than right appearance favoring atelectasis. IR chest tube was considered but ultimately deferred as her CXR on 10/2/24 noted relatively unchanged left lung pleural effusion compared to the previous study. After extensive multidisciplinary discussion, she was discharged on 10/2/24 with strict return precautions in case of recurrence of respiratory or infectious symptoms, orders for repeat chest x-ray, and follow-up visit with surgical oncology.     CXR 2-VIEW on 10/7/24 noted interval decrease in left pleural effusion.      She is here today accompanied by her  for follow-up after her recent discharge from the hospital. She continues recover well at home. She denies experiencing shortness of breath and states she is able to breather deeper now. She has also been using her incentive spirometer at home with improving results. States she is tolerating a regular diet and is slowly regaining her appetite. She is also slowly increasing her activity level at home. She is completing her course of Augmentin, Lasix, and potassium tablet today. She did have a question regarding a referral placed recently to vascular medicine which was discussed with Dr. Espinoza.     Update 10/22/24  CXR 2-VIEW on 10/18/24 noted smaller left pleural effusion with left basilar atelectasis.     On 10/22/24 her daughter called our office with concerns of side effects from Eliquis, specifically fatigue, shakiness, and GI upset. Denied fever or chills at the time. STAT CBC and CMP were ordered, notable for leukocytosis 17.1 and elevated Tbili 2.0.     She is here today accompanied by her  for follow up.  "They spoke to Dr. Esquivel last night after receiving lab results, Augmentin and STAT CT ABD/PELVIS ordered. She was also instructed to rehydrate based on her lab results. This morning she states she feels \"a little better\" but is still feeling tired. They state they have been rehydrating with Gatorade and she is able to tolerate eating and drinking however her  states she is still eating smaller options compared to before. Having normal BMs, denies fever, chills, nausea, or vomiting at this time. Her CT is scheduled for 7 PM tonight but is requesting an earlier appointment if possible.     Update 10/30/24  CT C/A/P on 10/22/24 noted postsurgical changes from the distal pancreatectomy and splenectomy with a 4.0 x 6.0 cm fluid collection adjacent to the pancreas/surgical bed extending to the left hemidiaphragm. Irregular enhancing soft tissue component along the diaphragmatic area raises concern for recurrent disease with exudate or infection being less likely. There is also a questionable area of breakthrough and possible extension into the chest cavity. Moderate left pleural effusion with left basilar atelectasis. Thoracentesis ordered with analysis of collected fluid per recommendations per Dr. Esquivel and Dr. Espinoza.     CXR 2-VIEW on 10/24/24 noted stable small to moderate left effusion and associated atelectasis. On 10/24/24 she completed ultrasound guided left sided diagnostic and therapeutic thoracentesis with 700 mL of pleural fluid withdrawn. Amylase was 20 and wound cultures were negative.     She is here today accompanied by  for follow up. She is finishing course of Augmentin. Labs on 10/28/24 notable for slightly elevated WBC 12.9, otherwise overall improved from 10/21/24. She otherwise states feeling better especially after completion of thoracentesis. Reports appetite noticeably improving with regular, formed BMs. Still having occasional abdominal cramping, states \"d/t Eliquis\". " Denies fever, chills, nausea or vomiting. Denies shortness of breath or difficulty breathing. Slowly increasing activity at home.      Update 1/7/25  On 12/4/24 she returned to the ED due to shortness of breath, CXR on 12/4/24 noted stable large left pleural effusion. On 12/9/24 she completed bronchoscopy with thoracoscopic pleurodesis by Dr. Saul. She was eventually discharged on 12/12/24.     She returned to the ED on 12/19/24 due to dyspnea, abdominal pain, and cramping. CT ABD/PELVIS on 12/19/24 noted rim-enhancing irregular fluid collection under the left hemidiaphragm is larger since prior study, now 7.2 x 8.9 cm, and likely represents an abscess, partially visualized loculated left pleural effusion/empyema containing some pockets of air likely related to infection under the hemidiaphragm with fluid in the left major fissure, cholelithiasis, small hiatal hernia, and colonic diverticulosis. On 12/21/24 she completed LUQ drain placement by Dr. Bowen. On 12/23/24 she completed ERCP, sphincterotomy, and stent placement by Dr. Quiroz. She was eventually discharged on 12/24/24.     CXR 2-VIEW on 12/29/24 noted small left pleural effusion with patchy left basilar opacities.     She is here today for follow-up and drain assessment.  Average drain output has been somewhere between 15 and 20 cc a day.  Somewhat thick.  She has had no fevers or chills.  She is otherwise feeling very well.  She feels like she is getting her energy back and she is eating better.  Denies any fevers or chills.     Update 1/29/25  CT ABD/PELVIS on 1/9/25 noted interval decrease in size of rim-enhancing multiloculated left upper quadrant fluid collection/abscess with size measurements above, decreased size of left empyema with small residual, and no other significant change.     CT ABD/PELVIS on 1/22/25 noted stable rim-enhancing multiloculated left upper quadrant fluid collection/abscess, stable small left empyema, and no significant  change.     CT ABD/PELVIS on 1/28/25 noted small loculated pleural effusion/empyema in left lung base unchanged. Stable 5 x 5.2 cm fluid collection/abscess in the splenic bed. Locking loop drain is unchanged in position.      She is here today for follow up regarding overall recovery and drain management. She states overall she is doing well, she is eating better, and has gained a little bit weight. She states she is more active at home. She has tracked daily output from drain - and it typically is 15-25 mL per day. They flush the drain twice per day with 5-10 mL NS. She denies fever, chills, and other systemic concerns. She recently changed PCP, and had full set of labs and started taking multi-vitamins.    Update 2/11/25  She is here today for follow up regarding abdominal IR drain. She has recorded 15-25 mL clear output on daily basis. She is eating well, and does normal activities. She denies fever, chills, or any other systemic concerns. She is joined by .    Past Medical History:   Diagnosis Date    Abdominal infection (HCC) 09/20/2024    Acute respiratory failure with hypoxia (HCC) 08/14/2024    Anesthesia     nausea post op    Atrial thrombus 08/15/2024    Cancer (HCC)     1987 breast left    Cancer of overlapping sites of left female breast (HCC)     Cataract 2024    IOL bilat    High cholesterol     Hypokalemia 09/29/2024    Hypomagnesemia 09/25/2024    Hypothyroidism     Intra-abdominal fluid collection 08/14/2024    Long term (current) use of aromatase inhibitors 01/16/2024    Osteoporosis 12/31/2024    PONV (postoperative nausea and vomiting) 1987    Just felt nausous after anesthesia    Primary hypertension 09/17/2024    Pulmonary embolism with acute cor pulmonale, unspecified chronicity, unspecified pulmonary embolism type (HCC) 08/14/2024    Thyroid nodule      Past Surgical History:   Procedure Laterality Date    AK ERCP,DIAGNOSTIC N/A 12/23/2024    Procedure: ERCP (ENDOSCOPIC RETROGRADE  CHOLANGIOPANCREATOGRAPHY);  Surgeon: Derrick Quiroz M.D.;  Location: SURGERY SAME DAY [facility];  Service: Gastroenterology    CO ERCP DUCT STENT PLACEMENT N/A 12/23/2024    Procedure: ERCP, WITH TUBE OR STENT INSERTION;  Surgeon: Derrick Quiroz M.D.;  Location: SURGERY SAME DAY [facility];  Service: Gastroenterology    SPHINCTEROTOMY N/A 12/23/2024    Procedure: SPHINCTEROTOMY;  Surgeon: Derrick Quiroz M.D.;  Location: SURGERY SAME DAY [facility];  Service: Gastroenterology    CO THORACOSCOPY,DX NO BX Left 12/9/2024    Procedure: THORACOSCOPY - LEFT THORACOSCOPIC PLEURODESIS;  Surgeon: Glenn Saul M.D.;  Location: SURGERY [facility];  Service: Thoracic    CO ULTRASONIC GUIDANCE, INTRAOPERATIVE  8/5/2024    Procedure: ULTRASOUND GUIDANCE;  Surgeon: Sachin Espinoza M.D.;  Location: [facility];  Service: General    PANCREATECTOMY N/A 8/5/2024    Procedure: OPEN DISTAL PANCREATECTOMY WITH SPLENECTOMY, NODE DISSECTION;  Surgeon: Sachin Espinoza M.D.;  Location: [facility];  Service: General    SPLENECTOMY N/A 8/5/2024    Procedure: SPLENECTOMY;  Surgeon: Sachin Espinoza M.D.;  Location: [facility];  Service: General    CREATION, FLAP, OMENTUM N/A 8/5/2024    Procedure: CREATION, FLAP, OMENTUM;  Surgeon: Sachin Espinoza M.D.;  Location: [facility];  Service: General    PB MASTECTOMY, PARTIAL Left 08/01/2022    Procedure: MELLO LOCALIZED LEFT PARTIAL MASTECTOMY;  Surgeon: Elena Arroyo M.D.;  Location: [facility];  Service: General    OTHER ORTHOPEDIC SURGERY  2019    back surgery    OTHER  2001    replace left breast implant    OTHER  1988    Left breast implant/lift    OTHER  1987    left mastectomy    LAMINOTOMY      LUMPECTOMY      PRIMARY C SECTION       Allergies   Allergen Reactions    Synthroid [Fd&C Red #40 Al Paul-Levothyroxine] Hives and Unspecified     Hives, Brand specific. Some brands are ok and some are not     Tape Unspecified     Skin degradation with use of tegaderm     [place] Thyroid [Thyroid] Diarrhea     diarrhea     Outpatient Encounter Medications as of 2/12/2025   Medication Sig Dispense Refill    Sodium Chloride Flush (NORMAL SALINE FLUSH) 0.9 % Solution Use 10 mL by Intracatheter route every day for 30 days. 300 mL 0    [DISCONTINUED] apixaban (ELIQUIS) 5mg Tab Take 5 mg by mouth 2 times a day.      ibandronate (BONIVA) 150 MG tablet Take 1 Tablet by mouth every 30 days. 3 Tablet 4    liothyronine (CYTOMEL) 5 MCG Tab Take 2.5-5 mcg by mouth see administration instructions. 5 mcg on Monday, Wednesday, Friday, 2.5 mcg on all other days      levothyroxine (SYNTHROID) 100 MCG Tab Take 100 mcg by mouth every morning on an empty stomach. Only generic       No facility-administered encounter medications on file as of 2/12/2025.     Social History     Socioeconomic History    Marital status:      Spouse name: Not on file    Number of children: Not on file    Years of education: Not on file    Highest education level: Bachelor's degree (e.g., BA, AB, BS)   Occupational History     Comment: retired banker   Tobacco Use    Smoking status: Never     Passive exposure: Past    Smokeless tobacco: Never   Vaping Use    Vaping status: Never Used   Substance and Sexual Activity    Alcohol use: Never    Drug use: Never    Sexual activity: Not Currently     Partners: Male     Birth control/protection: Abstinence, Male Sterilization, Post-Menopausal     Comment:    Other Topics Concern    Not on file   Social History Narrative    Not on file     Social Drivers of Health     Financial Resource Strain: Low Risk  (12/28/2024)    Overall Financial Resource Strain (CARDIA)     Difficulty of Paying Living Expenses: Not hard at all   Food Insecurity: No Food Insecurity (12/28/2024)    Hunger Vital Sign     Worried About Running Out of Food in the Last Year: Never true     Ran Out of Food in the Last Year: Never true    Transportation Needs: No Transportation Needs (12/28/2024)    PRAPARE - Transportation     Lack of Transportation (Medical): No     Lack of Transportation (Non-Medical): No   Physical Activity: Inactive (12/28/2024)    Exercise Vital Sign     Days of Exercise per Week: 0 days     Minutes of Exercise per Session: 0 min   Stress: No Stress Concern Present (12/28/2024)    [employer] [place] [employer] - Occupational Stress Questionnaire     Feeling of Stress : Not at all   Social Connections: Moderately Integrated (12/28/2024)    Social Connection and Isolation Panel [NHANES]     Frequency of Communication with Friends and Family: More than three times a week     Frequency of Social Gatherings with Friends and Family: Once a week     Attends Taoist Services: Never     Active Member of Clubs or Organizations: Yes     Attends Club or Organization Meetings: More than 4 times per year     Marital Status:    Intimate Partner Violence: Not At Risk (12/19/2024)    Humiliation, Afraid, Rape, and Kick questionnaire     Fear of Current or Ex-Partner: No     Emotionally Abused: No     Physically Abused: No     Sexually Abused: No   Housing Stability: Low Risk  (12/28/2024)    Housing Stability Vital Sign     Unable to Pay for Housing in the Last Year: No     Number of Times Moved in the Last Year: 0     Homeless in the Last Year: No      Social History     Tobacco Use   Smoking Status Never    Passive exposure: Past   Smokeless Tobacco Never     Social History     Substance and Sexual Activity   Alcohol Use Never     Social History     Substance and Sexual Activity   Drug Use Never      Family History   Problem Relation Age of Onset    Cancer Mother         Ovarian    Ovarian Cancer Mother     Dementia Father     Heart Disease Father     Hyperlipidemia Neg Hx        Review of Systems   Constitutional:  Negative for chills, fever, malaise/fatigue and weight loss.   Respiratory:  Negative for cough and  "shortness of breath.    Cardiovascular:  Negative for chest pain and leg swelling.   Gastrointestinal:  Negative for abdominal pain, diarrhea, nausea and vomiting.   Genitourinary:  Negative for dysuria.        Objective:   /66 (BP Location: Right arm, Patient Position: Sitting, BP Cuff Size: Adult)   Pulse 70   Temp 36.4 °C (97.5 °F) (Temporal)   Ht 1.626 m (5' 4\")   Wt 54.4 kg (120 lb)   LMP  (LMP Unknown)   SpO2 93%   BMI 20.60 kg/m²     Physical Exam    Labs    Latest Reference Range & Units 01/17/25 15:12   WBC 4.8 - 10.8 K/uL 8.4   RBC 4.20 - 5.40 M/uL 4.87   Hemoglobin 12.0 - 16.0 g/dL 13.9   Hematocrit 37.0 - 47.0 % 43.4   MCV 81.4 - 97.8 fL 89.1   MCH 27.0 - 33.0 pg 28.5   MCHC 32.2 - 35.5 g/dL 32.0 (L)   RDW 35.9 - 50.0 fL 52.9 (H)   Platelet Count 164 - 446 K/uL 740 (H)   MPV 9.0 - 12.9 fL 10.1   (L): Data is abnormally low  (H): Data is abnormally high       Latest Reference Range & Units 12/24/24 07:44   Sodium 135 - 145 mmol/L 136   Potassium 3.6 - 5.5 mmol/L 4.1   Chloride 96 - 112 mmol/L 101   Co2 20 - 33 mmol/L 22   Anion Gap 7.0 - 16.0  13.0   Glucose 65 - 99 mg/dL 117 (H)   Bun 8 - 22 mg/dL 9   Creatinine 0.50 - 1.40 mg/dL 0.63   GFR (CKD-EPI) >60 mL/min/1.73 m 2 91   Calcium 8.5 - 10.5 mg/dL 9.5   Correct Calcium 8.5 - 10.5 mg/dL 10.1   AST(SGOT) 12 - 45 U/L 20   ALT(SGPT) 2 - 50 U/L 6   Alkaline Phosphatase 30 - 99 U/L 93   Total Bilirubin 0.1 - 1.5 mg/dL 0.6   Albumin 3.2 - 4.9 g/dL 3.2   Total Protein 6.0 - 8.2 g/dL 7.0   Globulin 1.9 - 3.5 g/dL 3.8 (H)   A-G Ratio g/dL 0.8   (H): Data is abnormally high      Imaging  CT ABD/PELVIS 1/28/25  IMPRESSION:     1.  Small loculated pleural effusion/empyema in left lung base unchanged.     2.  Previous splenectomy and distal pancreatectomy.     3.  Stable 5 x 5.2 cm fluid collection/abscess in the splenic bed. Locking loop drain is unchanged in position.     4.  Nonobstructing left-sided nephrolithiasis.     5.  Cholelithiasis.     CT " ABD/PELVIS 1/22/25  IMPRESSION:  1.  Stable rim-enhancing multiloculated left upper quadrant fluid collection/abscess.  2.  Stable small left empyema.  3.  No significant change.     CT ABD/PELVIS 1/9/25  IMPRESSION:  1.  Interval decrease in size of rim-enhancing multiloculated left upper quadrant fluid collection/abscess with size measurements above.  2.  Decreased size of left empyema with small residual.  3.  No other significant change.     CXR 2-VIEW 12/29/24  IMPRESSION:  Small left pleural effusion with patchy left basilar opacities.     CT ABD/PELVIS 12/19/24  IMPRESSION:  1.  Rim-enhancing irregular fluid collection under the left hemidiaphragm is larger since prior study, now 7.2 x 8.9 cm, and likely represents an abscess.  2.  Partially visualized loculated left pleural effusion/empyema containing some pockets of air, likely related to infection under the hemidiaphragm. Fluid in the left major fissure.  3.  Cholelithiasis.  4.  Nonobstructing left nephrolithiasis.  5.  Small hiatal hernia.  6.  Colonic diverticulosis.  7.  Persistent small pericardial effusion.     CXR 2-VIEW (10/24/24)  IMPRESSION:  1.  Stable small to moderate left effusion and associated atelectasis.  2.  Hazy opacities in the left midlung are nonspecific, pneumonia can be considered in the appropriate clinical settings.     CT C/A/P (10/22/24)  IMPRESSION:  1. Postsurgical changes are noted from the distal pancreatectomy and splenectomy. There is a 4.0 x 6.0 cm fluid collection adjacent to the pancreas/surgical bed, extending to the left hemidiaphragm. Irregular enhancing soft tissue component along the   diaphragmatic area raises concern for recurrent disease, with exudate or infection being less likely. There is also a questionable area of breakthrough and possible extension into the chest cavity. Moderate left pleural effusion with left basilar   atelectasis.  2. Moderate hiatal hernia containing food debris.     CXR 2-VIEW  (10/18/24)  IMPRESSION:  Smaller left pleural effusion with left basilar atelectasis.     CXR 2-VIEW (10/7/24)  IMPRESSION:  Interval decrease in left pleural effusion      CXR 2-VIEW (10/2/24)  IMPRESSION:  Moderate left pleural effusion with adjacent airspace disease.     CT ABDOMEN (9/29/24)  IMPRESSION:  1.  Multiloculated enhancing fluid collection in the left abdomen with pigtail catheter in place, fluid collection has increased in size since prior study.  2.  Loculated appearing left pleural effusion.  3.  Linear consolidations in the bilateral lung bases, left greater than right, appearance favoring atelectasis.  4.  Enlarged bilateral inguinal lymph nodes, consider causes of adenopathy with additional workup as clinically appropriate  5.  Cholelithiasis  6.  Small hiatal hernia  7.  Atherosclerosis and atherosclerotic coronary artery disease     CXR 1-VIEW (9/27/24)  IMPRESSION:  Decreased size of left pleural effusion. No pneumothorax.     CXR 2-VIEW (9/27/24)  IMPRESSION:  1. Interval increase in size of the left pleural effusion, obscuring the left lung base.  2. The remainder of the lungs is clear.  3. Obscuration of the left cardiomediastinal border.  4. Other stable chronic degenerative changes.     CT ABD/PELVIS (9/23/24)  IMPRESSION:  1.  Decreased size of the fluid collection in the distal pancreatectomy-lumpectomy bed following tube placement  2.  Increased LEFT pleural effusion with overlying atelectasis  3.  Hiatal hernia  4.  Persistently thickened endometrium for age. Underlying mass is possible. Ultrasound could better assess.     CT ABD/PELVIS (9/17/24)  IMPRESSION:  1.  Post distal pancreatectomy.  2.  Fluid collection with some peripheral enhancement is identified in the distal pancreatic region including the region of previous pancreatectomy. Developing pseudocyst is a possibility. No emphysema or hemorrhage is appreciated. Remaining pancreas enhances normally.  3.  There is  cholelithiasis.  4.  Left pleural effusion and consolidations in the left lung base.  5.  No change in hiatal hernia.  6.  Prominent endometrial cavity again noted.     CT PANCREAS (8/27/24)  IMPRESSION:  1.  Prior distal pancreatectomy and splenectomy.  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  2.  LEFT upper quadrant drain in place with minimal adjacent peritoneal gas.  No significant residual or recurrent fluid collection demonstrated.  3.  Gallstones and minimal gallbladder wall thickening.  Cholecystitis is not excluded.  4.  Bilateral pleural fluid collections with associated atelectasis, worse on the LEFT.     CT ABD/PELVIS (8/20/24)  IMPRESSION:  1.  There is postoperative change consistent with distal pancreatectomy and splenectomy.  2.  The simple appearing left upper quadrant subphrenic fluid collection is again seen very similar to the recent prior study, possibly postoperative seroma as there is no rim enhancement to suggest abscess. Pancreatic leak is considered unlikely as the   majority of the fluid is in the splenic fossa rather than adjacent to the surgical margin of the pancreas.  3.  Stable bibasilar atelectasis and pleural effusions.  4.  Cholelithiasis again noted.  5.  Pulmonary arteries not well assessed on this exam in the right atrial thrombus is no longer evident. Small defect in the right ventricular apex is unchanged.     CT ABD/PELVIS (8/14/24)  IMPRESSION:  1.  Postop changes of splenectomy and distal pancreatectomy, fluid collection left upper quadrant is seen which could represent postop seroma or possibly pancreatic leak.  2.  Right lower lobe segmental and subsegmental pulmonary embolus.  3.  Trace right and small left pleural effusions.  4.  Linear densities of the lung bases suggesting atelectasis, component of left lower lobe infiltrate not excluded.  5.  Diverticulosis  6.  Cholelithiasis  7.  Cardiomegaly     CT CHEST  (8/14/24)  IMPRESSION:  1.  Bilateral segmental and subsegmental pulmonary emboli with changes compatible with significant right heart strain.  2.  Small left and trace right pleural effusions.  3.  Linear consolidations in the bilateral lung bases suggests atelectasis, component of left lower lobe infiltrate is not excluded.  4.  4.0 cm ascending thoracic aortic aneurysm, radiographic follow-up and surveillance recommended as clinically appropriate.  5.  Atherosclerosis and atherosclerotic coronary artery disease     CT C/A/P (6/28/24)  IMPRESSION:  1.  No definite CT evidence of infiltrating gastric mass although there is poor distention of the stomach with possible wall thickening of the gastric antrum.  2.  There is a large solid and cystic necrotic pancreatic tail mass suspicious for malignancy with no surrounding vascular involvement or lymphadenopathy.  3.  Mild hepatic steatosis with no focal hepatic lesions.  4.  Cholelithiasis without biliary dilatation.  5.  Moderate size hiatal hernia.  6.  Colonic diverticulosis without diverticulitis.  7.  Nonspecific borderline enlarged superior mediastinal right paraesophageal lymph node.  8.  There are postoperative changes of left mastectomy and axillary lymph node dissection.  9.  No parenchymal lung metastases.     Pathology  PATH 9/24/24  FINAL DIAGNOSIS:   A. Thoracentesis fluid:         No malignancy identified.         Cytology preparations, including cell block, demonstrate numerous           reactive mesothelial cells in a background of mixed           inflammatory cells; findings consistent with cell count.      PATH 8/16/24  FINAL DIAGNOSIS:   A. Right atrial mass:          Benign thrombus.      PATH 8/5/24  FINAL DIAGNOSIS:   A. Distal pancreas, spleen and nodes, distal pancreatectomy and   splenectomy:         Benign pancreatic serous cystadenoma (6.8 cm).          Margins are negative for neoplasm.          Background chronic pancreatitis.           Spleen with no significant pathologic abnormality.          Nine benign lymph nodes (0/9).      Procedures  12/23/24 ERCP, sphincterotomy, and stent placement by Dr. Quiroz     12/21/24 LUQ drain placement by Dr. Bowen     12/9/24 Bronchoscopy with thoracoscopic pleurodesis by Dr. Saul     10/24/24 Ultrasound guided left sided diagnostic and therapeutic thoracentesis by Cielo KEITH     9/27/24 Ultrasound guided left sided therapeutic thoracentesis by Jolie PEACE     9/24/24 Abscessogram by Dr. Bowen and ultrasound guided left sided diagnostic and therapeutic thoracentesis by Jolie PEACE     9/19/24 CT guided catheter drainage with placement of 10 fr locking loop catheter LUQ by Dr. Ruff     8/22/24 CT guided left upper quadrant fluid collection catheter drainage      8/16/24 Right atrial thrombectomy by Dr. Bowen     8/15/24 CT-guided retroperitoneal abscess drainage  LUQ retroperitoneal catheter drainage with CT guidance.     8/5/24 by Dr. Espinoza  1.  Exploratory laparotomy.  2.  Intraoperative ultrasound survey of the pancreas using 5/12 MHz T-probe.  3.  Distal pancreatectomy and splenectomy.  4.  Stomach and celiac node dissection.  5.  Omental flap.     Mastectomy 2022    Diagnosis:     1. Pancreatic mass        2. Cyst of pancreas        3. H/O exploratory laparotomy        4. Pancreatic fistula        5. Abdominal fluid collection          Medical Decision Making:  Today's Assessment / Status / Plan:     Overall, Paige Gudino appears to be continuing slow but steady recovery. The patient is tolerating a regular diet and is back to basic daily activities at home.      The abdominal drain has low steady output of grey watery fluid, approx 15-25 mL per day.     The patient case was discussed with Dr Espinoza    The IR drain was removed, patient tolerated well, simple dressing placed. Wound care instructions discussed.    Next follow up is planned for April/May 2025 with an updated CT scan.    Anderson FOREMAN  Fors NP, have entered, reviewed and confirmed the above diagnoses related to this patient on this date of service, as per the time and date noted at top of this note.

## 2025-01-30 ENCOUNTER — APPOINTMENT (OUTPATIENT)
Dept: RADIOLOGY | Facility: MEDICAL CENTER | Age: 80
End: 2025-01-30
Attending: INTERNAL MEDICINE
Payer: COMMERCIAL

## 2025-02-07 ENCOUNTER — HOSPITAL ENCOUNTER (OUTPATIENT)
Dept: RADIOLOGY | Facility: MEDICAL CENTER | Age: 80
End: 2025-02-07
Attending: STUDENT IN AN ORGANIZED HEALTH CARE EDUCATION/TRAINING PROGRAM
Payer: COMMERCIAL

## 2025-02-07 DIAGNOSIS — Z12.31 VISIT FOR SCREENING MAMMOGRAM: ICD-10-CM

## 2025-02-07 PROCEDURE — 77063 BREAST TOMOSYNTHESIS BI: CPT | Mod: 52

## 2025-02-10 ENCOUNTER — PATIENT MESSAGE (OUTPATIENT)
Dept: MEDICAL GROUP | Facility: LAB | Age: 80
End: 2025-02-10
Payer: COMMERCIAL

## 2025-02-10 DIAGNOSIS — I82.403 DEEP VEIN THROMBOSIS (DVT) OF BOTH LOWER EXTREMITIES, UNSPECIFIED CHRONICITY, UNSPECIFIED VEIN (HCC): ICD-10-CM

## 2025-02-10 DIAGNOSIS — Z86.711 HISTORY OF PULMONARY EMBOLISM: ICD-10-CM

## 2025-02-10 NOTE — PROGRESS NOTES
Radiology Progress Note   Author: SOUMYA Cabello Date & Time created: 9/22/2024  9:01 AM   Date of admission  9/17/2024  Note to reader: this note follows the APSO format rather than the historical SOAP format. Assessment and plan located at the top of the note for ease of use.    Chief Complaint  78 y.o. female admitted 9/17/2024 with leukocytosis, sharp epigastric pain  Chief Complaint   Patient presents with    Sent by MD     Sent by surgeon for elevated WBC. Pt reports abdominal drain removed last week and is concerned for infection at the site. Denies pain.          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  CT A/P showed fluid collection in distal pancreatic region.  IR consulted for drain placement.  On 09/19/2024 Dr. Ruff (IR) placed a 10 Lithuanian left peritoneal upper quadrant abdominal abscess drain and removal of 40 ML of purulent fluid.    Interval History:   09/20/2024-10 Lithuanian left upper quadrant peritoneal drain to bulb suction with 82 mL purulent  output in the last 24 hours.  I flushed drain with 10 mL of sterile NSS  I reviewed today's labs: WBC 17.3; H&H 12.4/38.5, Cr 0.70; Coags are 1.36,  Micro- abscess drainage from 09/19 positive for staphylococcal aureus; urine from 9/18 positive for yeast. I discussed drain care with pt  at bedside    09/21/2024-10 Lithuanian left upper quadrant peritoneal drain to bulb suction with 25 mL purulent  output in the last 24 hours.  I flushed drain with 10 mL of sterile NSS  I reviewed today's labs: WBC 8.7; H&H 14.5/46.4, Cr 0.53;  Coags are 1.36,  Micro- abscess drainage from 09/19 + for many GPC; urine from 9/18 + for yeast. I discussed drain care/with pt  at bedside, I reviewed drain flushing with patient and patient , all questions answered.     09/22/2024- LUQ 10 Spanish peritoneal drain to bulb suction with 35 mL purulent  output in the last 24 hours.  I flushed drain with 10 mL of sterile NSS  I reviewed most recent labs: WBC 8.7; H&H 14.5/46.4, Cr 0.53; Coags are 1.36,  Micro- abscess drainage from 09/19 + for MSSA, Streptococcus agalactiae group B; urine from 9/18 + for Candida albicans I discussed plan of care with infectious disease, and patient.  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)    Abdominal infection (HCC)      Plan IR  - Order placed to irrigate LUQ 10Fr  drain with 10 ml of sterile saline each shift  - Fluid cultures pending   - Recommend follow up CT scan in 5-7 days once drainage decreases to approximately 10-15 mL in 24 hours (proximately 09/25-27)-  - Continue to monitor drains, VS, and labs   -Ultimate plan is for patient to discharge with drain in place and follow-up with surgical oncology for drain management/care  -Once patient ready to discharge please assure patient has drain supplies-sterile NSS flushes, alcohol swabs    -Thank you for allowing Interventional Radiology team to participate in the patients care, if any additional care or requests are needed in the future please do not hesitate to call or place IR order           Review of Systems  Physical Exam   Review of  "Systems   Constitutional:  Positive for malaise/fatigue.   Respiratory:  Negative for shortness of breath.    Cardiovascular:  Negative for chest pain.   Gastrointestinal:  Negative for abdominal pain, nausea and vomiting.        \"Drain area is uncomfortable\"   Neurological:  Negative for weakness and headaches.      Vitals:    09/22/24 0656   BP: (!) 154/83   Pulse: 66   Resp: 16   Temp: 36.5 °C (97.7 °F)   SpO2: 88%        Physical Exam  Vitals and nursing note reviewed.   HENT:      Head: Normocephalic and atraumatic.      Nose: Nose normal.      Mouth/Throat:      Mouth: Mucous membranes are dry.      Pharynx: Oropharynx is clear.   Eyes:      Pupils: Pupils are equal, round, and reactive to light.   Cardiovascular:      Rate and Rhythm: Normal rate and regular rhythm.   Pulmonary:      Effort: Pulmonary effort is normal. No respiratory distress.   Abdominal:      Palpations: Abdomen is soft.      Comments: LUQ drain in place - dressing CDI   Skin:     General: Skin is warm and dry.      Capillary Refill: Capillary refill takes less than 2 seconds.   Neurological:      Mental Status: She is alert and oriented to person, place, and time.   Psychiatric:         Attention and Perception: Attention normal.         Mood and Affect: Mood normal.         Speech: Speech normal.         Behavior: Behavior normal. Behavior is cooperative.             Labs    Recent Labs     09/20/24  1406 09/21/24  0146   WBC 11.7* 8.7   RBC 4.88 5.04   HEMOGLOBIN 14.6 14.5   HEMATOCRIT 45.2 46.4   MCV 92.6 92.1   MCH 29.9 28.8   MCHC 32.3 31.3*   RDW 47.3 47.4   PLATELETCT 612* 653*   MPV 9.4 9.3     Recent Labs     09/21/24  0146   SODIUM 135   POTASSIUM 3.9   CHLORIDE 101   CO2 18*   GLUCOSE 117*   BUN 7*   CREATININE 0.53   CALCIUM 9.1     Recent Labs     09/21/24  0146   CREATININE 0.53     CT-IMAGE-GUIDED DRAIN PERITONEAL   Final Result      1.  CT GUIDED PERITONEAL LEFT UPPER QUADRANT ABDOMINAL ABSCESS DRAINAGE. PLACEMENT OF 10 " "[place]ian LOCKING LOOP CATHETER.   2.  THE CURRENT PLAN IS TO IRRIGATE ONCE DAILY WITH 5 ML STERILE SALINE, CHECK CULTURES, MONITOR DRAINAGE OUTPUT AND OBTAIN A FOLLOW-UP CT SCAN IN 5-7 DAYS IF CLINICALLY INDICATED.      CT-ABDOMEN-PELVIS WITH   Final Result      1.  Post distal pancreatectomy.      2.  Fluid collection with some peripheral enhancement is identified in the distal pancreatic region including the region of previous pancreatectomy. Developing pseudocyst is a possibility. No emphysema or hemorrhage is appreciated. Remaining pancreas    enhances normally.      3.  There is cholelithiasis.      4.  Left pleural effusion and consolidations in the left lung base.      5.  No change in hiatal hernia.      6.  Prominent endometrial cavity again noted.        INR   Date Value Ref Range Status   09/19/2024 1.36 (H) 0.87 - 1.13 Final     Comment:     INR - Non-therapeutic Reference Range: 0.87-1.13  INR - Therapeutic Reference Range: 2.0-4.0       No results found for: \"POCINR\"     Intake/Output Summary (Last 24 hours) at 9/19/2024 1405  Last data filed at 9/19/2024 1026  Gross per 24 hour   Intake 0 ml   Output 40 ml   Net -40 ml      I have personally reviewed the above labs and imaging      I have performed a physical exam and reviewed and updated ROS and Plan today (9/22/2024).     38 minutes in directly providing and coordinating care and extensive data review.  No time overlap and excludes procedures.    " Show Aperture Variable?: Yes Render Post-Care Instructions In Note?: no Detail Level: Detailed Number Of Freeze-Thaw Cycles: 1 freeze-thaw cycle Consent: The patient's consent was obtained including but not limited to risks of crusting, scabbing, blistering, scarring, darker or lighter pigmentary change, recurrence, incomplete removal and infection. Post-Care Instructions: I reviewed with the patient in detail post-care instructions. Patient is to wear sunprotection, and avoid picking at any of the treated lesions. Pt may apply Vaseline to crusted or scabbing areas. Duration Of Freeze Thaw-Cycle (Seconds): 10 Medical Necessity Clause: This procedure was medically necessary because the lesions that were treated were: Spray Paint Text: The liquid nitrogen was applied to the skin utilizing a spray paint frosting technique. Medical Necessity Information: It is in your best interest to select a reason for this procedure from the list below. All of these items fulfill various CMS LCD requirements except the new and changing color options. Detail Level: Simple

## 2025-02-12 ENCOUNTER — OFFICE VISIT (OUTPATIENT)
Dept: SURGICAL ONCOLOGY | Facility: MEDICAL CENTER | Age: 80
End: 2025-02-12
Payer: COMMERCIAL

## 2025-02-12 ENCOUNTER — APPOINTMENT (OUTPATIENT)
Dept: MEDICAL GROUP | Facility: IMAGING CENTER | Age: 80
End: 2025-02-12
Payer: COMMERCIAL

## 2025-02-12 VITALS
SYSTOLIC BLOOD PRESSURE: 114 MMHG | DIASTOLIC BLOOD PRESSURE: 66 MMHG | OXYGEN SATURATION: 93 % | HEIGHT: 64 IN | HEART RATE: 70 BPM | WEIGHT: 120 LBS | BODY MASS INDEX: 20.49 KG/M2 | TEMPERATURE: 97.5 F

## 2025-02-12 DIAGNOSIS — K86.89 PANCREATIC MASS: ICD-10-CM

## 2025-02-12 DIAGNOSIS — K86.2 CYST OF PANCREAS: ICD-10-CM

## 2025-02-12 DIAGNOSIS — Z98.890 H/O EXPLORATORY LAPAROTOMY: ICD-10-CM

## 2025-02-12 DIAGNOSIS — R18.8 ABDOMINAL FLUID COLLECTION: ICD-10-CM

## 2025-02-12 DIAGNOSIS — K86.89 PANCREATIC FISTULA: ICD-10-CM

## 2025-02-12 PROCEDURE — 3078F DIAST BP <80 MM HG: CPT | Performed by: NURSE PRACTITIONER

## 2025-02-12 PROCEDURE — 3074F SYST BP LT 130 MM HG: CPT | Performed by: NURSE PRACTITIONER

## 2025-02-12 PROCEDURE — 99024 POSTOP FOLLOW-UP VISIT: CPT | Performed by: NURSE PRACTITIONER

## 2025-02-12 ASSESSMENT — ENCOUNTER SYMPTOMS
CHILLS: 0
DIARRHEA: 0
COUGH: 0
FEVER: 0
WEIGHT LOSS: 0
VOMITING: 0
ABDOMINAL PAIN: 0
SHORTNESS OF BREATH: 0
NAUSEA: 0

## 2025-02-12 ASSESSMENT — FIBROSIS 4 INDEX: FIB4 SCORE: 0.86

## 2025-02-19 ENCOUNTER — HOSPITAL ENCOUNTER (OUTPATIENT)
Dept: LAB | Facility: MEDICAL CENTER | Age: 80
End: 2025-02-19
Attending: INTERNAL MEDICINE
Payer: COMMERCIAL

## 2025-02-19 LAB
25(OH)D3 SERPL-MCNC: 37 NG/ML (ref 30–100)
T3FREE SERPL-MCNC: 3.54 PG/ML (ref 2–4.4)
T4 FREE SERPL-MCNC: 1.52 NG/DL (ref 0.93–1.7)
TSH SERPL-ACNC: 0.2 UIU/ML (ref 0.35–5.5)
VIT B12 SERPL-MCNC: 439 PG/ML (ref 211–911)

## 2025-02-19 PROCEDURE — 84443 ASSAY THYROID STIM HORMONE: CPT

## 2025-02-19 PROCEDURE — 82306 VITAMIN D 25 HYDROXY: CPT

## 2025-02-19 PROCEDURE — 82607 VITAMIN B-12: CPT

## 2025-02-19 PROCEDURE — 84439 ASSAY OF FREE THYROXINE: CPT

## 2025-02-19 PROCEDURE — 84481 FREE ASSAY (FT-3): CPT

## 2025-02-19 PROCEDURE — 36415 COLL VENOUS BLD VENIPUNCTURE: CPT

## 2025-04-22 ENCOUNTER — HOSPITAL ENCOUNTER (OUTPATIENT)
Dept: LAB | Facility: MEDICAL CENTER | Age: 80
End: 2025-04-22
Payer: COMMERCIAL

## 2025-04-22 ENCOUNTER — APPOINTMENT (OUTPATIENT)
Dept: RADIOLOGY | Facility: MEDICAL CENTER | Age: 80
End: 2025-04-22
Payer: COMMERCIAL

## 2025-04-22 ENCOUNTER — TELEPHONE (OUTPATIENT)
Facility: MEDICAL CENTER | Age: 80
End: 2025-04-22
Payer: COMMERCIAL

## 2025-04-22 DIAGNOSIS — R18.8 ABDOMINAL FLUID COLLECTION: ICD-10-CM

## 2025-04-22 DIAGNOSIS — J90 PLEURAL EFFUSION: ICD-10-CM

## 2025-04-22 DIAGNOSIS — Z98.890 H/O EXPLORATORY LAPAROTOMY: ICD-10-CM

## 2025-04-22 LAB
ALBUMIN SERPL BCP-MCNC: 3.8 G/DL (ref 3.2–4.9)
ALBUMIN/GLOB SERPL: 1 G/DL
ALP SERPL-CCNC: 101 U/L (ref 30–99)
ALT SERPL-CCNC: 16 U/L (ref 2–50)
ANION GAP SERPL CALC-SCNC: 13 MMOL/L (ref 7–16)
AST SERPL-CCNC: 25 U/L (ref 12–45)
BILIRUB SERPL-MCNC: 1.6 MG/DL (ref 0.1–1.5)
BUN SERPL-MCNC: 14 MG/DL (ref 8–22)
CALCIUM ALBUM COR SERPL-MCNC: 9.5 MG/DL (ref 8.5–10.5)
CALCIUM SERPL-MCNC: 9.3 MG/DL (ref 8.5–10.5)
CHLORIDE SERPL-SCNC: 99 MMOL/L (ref 96–112)
CO2 SERPL-SCNC: 24 MMOL/L (ref 20–33)
CREAT SERPL-MCNC: 0.68 MG/DL (ref 0.5–1.4)
ERYTHROCYTE [DISTWIDTH] IN BLOOD BY AUTOMATED COUNT: 44.1 FL (ref 35.9–50)
GFR SERPLBLD CREATININE-BSD FMLA CKD-EPI: 89 ML/MIN/1.73 M 2
GLOBULIN SER CALC-MCNC: 4 G/DL (ref 1.9–3.5)
GLUCOSE SERPL-MCNC: 98 MG/DL (ref 65–99)
HCT VFR BLD AUTO: 45 % (ref 37–47)
HGB BLD-MCNC: 14.8 G/DL (ref 12–16)
MCH RBC QN AUTO: 29.1 PG (ref 27–33)
MCHC RBC AUTO-ENTMCNC: 32.9 G/DL (ref 32.2–35.5)
MCV RBC AUTO: 88.4 FL (ref 81.4–97.8)
PLATELET # BLD AUTO: 569 K/UL (ref 164–446)
PMV BLD AUTO: 10 FL (ref 9–12.9)
POTASSIUM SERPL-SCNC: 4 MMOL/L (ref 3.6–5.5)
PROT SERPL-MCNC: 7.8 G/DL (ref 6–8.2)
RBC # BLD AUTO: 5.09 M/UL (ref 4.2–5.4)
SODIUM SERPL-SCNC: 136 MMOL/L (ref 135–145)
WBC # BLD AUTO: 9.1 K/UL (ref 4.8–10.8)

## 2025-04-22 PROCEDURE — 36415 COLL VENOUS BLD VENIPUNCTURE: CPT

## 2025-04-22 PROCEDURE — 80053 COMPREHEN METABOLIC PANEL: CPT

## 2025-04-22 PROCEDURE — 85027 COMPLETE CBC AUTOMATED: CPT

## 2025-04-22 NOTE — TELEPHONE ENCOUNTER
Called patient to discuss most recent lab results from today 4/22/25, spoke with daughter Jennifer. Bilirubin mildly elevated at 1.6 but WBC and Hgb WNL. Per Jennifer, patient does not report any abdominal pain, jaundice, fevers, or chills and is steadily increasing her energy levels with each passing day. Patient has CT PANCREAS scheduled for Friday 4/25/25 with subsequent follow up with surgical oncology on 4/29/25. Discussed with Jennifer to call our office or present to urgent care/ED for any new or concerning symptoms as described above but will otherwise await scan and plan to discuss results at follow up appointment next week. Agreeable to plan, no further questions at this time.

## 2025-04-23 ENCOUNTER — HOSPITAL ENCOUNTER (OUTPATIENT)
Dept: RADIOLOGY | Facility: MEDICAL CENTER | Age: 80
End: 2025-04-23
Payer: COMMERCIAL

## 2025-04-23 DIAGNOSIS — R18.8 ABDOMINAL FLUID COLLECTION: ICD-10-CM

## 2025-04-23 DIAGNOSIS — K86.89 PANCREATIC MASS: ICD-10-CM

## 2025-04-23 DIAGNOSIS — K86.89 PANCREATIC FISTULA: ICD-10-CM

## 2025-04-23 DIAGNOSIS — Z98.890 H/O EXPLORATORY LAPAROTOMY: ICD-10-CM

## 2025-04-23 DIAGNOSIS — K86.2 CYST OF PANCREAS: ICD-10-CM

## 2025-04-23 PROCEDURE — 74170 CT ABD WO CNTRST FLWD CNTRST: CPT

## 2025-04-23 PROCEDURE — 700117 HCHG RX CONTRAST REV CODE 255

## 2025-04-23 RX ADMIN — IOHEXOL 100 ML: 350 INJECTION, SOLUTION INTRAVENOUS at 18:47

## 2025-04-23 NOTE — PROGRESS NOTES
Subjective:      Primary care physician: Tahira Meléndez D.O.  Referring Provider: Casimiro Fowler M.D.   Medical Oncologist: Casimiro Fowler M.D    Chief Complaint: No chief complaint on file.    Diagnosis:   1. Pancreatic mass        2. Pancreatic fistula        3. H/O exploratory laparotomy        4. Intra-abdominal abscess (HCC)          History of presenting illness:    Paige Gudino is a 77 y.o. female with a history of noninvasive carcinoma of the left breast associated with calcifications found on mammogram 34 years ago.  She was treated with a total mastectomy at that time.  She had implant reconstruction and a right mamma pexy subsequently.  No systemic therapy was given.  She did well until May 2022 when she self detected a lump under the implant at the inframammary fold on the left.  The tumor was ER positive greater than 90% MT positive greater than 90% HER2 negative IHC 1+.     On 8/1/22 she underwent resection of the lesion which demonstrated invasive grade 2 mammary carcinoma measuring 1.6 cm with a close posterior margin, all other margins clear.       She was started on anastrozole in Jan 2023, has tolerated well well with no hot flashes or musculoskeletal symptoms.       CT C/A/P on 6/28/24 noted a large solid and cystic necrotic pancreatic tail mass suspicious for malignancy with no surrounding vascular involvement or lymphadenopathy. Mild hepatic steatosis with no focal hepatic lesions. Cholelithiasis without biliary dilatation. Possible wall thickening of the gastric antrum. Moderate size hiatal hernia. Nonspecific borderline enlarged superior mediastinal right paraesophageal lymph node.     She is here today for evaluation of this large pancreatic mass that was found to tail the pancreas.  The characteristics of this tumor which were found incidentally without any symptoms appear to have characteristics of a microcystic  serous cystadenoma.  The patient has no family history of pancreatic cancer.  She does have a history of breast cancer and underwent a mastectomy on the left side.  In any event she is an extremely healthy 78-year-old.  She denies any fever or chills, nausea or vomiting.  She has no weight loss.  She did have a CT scan which I have personally reviewed from 2024 which showed a large mass with no adenopathy and no sign of focal invasion or metastatic disease.  The patient has not had an EUS or any biopsies.  She is not a diabetic.  She has been completely asymptomatic.     Update 24  On 24 she completed surgery by Dr. Espinoza, includin.  Exploratory laparotomy.  2.  Intraoperative ultrasound survey of the pancreas using 5/12 MHz T-probe.  3.  Distal pancreatectomy and splenectomy.  4.  Stomach and celiac node dissection.  5.  Omental flap.     Pathology noted benign pancreatic serous cystadenoma (6.8 cm), margins are negative for neoplasm, spleen with no significant pathologic abnormality.  She was eventually discharged on 24 with home oxygen and close outpatient follow-up.     On 24 she presented to the ED d/t worsening short of breath. CT CHEST on 24 noted bilateral segmental and subsegmental pulmonary emboli with changes compatible with significant right heart strain. CT ABD/PELVIS on 24 noted postop changes of splenectomy and distal pancreatectomy, fluid collection left upper quadrant is seen which could represent postop seroma or possibly pancreatic leak and right lower lobe segmental and subsegmental pulmonary embolus. She was subsequently admitted and started on antibiotics. On 8/15/24 she underwent CT-guided retroperitoneal abscess drainage and LUQ retroperitoneal catheter drainage with CT guidance. On 24 she underwent right atrial thrombectomy by Dr. Bowen, pathology noted benign thrombus. On 24 she underwent CT guided left upper quadrant fluid  "collection catheter drainage. Her condition eventually improved and she was discharged home on 8/23/24 with the drain in place and instructions to follow up with our office for drain management.     Yesterday 8/27/24 I received a call from patient's daughter Teri regarding minimal drain output of \"less than 1 mL daily\" since the patient was discharged home and that they would like to see if drain can be removed as the patient has been finding this uncomfortable and painful at times. Discussed with Dr. Esquivel, imaging ordered for confirmation. CT PANCREAS on 8/27/24 noted LEFT upper quadrant drain in place with minimal adjacent peritoneal gas and no significant residual or recurrent fluid collection demonstrated. Appointment scheduled with patient today to remove drain, patient arrived with spouse. Upon closer inspection, fluid in drain appears to be about 25 mL and patient states this is the output so far for today. Also received drain output record from patient, however measurements appear to be in ounces instead of mL. Based on log, patient has had about 1.5-1.6 oz (44-47 mL) per day. Discussed with patient that indications for removal of drain are output of 30 mL or less per day for 2 consecutive days and that we will need to keep the drain in place for now. However, also discussed that we can draw a fluid amylase from her drain and consider removing the drain this upcoming Friday if this comes back as normal. She is agreeable to this. Steris in place over abdominal incision and this appears to be healing well. Patient also brought up concerns regarding a \"small swollen area\" on her back measuring about 2-3 inches by 4 inches that migrated below her bra strap. Patient states that this has since resolved and denies shortness of breath or difficulty breathing. Also had questions regarding rescheduling CT abdomen scheduled for 9/16/24, advised to keep this appointment for now. She reports no problems with " eating or drinking, she has been taking multiple naps daily and states she feels energized afterwards. Denies fever, chills, nausea, or vomiting.     Update 9/4/24  She is here today for further follow-up. She is feeling pretty well. Drain is putting out about 25 cc of thick fluid a day. Last amylase a week ago as 5000.     Update 9/11/24  She is here today for follow up regarding pancreatic drain.  The drain is putting out only about 5 cc a day.  She is feeling well.     Update 9/17/24  She is here today for reevaluation. She has been feeling tired and a little bit off for the last day.  She had some nausea this morning. Denies fevers     Update 10/8/24  She presented to the ED on 9/17/24 for elevated WBC and recurrent intra-abdominal fluid collection. CT ABD/PELVIS  on 9/17/24 notable for fluid collection with some peripheral enhancement is identified in the distal pancreatic region including the region of previous pancreatectomy, cholelithiasis, and left pleural effusion and consolidations in the left lung base. On 9/19/24 she underwent CT guided catheter drainage with placement of 10 [place] locking loop catheter LUQ. CT ABD/PELVIS on 9/23/24 notable for decreased size of the fluid collection in the distal pancreatectomy-lumpectomy bed following tube placement and increased LEFT pleural effusion with overlying atelectasis. On 9/24/24 she underwent abscessogram, ultrasound guided left sided diagnostic, and therapeutic thoracentesis. Pathology did not identify malignancy in the thoracentesis fluid. CXR 2-VIEW on 9/27/24 noted interval increase in size of the left pleural effusion obscuring the left lung base and she then underwent ultrasound guided left sided therapeutic thoracentesis afterwards. Follow-up CXR 1-VIEW on 9/27/24 noted decreased size of left pleural effusion and no pneumothorax. CT ABDOMEN on 9/29/24 note multiloculated enhancing fluid collection in the left abdomen with pigtail catheter in place  with fluid collection increased in size since prior study, loculated appearing left pleural effusion, and linear consolidations in the bilateral lung bases with left greater than right appearance favoring atelectasis. IR chest tube was considered but ultimately deferred as her CXR on 10/2/24 noted relatively unchanged left lung pleural effusion compared to the previous study. After extensive multidisciplinary discussion, she was discharged on 10/2/24 with strict return precautions in case of recurrence of respiratory or infectious symptoms, orders for repeat chest x-ray, and follow-up visit with surgical oncology.     CXR 2-VIEW on 10/7/24 noted interval decrease in left pleural effusion.      She is here today accompanied by her  for follow-up after her recent discharge from the hospital. She continues recover well at home. She denies experiencing shortness of breath and states she is able to breather deeper now. She has also been using her incentive spirometer at home with improving results. States she is tolerating a regular diet and is slowly regaining her appetite. She is also slowly increasing her activity level at home. She is completing her course of Augmentin, Lasix, and potassium tablet today. She did have a question regarding a referral placed recently to vascular medicine which was discussed with Dr. Espinoza.     Update 10/22/24  CXR 2-VIEW on 10/18/24 noted smaller left pleural effusion with left basilar atelectasis.     On 10/22/24 her daughter called our office with concerns of side effects from Eliquis, specifically fatigue, shakiness, and GI upset. Denied fever or chills at the time. STAT CBC and CMP were ordered, notable for leukocytosis 17.1 and elevated Tbili 2.0.     She is here today accompanied by her  for follow up. They spoke to Dr. Eqsuivel last night after receiving lab results, Augmentin and STAT CT ABD/PELVIS ordered. She was also instructed to rehydrate based on her  "lab results. This morning she states she feels \"a little better\" but is still feeling tired. They state they have been rehydrating with Gatorade and she is able to tolerate eating and drinking however her  states she is still eating smaller options compared to before. Having normal BMs, denies fever, chills, nausea, or vomiting at this time. Her CT is scheduled for 7 PM tonight but is requesting an earlier appointment if possible.     Update 10/30/24  CT C/A/P on 10/22/24 noted postsurgical changes from the distal pancreatectomy and splenectomy with a 4.0 x 6.0 cm fluid collection adjacent to the pancreas/surgical bed extending to the left hemidiaphragm. Irregular enhancing soft tissue component along the diaphragmatic area raises concern for recurrent disease with exudate or infection being less likely. There is also a questionable area of breakthrough and possible extension into the chest cavity. Moderate left pleural effusion with left basilar atelectasis. Thoracentesis ordered with analysis of collected fluid per recommendations per Dr. Esquivel and Dr. Espinoza.     CXR 2-VIEW on 10/24/24 noted stable small to moderate left effusion and associated atelectasis. On 10/24/24 she completed ultrasound guided left sided diagnostic and therapeutic thoracentesis with 700 mL of pleural fluid withdrawn. Amylase was 20 and wound cultures were negative.     She is here today accompanied by  for follow up. She is finishing course of Augmentin. Labs on 10/28/24 notable for slightly elevated WBC 12.9, otherwise overall improved from 10/21/24. She otherwise states feeling better especially after completion of thoracentesis. Reports appetite noticeably improving with regular, formed BMs. Still having occasional abdominal cramping, states \"d/t Eliquis\". Denies fever, chills, nausea or vomiting. Denies shortness of breath or difficulty breathing. Slowly increasing activity at home.      Update 1/7/25  On 12/4/24 " she returned to the ED due to shortness of breath, CXR on 12/4/24 noted stable large left pleural effusion. On 12/9/24 she completed bronchoscopy with thoracoscopic pleurodesis by Dr. Saul. She was eventually discharged on 12/12/24.     She returned to the ED on 12/19/24 due to dyspnea, abdominal pain, and cramping. CT ABD/PELVIS on 12/19/24 noted rim-enhancing irregular fluid collection under the left hemidiaphragm is larger since prior study, now 7.2 x 8.9 cm, and likely represents an abscess, partially visualized loculated left pleural effusion/empyema containing some pockets of air likely related to infection under the hemidiaphragm with fluid in the left major fissure, cholelithiasis, small hiatal hernia, and colonic diverticulosis. On 12/21/24 she completed LUQ drain placement by Dr. Bowen. On 12/23/24 she completed ERCP, sphincterotomy, and stent placement by Dr. Quiroz. She was eventually discharged on 12/24/24.     CXR 2-VIEW on 12/29/24 noted small left pleural effusion with patchy left basilar opacities.     She is here today for follow-up and drain assessment.  Average drain output has been somewhere between 15 and 20 cc a day.  Somewhat thick.  She has had no fevers or chills.  She is otherwise feeling very well.  She feels like she is getting her energy back and she is eating better.  Denies any fevers or chills.     Update 1/29/25  CT ABD/PELVIS on 1/9/25 noted interval decrease in size of rim-enhancing multiloculated left upper quadrant fluid collection/abscess with size measurements above, decreased size of left empyema with small residual, and no other significant change.     CT ABD/PELVIS on 1/22/25 noted stable rim-enhancing multiloculated left upper quadrant fluid collection/abscess, stable small left empyema, and no significant change.     CT ABD/PELVIS on 1/28/25 noted small loculated pleural effusion/empyema in left lung base unchanged. Stable 5 x 5.2 cm fluid collection/abscess in the  splenic bed. Locking loop drain is unchanged in position.      She is here today for follow up regarding overall recovery and drain management. She states overall she is doing well, she is eating better, and has gained a little bit weight. She states she is more active at home. She has tracked daily output from drain - and it typically is 15-25 mL per day. They flush the drain twice per day with 5-10 mL NS. She denies fever, chills, and other systemic concerns. She recently changed PCP, and had full set of labs and started taking multi-vitamins.     Update 2/11/25  She is here today for follow up regarding abdominal IR drain. She has recorded 15-25 mL clear output on daily basis. She is eating well, and does normal activities. She denies fever, chills, or any other systemic concerns. She is joined by her .    Update 4/29/25  CT PANCREAS on 4/23/25 noted a 4.6 x 6.4 x 2.9 cm fluid collection in the left upper quadrant abutting the left hemidiaphragm. No gas in the collection. Distended gallbladder with multiple gallstones. There is pericholecystic fluid. Acute cholecystitis is possible. Small left pleural effusion, similar to prior.     She is here today for follow up. She reports she is doing well overall, and she is eating well, and has gained weight, is more active, and states she has no pain. Recent labs notable for tbili 1.6 (slightly elevated) - however, she denies any jaundice, abdominal discomfort, loss of appetite, pruritus, or other related symptoms. She is accompanied by her .    Past Medical History:   Diagnosis Date    Abdominal infection (HCC) 09/20/2024    Acute respiratory failure with hypoxia (HCC) 08/14/2024    Anesthesia     nausea post op    Atrial thrombus 08/15/2024    Cancer (HCC)     1987 breast left    Cancer of overlapping sites of left female breast (HCC)     Cataract 2024    IOL bilat    High cholesterol     Hypokalemia 09/29/2024    Hypomagnesemia 09/25/2024     Hypothyroidism     Intra-abdominal fluid collection 08/14/2024    Long term (current) use of aromatase inhibitors 01/16/2024    Osteoporosis 12/31/2024    PONV (postoperative nausea and vomiting) 1987    Just felt nausous after anesthesia    Primary hypertension 09/17/2024    Pulmonary embolism with acute cor pulmonale, unspecified chronicity, unspecified pulmonary embolism type (HCC) 08/14/2024    Thyroid nodule      Past Surgical History:   Procedure Laterality Date    MS ERCP,DIAGNOSTIC N/A 12/23/2024    Procedure: ERCP (ENDOSCOPIC RETROGRADE CHOLANGIOPANCREATOGRAPHY);  Surgeon: Derrick Quiroz M.D.;  Location: [facility];  Service: Gastroenterology    MS ERCP DUCT STENT PLACEMENT N/A 12/23/2024    Procedure: ERCP, WITH TUBE OR STENT INSERTION;  Surgeon: Derrick Quiroz M.D.;  Location: [facility];  Service: Gastroenterology    SPHINCTEROTOMY N/A 12/23/2024    Procedure: SPHINCTEROTOMY;  Surgeon: Derrick Quiroz M.D.;  Location: [facility];  Service: Gastroenterology    MS THORACOSCOPY,DX NO BX Left 12/9/2024    Procedure: THORACOSCOPY - LEFT THORACOSCOPIC PLEURODESIS;  Surgeon: Glenn Saul M.D.;  Location: SURGERY [facility];  Service: Thoracic    MS ULTRASONIC GUIDANCE, INTRAOPERATIVE  8/5/2024    Procedure: ULTRASOUND GUIDANCE;  Surgeon: Sachin Espinoza M.D.;  Location: [facility];  Service: General    PANCREATECTOMY N/A 8/5/2024    Procedure: OPEN DISTAL PANCREATECTOMY WITH SPLENECTOMY, NODE DISSECTION;  Surgeon: Sachin Espinoza M.D.;  Location: [facility];  Service: General    SPLENECTOMY N/A 8/5/2024    Procedure: SPLENECTOMY;  Surgeon: Sachin Espinoza M.D.;  Location: SURGERY [facility];  Service: General    CREATION, FLAP, OMENTUM N/A 8/5/2024    Procedure: CREATION, FLAP, OMENTUM;  Surgeon: Sachin Espinoza M.D.;  Location: [facility];  Service: General    PB MASTECTOMY, PARTIAL Left  08/01/2022    Procedure: MELLO LOCALIZED LEFT PARTIAL MASTECTOMY;  Surgeon: Elena Arroyo M.D.;  Location: SURGERY [facility];  Service: General    OTHER ORTHOPEDIC SURGERY  2019    back surgery    OTHER  2001    replace left breast implant    OTHER  1988    Left breast implant/lift    OTHER  1987    left mastectomy    LAMINOTOMY      LUMPECTOMY      PRIMARY C SECTION       Allergies   Allergen Reactions    Synthroid [Fd&C Red #40 Al Paul-Levothyroxine] Hives and Unspecified     Hives, Brand specific. Some brands are ok and some are not    Tape Unspecified     Skin degradation with use of tegaderm     [place] Thyroid [Thyroid] Diarrhea     diarrhea     Outpatient Encounter Medications as of 4/29/2025   Medication Sig Dispense Refill    apixaban (ELIQUIS) 5mg Tab Take 1 Tablet by mouth 2 times a day. 180 Tablet 0    ibandronate (BONIVA) 150 MG tablet Take 1 Tablet by mouth every 30 days. 3 Tablet 4    liothyronine (CYTOMEL) 5 MCG Tab Take 2.5-5 mcg by mouth see administration instructions. 5 mcg on Monday, Wednesday, Friday, 2.5 mcg on all other days      levothyroxine (SYNTHROID) 100 MCG Tab Take 100 mcg by mouth every morning on an empty stomach. Only generic       No facility-administered encounter medications on file as of 4/29/2025.     Social History     Socioeconomic History    Marital status:      Spouse name: Not on file    Number of children: Not on file    Years of education: Not on file    Highest education level: Bachelor's degree (e.g., BA, AB, BS)   Occupational History     Comment: retired banker   Tobacco Use    Smoking status: Never     Passive exposure: Past    Smokeless tobacco: Never   Vaping Use    Vaping status: Never Used   Substance and Sexual Activity    Alcohol use: Never    Drug use: Never    Sexual activity: Not Currently     Partners: Male     Birth control/protection: Abstinence, Male Sterilization, Post-Menopausal     Comment:    Other Topics Concern    Not on file    Social History Narrative    Not on file     Social Drivers of Health     Financial Resource Strain: Low Risk  (12/28/2024)    Overall Financial Resource Strain (CARDIA)     Difficulty of Paying Living Expenses: Not hard at all   Food Insecurity: No Food Insecurity (12/28/2024)    Hunger Vital Sign     Worried About Running Out of Food in the Last Year: Never true     Ran Out of Food in the Last Year: Never true   Transportation Needs: No Transportation Needs (12/28/2024)    PRAPARE - Transportation     Lack of Transportation (Medical): No     Lack of Transportation (Non-Medical): No   Physical Activity: Inactive (12/28/2024)    Exercise Vital Sign     Days of Exercise per Week: 0 days     Minutes of Exercise per Session: 0 min   Stress: No Stress Concern Present (12/28/2024)    [employer] [place] [employer] - Occupational Stress Questionnaire     Feeling of Stress : Not at all   Social Connections: Moderately Integrated (12/28/2024)    Social Connection and Isolation Panel [NHANES]     Frequency of Communication with Friends and Family: More than three times a week     Frequency of Social Gatherings with Friends and Family: Once a week     Attends Yazdanism Services: Never     Active Member of Clubs or Organizations: Yes     Attends Club or Organization Meetings: More than 4 times per year     Marital Status:    Intimate Partner Violence: Not At Risk (12/19/2024)    Humiliation, Afraid, Rape, and Kick questionnaire     Fear of Current or Ex-Partner: No     Emotionally Abused: No     Physically Abused: No     Sexually Abused: No   Housing Stability: Low Risk  (12/28/2024)    Housing Stability Vital Sign     Unable to Pay for Housing in the Last Year: No     Number of Times Moved in the Last Year: 0     Homeless in the Last Year: No      Social History     Tobacco Use   Smoking Status Never    Passive exposure: Past   Smokeless Tobacco Never     Social History     Substance and Sexual Activity    Alcohol Use Never     Social History     Substance and Sexual Activity   Drug Use Never      Family History   Problem Relation Age of Onset    Cancer Mother         Ovarian    Ovarian Cancer Mother     Dementia Father     Heart Disease Father     Hyperlipidemia Neg Hx      Review of Systems   Constitutional:  Negative for chills, fever, malaise/fatigue and weight loss.   Respiratory:  Negative for cough and shortness of breath.    Cardiovascular:  Negative for chest pain.   Gastrointestinal:  Negative for abdominal pain, constipation, diarrhea, nausea and vomiting.        Objective:   LMP  (LMP Unknown)     Physical Exam  Vitals and nursing note reviewed.   Constitutional:       Appearance: Normal appearance. She is normal weight. She is not ill-appearing.   HENT:      Head: Normocephalic and atraumatic.      Nose: Nose normal.      Mouth/Throat:      Pharynx: Oropharynx is clear.   Eyes:      Conjunctiva/sclera: Conjunctivae normal.   Cardiovascular:      Rate and Rhythm: Normal rate.   Pulmonary:      Effort: Pulmonary effort is normal. No respiratory distress.   Abdominal:      General: There is no distension.      Palpations: Abdomen is soft.      Tenderness: There is no abdominal tenderness. There is no guarding.      Comments: Well healed surgical incisions     Skin:     General: Skin is warm and dry.      Coloration: Skin is not jaundiced.   Neurological:      Mental Status: She is alert and oriented to person, place, and time.   Psychiatric:         Mood and Affect: Mood normal.         Behavior: Behavior normal.         Labs   Latest Reference Range & Units 04/22/25 09:09   WBC 4.8 - 10.8 K/uL 9.1   RBC 4.20 - 5.40 M/uL 5.09   Hemoglobin 12.0 - 16.0 g/dL 14.8   Hematocrit 37.0 - 47.0 % 45.0   MCV 81.4 - 97.8 fL 88.4   MCH 27.0 - 33.0 pg 29.1   MCHC 32.2 - 35.5 g/dL 32.9   RDW 35.9 - 50.0 fL 44.1   Platelet Count 164 - 446 K/uL 569 (H)   MPV 9.0 - 12.9 fL 10.0   (H): Data is abnormally high      Latest  Reference Range & Units 04/22/25 09:09   Sodium 135 - 145 mmol/L 136   Potassium 3.6 - 5.5 mmol/L 4.0   Chloride 96 - 112 mmol/L 99   Co2 20 - 33 mmol/L 24   Anion Gap 7.0 - 16.0  13.0   Glucose 65 - 99 mg/dL 98   Bun 8 - 22 mg/dL 14   Creatinine 0.50 - 1.40 mg/dL 0.68   GFR (CKD-EPI) >60 mL/min/1.73 m 2 89   Calcium 8.5 - 10.5 mg/dL 9.3   Correct Calcium 8.5 - 10.5 mg/dL 9.5   AST(SGOT) 12 - 45 U/L 25   ALT(SGPT) 2 - 50 U/L 16   Alkaline Phosphatase 30 - 99 U/L 101 (H)   Total Bilirubin 0.1 - 1.5 mg/dL 1.6 (H)   Albumin 3.2 - 4.9 g/dL 3.8   Total Protein 6.0 - 8.2 g/dL 7.8   Globulin 1.9 - 3.5 g/dL 4.0 (H)   A-G Ratio g/dL 1.0   (H): Data is abnormally high      Imaging  CT PANCREAS (4/23/25)  IMPRESSION:  1.  Interval removal of left upper quadrant pigtail catheter. There is a 4.6 x 6.4 x 2.9 cm fluid collection in the left upper quadrant abutting the left hemidiaphragm. No gas in the collection.  2.  Distended gallbladder with multiple gallstones. There is pericholecystic fluid. Acute cholecystitis is possible.  3.  Small left pleural effusion, similar to prior. Patchy left basilar opacities, likely atelectasis or scarring.    CT ABD/PELVIS 1/28/25  IMPRESSION:     1.  Small loculated pleural effusion/empyema in left lung base unchanged.     2.  Previous splenectomy and distal pancreatectomy.     3.  Stable 5 x 5.2 cm fluid collection/abscess in the splenic bed. Locking loop drain is unchanged in position.     4.  Nonobstructing left-sided nephrolithiasis.     5.  Cholelithiasis.     CT ABD/PELVIS 1/22/25  IMPRESSION:  1.  Stable rim-enhancing multiloculated left upper quadrant fluid collection/abscess.  2.  Stable small left empyema.  3.  No significant change.     CT ABD/PELVIS 1/9/25  IMPRESSION:  1.  Interval decrease in size of rim-enhancing multiloculated left upper quadrant fluid collection/abscess with size measurements above.  2.  Decreased size of left empyema with small residual.  3.  No other  significant change.     CXR 2-VIEW 12/29/24  IMPRESSION:  Small left pleural effusion with patchy left basilar opacities.     CT ABD/PELVIS 12/19/24  IMPRESSION:  1.  Rim-enhancing irregular fluid collection under the left hemidiaphragm is larger since prior study, now 7.2 x 8.9 cm, and likely represents an abscess.  2.  Partially visualized loculated left pleural effusion/empyema containing some pockets of air, likely related to infection under the hemidiaphragm. Fluid in the left major fissure.  3.  Cholelithiasis.  4.  Nonobstructing left nephrolithiasis.  5.  Small hiatal hernia.  6.  Colonic diverticulosis.  7.  Persistent small pericardial effusion.     CXR 2-VIEW (10/24/24)  IMPRESSION:  1.  Stable small to moderate left effusion and associated atelectasis.  2.  Hazy opacities in the left midlung are nonspecific, pneumonia can be considered in the appropriate clinical settings.     CT C/A/P (10/22/24)  IMPRESSION:  1. Postsurgical changes are noted from the distal pancreatectomy and splenectomy. There is a 4.0 x 6.0 cm fluid collection adjacent to the pancreas/surgical bed, extending to the left hemidiaphragm. Irregular enhancing soft tissue component along the diaphragmatic area raises concern for recurrent disease, with exudate or infection being less likely. There is also a questionable area of breakthrough and possible extension into the chest cavity. Moderate left pleural effusion with left basilar atelectasis.  2. Moderate hiatal hernia containing food debris.     CXR 2-VIEW (10/18/24)  IMPRESSION:  Smaller left pleural effusion with left basilar atelectasis.     CXR 2-VIEW (10/7/24)  IMPRESSION:  Interval decrease in left pleural effusion      CXR 2-VIEW (10/2/24)  IMPRESSION:  Moderate left pleural effusion with adjacent airspace disease.     CT ABDOMEN (9/29/24)  IMPRESSION:  1.  Multiloculated enhancing fluid collection in the left abdomen with pigtail catheter in place, fluid collection has  increased in size since prior study.  2.  Loculated appearing left pleural effusion.  3.  Linear consolidations in the bilateral lung bases, left greater than right, appearance favoring atelectasis.  4.  Enlarged bilateral inguinal lymph nodes, consider causes of adenopathy with additional workup as clinically appropriate  5.  Cholelithiasis  6.  Small hiatal hernia  7.  Atherosclerosis and atherosclerotic coronary artery disease     CXR 1-VIEW (9/27/24)  IMPRESSION:  Decreased size of left pleural effusion. No pneumothorax.     CXR 2-VIEW (9/27/24)  IMPRESSION:  1. Interval increase in size of the left pleural effusion, obscuring the left lung base.  2. The remainder of the lungs is clear.  3. Obscuration of the left cardiomediastinal border.  4. Other stable chronic degenerative changes.     CT ABD/PELVIS (9/23/24)  IMPRESSION:  1.  Decreased size of the fluid collection in the distal pancreatectomy-lumpectomy bed following tube placement  2.  Increased LEFT pleural effusion with overlying atelectasis  3.  Hiatal hernia  4.  Persistently thickened endometrium for age. Underlying mass is possible. Ultrasound could better assess.     CT ABD/PELVIS (9/17/24)  IMPRESSION:  1.  Post distal pancreatectomy.  2.  Fluid collection with some peripheral enhancement is identified in the distal pancreatic region including the region of previous pancreatectomy. Developing pseudocyst is a possibility. No emphysema or hemorrhage is appreciated. Remaining pancreas enhances normally.  3.  There is cholelithiasis.  4.  Left pleural effusion and consolidations in the left lung base.  5.  No change in hiatal hernia.  6.  Prominent endometrial cavity again noted.     CT PANCREAS (8/27/24)  IMPRESSION:  1.  Prior distal pancreatectomy and splenectomy.  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  2.  LEFT upper quadrant drain in place with minimal adjacent peritoneal gas.  No significant  residual or recurrent fluid collection demonstrated.  3.  Gallstones and minimal gallbladder wall thickening.  Cholecystitis is not excluded.  4.  Bilateral pleural fluid collections with associated atelectasis, worse on the LEFT.     CT ABD/PELVIS (8/20/24)  IMPRESSION:  1.  There is postoperative change consistent with distal pancreatectomy and splenectomy.  2.  The simple appearing left upper quadrant subphrenic fluid collection is again seen very similar to the recent prior study, possibly postoperative seroma as there is no rim enhancement to suggest abscess. Pancreatic leak is considered unlikely as the   majority of the fluid is in the splenic fossa rather than adjacent to the surgical margin of the pancreas.  3.  Stable bibasilar atelectasis and pleural effusions.  4.  Cholelithiasis again noted.  5.  Pulmonary arteries not well assessed on this exam in the right atrial thrombus is no longer evident. Small defect in the right ventricular apex is unchanged.     CT ABD/PELVIS (8/14/24)  IMPRESSION:  1.  Postop changes of splenectomy and distal pancreatectomy, fluid collection left upper quadrant is seen which could represent postop seroma or possibly pancreatic leak.  2.  Right lower lobe segmental and subsegmental pulmonary embolus.  3.  Trace right and small left pleural effusions.  4.  Linear densities of the lung bases suggesting atelectasis, component of left lower lobe infiltrate not excluded.  5.  Diverticulosis  6.  Cholelithiasis  7.  Cardiomegaly     CT CHEST (8/14/24)  IMPRESSION:  1.  Bilateral segmental and subsegmental pulmonary emboli with changes compatible with significant right heart strain.  2.  Small left and trace right pleural effusions.  3.  Linear consolidations in the bilateral lung bases suggests atelectasis, component of left lower lobe infiltrate is not excluded.  4.  4.0 cm ascending thoracic aortic aneurysm, radiographic follow-up and surveillance recommended as clinically  appropriate.  5.  Atherosclerosis and atherosclerotic coronary artery disease     CT C/A/P (6/28/24)  IMPRESSION:  1.  No definite CT evidence of infiltrating gastric mass although there is poor distention of the stomach with possible wall thickening of the gastric antrum.  2.  There is a large solid and cystic necrotic pancreatic tail mass suspicious for malignancy with no surrounding vascular involvement or lymphadenopathy.  3.  Mild hepatic steatosis with no focal hepatic lesions.  4.  Cholelithiasis without biliary dilatation.  5.  Moderate size hiatal hernia.  6.  Colonic diverticulosis without diverticulitis.  7.  Nonspecific borderline enlarged superior mediastinal right paraesophageal lymph node.  8.  There are postoperative changes of left mastectomy and axillary lymph node dissection.  9.  No parenchymal lung metastases.     Pathology  PATH 9/24/24  FINAL DIAGNOSIS:   A. Thoracentesis fluid:         No malignancy identified.         Cytology preparations, including cell block, demonstrate numerous           reactive mesothelial cells in a background of mixed           inflammatory cells; findings consistent with cell count.      PATH 8/16/24  FINAL DIAGNOSIS:   A. Right atrial mass:          Benign thrombus.      PATH 8/5/24  FINAL DIAGNOSIS:   A. Distal pancreas, spleen and nodes, distal pancreatectomy and   splenectomy:         Benign pancreatic serous cystadenoma (6.8 cm).          Margins are negative for neoplasm.          Background chronic pancreatitis.          Spleen with no significant pathologic abnormality.          Nine benign lymph nodes (0/9).      Procedures  12/23/24 ERCP, sphincterotomy, and stent placement by Dr. Quiroz     12/21/24 LUQ drain placement by Dr. Bowen     12/9/24 Bronchoscopy with thoracoscopic pleurodesis by Dr. Saul     10/24/24 Ultrasound guided left sided diagnostic and therapeutic thoracentesis by Cielo KEITH     9/27/24 Ultrasound guided left sided therapeutic  thoracentesis by [facility]     9/24/24 Abscessogram by Dr. Bowen and ultrasound guided left sided diagnostic and therapeutic thoracentesis by [facility]     9/19/24 CT guided catheter drainage with placement of 10 fr locking loop catheter LUQ by Dr. Ruff     8/22/24 CT guided left upper quadrant fluid collection catheter drainage      8/16/24 Right atrial thrombectomy by Dr. Bowen     8/15/24 CT-guided retroperitoneal abscess drainage  LUQ retroperitoneal catheter drainage with CT guidance.     8/5/24 by Dr. Espinoza  1.  Exploratory laparotomy.  2.  Intraoperative ultrasound survey of the pancreas using 5/12 MHz T-probe.  3.  Distal pancreatectomy and splenectomy.  4.  Stomach and celiac node dissection.  5.  Omental flap.     Mastectomy 2022    Diagnosis:     1. Pancreatic mass        2. Pancreatic fistula        3. H/O exploratory laparotomy        4. Intra-abdominal abscess (HCC)          Medical Decision Making:  Today's Assessment / Status / Plan:     Overall, Paige Gudino appears to be recovering well from recent surgery. The patient is tolerating a regular diet and is back to basic daily activities.     Surgical pain appears to be controlled, and she denies N/V. The surgical incisions appear to be healed as expected.     The patient was also seen and assessed by Dr Espinoza    We discussed the recent CT scan which noted mostly unchanged intra-abdominal fluid collection. Given, that she reports no current symptoms, the plan is to continue monitoring at this point.    We will recheck CMP in 1 month given the recently minimally elevated tbili.    The patient will be scheduled for next follow up visit in 6 months with updated CT imaging and Chromogranin A labs.    Anderson FOREMAN NP, have entered, reviewed and confirmed the above diagnoses related to this patient on this date of service, as per the time and date noted at top of this note.

## 2025-04-24 ENCOUNTER — OFFICE VISIT (OUTPATIENT)
Dept: MEDICAL GROUP | Facility: LAB | Age: 80
End: 2025-04-24
Payer: COMMERCIAL

## 2025-04-24 ENCOUNTER — TELEPHONE (OUTPATIENT)
Facility: MEDICAL CENTER | Age: 80
End: 2025-04-24

## 2025-04-24 VITALS
BODY MASS INDEX: 21.77 KG/M2 | HEIGHT: 64 IN | TEMPERATURE: 98.6 F | WEIGHT: 127.5 LBS | OXYGEN SATURATION: 96 % | SYSTOLIC BLOOD PRESSURE: 112 MMHG | DIASTOLIC BLOOD PRESSURE: 62 MMHG | RESPIRATION RATE: 16 BRPM | HEART RATE: 83 BPM

## 2025-04-24 DIAGNOSIS — R18.8 INTRA-ABDOMINAL FLUID COLLECTION: ICD-10-CM

## 2025-04-24 DIAGNOSIS — R17 ELEVATED BILIRUBIN: ICD-10-CM

## 2025-04-24 DIAGNOSIS — Z00.00 MEDICARE ANNUAL WELLNESS VISIT, SUBSEQUENT: Primary | ICD-10-CM

## 2025-04-24 DIAGNOSIS — K80.21 CALCULUS OF GALLBLADDER WITH BILIARY OBSTRUCTION BUT WITHOUT CHOLECYSTITIS: ICD-10-CM

## 2025-04-24 DIAGNOSIS — Z90.81 HISTORY OF SPLENECTOMY: ICD-10-CM

## 2025-04-24 DIAGNOSIS — D75.839 THROMBOCYTOSIS: ICD-10-CM

## 2025-04-24 RX ORDER — ASPIRIN 81 MG/1
81 TABLET ORAL DAILY
COMMUNITY

## 2025-04-24 ASSESSMENT — ACTIVITIES OF DAILY LIVING (ADL): BATHING_REQUIRES_ASSISTANCE: 0

## 2025-04-24 ASSESSMENT — PATIENT HEALTH QUESTIONNAIRE - PHQ9: CLINICAL INTERPRETATION OF PHQ2 SCORE: 0

## 2025-04-24 ASSESSMENT — FIBROSIS 4 INDEX: FIB4 SCORE: 0.86

## 2025-04-24 ASSESSMENT — ENCOUNTER SYMPTOMS: GENERAL WELL-BEING: GOOD

## 2025-04-24 NOTE — TELEPHONE ENCOUNTER
Patient's daughter called requesting to have Anderson or [place] call her and the patient with the results from the CT scan Rosangela had done yesterday. Patient's daughter is aware that Rosangela has an appointment on 4/29 but would like a call so they are not over thinking the results over the weekend.

## 2025-04-24 NOTE — PATIENT INSTRUCTIONS
Bring in copy of advanced directives once complete    fire extinguisher       Vaccines due that can be received only at pharmacy:  COVID  RSV (respiratory syncytial virus) (fall/winter)  Shingrix series (shingles) (2 shots)    Pneumococcal 20    
Male

## 2025-04-24 NOTE — PROGRESS NOTES
Chief Complaint   Patient presents with    Medicare Annual Wellness       HPI:  Paige Gudino is a 79 y.o. here for Medicare Annual Wellness Visit     Verbal consent was acquired by the patient to use [employer] ambient listening note generation during this visit Yes     History of Present Illness  The patient is a 77-year-old female who presents for her annual wellness visit.    She underwent laboratory tests a day ago and a CT scan last night, both of which were ordered by the surgical team. The results of the lab work were communicated to her daughter by a nurse practitioner, who expressed satisfaction with the findings. However, feedback regarding the CT scan results has not yet been received. Platelet levels have decreased. There is a fluid collection in the left upper quadrant along the diaphragm, with no gas present, and multiple gallstones were noted. Despite a diagnosis of acute cholecystitis, she reports no abdominal pain. A small fluid collection remains, which has been monitored and drained over the past 2 months, but she reports no current issues with breathing.    She has been off Eliquis for 2 months and is currently taking half a baby aspirin daily, as recommended by her cardiologist at [place]. She reports an improvement in her overall health, estimating it to be at 95% of normal.     She no longer requires naps and is able to perform daily activities independently, including showering, dressing, undressing, and household chores. She has also been able to drive her car for servicing and minor shopping. Her breathing has improved, and she reports no fevers.     She has advanced directives in place, which were updated approximately a year ago-she is not entirely certain the details of her advance directives but believes that they may have I would included DNR/DO NOT INTUBATE orders.     She continues to see her thyroid doctor, with the next appointment scheduled for 08/2025. She has  not experienced any falls and maintains regular exercise. She reports no constipation or diarrhea, with bowel movements occurring 1 to 3 times daily. She does not have a fire extinguisher at home but has a carbon monoxide detector and an attached garage.     Patient Active Problem List    Diagnosis Date Noted    Elevated bilirubin 04/26/2025    Cholelithiasis 04/26/2025    Osteoporosis 12/31/2024    History of partial pancreatectomy 12/31/2024    History of hypertension 12/31/2024    History of splenectomy 12/31/2024    History of pulmonary embolism 12/04/2024    Thrombocytosis 09/24/2024    AV block, [place] 1 09/19/2024    Thoracic ascending aortic aneurysm (HCC) 08/17/2024    Second degree heart block 08/16/2024    Intra-abdominal fluid collection 08/14/2024    History of breast cancer 08/05/2024    S/P chemotherapy, time since greater than 12 weeks 07/30/2024    History of mastectomy 07/30/2024    Trigger finger, right middle finger 07/10/2024    Other chronic sinusitis 08/14/2023    Tinnitus of both ears 08/14/2023    Cancer of overlapping sites of left breast (HCC) 08/01/2022    Sensorineural hearing loss (SNHL) of both ears 03/28/2022    Low serum vitamin B12 06/11/2021    Acquired hypothyroidism 12/15/2020    Thyroid nodule 12/15/2020    Hashimoto's thyroiditis 12/15/2020    Dyslipidemia 12/15/2020    Hypovitaminosis D 12/15/2020       Current Outpatient Medications   Medication Sig Dispense Refill    aspirin 81 MG EC tablet Take 81 mg by mouth every day.      ibandronate (BONIVA) 150 MG tablet Take 1 Tablet by mouth every 30 days. 3 Tablet 4    liothyronine (CYTOMEL) 5 MCG Tab Take 2.5-5 mcg by mouth see administration instructions. 5 mcg on Monday, Wednesday, Friday, 2.5 mcg on all other days      levothyroxine (SYNTHROID) 100 MCG Tab Take 100 mcg by mouth every morning on an empty stomach. Only generic       No current facility-administered medications for this visit.          Current supplements as per  medication list.     Allergies: Synthroid [fd&c red #40 [place]-levothyroxine], Tape, and [place] thyroid [thyroid]    Current social contact/activities: Garden, walk, spending time with .    She  reports that she has never smoked. She has been exposed to tobacco smoke. She has never used smokeless tobacco. She reports that she does not drink alcohol and does not use drugs.  Counseling given: Not Answered    ROS:    Gait: Uses no assistive device  Ostomy: No  Other tubes: No  Amputations: No  Chronic oxygen use: No  Last eye exam: 7/2024  Wears hearing aids: No   : Denies any urinary leakage during the last 6 months    Screening:    Depression Screening  Little interest or pleasure in doing things?  0 - not at all  Feeling down, depressed , or hopeless? 0 - not at all  Patient Health Questionnaire Score: 0     If depressive symptoms identified deferred to follow up visit unless specifically addressed in assessment and plan.    Interpretation of PHQ-9 Total Score   Score Severity   1-4 No Depression   5-9 Mild Depression   10-14 Moderate Depression   15-19 Moderately Severe Depression   20-27 Severe Depression    Screening for Cognitive Impairment  Do you or any of your friends or family members have any concern about your memory? No  Three Minute Recall (Village, Kitchen, Baby) 3/3    Henrietta clock face with all 12 numbers and set the hands to show 10 minutes past 11.  No Henrietta the clock and numbers, henrietta 5 past 11  Cognitive concerns identified deferred for follow up unless specifically addressed in assessment and plan.    Fall Risk Assessment  Has the patient had two or more falls in the last year or any fall with injury in the last year?  No    Safety Assessment  Do you always wear your seatbelt?  Yes  Any changes to home needed to function safely? No  Difficulty hearing.  No  Patient counseled about all safety risks that were identified.    Functional Assessment ADLs  Are there any barriers preventing you  from cooking for yourself or meeting nutritional needs?  No.    Are there any barriers preventing you from driving safely or obtaining transportation?  No.    Are there any barriers preventing you from using a telephone or calling for help?  No    Are there any barriers preventing you from shopping?  No.    Are there any barriers preventing you from taking care of your own finances?  No    Are there any barriers preventing you from managing your medications?  No    Are there any barriers preventing you from showering, bathing or dressing yourself? No    Are there any barriers preventing you from doing housework or laundry? No  Are there any barriers preventing you from using the toilet?No  Are you currently engaging in any exercise or physical activity?  Yes.      Self-Assessment of Health  What is your perception of your health? Good    Do you sleep more than six hours a night? Yes    In the past 7 days, how much did pain keep you from doing your normal work? None    Do you spend quality time with family or friends (virtually or in person)? Yes    Do you usually eat a heart healthy diet that constists of a variety of fruits, vegetables, whole grains and fiber? Yes    Do you eat foods high in fat and/or Fast Food more than three times per week? No    How concerned are you that your medical conditions are not being well managed? Not at all    Are you worried that in the next 2 months, you may not have stable housing that you own, rent, or stay in as part of a household? No      Advance Care Planning  Do you have an Advance Directive, Living Will, Durable Power of , or POLST? Yes          is not on file - instructed patient to bring in a copy to scan into their chart  - Advised to bring a copy of advanced directives for review.  - Discussed the importance of having clear instructions for resuscitation preferences.  - Consideration of a POLST form for clear communication of wishes in emergency situations,  declined to fill out today.     Health Maintenance Summary            Upcoming       Meningococcal Immunization (1 - Risk 2-dose series) Postponed until 12/31/2025     No completion history exists for this topic.              Meningococcal B Vaccine (1 of 4 - Increased Risk) Postponed until 12/31/2025     No completion history exists for this topic.              Influenza Vaccine (Season Ended) Next due on 9/1/2025 09/23/2020  Imm Admin: Influenza Vaccine Quad Inj (Pf)    09/07/2019  Imm Admin: Influenza, Unspecified - HISTORICAL DATA    10/01/2018  Imm Admin: Influenza Vaccine Quad Inj (Pf)    09/30/2017  Imm Admin: Influenza Vaccine Quad Inj (Pf)    10/17/2016  Imm Admin: Influenza (IM) Preservative Free - HISTORICAL DATA     Only the first 5 history entries have been loaded, but more history exists.            Annual Wellness Visit (Yearly) Next due on 4/24/2026 04/24/2025  Visit Dx: Medicare annual wellness visit, subsequent    04/24/2025  Level of Service: [place] ANNUAL WELLNESS VISIT-INCLUDES PPPS SUBSEQUE*    10/03/2023  Level of Service: ANNUAL WELLNESS VISIT-INCLUDES PPPS SUBSEQUE*    10/03/2023  Visit Dx: Medicare annual wellness visit, subsequent    03/28/2022  Level of Service: [place] ANNUAL WELLNESS VISIT-INCLUDES PPPS SUBSEQUE*      Only the first 5 history entries have been loaded, but more history exists.              Bone Density Scan (Every 2 Years) Next due on 7/8/2026 07/08/2024  DS-BONE DENSITY STUDY (DEXA)    05/18/2022  DS-BONE DENSITY STUDY (DEXA)    08/09/2005  DS-BONE DENSITY STUDY (DEXA)              Colorectal Cancer Screening (Colonoscopy - Every 3 Years) Next due on 12/12/2026 12/12/2023  AMB EXTERNAL COLONOSCOPY RESULTS    12/06/2023  AMB EXTERNAL COLONOSCOPY RESULTS    10/19/2023  OCCULT BLOOD FECES IMMUNOASSAY    03/28/2021  REFERRAL TO GI FOR COLONOSCOPY    02/25/2015  Colonoscopy (Reason not specified)      Only the first 5 history entries have been loaded, but more  history exists.              IMM DTaP/Tdap/Td Vaccine (2 - Td or Tdap) Next due on 6/25/2029 06/25/2019  Imm Admin: Tdap Vaccine                      Completed or No Longer Recommended       Hepatitis C Screening  Completed      03/18/2021  HEP C VIRUS ANTIBODY              Hepatitis A Vaccine (Hep A) (Series Information) Aged Out      No completion history exists for this topic.              Hepatitis B Vaccine (Hep B) (Series Information) Aged Out     No completion history exists for this topic.              HPV Vaccines (Series Information) Aged Out     No completion history exists for this topic.              Polio Vaccine (Inactivated Polio) (Series Information) Aged Out     No completion history exists for this topic.              Mammogram  Discontinued        Frequency changed to Never automatically (Topic No Longer Applies)    02/07/2025  MA-SCREENING MAMMO RIGHT W/TOMOSYNTHESIS W/CAD    12/11/2023  MA-SCREENING MAMMO RIGHT W/TOMOSYNTHESIS W/CAD    12/08/2022  MA-SCREENING MAMMO RIGHT W/TOMOSYNTHESIS W/CAD    11/05/2021  MA-SCREENING MAMMO RIGHT W/TOMOSYNTHESIS W/CAD     Only the first 5 history entries have been loaded, but more history exists.            Zoster (Shingles) Vaccines  Discontinued      01/08/2015  Imm Admin: Zoster Vaccine Live (ZVL) (Zostavax) - HISTORICAL DATA              COVID-19 Vaccine  Discontinued      No completion history exists for this topic.              Pneumococcal Vaccine: 50+ Years  Discontinued      01/23/2021  Imm Admin: Pneumococcal Conjugate Vaccine (Prevnar/PCV-13)    06/25/2019  Imm Admin: Pneumococcal Conjugate Vaccine (Prevnar/PCV-13)                            Patient Care Team:  Tahira Meléndez D.O. as PCP - General (Family Medicine)  Casimiro Fowler M.D. (Hematology & Oncology)  Jackie Velarde M.D. (Endocrinology)  Chaim Velasco M.D. (Surgery)  Shadi Paulino M.D. (Cardiovascular Disease (Cardiology))  Glenn Saul M.D.  (Surgery)    Social History     Tobacco Use    Smoking status: Never     Passive exposure: Past    Smokeless tobacco: Never   Vaping Use    Vaping status: Never Used   Substance Use Topics    Alcohol use: Never    Drug use: Never     Family History   Problem Relation Age of Onset    Cancer Mother         Ovarian    Ovarian Cancer Mother     Dementia Father     Heart Disease Father     Hyperlipidemia Neg Hx      She  has a past medical history of Abdominal infection (HCC) (09/20/2024), Acute respiratory failure with hypoxia ([facility]) (08/14/2024), Anesthesia, Atrial thrombus (08/15/2024), Cancer ([facility]), Cancer of overlapping sites of left female breast (HCC), Cataract (2024), Deep vein thrombosis (DVT) of both lower extremities ([facility]) (08/15/2024), Empyema, left ([facility]) (09/24/2024), High cholesterol, Hypokalemia (09/29/2024), Hypomagnesemia (09/25/2024), Hypothyroidism, Intra-abdominal abscess ([facility]) (09/17/2024), Intra-abdominal fluid collection (08/14/2024), Long term (current) use of aromatase inhibitors (01/16/2024), Osteoporosis (12/31/2024), PONV (postoperative nausea and vomiting) (1987), Primary hypertension (09/17/2024), Pulmonary embolism with acute cor pulmonale, unspecified chronicity, unspecified pulmonary embolism type ([facility]) (08/14/2024), and Thyroid nodule.   Past Surgical History:   Procedure Laterality Date    [place] ERCP,DIAGNOSTIC N/A 12/23/2024    Procedure: ERCP (ENDOSCOPIC RETROGRADE CHOLANGIOPANCREATOGRAPHY);  Surgeon: Derrick Quiroz M.D.;  Location: SURGERY SAME DAY [facility];  Service: Gastroenterology    [place] ERCP DUCT STENT PLACEMENT N/A 12/23/2024    Procedure: ERCP, WITH TUBE OR STENT INSERTION;  Surgeon: Derrick Quiroz M.D.;  Location: SURGERY SAME DAY [facility];  Service: Gastroenterology    SPHINCTEROTOMY N/A 12/23/2024    Procedure: SPHINCTEROTOMY;  Surgeon: Derrick Quiroz M.D.;  Location: SURGERY SAME DAY [facility];  Service: Gastroenterology    [place] THORACOSCOPY,DX NO BX Left 12/9/2024     "Procedure: THORACOSCOPY - LEFT THORACOSCOPIC PLEURODESIS;  Surgeon: Glenn Saul M.D.;  Location: SURGERY [facility];  Service: Thoracic    ME ULTRASONIC GUIDANCE, INTRAOPERATIVE  8/5/2024    Procedure: ULTRASOUND GUIDANCE;  Surgeon: Sachin Espinoza M.D.;  Location: [facility];  Service: General    PANCREATECTOMY N/A 8/5/2024    Procedure: OPEN DISTAL PANCREATECTOMY WITH SPLENECTOMY, NODE DISSECTION;  Surgeon: Sachin Espinoza M.D.;  Location: SURGERY [facility];  Service: General    SPLENECTOMY N/A 8/5/2024    Procedure: SPLENECTOMY;  Surgeon: Sachin Espinoza M.D.;  Location: SURGERY [facility];  Service: General    CREATION, FLAP, OMENTUM N/A 8/5/2024    Procedure: CREATION, FLAP, OMENTUM;  Surgeon: Sachin Espinoza M.D.;  Location: [facility];  Service: General    PB MASTECTOMY, PARTIAL Left 08/01/2022    Procedure: MELLO LOCALIZED LEFT PARTIAL MASTECTOMY;  Surgeon: Elena Arroyo M.D.;  Location: [facility];  Service: General    OTHER ORTHOPEDIC SURGERY  2019    back surgery    OTHER  2001    replace left breast implant    OTHER  1988    Left breast implant/lift    OTHER  1987    left mastectomy    LAMINOTOMY      LUMPECTOMY      PRIMARY C SECTION         Exam:   /62 (BP Location: Right arm, Patient Position: Sitting, BP Cuff Size: Small adult)   Pulse 83   Temp 37 °C (98.6 °F) (Temporal)   Resp 16   Ht 1.626 m (5' 4\")   Wt 57.8 kg (127 lb 8 oz)   SpO2 96%  Body mass index is 21.89 kg/m².    Hearing good.    Dentition good  Physical Exam  Vitals reviewed.   Constitutional:       General: She is not in acute distress.     Appearance: Normal appearance. She is not ill-appearing.   HENT:      Head: Normocephalic and atraumatic.      Nose: Nose normal.      Mouth/Throat:      Mouth: Mucous membranes are moist.   Eyes:      General: No scleral icterus.     Conjunctiva/sclera: Conjunctivae normal.   Cardiovascular:      Rate and " Rhythm: Normal rate and regular rhythm.      Heart sounds: Normal heart sounds. No murmur heard.  Pulmonary:      Effort: Pulmonary effort is normal. No respiratory distress.      Breath sounds: Normal breath sounds. No stridor. No wheezing, rhonchi or rales.   Abdominal:      General: Abdomen is flat. Bowel sounds are normal. There is no distension.      Palpations: Abdomen is soft. There is no mass.      Tenderness: There is no abdominal tenderness. There is no guarding or rebound.   Musculoskeletal:      Cervical back: Normal range of motion and neck supple. No rigidity or tenderness.      Right lower leg: No edema.      Left lower leg: No edema.   Skin:     General: Skin is warm and dry.      Coloration: Skin is not jaundiced.   Neurological:      General: No focal deficit present.      Mental Status: She is alert and oriented to person, place, and time.   Psychiatric:         Mood and Affect: Mood normal.         Behavior: Behavior normal.         Thought Content: Thought content normal.       Assessment and Plan. The following treatment and monitoring plan is recommended:      1. Medicare annual wellness visit, subsequent  Services suggested: No services needed at this time, continue care with endocrinology, oncology and surgical oncology.  Health Care Screening: Age-appropriate preventive services recommended by USPTF and ACIP covered by Medicare were discussed today. Services ordered if indicated and agreed upon by the patient.  Immunizations reviewed/recommended and directed to the pharmacy if not available in clinic.  Referrals offered: Community-based lifestyle interventions to reduce health risks and promote self-management and wellness, fall prevention, nutrition, physical activity, tobacco-use cessation, weight loss, and mental health services as per orders if indicated.     Discussion today about general wellness and lifestyle habits:    Prevent falls and reduce trip hazards; Cautioned about securing  or removing rugs.  Have a working fire alarm, fire extinguisher and carbon monoxide detector.  Engage in regular physical activity and social activities.    2. Thrombocytosis  Chronic. Platelet count has decreased from 740 to 569.  - Currently taking half a baby aspirin (81 mg) daily per patient as recommended by her cardiologist, completed course of Eliquis 5 mg twice daily and prefers not to continue.  She does have a history of atrial thrombosis and pulmonary emboli.    3. History of splenectomy  - Discussion about the importance of vaccines due to increased risk of infections.  - Provided a list of recommended vaccines for her consideration, declined today.    4. Intra-abdominal fluid collection  Chronic, drain has been removed and empyema appears to be resolved.  Fluid collection still present but no longer loculated.  Feeling well without abdominal pain or fevers.  Patient to follow-up with ordering provider regarding plan based on these results.    5. Elevated bilirubin  6. Calculus of gallbladder with biliary obstruction but without cholecystitis  Reviewed with lab and imaging ordered by surgical oncology.  Patient without abdominal pain, bilirubin minimally elevated.  Gave precautions for pain/jaundice, follow-up with ordering provider.    Follow-up: Return in about 6 months (around 10/24/2025), or if symptoms worsen or fail to improve.

## 2025-04-26 PROBLEM — R17 ELEVATED BILIRUBIN: Status: ACTIVE | Noted: 2025-04-26

## 2025-04-26 PROBLEM — K80.20 CHOLELITHIASIS: Status: ACTIVE | Noted: 2025-04-26

## 2025-04-26 PROBLEM — K65.1 INTRA-ABDOMINAL ABSCESS (HCC): Status: RESOLVED | Noted: 2024-09-17 | Resolved: 2025-04-26

## 2025-04-26 PROBLEM — J86.9 EMPYEMA, LEFT (HCC): Status: RESOLVED | Noted: 2024-09-24 | Resolved: 2025-04-26

## 2025-04-26 PROBLEM — I82.403 DEEP VEIN THROMBOSIS (DVT) OF BOTH LOWER EXTREMITIES (HCC): Status: RESOLVED | Noted: 2024-08-15 | Resolved: 2025-04-26

## 2025-04-29 ENCOUNTER — OFFICE VISIT (OUTPATIENT)
Facility: MEDICAL CENTER | Age: 80
End: 2025-04-29
Payer: COMMERCIAL

## 2025-04-29 VITALS
OXYGEN SATURATION: 96 % | TEMPERATURE: 98.7 F | BODY MASS INDEX: 22.21 KG/M2 | HEIGHT: 64 IN | SYSTOLIC BLOOD PRESSURE: 122 MMHG | HEART RATE: 78 BPM | WEIGHT: 130.07 LBS | DIASTOLIC BLOOD PRESSURE: 72 MMHG

## 2025-04-29 DIAGNOSIS — K86.89 PANCREATIC FISTULA: ICD-10-CM

## 2025-04-29 DIAGNOSIS — Z98.890 H/O EXPLORATORY LAPAROTOMY: ICD-10-CM

## 2025-04-29 DIAGNOSIS — K86.89 PANCREATIC MASS: ICD-10-CM

## 2025-04-29 DIAGNOSIS — K65.1 INTRA-ABDOMINAL ABSCESS (HCC): ICD-10-CM

## 2025-04-29 DIAGNOSIS — R17 ELEVATED BILIRUBIN: ICD-10-CM

## 2025-04-29 ASSESSMENT — ENCOUNTER SYMPTOMS
NAUSEA: 0
COUGH: 0
VOMITING: 0
DIARRHEA: 0
ABDOMINAL PAIN: 0
WEIGHT LOSS: 0
CHILLS: 0
FEVER: 0
SHORTNESS OF BREATH: 0
CONSTIPATION: 0

## 2025-04-29 ASSESSMENT — FIBROSIS 4 INDEX: FIB4 SCORE: 0.86

## 2025-05-28 ENCOUNTER — HOSPITAL ENCOUNTER (OUTPATIENT)
Dept: LAB | Facility: MEDICAL CENTER | Age: 80
End: 2025-05-28
Attending: NURSE PRACTITIONER
Payer: COMMERCIAL

## 2025-05-28 LAB
ALBUMIN SERPL BCP-MCNC: 3.9 G/DL (ref 3.2–4.9)
ALBUMIN/GLOB SERPL: 1.1 G/DL
ALP SERPL-CCNC: 91 U/L (ref 30–99)
ALT SERPL-CCNC: 21 U/L (ref 2–50)
ANION GAP SERPL CALC-SCNC: 12 MMOL/L (ref 7–16)
AST SERPL-CCNC: 29 U/L (ref 12–45)
BILIRUB SERPL-MCNC: 1.4 MG/DL (ref 0.1–1.5)
BUN SERPL-MCNC: 11 MG/DL (ref 8–22)
CALCIUM ALBUM COR SERPL-MCNC: 9.3 MG/DL (ref 8.5–10.5)
CALCIUM SERPL-MCNC: 9.2 MG/DL (ref 8.5–10.5)
CHLORIDE SERPL-SCNC: 103 MMOL/L (ref 96–112)
CO2 SERPL-SCNC: 23 MMOL/L (ref 20–33)
CREAT SERPL-MCNC: 0.74 MG/DL (ref 0.5–1.4)
GFR SERPLBLD CREATININE-BSD FMLA CKD-EPI: 82 ML/MIN/1.73 M 2
GLOBULIN SER CALC-MCNC: 3.5 G/DL (ref 1.9–3.5)
GLUCOSE SERPL-MCNC: 108 MG/DL (ref 65–99)
POTASSIUM SERPL-SCNC: 4.1 MMOL/L (ref 3.6–5.5)
PROT SERPL-MCNC: 7.4 G/DL (ref 6–8.2)
SODIUM SERPL-SCNC: 138 MMOL/L (ref 135–145)

## 2025-05-28 PROCEDURE — 80053 COMPREHEN METABOLIC PANEL: CPT

## 2025-05-28 PROCEDURE — 36415 COLL VENOUS BLD VENIPUNCTURE: CPT

## 2025-08-20 ENCOUNTER — HOSPITAL ENCOUNTER (OUTPATIENT)
Dept: LAB | Facility: MEDICAL CENTER | Age: 80
End: 2025-08-20
Attending: INTERNAL MEDICINE
Payer: COMMERCIAL

## 2025-08-20 LAB
25(OH)D3 SERPL-MCNC: 28 NG/ML (ref 30–100)
T3FREE SERPL-MCNC: 2.96 PG/ML (ref 2–4.4)
T4 FREE SERPL-MCNC: 1.26 NG/DL (ref 0.93–1.7)
TSH SERPL-ACNC: 0.12 UIU/ML (ref 0.38–5.33)
VIT B12 SERPL-MCNC: 2992 PG/ML (ref 211–911)

## 2025-08-20 PROCEDURE — 84443 ASSAY THYROID STIM HORMONE: CPT

## 2025-08-20 PROCEDURE — 84439 ASSAY OF FREE THYROXINE: CPT

## 2025-08-20 PROCEDURE — 82607 VITAMIN B-12: CPT

## 2025-08-20 PROCEDURE — 84481 FREE ASSAY (FT-3): CPT

## 2025-08-20 PROCEDURE — 36415 COLL VENOUS BLD VENIPUNCTURE: CPT

## 2025-08-20 PROCEDURE — 82306 VITAMIN D 25 HYDROXY: CPT

## (undated) DEVICE — CHLORAPREP 26 ML APPLICATOR - ORANGE TINT(25/CA)

## (undated) DEVICE — CONNECTOR Y TBG CRTY 5 IN 1 STERILE (50EA/CA)

## (undated) DEVICE — TOWEL STOP TIMEOUT SAFETY FLAG (40EA/CA)

## (undated) DEVICE — BLADE SURGICAL #15 - (50/BX 3BX/CA)

## (undated) DEVICE — MASK ANESTHESIA ADULT  - (100/CA)

## (undated) DEVICE — SUCTION INSTRUMENT YANKAUER BULBOUS TIP W/O VENT (50EA/CA)

## (undated) DEVICE — DRAPESURG STERI-DRAPE LONG - (10/BX 4BX/CA)

## (undated) DEVICE — COVER LIGHT HANDLE ALC PLUS DISP (18EA/BX)

## (undated) DEVICE — CANISTER SUCTION RIGID RED 1500CC (40EA/CA)

## (undated) DEVICE — ELECTRODE 850 FOAM ADHESIVE - HYDROGEL RADIOTRNSPRNT (50/PK)

## (undated) DEVICE — SENSOR OXIMETER ADULT SPO2 RD SET (20EA/BX)

## (undated) DEVICE — DRAPE IOBAN II INCISE 23X17 - (10EA/BX 4BX/CA)

## (undated) DEVICE — SUTURE 2-0 VICRYL PLUS CT-2 - 27 INCH (36/BX)

## (undated) DEVICE — STAPLER SKIN DISP - (6/BX 10BX/CA) VISISTAT

## (undated) DEVICE — GOWN WARMING STANDARD FLEX - (30/CA)

## (undated) DEVICE — GLOVE BIOGEL SZ 7 SURGICAL PF LTX - (50PR/BX 4BX/CA)

## (undated) DEVICE — PROTECTOR ULNA NERVE - (36PR/CA)

## (undated) DEVICE — CANISTER SUCTION 3000ML MECHANICAL FILTER AUTO SHUTOFF MEDI-VAC NONSTERILE LF DISP (40EA/CA)

## (undated) DEVICE — STAPLER ECHELON 3000 60MM LONG (3EA/BX)

## (undated) DEVICE — SUTURE 1 PDS II PLUS TP-1 - (12PK/BX)

## (undated) DEVICE — SET EXTENSION WITH 2 PORTS (48EA/CA) ***PART #2C8610 IS A SUBSTITUTE*****

## (undated) DEVICE — TRAY ANESTHESIA - CONTINUOUS EPIDURAL (10EA/CA) THIS IS A CUSTOM PACK

## (undated) DEVICE — DERMABOND ADVANCED - (12EA/BX)

## (undated) DEVICE — KIT CUSTOM PROCEDURE SINGLE FOR ENDO (15/CA)

## (undated) DEVICE — TUBE CONNECT SUCTION CLEAR 120 X 1/4" (50EA/CA)"

## (undated) DEVICE — SET LEADWIRE 5 LEAD BEDSIDE DISPOSABLE ECG (1SET OF 5/EA)

## (undated) DEVICE — TUBING CLEARLINK DUO-VENT - C-FLO (48EA/CA)

## (undated) DEVICE — TUBE CHEST SOFT STRAIGHT 24FR (10EA/BX)

## (undated) DEVICE — LACTATED RINGERS INJ 1000 ML - (14EA/CA 60CA/PF)

## (undated) DEVICE — DRAPE LARGE 3 QUARTER - (20/CA)

## (undated) DEVICE — BUTTON ENDOSCOPY DISPOSABLE

## (undated) DEVICE — SPONGE GAUZESTER 4 X 4 4PLY - (128PK/CA)

## (undated) DEVICE — ELECTRODE DUAL RETURN W/ CORD - (50/PK)

## (undated) DEVICE — SYRINGE 10 ML CONTROL LL (25EA/BX 4BX/CA)

## (undated) DEVICE — COVER ENDOSCOPE DISTAL SINGLE USE (20EA/BX)

## (undated) DEVICE — WATER IRRIGATION STERILE 1000ML (12EA/CA)

## (undated) DEVICE — SURGIFOAM (SIZE 100) - (6EA/CA)

## (undated) DEVICE — BOVIE BLADE COATED &INSULATED (50EA/PK)

## (undated) DEVICE — DEVICE BIOPSY RX BILIARY SYSTEM CAP (10EA/BX)

## (undated) DEVICE — SYSTEM CLEARIFY VISUALIZATION (10EA/PK)

## (undated) DEVICE — SUTURE GENERAL

## (undated) DEVICE — SPONGE LAP XR ST 36X36 LF - (1/PK40PK/CA)

## (undated) DEVICE — TOWELS CLOTH SURGICAL - (4/PK 20PK/CA)

## (undated) DEVICE — PACK LAP CHOLE OR - (2EA/CA)

## (undated) DEVICE — SLEEVE, VASO, THIGH, MED

## (undated) DEVICE — SOD. CHL. INJ. 0.9% 1000 ML - (14EA/CA 60CA/PF)

## (undated) DEVICE — PACK MAJOR BASIN - (2EA/CA)

## (undated) DEVICE — CONTAINER SPECIMEN BAG OR - STERILE 4 OZ W/LID (100EA/CA)

## (undated) DEVICE — PAD LAP STERILE 18 X 18 - (5/PK 40PK/CA)

## (undated) DEVICE — SYRINGE 50 ML LL (40EA/BX 4BX/CA)

## (undated) DEVICE — NEEDLE NON SAFETY 25 GA X 1 1/2 IN HYPO (100EA/BX)

## (undated) DEVICE — DRESSING NON-ADHERING 8 X 3 - (50/BX)

## (undated) DEVICE — CLIP LG INTNL HRZN TI ESCP LGT - (20/BX)

## (undated) DEVICE — HEAD HOLDER JUNIOR/ADULT

## (undated) DEVICE — VESSELOOP MAXI BLUE STERILE- SURG-I-LOOP (10EA/BX)

## (undated) DEVICE — KIT ANESTHESIA W/CIRCUIT & 3/LT BAG W/FILTER (20EA/CA)

## (undated) DEVICE — ADHESIVE MASTISOL - (48/BX)

## (undated) DEVICE — SPONGE GAUZE NON-STERILE 4X4 - (2000/CA 10PK/CA)

## (undated) DEVICE — TUBE CONNECTING SUCTION - CLEAR PLASTIC STERILE 72 IN (50EA/CA)

## (undated) DEVICE — SUTURE 3-0 SILK SH (12PK/BX)

## (undated) DEVICE — BOVIE BLADE COATED - (50/PK)

## (undated) DEVICE — STAPLE 60MM WHTE 2.6MM - (12/BX) WAS PART #ECR60W

## (undated) DEVICE — TROCAR THORACOPORT SOFT 12MM (12EA/CA)

## (undated) DEVICE — TUBE E-T HI-LO CUFF 7.5MM (10EA/PK)

## (undated) DEVICE — SODIUM CHL IRRIGATION 0.9% 1000ML (12EA/CA)

## (undated) DEVICE — SUTURE 2-0 VICRYL PLUS CT-1 36 (36PK/BX)"

## (undated) DEVICE — GUIDEWARE ENDOSCOPIC JAGWIRE REVOLUTION RX 39 SPHINCTEROTOME (1EA)

## (undated) DEVICE — MASK OXYGEN VNYL ADLT MED CONC WITH 7 FOOT TUBING - (50EA/CA)

## (undated) DEVICE — SUTURE 0 SILK CT-1 (36PK/BX)

## (undated) DEVICE — GLOVE BIOGEL SZ 8 SURGICAL PF LTX - (50PR/BX 4BX/CA)

## (undated) DEVICE — CATHETER TROCAR 24FR - (10/CA)

## (undated) DEVICE — SUTURE 3-0 VICRYL PLUS SH - 8X 18 INCH (12/BX)

## (undated) DEVICE — STAPLE 35MM WHITE ECHELON FLEX (12/BX)

## (undated) DEVICE — BLOCK BITE MAXI DENTAL RETENTION RIM (100EA/BX)

## (undated) DEVICE — TRAY SURESTEP FOLEY TEMP SENSING 16FR SNAP SECURE(10EA/CA) ORDER #18764 FOR TEMP FOLEY ONLY

## (undated) DEVICE — HEMOSTAT SURG ABSORBABLE - 4 X 8 IN SURGICEL (24EA/CA)

## (undated) DEVICE — FILM CASSETTE ENDO

## (undated) DEVICE — BOVIE BLADE COATED &INSULATED - 25/PK

## (undated) DEVICE — SYSTEM PREVENA INCISION MNGM - (1/EA)

## (undated) DEVICE — SUTURE 4-0 MONOCRYL PLUS PS-2 - 27 INCH (36/BX)

## (undated) DEVICE — SLEEVE VASO CALF MED - (10PR/CA)

## (undated) DEVICE — PACK MINOR BASIN - (2EA/CA)

## (undated) DEVICE — GLOVE SZ 6.5 BIOGEL PI MICRO - PF LF (50PR/BX)

## (undated) DEVICE — SUTURE 4-0 MONOCRYL PLUS PS-1 - 27 INCH (36/BX)

## (undated) DEVICE — DRAPE ULTRA GUARD LAPAROTOMY WITH POUCHES 102 X 121 X 78 (12EA/CA)

## (undated) DEVICE — PORT AUXILLARY WATER (50EA/BX)

## (undated) DEVICE — SET SUCTION/IRRIGATION WITH DISPOSABLE TIP (6/CA )PART #0250-070-520 IS A SUB

## (undated) DEVICE — DRAPE CHEST/BREAST - (12EA/CA)

## (undated) DEVICE — CLIP MED INTNL HRZN TI ESCP - (25/BX)

## (undated) DEVICE — CANISTER SUCTION 3000ML MECHANICAL FILTER AUTO SHUTOFF MEDI-VAC NONSTERILE LF DISP  (40EA/CA)

## (undated) DEVICE — NEPTUNE 4 PORT MANIFOLD - (20/PK)

## (undated) DEVICE — CORETEMP DRAPE FORM-FITTED EASY DROPANDGO DRAPE FOR USE ON THE CORETEMP FLUID MANAGEMENT 56IN X 56IN

## (undated) DEVICE — SUCTION TIP STRAIGHT ARGYLE - 50EA/CA

## (undated) DEVICE — SUTURE 2-0 SILK SH 30 IN C/R (12PK/BX)

## (undated) DEVICE — CLIP MED LG INTNL HRZN TI ESCP - (20/BX)

## (undated) DEVICE — SLEEVE VASO DVT COMPRESSION CALF MED - (10PR/CA)